# Patient Record
Sex: MALE | Race: BLACK OR AFRICAN AMERICAN | Employment: UNEMPLOYED | URBAN - METROPOLITAN AREA
[De-identification: names, ages, dates, MRNs, and addresses within clinical notes are randomized per-mention and may not be internally consistent; named-entity substitution may affect disease eponyms.]

---

## 2017-03-15 ENCOUNTER — PATIENT OUTREACH (OUTPATIENT)
Dept: INTERNAL MEDICINE CLINIC | Age: 62
End: 2017-03-15

## 2017-03-15 NOTE — PROGRESS NOTES
Goals        Post Hospitalization     Prevent complications post hospitalization. 3/15/17: Patient currently still admitted to [facility]; Wife to contact this NN with updates regarding discharge planning/discharge date.

## 2017-03-15 NOTE — PROGRESS NOTES
[address] [facility] Post-Hospitalization          NN follow-up    Patient referred to this NN via Referral from [address] 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 Patient's most recent Office Visit with PCP: 10/27/16; was advised to follow-up in 6 months. Most recent HIPAA form in the patient's chart (dated 10/27/16) was reviewed; authorized individual(s) listed on HIPAA form include Rick Gooden. NN follow-up phone call  NN spoke with the patient's Wife, Ya Ozuna, today to complete NN post-hospital discharge assessment. Two patient identifiers verified. NN introduced self to the patient's Wife, including NN role and purpose of NN follow-up phone call; patient's Wife verbalizes understanding. Per Mrs. Ryder Fernández, the patient is still currently admitted to [facility]; reports that patient may possibly be discharged from [facility] tomorrow, however discharge disposition is currently unknown. NN requested that Mrs. Ryder Fernández contact this NN with updates regarding patient's discharge planning and notify this NN upon patient's discharge from [facility]; Mrs. Ryder Fernández is agreeable to this. NN will route this encounter to Dr. Tan Oneill for notification/review. This note will not be viewable in [address]

## 2017-03-17 ENCOUNTER — HOSPITAL ENCOUNTER (OUTPATIENT)
Age: 62
Discharge: HOME HEALTH | End: 2017-03-31
Attending: PHYSICAL MEDICINE & REHABILITATION | Admitting: PHYSICAL MEDICINE & REHABILITATION

## 2017-03-17 PROCEDURE — 74011250636 HC RX REV CODE- 250/636: Performed by: PHYSICAL MEDICINE & REHABILITATION

## 2017-03-17 PROCEDURE — 74011250637 HC RX REV CODE- 250/637: Performed by: PHYSICAL MEDICINE & REHABILITATION

## 2017-03-17 RX ORDER — LISINOPRIL 20 MG/1
20 TABLET ORAL EVERY EVENING
Status: DISCONTINUED | OUTPATIENT
Start: 2017-03-17 | End: 2017-03-20

## 2017-03-17 RX ORDER — MAGNESIUM SULFATE 100 %
16 CRYSTALS MISCELLANEOUS AS NEEDED
Status: DISCONTINUED | OUTPATIENT
Start: 2017-03-17 | End: 2017-03-31 | Stop reason: HOSPADM

## 2017-03-17 RX ORDER — OXYCODONE HYDROCHLORIDE 5 MG/1
5 TABLET ORAL
Status: DISCONTINUED | OUTPATIENT
Start: 2017-03-17 | End: 2017-03-30

## 2017-03-17 RX ORDER — HYDRALAZINE HYDROCHLORIDE 10 MG/1
10 TABLET, FILM COATED ORAL 2 TIMES DAILY
Status: DISCONTINUED | OUTPATIENT
Start: 2017-03-17 | End: 2017-03-27

## 2017-03-17 RX ORDER — CARVEDILOL 12.5 MG/1
37.5 TABLET ORAL 2 TIMES DAILY WITH MEALS
Status: DISCONTINUED | OUTPATIENT
Start: 2017-03-17 | End: 2017-03-20

## 2017-03-17 RX ORDER — VANCOMYCIN/0.9 % SOD CHLORIDE 1.5G/250ML
1500 PLASTIC BAG, INJECTION (ML) INTRAVENOUS EVERY 12 HOURS
Status: ACTIVE | OUTPATIENT
Start: 2017-03-17 | End: 2017-03-22

## 2017-03-17 RX ORDER — ACETAMINOPHEN 325 MG/1
650 TABLET ORAL
Status: DISCONTINUED | OUTPATIENT
Start: 2017-03-17 | End: 2017-03-31 | Stop reason: HOSPADM

## 2017-03-17 RX ORDER — ADHESIVE BANDAGE
30 BANDAGE TOPICAL DAILY PRN
Status: DISCONTINUED | OUTPATIENT
Start: 2017-03-17 | End: 2017-03-31 | Stop reason: HOSPADM

## 2017-03-17 RX ORDER — AMOXICILLIN 250 MG
1 CAPSULE ORAL 2 TIMES DAILY
Status: DISCONTINUED | OUTPATIENT
Start: 2017-03-17 | End: 2017-03-31 | Stop reason: HOSPADM

## 2017-03-17 RX ORDER — IPRATROPIUM BROMIDE AND ALBUTEROL SULFATE 2.5; .5 MG/3ML; MG/3ML
3 SOLUTION RESPIRATORY (INHALATION)
Status: DISCONTINUED | OUTPATIENT
Start: 2017-03-17 | End: 2017-03-31 | Stop reason: HOSPADM

## 2017-03-17 RX ORDER — ZOLPIDEM TARTRATE 5 MG/1
5 TABLET ORAL
Status: DISCONTINUED | OUTPATIENT
Start: 2017-03-17 | End: 2017-03-31 | Stop reason: HOSPADM

## 2017-03-17 RX ORDER — NYSTATIN 100000 [USP'U]/ML
500000 SUSPENSION ORAL 4 TIMES DAILY
Status: DISPENSED | OUTPATIENT
Start: 2017-03-17 | End: 2017-03-22

## 2017-03-17 RX ORDER — LANOLIN ALCOHOL/MO/W.PET/CERES
800 CREAM (GRAM) TOPICAL 2 TIMES DAILY
Status: DISCONTINUED | OUTPATIENT
Start: 2017-03-17 | End: 2017-03-31 | Stop reason: HOSPADM

## 2017-03-17 RX ORDER — SODIUM CHLORIDE 0.9 % (FLUSH) 0.9 %
5 SYRINGE (ML) INJECTION AS NEEDED
Status: DISCONTINUED | OUTPATIENT
Start: 2017-03-17 | End: 2017-03-23 | Stop reason: ALTCHOICE

## 2017-03-17 RX ORDER — FACIAL-BODY WIPES
10 EACH TOPICAL DAILY PRN
Status: DISCONTINUED | OUTPATIENT
Start: 2017-03-17 | End: 2017-03-31 | Stop reason: HOSPADM

## 2017-03-17 RX ORDER — OXYCODONE HCL 10 MG/1
10 TABLET, FILM COATED, EXTENDED RELEASE ORAL EVERY 12 HOURS
Status: DISCONTINUED | OUTPATIENT
Start: 2017-03-17 | End: 2017-03-19

## 2017-03-17 RX ORDER — AMOXICILLIN AND CLAVULANATE POTASSIUM 875; 125 MG/1; MG/1
1 TABLET, FILM COATED ORAL EVERY 12 HOURS
Status: COMPLETED | OUTPATIENT
Start: 2017-03-17 | End: 2017-03-22

## 2017-03-17 RX ORDER — INSULIN LISPRO 100 [IU]/ML
INJECTION, SOLUTION INTRAVENOUS; SUBCUTANEOUS
Status: DISCONTINUED | OUTPATIENT
Start: 2017-03-17 | End: 2017-03-28

## 2017-03-17 RX ORDER — DEXTROSE 50 % IN WATER (D50W) INTRAVENOUS SYRINGE
25 AS NEEDED
Status: DISCONTINUED | OUTPATIENT
Start: 2017-03-17 | End: 2017-03-31 | Stop reason: HOSPADM

## 2017-03-17 RX ORDER — ATORVASTATIN CALCIUM 40 MG/1
80 TABLET, FILM COATED ORAL
Status: DISCONTINUED | OUTPATIENT
Start: 2017-03-17 | End: 2017-03-31 | Stop reason: HOSPADM

## 2017-03-17 RX ORDER — GUAIFENESIN 100 MG/5ML
200 SOLUTION ORAL
Status: DISCONTINUED | OUTPATIENT
Start: 2017-03-17 | End: 2017-03-23

## 2017-03-17 RX ORDER — ONDANSETRON 4 MG/1
4 TABLET, ORALLY DISINTEGRATING ORAL
Status: DISCONTINUED | OUTPATIENT
Start: 2017-03-17 | End: 2017-03-31 | Stop reason: HOSPADM

## 2017-03-17 RX ORDER — OXYCODONE HYDROCHLORIDE 5 MG/1
10 TABLET ORAL
Status: DISCONTINUED | OUTPATIENT
Start: 2017-03-17 | End: 2017-03-30

## 2017-03-17 RX ORDER — LEVOFLOXACIN 750 MG/1
750 TABLET ORAL DAILY
Status: COMPLETED | OUTPATIENT
Start: 2017-03-18 | End: 2017-03-22

## 2017-03-17 RX ORDER — SODIUM CHLORIDE 0.9 % (FLUSH) 0.9 %
5 SYRINGE (ML) INJECTION EVERY 8 HOURS
Status: DISCONTINUED | OUTPATIENT
Start: 2017-03-17 | End: 2017-03-23 | Stop reason: ALTCHOICE

## 2017-03-17 RX ADMIN — CARVEDILOL 37.5 MG: 12.5 TABLET, FILM COATED ORAL at 19:25

## 2017-03-17 RX ADMIN — RIVAROXABAN 20 MG: 20 TABLET, FILM COATED ORAL at 19:25

## 2017-03-17 RX ADMIN — Medication 1 TABLET: at 20:44

## 2017-03-17 RX ADMIN — HYDRALAZINE HYDROCHLORIDE 10 MG: 10 TABLET, FILM COATED ORAL at 20:44

## 2017-03-17 RX ADMIN — GUAIFENESIN 200 MG: 100 SOLUTION ORAL at 22:06

## 2017-03-17 RX ADMIN — INSULIN LISPRO 2 UNITS: 100 INJECTION, SOLUTION INTRAVENOUS; SUBCUTANEOUS at 22:00

## 2017-03-17 RX ADMIN — GUAIFENESIN 200 MG: 100 SOLUTION ORAL at 19:29

## 2017-03-17 RX ADMIN — Medication 800 MG: at 20:44

## 2017-03-17 RX ADMIN — LISINOPRIL 20 MG: 20 TABLET ORAL at 19:25

## 2017-03-17 RX ADMIN — VANCOMYCIN HYDROCHLORIDE 1500 MG: 10 INJECTION, POWDER, LYOPHILIZED, FOR SOLUTION INTRAVENOUS at 22:07

## 2017-03-17 RX ADMIN — OXYCODONE HYDROCHLORIDE 10 MG: 10 TABLET, FILM COATED, EXTENDED RELEASE ORAL at 20:44

## 2017-03-17 RX ADMIN — NYSTATIN 500000 UNITS: 100000 SUSPENSION ORAL at 20:45

## 2017-03-17 RX ADMIN — ATORVASTATIN CALCIUM 80 MG: 40 TABLET, FILM COATED ORAL at 20:44

## 2017-03-17 RX ADMIN — LEVETIRACETAM 750 MG: 250 TABLET, FILM COATED ORAL at 20:45

## 2017-03-17 RX ADMIN — AMOXICILLIN AND CLAVULANATE POTASSIUM 1 TABLET: 875; 125 TABLET, FILM COATED ORAL at 20:45

## 2017-03-18 LAB — VANCOMYCIN SERPL-MCNC: 14.6 UG/ML

## 2017-03-18 PROCEDURE — 74011250637 HC RX REV CODE- 250/637: Performed by: PHYSICAL MEDICINE & REHABILITATION

## 2017-03-18 PROCEDURE — 74011250636 HC RX REV CODE- 250/636: Performed by: PHYSICAL MEDICINE & REHABILITATION

## 2017-03-18 PROCEDURE — 36415 COLL VENOUS BLD VENIPUNCTURE: CPT | Performed by: PHYSICAL MEDICINE & REHABILITATION

## 2017-03-18 PROCEDURE — 80202 ASSAY OF VANCOMYCIN: CPT | Performed by: PHYSICAL MEDICINE & REHABILITATION

## 2017-03-18 RX ADMIN — LEVETIRACETAM 750 MG: 250 TABLET, FILM COATED ORAL at 08:59

## 2017-03-18 RX ADMIN — Medication 5 ML: at 21:05

## 2017-03-18 RX ADMIN — OXYCODONE HYDROCHLORIDE 10 MG: 10 TABLET, FILM COATED, EXTENDED RELEASE ORAL at 20:59

## 2017-03-18 RX ADMIN — Medication 1 TABLET: at 20:58

## 2017-03-18 RX ADMIN — Medication 800 MG: at 21:04

## 2017-03-18 RX ADMIN — AMOXICILLIN AND CLAVULANATE POTASSIUM 1 TABLET: 875; 125 TABLET, FILM COATED ORAL at 20:58

## 2017-03-18 RX ADMIN — Medication 5 ML: at 05:07

## 2017-03-18 RX ADMIN — Medication 800 MG: at 08:59

## 2017-03-18 RX ADMIN — CARVEDILOL 37.5 MG: 12.5 TABLET, FILM COATED ORAL at 17:40

## 2017-03-18 RX ADMIN — LEVETIRACETAM 750 MG: 250 TABLET, FILM COATED ORAL at 21:00

## 2017-03-18 RX ADMIN — Medication 1 TABLET: at 08:59

## 2017-03-18 RX ADMIN — HYDRALAZINE HYDROCHLORIDE 10 MG: 10 TABLET, FILM COATED ORAL at 21:29

## 2017-03-18 RX ADMIN — GUAIFENESIN 200 MG: 100 SOLUTION ORAL at 14:35

## 2017-03-18 RX ADMIN — OXYCODONE HYDROCHLORIDE 10 MG: 10 TABLET, FILM COATED, EXTENDED RELEASE ORAL at 09:00

## 2017-03-18 RX ADMIN — OXYCODONE HYDROCHLORIDE 10 MG: 5 TABLET ORAL at 05:18

## 2017-03-18 RX ADMIN — Medication 1 CAPSULE: at 08:59

## 2017-03-18 RX ADMIN — NYSTATIN 500000 UNITS: 100000 SUSPENSION ORAL at 08:59

## 2017-03-18 RX ADMIN — AMOXICILLIN AND CLAVULANATE POTASSIUM 1 TABLET: 875; 125 TABLET, FILM COATED ORAL at 08:59

## 2017-03-18 RX ADMIN — HYDRALAZINE HYDROCHLORIDE 10 MG: 10 TABLET, FILM COATED ORAL at 09:00

## 2017-03-18 RX ADMIN — RIVAROXABAN 20 MG: 20 TABLET, FILM COATED ORAL at 17:40

## 2017-03-18 RX ADMIN — CARVEDILOL 37.5 MG: 12.5 TABLET, FILM COATED ORAL at 09:00

## 2017-03-18 RX ADMIN — INSULIN LISPRO 2 UNITS: 100 INJECTION, SOLUTION INTRAVENOUS; SUBCUTANEOUS at 09:00

## 2017-03-18 RX ADMIN — LISINOPRIL 20 MG: 20 TABLET ORAL at 17:40

## 2017-03-18 RX ADMIN — LEVOFLOXACIN 750 MG: 750 TABLET, FILM COATED ORAL at 08:59

## 2017-03-18 RX ADMIN — GUAIFENESIN 200 MG: 100 SOLUTION ORAL at 05:07

## 2017-03-18 RX ADMIN — ATORVASTATIN CALCIUM 80 MG: 40 TABLET, FILM COATED ORAL at 20:59

## 2017-03-18 RX ADMIN — VANCOMYCIN HYDROCHLORIDE 1500 MG: 10 INJECTION, POWDER, LYOPHILIZED, FOR SOLUTION INTRAVENOUS at 11:20

## 2017-03-18 RX ADMIN — Medication 5 ML: at 14:19

## 2017-03-18 RX ADMIN — GUAIFENESIN 200 MG: 100 SOLUTION ORAL at 20:58

## 2017-03-18 RX ADMIN — GUAIFENESIN 200 MG: 100 SOLUTION ORAL at 17:40

## 2017-03-18 RX ADMIN — NYSTATIN 500000 UNITS: 100000 SUSPENSION ORAL at 17:41

## 2017-03-18 RX ADMIN — GUAIFENESIN 200 MG: 100 SOLUTION ORAL at 09:00

## 2017-03-19 LAB
ANION GAP BLD CALC-SCNC: 10 MMOL/L (ref 5–15)
BUN SERPL-MCNC: 11 MG/DL (ref 6–20)
BUN/CREAT SERPL: 9 (ref 12–20)
CALCIUM SERPL-MCNC: 8.3 MG/DL (ref 8.5–10.1)
CHLORIDE SERPL-SCNC: 104 MMOL/L (ref 97–108)
CO2 SERPL-SCNC: 26 MMOL/L (ref 21–32)
CREAT SERPL-MCNC: 1.16 MG/DL (ref 0.7–1.3)
GLUCOSE SERPL-MCNC: 167 MG/DL (ref 65–100)
POTASSIUM SERPL-SCNC: 3.7 MMOL/L (ref 3.5–5.1)
SODIUM SERPL-SCNC: 140 MMOL/L (ref 136–145)

## 2017-03-19 PROCEDURE — 74011250636 HC RX REV CODE- 250/636: Performed by: PHYSICAL MEDICINE & REHABILITATION

## 2017-03-19 PROCEDURE — 36415 COLL VENOUS BLD VENIPUNCTURE: CPT | Performed by: PHYSICAL MEDICINE & REHABILITATION

## 2017-03-19 PROCEDURE — 74011250637 HC RX REV CODE- 250/637: Performed by: PHYSICAL MEDICINE & REHABILITATION

## 2017-03-19 PROCEDURE — 80048 BASIC METABOLIC PNL TOTAL CA: CPT | Performed by: PHYSICAL MEDICINE & REHABILITATION

## 2017-03-19 RX ORDER — OXYCODONE HCL 20 MG/1
20 TABLET, FILM COATED, EXTENDED RELEASE ORAL EVERY 12 HOURS
Status: DISCONTINUED | OUTPATIENT
Start: 2017-03-19 | End: 2017-03-21

## 2017-03-19 RX ORDER — LIDOCAINE 50 MG/G
1 PATCH TOPICAL EVERY 24 HOURS
Status: DISCONTINUED | OUTPATIENT
Start: 2017-03-19 | End: 2017-03-21

## 2017-03-19 RX ADMIN — OXYCODONE HYDROCHLORIDE 10 MG: 5 TABLET ORAL at 12:13

## 2017-03-19 RX ADMIN — INSULIN LISPRO 2 UNITS: 100 INJECTION, SOLUTION INTRAVENOUS; SUBCUTANEOUS at 11:30

## 2017-03-19 RX ADMIN — RIVAROXABAN 20 MG: 20 TABLET, FILM COATED ORAL at 18:04

## 2017-03-19 RX ADMIN — OXYCODONE HYDROCHLORIDE 10 MG: 5 TABLET ORAL at 18:03

## 2017-03-19 RX ADMIN — Medication 5 ML: at 06:44

## 2017-03-19 RX ADMIN — ACETAMINOPHEN 650 MG: 325 TABLET, FILM COATED ORAL at 18:03

## 2017-03-19 RX ADMIN — VANCOMYCIN HYDROCHLORIDE 1500 MG: 10 INJECTION, POWDER, LYOPHILIZED, FOR SOLUTION INTRAVENOUS at 01:15

## 2017-03-19 RX ADMIN — HYDRALAZINE HYDROCHLORIDE 10 MG: 10 TABLET, FILM COATED ORAL at 08:17

## 2017-03-19 RX ADMIN — ACETAMINOPHEN 650 MG: 325 TABLET, FILM COATED ORAL at 12:13

## 2017-03-19 RX ADMIN — Medication 800 MG: at 22:19

## 2017-03-19 RX ADMIN — LEVETIRACETAM 750 MG: 250 TABLET, FILM COATED ORAL at 22:20

## 2017-03-19 RX ADMIN — Medication 1 TABLET: at 22:21

## 2017-03-19 RX ADMIN — Medication 1 TABLET: at 08:17

## 2017-03-19 RX ADMIN — NYSTATIN 500000 UNITS: 100000 SUSPENSION ORAL at 18:04

## 2017-03-19 RX ADMIN — OXYCODONE HYDROCHLORIDE 10 MG: 5 TABLET ORAL at 08:18

## 2017-03-19 RX ADMIN — Medication 800 MG: at 08:17

## 2017-03-19 RX ADMIN — LEVOFLOXACIN 750 MG: 750 TABLET, FILM COATED ORAL at 08:17

## 2017-03-19 RX ADMIN — ACETAMINOPHEN 650 MG: 325 TABLET, FILM COATED ORAL at 22:18

## 2017-03-19 RX ADMIN — ACETAMINOPHEN 650 MG: 325 TABLET, FILM COATED ORAL at 15:28

## 2017-03-19 RX ADMIN — NYSTATIN 500000 UNITS: 100000 SUSPENSION ORAL at 22:21

## 2017-03-19 RX ADMIN — ACETAMINOPHEN 650 MG: 325 TABLET, FILM COATED ORAL at 08:18

## 2017-03-19 RX ADMIN — GUAIFENESIN 200 MG: 100 SOLUTION ORAL at 22:21

## 2017-03-19 RX ADMIN — AMOXICILLIN AND CLAVULANATE POTASSIUM 1 TABLET: 875; 125 TABLET, FILM COATED ORAL at 08:17

## 2017-03-19 RX ADMIN — GUAIFENESIN 200 MG: 100 SOLUTION ORAL at 18:04

## 2017-03-19 RX ADMIN — INSULIN LISPRO 2 UNITS: 100 INJECTION, SOLUTION INTRAVENOUS; SUBCUTANEOUS at 00:06

## 2017-03-19 RX ADMIN — OXYCODONE HYDROCHLORIDE 20 MG: 20 TABLET, FILM COATED, EXTENDED RELEASE ORAL at 22:20

## 2017-03-19 RX ADMIN — Medication 1 CAPSULE: at 08:17

## 2017-03-19 RX ADMIN — HYDRALAZINE HYDROCHLORIDE 10 MG: 10 TABLET, FILM COATED ORAL at 22:21

## 2017-03-19 RX ADMIN — OXYCODONE HYDROCHLORIDE 5 MG: 5 TABLET ORAL at 22:20

## 2017-03-19 RX ADMIN — OXYCODONE HYDROCHLORIDE 10 MG: 10 TABLET, FILM COATED, EXTENDED RELEASE ORAL at 08:18

## 2017-03-19 RX ADMIN — INSULIN LISPRO 2 UNITS: 100 INJECTION, SOLUTION INTRAVENOUS; SUBCUTANEOUS at 18:02

## 2017-03-19 RX ADMIN — CARVEDILOL 37.5 MG: 12.5 TABLET, FILM COATED ORAL at 18:03

## 2017-03-19 RX ADMIN — Medication 5 ML: at 22:31

## 2017-03-19 RX ADMIN — ATORVASTATIN CALCIUM 80 MG: 40 TABLET, FILM COATED ORAL at 22:18

## 2017-03-19 RX ADMIN — LISINOPRIL 20 MG: 20 TABLET ORAL at 18:04

## 2017-03-19 RX ADMIN — Medication 5 ML: at 14:15

## 2017-03-19 RX ADMIN — OXYCODONE HYDROCHLORIDE 10 MG: 5 TABLET ORAL at 15:28

## 2017-03-19 RX ADMIN — AMOXICILLIN AND CLAVULANATE POTASSIUM 1 TABLET: 875; 125 TABLET, FILM COATED ORAL at 22:21

## 2017-03-19 RX ADMIN — GUAIFENESIN 200 MG: 100 SOLUTION ORAL at 09:11

## 2017-03-19 RX ADMIN — GUAIFENESIN 200 MG: 100 SOLUTION ORAL at 15:28

## 2017-03-19 RX ADMIN — INSULIN LISPRO 2 UNITS: 100 INJECTION, SOLUTION INTRAVENOUS; SUBCUTANEOUS at 22:29

## 2017-03-19 RX ADMIN — NYSTATIN 500000 UNITS: 100000 SUSPENSION ORAL at 08:19

## 2017-03-19 RX ADMIN — OXYCODONE HYDROCHLORIDE 10 MG: 5 TABLET ORAL at 03:55

## 2017-03-19 RX ADMIN — GUAIFENESIN 200 MG: 100 SOLUTION ORAL at 06:44

## 2017-03-19 RX ADMIN — CARVEDILOL 37.5 MG: 12.5 TABLET, FILM COATED ORAL at 08:17

## 2017-03-19 RX ADMIN — LEVETIRACETAM 750 MG: 250 TABLET, FILM COATED ORAL at 08:17

## 2017-03-19 RX ADMIN — VANCOMYCIN HYDROCHLORIDE 1500 MG: 10 INJECTION, POWDER, LYOPHILIZED, FOR SOLUTION INTRAVENOUS at 09:18

## 2017-03-19 RX ADMIN — VANCOMYCIN HYDROCHLORIDE 1500 MG: 10 INJECTION, POWDER, LYOPHILIZED, FOR SOLUTION INTRAVENOUS at 22:41

## 2017-03-19 RX ADMIN — NYSTATIN 500000 UNITS: 100000 SUSPENSION ORAL at 12:27

## 2017-03-20 LAB
ALBUMIN SERPL BCP-MCNC: 2.6 G/DL (ref 3.5–5)
ALBUMIN/GLOB SERPL: 0.5 {RATIO} (ref 1.1–2.2)
ALP SERPL-CCNC: 71 U/L (ref 45–117)
ALT SERPL-CCNC: 20 U/L (ref 12–78)
ANION GAP BLD CALC-SCNC: 9 MMOL/L (ref 5–15)
AST SERPL W P-5'-P-CCNC: 17 U/L (ref 15–37)
BILIRUB SERPL-MCNC: 0.4 MG/DL (ref 0.2–1)
BUN SERPL-MCNC: 12 MG/DL (ref 6–20)
BUN/CREAT SERPL: 8 (ref 12–20)
CALCIUM SERPL-MCNC: 8.3 MG/DL (ref 8.5–10.1)
CHLORIDE SERPL-SCNC: 103 MMOL/L (ref 97–108)
CO2 SERPL-SCNC: 28 MMOL/L (ref 21–32)
CREAT SERPL-MCNC: 1.45 MG/DL (ref 0.7–1.3)
ERYTHROCYTE [DISTWIDTH] IN BLOOD BY AUTOMATED COUNT: 14.1 % (ref 11.5–14.5)
GLOBULIN SER CALC-MCNC: 4.9 G/DL (ref 2–4)
GLUCOSE SERPL-MCNC: 126 MG/DL (ref 65–100)
HCT VFR BLD AUTO: 37.9 % (ref 36.6–50.3)
HGB BLD-MCNC: 12.5 G/DL (ref 12.1–17)
MAGNESIUM SERPL-MCNC: 2.2 MG/DL (ref 1.6–2.4)
MCH RBC QN AUTO: 27.8 PG (ref 26–34)
MCHC RBC AUTO-ENTMCNC: 33 G/DL (ref 30–36.5)
MCV RBC AUTO: 84.2 FL (ref 80–99)
PLATELET # BLD AUTO: 269 K/UL (ref 150–400)
POTASSIUM SERPL-SCNC: 3.8 MMOL/L (ref 3.5–5.1)
PROT SERPL-MCNC: 7.5 G/DL (ref 6.4–8.2)
RBC # BLD AUTO: 4.5 M/UL (ref 4.1–5.7)
SODIUM SERPL-SCNC: 140 MMOL/L (ref 136–145)
VANCOMYCIN SERPL-MCNC: 20.1 UG/ML
WBC # BLD AUTO: 7.3 K/UL (ref 4.1–11.1)

## 2017-03-20 PROCEDURE — 74011250636 HC RX REV CODE- 250/636: Performed by: PHYSICAL MEDICINE & REHABILITATION

## 2017-03-20 PROCEDURE — 83735 ASSAY OF MAGNESIUM: CPT | Performed by: PHYSICAL MEDICINE & REHABILITATION

## 2017-03-20 PROCEDURE — 74011250637 HC RX REV CODE- 250/637: Performed by: PHYSICAL MEDICINE & REHABILITATION

## 2017-03-20 PROCEDURE — 80202 ASSAY OF VANCOMYCIN: CPT | Performed by: PHYSICAL MEDICINE & REHABILITATION

## 2017-03-20 PROCEDURE — 85027 COMPLETE CBC AUTOMATED: CPT | Performed by: PHYSICAL MEDICINE & REHABILITATION

## 2017-03-20 PROCEDURE — 80053 COMPREHEN METABOLIC PANEL: CPT | Performed by: PHYSICAL MEDICINE & REHABILITATION

## 2017-03-20 PROCEDURE — 36415 COLL VENOUS BLD VENIPUNCTURE: CPT | Performed by: PHYSICAL MEDICINE & REHABILITATION

## 2017-03-20 RX ORDER — CARVEDILOL 12.5 MG/1
25 TABLET ORAL 2 TIMES DAILY WITH MEALS
Status: DISCONTINUED | OUTPATIENT
Start: 2017-03-20 | End: 2017-03-31 | Stop reason: HOSPADM

## 2017-03-20 RX ORDER — SODIUM CHLORIDE 9 MG/ML
75 INJECTION, SOLUTION INTRAVENOUS ONCE
Status: COMPLETED | OUTPATIENT
Start: 2017-03-20 | End: 2017-03-20

## 2017-03-20 RX ORDER — LISINOPRIL 20 MG/1
20 TABLET ORAL EVERY EVENING
Status: DISCONTINUED | OUTPATIENT
Start: 2017-03-23 | End: 2017-03-31 | Stop reason: HOSPADM

## 2017-03-20 RX ADMIN — Medication 5 ML: at 10:43

## 2017-03-20 RX ADMIN — LEVETIRACETAM 750 MG: 250 TABLET, FILM COATED ORAL at 22:35

## 2017-03-20 RX ADMIN — NYSTATIN 500000 UNITS: 100000 SUSPENSION ORAL at 22:36

## 2017-03-20 RX ADMIN — NYSTATIN 500000 UNITS: 100000 SUSPENSION ORAL at 12:49

## 2017-03-20 RX ADMIN — OXYCODONE HYDROCHLORIDE 5 MG: 5 TABLET ORAL at 21:39

## 2017-03-20 RX ADMIN — CARVEDILOL 37.5 MG: 12.5 TABLET, FILM COATED ORAL at 09:25

## 2017-03-20 RX ADMIN — NYSTATIN 500000 UNITS: 100000 SUSPENSION ORAL at 09:25

## 2017-03-20 RX ADMIN — CARVEDILOL 25 MG: 12.5 TABLET, FILM COATED ORAL at 17:19

## 2017-03-20 RX ADMIN — Medication 800 MG: at 22:35

## 2017-03-20 RX ADMIN — GUAIFENESIN 200 MG: 100 SOLUTION ORAL at 17:21

## 2017-03-20 RX ADMIN — Medication 5 ML: at 23:10

## 2017-03-20 RX ADMIN — HYDRALAZINE HYDROCHLORIDE 10 MG: 10 TABLET, FILM COATED ORAL at 22:36

## 2017-03-20 RX ADMIN — Medication 5 ML: at 14:18

## 2017-03-20 RX ADMIN — GUAIFENESIN 200 MG: 100 SOLUTION ORAL at 13:03

## 2017-03-20 RX ADMIN — OXYCODONE HYDROCHLORIDE 5 MG: 5 TABLET ORAL at 14:29

## 2017-03-20 RX ADMIN — LEVOFLOXACIN 750 MG: 750 TABLET, FILM COATED ORAL at 09:26

## 2017-03-20 RX ADMIN — GUAIFENESIN 200 MG: 100 SOLUTION ORAL at 09:26

## 2017-03-20 RX ADMIN — AMOXICILLIN AND CLAVULANATE POTASSIUM 1 TABLET: 875; 125 TABLET, FILM COATED ORAL at 09:25

## 2017-03-20 RX ADMIN — VANCOMYCIN HYDROCHLORIDE 1500 MG: 10 INJECTION, POWDER, LYOPHILIZED, FOR SOLUTION INTRAVENOUS at 10:46

## 2017-03-20 RX ADMIN — OXYCODONE HYDROCHLORIDE 20 MG: 20 TABLET, FILM COATED, EXTENDED RELEASE ORAL at 09:26

## 2017-03-20 RX ADMIN — NYSTATIN 500000 UNITS: 100000 SUSPENSION ORAL at 17:21

## 2017-03-20 RX ADMIN — LEVETIRACETAM 750 MG: 250 TABLET, FILM COATED ORAL at 09:26

## 2017-03-20 RX ADMIN — Medication 5 ML: at 06:23

## 2017-03-20 RX ADMIN — OXYCODONE HYDROCHLORIDE 5 MG: 5 TABLET ORAL at 06:22

## 2017-03-20 RX ADMIN — Medication 1 TABLET: at 22:36

## 2017-03-20 RX ADMIN — GUAIFENESIN 200 MG: 100 SOLUTION ORAL at 06:22

## 2017-03-20 RX ADMIN — VANCOMYCIN HYDROCHLORIDE 1500 MG: 10 INJECTION, POWDER, LYOPHILIZED, FOR SOLUTION INTRAVENOUS at 23:09

## 2017-03-20 RX ADMIN — Medication 800 MG: at 09:25

## 2017-03-20 RX ADMIN — HYDRALAZINE HYDROCHLORIDE 10 MG: 10 TABLET, FILM COATED ORAL at 09:26

## 2017-03-20 RX ADMIN — Medication 1 CAPSULE: at 09:26

## 2017-03-20 RX ADMIN — AMOXICILLIN AND CLAVULANATE POTASSIUM 1 TABLET: 875; 125 TABLET, FILM COATED ORAL at 22:35

## 2017-03-20 RX ADMIN — RIVAROXABAN 20 MG: 20 TABLET, FILM COATED ORAL at 17:19

## 2017-03-20 RX ADMIN — Medication 1 TABLET: at 12:50

## 2017-03-20 RX ADMIN — ATORVASTATIN CALCIUM 80 MG: 40 TABLET, FILM COATED ORAL at 22:35

## 2017-03-20 RX ADMIN — GUAIFENESIN 200 MG: 100 SOLUTION ORAL at 22:36

## 2017-03-20 RX ADMIN — SODIUM CHLORIDE 75 ML/HR: 9 INJECTION, SOLUTION INTRAVENOUS at 17:18

## 2017-03-21 LAB
ANION GAP BLD CALC-SCNC: 9 MMOL/L (ref 5–15)
BUN SERPL-MCNC: 8 MG/DL (ref 6–20)
BUN/CREAT SERPL: 6 (ref 12–20)
CALCIUM SERPL-MCNC: 8.3 MG/DL (ref 8.5–10.1)
CHLORIDE SERPL-SCNC: 106 MMOL/L (ref 97–108)
CO2 SERPL-SCNC: 28 MMOL/L (ref 21–32)
CREAT SERPL-MCNC: 1.34 MG/DL (ref 0.7–1.3)
GLUCOSE SERPL-MCNC: 157 MG/DL (ref 65–100)
POTASSIUM SERPL-SCNC: 3.6 MMOL/L (ref 3.5–5.1)
SODIUM SERPL-SCNC: 143 MMOL/L (ref 136–145)

## 2017-03-21 PROCEDURE — 80048 BASIC METABOLIC PNL TOTAL CA: CPT | Performed by: PHYSICAL MEDICINE & REHABILITATION

## 2017-03-21 PROCEDURE — 74011250637 HC RX REV CODE- 250/637: Performed by: PHYSICAL MEDICINE & REHABILITATION

## 2017-03-21 PROCEDURE — 74011250636 HC RX REV CODE- 250/636: Performed by: PHYSICAL MEDICINE & REHABILITATION

## 2017-03-21 PROCEDURE — 36415 COLL VENOUS BLD VENIPUNCTURE: CPT | Performed by: PHYSICAL MEDICINE & REHABILITATION

## 2017-03-21 RX ORDER — LIDOCAINE 50 MG/G
2 PATCH TOPICAL DAILY
Status: DISCONTINUED | OUTPATIENT
Start: 2017-03-22 | End: 2017-03-31 | Stop reason: HOSPADM

## 2017-03-21 RX ORDER — LIDOCAINE 50 MG/G
3 PATCH TOPICAL DAILY
Status: DISCONTINUED | OUTPATIENT
Start: 2017-03-21 | End: 2017-03-21

## 2017-03-21 RX ORDER — LIDOCAINE 50 MG/G
2 PATCH TOPICAL DAILY
Status: DISCONTINUED | OUTPATIENT
Start: 2017-03-21 | End: 2017-03-21

## 2017-03-21 RX ADMIN — INSULIN LISPRO 2 UNITS: 100 INJECTION, SOLUTION INTRAVENOUS; SUBCUTANEOUS at 21:37

## 2017-03-21 RX ADMIN — NYSTATIN 500000 UNITS: 100000 SUSPENSION ORAL at 09:48

## 2017-03-21 RX ADMIN — AMOXICILLIN AND CLAVULANATE POTASSIUM 1 TABLET: 875; 125 TABLET, FILM COATED ORAL at 21:02

## 2017-03-21 RX ADMIN — NYSTATIN 500000 UNITS: 100000 SUSPENSION ORAL at 17:06

## 2017-03-21 RX ADMIN — CARVEDILOL 25 MG: 12.5 TABLET, FILM COATED ORAL at 17:05

## 2017-03-21 RX ADMIN — Medication 5 ML: at 14:28

## 2017-03-21 RX ADMIN — GUAIFENESIN 200 MG: 100 SOLUTION ORAL at 13:03

## 2017-03-21 RX ADMIN — Medication 1 CAPSULE: at 09:47

## 2017-03-21 RX ADMIN — RIVAROXABAN 20 MG: 20 TABLET, FILM COATED ORAL at 17:06

## 2017-03-21 RX ADMIN — VANCOMYCIN HYDROCHLORIDE 1500 MG: 10 INJECTION, POWDER, LYOPHILIZED, FOR SOLUTION INTRAVENOUS at 10:02

## 2017-03-21 RX ADMIN — VANCOMYCIN HYDROCHLORIDE 1500 MG: 10 INJECTION, POWDER, LYOPHILIZED, FOR SOLUTION INTRAVENOUS at 21:11

## 2017-03-21 RX ADMIN — GUAIFENESIN 200 MG: 100 SOLUTION ORAL at 09:47

## 2017-03-21 RX ADMIN — Medication 5 ML: at 06:38

## 2017-03-21 RX ADMIN — Medication 1 TABLET: at 21:02

## 2017-03-21 RX ADMIN — NYSTATIN 500000 UNITS: 100000 SUSPENSION ORAL at 21:02

## 2017-03-21 RX ADMIN — Medication 5 ML: at 21:05

## 2017-03-21 RX ADMIN — AMOXICILLIN AND CLAVULANATE POTASSIUM 1 TABLET: 875; 125 TABLET, FILM COATED ORAL at 09:47

## 2017-03-21 RX ADMIN — GUAIFENESIN 200 MG: 100 SOLUTION ORAL at 17:06

## 2017-03-21 RX ADMIN — NYSTATIN 500000 UNITS: 100000 SUSPENSION ORAL at 12:57

## 2017-03-21 RX ADMIN — OXYCODONE HYDROCHLORIDE 5 MG: 5 TABLET ORAL at 15:43

## 2017-03-21 RX ADMIN — Medication 1 TABLET: at 09:47

## 2017-03-21 RX ADMIN — LEVOFLOXACIN 750 MG: 750 TABLET, FILM COATED ORAL at 06:38

## 2017-03-21 RX ADMIN — LEVETIRACETAM 500 MG: 250 TABLET, FILM COATED ORAL at 21:03

## 2017-03-21 RX ADMIN — Medication 800 MG: at 21:02

## 2017-03-21 RX ADMIN — INSULIN LISPRO 2 UNITS: 100 INJECTION, SOLUTION INTRAVENOUS; SUBCUTANEOUS at 12:56

## 2017-03-21 RX ADMIN — CARVEDILOL 25 MG: 12.5 TABLET, FILM COATED ORAL at 09:47

## 2017-03-21 RX ADMIN — LEVETIRACETAM 750 MG: 250 TABLET, FILM COATED ORAL at 09:47

## 2017-03-21 RX ADMIN — HYDRALAZINE HYDROCHLORIDE 10 MG: 10 TABLET, FILM COATED ORAL at 21:04

## 2017-03-21 RX ADMIN — INSULIN LISPRO 2 UNITS: 100 INJECTION, SOLUTION INTRAVENOUS; SUBCUTANEOUS at 10:01

## 2017-03-21 RX ADMIN — OXYCODONE HYDROCHLORIDE 5 MG: 5 TABLET ORAL at 21:05

## 2017-03-21 RX ADMIN — Medication 800 MG: at 09:47

## 2017-03-21 RX ADMIN — HYDRALAZINE HYDROCHLORIDE 10 MG: 10 TABLET, FILM COATED ORAL at 09:47

## 2017-03-21 RX ADMIN — GUAIFENESIN 200 MG: 100 SOLUTION ORAL at 21:02

## 2017-03-21 RX ADMIN — GUAIFENESIN 200 MG: 100 SOLUTION ORAL at 06:33

## 2017-03-21 RX ADMIN — ATORVASTATIN CALCIUM 80 MG: 40 TABLET, FILM COATED ORAL at 21:03

## 2017-03-21 RX ADMIN — INSULIN LISPRO 2 UNITS: 100 INJECTION, SOLUTION INTRAVENOUS; SUBCUTANEOUS at 17:06

## 2017-03-22 PROCEDURE — 74011250637 HC RX REV CODE- 250/637: Performed by: PHYSICAL MEDICINE & REHABILITATION

## 2017-03-22 PROCEDURE — 74011250636 HC RX REV CODE- 250/636: Performed by: PHYSICAL MEDICINE & REHABILITATION

## 2017-03-22 RX ADMIN — NYSTATIN 500000 UNITS: 100000 SUSPENSION ORAL at 16:08

## 2017-03-22 RX ADMIN — CARVEDILOL 25 MG: 12.5 TABLET, FILM COATED ORAL at 16:08

## 2017-03-22 RX ADMIN — AMOXICILLIN AND CLAVULANATE POTASSIUM 1 TABLET: 875; 125 TABLET, FILM COATED ORAL at 08:51

## 2017-03-22 RX ADMIN — CARVEDILOL 25 MG: 12.5 TABLET, FILM COATED ORAL at 08:51

## 2017-03-22 RX ADMIN — OXYCODONE HYDROCHLORIDE 5 MG: 5 TABLET ORAL at 08:58

## 2017-03-22 RX ADMIN — HYDRALAZINE HYDROCHLORIDE 10 MG: 10 TABLET, FILM COATED ORAL at 08:51

## 2017-03-22 RX ADMIN — Medication 1 CAPSULE: at 08:51

## 2017-03-22 RX ADMIN — GUAIFENESIN 200 MG: 100 SOLUTION ORAL at 06:09

## 2017-03-22 RX ADMIN — Medication 5 ML: at 06:09

## 2017-03-22 RX ADMIN — INSULIN LISPRO 2 UNITS: 100 INJECTION, SOLUTION INTRAVENOUS; SUBCUTANEOUS at 17:25

## 2017-03-22 RX ADMIN — GUAIFENESIN 200 MG: 100 SOLUTION ORAL at 13:50

## 2017-03-22 RX ADMIN — HYDRALAZINE HYDROCHLORIDE 10 MG: 10 TABLET, FILM COATED ORAL at 22:07

## 2017-03-22 RX ADMIN — Medication 1 TABLET: at 08:51

## 2017-03-22 RX ADMIN — GUAIFENESIN 200 MG: 100 SOLUTION ORAL at 17:25

## 2017-03-22 RX ADMIN — Medication 800 MG: at 08:51

## 2017-03-22 RX ADMIN — NYSTATIN 500000 UNITS: 100000 SUSPENSION ORAL at 12:35

## 2017-03-22 RX ADMIN — GUAIFENESIN 200 MG: 100 SOLUTION ORAL at 21:58

## 2017-03-22 RX ADMIN — Medication 1 TABLET: at 21:00

## 2017-03-22 RX ADMIN — NYSTATIN 500000 UNITS: 100000 SUSPENSION ORAL at 08:51

## 2017-03-22 RX ADMIN — LEVOFLOXACIN 750 MG: 750 TABLET, FILM COATED ORAL at 08:51

## 2017-03-22 RX ADMIN — GUAIFENESIN 200 MG: 100 SOLUTION ORAL at 09:00

## 2017-03-22 RX ADMIN — Medication 800 MG: at 21:57

## 2017-03-22 RX ADMIN — OXYCODONE HYDROCHLORIDE 5 MG: 5 TABLET ORAL at 12:35

## 2017-03-22 RX ADMIN — RIVAROXABAN 20 MG: 20 TABLET, FILM COATED ORAL at 16:08

## 2017-03-22 RX ADMIN — LEVETIRACETAM 750 MG: 250 TABLET, FILM COATED ORAL at 08:51

## 2017-03-22 RX ADMIN — LEVETIRACETAM 750 MG: 250 TABLET, FILM COATED ORAL at 21:58

## 2017-03-22 RX ADMIN — ATORVASTATIN CALCIUM 80 MG: 40 TABLET, FILM COATED ORAL at 21:58

## 2017-03-22 RX ADMIN — ACETAMINOPHEN 650 MG: 325 TABLET, FILM COATED ORAL at 12:35

## 2017-03-23 ENCOUNTER — PATIENT OUTREACH (OUTPATIENT)
Dept: INTERNAL MEDICINE CLINIC | Age: 62
End: 2017-03-23

## 2017-03-23 PROCEDURE — 74011250637 HC RX REV CODE- 250/637: Performed by: PHYSICAL MEDICINE & REHABILITATION

## 2017-03-23 PROCEDURE — 74011250636 HC RX REV CODE- 250/636: Performed by: PHYSICAL MEDICINE & REHABILITATION

## 2017-03-23 RX ORDER — GUAIFENESIN 100 MG/5ML
200 SOLUTION ORAL
Status: DISCONTINUED | OUTPATIENT
Start: 2017-03-23 | End: 2017-03-31 | Stop reason: HOSPADM

## 2017-03-23 RX ADMIN — OXYCODONE HYDROCHLORIDE 10 MG: 5 TABLET ORAL at 01:46

## 2017-03-23 RX ADMIN — Medication 1 CAPSULE: at 08:34

## 2017-03-23 RX ADMIN — GUAIFENESIN 200 MG: 100 SOLUTION ORAL at 06:23

## 2017-03-23 RX ADMIN — LEVETIRACETAM 750 MG: 250 TABLET, FILM COATED ORAL at 08:35

## 2017-03-23 RX ADMIN — Medication 800 MG: at 21:04

## 2017-03-23 RX ADMIN — RIVAROXABAN 20 MG: 20 TABLET, FILM COATED ORAL at 17:16

## 2017-03-23 RX ADMIN — INSULIN LISPRO 2 UNITS: 100 INJECTION, SOLUTION INTRAVENOUS; SUBCUTANEOUS at 17:16

## 2017-03-23 RX ADMIN — HYDRALAZINE HYDROCHLORIDE 10 MG: 10 TABLET, FILM COATED ORAL at 08:35

## 2017-03-23 RX ADMIN — ATORVASTATIN CALCIUM 80 MG: 40 TABLET, FILM COATED ORAL at 21:04

## 2017-03-23 RX ADMIN — ACETAMINOPHEN 650 MG: 325 TABLET, FILM COATED ORAL at 01:46

## 2017-03-23 RX ADMIN — CARVEDILOL 25 MG: 12.5 TABLET, FILM COATED ORAL at 08:35

## 2017-03-23 RX ADMIN — HYDRALAZINE HYDROCHLORIDE 10 MG: 10 TABLET, FILM COATED ORAL at 21:04

## 2017-03-23 RX ADMIN — LEVETIRACETAM 750 MG: 250 TABLET, FILM COATED ORAL at 21:04

## 2017-03-23 RX ADMIN — Medication 800 MG: at 08:35

## 2017-03-23 RX ADMIN — LISINOPRIL 20 MG: 20 TABLET ORAL at 17:16

## 2017-03-23 RX ADMIN — OXYCODONE HYDROCHLORIDE 10 MG: 5 TABLET ORAL at 14:21

## 2017-03-23 RX ADMIN — Medication 1 TABLET: at 21:04

## 2017-03-23 RX ADMIN — Medication 1 TABLET: at 08:35

## 2017-03-23 RX ADMIN — OXYCODONE HYDROCHLORIDE 10 MG: 5 TABLET ORAL at 17:16

## 2017-03-23 RX ADMIN — GUAIFENESIN 200 MG: 100 SOLUTION ORAL at 08:38

## 2017-03-23 RX ADMIN — CARVEDILOL 25 MG: 12.5 TABLET, FILM COATED ORAL at 17:16

## 2017-03-23 NOTE — PROGRESS NOTES
[facility] Hospitalization             Referral from Amy              Per combined daily census report patient admitted to [facility] on 3/10/17 and discharged on 3/17/17 with diagnosis of malignant neoplasm of unspecified part of right bronchus or lung. Patient currently admitted to [facility]. NN follow-up  Per EMR patient currently remains admitted to [facility] as of 3/17/17;per combined daily census report discharge date 3/24/17. NN will route this encounter to Dr. Roney Ley for notification/review. This note will not be viewable in [address]

## 2017-03-24 PROCEDURE — 74011250637 HC RX REV CODE- 250/637: Performed by: PHYSICAL MEDICINE & REHABILITATION

## 2017-03-24 PROCEDURE — 74011250636 HC RX REV CODE- 250/636: Performed by: PHYSICAL MEDICINE & REHABILITATION

## 2017-03-24 RX ADMIN — OXYCODONE HYDROCHLORIDE 10 MG: 5 TABLET ORAL at 15:32

## 2017-03-24 RX ADMIN — INSULIN LISPRO 2 UNITS: 100 INJECTION, SOLUTION INTRAVENOUS; SUBCUTANEOUS at 22:30

## 2017-03-24 RX ADMIN — GUAIFENESIN 200 MG: 100 SOLUTION ORAL at 22:11

## 2017-03-24 RX ADMIN — Medication 800 MG: at 22:03

## 2017-03-24 RX ADMIN — CARVEDILOL 25 MG: 12.5 TABLET, FILM COATED ORAL at 09:29

## 2017-03-24 RX ADMIN — LISINOPRIL 20 MG: 20 TABLET ORAL at 17:17

## 2017-03-24 RX ADMIN — LEVETIRACETAM 750 MG: 250 TABLET, FILM COATED ORAL at 09:29

## 2017-03-24 RX ADMIN — ATORVASTATIN CALCIUM 80 MG: 40 TABLET, FILM COATED ORAL at 22:04

## 2017-03-24 RX ADMIN — ZOLPIDEM TARTRATE 5 MG: 5 TABLET ORAL at 22:03

## 2017-03-24 RX ADMIN — HYDRALAZINE HYDROCHLORIDE 10 MG: 10 TABLET, FILM COATED ORAL at 09:29

## 2017-03-24 RX ADMIN — Medication 1 TABLET: at 22:04

## 2017-03-24 RX ADMIN — HYDRALAZINE HYDROCHLORIDE 10 MG: 10 TABLET, FILM COATED ORAL at 22:03

## 2017-03-24 RX ADMIN — Medication 1 CAPSULE: at 09:28

## 2017-03-24 RX ADMIN — OXYCODONE HYDROCHLORIDE 10 MG: 5 TABLET ORAL at 22:02

## 2017-03-24 RX ADMIN — RIVAROXABAN 20 MG: 20 TABLET, FILM COATED ORAL at 17:17

## 2017-03-24 RX ADMIN — Medication 1 TABLET: at 09:28

## 2017-03-24 RX ADMIN — OXYCODONE HYDROCHLORIDE 10 MG: 5 TABLET ORAL at 07:24

## 2017-03-24 RX ADMIN — ACETAMINOPHEN 650 MG: 325 TABLET, FILM COATED ORAL at 22:03

## 2017-03-24 RX ADMIN — OXYCODONE HYDROCHLORIDE 10 MG: 5 TABLET ORAL at 01:26

## 2017-03-24 RX ADMIN — Medication 800 MG: at 09:29

## 2017-03-24 RX ADMIN — LEVETIRACETAM 750 MG: 250 TABLET, FILM COATED ORAL at 22:03

## 2017-03-24 RX ADMIN — CARVEDILOL 25 MG: 12.5 TABLET, FILM COATED ORAL at 17:17

## 2017-03-25 PROCEDURE — 74011250637 HC RX REV CODE- 250/637: Performed by: PHYSICAL MEDICINE & REHABILITATION

## 2017-03-25 PROCEDURE — 74011250636 HC RX REV CODE- 250/636: Performed by: PHYSICAL MEDICINE & REHABILITATION

## 2017-03-25 RX ADMIN — LEVETIRACETAM 750 MG: 250 TABLET, FILM COATED ORAL at 21:23

## 2017-03-25 RX ADMIN — LEVETIRACETAM 750 MG: 250 TABLET, FILM COATED ORAL at 09:39

## 2017-03-25 RX ADMIN — CARVEDILOL 25 MG: 12.5 TABLET, FILM COATED ORAL at 17:37

## 2017-03-25 RX ADMIN — Medication 800 MG: at 09:39

## 2017-03-25 RX ADMIN — Medication 1 CAPSULE: at 09:39

## 2017-03-25 RX ADMIN — INSULIN LISPRO 2 UNITS: 100 INJECTION, SOLUTION INTRAVENOUS; SUBCUTANEOUS at 12:45

## 2017-03-25 RX ADMIN — Medication 800 MG: at 21:23

## 2017-03-25 RX ADMIN — HYDRALAZINE HYDROCHLORIDE 10 MG: 10 TABLET, FILM COATED ORAL at 21:27

## 2017-03-25 RX ADMIN — Medication 1 TABLET: at 09:39

## 2017-03-25 RX ADMIN — OXYCODONE HYDROCHLORIDE 5 MG: 5 TABLET ORAL at 20:22

## 2017-03-25 RX ADMIN — ATORVASTATIN CALCIUM 80 MG: 40 TABLET, FILM COATED ORAL at 21:23

## 2017-03-25 RX ADMIN — INSULIN LISPRO 4 UNITS: 100 INJECTION, SOLUTION INTRAVENOUS; SUBCUTANEOUS at 17:37

## 2017-03-25 RX ADMIN — LISINOPRIL 20 MG: 20 TABLET ORAL at 17:37

## 2017-03-25 RX ADMIN — ACETAMINOPHEN 650 MG: 325 TABLET, FILM COATED ORAL at 09:44

## 2017-03-25 RX ADMIN — HYDRALAZINE HYDROCHLORIDE 10 MG: 10 TABLET, FILM COATED ORAL at 09:39

## 2017-03-25 RX ADMIN — OXYCODONE HYDROCHLORIDE 10 MG: 5 TABLET ORAL at 09:44

## 2017-03-25 RX ADMIN — RIVAROXABAN 20 MG: 20 TABLET, FILM COATED ORAL at 17:37

## 2017-03-25 RX ADMIN — ACETAMINOPHEN 650 MG: 325 TABLET, FILM COATED ORAL at 14:51

## 2017-03-25 RX ADMIN — CARVEDILOL 25 MG: 12.5 TABLET, FILM COATED ORAL at 09:39

## 2017-03-25 RX ADMIN — OXYCODONE HYDROCHLORIDE 10 MG: 5 TABLET ORAL at 14:51

## 2017-03-25 RX ADMIN — Medication 1 TABLET: at 21:24

## 2017-03-26 PROCEDURE — 74011250637 HC RX REV CODE- 250/637: Performed by: PHYSICAL MEDICINE & REHABILITATION

## 2017-03-26 PROCEDURE — 74011250636 HC RX REV CODE- 250/636: Performed by: PHYSICAL MEDICINE & REHABILITATION

## 2017-03-26 RX ADMIN — ACETAMINOPHEN 650 MG: 325 TABLET, FILM COATED ORAL at 12:05

## 2017-03-26 RX ADMIN — Medication 800 MG: at 08:30

## 2017-03-26 RX ADMIN — Medication 1 TABLET: at 21:16

## 2017-03-26 RX ADMIN — LEVETIRACETAM 750 MG: 250 TABLET, FILM COATED ORAL at 21:16

## 2017-03-26 RX ADMIN — LEVETIRACETAM 750 MG: 250 TABLET, FILM COATED ORAL at 08:29

## 2017-03-26 RX ADMIN — LISINOPRIL 20 MG: 20 TABLET ORAL at 17:21

## 2017-03-26 RX ADMIN — RIVAROXABAN 20 MG: 20 TABLET, FILM COATED ORAL at 16:43

## 2017-03-26 RX ADMIN — OXYCODONE HYDROCHLORIDE 10 MG: 5 TABLET ORAL at 16:43

## 2017-03-26 RX ADMIN — Medication 1 CAPSULE: at 08:29

## 2017-03-26 RX ADMIN — INSULIN LISPRO 2 UNITS: 100 INJECTION, SOLUTION INTRAVENOUS; SUBCUTANEOUS at 21:32

## 2017-03-26 RX ADMIN — HYDRALAZINE HYDROCHLORIDE 10 MG: 10 TABLET, FILM COATED ORAL at 21:18

## 2017-03-26 RX ADMIN — INSULIN LISPRO 2 UNITS: 100 INJECTION, SOLUTION INTRAVENOUS; SUBCUTANEOUS at 17:20

## 2017-03-26 RX ADMIN — OXYCODONE HYDROCHLORIDE 5 MG: 5 TABLET ORAL at 21:18

## 2017-03-26 RX ADMIN — CARVEDILOL 25 MG: 12.5 TABLET, FILM COATED ORAL at 08:30

## 2017-03-26 RX ADMIN — CARVEDILOL 25 MG: 12.5 TABLET, FILM COATED ORAL at 16:43

## 2017-03-26 RX ADMIN — Medication 800 MG: at 21:17

## 2017-03-26 RX ADMIN — HYDRALAZINE HYDROCHLORIDE 10 MG: 10 TABLET, FILM COATED ORAL at 08:50

## 2017-03-26 RX ADMIN — ATORVASTATIN CALCIUM 80 MG: 40 TABLET, FILM COATED ORAL at 21:16

## 2017-03-26 RX ADMIN — ACETAMINOPHEN 650 MG: 325 TABLET, FILM COATED ORAL at 16:43

## 2017-03-26 RX ADMIN — OXYCODONE HYDROCHLORIDE 10 MG: 5 TABLET ORAL at 12:05

## 2017-03-26 RX ADMIN — Medication 1 TABLET: at 08:29

## 2017-03-27 ENCOUNTER — PATIENT OUTREACH (OUTPATIENT)
Dept: INTERNAL MEDICINE CLINIC | Age: 62
End: 2017-03-27

## 2017-03-27 LAB
ANION GAP BLD CALC-SCNC: 9 MMOL/L (ref 5–15)
APPEARANCE UR: CLEAR
BACTERIA URNS QL MICRO: NEGATIVE /HPF
BILIRUB UR QL: NEGATIVE
BUN SERPL-MCNC: 13 MG/DL (ref 6–20)
BUN/CREAT SERPL: 9 (ref 12–20)
CALCIUM SERPL-MCNC: 8.4 MG/DL (ref 8.5–10.1)
CHLORIDE SERPL-SCNC: 103 MMOL/L (ref 97–108)
CO2 SERPL-SCNC: 29 MMOL/L (ref 21–32)
COLOR UR: NORMAL
CREAT SERPL-MCNC: 1.38 MG/DL (ref 0.7–1.3)
EPITH CASTS URNS QL MICRO: NORMAL /LPF
ERYTHROCYTE [DISTWIDTH] IN BLOOD BY AUTOMATED COUNT: 14.1 % (ref 11.5–14.5)
GLUCOSE SERPL-MCNC: 118 MG/DL (ref 65–100)
GLUCOSE UR STRIP.AUTO-MCNC: NEGATIVE MG/DL
HCT VFR BLD AUTO: 36.3 % (ref 36.6–50.3)
HGB BLD-MCNC: 11.7 G/DL (ref 12.1–17)
HGB UR QL STRIP: NEGATIVE
HYALINE CASTS URNS QL MICRO: NORMAL /LPF (ref 0–5)
KETONES UR QL STRIP.AUTO: NEGATIVE MG/DL
LEUKOCYTE ESTERASE UR QL STRIP.AUTO: NEGATIVE
MCH RBC QN AUTO: 27.1 PG (ref 26–34)
MCHC RBC AUTO-ENTMCNC: 32.2 G/DL (ref 30–36.5)
MCV RBC AUTO: 84.2 FL (ref 80–99)
NITRITE UR QL STRIP.AUTO: NEGATIVE
PH UR STRIP: 6.5 [PH] (ref 5–8)
PLATELET # BLD AUTO: 332 K/UL (ref 150–400)
POTASSIUM SERPL-SCNC: 4.2 MMOL/L (ref 3.5–5.1)
PROT UR STRIP-MCNC: NEGATIVE MG/DL
RBC # BLD AUTO: 4.31 M/UL (ref 4.1–5.7)
RBC #/AREA URNS HPF: NORMAL /HPF (ref 0–5)
SODIUM SERPL-SCNC: 141 MMOL/L (ref 136–145)
SP GR UR REFRACTOMETRY: <1.005 (ref 1–1.03)
UROBILINOGEN UR QL STRIP.AUTO: 0.2 EU/DL (ref 0.2–1)
WBC # BLD AUTO: 7 K/UL (ref 4.1–11.1)
WBC URNS QL MICRO: NORMAL /HPF (ref 0–4)

## 2017-03-27 PROCEDURE — 81001 URINALYSIS AUTO W/SCOPE: CPT | Performed by: PHYSICAL MEDICINE & REHABILITATION

## 2017-03-27 PROCEDURE — 36415 COLL VENOUS BLD VENIPUNCTURE: CPT | Performed by: PHYSICAL MEDICINE & REHABILITATION

## 2017-03-27 PROCEDURE — 74011250636 HC RX REV CODE- 250/636: Performed by: PHYSICAL MEDICINE & REHABILITATION

## 2017-03-27 PROCEDURE — 74011250637 HC RX REV CODE- 250/637: Performed by: PHYSICAL MEDICINE & REHABILITATION

## 2017-03-27 PROCEDURE — 80048 BASIC METABOLIC PNL TOTAL CA: CPT | Performed by: PHYSICAL MEDICINE & REHABILITATION

## 2017-03-27 PROCEDURE — 85027 COMPLETE CBC AUTOMATED: CPT | Performed by: PHYSICAL MEDICINE & REHABILITATION

## 2017-03-27 PROCEDURE — 87086 URINE CULTURE/COLONY COUNT: CPT | Performed by: PHYSICAL MEDICINE & REHABILITATION

## 2017-03-27 RX ORDER — HYDRALAZINE HYDROCHLORIDE 10 MG/1
10 TABLET, FILM COATED ORAL 3 TIMES DAILY
Status: DISCONTINUED | OUTPATIENT
Start: 2017-03-27 | End: 2017-03-29

## 2017-03-27 RX ADMIN — OXYCODONE HYDROCHLORIDE 10 MG: 5 TABLET ORAL at 16:27

## 2017-03-27 RX ADMIN — CARVEDILOL 25 MG: 12.5 TABLET, FILM COATED ORAL at 09:23

## 2017-03-27 RX ADMIN — Medication 1 CAPSULE: at 09:23

## 2017-03-27 RX ADMIN — CARVEDILOL 25 MG: 12.5 TABLET, FILM COATED ORAL at 16:22

## 2017-03-27 RX ADMIN — Medication 1 TABLET: at 09:23

## 2017-03-27 RX ADMIN — RIVAROXABAN 20 MG: 20 TABLET, FILM COATED ORAL at 17:00

## 2017-03-27 RX ADMIN — HYDRALAZINE HYDROCHLORIDE 10 MG: 10 TABLET, FILM COATED ORAL at 09:23

## 2017-03-27 RX ADMIN — Medication 800 MG: at 21:32

## 2017-03-27 RX ADMIN — Medication 800 MG: at 09:23

## 2017-03-27 RX ADMIN — Medication 1 TABLET: at 21:32

## 2017-03-27 RX ADMIN — HYDRALAZINE HYDROCHLORIDE 10 MG: 10 TABLET, FILM COATED ORAL at 16:26

## 2017-03-27 RX ADMIN — OXYCODONE HYDROCHLORIDE 10 MG: 5 TABLET ORAL at 09:23

## 2017-03-27 RX ADMIN — INSULIN LISPRO 2 UNITS: 100 INJECTION, SOLUTION INTRAVENOUS; SUBCUTANEOUS at 21:40

## 2017-03-27 RX ADMIN — ATORVASTATIN CALCIUM 80 MG: 40 TABLET, FILM COATED ORAL at 21:32

## 2017-03-27 RX ADMIN — LEVETIRACETAM 750 MG: 250 TABLET, FILM COATED ORAL at 09:23

## 2017-03-27 RX ADMIN — LISINOPRIL 20 MG: 20 TABLET ORAL at 16:22

## 2017-03-27 RX ADMIN — HYDRALAZINE HYDROCHLORIDE 10 MG: 10 TABLET, FILM COATED ORAL at 21:39

## 2017-03-27 RX ADMIN — LEVETIRACETAM 750 MG: 250 TABLET, FILM COATED ORAL at 21:32

## 2017-03-28 PROCEDURE — 74011250637 HC RX REV CODE- 250/637: Performed by: PHYSICAL MEDICINE & REHABILITATION

## 2017-03-28 RX ORDER — INSULIN LISPRO 100 [IU]/ML
INJECTION, SOLUTION INTRAVENOUS; SUBCUTANEOUS
Status: DISCONTINUED | OUTPATIENT
Start: 2017-03-28 | End: 2017-03-31 | Stop reason: HOSPADM

## 2017-03-28 RX ADMIN — Medication 800 MG: at 08:50

## 2017-03-28 RX ADMIN — HYDRALAZINE HYDROCHLORIDE 10 MG: 10 TABLET, FILM COATED ORAL at 13:00

## 2017-03-28 RX ADMIN — RIVAROXABAN 20 MG: 20 TABLET, FILM COATED ORAL at 16:26

## 2017-03-28 RX ADMIN — Medication 1 TABLET: at 22:23

## 2017-03-28 RX ADMIN — HYDRALAZINE HYDROCHLORIDE 10 MG: 10 TABLET, FILM COATED ORAL at 06:24

## 2017-03-28 RX ADMIN — Medication 1 CAPSULE: at 08:50

## 2017-03-28 RX ADMIN — ATORVASTATIN CALCIUM 80 MG: 40 TABLET, FILM COATED ORAL at 22:23

## 2017-03-28 RX ADMIN — Medication 800 MG: at 22:23

## 2017-03-28 RX ADMIN — Medication 1 TABLET: at 08:50

## 2017-03-28 RX ADMIN — LISINOPRIL 20 MG: 20 TABLET ORAL at 17:44

## 2017-03-28 RX ADMIN — CARVEDILOL 25 MG: 12.5 TABLET, FILM COATED ORAL at 16:26

## 2017-03-28 RX ADMIN — ACETAMINOPHEN 650 MG: 325 TABLET, FILM COATED ORAL at 08:49

## 2017-03-28 RX ADMIN — CARVEDILOL 25 MG: 12.5 TABLET, FILM COATED ORAL at 08:50

## 2017-03-28 RX ADMIN — LEVETIRACETAM 750 MG: 250 TABLET, FILM COATED ORAL at 22:23

## 2017-03-28 RX ADMIN — LEVETIRACETAM 750 MG: 250 TABLET, FILM COATED ORAL at 08:50

## 2017-03-28 RX ADMIN — ACETAMINOPHEN 650 MG: 325 TABLET, FILM COATED ORAL at 16:25

## 2017-03-28 RX ADMIN — HYDRALAZINE HYDROCHLORIDE 10 MG: 10 TABLET, FILM COATED ORAL at 22:23

## 2017-03-29 VITALS
HEART RATE: 59 BPM | BODY MASS INDEX: 26.02 KG/M2 | DIASTOLIC BLOOD PRESSURE: 82 MMHG | WEIGHT: 196.31 LBS | HEIGHT: 73 IN | SYSTOLIC BLOOD PRESSURE: 128 MMHG

## 2017-03-29 LAB
BACTERIA SPEC CULT: ABNORMAL
BACTERIA SPEC CULT: ABNORMAL
CC UR VC: ABNORMAL
SERVICE CMNT-IMP: ABNORMAL

## 2017-03-29 PROCEDURE — 74011250637 HC RX REV CODE- 250/637: Performed by: PHYSICAL MEDICINE & REHABILITATION

## 2017-03-29 PROCEDURE — 74011636637 HC RX REV CODE- 636/637: Performed by: PHYSICAL MEDICINE & REHABILITATION

## 2017-03-29 RX ORDER — HYDRALAZINE HYDROCHLORIDE 25 MG/1
25 TABLET, FILM COATED ORAL 3 TIMES DAILY
Status: DISCONTINUED | OUTPATIENT
Start: 2017-03-29 | End: 2017-03-30

## 2017-03-29 RX ADMIN — ATORVASTATIN CALCIUM 80 MG: 40 TABLET, FILM COATED ORAL at 21:00

## 2017-03-29 RX ADMIN — HYDRALAZINE HYDROCHLORIDE 25 MG: 25 TABLET, FILM COATED ORAL at 13:05

## 2017-03-29 RX ADMIN — RIVAROXABAN 20 MG: 20 TABLET, FILM COATED ORAL at 17:18

## 2017-03-29 RX ADMIN — Medication 1 TABLET: at 21:01

## 2017-03-29 RX ADMIN — HYDRALAZINE HYDROCHLORIDE 25 MG: 25 TABLET, FILM COATED ORAL at 21:01

## 2017-03-29 RX ADMIN — LISINOPRIL 20 MG: 20 TABLET ORAL at 17:22

## 2017-03-29 RX ADMIN — CARVEDILOL 25 MG: 12.5 TABLET, FILM COATED ORAL at 17:18

## 2017-03-29 RX ADMIN — CARVEDILOL 25 MG: 12.5 TABLET, FILM COATED ORAL at 09:21

## 2017-03-29 RX ADMIN — HYDRALAZINE HYDROCHLORIDE 10 MG: 10 TABLET, FILM COATED ORAL at 05:42

## 2017-03-29 RX ADMIN — Medication 800 MG: at 09:21

## 2017-03-29 RX ADMIN — LEVETIRACETAM 750 MG: 250 TABLET, FILM COATED ORAL at 09:21

## 2017-03-29 RX ADMIN — Medication 800 MG: at 21:01

## 2017-03-29 RX ADMIN — Medication 1 CAPSULE: at 09:21

## 2017-03-29 RX ADMIN — INSULIN LISPRO 2 UNITS: 100 INJECTION, SOLUTION INTRAVENOUS; SUBCUTANEOUS at 17:19

## 2017-03-29 RX ADMIN — LEVETIRACETAM 750 MG: 250 TABLET, FILM COATED ORAL at 21:00

## 2017-03-29 RX ADMIN — ACETAMINOPHEN 650 MG: 325 TABLET, FILM COATED ORAL at 05:42

## 2017-03-30 PROCEDURE — 74011250637 HC RX REV CODE- 250/637: Performed by: PHYSICAL MEDICINE & REHABILITATION

## 2017-03-30 RX ORDER — HYDRALAZINE HYDROCHLORIDE 50 MG/1
50 TABLET, FILM COATED ORAL 3 TIMES DAILY
Status: DISCONTINUED | OUTPATIENT
Start: 2017-03-30 | End: 2017-03-31 | Stop reason: HOSPADM

## 2017-03-30 RX ORDER — AMLODIPINE BESYLATE 5 MG/1
10 TABLET ORAL DAILY
Status: DISCONTINUED | OUTPATIENT
Start: 2017-03-30 | End: 2017-03-31 | Stop reason: HOSPADM

## 2017-03-30 RX ADMIN — HYDRALAZINE HYDROCHLORIDE 50 MG: 50 TABLET, FILM COATED ORAL at 20:58

## 2017-03-30 RX ADMIN — RIVAROXABAN 20 MG: 20 TABLET, FILM COATED ORAL at 16:26

## 2017-03-30 RX ADMIN — OXYCODONE HYDROCHLORIDE 10 MG: 5 TABLET ORAL at 05:02

## 2017-03-30 RX ADMIN — Medication 800 MG: at 20:58

## 2017-03-30 RX ADMIN — ACETAMINOPHEN 650 MG: 325 TABLET, FILM COATED ORAL at 21:07

## 2017-03-30 RX ADMIN — OXYCODONE HYDROCHLORIDE 10 MG: 5 TABLET ORAL at 01:36

## 2017-03-30 RX ADMIN — Medication 1 TABLET: at 09:16

## 2017-03-30 RX ADMIN — HYDRALAZINE HYDROCHLORIDE 50 MG: 50 TABLET, FILM COATED ORAL at 15:25

## 2017-03-30 RX ADMIN — Medication 800 MG: at 09:16

## 2017-03-30 RX ADMIN — LEVETIRACETAM 750 MG: 250 TABLET, FILM COATED ORAL at 09:16

## 2017-03-30 RX ADMIN — CARVEDILOL 25 MG: 12.5 TABLET, FILM COATED ORAL at 16:25

## 2017-03-30 RX ADMIN — ATORVASTATIN CALCIUM 80 MG: 40 TABLET, FILM COATED ORAL at 20:59

## 2017-03-30 RX ADMIN — LEVETIRACETAM 750 MG: 250 TABLET, FILM COATED ORAL at 20:58

## 2017-03-30 RX ADMIN — AMLODIPINE BESYLATE 10 MG: 5 TABLET ORAL at 12:31

## 2017-03-30 RX ADMIN — HYDRALAZINE HYDROCHLORIDE 25 MG: 25 TABLET, FILM COATED ORAL at 05:02

## 2017-03-30 RX ADMIN — LISINOPRIL 20 MG: 20 TABLET ORAL at 17:42

## 2017-03-30 RX ADMIN — Medication 1 CAPSULE: at 09:16

## 2017-03-30 RX ADMIN — Medication 1 TABLET: at 20:58

## 2017-03-30 RX ADMIN — CARVEDILOL 25 MG: 12.5 TABLET, FILM COATED ORAL at 09:16

## 2017-03-31 ENCOUNTER — APPOINTMENT (OUTPATIENT)
Dept: GENERAL RADIOLOGY | Age: 62
End: 2017-03-31
Attending: EMERGENCY MEDICINE
Payer: COMMERCIAL

## 2017-03-31 ENCOUNTER — APPOINTMENT (OUTPATIENT)
Dept: CT IMAGING | Age: 62
End: 2017-03-31
Attending: EMERGENCY MEDICINE
Payer: COMMERCIAL

## 2017-03-31 ENCOUNTER — PATIENT OUTREACH (OUTPATIENT)
Dept: INTERNAL MEDICINE CLINIC | Age: 62
End: 2017-03-31

## 2017-03-31 ENCOUNTER — HOSPITAL ENCOUNTER (EMERGENCY)
Age: 62
Discharge: HOME | End: 2017-03-31
Attending: EMERGENCY MEDICINE
Payer: COMMERCIAL

## 2017-03-31 VITALS
TEMPERATURE: 97.7 F | RESPIRATION RATE: 23 BRPM | WEIGHT: 210 LBS | HEIGHT: 73 IN | HEART RATE: 95 BPM | OXYGEN SATURATION: 98 % | SYSTOLIC BLOOD PRESSURE: 141 MMHG | BODY MASS INDEX: 27.83 KG/M2 | DIASTOLIC BLOOD PRESSURE: 99 MMHG

## 2017-03-31 DIAGNOSIS — C34.92 MALIGNANT NEOPLASM OF LEFT LUNG, UNSPECIFIED PART OF LUNG (HCC): Primary | ICD-10-CM

## 2017-03-31 LAB
ALBUMIN SERPL BCP-MCNC: 3.3 G/DL (ref 3.5–5)
ALBUMIN/GLOB SERPL: 0.6 {RATIO} (ref 1.1–2.2)
ALP SERPL-CCNC: 91 U/L (ref 45–117)
ALT SERPL-CCNC: 33 U/L (ref 12–78)
ANION GAP BLD CALC-SCNC: 10 MMOL/L (ref 5–15)
ANION GAP BLD CALC-SCNC: 8 MMOL/L (ref 5–15)
APTT PPP: 31.5 SEC (ref 22.1–32.5)
AST SERPL W P-5'-P-CCNC: 16 U/L (ref 15–37)
BASOPHILS # BLD AUTO: 0 K/UL (ref 0–0.1)
BASOPHILS # BLD: 0 % (ref 0–1)
BILIRUB SERPL-MCNC: 0.3 MG/DL (ref 0.2–1)
BNP SERPL-MCNC: 967 PG/ML (ref 0–125)
BUN SERPL-MCNC: 12 MG/DL (ref 6–20)
BUN SERPL-MCNC: 9 MG/DL (ref 6–20)
BUN/CREAT SERPL: 7 (ref 12–20)
BUN/CREAT SERPL: 8 (ref 12–20)
CALCIUM SERPL-MCNC: 8 MG/DL (ref 8.5–10.1)
CALCIUM SERPL-MCNC: 8.6 MG/DL (ref 8.5–10.1)
CHLORIDE SERPL-SCNC: 104 MMOL/L (ref 97–108)
CHLORIDE SERPL-SCNC: 107 MMOL/L (ref 97–108)
CK MB CFR SERPL CALC: NORMAL % (ref 0–2.5)
CK MB SERPL-MCNC: <1 NG/ML (ref 5–25)
CK SERPL-CCNC: 74 U/L (ref 39–308)
CO2 SERPL-SCNC: 26 MMOL/L (ref 21–32)
CO2 SERPL-SCNC: 29 MMOL/L (ref 21–32)
CREAT SERPL-MCNC: 1.24 MG/DL (ref 0.7–1.3)
CREAT SERPL-MCNC: 1.45 MG/DL (ref 0.7–1.3)
EOSINOPHIL # BLD: 0 K/UL (ref 0–0.4)
EOSINOPHIL NFR BLD: 1 % (ref 0–7)
ERYTHROCYTE [DISTWIDTH] IN BLOOD BY AUTOMATED COUNT: 13.9 % (ref 11.5–14.5)
ERYTHROCYTE [DISTWIDTH] IN BLOOD BY AUTOMATED COUNT: 14.1 % (ref 11.5–14.5)
GLOBULIN SER CALC-MCNC: 5.3 G/DL (ref 2–4)
GLUCOSE SERPL-MCNC: 143 MG/DL (ref 65–100)
GLUCOSE SERPL-MCNC: 218 MG/DL (ref 65–100)
HCT VFR BLD AUTO: 36.1 % (ref 36.6–50.3)
HCT VFR BLD AUTO: 38 % (ref 36.6–50.3)
HGB BLD-MCNC: 11.8 G/DL (ref 12.1–17)
HGB BLD-MCNC: 12.5 G/DL (ref 12.1–17)
INR PPP: 1.4 (ref 0.9–1.1)
LYMPHOCYTES # BLD AUTO: 18 % (ref 12–49)
LYMPHOCYTES # BLD: 1.4 K/UL (ref 0.8–3.5)
MAGNESIUM SERPL-MCNC: 2.3 MG/DL (ref 1.6–2.4)
MCH RBC QN AUTO: 27.1 PG (ref 26–34)
MCH RBC QN AUTO: 27.5 PG (ref 26–34)
MCHC RBC AUTO-ENTMCNC: 32.7 G/DL (ref 30–36.5)
MCHC RBC AUTO-ENTMCNC: 32.9 G/DL (ref 30–36.5)
MCV RBC AUTO: 83 FL (ref 80–99)
MCV RBC AUTO: 83.7 FL (ref 80–99)
MONOCYTES # BLD: 0.6 K/UL (ref 0–1)
MONOCYTES NFR BLD AUTO: 8 % (ref 5–13)
NEUTS SEG # BLD: 5.4 K/UL (ref 1.8–8)
NEUTS SEG NFR BLD AUTO: 73 % (ref 32–75)
PLATELET # BLD AUTO: 337 K/UL (ref 150–400)
PLATELET # BLD AUTO: 366 K/UL (ref 150–400)
POTASSIUM SERPL-SCNC: 3.4 MMOL/L (ref 3.5–5.1)
POTASSIUM SERPL-SCNC: 3.7 MMOL/L (ref 3.5–5.1)
PROT SERPL-MCNC: 8.6 G/DL (ref 6.4–8.2)
PROTHROMBIN TIME: 14.5 SEC (ref 9–11.1)
RBC # BLD AUTO: 4.35 M/UL (ref 4.1–5.7)
RBC # BLD AUTO: 4.54 M/UL (ref 4.1–5.7)
SODIUM SERPL-SCNC: 141 MMOL/L (ref 136–145)
SODIUM SERPL-SCNC: 143 MMOL/L (ref 136–145)
THERAPEUTIC RANGE,PTTT: NORMAL SECS (ref 58–77)
TROPONIN I SERPL-MCNC: <0.04 NG/ML
WBC # BLD AUTO: 6.7 K/UL (ref 4.1–11.1)
WBC # BLD AUTO: 7.5 K/UL (ref 4.1–11.1)

## 2017-03-31 PROCEDURE — 74011000250 HC RX REV CODE- 250: Performed by: EMERGENCY MEDICINE

## 2017-03-31 PROCEDURE — 82550 ASSAY OF CK (CPK): CPT

## 2017-03-31 PROCEDURE — 94640 AIRWAY INHALATION TREATMENT: CPT

## 2017-03-31 PROCEDURE — 85610 PROTHROMBIN TIME: CPT

## 2017-03-31 PROCEDURE — 71275 CT ANGIOGRAPHY CHEST: CPT

## 2017-03-31 PROCEDURE — 74011250637 HC RX REV CODE- 250/637: Performed by: PHYSICAL MEDICINE & REHABILITATION

## 2017-03-31 PROCEDURE — 77030013140 HC MSK NEB VYRM -A

## 2017-03-31 PROCEDURE — 83735 ASSAY OF MAGNESIUM: CPT

## 2017-03-31 PROCEDURE — 93005 ELECTROCARDIOGRAM TRACING: CPT

## 2017-03-31 PROCEDURE — 80048 BASIC METABOLIC PNL TOTAL CA: CPT | Performed by: PHYSICAL MEDICINE & REHABILITATION

## 2017-03-31 PROCEDURE — 71010 XR CHEST PORT: CPT

## 2017-03-31 PROCEDURE — 74011250636 HC RX REV CODE- 250/636: Performed by: EMERGENCY MEDICINE

## 2017-03-31 PROCEDURE — 85025 COMPLETE CBC W/AUTO DIFF WBC: CPT

## 2017-03-31 PROCEDURE — 84484 ASSAY OF TROPONIN QUANT: CPT

## 2017-03-31 PROCEDURE — 83880 ASSAY OF NATRIURETIC PEPTIDE: CPT

## 2017-03-31 PROCEDURE — 80053 COMPREHEN METABOLIC PANEL: CPT

## 2017-03-31 PROCEDURE — 36415 COLL VENOUS BLD VENIPUNCTURE: CPT

## 2017-03-31 PROCEDURE — 74011250637 HC RX REV CODE- 250/637: Performed by: EMERGENCY MEDICINE

## 2017-03-31 PROCEDURE — 85027 COMPLETE CBC AUTOMATED: CPT | Performed by: PHYSICAL MEDICINE & REHABILITATION

## 2017-03-31 PROCEDURE — 85730 THROMBOPLASTIN TIME PARTIAL: CPT

## 2017-03-31 PROCEDURE — 99285 EMERGENCY DEPT VISIT HI MDM: CPT

## 2017-03-31 PROCEDURE — 74011636320 HC RX REV CODE- 636/320: Performed by: EMERGENCY MEDICINE

## 2017-03-31 RX ORDER — SODIUM CHLORIDE 0.9 % (FLUSH) 0.9 %
10 SYRINGE (ML) INJECTION
Status: COMPLETED | OUTPATIENT
Start: 2017-03-31 | End: 2017-03-31

## 2017-03-31 RX ORDER — POTASSIUM CHLORIDE 20 MEQ/1
40 TABLET, EXTENDED RELEASE ORAL ONCE
Status: COMPLETED | OUTPATIENT
Start: 2017-03-31 | End: 2017-03-31

## 2017-03-31 RX ORDER — POTASSIUM CHLORIDE 20 MEQ/1
20 TABLET, EXTENDED RELEASE ORAL
Status: COMPLETED | OUTPATIENT
Start: 2017-03-31 | End: 2017-03-31

## 2017-03-31 RX ORDER — SODIUM CHLORIDE 9 MG/ML
50 INJECTION, SOLUTION INTRAVENOUS
Status: COMPLETED | OUTPATIENT
Start: 2017-03-31 | End: 2017-03-31

## 2017-03-31 RX ORDER — IPRATROPIUM BROMIDE AND ALBUTEROL SULFATE 2.5; .5 MG/3ML; MG/3ML
3 SOLUTION RESPIRATORY (INHALATION)
Status: COMPLETED | OUTPATIENT
Start: 2017-03-31 | End: 2017-03-31

## 2017-03-31 RX ADMIN — POTASSIUM CHLORIDE 40 MEQ: 20 TABLET, EXTENDED RELEASE ORAL at 11:06

## 2017-03-31 RX ADMIN — Medication 1 CAPSULE: at 08:16

## 2017-03-31 RX ADMIN — SODIUM CHLORIDE 50 ML/HR: 900 INJECTION, SOLUTION INTRAVENOUS at 16:18

## 2017-03-31 RX ADMIN — AMLODIPINE BESYLATE 10 MG: 5 TABLET ORAL at 08:16

## 2017-03-31 RX ADMIN — ACETAMINOPHEN 650 MG: 325 TABLET, FILM COATED ORAL at 03:48

## 2017-03-31 RX ADMIN — Medication 800 MG: at 08:16

## 2017-03-31 RX ADMIN — IOPAMIDOL 100 ML: 755 INJECTION, SOLUTION INTRAVENOUS at 16:18

## 2017-03-31 RX ADMIN — Medication 1 TABLET: at 08:16

## 2017-03-31 RX ADMIN — IPRATROPIUM BROMIDE AND ALBUTEROL SULFATE 3 ML: .5; 3 SOLUTION RESPIRATORY (INHALATION) at 15:41

## 2017-03-31 RX ADMIN — HYDRALAZINE HYDROCHLORIDE 50 MG: 50 TABLET, FILM COATED ORAL at 05:22

## 2017-03-31 RX ADMIN — CARVEDILOL 25 MG: 12.5 TABLET, FILM COATED ORAL at 08:16

## 2017-03-31 RX ADMIN — Medication 10 ML: at 16:18

## 2017-03-31 RX ADMIN — LEVETIRACETAM 750 MG: 250 TABLET, FILM COATED ORAL at 08:17

## 2017-03-31 RX ADMIN — POTASSIUM CHLORIDE 20 MEQ: 20 TABLET, EXTENDED RELEASE ORAL at 16:40

## 2017-03-31 NOTE — ED PROVIDER NOTES
HPI Comments: 62 yo M w [facility] lung cancer presents with L lung VATS for lung cancer 20 days ago. [facility] and then came to the ED for SOB which devloped today after DC from [facility]. Denies cough, fever, leg swelling or central chest pain. On xarelto which was stopped perioperatively, however restarted during the last week and he has been compliant. Patient is a 63 y.o. male presenting with shortness of breath. The history is provided by the patient and a relative. Shortness of Breath   Pertinent negatives include no fever, no chest pain and no abdominal pain. Past Medical History:   Diagnosis Date    Congestive heart failure, unspecified     Diabetes ([address]     Hypertension     Overweight(278.02)     Seizures (HCC)     SOLITARY PULMONARY NODULE     1.6 cm    Stroke [facility])        Past Surgical History:   Procedure Laterality Date    CHEST SURGERY PROCEDURE UNLISTED      VATS Video-assisted thoracic surgery         Family History:   Problem Relation Age of Onset    Stroke Mother     Cancer Father        Social History     Social History    Marital status:      Spouse name: N/A    Number of children: N/A    Years of education: N/A     Occupational History    Not on file. Social History Main Topics    Smoking status: Former Smoker     Packs/day: 0.10     Start date: 6/1/2015    Smokeless tobacco: Never Used      Comment: former cigar    Alcohol use No    Drug use: No    Sexual activity: Not on file     Other Topics Concern    Not on file     Social History Narrative         ALLERGIES: Review of patient's allergies indicates no known allergies. Review of Systems   Constitutional: Negative for fever. Eyes: Negative for visual disturbance. Respiratory: Positive for shortness of breath. Cardiovascular: Negative for chest pain. Gastrointestinal: Negative for abdominal pain. Endocrine: Negative for polyuria.    Genitourinary: Negative for dysuria. Skin: Negative for wound. Neurological: Negative for syncope. Psychiatric/Behavioral: Negative for suicidal ideas. All other systems reviewed and are negative. Vitals:    03/31/17 1448 03/31/17 1515 03/31/17 1530 03/31/17 1545   BP: (!) 121/97 131/86 (!) 132/96 128/86   Pulse: (!) 56 89 83 86   Resp: 26 18 17 20   Temp: 97.7 °F (36.5 °C)      SpO2: 97% 99% 98% 99%   Weight: 95.3 kg (210 lb)      Height: 6' 1\" (1.854 m)               Physical Exam   Constitutional: He is oriented to person, place, and time. He appears well-developed and well-nourished. HENT:   Head: Normocephalic and atraumatic. Cardiovascular: Normal rate, regular rhythm, normal heart sounds and intact distal pulses. Exam reveals no gallop. No murmur heard. Pulmonary/Chest: Breath sounds normal. No respiratory distress. He has no wheezes. He has no rales. He exhibits tenderness. Intermittent tachypnea with IWOB   Abdominal: Soft. He exhibits no distension. There is no tenderness. Musculoskeletal: He exhibits no edema. Neurological: He is alert and oriented to person, place, and time. Skin: Skin is warm and dry. Psychiatric: He has a normal mood and affect. Nursing note and vitals reviewed. MDM  Number of Diagnoses or Management Options  Malignant neoplasm of left lung, unspecified part of lung [facility]):   Diagnosis management comments: 64 yo M w recent VATS, now with acute SOB. No fever or cough or wheeze to suggest infection. Concern for ACS vs. PE. Will ro w CT scan, trop x2.     Improved with nebs, will d/c follow to [facility], case discussed with pulmonary at [facility]       Amount and/or Complexity of Data Reviewed  Clinical lab tests: ordered and reviewed  Tests in the radiology section of CPT®: ordered and reviewed  Tests in the medicine section of CPT®: ordered and reviewed  Obtain history from someone other than the patient: yes  Review and summarize past medical records: yes  Discuss the patient with other providers: yes  Independent visualization of images, tracings, or specimens: yes    Risk of Complications, Morbidity, and/or Mortality  Presenting problems: moderate  Diagnostic procedures: moderate  Management options: moderate    Patient Progress  Patient progress: stable    ED Course       Procedures    67  Spoke with Dr. Shila Mckeon at [facility] thoracic - okay to DC    1720  Discussed the risks of smoking and the benefits of smoking cessation as well as the long term sequelae of smoking with the pt. The patient verbalized their understanding. 6:31 PM  Pt ambulates at baseline. I personally saw and examined the patient. I found the following on physical exam:    Mild bronchospasm, improved with nebs, negative CTA, follow [facility] thoracic    I have reviewed and agree with theResident's findings, including all diagnostic interpretations, and plans as written. I was present during the key portions of separately billed procedures.    Dannie Robbins MD      LABORATORY TESTS:  Recent Results (from the past 12 hour(s))   EKG, 12 LEAD, INITIAL    Collection Time: 03/31/17  2:49 PM   Result Value Ref Range    Ventricular Rate 90 BPM    Atrial Rate 90 BPM    QRS Duration 88 ms    Q-T Interval 390 ms    QTC Calculation (Bezet) 477 ms    Calculated R Axis 22 degrees    Calculated T Axis 17 degrees    Diagnosis       Atrial fibrillation  Nonspecific ST and T wave abnormality  Prolonged QT  Abnormal ECG  When compared with ECG of 20-JUL-2015 07:28,  Nonspecific T wave abnormality, worse in Lateral leads     METABOLIC PANEL, COMPREHENSIVE    Collection Time: 03/31/17  3:30 PM   Result Value Ref Range    Sodium 141 136 - 145 mmol/L    Potassium 3.7 3.5 - 5.1 mmol/L    Chloride 104 97 - 108 mmol/L    CO2 29 21 - 32 mmol/L    Anion gap 8 5 - 15 mmol/L    Glucose 218 (H) 65 - 100 mg/dL    BUN 12 6 - 20 MG/DL    Creatinine 1.45 (H) 0.70 - 1.30 MG/DL    BUN/Creatinine ratio 8 (L) 12 - 20      GFR est AA >60 >60 ml/min/1.73m2    GFR est non-AA 50 (L) >60 ml/min/1.73m2    Calcium 8.6 8.5 - 10.1 MG/DL    Bilirubin, total 0.3 0.2 - 1.0 MG/DL    ALT (SGPT) 33 12 - 78 U/L    AST (SGOT) 16 15 - 37 U/L    Alk. phosphatase 91 45 - 117 U/L    Protein, total 8.6 (H) 6.4 - 8.2 g/dL    Albumin 3.3 (L) 3.5 - 5.0 g/dL    Globulin 5.3 (H) 2.0 - 4.0 g/dL    A-G Ratio 0.6 (L) 1.1 - 2.2     CBC WITH AUTOMATED DIFF    Collection Time: 03/31/17  3:30 PM   Result Value Ref Range    WBC 7.5 4.1 - 11.1 K/uL    RBC 4.54 4.10 - 5.70 M/uL    HGB 12.5 12.1 - 17.0 g/dL    HCT 38.0 36.6 - 50.3 %    MCV 83.7 80.0 - 99.0 FL    MCH 27.5 26.0 - 34.0 PG    MCHC 32.9 30.0 - 36.5 g/dL    RDW 14.1 11.5 - 14.5 %    PLATELET 984 905 - 450 K/uL    NEUTROPHILS 73 32 - 75 %    LYMPHOCYTES 18 12 - 49 %    MONOCYTES 8 5 - 13 %    EOSINOPHILS 1 0 - 7 %    BASOPHILS 0 0 - 1 %    ABS. NEUTROPHILS 5.4 1.8 - 8.0 K/UL    ABS. LYMPHOCYTES 1.4 0.8 - 3.5 K/UL    ABS. MONOCYTES 0.6 0.0 - 1.0 K/UL    ABS. EOSINOPHILS 0.0 0.0 - 0.4 K/UL    ABS.  BASOPHILS 0.0 0.0 - 0.1 K/UL   TROPONIN I    Collection Time: 03/31/17  3:30 PM   Result Value Ref Range    Troponin-I, Qt. <0.04 <0.05 ng/mL   PRO-BNP    Collection Time: 03/31/17  3:30 PM   Result Value Ref Range    NT pro- (H) 0 - 125 PG/ML   CK W/ CKMB & INDEX    Collection Time: 03/31/17  3:30 PM   Result Value Ref Range    CK 74 39 - 308 U/L    CK - MB <1.0 <3.6 NG/ML    CK-MB Index Cannot be calulated 0 - 2.5     MAGNESIUM    Collection Time: 03/31/17  3:30 PM   Result Value Ref Range    Magnesium 2.3 1.6 - 2.4 mg/dL   PROTHROMBIN TIME + INR    Collection Time: 03/31/17  3:30 PM   Result Value Ref Range    INR 1.4 (H) 0.9 - 1.1      Prothrombin time 14.5 (H) 9.0 - 11.1 sec   PTT    Collection Time: 03/31/17  3:30 PM   Result Value Ref Range    aPTT 31.5 22.1 - 32.5 sec    aPTT, therapeutic range     58.0 - 77.0 SECS       IMAGING RESULTS:  CT Results  (Last 48 hours)               03/31/17 1617  CTA CHEST W OR W WO CONT Final result    Impression:  IMPRESSION:    1. No evidence pulmonary embolism. 2. Moderate-sized left pleural effusion and left basilar patchy airspace   consolidation. 3. Cardiomegaly. Narrative:  INDICATION: Dyspnea on exertion. Shortness of breath. History of lung cancer. CT pulmonary angiogram is performed with 2.5 mm collimation. 100 mL of nonionic   IV Isovue-370 was administered for exam. 3D coronal and sagittal postprocessed   images were performed for this examination. CT dose reduction was achieved through use of a standardized protocol tailored   for this examination and automatic exposure control for dose modulation. Direct comparison is made to prior CT dated July 2015. The pulmonary arteries are well opacified and there is no evidence of pulmonary   embolism. The thoracic aorta is normal in course and caliber and there is no   aortic dissection. The heart is moderately enlarged. There is no pericardial   fluid. There is left lung volume loss, consistent with left lower lobectomy. There is a new moderate-sized left pleural effusion and there is patchy   consolidation of the left lung base. Right lung is clear. There is no right   pleural fluid. No lytic or sclerotic osseous lesion is visualized. There are   prominent and subcentimeter mediastinal lymph nodes which are unchanged compared   to prior CT dated July 2015. Upper abdomen: The visualized upper abdominal structures are normal.               CXR Results  (Last 48 hours)               03/31/17 1618  XR CHEST PORT Final result    Impression:  IMPRESSION: New moderate sized left pleural effusion left basilar consolidation. Narrative:  INDICATION:   dyspnea. Shortness of breath for 2 hours. Portable AP semiupright view of the chest.       Direct comparison made to prior chest x-ray dated December 2015.        Cardiomediastinal silhouette is obscured by a new, moderate-sized left pleural   effusion and left basilar consolidation. Right lung is clear. Right pleural space is normal. There is no pneumothorax. VITALS:  Patient Vitals for the past 12 hrs:   Temp Pulse Resp BP SpO2   03/31/17 1800 - 95 23 (!) 141/99 98 %   03/31/17 1730 - 70 28 (!) 137/98 97 %   03/31/17 1630 - 74 23 134/88 97 %   03/31/17 1545 - 86 20 128/86 99 %   03/31/17 1530 - 83 17 (!) 132/96 98 %   03/31/17 1515 - 89 18 131/86 99 %   03/31/17 1448 97.7 °F (36.5 °C) (!) 56 26 (!) 121/97 97 %       MEDICATIONS GIVEN:  Medications   albuterol-ipratropium (DUO-NEB) 2.5 MG-0.5 MG/3 ML (3 mL Nebulization Given 3/31/17 1541)   0.9% sodium chloride infusion (0 mL/hr IntraVENous IV Completed 3/31/17 1640)   sodium chloride (NS) flush 10 mL (10 mL IntraVENous Given 3/31/17 1618)   iopamidol (ISOVUE-370) 76 % injection 100 mL (100 mL IntraVENous Given 3/31/17 1618)   potassium chloride (K-DUR, KLOR-CON) SR tablet 20 mEq (20 mEq Oral Given 3/31/17 1640)       IMPRESSION:  1. Malignant neoplasm of left lung, unspecified part of lung ([facility] [address]        PLAN:  1. Discharge Medication List as of 3/31/2017  6:30 PM        2. Follow-up Information     Follow up With Details Comments Contact Info    Follow up with Dr. Moon Dai in one week In 1 week          3. Return to ED if worse     Discharge Note:  6:44 PM  The patient has been re-evaluated and is ready for discharge. Reviewed available results with patient. Counseled patient on diagnosis and care plan. Patient has expressed understanding, and all questions have been answered. Patient agrees with plan and agrees to follow up as recommended, or to return to the ED if their symptoms worsen. Discharge instructions have been provided and explained to the patient, along with reasons to return to the ED.

## 2017-03-31 NOTE — ED NOTES
Pt presents to ED for increased SOB x 2 hours which he was [facility] hours ago. When patient got home family reports increased work to breathe. Pt denies chest pain. Pt has HX of lung CA and recently had a VATS procedure done 3/10/17. Pt had procedure done at [facility]. Pt follows commands and is alert and oriented x 4. Pt has a clean dressing to L. Abdomen. Pt had a possible ischemic EKG in triage. In triage.

## 2017-03-31 NOTE — DISCHARGE INSTRUCTIONS
Lung Cancer: Care Instructions  Your Care Instructions  Lung cancer occurs when abnormal cells grow out of control in the lung. Lung cancer can start anywhere in the lungs and spread to other parts of the body. Treatment for lung cancer depends on what type of lung cancer you have and how advanced it is. Treatment may include surgery to remove the cancer. It could also include medicines (chemotherapy) or radiation to destroy cancer cells. Being treated for cancer can weaken your body, and you may feel very tired. Home treatment and certain medicines can help you feel better. Finding out that you have cancer is scary. You may feel many emotions and may need some help coping. Seek out family, friends, and counselors for support. You also can do things at home to make yourself feel better while you go through treatment. Call the [employer] (3-907.671.9285) or visit its website at [address] [employer] for more information. Follow-up care is a key part of your treatment and safety. Be sure to make and go to all appointments, and call your doctor if you are having problems. It's also a good idea to know your test results and keep a list of the medicines you take. How can you care for yourself at home? · Take your medicines exactly as prescribed. Call your doctor if you think you are having a problem with your medicine. You will get more details on the specific medicines your doctor prescribes. · Follow your doctor's instructions to relieve pain. Use pain medicine when you first feel pain, before it becomes severe. Taking pain medicines regularly is often the best way to keep pain under control. · Eat healthy food. If you do not feel like eating, try to eat food that has protein and extra calories to keep up your strength and prevent weight loss. Drink liquid meal replacements for extra calories and protein. Try to eat your main meal early. Eating smaller portions more often may help as well.   · Get some physical activity every day, but do not get too tired. Keep doing the hobbies you enjoy as your energy allows. · Do not smoke. Smoking can make your cancer symptoms worse. If you need help quitting, talk to your doctor about stop-smoking programs and medicines. These can increase your chances of quitting for good. · If you use oxygen, do not smoke, light a cigarette, or use a flame while your oxygen is on. Smoking while using oxygen can lead to fire and even explosion. · If you have nausea, try to eat several small meals a day. When you feel better, eat clear soups and mild foods until all symptoms are gone for 12 to 48 hours. Other good choices include dry toast, crackers, cooked cereal, and gelatin dessert, such as Jell-O.  · If you are vomiting or have diarrhea:  ¨ Drink plenty of fluids (enough so that your urine is light yellow or clear like water) to prevent dehydration. Choose water and other caffeine-free clear liquids. If you have kidney, heart, or liver disease and have to limit fluids, talk with your doctor before you increase the amount of fluids you drink. ¨ When you are able to eat, try clear soups, mild foods, and liquids until all symptoms are gone for 12 to 48 hours. Other good choices include dry toast, crackers, cooked cereal, and gelatin dessert, such as Jell-O.  · Take steps to control your stress and workload. Learn relaxation techniques. ¨ Share your feelings. Stress and tension affect our emotions. By expressing your feelings to others, you may be able to understand and cope with them. ¨ Consider joining a support group. Talking about a problem with your spouse, a good friend, or other people with similar problems is a good way to reduce tension and stress. ¨ Express yourself with art. Try writing, crafts, dance, or art to relieve stress. Some dance, writing, or art groups may be available just for people who have cancer. ¨ Be kind to your body and mind.  Getting enough sleep, eating a healthy diet, and taking time to do things you enjoy can contribute to an overall feeling of balance in your life and help reduce stress. ¨ Get help if you need it. Discuss your concerns with your doctor or counselor. · If you have not already done so, prepare a list of advance directives. Advance directives are instructions to your doctor and family members about what kind of care you want if you become unable to speak or express yourself. When should you call for help? Call 911 anytime you think you may need emergency care. For example, call if:  · You have sudden or severe chest pain. · You have severe trouble breathing. · You cough up a lot of blood. · You vomit and feel like you may faint when you sit up or stand. Call your doctor now or seek immediate medical care if:  · You are short of breath. · You have new chest pain. · You have a fever. · Your neck and face swell. · You have unexpected or severe nausea or vomiting. · Your pain is not controlled by medicine. · Your symptoms get worse or are not getting better. Watch closely for changes in your health, and be sure to contact your doctor if:  · You develop a new cough, or your cough does not go away. · You cough up yellow or green mucus for longer than 2 days. · You are constipated or have diarrhea. Where can you learn more? Go to http://carolina-shayla.info/. Enter O380 in the search box to learn more about \"Lung Cancer: Care Instructions. \"  Current as of: July 26, 2016  Content Version: 11.2  © 8291-5664 [employer]. Care instructions adapted under license by [employer] (which disclaims liability or warranty for this information). If you have questions about a medical condition or this instruction, always ask your healthcare professional. [address] any warranty or liability for your use of this information.

## 2017-03-31 NOTE — PROGRESS NOTES
NNTOCIP Post Hospitalization                       [facility]  Per combined daily census report patient admitted to [facility] on 3/10/17 and discharged on 3/17/17 with diagnosis of malignant neoplasm of unspecified part of right bronchus or lung.                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                    NN follow-up  Per EMR patient currently remains admitted to [facility] as of 3/17/17.        - Goals:  Goals        Post Hospitalization     Prevent complications post hospitalization. 3/15/17: Patient currently still admitted to [facility]; Wife to contact this NN with updates regarding discharge planning/discharge date. 3/27/17: Patient currently remains admitted to [facility] as of 3/17/17.  3/31/17: Patient currently remains admitted to [facility] as of 3/17/17. This note will not be viewable in [address]

## 2017-03-31 NOTE — ED NOTES
MD Angela Turner has reviewed discharge instructions with the patient. The patient verbalized understanding. Pt discharged with written instructions. No further concerns at this time. IV removed. Pt given prescriptions and ED excuse note.

## 2017-04-01 LAB
ATRIAL RATE: 90 BPM
CALCULATED R AXIS, ECG10: 22 DEGREES
CALCULATED T AXIS, ECG11: 17 DEGREES
DIAGNOSIS, 93000: NORMAL
Q-T INTERVAL, ECG07: 390 MS
QRS DURATION, ECG06: 88 MS
QTC CALCULATION (BEZET), ECG08: 477 MS
VENTRICULAR RATE, ECG03: 90 BPM

## 2017-04-04 ENCOUNTER — PATIENT OUTREACH (OUTPATIENT)
Dept: INTERNAL MEDICINE CLINIC | Age: 62
End: 2017-04-04

## 2017-04-04 RX ORDER — HYDRALAZINE HYDROCHLORIDE 50 MG/1
50 TABLET, FILM COATED ORAL 3 TIMES DAILY
COMMUNITY
End: 2018-02-13 | Stop reason: SDUPTHER

## 2017-04-04 RX ORDER — CARVEDILOL 25 MG/1
25 TABLET ORAL 2 TIMES DAILY WITH MEALS
COMMUNITY
End: 2017-11-03 | Stop reason: SDUPTHER

## 2017-04-04 NOTE — PROGRESS NOTES
NNTOCIP Post Hospitalization       Patient admitted to [facility] 3/17/17-3/31/17.         Most recent HIPAA form in the patient's chart (dated 10/27/16) was reviewed; authorized individual(s) listed on HIPAA form include Adriano Ramos, [facility]        NN follow-up phone call  NN spoke with the patient's Wife, Oralia Sexton, today to complete NN post-hospital discharge assessment. Two patient identifiers verified. NN introduced self to the patient's Wife, including NN role and purpose of NN follow-up phone call; patient's Wife verbalizes understanding. NN inquired about the patient's current condition and how the patient is feeling; patient's Wife states, \"Fine. \"               See details of Functional Assessment below as reported by the patient's Wife, Oralia Sexton. Living Situation/Support System: Lives with his Wife; reports a good support system in place for the patient at home. ADLs: Independent with ADLs. Mobility: Vernadine Peaks; denies history of patient falls within the past 6 months. Transportation: Denies any concerns regarding patient transportation. Medication Management: Wife manages the patient's medications. Financial Status: Denies any financial concerns regarding the patient's medications. Barriers to care?  no     Patient's allergies reviewed with the patient's Wife and Medication Reconciliation completed and updated. Mrs. Damion Goddard understanding of management of the patient's medications and reports patient compliance with prescribed medication regimen. Med Rec during this call  Current Outpatient Prescriptions   Medication Sig    carvedilol (COREG) 25 mg tablet Take 25 mg by mouth two (2) times daily (with meals).  hydrALAZINE (APRESOLINE) 50 mg tablet Take 50 mg by mouth three (3) times daily.     amLODIPine (NORVASC) 10 mg tablet TAKE ONE TABLET BY MOUTH ONCE DAILY FOR BLOOD PRESSURE    lisinopril (PRINIVIL, ZESTRIL) 40 mg tablet TAKE ONE TABLET BY MOUTH ONCE DAILY FOR BLOOD PRESSURE (Patient taking differently: 20 mg. TAKE ONE TABLET BY MOUTH ONCE DAILY FOR BLOOD PRESSURE)    atorvastatin (LIPITOR) 80 mg tablet Take 1 Tab by mouth nightly.  levETIRAcetam (KEPPRA) 750 mg tablet Take 1 Tab by mouth two (2) times a day.  rivaroxaban (XARELTO) 20 mg tab tablet Take 20 mg by mouth daily.  glucose blood VI test strips (FREESTYLE TEST) strip Test daily. 250.00    Lancets (FREESTYLE LANCETS) misc Test daily. 250.00     No current facility-administered medications for this visit. Medications Discontinued During This Encounter   Medication Reason    carvedilol (COREG) 25 mg tablet Dose Adjustment    hydrALAZINE (APRESOLINE) 10 mg tablet Dose Adjustment    hydrALAZINE (APRESOLINE) 10 mg tablet Dose Adjustment    isosorbide dinitrate (ISORDIL) 20 mg tablet Discontinued by Another Clinician    clonazePAM (KLONOPIN) 0.5 mg tablet Discontinued by Another Clinician             Carvedilol 25 mg tablet discontinued because patient's Wife reports that dose was changed to 25 mg tablet Take one tablet by mouth twice daily. Med Rec updated. Hydralazine 10 mg tablet discontinued because patient's Wife reports that dose was changed to 50 mg tablet. Med Rec updated. Hydralazine 10 mg tablet discontinued because patient's Wife reports that dose was changed to 50 mg tablet. Med Rec updated. Isosorbide discontinued because patient's Wife reports that this was discontinued at patient discharge from [facility]. Not A Current Medication. Med Rec updated. Clonazepam discontinued because patient's Wife reports that this was discontinued at patient discharge from [facility]. Not A Current Medication. Med Rec updated. Per patient's Wife, New medications at discharge include: Denies  Per patient's Wife, Medication(s) changed at facility discharge include: carvedilol 25 mg tablet Take one tablet by mouth twice daily;  Hydralazine 50 mg tablet Take one tablet by mouth three times daily; Lisinopril 40 mg tablet Take 12/ tablet by mouth daily  Per patient's Wife, Medication(s) discontinued at facility discharge include: metformin, isosorbide, clonazepam,   Patient able to fill/pickup new medications without difficulty: Yes. Any Questions/Concerns regarding new Medication(s) and/or Medication Change(s): Denies any questions/concerns. - Home Health. Per Mrs. Shantell Christianson, patient is currently open to [facility] through [facility]; reports that initial visit by SN was completed yesterday and PT/OT are to be contacting her in order to schedule their visit(s). Mrs. Mitchell Reading understanding of discharge instructions that were provided to the patient/family upon the patient's discharge from [facility]. Mrs. Shantell Christianson states that the patient has a follow-up appointment scheduled with [facility] Surgeon on 4/24/17. Patient has been scheduled for a DOMINIK appointment with Dr. Robb Mckeon; Mrs. Mitchell Reading understanding of appointment information. Opportunity for patient to ask NN questions was provided. NN contact information provided and Mrs. Shantell Christianson advised to contact NN as needed. PLAN  -Self-Management of Chronic Disease(s):     - DM: Mrs. Shantell Christianson reports that the patient does check his blood glucose at home, however his glucometer battery needs to be replaced; states that the intends to purchase a new battery today.    -Patient to attend Transitions Of Care Appointment with Dr. Robb Mckeon on 4/13/17 at 2:00 pm; offered appointment fro 4/5/17, however Mrs. Shantell Christianson declined. -Patient to attend follow-up appointment(s) with Surgeon(if applicable) and/or Speciality Provider(s): [facility] Surgeon on 4/24/17 (reported).     -Patient to contact this NN and/or PCP office with any questions/concerns.  -Notified of availability of PCP on-call physician after-hours and on the weekends for non-emergent/non-life threatening medical questions/concerns; patient's Wife verbalizes understanding.  -Goals:  Goals        Post Hospitalization     Prevent complications post hospitalization. 3/15/17: Patient currently still admitted to [facility]; Wife to contact this NN with updates regarding discharge planning/discharge date. 3/27/17: Patient currently remains admitted to [facility] as of 3/17/17.  3/31/17: Patient currently remains admitted to [facility] as of 3/17/17.  4/4/17: Patient discharged from [facility] to Home with [address] on 3/31/17. NN initial DOMINIK call completed; DOMINIK [phone] scheduled. Patient reportedly open to [address]; reports f/u with [facility] Surgeon scheduled for 4/24/17. No barriers to care reported/identified at this time. Mrs. Esperanza Munson expressed no questions, concerns or needs for this NN at this time. Mrs. Esperanza Munson verbalized understanding of all information discussed. NN contact information provided and Mrs. Esperanza Munson advised to contact NN as needed. NN will route this encounter to Dr. Deandra Cantrell for notification/review. NN will route this encounter to Ambulatory  NN for notification/review and continued follow-up during DOMINIK period. This note will not be viewable in [address]

## 2017-04-04 NOTE — PROGRESS NOTES
NOTE    [address]          NN follow-up      Patient with ED visit to [facility] on 3/31/17 with diagnosis of Malignant Neoplasm of Left Lung. Per notes, patient was discharged from ED to Home.   New Presciption(s) at ED Discharge: N/A  Change(s) to existing Medication(s) at ED Discharge: N/A  Medication(s) discontinued at ED Discharge: Metformin  Patient to follow-up with:   None   Follow-up Information   Follow up With Details Comments Contact Info   Follow up with Dr. Marcela Schmitz in one week In 1 week

## 2017-04-04 NOTE — Clinical Note
D/c from [facility] 3/31/17; maria e patient/ hfu scheduled; reports f/u with [facility] surgeon scheduled for 4/24/17; no acute needs at this time; repotrs open to home health Able to resolve on 4/18/17

## 2017-04-13 ENCOUNTER — PATIENT OUTREACH (OUTPATIENT)
Dept: INTERNAL MEDICINE CLINIC | Age: 62
End: 2017-04-13

## 2017-04-13 ENCOUNTER — OFFICE VISIT (OUTPATIENT)
Dept: INTERNAL MEDICINE CLINIC | Age: 62
End: 2017-04-13

## 2017-04-13 VITALS
SYSTOLIC BLOOD PRESSURE: 127 MMHG | TEMPERATURE: 97.8 F | RESPIRATION RATE: 18 BRPM | WEIGHT: 213 LBS | HEIGHT: 73 IN | HEART RATE: 72 BPM | DIASTOLIC BLOOD PRESSURE: 92 MMHG | BODY MASS INDEX: 28.23 KG/M2 | OXYGEN SATURATION: 99 %

## 2017-04-13 DIAGNOSIS — I10 HTN (HYPERTENSION), BENIGN: ICD-10-CM

## 2017-04-13 DIAGNOSIS — D3A.00 CARCINOID TUMOR: Primary | ICD-10-CM

## 2017-04-13 DIAGNOSIS — R56.9 SEIZURES (HCC): ICD-10-CM

## 2017-04-13 DIAGNOSIS — I48.20 CHRONIC ATRIAL FIBRILLATION (HCC): ICD-10-CM

## 2017-04-13 DIAGNOSIS — E11.8 TYPE 2 DIABETES MELLITUS WITH COMPLICATION, WITHOUT LONG-TERM CURRENT USE OF INSULIN (HCC): ICD-10-CM

## 2017-04-13 DIAGNOSIS — N17.9 AKI (ACUTE KIDNEY INJURY) (HCC): ICD-10-CM

## 2017-04-13 DIAGNOSIS — I42.8 CARDIOMYOPATHY, NONISCHEMIC (HCC): ICD-10-CM

## 2017-04-13 NOTE — PROGRESS NOTES
HISTORY OF PRESENT ILLNESS  Jayla Chung is a 63 y.o. male. HPI   Seen today for DOMINIK  Admitted to [facility] for left lung VATS on 3-10-17 Dr Leidy Ontiveros to [facility] then sent home 3-31-17  Getting home PT 2 twice per week and nurse visit  F/u with Dr. Ajit Kingsley 4-24-17   Metformin was stopped in the hospit due to MARIA TERESA and lisinopril lowered to 20 mg every day  Coreg dose was reduced d/t bracycardia but still taking 25mg bid  Pt denies post surgical pain  Has dry cough  Went to ED 3-31-17 for sob--CTA neg PE but mod left effusion and left basilar ASDZ  Denies f/c    Patient Active Problem List    Diagnosis Date Noted    Polyneuropathy in diabetes ([facility] 07/21/2015    Hypertensive emergency 07/20/2015    Seizure disorder ([facility] 07/20/2015    Type II or unspecified type diabetes mellitus with neurological manifestations, uncontrolled 07/20/2015    History of atrial fibrillation 07/20/2015    Hyperlipidemia 07/20/2015    Diabetic neuropathy ([facility] 11/21/2014    Seizures ([facility] 08/08/2014    Syncope 07/13/2012    Cardiomyopathy, nonischemic ([facility] 10/31/2009    DM (diabetes mellitus) ([facility] 10/26/2009    HTN (hypertension), benign 10/26/2009    Pulmonary nodule 10/26/2009     Current Outpatient Prescriptions   Medication Sig Dispense Refill    carvedilol (COREG) 25 mg tablet Take 25 mg by mouth two (2) times daily (with meals).  hydrALAZINE (APRESOLINE) 50 mg tablet Take 50 mg by mouth three (3) times daily.  amLODIPine (NORVASC) 10 mg tablet TAKE ONE TABLET BY MOUTH ONCE DAILY FOR BLOOD PRESSURE 30 Tab 6    atorvastatin (LIPITOR) 80 mg tablet Take 1 Tab by mouth nightly. 30 Tab 6    levETIRAcetam (KEPPRA) 750 mg tablet Take 1 Tab by mouth two (2) times a day. 60 Tab 6    rivaroxaban (XARELTO) 20 mg tab tablet Take 20 mg by mouth daily.  glucose blood VI test strips (FREESTYLE TEST) strip Test daily. 250.00 1 Package 5    Lancets (FREESTYLE LANCETS) misc Test daily.  250.00 1 Package 5    lisinopril (PRINIVIL, ZESTRIL) 40 mg tablet TAKE ONE TABLET BY MOUTH ONCE DAILY FOR BLOOD PRESSURE (Patient taking differently: 20 mg. TAKE ONE TABLET BY MOUTH ONCE DAILY FOR BLOOD PRESSURE) 30 Tab 6     No Known Allergies   Lab Results  Component Value Date/Time   Hemoglobin A1c 6.9 10/27/2016 10:40 AM   Hemoglobin A1c 6.6 12/04/2015 10:04 AM   Hemoglobin A1c 6.4 07/21/2015 03:03 AM   Glucose 218 03/31/2017 03:30 PM   Glucose (POC) 137 08/24/2015 02:55 PM   Microalb/Creat ratio (ug/mg creat.) 31.8 12/04/2015 10:04 AM   LDL, calculated 55 10/27/2016 10:40 AM   Creatinine 1.45 03/31/2017 03:30 PM      Lab Results  Component Value Date/Time   Cholesterol, total 99 10/27/2016 10:40 AM   HDL Cholesterol 29 10/27/2016 10:40 AM   LDL, calculated 55 10/27/2016 10:40 AM   Triglyceride 76 10/27/2016 10:40 AM   CHOL/HDL Ratio 3.7 07/21/2015 03:03 AM       Lab Results  Component Value Date/Time   GFR est AA >60 03/31/2017 03:30 PM   GFR est non-AA 50 03/31/2017 03:30 PM   Creatinine 1.45 03/31/2017 03:30 PM   BUN 12 03/31/2017 03:30 PM   Sodium 141 03/31/2017 03:30 PM   Potassium 3.7 03/31/2017 03:30 PM   Chloride 104 03/31/2017 03:30 PM   CO2 29 03/31/2017 03:30 PM         ROS    Physical Exam   Constitutional: He appears well-developed and well-nourished. No distress. Appears stated age   HENT:   Head: Normocephalic. Cardiovascular: Normal rate and regular rhythm. Exam reveals friction rub. Exam reveals no gallop. No murmur heard. Pulmonary/Chest: Effort normal and breath sounds normal.   Abdominal: Soft. Musculoskeletal: He exhibits no edema. Neurological: He is alert. Skin:        Psychiatric: He has a normal mood and affect. Nursing note and vitals reviewed. ASSESSMENT and Estefani Blankenship was seen today for hospital follow up.     Diagnoses and all orders for this visit:    Carcinoid tumor   S/p wedge resection at U   Appears stable post op   Records reviewed from [facility] and Dr Jacques Gaston   Pleural effusion and Riki Booker may be expected post op but copy of CTA was given to pt and will d/w Dr Sharmaine Hope.    To call to f/c or increased SOB   Continue HH PT    HTN (hypertension), benign   Controlled on lower dose lisinopril  Cardiomyopathy, nonischemic (HCC)   compensated  Type 2 diabetes mellitus with complication, without long-term current use of insulin (HCC)   Ordered a1c and bmp   May need to restart metformin or an oral agent  Seizures ([address]    Chronic atrial fibrillation ([address]   On xarelto    Follow-up Disposition: Not on File

## 2017-04-13 NOTE — MR AVS SNAPSHOT
Visit Information Date & Time Provider Department Dept. Phone Encounter #  
 4/13/2017  2:00 PM Kota Shaw, [address] [phone] [id number] Follow-up Instructions Return in about 4 months (around 8/13/2017) for dm-2 htn hld carcinoid. Your Appointments 4/28/2017  3:00 PM  
ROUTINE CARE with Kota Shaw, [address] [facility] Appt Note: 6 month follow up; lvm to r/s 3/2/2017 PJ; 6 month follow up  
 [address] [facility] [address] [id number]  
[address] [facility] [address] [place] [address] Upcoming Health Maintenance Date Due COLONOSCOPY 8/24/1975 DTaP/Tdap/Td series (1 - Tdap) 8/24/1978 Pneumococcal 19-64 Highest Risk (2 of 3 - PCV13) 7/22/2016 MICROALBUMIN Q1 2/18/2017 HEMOGLOBIN A1C Q6M 4/27/2017 FOOT EXAM Q1 6/27/2017 EYE EXAM RETINAL OR DILATED Q1 10/25/2017 LIPID PANEL Q1 10/27/2017 Allergies as of 4/13/2017  Review Complete On: 4/13/2017 By: Wesley Key No Known Allergies Current Immunizations  Reviewed on 10/27/2016 Name Date Influenza Vaccine (Quad) PF 10/27/2016 10:35 AM  
 Influenza Vaccine PF 11/21/2014 Pneumococcal Polysaccharide (PPSV-23) 7/22/2015 12:39 PM  
  
 Not reviewed this visit You Were Diagnosed With   
  
 Codes Comments Carcinoid tumor    -  Primary ICD-10-CM: D3A.00 
ICD-9-CM: 209.60 HTN (hypertension), benign     ICD-10-CM: I10 
ICD-9-CM: 401.1 Cardiomyopathy, nonischemic ([facility]     ICD-10-CM: I42.8 ICD-9-CM: 425.4 Type 2 diabetes mellitus with complication, without long-term current use of insulin (HCC)     ICD-10-CM: E11.8 ICD-9-CM: 250.90 Seizures ([facility]     ICD-10-CM: R56.9 ICD-9-CM: 780.39 Chronic atrial fibrillation (HCC)     ICD-10-CM: W24.9 ICD-9-CM: 427.31 MARIA TERESA (acute kidney injury) ([facility]     ICD-10-CM: N17.9 ICD-9-CM: 353. 9 Vitals BP Pulse Temp Resp Height(growth percentile) Weight(growth percentile) (!) 127/92 (BP 1 Location: Right arm, BP Patient Position: Sitting) 72 97.8 °F (36.6 °C) (Oral) 18 6' 1\" (1.854 m) 213 lb (96.6 kg) SpO2 BMI Smoking Status 99% 28.1 kg/m2 Former Smoker Vitals History BMI and BSA Data Body Mass Index Body Surface Area  
 28.1 kg/m 2 2.23 m 2 Preferred Pharmacy Pharmacy Name Phone [facility] [address], [address] [place] [address] Avenue [phone] Your Updated Medication List  
  
   
This list is accurate as of: 4/13/17  2:47 PM.  Always use your most recent med list. amLODIPine 10 mg tablet Commonly known as:  Kate Rafter TAKE ONE TABLET BY MOUTH ONCE DAILY FOR BLOOD PRESSURE  
  
 atorvastatin 80 mg tablet Commonly known as:  LIPITOR Take 1 Tab by mouth nightly. carvedilol 25 mg tablet Commonly known as:  Angela Spinner Take 25 mg by mouth two (2) times daily (with meals). glucose blood VI test strips strip Commonly known as:  FREESTYLE TEST Test daily. 250.00  
  
 hydrALAZINE 50 mg tablet Commonly known as:  APRESOLINE Take 50 mg by mouth three (3) times daily. Lancets Misc Commonly known as:  FREESTYLE LANCETS Test daily. 250.00  
  
 levETIRAcetam 750 mg tablet Commonly known as:  KEPPRA Take 1 Tab by mouth two (2) times a day. lisinopril 40 mg tablet Commonly known as:  PRINIVIL, ZESTRIL  
TAKE ONE TABLET BY MOUTH ONCE DAILY FOR BLOOD PRESSURE  
  
 XARELTO 20 mg Tab tablet Generic drug:  rivaroxaban Take 20 mg by mouth daily. We Performed the Following HEMOGLOBIN A1C WITH EAG [80908 CPT(R)] METABOLIC PANEL, BASIC [96279 CPT(R)] Follow-up Instructions Return in about 4 months (around 8/13/2017) for dm-2 htn hld carcinoid. Introducing [facility]! Dear Cindy Hughes: Thank you for requesting a [employer] account.   Our records indicate that you have previously registered for a [employer] account but its currently inactive. Please call our [employer] support line at [phone]. Additional Information If you have questions, please visit the Frequently Asked Questions section of the [employer] website at [email or web address] / [employer] Remember, [employer] is NOT to be used for urgent needs. For medical emergencies, dial 911. Now available from your iPhone and Android! Please provide this summary of care documentation to your next provider. Your primary care clinician is listed as Silvia GLYNN. If you have any questions after today's visit, please call [phone].

## 2017-04-13 NOTE — PROGRESS NOTES
This note will not be viewable in [address] NNTOCIP Post Hospitalization -Per Combined Daily Census, patient with admission to [facility] on 3/10/17, discharge date of 3/14/17, Visit Type - Inpatient, diagnosis of Malignant Neoplasm of Unspecified Part of Right Bronchus or Lung.       - Patient attended [facility] appointment with Dr. Landon Dubin on today 4/13/17; was advised to follow-up in 4 months. Future Appointments:     Provider Bj Todd   8/18/2017 10:00 AM Elyssa Ybarra, [address] [place] [address] to pt and his wife. Pt was using a cane today. Pt stated he gets some shortness of breath yet. O2 sat in office today was 99%. He uses his incentive spirometer at home. Pt stated he is in no pain. He has left lung cancer. Has home health, [facility] physical therapy. Pt's wife stated she is going to buy a grab bar for the shower and a shower chair. Pt was in [address] [place] [address] for 2 weeks. Goals      Prevent complications post hospitalization. 3/15/17: Patient currently still admitted to [facility]; Wife to contact this NN with updates regarding discharge planning/discharge date. 3/27/17: Patient currently remains admitted to [facility] as of 3/17/17.  3/31/17: Patient currently remains admitted to [facility] as of 3/17/17.  4/4/17: Patient discharged from [facility] to Home with [address] on 3/31/17. NN initial DOMINIK call completed; DOMINIK [phone] scheduled. Patient reportedly open to [address]; reports f/u with [facility] Surgeon scheduled for 4/24/17. No barriers to care reported/identified at this time. 4/13/17- pt attended [facility] appt today with Dr. Landon Dubin. Has surgeon f/u on 4/24/17 Dr. Temo Cerna. Pt and wife stated all needs are met at this time. Daughter lives in the home and helps. DW          Pt and wife will call with any questions or concerns.

## 2017-04-17 LAB
ALBUMIN/CREAT UR: 249 MG/G CREAT (ref 0–30)
BUN SERPL-MCNC: NORMAL MG/DL
CALCIUM SERPL-MCNC: NORMAL MG/DL
CHLORIDE SERPL-SCNC: NORMAL MMOL/L
CO2 SERPL-SCNC: NORMAL MMOL/L
CREAT SERPL-MCNC: NORMAL MG/DL
CREAT UR-MCNC: 346.1 MG/DL
EST. AVERAGE GLUCOSE BLD GHB EST-MCNC: 174 MG/DL
GLUCOSE SERPL-MCNC: NORMAL MG/DL
HBA1C MFR BLD: 7.7 % (ref 4.8–5.6)
MICROALBUMIN UR-MCNC: 861.9 UG/ML
POTASSIUM SERPL-SCNC: NORMAL MMOL/L
SODIUM SERPL-SCNC: NORMAL MMOL/L

## 2017-04-17 RX ORDER — GLIMEPIRIDE 1 MG/1
1 TABLET ORAL
Qty: 30 TAB | Refills: 4 | Status: SHIPPED | OUTPATIENT
Start: 2017-04-17 | End: 2017-04-28

## 2017-04-17 NOTE — PROGRESS NOTES
Tell pt or wife -his glucose average is too high--174- no longer on metformin d/t acute kidney injury,.  I will escribe amaryl 1 mg every day  Advise fsbs check qam

## 2017-04-24 ENCOUNTER — TELEPHONE (OUTPATIENT)
Dept: INTERNAL MEDICINE CLINIC | Age: 62
End: 2017-04-24

## 2017-04-24 NOTE — TELEPHONE ENCOUNTER
4/24/17 Patient's wife call stating that patient need  to see a dentist but they don't know who to see. Per Dr. Fernando Romero patient could make an appt. With Dr. Ashli Jones. Phone number and address was given to patient's wife.

## 2017-04-28 ENCOUNTER — TELEPHONE (OUTPATIENT)
Dept: INTERNAL MEDICINE CLINIC | Age: 62
End: 2017-04-28

## 2017-04-28 RX ORDER — GLIMEPIRIDE 4 MG/1
4 TABLET ORAL
Qty: 30 TAB | Refills: 0 | Status: SHIPPED | OUTPATIENT
Start: 2017-04-28 | End: 2017-05-30 | Stop reason: SDUPTHER

## 2017-04-28 RX ORDER — METFORMIN HYDROCHLORIDE 500 MG/1
500 TABLET ORAL 2 TIMES DAILY WITH MEALS
Qty: 60 TAB | Refills: 0 | Status: SHIPPED | OUTPATIENT
Start: 2017-04-28 | End: 2017-05-30 | Stop reason: SDUPTHER

## 2017-05-09 NOTE — TELEPHONE ENCOUNTER
Pt needs blood sugar strips to be sent in to [facility] on [address]    The best contact number is [phone]       Message received & copied from [facility]

## 2017-05-10 ENCOUNTER — PATIENT OUTREACH (OUTPATIENT)
Dept: INTERNAL MEDICINE CLINIC | Age: 62
End: 2017-05-10

## 2017-05-10 NOTE — PROGRESS NOTES
This note will not be viewable in [address] NNTOCIP Post Hospitalization - [facility]- 3/10-3/14/17 lung ca-left lung VATS     - Patient attended [facility] appointment with Dr. Ny Andersen on 4/13/17; was advised to follow-up in 4 months. Pt has f/u appt in two weeks to f/u on  hyperglycemia. To the best of this NN's knowledge, this patient had no additional ED visits or Hospital Admissions during 30 day DOMINIK period following admission to [facility]. DOMINIK period has ended. Post-Hospitalization Episode Resolved. Patient has NN contact information if any questions/concerns arise in the future.       Future Appointments      Provider Bj Todd   5/15/2017 11:30 AM Kota Shaw [address]   8/18/2017 10:00 AM Kota Shaw MD [address]

## 2017-05-14 DIAGNOSIS — N18.2 CKD STAGE 2 DUE TO TYPE 2 DIABETES MELLITUS (HCC): ICD-10-CM

## 2017-05-14 DIAGNOSIS — E11.22 CKD STAGE 2 DUE TO TYPE 2 DIABETES MELLITUS (HCC): ICD-10-CM

## 2017-05-15 ENCOUNTER — OFFICE VISIT (OUTPATIENT)
Dept: INTERNAL MEDICINE CLINIC | Age: 62
End: 2017-05-15

## 2017-05-15 VITALS
WEIGHT: 221.4 LBS | SYSTOLIC BLOOD PRESSURE: 123 MMHG | OXYGEN SATURATION: 99 % | HEIGHT: 73 IN | TEMPERATURE: 97.6 F | BODY MASS INDEX: 29.34 KG/M2 | HEART RATE: 69 BPM | RESPIRATION RATE: 16 BRPM | DIASTOLIC BLOOD PRESSURE: 85 MMHG

## 2017-05-15 DIAGNOSIS — N17.9 AKI (ACUTE KIDNEY INJURY) (HCC): Primary | ICD-10-CM

## 2017-05-15 LAB — GLUCOSE POC: 188 MG/DL

## 2017-05-15 NOTE — MR AVS SNAPSHOT
Visit Information Date & Time Provider Department Dept. Phone Encounter #  
 5/15/2017 11:30 AM Krishna Moon, [address] [place] [address] [phone] [id number] Follow-up Instructions Return in about 4 months (around 9/15/2017) for dm-2 htn MARIA TERESA. Your Appointments 8/18/2017 10:00 AM  
ROUTINE CARE with Krishna Moon, [address] [place] [address]) Appt Note: 4 month follow up  
 [facility] [address] [phone]  
[address] [facility] [address] [place] [address] Upcoming Health Maintenance Date Due COLONOSCOPY 8/24/1975 DTaP/Tdap/Td series (1 - Tdap) 8/24/1978 Pneumococcal 19-64 Highest Risk (2 of 3 - PCV13) 7/22/2016 FOOT EXAM Q1 6/27/2017 INFLUENZA AGE 9 TO ADULT 8/1/2017 HEMOGLOBIN A1C Q6M 10/13/2017 EYE EXAM RETINAL OR DILATED Q1 10/25/2017 LIPID PANEL Q1 10/27/2017 MICROALBUMIN Q1 4/13/2018 Allergies as of 5/15/2017  Review Complete On: 5/15/2017 By: Dary Pan LPN No Known Allergies Current Immunizations  Reviewed on 10/27/2016 Name Date Influenza Vaccine (Quad) PF 10/27/2016 10:35 AM  
 Influenza Vaccine PF 11/21/2014 Pneumococcal Polysaccharide (PPSV-23) 7/22/2015 12:39 PM  
  
 Not reviewed this visit You Were Diagnosed With   
  
 Codes Comments MARIA TERESA (acute kidney injury) ([facility]    -  Primary ICD-10-CM: N17.9 ICD-9-CM: 584.9 Uncontrolled type 2 diabetes mellitus without complication, without long-term current use of insulin ([facility]     ICD-10-CM: E11.65 ICD-9-CM: 250.02 Vitals BP Pulse Temp Resp Height(growth percentile) Weight(growth percentile) 123/85 69 97.6 °F (36.4 °C) (Oral) 16 6' 1\" (1.854 m) 221 lb 6.4 oz (100.4 kg) SpO2 BMI Smoking Status 99% 29.21 kg/m2 Former Smoker Vitals History BMI and BSA Data  Body Mass Index Body Surface Area  
 29.21 kg/m 2 2.27 m 2  
  
  
 Preferred Pharmacy Pharmacy Name Phone [facility] [address] [place] [address] Dr Gunn, [address] [phone] Your Updated Medication List  
  
   
This list is accurate as of: 5/15/17 12:16 PM.  Always use your most recent med list. amLODIPine 10 mg tablet Commonly known as:  Ariella Akhil TAKE ONE TABLET BY MOUTH ONCE DAILY FOR BLOOD PRESSURE  
  
 atorvastatin 80 mg tablet Commonly known as:  LIPITOR Take 1 Tab by mouth nightly. carvedilol 25 mg tablet Commonly known as:  Merry Schimke Take 25 mg by mouth two (2) times daily (with meals). glimepiride 4 mg tablet Commonly known as:  AMARYL Take 1 Tab by mouth every morning. glucose blood VI test strips strip Commonly known as:  FREESTYLE TEST Use as directed to check blood sugars once daily. E11.9  
  
 hydrALAZINE 50 mg tablet Commonly known as:  APRESOLINE Take 50 mg by mouth three (3) times daily. Lancets Misc Commonly known as:  FREESTYLE LANCETS Test daily. 250.00  
  
 levETIRAcetam 750 mg tablet Commonly known as:  KEPPRA Take 1 Tab by mouth two (2) times a day. lisinopril 40 mg tablet Commonly known as:  PRINIVIL, ZESTRIL  
TAKE ONE TABLET BY MOUTH ONCE DAILY FOR BLOOD PRESSURE  
  
 metFORMIN 500 mg tablet Commonly known as:  GLUCOPHAGE Take 1 Tab by mouth two (2) times daily (with meals). XARELTO 20 mg Tab tablet Generic drug:  rivaroxaban Take 20 mg by mouth daily. Prescriptions Sent to Pharmacy Refills  
 glucose blood VI test strips (FREESTYLE TEST) strip 11 Sig: Use as directed to check blood sugars once daily. E11.9 Class: Normal  
 Pharmacy: [address] Gunn, [address] Ph #: [phone] We Performed the Following METABOLIC PANEL, BASIC [26197 CPT(R)] Follow-up Instructions Return in about 4 months (around 9/15/2017) for dm-2 htn MARIA TERESA. Introducing [facility]! Dear Tennis Limbo: Thank you for requesting a [employer] account. Our records indicate that you have previously registered for a [employer] account but its currently inactive. Please call our [employer] support line at [phone]. Additional Information If you have questions, please visit the Frequently Asked Questions section of the [employer] website at [email or web address] Remember, [employer] is NOT to be used for urgent needs. For medical emergencies, dial 911. Now available from your iPhone and Android! Please provide this summary of care documentation to your next provider. Your primary care clinician is listed as Wilma GLYNN. If you have any questions after today's visit, please call [phone].

## 2017-05-15 NOTE — PROGRESS NOTES
Chief Complaint   Patient presents with    Diabetes    Follow-up    Hand Problem     c/o (R) Hand weakness/lack of strength     Medication Refill     1. Have you been to the ER, urgent care clinic since your last visit? Hospitalized since your last visit? No    2. Have you seen or consulted any other health care providers outside of the [address] [place] [address] since your last visit? Include any pap smears or colon screening.  No

## 2017-05-15 NOTE — PROGRESS NOTES
HISTORY OF PRESENT ILLNESS  Dami Rivas is a 63 y.o. male. HPI   F/u dm-2 and MARIA TERESA  Was started to [facility]; 1 mg every day last month in place of metformin d/t MARIA TERESA after VATS surgery  Creatinine 1.45 on 3-31-17  fsbs at home 200-320 on amaryl 1 mg  Metformin has been restarted and amaryl dose increased to 4 mg every day  No fsbs checks in last 2 weeks--ran out of strips. Patient Active Problem List    Diagnosis Date Noted    CKD stage 2 due to type 2 diabetes mellitus ([facility] 05/14/2017    [facility] in diabetes [facility] 07/21/2015    Hypertensive emergency 07/20/2015    Seizure disorder ([facility] 07/20/2015    Type II or unspecified type diabetes mellitus with neurological manifestations, uncontrolled 07/20/2015    History of atrial fibrillation 07/20/2015    Hyperlipidemia 07/20/2015    Diabetic neuropathy ([facility] 11/21/2014    Seizures ([facility] 08/08/2014    Syncope 07/13/2012    Cardiomyopathy, nonischemic ([facility] 10/31/2009    DM (diabetes mellitus) ([facility] 10/26/2009    HTN (hypertension), benign 10/26/2009    Pulmonary nodule 10/26/2009     Current Outpatient Prescriptions   Medication Sig Dispense Refill    glucose blood VI test strips (FREESTYLE TEST) strip Use as directed to check blood sugars once daily. E11.9 100 Strip 11    metFORMIN (GLUCOPHAGE) 500 mg tablet Take 1 Tab by mouth two (2) times daily (with meals). 60 Tab 0    glimepiride (AMARYL) 4 mg tablet Take 1 Tab by mouth every morning. 30 Tab 0    amLODIPine (NORVASC) 10 mg tablet TAKE ONE TABLET BY MOUTH ONCE DAILY FOR BLOOD PRESSURE 30 Tab 11    carvedilol (COREG) 25 mg tablet Take 25 mg by mouth two (2) times daily (with meals).  hydrALAZINE (APRESOLINE) 50 mg tablet Take 50 mg by mouth three (3) times daily.  lisinopril (PRINIVIL, ZESTRIL) 40 mg tablet TAKE ONE TABLET BY MOUTH ONCE DAILY FOR BLOOD PRESSURE (Patient taking differently: 20 mg.  TAKE ONE TABLET BY MOUTH ONCE DAILY FOR BLOOD PRESSURE) 30 Tab 6    atorvastatin (LIPITOR) 80 mg tablet Take 1 Tab by mouth nightly. 30 Tab 6    levETIRAcetam (KEPPRA) 750 mg tablet Take 1 Tab by mouth two (2) times a day. 60 Tab 6    rivaroxaban (XARELTO) 20 mg tab tablet Take 20 mg by mouth daily.  Lancets (FREESTYLE LANCETS) misc Test daily. 250.00 1 Package 5     No Known Allergies   Lab Results  Component Value Date/Time   Hemoglobin A1c 7.7 04/13/2017 03:07 PM   Hemoglobin A1c 6.9 10/27/2016 10:40 AM   Hemoglobin A1c 6.6 12/04/2015 10:04 AM   Glucose CANCELED 04/13/2017 03:07 PM   Glucose (POC) 137 08/24/2015 02:55 PM   Microalb/Creat ratio (ug/mg creat.) 249.0 04/13/2017 03:07 PM   LDL, calculated 55 10/27/2016 10:40 AM   Creatinine CANCELED 04/13/2017 03:07 PM      Lab Results  Component Value Date/Time   GFR est AA >60 03/31/2017 03:30 PM   GFR est non-AA 50 03/31/2017 03:30 PM   Creatinine CANCELED 04/13/2017 03:07 PM   BUN CANCELED 04/13/2017 03:07 PM   Sodium CANCELED 04/13/2017 03:07 PM   Potassium CANCELED 04/13/2017 03:07 PM   Chloride CANCELED 04/13/2017 03:07 PM   CO2 CANCELED 04/13/2017 03:07 PM         ROS    Physical Exam   Constitutional: He appears well-developed and well-nourished. No distress. Appears stated age   HENT:   Head: Normocephalic. Cardiovascular: Normal rate, regular rhythm and normal heart sounds. Exam reveals no gallop and no friction rub. No murmur heard. Pulmonary/Chest: Effort normal and breath sounds normal.   Abdominal: Soft. Musculoskeletal: He exhibits no edema. Neurological: He is alert. Psychiatric: He has a normal mood and affect. Nursing note and vitals reviewed. ASSESSMENT and PLAN  Beata Henning was seen today for diabetes, follow-up, hand problem and medication refill.     Diagnoses and all orders for this visit:    MARIA TERESA (acute kidney injury) ([facility]  -     METABOLIC PANEL, BASIC   Should be able to stay on metformin in creatinine ok  Uncontrolled type 2 diabetes mellitus without complication, without long-term current use of insulin (HCC)   fsbs 188 post meal   Having sxs of hypoglycemia a few datys per week x 1-2 weeks   Start checking fsbs bid and prn and notify me if < 100--d/w pt and wife  Other orders  -     glucose blood VI test strips (FREESTYLE TEST) strip; Use as directed to check blood sugars once daily.  E11.9      Follow-up Disposition: Not on File

## 2017-05-16 LAB
BUN SERPL-MCNC: 16 MG/DL (ref 6–24)
BUN/CREAT SERPL: 11 (ref 9–20)
CALCIUM SERPL-MCNC: 7.9 MG/DL (ref 8.7–10.2)
CHLORIDE SERPL-SCNC: 101 MMOL/L (ref 96–106)
CO2 SERPL-SCNC: 26 MMOL/L (ref 18–29)
CREAT SERPL-MCNC: 1.45 MG/DL (ref 0.76–1.27)
GLUCOSE SERPL-MCNC: 134 MG/DL (ref 65–99)
POTASSIUM SERPL-SCNC: 3.9 MMOL/L (ref 3.5–5.2)
SODIUM SERPL-SCNC: 144 MMOL/L (ref 134–144)

## 2017-06-18 ENCOUNTER — HOSPITAL ENCOUNTER (EMERGENCY)
Age: 62
Discharge: HOME | End: 2017-06-18
Attending: EMERGENCY MEDICINE | Admitting: EMERGENCY MEDICINE
Payer: COMMERCIAL

## 2017-06-18 ENCOUNTER — APPOINTMENT (OUTPATIENT)
Dept: GENERAL RADIOLOGY | Age: 62
End: 2017-06-18
Attending: PHYSICIAN ASSISTANT
Payer: COMMERCIAL

## 2017-06-18 VITALS
DIASTOLIC BLOOD PRESSURE: 88 MMHG | HEART RATE: 64 BPM | TEMPERATURE: 98 F | HEIGHT: 73 IN | OXYGEN SATURATION: 100 % | SYSTOLIC BLOOD PRESSURE: 145 MMHG | RESPIRATION RATE: 14 BRPM | BODY MASS INDEX: 28.72 KG/M2 | WEIGHT: 216.71 LBS

## 2017-06-18 DIAGNOSIS — R60.0 EDEMA OF HAND: Primary | ICD-10-CM

## 2017-06-18 DIAGNOSIS — M19.049 ARTHRITIS OF HAND: ICD-10-CM

## 2017-06-18 PROCEDURE — 73130 X-RAY EXAM OF HAND: CPT

## 2017-06-18 PROCEDURE — 99283 EMERGENCY DEPT VISIT LOW MDM: CPT

## 2017-06-18 PROCEDURE — 74011250637 HC RX REV CODE- 250/637: Performed by: PHYSICIAN ASSISTANT

## 2017-06-18 RX ORDER — HYDROCODONE BITARTRATE AND ACETAMINOPHEN 5; 325 MG/1; MG/1
1 TABLET ORAL
Status: COMPLETED | OUTPATIENT
Start: 2017-06-18 | End: 2017-06-18

## 2017-06-18 RX ORDER — HYDROCODONE BITARTRATE AND ACETAMINOPHEN 5; 325 MG/1; MG/1
1 TABLET ORAL
Qty: 20 TAB | Refills: 0 | Status: SHIPPED | OUTPATIENT
Start: 2017-06-18 | End: 2017-09-19 | Stop reason: ALTCHOICE

## 2017-06-18 RX ADMIN — HYDROCODONE BITARTRATE AND ACETAMINOPHEN 1 TABLET: 5; 325 TABLET ORAL at 21:27

## 2017-06-19 NOTE — ED NOTES
Assumed care of patient from triage. Patient complains of right hand pain/swelling for 3 weeks. Patient denies injury. Hand visibly swollen on assessment and patient is unable to bend hand to make a fist. +pulses, cap refill <3 seconds in affected hand, +feeling in affected hand.

## 2017-06-19 NOTE — DISCHARGE INSTRUCTIONS
Osteoarthritis: Care Instructions  Your Care Instructions    Arthritis is a common health problem in which the joints are inflamed. There are several kinds of arthritis. Osteoarthritis is caused by a breakdown of cartilage, the hard, thick tissue that cushions the joints. It causes pain, stiffness, and swelling, often in the spine, fingers, hips, and knees. Osteoarthritis can happen at any age, but it is most common in older people. Osteoarthritis never goes away completely, but it can be controlled. Medicine and home treatment can reduce the pain and prevent the arthritis from getting worse. Follow-up care is a key part of your treatment and safety. Be sure to make and go to all appointments, and call your doctor if you are having problems. It's also a good idea to know your test results and keep a list of the medicines you take. How can you care for yourself at home? · Take a warm shower or bath in the morning to relieve stiffness. Avoid sitting still afterwards. · If the joint is not swollen, use moist heat, like a warm, damp towel, for 20 to 30 minutes, 2 or 3 times a day. Do not use heat on a swollen joint. · If the joint is swollen, use ice or cold packs for 10 to 20 minutes, once an hour. Cold will help relieve pain and reduce inflammation. Put a thin cloth between the ice and your skin. · To prevent stiffness, gently move the joint through its full range of motion several times a day. · If the joint hurts, avoid activities that put a strain on it for a few days. Take rest breaks throughout the day. · Get regular exercise. Walking, swimming, yoga, biking, ximena chi, and water aerobics are good exercises that are gentle on the joints. · Reach and stay at a healthy weight. If you need to lose or maintain weight, regular exercise and a healthy diet will help. Extra weight can strain the joints, especially the knees and hips, and make the pain worse. Losing even a few pounds may help.   · Take pain medicines exactly as directed. ¨ If the doctor gave you a prescription medicine for pain, take it as prescribed. ¨ If you are not taking a prescription pain medicine, ask your doctor if you can take an over-the-counter medicine. When should you call for help? Call your doctor now or seek immediate medical care if:  · The pain is so bad that you cannot use the joint. · You have sudden back pain with weakness in your legs or loss of bowel or bladder control. · Your stools are black and tarlike or have streaks of blood. · You have severe pain and swelling in more than one joint. Watch closely for changes in your health, and be sure to contact your doctor if:  · You have side effects from the medicines, like belly pain, ongoing heartburn, or nausea. · Joint pain continues for more than 6 weeks, and home treatment is not helping. Where can you learn more? Go to http://carolina-shayla.info/. Enter O342 in the search box to learn more about \"Osteoarthritis: Care Instructions. \"  Current as of: November 28, 2016  Content Version: 11.2  © 0241-7859 [employer]. Care instructions adapted under license by [employer] (which disclaims liability or warranty for this information). If you have questions about a medical condition or this instruction, always ask your healthcare professional. [address] any warranty or liability for your use of this information. Thumb Arthritis: Exercises  Your Care Instructions  Here are some examples of exercises for thumb arthritis. Start each exercise slowly. Ease off the exercise if you start to have pain. Your doctor or your physical or occupational therapist will tell you when you can start these exercises and which ones will work best for you. How to do the exercises  Thumb IP flexion    1. Place your forearm and hand on a table with your affected thumb pointing up.   2. With your other hand, hold your thumb steady just below the joint nearest your thumbnail. 3. Bend the tip of your thumb downward, then straighten it. 4. Repeat 8 to 12 times. 5. Switch hands and repeat steps 1 through 4, even if only one thumb is sore. Thumb MP flexion    1. Place your forearm and hand on a table with your affected thumb pointing up. 2. With your other hand, hold the base of your thumb and palm steady. 3. Bend your thumb downward where it meets your palm, then straighten it. 4. Repeat 8 to 12 times. 5. Switch hands and repeat steps 1 through 4, even if only one thumb is sore. Thumb opposition    1. With your affected hand, point your fingers and thumb straight up. Your wrist should be relaxed, following the line of your fingers and thumb. 2. Touch your affected thumb to each finger, one finger at a time. This will look like an \"okay\" sign, but try to keep your other fingers straight and pointing upward as much as you can. 3. Repeat 8 to 12 times. 4. Switch hands and repeat steps 1 through 3, even if only one thumb is sore. Follow-up care is a key part of your treatment and safety. Be sure to make and go to all appointments, and call your doctor if you are having problems. It's also a good idea to know your test results and keep a list of the medicines you take. Where can you learn more? Go to http://carolina-shayla.info/. Enter P822 in the search box to learn more about \"Thumb Arthritis: Exercises. \"  Current as of: May 23, 2016  Content Version: 11.2  © 0375-4020 [employer]. Care instructions adapted under license by [employer] (which disclaims liability or warranty for this information). If you have questions about a medical condition or this instruction, always ask your healthcare professional. [address] any warranty or liability for your use of this information.

## 2017-06-19 NOTE — ED PROVIDER NOTES
HPI Comments: Lael Siemens is a 63 y.o. male with PMhx significant for DM, CHF, HTN, CVA/TIA, and seizures who presents ambulatory to the ED for further evaluation of right hand swelling and intermittent pain shooting up to the right elbow x3 weeks. The pt denies any injuries to the affected area or any h/o similar sx. He specifically denies any kidney, liver, or thyroid disease. PCP: Nikolai Butler MD      There are no other complaints, changes or physical findings at this time. Written by Millie Rahman ED Scribe, as dictated by GERSON Heredia     The history is provided by the patient. No  was used. Past Medical History:   Diagnosis Date    Congestive heart failure, unspecified     Diabetes ([address]     Hypertension     Overweight     Seizures (HCC)     SOLITARY PULMONARY NODULE     1.6 cm    Stroke [facility])        Past Surgical History:   Procedure Laterality Date    CHEST SURGERY PROCEDURE UNLISTED      VATS Video-assisted thoracic surgery         Family History:   Problem Relation Age of Onset    Stroke Mother     Cancer Father        Social History     Social History    Marital status:      Spouse name: N/A    Number of children: N/A    Years of education: N/A     Occupational History    Not on file. Social History Main Topics    Smoking status: Light Tobacco Smoker     Packs/day: 0.10     Start date: 6/1/2015    Smokeless tobacco: Never Used      Comment: former cigar    Alcohol use No    Drug use: No    Sexual activity: Not on file     Other Topics Concern    Not on file     Social History Narrative         ALLERGIES: Review of patient's allergies indicates no known allergies. Review of Systems   Constitutional: Negative. Negative for fever. HENT: Negative. Eyes: Negative. Respiratory: Negative. Cardiovascular: Negative. Gastrointestinal: Negative. Negative for constipation, diarrhea, nausea and vomiting.         Denies liver disease   Endocrine:        Denies thyroid disease   Genitourinary: Negative. Negative for dysuria. Denies kidney disease   Musculoskeletal: Positive for arthralgias (right hand) and joint swelling (right hand). Skin: Negative. Neurological: Negative. All other systems reviewed and are negative. Vitals:    06/18/17 1958   BP: 145/88   Pulse: 64   Resp: 14   Temp: 98 °F (36.7 °C)   SpO2: 100%   Weight: 98.3 kg (216 lb 11.4 oz)   Height: 6' 1\" (1.854 m)            Physical Exam   Constitutional: He is oriented to person, place, and time. He appears well-developed and well-nourished. No distress. HENT:   Head: Normocephalic and atraumatic. Right Ear: External ear normal.   Left Ear: External ear normal.   Nose: Nose normal.   Mouth/Throat: Oropharynx is clear and moist. No oropharyngeal exudate. Eyes: Conjunctivae and EOM are normal. Pupils are equal, round, and reactive to light. Right eye exhibits no discharge. Left eye exhibits no discharge. No scleral icterus. Neck: Normal range of motion. Neck supple. No tracheal deviation present. Cardiovascular: Normal rate, regular rhythm, normal heart sounds and intact distal pulses. Exam reveals no gallop and no friction rub. No murmur heard. Capillary refill less than 3 seconds. Pulmonary/Chest: Effort normal and breath sounds normal. No respiratory distress. He has no wheezes. He has no rales. He exhibits no tenderness. Abdominal: Soft. Bowel sounds are normal. He exhibits no distension and no mass. There is no tenderness. There is no rebound and no guarding. Musculoskeletal:   Swelling to the right hand, no erythema, no contusion   Pain with passive flexion   Lymphadenopathy:     He has no cervical adenopathy. Neurological: He is alert and oriented to person, place, and time. No cranial nerve deficit. Coordination normal.   Skin: Skin is warm and dry. No rash noted. No erythema.    Psychiatric: He has a normal mood and affect. His behavior is normal. Judgment and thought content normal.   Nursing note and vitals reviewed. MDM  Number of Diagnoses or Management Options  Diagnosis management comments: DDx: Swelling, Arthritis. Amount and/or Complexity of Data Reviewed  Tests in the radiology section of CPT®: ordered and reviewed  Review and summarize past medical records: yes    Patient Progress  Patient progress: stable    ED Course       Procedures    PROGRESS NOTE:  9:23 PM  Pt has been re-evaluated. The case has been discussed with Dorothy Burkett DO who agrees with the plan. Written by Celso Rahman, ED Scribe, as dictated by GERSON Rae. Procedure Note - Ace Wrap Placement:  10:29 PM  Performed by: Meg Monreal  Neurovascularly intact prior to tx. An ace wrap was placed on pt's right hand. Joint was placed in neutral position. Neurovascularly intact after tx. The procedure took 1-15 minutes, and pt tolerated well. Written by Brandon He, ED Scribe, as dictated by GERSON Rae. IMAGING RESULTS:  XR HAND RT MIN 3 V   Final Result   EXAM: XR HAND RT MIN 3 V     INDICATION: pain swelling.     COMPARISON: None.     FINDINGS: Three views of the right hand demonstrate no fracture or other acute  osseous or articular abnormality. The soft tissues are within normal limits. There is mild degenerative spurring of the first interphalangeal joint.     IMPRESSION  IMPRESSION: No acute abnormality. MEDICATIONS GIVEN:  Medications   HYDROcodone-acetaminophen (NORCO) 5-325 mg per tablet 1 Tab (1 Tab Oral Given 6/18/17 2127)       IMPRESSION:  1. Edema of hand    2. Arthritis of hand        PLAN:  1. Current Discharge Medication List      START taking these medications    Details   HYDROcodone-acetaminophen (NORCO) 5-325 mg per tablet Take 1 Tab by mouth every four (4) hours as needed for Pain. Max Daily Amount: 6 Tabs. Qty: 20 Tab, Refills: 0           2.    Follow-up Information Follow up With Details Collin Da Silva MD   [address] [facility] [address]  [facility]  [phone]      [facility] EMERGENCY DEPT  If symptoms worsen [address] [facility]  [phone]        3. Return to ED if worse  Discharge Note:  10:31 PM  The patient is ready for discharge. The patient's signs, symptoms, diagnosis, and discharge instruction have been discussed and the patient has conveyed their understanding. The patient is to follow up as recommended or return to the ER should their symptoms worsen. Plan has been discussed and the patient is in agreement. Written by Norma Rahman ED Scribe, as dictated by PA-C Halina Schlatter    Attestation: This note is prepared by Dominguez Rahman, acting as Scribe for [employer]. PA-C Halina Schlatter: The scribe's documentation has been prepared under my direction and personally reviewed by me in its entirety. I confirm that the note above accurately reflects all work, treatment, procedures, and medical decision making performed by me.

## 2017-09-19 ENCOUNTER — OFFICE VISIT (OUTPATIENT)
Dept: INTERNAL MEDICINE CLINIC | Age: 62
End: 2017-09-19

## 2017-09-19 VITALS
SYSTOLIC BLOOD PRESSURE: 115 MMHG | BODY MASS INDEX: 29.55 KG/M2 | TEMPERATURE: 97.9 F | OXYGEN SATURATION: 99 % | DIASTOLIC BLOOD PRESSURE: 72 MMHG | HEART RATE: 69 BPM | HEIGHT: 73 IN | WEIGHT: 223 LBS

## 2017-09-19 DIAGNOSIS — D3A.090 CARCINOID TUMOR OF LUNG: ICD-10-CM

## 2017-09-19 DIAGNOSIS — G40.909 SEIZURE DISORDER (HCC): ICD-10-CM

## 2017-09-19 DIAGNOSIS — Z23 ENCOUNTER FOR IMMUNIZATION: ICD-10-CM

## 2017-09-19 DIAGNOSIS — N18.2 CKD STAGE 2 DUE TO TYPE 2 DIABETES MELLITUS (HCC): ICD-10-CM

## 2017-09-19 DIAGNOSIS — E11.8 TYPE 2 DIABETES MELLITUS WITH COMPLICATION, WITHOUT LONG-TERM CURRENT USE OF INSULIN (HCC): ICD-10-CM

## 2017-09-19 DIAGNOSIS — G56.01 CARPAL TUNNEL SYNDROME OF RIGHT WRIST: ICD-10-CM

## 2017-09-19 DIAGNOSIS — E78.00 PURE HYPERCHOLESTEROLEMIA: ICD-10-CM

## 2017-09-19 DIAGNOSIS — E11.49 DIABETIC NEUROPATHY WITH NEUROLOGIC COMPLICATION (HCC): Primary | ICD-10-CM

## 2017-09-19 DIAGNOSIS — I10 ESSENTIAL HYPERTENSION: ICD-10-CM

## 2017-09-19 DIAGNOSIS — E11.22 CKD STAGE 2 DUE TO TYPE 2 DIABETES MELLITUS (HCC): ICD-10-CM

## 2017-09-19 DIAGNOSIS — E11.40 DIABETIC NEUROPATHY WITH NEUROLOGIC COMPLICATION (HCC): Primary | ICD-10-CM

## 2017-09-19 DIAGNOSIS — I48.20 CHRONIC A-FIB (HCC): ICD-10-CM

## 2017-09-19 LAB — HBA1C MFR BLD HPLC: 6.8 % (ref 4.8–5.6)

## 2017-09-19 RX ORDER — LEVETIRACETAM 750 MG/1
TABLET ORAL
Qty: 60 TAB | Refills: 11 | Status: SHIPPED | OUTPATIENT
Start: 2017-09-19 | End: 2017-11-03 | Stop reason: SDUPTHER

## 2017-09-19 NOTE — PROGRESS NOTES
Patient is here today for his 4 month follow up  Diabetes and POC A1C . Patient 's A1C today is  6.8. Patient is complaining of pain groin area  And complaining of right hand.

## 2017-09-19 NOTE — MR AVS SNAPSHOT
Visit Information Date & Time Provider Department Dept. Phone Encounter #  
 9/19/2017  9:45 AM Radha Motley, [address] [phone] [id number] Follow-up Instructions Return in about 4 months (around 1/19/2018) for dm-2 htn hld. Upcoming Health Maintenance Date Due COLONOSCOPY 8/24/1975 DTaP/Tdap/Td series (1 - Tdap) 8/24/1978 Pneumococcal 19-64 Highest Risk (2 of 3 - PCV13) 7/22/2016 ZOSTER VACCINE AGE 60> 6/24/2017 FOOT EXAM Q1 6/27/2017 INFLUENZA AGE 9 TO ADULT 8/1/2017 HEMOGLOBIN A1C Q6M 10/13/2017 EYE EXAM RETINAL OR DILATED Q1 10/25/2017 LIPID PANEL Q1 10/27/2017 MICROALBUMIN Q1 4/13/2018 Allergies as of 9/19/2017  Review Complete On: 9/19/2017 By: Marvin Lara LPN No Known Allergies Current Immunizations  Reviewed on 10/27/2016 Name Date Influenza Vaccine (Quad) PF  Incomplete, 10/27/2016 10:35 AM  
 Influenza Vaccine PF 11/21/2014 Pneumococcal Polysaccharide (PPSV-23) 7/22/2015 12:39 PM  
  
 Not reviewed this visit You Were Diagnosed With   
  
 Codes Comments Diabetic neuropathy with neurologic complication ([facility]    -  Primary ICD-10-CM: E11.40, E11.49 
ICD-9-CM: 250.60, 357.2 Encounter for immunization     ICD-10-CM: S50 ICD-9-CM: V03.89 Type 2 diabetes mellitus with complication, without long-term current use of insulin (HCC)     ICD-10-CM: E11.8 ICD-9-CM: 250.90 CKD stage 2 due to type 2 diabetes mellitus ([facility]     ICD-10-CM: E11.22, N18.2 ICD-9-CM: 250.40, 585.2 Seizure disorder ([facility]     ICD-10-CM: G40.909 ICD-9-CM: 345.90 Essential hypertension     ICD-10-CM: I10 
ICD-9-CM: 401.9 Pure hypercholesterolemia     ICD-10-CM: E78.00 ICD-9-CM: 272.0 Chronic a-fib (HCC)     ICD-10-CM: K18.6 ICD-9-CM: 427.31 Carcinoid tumor of lung     ICD-10-CM: D3A.090 
ICD-9-CM: 209.61 Carpal tunnel syndrome of right wrist     ICD-10-CM: G56.01 
ICD-9-CM: 354.0 Vitals BP Pulse Temp Height(growth percentile) Weight(growth percentile) SpO2  
 115/72 (BP 1 Location: Left arm, BP Patient Position: Sitting) 69 97.9 °F (36.6 °C) (Oral) 6' 1\" (1.854 m) 223 lb (101.2 kg) 99% BMI Smoking Status 29.42 kg/m2 Light Tobacco Smoker BMI and BSA Data Body Mass Index Body Surface Area  
 29.42 kg/m 2 2.28 m 2 Preferred Pharmacy Pharmacy Name Phone [facility] [address] [place] [address] Dr Gunn, [address] [phone] Your Updated Medication List  
  
   
This list is accurate as of: 9/19/17 10:56 AM.  Always use your most recent med list. amLODIPine 10 mg tablet Commonly known as:  Mehdi Carson TAKE ONE TABLET BY MOUTH ONCE DAILY FOR BLOOD PRESSURE  
  
 atorvastatin 80 mg tablet Commonly known as:  LIPITOR  
TAKE ONE TABLET BY MOUTH ONCE DAILY AT  NIGHT  
  
 carvedilol 25 mg tablet Commonly known as:  Natalie Menghini Take 25 mg by mouth two (2) times daily (with meals). glimepiride 4 mg tablet Commonly known as:  AMARYL  
TAKE ONE TABLET BY MOUTH IN THE MORNING  
  
 glucose blood VI test strips strip Commonly known as:  FREESTYLE TEST Use as directed to check blood sugars once daily. E11.9  
  
 * hydrALAZINE 50 mg tablet Commonly known as:  APRESOLINE Take 50 mg by mouth three (3) times daily. * hydrALAZINE 10 mg tablet Commonly known as:  APRESOLINE  
TAKE ONE TABLET BY MOUTH THREE TIMES DAILY Lancets Misc Commonly known as:  FREESTYLE LANCETS Test daily. 250.00  
  
 levETIRAcetam 750 mg tablet Commonly known as:  KEPPRA Take 1 Tab by mouth two (2) times a day. lisinopril 40 mg tablet Commonly known as:  PRINIVIL, ZESTRIL  
TAKE ONE TABLET BY MOUTH ONCE DAILY FOR BLOOD PRESSURE  
  
 metFORMIN 500 mg tablet Commonly known as:  GLUCOPHAGE  
TAKE ONE TABLET BY MOUTH TWICE DAILY WITH FOOD  
  
 rivaroxaban 20 mg Tab tablet Commonly known as:  America Mora Take 1 Tab by mouth daily. * Notice: This list has 2 medication(s) that are the same as other medications prescribed for you. Read the directions carefully, and ask your doctor or other care provider to review them with you. Prescriptions Sent to Pharmacy Refills  
 rivaroxaban (XARELTO) 20 mg tab tablet 11 Sig: Take 1 Tab by mouth daily. Class: Normal  
 Pharmacy: [address], [address] [place] [address] Avenue  #: [phone] Route: Oral  
  
We Performed the Following AMB POC HEMOGLOBIN A1C [23268 CPT(R)] INFLUENZA VIRUS VAC QUAD,SPLIT,PRESV FREE SYRINGE IM [id number] CPT(R)] TN IMMUNIZ ADMIN,1 SINGLE/COMB VAC/TOXOID [id number] CPT(R)] REFERRAL TO ORTHOPEDIC SURGERY [REF62 Custom] Comments:  
 Please evaluate patient for carpal tunnel Follow-up Instructions Return in about 4 months (around 1/19/2018) for dm-2 htn hld. Referral Information Referral ID Referred By Referred To  
  
 [id number] LUISA GLYNN [facility]   
   [address] [address], [address] Visits Status Start Date End Date 1 New Request 9/19/17 9/19/18 If your referral has a status of pending review or denied, additional information will be sent to support the outcome of this decision. Introducing [address]! Reji Jane introduces [employer] patient portal. Now you can access parts of your medical record, email your doctor's office, and request medication refills online. 1. In your internet browser, go to [email or web address] 2. Click on the First Time User? Click Here link in the Sign In box. You will see the New Member Sign Up page. 3. Enter your [employer] Access Code exactly as it appears below. You will not need to use this code after youve completed the sign-up process. If you do not sign up before the expiration date, you must request a new code. · [employer] Access Code: [email or web address] Expires: 12/18/2017 10:34 AM 
 
4. Enter the last four digits of your Social Security Number (xxxx) and Date of Birth (mm/dd/yyyy) as indicated and click Submit. You will be taken to the next sign-up page. 5. Create a [employer] ID. This will be your [employer] login ID and cannot be changed, so think of one that is secure and easy to remember. 6. Create a [employer] password. You can change your password at any time. 7. Enter your Password Reset Question and Answer. This can be used at a later time if you forget your password. 8. Enter your e-mail address. You will receive e-mail notification when new information is available in [address] 9. Click Sign Up. You can now view and download portions of your medical record. 10. Click the Download Summary menu link to download a portable copy of your medical information. If you have questions, please visit the Frequently Asked Questions section of the [employer] website. Remember, [employer] is NOT to be used for urgent needs. For medical emergencies, dial 911. Now available from your iPhone and Android! Please provide this summary of care documentation to your next provider. Your primary care clinician is listed as Shen GLYNN. If you have any questions after today's visit, please call [phone].

## 2017-09-19 NOTE — PROGRESS NOTES
HISTORY OF PRESENT ILLNESS  Reynaldo Siddiqui is a 63 y.o. male. HPI   F/u dm-2 and MARIA TERESA, chronic afib, hx lung carcinoid  Was started to amary; 1 mg every day last month in place of metformin d/t MARIA TERESA after VATS surgery  fsbs  an am average per pt  No hypoglycemia  Last a1c 7.7 , today down to 6.8  Diet is better now per his wife  Had chest CT for the lung carcinoid a few weeks ago--Dr Light--repeat in 6 months  Metformin has been restarted and amaryl dose increased to 4 mg every day  Saw Dr. Aurelio arce for afib-no med changes  Saw Dr Jenny See for CKD 3--labs done this month  C/o weakness of right hand  x months       Patient Active Problem List    Diagnosis Date Noted    CKD stage 2 due to type 2 diabetes mellitus ([facility] 05/14/2017    Poly[facility]uropathy in diabetes ([facility] 07/21/2015    Hypertensive emergency 07/20/2015    Seizure disorder ([facility] 07/20/2015    Type II or unspecified type diabetes mellitus with neurological manifestations, uncontrolled 07/20/2015    History of atrial fibrillation 07/20/2015    Hyperlipidemia 07/20/2015    Diabetic neuropathy ([facility] 11/21/2014    Seizures ([facility] 08/08/2014    Syncope 07/13/2012    Cardiomyopathy, nonischemic ([facility] 10/31/2009    DM (diabetes mellitus) ([facility] 10/26/2009    HTN (hypertension), benign 10/26/2009    Pulmonary nodule 10/26/2009     Current Outpatient Prescriptions   Medication Sig Dispense Refill    rivaroxaban (XARELTO) 20 mg tab tablet Take 1 Tab by mouth daily.  30 Tab 11    hydrALAZINE (APRESOLINE) 10 mg tablet TAKE ONE TABLET BY MOUTH THREE TIMES DAILY 90 Tab 11    atorvastatin (LIPITOR) 80 mg tablet TAKE ONE TABLET BY MOUTH ONCE DAILY AT  NIGHT 30 Tab 11    glimepiride (AMARYL) 4 mg tablet TAKE ONE TABLET BY MOUTH IN THE MORNING 30 Tab 11    lisinopril (PRINIVIL, ZESTRIL) 40 mg tablet TAKE ONE TABLET BY MOUTH ONCE DAILY FOR BLOOD PRESSURE 30 Tab 11    metFORMIN (GLUCOPHAGE) 500 mg tablet TAKE ONE TABLET BY MOUTH TWICE DAILY WITH FOOD 60 Tab 11    glucose blood VI test strips (FREESTYLE TEST) strip Use as directed to check blood sugars once daily. E11.9 100 Strip 11    amLODIPine (NORVASC) 10 mg tablet TAKE ONE TABLET BY MOUTH ONCE DAILY FOR BLOOD PRESSURE 30 Tab 11    carvedilol (COREG) 25 mg tablet Take 25 mg by mouth two (2) times daily (with meals).  levETIRAcetam (KEPPRA) 750 mg tablet Take 1 Tab by mouth two (2) times a day. 60 Tab 6    Lancets (FREESTYLE LANCETS) misc Test daily. 250.00 1 Package 5    levETIRAcetam (KEPPRA) 750 mg tablet TAKE ONE TABLET BY MOUTH TWICE DAILY 60 Tab 11    hydrALAZINE (APRESOLINE) 50 mg tablet Take 50 mg by mouth three (3) times daily.        No Known Allergies   Lab Results  Component Value Date/Time   Hemoglobin A1c 7.7 04/13/2017 03:07 PM   Hemoglobin A1c 6.9 10/27/2016 10:40 AM   Hemoglobin A1c 6.6 12/04/2015 10:04 AM   Glucose 134 05/15/2017 12:23 PM   Glucose (POC) 137 08/24/2015 02:55 PM   Glucose  05/15/2017 12:14 PM   Microalb/Creat ratio (ug/mg creat.) 249.0 04/13/2017 03:07 PM   LDL, calculated 55 10/27/2016 10:40 AM   Creatinine 1.45 05/15/2017 12:23 PM      Lab Results  Component Value Date/Time   Cholesterol, total 99 10/27/2016 10:40 AM   Cholesterol (POC) 167 11/21/2014 09:00 AM   HDL Cholesterol 29 10/27/2016 10:40 AM   LDL, calculated 55 10/27/2016 10:40 AM   LDL Cholesterol (POC) 111 11/21/2014 09:00 AM   Triglyceride 76 10/27/2016 10:40 AM   Triglycerides (POC) 70 11/21/2014 09:00 AM   CHOL/HDL Ratio 3.7 07/21/2015 03:03 AM     Lab Results  Component Value Date/Time   GFR est non-AA 52 05/15/2017 12:23 PM   GFR est AA 61 05/15/2017 12:23 PM   Creatinine 1.45 05/15/2017 12:23 PM   BUN 16 05/15/2017 12:23 PM   Sodium 144 05/15/2017 12:23 PM   Potassium 3.9 05/15/2017 12:23 PM   Chloride 101 05/15/2017 12:23 PM   CO2 26 05/15/2017 12:23 PM   Magnesium 2.3 03/31/2017 03:30 PM          ROS    Physical Exam   Constitutional: He appears well-developed and well-nourished. No distress. Appears stated age   HENT:   Head: Normocephalic. Cardiovascular: Normal rate, regular rhythm and normal heart sounds. Exam reveals no gallop and no friction rub. No murmur heard. Pulmonary/Chest: Effort normal and breath sounds normal. No respiratory distress. He has no wheezes. He has no rales. He exhibits no tenderness. Abdominal: Soft. Musculoskeletal: He exhibits no edema. Neurological: He is alert. Right hand is edematous, weak  b/l   Psychiatric: He has a normal mood and affect. Nursing note and vitals reviewed. ASSESSMENT and PLAN  Diagnoses and all orders for this visit:    1. Diabetic neuropathy with neurologic complication (HCC)  -     AMB POC HEMOGLOBIN A1C 6.8 controlled   Continue medicines    fsbs qd  2. Encounter for immunization  -     Influenza virus vaccine (QUADRIVALENT PRES FREE SYRINGE) IM (18081)  -     LA IMMUNIZ ADMIN,1 SINGLE/COMB VAC/TOXOID    3. Type 2 diabetes mellitus with complication, without long-term current use of insulin (HCC)   controlled  4. CKD stage 2 due to type 2 diabetes mellitus ([facility]   Had labs with renal MD  recently  5. Seizure disorder ([facility]   Continue keppra  6. Essential hypertension   controlled  7. Pure hypercholesterolemia   Continue statin  8. Chronic a-fib (HCC)   Rate controlled  9. Carcinoid tumor of lung   SYLVIE recurrence by CT scans, f/u Dr Viet Donnelly and Dr. Faiza Cruz  10. Carpal tunnel syndrome of right wrist  -     REFERRAL TO ORTHOPEDIC SURGERY    Other orders  -     rivaroxaban (XARELTO) 20 mg tab tablet; Take 1 Tab by mouth daily. Follow-up Disposition:  Return in about 4 months (around 1/19/2018) for dm-2 htn hld.

## 2017-10-25 RX ORDER — CARVEDILOL 25 MG/1
TABLET ORAL
Qty: 90 TAB | Refills: 11 | Status: SHIPPED | OUTPATIENT
Start: 2017-10-25 | End: 2018-09-06 | Stop reason: DRUGHIGH

## 2017-10-27 ENCOUNTER — HOSPITAL ENCOUNTER (EMERGENCY)
Age: 62
Discharge: HOME | End: 2017-10-27
Attending: EMERGENCY MEDICINE
Payer: COMMERCIAL

## 2017-10-27 ENCOUNTER — APPOINTMENT (OUTPATIENT)
Dept: GENERAL RADIOLOGY | Age: 62
End: 2017-10-27
Attending: EMERGENCY MEDICINE
Payer: COMMERCIAL

## 2017-10-27 ENCOUNTER — APPOINTMENT (OUTPATIENT)
Dept: CT IMAGING | Age: 62
End: 2017-10-27
Attending: EMERGENCY MEDICINE
Payer: COMMERCIAL

## 2017-10-27 VITALS
HEART RATE: 63 BPM | SYSTOLIC BLOOD PRESSURE: 146 MMHG | OXYGEN SATURATION: 100 % | HEIGHT: 73 IN | DIASTOLIC BLOOD PRESSURE: 95 MMHG | RESPIRATION RATE: 14 BRPM | BODY MASS INDEX: 29.13 KG/M2 | WEIGHT: 219.8 LBS | TEMPERATURE: 100.2 F

## 2017-10-27 DIAGNOSIS — J18.9 PNEUMONIA OF RIGHT LOWER LOBE DUE TO INFECTIOUS ORGANISM: ICD-10-CM

## 2017-10-27 DIAGNOSIS — R10.32 LEFT GROIN PAIN: ICD-10-CM

## 2017-10-27 DIAGNOSIS — N28.89 RIGHT RENAL MASS: ICD-10-CM

## 2017-10-27 DIAGNOSIS — K57.90 DIVERTICULOSIS OF INTESTINE WITHOUT BLEEDING, UNSPECIFIED INTESTINAL TRACT LOCATION: ICD-10-CM

## 2017-10-27 DIAGNOSIS — R10.32 ABDOMINAL PAIN, LLQ (LEFT LOWER QUADRANT): Primary | ICD-10-CM

## 2017-10-27 LAB
ALBUMIN SERPL-MCNC: 3.8 G/DL (ref 3.5–5)
ALBUMIN/GLOB SERPL: 0.7 {RATIO} (ref 1.1–2.2)
ALP SERPL-CCNC: 84 U/L (ref 45–117)
ALT SERPL-CCNC: 36 U/L (ref 12–78)
ANION GAP SERPL CALC-SCNC: 9 MMOL/L (ref 5–15)
APPEARANCE UR: CLEAR
AST SERPL-CCNC: 21 U/L (ref 15–37)
BACTERIA URNS QL MICRO: NEGATIVE /HPF
BASOPHILS # BLD: 0 K/UL (ref 0–0.1)
BASOPHILS NFR BLD: 0 % (ref 0–1)
BILIRUB SERPL-MCNC: 1 MG/DL (ref 0.2–1)
BILIRUB UR QL CFM: NEGATIVE
BUN SERPL-MCNC: 15 MG/DL (ref 6–20)
BUN/CREAT SERPL: 9 (ref 12–20)
CALCIUM SERPL-MCNC: 8.4 MG/DL (ref 8.5–10.1)
CHLORIDE SERPL-SCNC: 101 MMOL/L (ref 97–108)
CK SERPL-CCNC: 166 U/L (ref 39–308)
CO2 SERPL-SCNC: 28 MMOL/L (ref 21–32)
COLOR UR: ABNORMAL
CREAT SERPL-MCNC: 1.72 MG/DL (ref 0.7–1.3)
EOSINOPHIL # BLD: 0 K/UL (ref 0–0.4)
EOSINOPHIL NFR BLD: 0 % (ref 0–7)
EPITH CASTS URNS QL MICRO: ABNORMAL /LPF
ERYTHROCYTE [DISTWIDTH] IN BLOOD BY AUTOMATED COUNT: 14.7 % (ref 11.5–14.5)
FLUAV AG NPH QL IA: NEGATIVE
FLUBV AG NOSE QL IA: NEGATIVE
GLOBULIN SER CALC-MCNC: 5.4 G/DL (ref 2–4)
GLUCOSE SERPL-MCNC: 80 MG/DL (ref 65–100)
GLUCOSE UR STRIP.AUTO-MCNC: NEGATIVE MG/DL
HCT VFR BLD AUTO: 42.7 % (ref 36.6–50.3)
HGB BLD-MCNC: 14.4 G/DL (ref 12.1–17)
HGB UR QL STRIP: NEGATIVE
HYALINE CASTS URNS QL MICRO: ABNORMAL /LPF (ref 0–5)
KETONES UR QL STRIP.AUTO: NEGATIVE MG/DL
LACTATE SERPL-SCNC: 1.6 MMOL/L (ref 0.4–2)
LEUKOCYTE ESTERASE UR QL STRIP.AUTO: NEGATIVE
LIPASE SERPL-CCNC: 118 U/L (ref 73–393)
LYMPHOCYTES # BLD: 1.2 K/UL (ref 0.8–3.5)
LYMPHOCYTES NFR BLD: 31 % (ref 12–49)
MCH RBC QN AUTO: 28.5 PG (ref 26–34)
MCHC RBC AUTO-ENTMCNC: 33.7 G/DL (ref 30–36.5)
MCV RBC AUTO: 84.6 FL (ref 80–99)
MONOCYTES # BLD: 0.6 K/UL (ref 0–1)
MONOCYTES NFR BLD: 14 % (ref 5–13)
MUCOUS THREADS URNS QL MICRO: ABNORMAL /LPF
NEUTS SEG # BLD: 2.2 K/UL (ref 1.8–8)
NEUTS SEG NFR BLD: 55 % (ref 32–75)
NITRITE UR QL STRIP.AUTO: NEGATIVE
PH UR STRIP: 6 [PH] (ref 5–8)
PLATELET # BLD AUTO: 170 K/UL (ref 150–400)
POTASSIUM SERPL-SCNC: 3.2 MMOL/L (ref 3.5–5.1)
PROT SERPL-MCNC: 9.2 G/DL (ref 6.4–8.2)
PROT UR STRIP-MCNC: 30 MG/DL
RBC # BLD AUTO: 5.05 M/UL (ref 4.1–5.7)
RBC #/AREA URNS HPF: ABNORMAL /HPF (ref 0–5)
SODIUM SERPL-SCNC: 138 MMOL/L (ref 136–145)
SP GR UR REFRACTOMETRY: 1.02 (ref 1–1.03)
TROPONIN I SERPL-MCNC: <0.04 NG/ML
UROBILINOGEN UR QL STRIP.AUTO: 4 EU/DL (ref 0.2–1)
WBC # BLD AUTO: 4 K/UL (ref 4.1–11.1)
WBC URNS QL MICRO: ABNORMAL /HPF (ref 0–4)

## 2017-10-27 PROCEDURE — 83690 ASSAY OF LIPASE: CPT | Performed by: EMERGENCY MEDICINE

## 2017-10-27 PROCEDURE — 99285 EMERGENCY DEPT VISIT HI MDM: CPT

## 2017-10-27 PROCEDURE — 84484 ASSAY OF TROPONIN QUANT: CPT | Performed by: EMERGENCY MEDICINE

## 2017-10-27 PROCEDURE — 87804 INFLUENZA ASSAY W/OPTIC: CPT | Performed by: EMERGENCY MEDICINE

## 2017-10-27 PROCEDURE — 96374 THER/PROPH/DIAG INJ IV PUSH: CPT

## 2017-10-27 PROCEDURE — 87040 BLOOD CULTURE FOR BACTERIA: CPT | Performed by: EMERGENCY MEDICINE

## 2017-10-27 PROCEDURE — 93005 ELECTROCARDIOGRAM TRACING: CPT

## 2017-10-27 PROCEDURE — 36415 COLL VENOUS BLD VENIPUNCTURE: CPT | Performed by: EMERGENCY MEDICINE

## 2017-10-27 PROCEDURE — 81001 URINALYSIS AUTO W/SCOPE: CPT | Performed by: EMERGENCY MEDICINE

## 2017-10-27 PROCEDURE — 83605 ASSAY OF LACTIC ACID: CPT | Performed by: EMERGENCY MEDICINE

## 2017-10-27 PROCEDURE — 74011250637 HC RX REV CODE- 250/637: Performed by: EMERGENCY MEDICINE

## 2017-10-27 PROCEDURE — 80053 COMPREHEN METABOLIC PANEL: CPT | Performed by: EMERGENCY MEDICINE

## 2017-10-27 PROCEDURE — 85025 COMPLETE CBC W/AUTO DIFF WBC: CPT | Performed by: EMERGENCY MEDICINE

## 2017-10-27 PROCEDURE — 96361 HYDRATE IV INFUSION ADD-ON: CPT

## 2017-10-27 PROCEDURE — 74176 CT ABD & PELVIS W/O CONTRAST: CPT

## 2017-10-27 PROCEDURE — 71010 XR CHEST PORT: CPT

## 2017-10-27 PROCEDURE — 82550 ASSAY OF CK (CPK): CPT | Performed by: EMERGENCY MEDICINE

## 2017-10-27 PROCEDURE — 74011250636 HC RX REV CODE- 250/636: Performed by: EMERGENCY MEDICINE

## 2017-10-27 RX ORDER — LEVOFLOXACIN 500 MG/1
500 TABLET, FILM COATED ORAL
Status: COMPLETED | OUTPATIENT
Start: 2017-10-27 | End: 2017-10-27

## 2017-10-27 RX ORDER — SODIUM CHLORIDE 9 MG/ML
50 INJECTION, SOLUTION INTRAVENOUS
Status: DISCONTINUED | OUTPATIENT
Start: 2017-10-27 | End: 2017-10-27

## 2017-10-27 RX ORDER — HYDROCODONE BITARTRATE AND ACETAMINOPHEN 5; 325 MG/1; MG/1
1 TABLET ORAL
Qty: 20 TAB | Refills: 0 | Status: SHIPPED | OUTPATIENT
Start: 2017-10-27 | End: 2017-11-03 | Stop reason: ALTCHOICE

## 2017-10-27 RX ORDER — SODIUM CHLORIDE 0.9 % (FLUSH) 0.9 %
10 SYRINGE (ML) INJECTION
Status: DISCONTINUED | OUTPATIENT
Start: 2017-10-27 | End: 2017-10-27

## 2017-10-27 RX ORDER — ACETAMINOPHEN 500 MG
1000 TABLET ORAL ONCE
Status: COMPLETED | OUTPATIENT
Start: 2017-10-27 | End: 2017-10-27

## 2017-10-27 RX ORDER — MORPHINE SULFATE 10 MG/ML
2 INJECTION, SOLUTION INTRAMUSCULAR; INTRAVENOUS
Status: COMPLETED | OUTPATIENT
Start: 2017-10-27 | End: 2017-10-27

## 2017-10-27 RX ORDER — LEVOFLOXACIN 500 MG/1
500 TABLET, FILM COATED ORAL DAILY
Qty: 6 TAB | Refills: 0 | Status: SHIPPED | OUTPATIENT
Start: 2017-10-28 | End: 2018-02-13 | Stop reason: ALTCHOICE

## 2017-10-27 RX ADMIN — LEVOFLOXACIN 500 MG: 500 TABLET, FILM COATED ORAL at 20:53

## 2017-10-27 RX ADMIN — ACETAMINOPHEN 1000 MG: 500 TABLET ORAL at 18:13

## 2017-10-27 RX ADMIN — SODIUM CHLORIDE 1000 ML: 900 INJECTION, SOLUTION INTRAVENOUS at 17:45

## 2017-10-27 RX ADMIN — MORPHINE SULFATE 2 MG: 10 INJECTION INTRAMUSCULAR; INTRAVENOUS; SUBCUTANEOUS at 20:53

## 2017-10-27 NOTE — ED NOTES
Pt arrived ambulatory to room #26 from triage. Pt with c/o of left upper leg pain. Patient stated that he saw his PCP \"about a month ago\" and he stated that the patient needs to be seen in ER. Patient is also experiencing blood tinged sputum. Patient stated upon waking up today, patient woke up coughing up mucus and noticed blood in sputum. Patient stated \"just haven't been feeling good. \"  Pt resting in position of comfort. Call bell within reach. Placed on monitor x2.

## 2017-10-27 NOTE — ED TRIAGE NOTES
Pt. Complains of left groin pain for a couple months. Pt. Also states when you sit down he falls asleep for months. He states he's told Dr. Toro Rashid he goes in there\". He told him he could take him off some medications b/c they might be doing that. Pt. States he coughed x1 today and had some blood in it and thinks he's catching a cold.

## 2017-10-27 NOTE — ED PROVIDER NOTES
[address]  EMERGENCY DEPARTMENT HISTORY AND PHYSICAL EXAM       Date of Service: 10/27/2017   Patient Name: Stan Underwood   YOB: 1955  Medical Record Number: [id number]    History of Presenting Illness     Chief Complaint   Patient presents with    Leg Pain     Pt. complains of left groin pain x2 months    Fatigue     Pt. states when he sits down he sleeps a long time. History Provided By:  patient    Additional History: Stan Underwood is a 63 y.o. male with PMhx significant for DM, HTN, stroke, seizures, CHF, and overweight, who presents ambulatory with daughter to the ED [facility] ED with cc of LLQ and left inguinal pain x [place] couple of months. \" Pt reports the pain is exacerbated by ambulation. He reports associated chills starting 2 days ago, fever today, productive cough with clear sputum, and SOB. Pt states he [place] [facility]. He notes he has had a flu vaccination for this season. Pt denies urinary symptoms, CP, bowel symptoms, and headache. Social Hx: + Tobacco (cigars occasionally)  Family Hx: CA and CVA  There are no other complaints, changes or physical findings at this time.     Primary Care Provider: Selwyn Gaines MD       Past History     Past Medical History:   Past Medical History:   Diagnosis Date    Congestive heart failure, unspecified     Diabetes ([address]     Hypertension     Overweight(278.02)     Seizures ([address]     SOLITARY PULMONARY NODULE     1.6 cm    Stroke [facility])         Past Surgical History:   Past Surgical History:   Procedure Laterality Date    CHEST SURGERY PROCEDURE UNLISTED      VATS Video-assisted thoracic surgery        Family History:   Family History   Problem Relation Age of Onset    Stroke Mother     Cancer Father         Social History:   Social History   Substance Use Topics    Smoking status: Light Tobacco Smoker     Packs/day: 0.10     Start date: 6/1/2015   [facility] Smokeless tobacco: Never Used      Comment: former cigar    Alcohol use Yes      Comment: social        Allergies:   No Known Allergies      Review of Systems   Review of Systems   Constitutional: Positive for chills and fever. HENT: Negative for congestion. Eyes: Positive for itching. Respiratory: Positive for cough (clear sputum) and shortness of breath. Cardiovascular: Negative for chest pain. Gastrointestinal: Positive for abdominal pain (LLQ). Negative for blood in stool, constipation and diarrhea. Endocrine: Negative for heat intolerance. Genitourinary: Negative. Negative for dysuria, frequency and hematuria. Musculoskeletal:        + L inguinal pain   Skin: Negative for rash. Allergic/Immunologic: Negative for immunocompromised state. Neurological: Negative for headaches. Hematological: Does not bruise/bleed easily. Psychiatric/Behavioral: Negative. All other systems reviewed and are negative. Physical Exam  Physical Exam   Constitutional: He is oriented to person, place, and time. He appears well-developed and well-nourished. He appears distressed (mild). HENT:   Head: Normocephalic and atraumatic. Eyes: EOM are normal.   Neck: Normal range of motion. Neck supple. Cardiovascular: Normal rate, regular rhythm and normal heart sounds. Pulmonary/Chest: Effort normal and breath sounds normal. He has no wheezes. Abdominal: Soft. Bowel sounds are normal. There is no tenderness. Musculoskeletal: He exhibits no edema. Neurological: He is alert and oriented to person, place, and time. Coordination normal.   Skin: Skin is warm and dry. Psychiatric: He has a normal mood and affect. Nursing note and vitals reviewed. Medical Decision Making   I am the first provider for this patient. I reviewed the vital signs, available nursing notes, past medical history, past surgical history, family history and social history. Old Medical Records: Old medical records. Provider Notes:   DDx: Diverticulitis, hernia, UTI, kidney stone, influenza, PNA, bronchitis. ED Course:  7:57 PM   Initial assessment performed. The patients presenting problems have been discussed, and they are in agreement with the care plan formulated and outlined with them. I have encouraged them to ask questions as they arise throughout their visit. Progress Notes:   8:00 PM  Pt states he feels better but still has some pain, so will give him some morphine. Diagnostic Study Results     Labs -      Recent Results (from the past 12 hour(s))   EKG, 12 LEAD, INITIAL    Collection Time: 10/27/17  5:35 PM   Result Value Ref Range    Ventricular Rate 70 BPM    Atrial Rate 202 BPM    QRS Duration 104 ms    Q-T Interval 424 ms    QTC Calculation (Bezet) 457 ms    Calculated R Axis 26 degrees    Calculated T Axis 23 degrees    Diagnosis       Atrial fibrillation with a competing junctional pacemaker  Abnormal ECG  When compared with ECG of 31-MAR-2017 14:49,  Nonspecific T wave abnormality, improved in Inferior leads  Nonspecific T wave abnormality no longer evident in Lateral leads     CBC WITH AUTOMATED DIFF    Collection Time: 10/27/17  5:36 PM   Result Value Ref Range    WBC 4.0 (L) 4.1 - 11.1 K/uL    RBC 5.05 4. 10 - 5.70 M/uL    HGB 14.4 12.1 - 17.0 g/dL    HCT 42.7 36.6 - 50.3 %    MCV 84.6 80.0 - 99.0 FL    MCH 28.5 26.0 - 34.0 PG    MCHC 33.7 30.0 - 36.5 g/dL    RDW 14.7 (H) 11.5 - 14.5 %    PLATELET 057 420 - 617 K/uL    NEUTROPHILS 55 32 - 75 %    LYMPHOCYTES 31 12 - 49 %    MONOCYTES 14 (H) 5 - 13 %    EOSINOPHILS 0 0 - 7 %    BASOPHILS 0 0 - 1 %    ABS. NEUTROPHILS 2.2 1.8 - 8.0 K/UL    ABS. LYMPHOCYTES 1.2 0.8 - 3.5 K/UL    ABS. MONOCYTES 0.6 0.0 - 1.0 K/UL    ABS. EOSINOPHILS 0.0 0.0 - 0.4 K/UL    ABS.  BASOPHILS 0.0 0.0 - 0.1 K/UL   METABOLIC PANEL, COMPREHENSIVE    Collection Time: 10/27/17  5:36 PM   Result Value Ref Range    Sodium 138 136 - 145 mmol/L    Potassium 3.2 (L) 3.5 - 5.1 mmol/L    Chloride 101 97 - 108 mmol/L    CO2 28 21 - 32 mmol/L    Anion gap 9 5 - 15 mmol/L    Glucose 80 65 - 100 mg/dL    BUN 15 6 - 20 MG/DL    Creatinine 1.72 (H) 0.70 - 1.30 MG/DL    BUN/Creatinine ratio 9 (L) 12 - 20      GFR est AA 49 (L) >60 ml/min/1.73m2    GFR est non-AA 41 (L) >60 ml/min/1.73m2    Calcium 8.4 (L) 8.5 - 10.1 MG/DL    Bilirubin, total 1.0 0.2 - 1.0 MG/DL    ALT (SGPT) 36 12 - 78 U/L    AST (SGOT) 21 15 - 37 U/L    Alk.  phosphatase 84 45 - 117 U/L    Protein, total 9.2 (H) 6.4 - 8.2 g/dL    Albumin 3.8 3.5 - 5.0 g/dL    Globulin 5.4 (H) 2.0 - 4.0 g/dL    A-G Ratio 0.7 (L) 1.1 - 2.2     LIPASE    Collection Time: 10/27/17  5:36 PM   Result Value Ref Range    Lipase 118 73 - 393 U/L   TROPONIN I    Collection Time: 10/27/17  5:36 PM   Result Value Ref Range    Troponin-I, Qt. <0.04 <0.05 ng/mL   CK    Collection Time: 10/27/17  5:36 PM   Result Value Ref Range     39 - 308 U/L   LACTIC ACID    Collection Time: 10/27/17  5:36 PM   Result Value Ref Range    Lactic acid 1.6 0.4 - 2.0 MMOL/L   INFLUENZA A & B AG (RAPID TEST)    Collection Time: 10/27/17  5:41 PM   Result Value Ref Range    Influenza A Antigen NEGATIVE  NEG      Influenza B Antigen NEGATIVE  NEG     URINALYSIS W/ RFLX MICROSCOPIC    Collection Time: 10/27/17  6:15 PM   Result Value Ref Range    Color DARK YELLOW      Appearance CLEAR CLEAR      Specific gravity 1.021 1.003 - 1.030      pH (UA) 6.0 5.0 - 8.0      Protein 30 (A) NEG mg/dL    Glucose NEGATIVE  NEG mg/dL    Ketone NEGATIVE  NEG mg/dL    Blood NEGATIVE  NEG      Urobilinogen 4.0 (H) 0.2 - 1.0 EU/dL    Nitrites NEGATIVE  NEG      Leukocyte Esterase NEGATIVE  NEG      WBC 0-4 0 - 4 /hpf    RBC 0-5 0 - 5 /hpf    Epithelial cells FEW FEW /lpf    Bacteria NEGATIVE  NEG /hpf    Mucus 1+ (A) NEG /lpf    Hyaline cast 0-2 0 - 5 /lpf   BILIRUBIN, CONFIRM    Collection Time: 10/27/17  6:15 PM   Result Value Ref Range    Bilirubin UA, confirm NEGATIVE  NEG         Radiologic Studies -  The following have been ordered and reviewed:  CT Results  (Last 48 hours)               10/27/17 1924  CT ABD PELV WO CONT Final result    Impression:  IMPRESSION:       1. Indeterminate right renal masses which may represent cysts, but this cannot   be confirmed without contrast.   2. Status post VATS procedure on the left since prior study, with residual   pleural parenchymal nodular thickening. 3. New nodular interstitial infiltrate in the right lower lobe and right middle   lobe suspicious for acute infection. 4. Diverticulosis without diverticulitis. Narrative:  EXAM:  CT ABD PELV WO CONT       INDICATION: pain/fever       COMPARISON:       CONTRAST:  None. TECHNIQUE:    Thin axial images were obtained through the abdomen and pelvis. Coronal and   sagittal reconstructions were generated. Oral contrast was not administered. CT   dose reduction was achieved through use of a standardized protocol tailored for   this examination and automatic exposure control for dose modulation. The absence of intravenous contrast material reduces the sensitivity for   evaluation of the solid parenchymal organs of the abdomen. FINDINGS:    LUNG BASES: There has been a VATS procedure since the prior study, with surgical   clips along the medial pleural margin and streaky atelectasis or scar. A nodular   component remains pleural-based in the posterior mediastinum, 1.9 cm,   considerably smaller than the nodule previously described in the left lower   lobe. However, there is new nodular infiltrate throughout the right lower lobe   and also in the right middle lobe since the prior study, suspicious for   infection. INCIDENTALLY IMAGED HEART AND MEDIASTINUM: Unremarkable. LIVER: No mass or biliary dilatation. GALLBLADDER: Unremarkable. SPLEEN: No mass. PANCREAS: No mass or ductal dilatation. ADRENALS: Unremarkable. KIDNEYS/URETERS: No calculus, or hydronephrosis.  Small hypodense renal cortical   masses on the right, possibly representing cysts, but confirmation would require   contrast.   STOMACH: Unremarkable. SMALL BOWEL: No dilatation or wall thickening. COLON: Diverticula throughout the colon with no associated acute inflammatory   change. APPENDIX: Unremarkable. PERITONEUM: No ascites or pneumoperitoneum. RETROPERITONEUM: No lymphadenopathy or aortic aneurysm. REPRODUCTIVE ORGANS:   URINARY BLADDER: No mass or calculus. BONES: No destructive bone lesion. ADDITIONAL COMMENTS: No hernia, lymphadenopathy or mass in the groins on either   side. CXR Results  (Last 48 hours)               10/27/17 1730  XR CHEST PORT Final result    Impression:  IMPRESSION:       1. Right upper lobe consolidation. 2. Left midlung field scar. 3. Left lung base not well visualized. A two-view standard examination is   recommended when clinically possible. .  . Narrative:  INDICATION:  Chest Pain        EXAM: Chest single view. COMPARISON: 3/31/2017. FINDINGS: A single frontal view of the chest at 1718 hours shows right upper   lobe consolidation, new since the prior study. Inspiratory effort is poor. The   left lung base is not well visualized, and there may be atelectasis or   infiltrate as well as a small pleural effusion. Left midlung field atelectasis   or scar. .  The heart, mediastinum and pulmonary vasculature are stable . The   bony thorax is unremarkable for age. .                 Vital Signs-Reviewed the patient's vital signs.    Patient Vitals for the past 12 hrs:   Temp Pulse Resp BP SpO2   10/27/17 1815 - - - (!) 146/95 100 %   10/27/17 1745 - - - 132/86 99 %   10/27/17 1730 - - - (!) 150/97 99 %   10/27/17 1715 - - - 140/90 99 %   10/27/17 1700 - - - (!) 138/93 97 %   10/27/17 1649 - - - 138/89 -   10/27/17 1550 100.2 °F (37.9 °C) 63 14 (!) 127/92 98 %       Medications Given in the ED:  Medications   sodium chloride 0.9 % bolus infusion 1,000 mL (1,000 mL IntraVENous New Bag 10/27/17 1745)   acetaminophen (TYLENOL) tablet 1,000 mg (1,000 mg Oral Given 10/27/17 1813)   morphine injection 2 mg (2 mg IntraVENous Given 10/27/17 2053)   levoFLOXacin (LEVAQUIN) tablet 500 mg (500 mg Oral Given 10/27/17 2053)       EKG interpretation: (Preliminary) 17:35  Rhythm: atrial fib; and irregular. Rate (approx.): 70; QRS interval: normal ; ST/T wave: normal; No significant change. Diagnosis   Clinical Impression:   1. Abdominal pain, LLQ (left lower quadrant)    2. Left groin pain    3. Pneumonia of right lower lobe due to infectious organism ([facility] [address]    4. Diverticulosis of intestine without bleeding, unspecified intestinal tract location    5. Right renal mass         Plan:  1:   Follow-up Information     Follow up With Details Comments [address] Mansoor Lara MD In 3 days about your renal masses [address]      Ramonita Wesley MD  As needed [address] [facility] [address] [phone]      \[facility]\"" EMERGENCY DEPT  If symptoms worsen [address] [facility]    Dwaine Oquendo MD  As needed [address] [facility]  [address] [facility] [address]  [phone]          2:   Current Discharge Medication List      START taking these medications    Details   HYDROcodone-acetaminophen (NORCO) 5-325 mg per tablet Take 1 Tab by mouth every four (4) hours as needed for Pain. Max Daily Amount: 6 Tabs. Qty: 20 Tab, Refills: 0      levoFLOXacin (LEVAQUIN) 500 mg tablet Take 1 Tab by mouth daily. Qty: 6 Tab, Refills: 0           Return to ED if Worse    Disposition Note:  DISCHARGE NOTE  9:20 PM  The patient has been re-evaluated and is ready for discharge. Reviewed available results with patient. Counseled patient on diagnosis and care plan. Patient has expressed understanding, and all questions have been answered.  Patient agrees with plan and agrees to follow up as recommended, or return to the ED if their symptoms worsen. Discharge instructions have been provided and explained to the patient, along with reasons to return to the ED.    _______________________________   Attestations: This note is prepared by Lisa Villanueva, acting as Scribe for Donna Acuña MD.      The scribe's documentation has been prepared under my direction and personally reviewed by me in its entirety. I confirm that the note above accurately reflects all work, treatment, procedures, and medical decision making performed by me.   Donna Acuña MD  _______________________________

## 2017-10-28 LAB
ATRIAL RATE: 202 BPM
CALCULATED R AXIS, ECG10: 26 DEGREES
CALCULATED T AXIS, ECG11: 23 DEGREES
DIAGNOSIS, 93000: NORMAL
Q-T INTERVAL, ECG07: 424 MS
QRS DURATION, ECG06: 104 MS
QTC CALCULATION (BEZET), ECG08: 457 MS
VENTRICULAR RATE, ECG03: 70 BPM

## 2017-10-28 NOTE — DISCHARGE INSTRUCTIONS
Abdominal Pain: Care Instructions  Your Care Instructions    Abdominal pain has many possible causes. Some aren't serious and get better on their own in a few days. Others need more testing and treatment. If your pain continues or gets worse, you need to be rechecked and may need more tests to find out what is wrong. You may need surgery to correct the problem. Don't ignore new symptoms, such as fever, nausea and vomiting, urination problems, pain that gets worse, and dizziness. These may be signs of a more serious problem. Your doctor may have recommended a follow-up visit in the next 8 to 12 hours. If you are not getting better, you may need more tests or treatment. The doctor has checked you carefully, but problems can develop later. If you notice any problems or new symptoms, get medical treatment right away. Follow-up care is a key part of your treatment and safety. Be sure to make and go to all appointments, and call your doctor if you are having problems. It's also a good idea to know your test results and keep a list of the medicines you take. How can you care for yourself at home? · Rest until you feel better. · To prevent dehydration, drink plenty of fluids, enough so that your urine is light yellow or clear like water. Choose water and other caffeine-free clear liquids until you feel better. If you have kidney, heart, or liver disease and have to limit fluids, talk with your doctor before you increase the amount of fluids you drink. · If your stomach is upset, eat mild foods, such as rice, dry toast or crackers, bananas, and applesauce. Try eating several small meals instead of two or three large ones. · Wait until 48 hours after all symptoms have gone away before you have spicy foods, alcohol, and drinks that contain caffeine. · Do not eat foods that are high in fat. · Avoid anti-inflammatory medicines such as aspirin, ibuprofen (Advil, Motrin), and naproxen (Aleve).  These can cause stomach upset. Talk to your doctor if you take daily aspirin for another health problem. When should you call for help? Call 911 anytime you think you may need emergency care. For example, call if:  ? · You passed out (lost consciousness). ? · You pass maroon or very bloody stools. ? · You vomit blood or what looks like coffee grounds. ? · You have new, severe belly pain. ?Call your doctor now or seek immediate medical care if:  ? · Your pain gets worse, especially if it becomes focused in one area of your belly. ? · You have a new or higher fever. ? · Your stools are black and look like tar, or they have streaks of blood. ? · You have unexpected vaginal bleeding. ? · You have symptoms of a urinary tract infection. These may include:  ¨ Pain when you urinate. ¨ Urinating more often than usual.  ¨ Blood in your urine. ? · You are dizzy or lightheaded, or you feel like you may faint. ? Watch closely for changes in your health, and be sure to contact your doctor if:  ? · You are not getting better after 1 day (24 hours). Where can you learn more? Go to http://carolinaSonexis Technologyshayla.info/. Enter Z918 in the search box to learn more about \"Abdominal Pain: Care Instructions. \"  Current as of: March 20, 2017  Content Version: 11.4  © 7887-3460 [employer]. Care instructions adapted under license by [employer] (which disclaims liability or warranty for this information). If you have questions about a medical condition or this instruction, always ask your healthcare professional. [place] [address] any warranty or liability for your use of this information. Diverticulosis: Care Instructions  Your Care Instructions  In diverticulosis, pouches called diverticula form in the wall of the large intestine (colon). The pouches do not cause any pain or other symptoms. Most people who have diverticulosis do not know they have it.  But the pouches sometimes bleed, and if they become infected, they can cause pain and other symptoms. When this happens, it is called diverticulitis. Diverticula form when pressure pushes the wall of the colon outward at certain weak points. A diet that is too low in fiber can cause diverticula. Follow-up care is a key part of your treatment and safety. Be sure to make and go to all appointments, and call your doctor if you are having problems. It's also a good idea to know your test results and keep a list of the medicines you take. How can you care for yourself at home? · Include fruits, leafy green vegetables, beans, and whole grains in your diet each day. These foods are high in fiber. · Take a fiber supplement, such as Citrucel or Metamucil, every day if needed. Read and follow all instructions on the label. · Drink plenty of fluids, enough so that your urine is light yellow or clear like water. If you have kidney, heart, or liver disease and have to limit fluids, talk with your doctor before you increase the amount of fluids you drink. · Get at least 30 minutes of exercise on most days of the week. Walking is a good choice. You also may want to do other activities, such as running, swimming, cycling, or playing tennis or team sports. · Cut out foods that cause gas, pain, or other symptoms. When should you call for help? Call your doctor now or seek immediate medical care if:  ? · You have belly pain. ? · You pass maroon or very bloody stools. ? · You have a fever. ? · You have nausea and vomiting. ? · You have unusual changes in your bowel movements or abdominal swelling. ? · You have burning pain when you urinate. ? · You have abnormal vaginal discharge. ? · You have shoulder pain. ? · You have cramping pain that does not get better when you have a bowel movement or pass gas. ? · You pass gas or stool from your urethra while urinating. ? Watch closely for changes in your health, and be sure to contact your doctor if you have any problems. Where can you learn more? Go to http://carolina-shayla.info/. Enter K044 in the search box to learn more about \"Diverticulosis: Care Instructions. \"  Current as of: May 12, 2017  Content Version: 11.4  © 7726-8740 [employer]. Care instructions adapted under license by [employer] (which disclaims liability or warranty for this information). If you have questions about a medical condition or this instruction, always ask your healthcare professional. [address] any warranty or liability for your use of this information. Pneumonia: Care Instructions  Your Care Instructions    Pneumonia is an infection of the lungs. Most cases are caused by infections from bacteria or viruses. Pneumonia may be mild or very severe. If it is caused by bacteria, you will be treated with antibiotics. It may take a few weeks to a few months to recover fully from pneumonia, depending on how sick you were and whether your overall health is good. Follow-up care is a key part of your treatment and safety. Be sure to make and go to all appointments, and call your doctor if you are having problems. It's also a good idea to know your test results and keep a list of the medicines you take. How can you care for yourself at home? · Take your antibiotics exactly as directed. Do not stop taking the medicine just because you are feeling better. You need to take the full course of antibiotics. · Take your medicines exactly as prescribed. Call your doctor if you think you are having a problem with your medicine. · Get plenty of rest and sleep. You may feel weak and tired for a while, but your energy level will improve with time. · To prevent dehydration, drink plenty of fluids, enough so that your urine is light yellow or clear like water. Choose water and other caffeine-free clear liquids until you feel better.  If you have kidney, heart, or liver disease and have to limit fluids, talk with your doctor before you increase the amount of fluids you drink. · Take care of your cough so you can rest. A cough that brings up mucus from your lungs is common with pneumonia. It is one way your body gets rid of the infection. But if coughing keeps you from resting or causes severe fatigue and chest-wall pain, talk to your doctor. He or she may suggest that you take a medicine to reduce the cough. · Use a vaporizer or humidifier to add moisture to your bedroom. Follow the directions for cleaning the machine. · Do not smoke or allow others to smoke around you. Smoke will make your cough last longer. If you need help quitting, talk to your doctor about stop-smoking programs and medicines. These can increase your chances of quitting for good. · Take an over-the-counter pain medicine, such as acetaminophen (Tylenol), ibuprofen (Advil, Motrin), or naproxen (Aleve). Read and follow all instructions on the label. · Do not take two or more pain medicines at the same time unless the doctor told you to. Many pain medicines have acetaminophen, which is Tylenol. Too much acetaminophen (Tylenol) can be harmful. · If you were given a spirometer to measure how well your lungs are working, use it as instructed. This can help your doctor tell how your recovery is going. · To prevent pneumonia in the future, talk to your doctor about getting a flu vaccine (once a year) and a pneumococcal vaccine (one time only for most people). When should you call for help? Call 911 anytime you think you may need emergency care. For example, call if:  ? · You have severe trouble breathing. ?Call your doctor now or seek immediate medical care if:  ? · You cough up dark brown or bloody mucus (sputum). ? · You have new or worse trouble breathing. ? · You are dizzy or lightheaded, or you feel like you may faint. ? Watch closely for changes in your health, and be sure to contact your doctor if:  ? · You have a new or higher fever. ? · You are coughing more deeply or more often. ? · You are not getting better after 2 days (48 hours). ? · You do not get better as expected. Where can you learn more? Go to http://carolina-shayla.info/. Enter [id number] in the search box to learn more about \"Pneumonia: Care Instructions. \"  Current as of: May 12, 2017  Content Version: 11.4  © 5437-6608 [employer]. Care instructions adapted under license by [employer] (which disclaims liability or warranty for this information). If you have questions about a medical condition or this instruction, always ask your healthcare professional. [address] any warranty or liability for your use of this information.

## 2017-10-28 NOTE — ED NOTES
Discharge instructions given to patient by Felicia Choi MD. Patient verbalized understanding of discharge instructions. Pt discharged without difficulty. Pt discharged in stable condition via ambulation, accompanied by daughter.

## 2017-10-30 ENCOUNTER — TELEPHONE (OUTPATIENT)
Dept: INTERNAL MEDICINE CLINIC | Age: 62
End: 2017-10-30

## 2017-10-30 NOTE — TELEPHONE ENCOUNTER
----- Message from Elvia Hernadez sent at 10/30/2017  1:40 PM EDT -----  Regarding: Dr. Carline Li  Patient's wife called to make an appointment for patient on tomorrow regarding  follow up appointment for ER visit on 10/27/17. Prefers to see Dr. Meagan Deluca. Best contact number for Mrs Ilda Desai is [phone]. Also needs to discuss medication prescribed by ER. Patient was diagnosed with pneumonia.      Message copied/pasted from [facility]

## 2017-10-30 NOTE — TELEPHONE ENCOUNTER
#[phone] wife Ya Ozuna states pt went to ED [facility] ED on 10-27-17 and has pneumonia and they found a mass on his kidney. Pt needs to be seen at once she was told. Please call to schedule as he wants to see Dr. Tan Oneill. Ya Ozuna is off at 1 pm and she will be able to talk with Suly at that time.

## 2017-10-31 NOTE — TELEPHONE ENCOUNTER
Spoke with patient after 2 patient identifiers being note and advised that I had an appt on Friday, November 3, 2017 02:45 PM, patient accepted. Patient expressed understanding and has no further questions at this time.

## 2017-11-01 LAB
BACTERIA SPEC CULT: NORMAL
SERVICE CMNT-IMP: NORMAL

## 2017-11-03 ENCOUNTER — OFFICE VISIT (OUTPATIENT)
Dept: INTERNAL MEDICINE CLINIC | Age: 62
End: 2017-11-03

## 2017-11-03 VITALS
HEART RATE: 53 BPM | BODY MASS INDEX: 29.03 KG/M2 | WEIGHT: 219 LBS | HEIGHT: 73 IN | SYSTOLIC BLOOD PRESSURE: 114 MMHG | TEMPERATURE: 98.2 F | DIASTOLIC BLOOD PRESSURE: 68 MMHG | OXYGEN SATURATION: 98 %

## 2017-11-03 DIAGNOSIS — N28.9 KIDNEY LESION: ICD-10-CM

## 2017-11-03 DIAGNOSIS — R93.429 ABNORMAL CT SCAN, KIDNEY: Primary | ICD-10-CM

## 2017-11-03 DIAGNOSIS — J18.9 COMMUNITY ACQUIRED PNEUMONIA OF RIGHT LOWER LOBE OF LUNG: ICD-10-CM

## 2017-11-03 RX ORDER — LEVOFLOXACIN 500 MG/1
500 TABLET, FILM COATED ORAL DAILY
Qty: 3 TAB | Refills: 0 | Status: SHIPPED | OUTPATIENT
Start: 2017-11-03 | End: 2018-02-13 | Stop reason: ALTCHOICE

## 2017-11-03 RX ORDER — CODEINE PHOSPHATE AND GUAIFENESIN 10; 100 MG/5ML; MG/5ML
5 SOLUTION ORAL
Qty: 100 ML | Refills: 0 | Status: SHIPPED | OUTPATIENT
Start: 2017-11-03 | End: 2018-09-06

## 2017-11-03 NOTE — PROGRESS NOTES
Patient is here today for ED follow up  Pneumonia ,LLQ pain ,Left groin pain,mass   on right kidney.

## 2017-11-03 NOTE — PROGRESS NOTES
HISTORY OF PRESENT ILLNESS  Stan Underwood is a 63 y.o. male. HPI   Pt here in ED f/u from 10/27  Went to ED for LLQ and f/c clear cough and SOB sxs    CT ab/pelvis:   1. Indeterminate right renal masses which may represent cysts, but this cannot  be confirmed without contrast.  2. Status post VATS procedure on the left since prior study, with residual  pleural parenchymal nodular thickening. 3. New nodular interstitial infiltrate in the right lower lobe and right middle  lobe suspicious for acute infection. 4. Diverticulosis without diverticulitis. Treated for RML/RLL CAP with iv levaquin  Has 1 more day of levaquin left  Cough getting better but still frequent  No f/c now  Has had chornic left leg and thigh pain since CVA with slight left leg weakness      Patient Active Problem List    Diagnosis Date Noted    CKD stage 2 due to type 2 diabetes mellitus ([facility] 05/14/2017    Polyneuropathy in diabetes(357.2) 07/21/2015    Hypertensive emergency 07/20/2015    Seizure disorder ([facility] 07/20/2015    Type II or unspecified type diabetes mellitus with neurological manifestations, uncontrolled(250.62) ([address] 07/20/2015    History of atrial fibrillation 07/20/2015    Hyperlipidemia 07/20/2015    Diabetic neuropathy ([address] 11/21/2014    Seizures ([facility] 08/08/2014    Syncope 07/13/2012    Cardiomyopathy, nonischemic ([address] 10/31/2009    DM (diabetes mellitus) ([facility] 10/26/2009    HTN (hypertension), benign 10/26/2009    Pulmonary nodule 10/26/2009     Current Outpatient Prescriptions   Medication Sig Dispense Refill    guaiFENesin-codeine (ROBITUSSIN AC) 100-10 mg/5 mL solution Take 5 mL by mouth three (3) times daily as needed for Cough. Max Daily Amount: 15 mL. 100 mL 0    levoFLOXacin (LEVAQUIN) 500 mg tablet Take 1 Tab by mouth daily. 3 Tab 0    levoFLOXacin (LEVAQUIN) 500 mg tablet Take 1 Tab by mouth daily.  6 Tab 0    carvedilol (COREG) 25 mg tablet TAKE ONE & ONE-HALF TABLETS BY MOUTH TWICE DAILY WITH MEALS 90 Tab 11    rivaroxaban (XARELTO) 20 mg tab tablet Take 1 Tab by mouth daily. 30 Tab 11    hydrALAZINE (APRESOLINE) 10 mg tablet TAKE ONE TABLET BY MOUTH THREE TIMES DAILY 90 Tab 11    atorvastatin (LIPITOR) 80 mg tablet TAKE ONE TABLET BY MOUTH ONCE DAILY AT  NIGHT 30 Tab 11    glimepiride (AMARYL) 4 mg tablet TAKE ONE TABLET BY MOUTH IN THE MORNING 30 Tab 11    lisinopril (PRINIVIL, ZESTRIL) 40 mg tablet TAKE ONE TABLET BY MOUTH ONCE DAILY FOR BLOOD PRESSURE 30 Tab 11    metFORMIN (GLUCOPHAGE) 500 mg tablet TAKE ONE TABLET BY MOUTH TWICE DAILY WITH FOOD 60 Tab 11    amLODIPine (NORVASC) 10 mg tablet TAKE ONE TABLET BY MOUTH ONCE DAILY FOR BLOOD PRESSURE 30 Tab 11    levETIRAcetam (KEPPRA) 750 mg tablet Take 1 Tab by mouth two (2) times a day. 60 Tab 6    Lancets (FREESTYLE LANCETS) misc Test daily. 250.00 1 Package 5    glucose blood VI test strips (FREESTYLE TEST) strip Use as directed to check blood sugars once daily. E11.9 100 Strip 11    hydrALAZINE (APRESOLINE) 50 mg tablet Take 50 mg by mouth three (3) times daily. No Known Allergies   Lab Results  Component Value Date/Time   GFR est non-AA 41 10/27/2017 05:36 PM   GFR est AA 49 10/27/2017 05:36 PM   Creatinine 1.72 10/27/2017 05:36 PM   BUN 15 10/27/2017 05:36 PM   Sodium 138 10/27/2017 05:36 PM   Potassium 3.2 10/27/2017 05:36 PM   Chloride 101 10/27/2017 05:36 PM   CO2 28 10/27/2017 05:36 PM   Magnesium 2.3 03/31/2017 03:30 PM        ROS    Physical Exam   Constitutional: He appears well-developed and well-nourished. No distress. Appears stated age   HENT:   Head: Normocephalic. Cardiovascular: Normal rate, regular rhythm and normal heart sounds. Exam reveals no gallop and no friction rub. No murmur heard. Pulmonary/Chest: Effort normal and breath sounds normal. No respiratory distress. He has no wheezes. He has no rales. He exhibits no tenderness. Abdominal: Soft. Musculoskeletal: He exhibits no edema. Neurological: He is alert. Psychiatric: He has a normal mood and affect. Nursing note and vitals reviewed. ASSESSMENT and PLAN  Diagnoses and all orders for this visit:    1. Abnormal CT scan, kidney  -     US RETROPERITONEUM COMP; Future    2. Kidney lesion  -     US RETROPERITONEUM COMP; Future       3. Community acquired pneumonia of right lower lobe of lung ([facility]   Resolving   Extend course of abx 3 more days--levaquin   Tai ac syrup   To call if not improved next weeek  Other orders  -     guaiFENesin-codeine (ROBITUSSIN AC) 100-10 mg/5 mL solution; Take 5 mL by mouth three (3) times daily as needed for Cough. Max Daily Amount: 15 mL. -     levoFLOXacin (LEVAQUIN) 500 mg tablet; Take 1 Tab by mouth daily. Follow-up Disposition:  Return if symptoms worsen or fail to improve.

## 2017-11-03 NOTE — MR AVS SNAPSHOT
Visit Information Date & Time Provider Department Dept. Phone Encounter #  
 11/3/2017  2:45 PM Derrek Bell, [address] [place] [address],[address] [phone] [id number] Follow-up Instructions Return if symptoms worsen or fail to improve. Your Appointments 2/13/2018  9:45 AM  
ROUTINE CARE with MD MICHEAL Wilder [facility]) Appt Note: 4 mnth follow up  
 [facility] [address] [place] [address]  
[address] [facility]  [address] Upcoming Health Maintenance Date Due DTaP/Tdap/Td series (1 - Tdap) 8/24/1978 Pneumococcal 19-64 Highest Risk (2 of 3 - PCV13) 7/22/2016 ZOSTER VACCINE AGE 60> 6/24/2017 FOOT EXAM Q1 6/27/2017 EYE EXAM RETINAL OR DILATED Q1 10/25/2017 LIPID PANEL Q1 10/27/2017 HEMOGLOBIN A1C Q6M 3/19/2018 MICROALBUMIN Q1 9/8/2018 COLONOSCOPY 5/9/2026 Allergies as of 11/3/2017  Review Complete On: 11/3/2017 By: Linda Cervantes LPN No Known Allergies Current Immunizations  Reviewed on 10/27/2016 Name Date Influenza Vaccine (Quad) PF 9/19/2017, 10/27/2016 10:35 AM  
 Influenza Vaccine PF 11/21/2014 Pneumococcal Polysaccharide (PPSV-23) 7/22/2015 12:39 PM  
  
 Not reviewed this visit You Were Diagnosed With   
  
 Codes Comments Abnormal CT scan, kidney    -  Primary ICD-10-CM: M45.801 ICD-9-CM: 793.5 Kidney lesion     ICD-10-CM: N28.9 ICD-9-CM: 593.9 Community acquired pneumonia of right lower lobe of lung ([facility]     ICD-10-CM: J18.1 ICD-9-CM: 865 Vitals BP Pulse Temp Height(growth percentile) Weight(growth percentile) SpO2  
 114/68 (BP 1 Location: Left arm, BP Patient Position: Sitting) (!) 53 98.2 °F (36.8 °C) (Oral) 6' 1\" (1.854 m) 219 lb (99.3 kg) 98% BMI Smoking Status 28.89 kg/m2 Light Tobacco Smoker BMI and BSA Data Body Mass Index Body Surface Area 28.89 kg/m 2 2.26 m 2 Preferred Pharmacy Pharmacy Name Phone [facility] [address], [address] [place] [address] Avenue [phone] Your Updated Medication List  
  
   
This list is accurate as of: 11/3/17  3:23 PM.  Always use your most recent med list. amLODIPine 10 mg tablet Commonly known as:  Izetta Harder TAKE ONE TABLET BY MOUTH ONCE DAILY FOR BLOOD PRESSURE  
  
 atorvastatin 80 mg tablet Commonly known as:  LIPITOR  
TAKE ONE TABLET BY MOUTH ONCE DAILY AT  NIGHT  
  
 carvedilol 25 mg tablet Commonly known as:  COREG  
TAKE ONE & ONE-HALF TABLETS BY MOUTH TWICE DAILY WITH MEALS  
  
 glimepiride 4 mg tablet Commonly known as:  AMARYL  
TAKE ONE TABLET BY MOUTH IN THE MORNING  
  
 glucose blood VI test strips strip Commonly known as:  FREESTYLE TEST Use as directed to check blood sugars once daily. E11.9  
  
 guaiFENesin-codeine 100-10 mg/5 mL solution Commonly known as:  ROBITUSSIN AC Take 5 mL by mouth three (3) times daily as needed for Cough. Max Daily Amount: 15 mL. * hydrALAZINE 50 mg tablet Commonly known as:  APRESOLINE Take 50 mg by mouth three (3) times daily. * hydrALAZINE 10 mg tablet Commonly known as:  APRESOLINE  
TAKE ONE TABLET BY MOUTH THREE TIMES DAILY Lancets Misc Commonly known as:  FREESTYLE LANCETS Test daily. 250.00  
  
 levETIRAcetam 750 mg tablet Commonly known as:  KEPPRA Take 1 Tab by mouth two (2) times a day. * levoFLOXacin 500 mg tablet Commonly known as:  Ashley Zepeda Take 1 Tab by mouth daily. * levoFLOXacin 500 mg tablet Commonly known as:  Ashley Zepeda Take 1 Tab by mouth daily. lisinopril 40 mg tablet Commonly known as:  PRINIVIL, ZESTRIL  
TAKE ONE TABLET BY MOUTH ONCE DAILY FOR BLOOD PRESSURE  
  
 metFORMIN 500 mg tablet Commonly known as:  GLUCOPHAGE  
TAKE ONE TABLET BY MOUTH TWICE DAILY WITH FOOD  
  
 rivaroxaban 20 mg Tab tablet Commonly known as:  Lauren Snyder Take 1 Tab by mouth daily. * Notice: This list has 4 medication(s) that are the same as other medications prescribed for you. Read the directions carefully, and ask your doctor or other care provider to review them with you. Prescriptions Printed Refills  
 guaiFENesin-codeine (ROBITUSSIN AC) 100-10 mg/5 mL solution 0 Sig: Take 5 mL by mouth three (3) times daily as needed for Cough. Max Daily Amount: 15 mL. Class: Print Route: Oral  
  
Prescriptions Sent to Pharmacy Refills  
 levoFLOXacin (LEVAQUIN) 500 mg tablet 0 Sig: Take 1 Tab by mouth daily. Class: Normal  
 Pharmacy: [address] [place], [address] Ph #: [phone] Route: Oral  
  
Follow-up Instructions Return if symptoms worsen or fail to improve. To-Do List   
 11/20/2017 Imaging:  US RETROPERITONEUM COMP Introducing [facility]! Chinedu Lay introduces [email or web address] patient portal. Now you can access parts of your medical record, email your doctor's office, and request medication refills online. 1. In your internet browser, go to [email or web address] 2. Click on the First Time User? Click Here link in the Sign In box. You will see the New Member Sign Up page. 3. Enter your [email or web address] Access Code exactly as it appears below. You will not need to use this code after youve completed the sign-up process. If you do not sign up before the expiration date, you must request a new code. · [email or web address] Access Code: [email or web address] Expires: 12/18/2017 10:34 AM 
 
4. Enter the last four digits of your Social Security Number (xxxx) and Date of Birth (mm/dd/yyyy) as indicated and click Submit. You will be taken to the next sign-up page. 5. Create a Appbymet ID. This will be your Appbymet login ID and cannot be changed, so think of one that is secure and easy to remember. 6. Create a Dataium password. You can change your password at any time. 7. Enter your Password Reset Question and Answer. This can be used at a later time if you forget your password. 8. Enter your e-mail address. You will receive e-mail notification when new information is available in [address] 9. Click Sign Up. You can now view and download portions of your medical record. 10. Click the Download Summary menu link to download a portable copy of your medical information. If you have questions, please visit the Frequently Asked Questions section of the Dataium website. Remember, Dataium is NOT to be used for urgent needs. For medical emergencies, dial 911. Now available from your iPhone and Android! Please provide this summary of care documentation to your next provider. Your primary care clinician is listed as Patricia GLYNN. If you have any questions after today's visit, please call [phone].

## 2018-02-13 ENCOUNTER — OFFICE VISIT (OUTPATIENT)
Dept: INTERNAL MEDICINE CLINIC | Age: 63
End: 2018-02-13

## 2018-02-13 ENCOUNTER — HOSPITAL ENCOUNTER (OUTPATIENT)
Dept: LAB | Age: 63
Discharge: HOME | End: 2018-02-13
Payer: MEDICARE

## 2018-02-13 VITALS
OXYGEN SATURATION: 100 % | SYSTOLIC BLOOD PRESSURE: 123 MMHG | BODY MASS INDEX: 30.62 KG/M2 | TEMPERATURE: 98 F | HEIGHT: 73 IN | HEART RATE: 71 BPM | DIASTOLIC BLOOD PRESSURE: 84 MMHG | WEIGHT: 231 LBS

## 2018-02-13 DIAGNOSIS — Z12.5 PROSTATE CANCER SCREENING: ICD-10-CM

## 2018-02-13 DIAGNOSIS — I10 HTN (HYPERTENSION), BENIGN: ICD-10-CM

## 2018-02-13 DIAGNOSIS — E78.00 PURE HYPERCHOLESTEROLEMIA: ICD-10-CM

## 2018-02-13 DIAGNOSIS — D3A.00 CARCINOID TUMOR: ICD-10-CM

## 2018-02-13 DIAGNOSIS — G62.9 NEUROPATHY: ICD-10-CM

## 2018-02-13 DIAGNOSIS — E11.49 TYPE II OR UNSPECIFIED TYPE DIABETES MELLITUS WITH NEUROLOGICAL MANIFESTATIONS, UNCONTROLLED(250.62) (HCC): Primary | ICD-10-CM

## 2018-02-13 PROCEDURE — 84153 ASSAY OF PSA TOTAL: CPT

## 2018-02-13 PROCEDURE — 80061 LIPID PANEL: CPT

## 2018-02-13 PROCEDURE — 36415 COLL VENOUS BLD VENIPUNCTURE: CPT

## 2018-02-13 PROCEDURE — 83036 HEMOGLOBIN GLYCOSYLATED A1C: CPT

## 2018-02-13 NOTE — PROGRESS NOTES
HISTORY OF PRESENT ILLNESS  Cliff Crawford is a 63 y.o. male. HPI   F/u dm-2 and MARIA TERESA, chronic afib, hx lung carcinoid  fsbs at [facility] 115-150  No cp or sob sxs  Sees  renal Dr Ester alonzo for ckd 3    Last visit:  Was started to [facility]; 1 mg every day last month in place of metformin d/t MARIA TERESA after VATS surgery  fsbs  an am average per pt  No hypoglycemia  Last a1c 7.7 , today down to 6.8  Diet is better now per his wife  Had chest CT for the lung carcinoid a few weeks ago--Dr Light--repeat in 6 months  Metformin has been restarted and amaryl dose increased to 4 mg every day  Saw Dr. Robles arce for afib-no med changes  Saw Dr Kyra Quinteros for CKD 3--labs done this month  C/o weakness of right hand  x months        Patient Active Problem List    Diagnosis Date Noted    Carcinoid tumor 02/13/2018    CKD stage 2 due to type 2 diabetes mellitus ([facility] 05/14/2017    Polyneuropathy in diabetes(357.2) 07/21/2015    Hypertensive emergency 07/20/2015    Seizure disorder ([facility] 07/20/2015    Type II or unspecified type diabetes mellitus with neurological manifestations, uncontrolled(250.62) ([address] 07/20/2015    History of atrial fibrillation 07/20/2015    Hyperlipidemia 07/20/2015    Diabetic neuropathy ([facility] 11/21/2014    Seizures ([facility] 08/08/2014    Syncope 07/13/2012    Cardiomyopathy, nonischemic ([facility] 10/31/2009    DM (diabetes mellitus) ([facility] 10/26/2009    HTN (hypertension), benign 10/26/2009    Pulmonary nodule 10/26/2009     Current Outpatient Prescriptions   Medication Sig Dispense Refill    carvedilol (COREG) 25 mg tablet TAKE ONE & ONE-HALF TABLETS BY MOUTH TWICE DAILY WITH MEALS 90 Tab 11    rivaroxaban (XARELTO) 20 mg tab tablet Take 1 Tab by mouth daily.  30 Tab 11    hydrALAZINE (APRESOLINE) 10 mg tablet TAKE ONE TABLET BY MOUTH THREE TIMES DAILY 90 Tab 11    atorvastatin (LIPITOR) 80 mg tablet TAKE ONE TABLET BY MOUTH ONCE DAILY AT  NIGHT 30 Tab 11    glimepiride (AMARYL) 4 mg tablet TAKE ONE TABLET BY MOUTH IN THE MORNING 30 Tab 11    lisinopril (PRINIVIL, ZESTRIL) 40 mg tablet TAKE ONE TABLET BY MOUTH ONCE DAILY FOR BLOOD PRESSURE 30 Tab 11    metFORMIN (GLUCOPHAGE) 500 mg tablet TAKE ONE TABLET BY MOUTH TWICE DAILY WITH FOOD 60 Tab 11    glucose blood VI test strips (FREESTYLE TEST) strip Use as directed to check blood sugars once daily. E11.9 100 Strip 11    amLODIPine (NORVASC) 10 mg tablet TAKE ONE TABLET BY MOUTH ONCE DAILY FOR BLOOD PRESSURE 30 Tab 11    levETIRAcetam (KEPPRA) 750 mg tablet Take 1 Tab by mouth two (2) times a day. 60 Tab 6    Lancets (FREESTYLE LANCETS) misc Test daily. 250.00 1 Package 5    guaiFENesin-codeine (ROBITUSSIN AC) 100-10 mg/5 mL solution Take 5 mL by mouth three (3) times daily as needed for Cough. Max Daily Amount: 15 mL.  100 mL 0     No Known Allergies   Lab Results  Component Value Date/Time   Hemoglobin A1c 7.7 (H) 04/13/2017 03:07 PM   Hemoglobin A1c 6.9 (H) 10/27/2016 10:40 AM   Hemoglobin A1c 6.6 (H) 12/04/2015 10:04 AM   Glucose 80 10/27/2017 05:36 PM   Glucose (POC) 137 (H) 08/24/2015 02:55 PM   Glucose  05/15/2017 12:14 PM   Microalb/Creat ratio (ug/mg creat.) 249.0 (H) 04/13/2017 03:07 PM   LDL, calculated 55 10/27/2016 10:40 AM   Creatinine 1.72 (H) 10/27/2017 05:36 PM      Lab Results  Component Value Date/Time   Cholesterol, total 99 (L) 10/27/2016 10:40 AM   Cholesterol (POC) 167 11/21/2014 09:00 AM   HDL Cholesterol 29 (L) 10/27/2016 10:40 AM   LDL, calculated 55 10/27/2016 10:40 AM   LDL Cholesterol (POC) 111 11/21/2014 09:00 AM   Triglyceride 76 10/27/2016 10:40 AM   Triglycerides (POC) 70 11/21/2014 09:00 AM   CHOL/HDL Ratio 3.7 07/21/2015 03:03 AM     Lab Results  Component Value Date/Time   GFR est non-AA 41 (L) 10/27/2017 05:36 PM   GFR est AA 49 (L) 10/27/2017 05:36 PM   Creatinine 1.72 (H) 10/27/2017 05:36 PM   BUN 15 10/27/2017 05:36 PM   Sodium 138 10/27/2017 05:36 PM   Potassium 3.2 (L) 10/27/2017 05:36 PM   Chloride 101 10/27/2017 05:36 PM   CO2 28 10/27/2017 05:36 PM   Magnesium 2.3 03/31/2017 03:30 PM        ROS    Physical Exam   Constitutional: He appears well-developed and well-nourished. No distress. Appears stated age   HENT:   Head: Normocephalic. Cardiovascular: Normal rate, regular rhythm and normal heart sounds. Exam reveals no gallop and no friction rub. No murmur heard. Pulmonary/Chest: Effort normal and breath sounds normal. No respiratory distress. He has no wheezes. He has no rales. He exhibits no tenderness. Abdominal: Soft. Musculoskeletal: He exhibits no edema. Neurological: He is alert. Diabetic foot exam performed by Mariana Castro MD       Measurement  Response Nurse Comment Physician Comment  Monofilament  R - reduced sensation with micro filament  L - reduced sensation with micro filament    Pulse DP R - 2+ (normal)  L - 2+ (normal)    Pulse TP R - 2+ (normal)  L - 2+ (normal)    Structural deformity R - None  L - None    Skin Integrity / Deformity R - None  L - None       Reviewed by:         Psychiatric: He has a normal mood and affect. Nursing note and vitals reviewed. ASSESSMENT and PLAN  Diagnoses and all orders for this visit:    1. Type II or unspecified type diabetes mellitus with neurological manifestations, uncontrolled(250.62) (HCC)  -     FUNDUS PHOTOGRAPHY  -      DIABETES FOOT EXAM  -     HEMOGLOBIN A1C WITH EAG   Controlled per home readings   Has diabetic neuropathy--discussed appropriate footwear , daily foot inspection etc   Continue metformin  2. HTN (hypertension), benign   controlled  3. Pure hypercholesterolemia  -     LIPID PANEL    4. Prostate cancer screening  -     PSA SCREENING (SCREENING) ()    5. Carcinoid tumor   S/p resection   F/u [facility] surgery and Dr Lalita Garduno  6. Neuropathy   No pain   See above   7. PAF   On Xarelto-Dr Olivo    8.  CKD 3   F/u Dr Luz Elena Tate  Follow-up Disposition:  Return in about 4 months (around 6/13/2018) for dm-2 htn hld.

## 2018-02-13 NOTE — LETTER
2018 3:01 PM 
 
Mr. Vinny [place] [address] Dear Vinny Frye: 
 
Please find your most recent results below. Resulted Orders FUNDUS PHOTOGRAPHY Result Value Ref Range SEVERITY NORMAL Right eye diabetic retinopathy None Right eye macular edema None Right eye other retinopathy None Right eye image quality Image Is Not Gradable Left eye diabetic retinopathy None Left eye macular edema None Left eye other retinopathy None Left eye image quality Image Is Not Gradable Result Retinal Study Result for Ilia March Result Result liliana Ortiz 62 y/o, M (: 19-, MRN: [id number]) Result    
  presented to [facility] on 2018 for a retinal imaging  
study of the left and right eyes. Result Result Based on the findings of the study, the following is recommended for Ilia March Result Not Gradable Eye(s) Found: Schedule an appointment with a retina specialist  
for further evaluation within 3 months. Result Result Interpreting Provider's Comments:  No comments provided Result Diagnoses Present: E11.49 - Type 2 diabetes mellitus with other diabetic  
neurological complication Result Result Right Eye Findings:   
 Result Image not gradable for reasons listed:  Camera Angle Causing Light Shadow Present Blocking View of Retina Result Result Left Eye Findings:   
 Result Image not gradable for reasons listed:  Camera Angle Causing Light Shadow Present Blocking View of Retina Result Result Result This result was electronically signed by Rangel Psot MD, NPI:  
[id number]; Taxonomy: [id number] on 2018 05:24:40 UTC time. Result Result NOTE:  Any pathology noted on this diabetic retinal evaluation should be confirmed by an appropriate ophthalmic examination. RECOMMENDATIONS: 
Per Dr. Zoila Farris the images are not gradable, advise to see eye doctor for diabetic eye exam  
 
Please call me if you have any questions: [phone] Sincerely, 
 
 
Janiece Rubinstein, MD

## 2018-02-13 NOTE — MR AVS SNAPSHOT
[facility] [address] [place] [facility]. 
[phone] Patient: Anabel Odom MRN:  [id number] Visit Information Date & Time Provider Department Dept. Phone Encounter #  
 2/13/2018  3:45 PM Tomasa Perez, [address] [place] [address] [phone] [id number] Follow-up Instructions Return in about 4 months (around 6/13/2018) for dm-2 htn hld. Upcoming Health Maintenance Date Due DTaP/Tdap/Td series (1 - Tdap) 8/24/1978 Pneumococcal 19-64 Highest Risk (2 of 3 - PCV13) 7/22/2016 ZOSTER VACCINE AGE 60> 6/24/2017 EYE EXAM RETINAL OR DILATED Q1 10/25/2017 LIPID PANEL Q1 10/27/2017 HEMOGLOBIN A1C Q6M 3/19/2018 MICROALBUMIN Q1 9/8/2018 FOOT EXAM Q1 2/13/2019 COLONOSCOPY 5/9/2026 Allergies as of 2/13/2018  Review Complete On: 11/3/2017 By: Anabel Rubio LPN No Known Allergies Current Immunizations  Reviewed on 10/27/2016 Name Date Influenza Vaccine (Quad) PF 9/19/2017, 10/27/2016 10:35 AM  
 Influenza Vaccine PF 11/21/2014 Pneumococcal Polysaccharide (PPSV-23) 7/22/2015 12:39 PM  
  
 Not reviewed this visit You Were Diagnosed With   
  
 Codes Comments Type II or unspecified type diabetes mellitus with neurological manifestations, uncontrolled(250.62) ([facility]    -  Primary ICD-10-CM: E11.49 
ICD-9-CM: 250.62 HTN (hypertension), benign     ICD-10-CM: I10 
ICD-9-CM: 401.1 Pure hypercholesterolemia     ICD-10-CM: E78.00 ICD-9-CM: 272.0 Prostate cancer screening     ICD-10-CM: Z12.5 ICD-9-CM: V76.44 Carcinoid tumor     ICD-10-CM: D3A.00 
ICD-9-CM: 209.60 Neuropathy     ICD-10-CM: G62.9 ICD-9-CM: 993. 9 Vitals BP Pulse Temp Height(growth percentile) Weight(growth percentile) SpO2  
 123/84 (BP 1 Location: Left arm, BP Patient Position: Sitting) 71 98 °F (36.7 °C) (Oral) 6' 1\" (1.854 m) 231 lb (104.8 kg) 100% BMI Smoking Status 30.48 kg/m2 Light Tobacco Smoker BMI and BSA Data Body Mass Index Body Surface Area  
 30.48 kg/m 2 2.32 m 2 Preferred Pharmacy Pharmacy Name Phone [facility] [address] [place], [address] [phone] Your Updated Medication List  
  
   
This list is accurate as of: 2/13/18  4:20 PM.  Always use your most recent med list. amLODIPine 10 mg tablet Commonly known as:  Jillyn Boast TAKE ONE TABLET BY MOUTH ONCE DAILY FOR BLOOD PRESSURE  
  
 atorvastatin 80 mg tablet Commonly known as:  LIPITOR  
TAKE ONE TABLET BY MOUTH ONCE DAILY AT  NIGHT  
  
 carvedilol 25 mg tablet Commonly known as:  COREG  
TAKE ONE & ONE-HALF TABLETS BY MOUTH TWICE DAILY WITH MEALS  
  
 glimepiride 4 mg tablet Commonly known as:  AMARYL  
TAKE ONE TABLET BY MOUTH IN THE MORNING  
  
 glucose blood VI test strips strip Commonly known as:  FREESTYLE TEST Use as directed to check blood sugars once daily. E11.9  
  
 guaiFENesin-codeine 100-10 mg/5 mL solution Commonly known as:  ROBITUSSIN AC Take 5 mL by mouth three (3) times daily as needed for Cough. Max Daily Amount: 15 mL.  
  
 hydrALAZINE 10 mg tablet Commonly known as:  APRESOLINE  
TAKE ONE TABLET BY MOUTH THREE TIMES DAILY Lancets Misc Commonly known as:  FREESTYLE LANCETS Test daily. 250.00  
  
 levETIRAcetam 750 mg tablet Commonly known as:  KEPPRA Take 1 Tab by mouth two (2) times a day. lisinopril 40 mg tablet Commonly known as:  PRINIVIL, ZESTRIL  
TAKE ONE TABLET BY MOUTH ONCE DAILY FOR BLOOD PRESSURE  
  
 metFORMIN 500 mg tablet Commonly known as:  GLUCOPHAGE  
TAKE ONE TABLET BY MOUTH TWICE DAILY WITH FOOD  
  
 rivaroxaban 20 mg Tab tablet Commonly known as:  [place]over Prom Take 1 Tab by mouth daily. We Performed the Following FUNDUS PHOTOGRAPHY I017626 CPT(R)] HEMOGLOBIN A1C WITH EAG [66160 CPT(R)] HM DIABETES FOOT EXAM [HM7 Custom] LIPID PANEL [90925 CPT(R)] PSA SCREENING (SCREENING) [ [facility]] Follow-up Instructions Return in about 4 months (around 6/13/2018) for dm-2 htn hld. Introducing [facility]! Rosalina Sandoval introduces [email or web address] patient portal. Now you can access parts of your medical record, email your doctor's office, and request medication refills online. 1. In your internet browser, go to [email or web address] 2. Click on the First Time User? Click Here link in the Sign In box. You will see the New Member Sign Up page. 3. Enter your [email or web address] Access Code exactly as it appears below. You will not need to use this code after youve completed the sign-up process. If you do not sign up before the expiration date, you must request a new code. · [email or web address] Access Code: [email or web address] Expires: 5/14/2018  4:20 PM 
 
4. Enter the last four digits of your Social Security Number (xxxx) and Date of Birth (mm/dd/yyyy) as indicated and click Submit. You will be taken to the next sign-up page. 5. Create a [email or web address] ID. This will be your [email or web address] login ID and cannot be changed, so think of one that is secure and easy to remember. 6. Create a [email or web address] password. You can change your password at any time. 7. Enter your Password Reset Question and Answer. This can be used at a later time if you forget your password. 8. Enter your e-mail address. You will receive e-mail notification when new information is available in [address]. 9. Click Sign Up. You can now view and download portions of your medical record. 10. Click the Download Summary menu link to download a portable copy of your medical information. If you have questions, please visit the Frequently Asked Questions section of the [email or web address] website. Remember, [email or web address] is NOT to be used for urgent needs. For medical emergencies, dial 911. Now available from your iPhone and Android! Please provide this summary of care documentation to your next provider. Your primary care clinician is listed as Cristiano GLYNN. If you have any questions after today's visit, please call [phone].

## 2018-02-14 LAB
CHOLEST SERPL-MCNC: 110 MG/DL (ref 100–199)
EST. AVERAGE GLUCOSE BLD GHB EST-MCNC: 146 MG/DL
HBA1C MFR BLD: 6.7 % (ref 4.8–5.6)
HDLC SERPL-MCNC: 33 MG/DL
LDLC SERPL CALC-MCNC: 56 MG/DL (ref 0–99)
LEFT EYE DIABETIC RETINOPATHY: NORMAL
LEFT EYE IMAGE QUALITY: NORMAL
LEFT EYE MACULAR EDEMA: NORMAL
LEFT EYE OTHER RETINOPATHY: NORMAL
PSA SERPL-MCNC: 0.8 NG/ML (ref 0–4)
RESULT: NORMAL
RIGHT EYE DIABETIC RETINOPATHY: NORMAL
RIGHT EYE IMAGE QUALITY: NORMAL
RIGHT EYE MACULAR EDEMA: NORMAL
RIGHT EYE OTHER RETINOPATHY: NORMAL
SEVERITY: NORMAL
TRIGL SERPL-MCNC: 107 MG/DL (ref 0–149)
VLDLC SERPL CALC-MCNC: 21 MG/DL (ref 5–40)

## 2018-04-17 NOTE — ED NOTES
Discharge instructions reviewed with patient. Discharge instructions given to patient per Soila Heredia PA-C. Patient able to return/verbalize discharge instructions. Copy of discharge instructions provided. Patient condition stable, respiratory status within normal limits, neuro status intact. Ambulatory out of ER. details…

## 2018-05-02 RX ORDER — AMLODIPINE BESYLATE 10 MG/1
TABLET ORAL
Qty: 30 TAB | Refills: 11 | Status: SHIPPED | OUTPATIENT
Start: 2018-05-02 | End: 2019-05-17 | Stop reason: SDUPTHER

## 2018-06-06 RX ORDER — GLIMEPIRIDE 4 MG/1
TABLET ORAL
Qty: 30 TAB | Refills: 11 | Status: SHIPPED | OUTPATIENT
Start: 2018-06-06 | End: 2018-06-08 | Stop reason: SDUPTHER

## 2018-06-08 RX ORDER — GLIMEPIRIDE 4 MG/1
4 TABLET ORAL
Qty: 90 TAB | Refills: 3 | Status: SHIPPED | OUTPATIENT
Start: 2018-06-08 | End: 2018-09-06

## 2018-06-08 NOTE — TELEPHONE ENCOUNTER
Requested Prescriptions     Pending Prescriptions Disp Refills    glimepiride (AMARYL) 4 mg tablet 90 Tab 3     Sig: Take 1 Tab by mouth every morning. PCP: Eun Gray MD    Last appt: 2/13/2018  Future Appointments  Date Time Provider Bj Peyton   6/21/2018 2:45 PM Eun Gray MD [address]       Requested Prescriptions     Pending Prescriptions Disp Refills    glimepiride (AMARYL) 4 mg tablet 90 Tab 3     Sig: Take 1 Tab by mouth every morning.

## 2018-06-19 RX ORDER — METFORMIN HYDROCHLORIDE 500 MG/1
TABLET ORAL
Qty: 60 TAB | Refills: 11 | Status: SHIPPED | OUTPATIENT
Start: 2018-06-19 | End: 2018-06-20 | Stop reason: SDUPTHER

## 2018-06-20 DIAGNOSIS — Z79.4 TYPE 2 DIABETES MELLITUS WITH COMPLICATION, WITH LONG-TERM CURRENT USE OF INSULIN (HCC): Primary | ICD-10-CM

## 2018-06-20 DIAGNOSIS — E11.8 TYPE 2 DIABETES MELLITUS WITH COMPLICATION, WITH LONG-TERM CURRENT USE OF INSULIN (HCC): Primary | ICD-10-CM

## 2018-06-20 RX ORDER — METFORMIN HYDROCHLORIDE 500 MG/1
500 TABLET ORAL 2 TIMES DAILY WITH MEALS
Qty: 180 TAB | Refills: 3 | Status: SHIPPED | OUTPATIENT
Start: 2018-06-20 | End: 2018-09-06 | Stop reason: DRUGHIGH

## 2018-06-20 NOTE — TELEPHONE ENCOUNTER
PCP: Lisy Hays MD    Last appt: 2/13/2018  Future Appointments  Date Time Provider Bj Todd   6/21/2018 2:45 PM Lisy Hays MD [facility]       Requested Prescriptions     Pending Prescriptions Disp Refills    metFORMIN (GLUCOPHAGE) 500 mg tablet 180 Tab 3     Sig: Take 1 Tab by mouth two (2) times daily (with meals).

## 2018-06-21 ENCOUNTER — OFFICE VISIT (OUTPATIENT)
Dept: INTERNAL MEDICINE CLINIC | Age: 63
End: 2018-06-21

## 2018-06-21 VITALS
WEIGHT: 231 LBS | BODY MASS INDEX: 30.62 KG/M2 | HEIGHT: 73 IN | SYSTOLIC BLOOD PRESSURE: 116 MMHG | DIASTOLIC BLOOD PRESSURE: 70 MMHG | HEART RATE: 63 BPM | OXYGEN SATURATION: 98 % | TEMPERATURE: 98.2 F

## 2018-06-21 DIAGNOSIS — I10 ESSENTIAL HYPERTENSION: ICD-10-CM

## 2018-06-21 DIAGNOSIS — D3A.00 CARCINOID TUMOR: ICD-10-CM

## 2018-06-21 DIAGNOSIS — Z86.73 HISTORY OF CVA (CEREBROVASCULAR ACCIDENT): ICD-10-CM

## 2018-06-21 DIAGNOSIS — Z00.00 MEDICARE ANNUAL WELLNESS VISIT, SUBSEQUENT: ICD-10-CM

## 2018-06-21 DIAGNOSIS — E78.00 PURE HYPERCHOLESTEROLEMIA: ICD-10-CM

## 2018-06-21 DIAGNOSIS — Z86.79 HISTORY OF ATRIAL FIBRILLATION: ICD-10-CM

## 2018-06-21 DIAGNOSIS — E11.9 CONTROLLED TYPE 2 DIABETES MELLITUS WITHOUT COMPLICATION, WITHOUT LONG-TERM CURRENT USE OF INSULIN (HCC): Primary | ICD-10-CM

## 2018-06-21 LAB — HBA1C MFR BLD HPLC: 7.2 % (ref 4.8–5.6)

## 2018-06-21 NOTE — PATIENT INSTRUCTIONS
Medicare Wellness Visit, Male    The best way to live healthy is to have a lifestyle where you eat a well-balanced diet, exercise regularly, limit alcohol use, and quit all forms of tobacco/nicotine, if applicable. Regular preventive services are another way to keep healthy. Preventive services (vaccines, screening tests, monitoring & exams) can help personalize your care plan, which helps you manage your own care. Screening tests can find health problems at the earliest stages, when they are easiest to treat. [facility] [address] [facility] follows the current, evidence-based guidelines published by the [facility] Antonio Reese ([facility]) when recommending preventive services for our patients. Because we follow these guidelines, sometimes recommendations change over time as research supports it. (For example, a prostate screening blood test is no longer routinely recommended for men with no symptoms.)    Of course, you and your provider may decide to screen more often for some diseases, based on your risk and co-morbidities (chronic disease you are already diagnosed with). Preventive services for you include:    - Medicare offers their members a free annual wellness visit, which is time for you and your primary care provider to discuss and plan for your preventive service needs. Take advantage of this benefit every year!    -All people over age 72 should receive the recommended pneumonia vaccines. Current USPSTF guidelines recommend a series of two vaccines for the best pneumonia protection.     -All adults should have a yearly flu vaccine and a tetanus vaccine every 10 years.  All adults age 61 years should receive a shingles vaccine once in their lifetime.      -All adults age 38-68 years who are overweight should have a diabetes screening test once every three years.     -Other screening tests & preventive services for persons with diabetes include: an eye exam to screen for diabetic retinopathy, a kidney function test, a foot exam, and stricter control over your cholesterol.     -Cardiovascular screening for adults with routine risk involves an electrocardiogram (ECG) at intervals determined by the provider.     -Colorectal cancer screenings should be done for adults age 54-65 years with normal risk. There are a number of acceptable methods of screening for this type of cancer. Each test has its own benefits and drawbacks. Discuss with your provider what is most appropriate for you during your annual wellness visit. The different tests include: colonoscopy (considered the best screening method), a fecal occult blood test, a fecal DNA test, and sigmoidoscopy.    -All adults born between [facility] should be screened once for Hepatitis C.    -An Abdominal Aortic Aneurysm (AAA) Screening is recommended for men age 73-68 who has ever smoked in their lifetime.      Here is a list of your current Health Maintenance items (your personalized list of preventive services) with a due date:  Health Maintenance Due   Topic Date Due    DTaP/Tdap/Td  (1 - Tdap) 08/24/1978    Shingles Vaccine  06/24/2017    Eye Exam  10/25/2017    Annual Well Visit  03/14/2018

## 2018-06-21 NOTE — MR AVS SNAPSHOT
[facility] [address] [place] [facility] 83. 
[phone] Patient: Lianna Chaudhary MRN:  [id number] Visit Information Date & Time Provider Department Dept. Phone Encounter #  
 6/21/2018  2:45 PM Patsy Agarwal, [address] [place] [address],[address] [phone] [id number] Follow-up Instructions Return in about 4 months (around 10/21/2018) for dm-2 htn paf. Upcoming Health Maintenance Date Due DTaP/Tdap/Td series (1 - Tdap) 8/24/1978 ZOSTER VACCINE AGE 60> 6/24/2017 EYE EXAM RETINAL OR DILATED Q1 10/25/2017 MEDICARE YEARLY EXAM 3/14/2018 Influenza Age 5 to Adult 8/1/2018 HEMOGLOBIN A1C Q6M 8/13/2018 MICROALBUMIN Q1 9/8/2018 FOOT EXAM Q1 2/13/2019 LIPID PANEL Q1 2/13/2019 COLONOSCOPY 5/9/2026 Allergies as of 6/21/2018  Review Complete On: 11/3/2017 By: Karl Montes LPN No Known Allergies Current Immunizations  Reviewed on 10/27/2016 Name Date Influenza Vaccine (Quad) PF 9/19/2017, 10/27/2016 10:35 AM  
 Influenza Vaccine PF 11/21/2014 Pneumococcal Polysaccharide (PPSV-23) 7/22/2015 12:39 PM  
  
 Not reviewed this visit You Were Diagnosed With   
  
 Codes Comments Controlled type 2 diabetes mellitus without complication, without long-term current use of insulin ([facility]    -  Primary ICD-10-CM: E11.9 ICD-9-CM: 250.00 Essential hypertension     ICD-10-CM: I10 
ICD-9-CM: 401.9 Pure hypercholesterolemia     ICD-10-CM: E78.00 ICD-9-CM: 272.0 History of atrial fibrillation     ICD-10-CM: Z86.79 
ICD-9-CM: V12.59 Carcinoid tumor     ICD-10-CM: D3A.00 
ICD-9-CM: 209.60 History of CVA (cerebrovascular accident)     ICD-10-CM: Z86.73 
ICD-9-CM: V12.54 Vitals  BP Pulse Temp Height(growth percentile) Weight(growth percentile) SpO2  
 116/70 (BP 1 Location: Left arm, BP Patient Position: Sitting) 63 98.2 °F (36.8 °C) (Oral) 6' 1\" (1.854 m) 231 lb (104.8 kg) 98% BMI Smoking Status 30.48 kg/m2 Light Tobacco Smoker BMI and BSA Data Body Mass Index Body Surface Area  
 30.48 kg/m 2 2.32 m 2 Preferred Pharmacy Pharmacy Name Phone [facility] [address], [address] [place] [address] Avenue [phone] Your Updated Medication List  
  
   
This list is accurate as of 6/21/18  3:42 PM.  Always use your most recent med list. amLODIPine 10 mg tablet Commonly known as:  Leward Crockett TAKE ONE TABLET BY MOUTH ONCE DAILY FOR BLOOD PRESSURE  
  
 atorvastatin 80 mg tablet Commonly known as:  LIPITOR  
TAKE ONE TABLET BY MOUTH ONCE DAILY AT  NIGHT  
  
 carvedilol 25 mg tablet Commonly known as:  COREG  
TAKE ONE & ONE-HALF TABLETS BY MOUTH TWICE DAILY WITH MEALS  
  
 glimepiride 4 mg tablet Commonly known as:  AMARYL Take 1 Tab by mouth every morning. glucose blood VI test strips strip Commonly known as:  FREESTYLE TEST Use as directed to check blood sugars once daily. E11.9  
  
 guaiFENesin-codeine 100-10 mg/5 mL solution Commonly known as:  ROBITUSSIN AC Take 5 mL by mouth three (3) times daily as needed for Cough. Max Daily Amount: 15 mL.  
  
 hydrALAZINE 10 mg tablet Commonly known as:  APRESOLINE  
TAKE ONE TABLET BY MOUTH THREE TIMES DAILY Lancets Misc Commonly known as:  FREESTYLE LANCETS Test daily. 250.00  
  
 levETIRAcetam 750 mg tablet Commonly known as:  KEPPRA Take 1 Tab by mouth two (2) times a day. lisinopril 40 mg tablet Commonly known as:  PRINIVIL, ZESTRIL  
TAKE ONE TABLET BY MOUTH ONCE DAILY FOR BLOOD PRESSURE  
  
 metFORMIN 500 mg tablet Commonly known as:  GLUCOPHAGE Take 1 Tab by mouth two (2) times daily (with meals). rivaroxaban 20 mg Tab tablet Commonly known as:  Daphine Nickolas Take 1 Tab by mouth daily. We Performed the Following AMB POC HEMOGLOBIN A1C [65021 CPT(R)] Follow-up Instructions Return in about 4 months (around 10/21/2018) for dm-2 htn paf. Patient Instructions Medicare Wellness Visit, Male The best way to live healthy is to have a lifestyle where you eat a well-balanced diet, exercise regularly, limit alcohol use, and quit all forms of tobacco/nicotine, if applicable. Regular preventive services are another way to keep healthy. Preventive services (vaccines, screening tests, monitoring & exams) can help personalize your care plan, which helps you manage your own care. Screening tests can find health problems at the earliest stages, when they are easiest to treat. [address] follows the current, evidence-based guidelines published by the [facility] Antonio Reese ([facility]) when recommending preventive services for our patients. Because we follow these guidelines, sometimes recommendations change over time as research supports it. (For example, a prostate screening blood test is no longer routinely recommended for men with no symptoms.) Of course, you and your provider may decide to screen more often for some diseases, based on your risk and co-morbidities (chronic disease you are already diagnosed with). Preventive services for you include: - Medicare offers their members a free annual wellness visit, which is time for you and your primary care provider to discuss and plan for your preventive service needs. Take advantage of this benefit every year! 
 
-All people over age 72 should receive the recommended pneumonia vaccines. Current USPSTF guidelines recommend a series of two vaccines for the best pneumonia protection.  
 
-All adults should have a yearly flu vaccine and a tetanus vaccine every 10 years. All adults age 61 years should receive a shingles vaccine once in their lifetime. -All adults age 38-68 years who are overweight should have a diabetes screening test once every three years.  
 
-Other screening tests & preventive services for persons with diabetes include: an eye exam to screen for diabetic retinopathy, a kidney function test, a foot exam, and stricter control over your cholesterol.  
 
-Cardiovascular screening for adults with routine risk involves an electrocardiogram (ECG) at intervals determined by the provider.  
 
-Colorectal cancer screenings should be done for adults age 54-65 years with normal risk. There are a number of acceptable methods of screening for this type of cancer. Each test has its own benefits and drawbacks. Discuss with your provider what is most appropriate for you during your annual wellness visit. The different tests include: colonoscopy (considered the best screening method), a fecal occult blood test, a fecal DNA test, and sigmoidoscopy. 
 
-All adults born between [facility] should be screened once for Hepatitis C. 
 
-An Abdominal Aortic Aneurysm (AAA) Screening is recommended for men age 73-68 who has ever smoked in their lifetime. Here is a list of your current Health Maintenance items (your personalized list of preventive services) with a due date: 
Health Maintenance Due Topic Date Due  
 DTaP/Tdap/Td  (1 - Tdap) 08/24/1978  Shingles Vaccine  06/24/2017 Pattie Imam Eye Exam  10/25/2017 [facility] Annual Well Visit  03/14/2018 Introducing [facility]! Gadeil Juárez introduces [employer] patient portal. Now you can access parts of your medical record, email your doctor's office, and request medication refills online. 1. In your internet browser, go to [email or web address] 2. Click on the First Time User? Click Here link in the Sign In box. You will see the New Member Sign Up page. 3. Enter your [employer] Access Code exactly as it appears below.  You will not need to use this code after youve completed the sign-up process. If you do not sign up before the expiration date, you must request a new code. · Penemarie K Murphy Access Code: JULIO CESAR [facility] Expires: 9/19/2018  3:42 PM 
 
4. Enter the last four digits of your Social Security Number (xxxx) and Date of Birth (mm/dd/yyyy) as indicated and click Submit. You will be taken to the next sign-up page. 5. Create a Penemarie K Murphy ID. This will be your Penemarie K Murphy login ID and cannot be changed, so think of one that is secure and easy to remember. 6. Create a Penemarie K Murphy password. You can change your password at any time. 7. Enter your Password Reset Question and Answer. This can be used at a later time if you forget your password. 8. Enter your e-mail address. You will receive e-mail notification when new information is available in [address] 9. Click Sign Up. You can now view and download portions of your medical record. 10. Click the Download Summary menu link to download a portable copy of your medical information. If you have questions, please visit the Frequently Asked Questions section of the Penemarie K Murphy website. Remember, Penemarie K Murphy is NOT to be used for urgent needs. For medical emergencies, dial 911. Now available from your iPhone and Android! Please provide this summary of care documentation to your next provider. Your primary care clinician is listed as Tera GLYNN. If you have any questions after today's visit, please call [phone].

## 2018-06-21 NOTE — PROGRESS NOTES
Chief Complaint   Patient presents with    Diabetes     4 month follow up POC A1c is 7.2% on today's visit    Leg Pain     foot and leg pain (left ) x 5 days    Breathing Problem     with and without activity.

## 2018-06-21 NOTE — PROGRESS NOTES
HISTORY OF PRESENT ILLNESS  Daniel Rodriguez is a 63 y.o. male.   HPI     F/u dm-2 and MARIA TERESA, chronic afib, hx lung carcinoid and medicare wellness-----------------  Last a1c 6.7 LDL 56  amb a1c 7.2 today  Too many carbs at home  fsbs at home -no recent  checks  Sees Dr Edna Burch for afib, renal Dr Lester Pickard for ckd 3 and Dr Teri Severe s/p VATS for lung carcinoid  Dr Isidro Orosco next month for carcinoid  Creatinine down to 1.23  2 mos ago with renal appt      Last visit  fsbs at [facility] 115-150  No cp or sob sxs  Sees  renal Dr Je Corral for ckd 3     Last visit:  Was started to [facility]; 1 mg every day last month in place of metformin d/t MARIA TERESA after VATS surgery  fsbs  an am average per pt  No hypoglycemia  Last a1c 7.7 , today down to 6.8  Diet is better now per his wife  Had chest CT for the lung carcinoid a few weeks ago--Dr Light--repeat in 6 months  Metformin has been restarted and amaryl dose increased to 4 mg every day  Saw Dr. Whitney arce for afib-no med changes  Saw Dr Lester Pickard for CKD 3--labs done this month  C/o weakness of right hand  x months        Patient Active Problem List    Diagnosis Date Noted    Carcinoid tumor 02/13/2018    CKD stage 2 due to type 2 diabetes mellitus ([address] 05/14/2017    Polyneuropathy in diabetes(357.2) 07/21/2015    Hypertensive emergency 07/20/2015    Seizure disorder ([address] 07/20/2015    Type II or unspecified type diabetes mellitus with neurological manifestations, uncontrolled(250.62) ([address] 07/20/2015    History of atrial fibrillation 07/20/2015    Hyperlipidemia 07/20/2015    Diabetic neuropathy ([address] 11/21/2014    Seizures ([address] 08/08/2014    Syncope 07/13/2012    Cardiomyopathy, nonischemic ([address] 10/31/2009    DM (diabetes mellitus) ([address] 10/26/2009    HTN (hypertension), benign 10/26/2009    Pulmonary nodule 10/26/2009     Current Outpatient Prescriptions   Medication Sig Dispense Refill    metFORMIN (GLUCOPHAGE) 500 mg tablet Take 1 Tab by mouth two (2) times daily (with meals). 180 Tab 3    glimepiride (AMARYL) 4 mg tablet Take 1 Tab by mouth every morning. 90 Tab 3    amLODIPine (NORVASC) 10 mg tablet TAKE ONE TABLET BY MOUTH ONCE DAILY FOR BLOOD PRESSURE 30 Tab 11    guaiFENesin-codeine (ROBITUSSIN AC) 100-10 mg/5 mL solution Take 5 mL by mouth three (3) times daily as needed for Cough. Max Daily Amount: 15 mL. 100 mL 0    carvedilol (COREG) 25 mg tablet TAKE ONE & ONE-HALF TABLETS BY MOUTH TWICE DAILY WITH MEALS 90 Tab 11    rivaroxaban (XARELTO) 20 mg tab tablet Take 1 Tab by mouth daily. 30 Tab 11    hydrALAZINE (APRESOLINE) 10 mg tablet TAKE ONE TABLET BY MOUTH THREE TIMES DAILY 90 Tab 11    atorvastatin (LIPITOR) 80 mg tablet TAKE ONE TABLET BY MOUTH ONCE DAILY AT  NIGHT 30 Tab 11    lisinopril (PRINIVIL, ZESTRIL) 40 mg tablet TAKE ONE TABLET BY MOUTH ONCE DAILY FOR BLOOD PRESSURE 30 Tab 11    glucose blood VI test strips (FREESTYLE TEST) strip Use as directed to check blood sugars once daily. E11.9 100 Strip 11    levETIRAcetam (KEPPRA) 750 mg tablet Take 1 Tab by mouth two (2) times a day. 60 Tab 6    Lancets (FREESTYLE LANCETS) misc Test daily.  250.00 1 Package 5     No Known Allergies   Lab Results  Component Value Date/Time   WBC 4.0 (L) 10/27/2017 05:36 PM   HGB 14.4 10/27/2017 05:36 PM   HCT 42.7 10/27/2017 05:36 PM   PLATELET 731 72/75/5074 05:36 PM   MCV 84.6 10/27/2017 05:36 PM     Lab Results  Component Value Date/Time   Hemoglobin A1c 6.7 (H) 02/13/2018 04:35 PM   Hemoglobin A1c 7.7 (H) 04/13/2017 03:07 PM   Hemoglobin A1c 6.9 (H) 10/27/2016 10:40 AM   Glucose 80 10/27/2017 05:36 PM   Glucose (POC) 137 (H) 08/24/2015 02:55 PM   Glucose  05/15/2017 12:14 PM   Microalb/Creat ratio (ug/mg creat.) 249.0 (H) 04/13/2017 03:07 PM   LDL, calculated 56 02/13/2018 04:35 PM   Creatinine 1.72 (H) 10/27/2017 05:36 PM      Lab Results  Component Value Date/Time   Cholesterol, total 110 02/13/2018 04:35 PM   Cholesterol (POC) 167 11/21/2014 09:00 AM   HDL Cholesterol 33 (L) 02/13/2018 04:35 PM   LDL, calculated 56 02/13/2018 04:35 PM   LDL Cholesterol (POC) 111 11/21/2014 09:00 AM   Triglyceride 107 02/13/2018 04:35 PM   Triglycerides (POC) 70 11/21/2014 09:00 AM   CHOL/HDL Ratio 3.7 07/21/2015 03:03 AM     Lab Results  Component Value Date/Time   GFR est non-AA 41 (L) 10/27/2017 05:36 PM   GFR est AA 49 (L) 10/27/2017 05:36 PM   Creatinine 1.72 (H) 10/27/2017 05:36 PM   BUN 15 10/27/2017 05:36 PM   Sodium 138 10/27/2017 05:36 PM   Potassium 3.2 (L) 10/27/2017 05:36 PM   Chloride 101 10/27/2017 05:36 PM   CO2 28 10/27/2017 05:36 PM   Magnesium 2.3 03/31/2017 03:30 PM     Lab Results  Component Value Date/Time   Prostate Specific Ag 0.8 02/13/2018 04:35 PM   Prostate Specific Ag 0.6 10/27/2016 10:40 AM   Prostate Specific Ag 0.7 04/04/2014 11:55 AM        ROS    Physical Exam   Constitutional: He appears well-developed and well-nourished. No distress. Appears stated age   HENT:   Head: Normocephalic. Cardiovascular: Normal rate, regular rhythm and normal heart sounds. Exam reveals no gallop and no friction rub. No murmur heard. Pulmonary/Chest: Effort normal and breath sounds normal. No respiratory distress. He has no wheezes. He has no rales. He exhibits no tenderness. Abdominal: Soft. Musculoskeletal: He exhibits no edema. Neurological: He is alert. Psychiatric: He has a normal mood and affect. Nursing note and vitals reviewed. ASSESSMENT and PLAN  Diagnoses and all orders for this visit:    1. Controlled type 2 diabetes mellitus without complication, without long-term current use of insulin (HCC)  -     AMB POC HEMOGLOBIN A1C    2. Essential hypertension    3. Pure hypercholesterolemia    4. History of atrial fibrillation    5. Carcinoid tumor    6. History of CVA (cerebrovascular accident)      Follow-up Disposition:  Return in about 4 months (around 10/21/2018) for dm-2 htn paf.     This is the Subsequent Medicare Annual Wellness Exam, performed 12 months or more after the Initial AWV or the last Subsequent AWV    I have reviewed the patient's medical history in detail and updated the computerized patient record. History     Past Medical History:   Diagnosis Date    Congestive heart failure, unspecified     Diabetes ([address]     Hypertension     Overweight(278.02)     Seizures (HCC)     SOLITARY PULMONARY NODULE     1.6 cm    Stroke [facility])       Past Surgical History:   Procedure Laterality Date    CHEST SURGERY PROCEDURE UNLISTED      VATS Video-assisted thoracic surgery     Current Outpatient Prescriptions   Medication Sig Dispense Refill    metFORMIN (GLUCOPHAGE) 500 mg tablet Take 1 Tab by mouth two (2) times daily (with meals). 180 Tab 3    glimepiride (AMARYL) 4 mg tablet Take 1 Tab by mouth every morning. 90 Tab 3    amLODIPine (NORVASC) 10 mg tablet TAKE ONE TABLET BY MOUTH ONCE DAILY FOR BLOOD PRESSURE 30 Tab 11    carvedilol (COREG) 25 mg tablet TAKE ONE & ONE-HALF TABLETS BY MOUTH TWICE DAILY WITH MEALS 90 Tab 11    rivaroxaban (XARELTO) 20 mg tab tablet Take 1 Tab by mouth daily. 30 Tab 11    hydrALAZINE (APRESOLINE) 10 mg tablet TAKE ONE TABLET BY MOUTH THREE TIMES DAILY 90 Tab 11    atorvastatin (LIPITOR) 80 mg tablet TAKE ONE TABLET BY MOUTH ONCE DAILY AT  NIGHT 30 Tab 11    lisinopril (PRINIVIL, ZESTRIL) 40 mg tablet TAKE ONE TABLET BY MOUTH ONCE DAILY FOR BLOOD PRESSURE 30 Tab 11    glucose blood VI test strips (FREESTYLE TEST) strip Use as directed to check blood sugars once daily. E11.9 100 Strip 11    levETIRAcetam (KEPPRA) 750 mg tablet Take 1 Tab by mouth two (2) times a day. 60 Tab 6    Lancets (FREESTYLE LANCETS) misc Test daily. 250.00 1 Package 5    guaiFENesin-codeine (ROBITUSSIN AC) 100-10 mg/5 mL solution Take 5 mL by mouth three (3) times daily as needed for Cough. Max Daily Amount: 15 mL.  100 mL 0     No Known Allergies  Family History   Problem Relation Age of Onset  Stroke Mother     Cancer Father      Social History   Substance Use Topics    Smoking status: Light Tobacco Smoker     Packs/day: 0.10     Start date: 6/1/2015    Smokeless tobacco: Never Used      Comment: former cigar    Alcohol use Yes      Comment: social     Patient Active Problem List   Diagnosis Code    DM [facility]) [facility] E11.9    HTN (hypertension), benign I10    Pulmonary nodule R91.1    Cardiomyopathy, nonischemic ([facility] I42.8    Syncope R55    Seizures ([facility] R56.9    Diabetic neuropathy ([facility] E11.40    Hypertensive emergency I16.1    Seizure disorder ([facility] G40.909    Type II or unspecified type diabetes mellitus with neurological manifestations, uncontrolled(250.62) ([facility] E11.49    History of atrial fibrillation Z86.79    Hyperlipidemia E78.5    Polyneuropathy in diabetes(357.2) E11.42    CKD stage 2 due to type 2 diabetes mellitus ([facility] E11.22, N18.2    Carcinoid tumor D3A.00       Depression Risk Factor Screening:     PHQ over the last two weeks 6/21/2018   Little interest or pleasure in doing things Not at all   Feeling down, depressed or hopeless Not at all   Total Score PHQ 2 0     Alcohol Risk Factor Screening: You do not drink alcohol or very rarely. Functional Ability and Level of Safety:   Hearing Loss  Hearing is good. Activities of Daily Living  The home contains: no safety equipment. Patient does total self care    Fall Risk  No flowsheet data found.     Abuse Screen  Patient is not abused    Cognitive Screening   Evaluation of Cognitive Function:  Has your family/caregiver stated any concerns about your memory: no  Normal    Patient Care Team   Patient Care Team:  Keiko Johnson MD as PCP - General  Damaris Floyd, RN as Ambulatory Care Navigator  Lupe Humphries MD (Neurology)  Gelacio Orosco MD (Thoracic (non-cardiac) Surgery)  Davy Conley, SAMANTHA as Ambulatory Care Navigator    Assessment/Plan   Education and counseling provided:  Are appropriate based on today's review and evaluation  End-of-Life planning (with patient's consent)-see ACP note  shingrix , tdap recommended    Diagnoses and all orders for this visit:    1. Controlled type 2 diabetes mellitus without complication, without long-term current use of insulin (HCC)  -     AMB POC HEMOGLOBIN A1C 7.1   Continue medicines   To work on diet    2. Essential hypertension   controlled  3. Pure hypercholesterolemia   LDL at goal  4. History of atrial fibrillation   On xarelto   Hx cva  5.  Carcinoid tumor   F/u onc MD and thoracic surgeon      Health Maintenance Due   Topic Date Due    DTaP/Tdap/Td series (1 - Tdap) 08/24/1978    ZOSTER VACCINE AGE 60>  06/24/2017    EYE EXAM RETINAL OR DILATED Q1  10/25/2017    MEDICARE YEARLY EXAM  03/14/2018

## 2018-06-29 RX ORDER — LISINOPRIL 40 MG/1
TABLET ORAL
Qty: 30 TAB | Refills: 11 | Status: SHIPPED | OUTPATIENT
Start: 2018-06-29

## 2018-07-14 RX ORDER — HYDRALAZINE HYDROCHLORIDE 10 MG/1
TABLET, FILM COATED ORAL
Qty: 90 TAB | Refills: 11 | Status: SHIPPED | OUTPATIENT
Start: 2018-07-14 | End: 2018-09-06 | Stop reason: DRUGHIGH

## 2018-08-11 RX ORDER — ATORVASTATIN CALCIUM 80 MG/1
TABLET, FILM COATED ORAL
Qty: 30 TAB | Refills: 11 | Status: SHIPPED | OUTPATIENT
Start: 2018-08-11 | End: 2019-08-28 | Stop reason: SDUPTHER

## 2018-09-06 ENCOUNTER — APPOINTMENT (OUTPATIENT)
Dept: CT IMAGING | Age: 63
End: 2018-09-06
Attending: EMERGENCY MEDICINE
Payer: MEDICARE

## 2018-09-06 ENCOUNTER — HOSPITAL ENCOUNTER (EMERGENCY)
Age: 63
Discharge: OTHER INSTITUTION | End: 2018-09-06
Attending: EMERGENCY MEDICINE
Payer: MEDICARE

## 2018-09-06 VITALS
HEIGHT: 73 IN | TEMPERATURE: 97.3 F | DIASTOLIC BLOOD PRESSURE: 104 MMHG | OXYGEN SATURATION: 98 % | WEIGHT: 241.18 LBS | SYSTOLIC BLOOD PRESSURE: 148 MMHG | HEART RATE: 66 BPM | BODY MASS INDEX: 31.96 KG/M2 | RESPIRATION RATE: 22 BRPM

## 2018-09-06 DIAGNOSIS — I77.71 CAROTID ARTERY DISSECTION (HCC): ICD-10-CM

## 2018-09-06 DIAGNOSIS — I63.9 CEREBROVASCULAR ACCIDENT (CVA), UNSPECIFIED MECHANISM (HCC): Primary | ICD-10-CM

## 2018-09-06 LAB
ALBUMIN SERPL-MCNC: 3.5 G/DL (ref 3.5–5)
ALBUMIN/GLOB SERPL: 0.9 {RATIO} (ref 1.1–2.2)
ALP SERPL-CCNC: 62 U/L (ref 45–117)
ALT SERPL-CCNC: 23 U/L (ref 12–78)
ANION GAP SERPL CALC-SCNC: 9 MMOL/L (ref 5–15)
APTT PPP: 37.6 SEC (ref 22.1–32)
AST SERPL-CCNC: 13 U/L (ref 15–37)
BASOPHILS # BLD: 0 K/UL (ref 0–0.1)
BASOPHILS NFR BLD: 0 % (ref 0–1)
BILIRUB SERPL-MCNC: 0.5 MG/DL (ref 0.2–1)
BUN SERPL-MCNC: 9 MG/DL (ref 6–20)
BUN/CREAT SERPL: 7 (ref 12–20)
CALCIUM SERPL-MCNC: 7.2 MG/DL (ref 8.5–10.1)
CHLORIDE SERPL-SCNC: 106 MMOL/L (ref 97–108)
CO2 SERPL-SCNC: 28 MMOL/L (ref 21–32)
COMMENT, HOLDF: NORMAL
CREAT SERPL-MCNC: 1.35 MG/DL (ref 0.7–1.3)
DIFFERENTIAL METHOD BLD: NORMAL
EOSINOPHIL # BLD: 0.1 K/UL (ref 0–0.4)
EOSINOPHIL NFR BLD: 1 % (ref 0–7)
ERYTHROCYTE [DISTWIDTH] IN BLOOD BY AUTOMATED COUNT: 14.4 % (ref 11.5–14.5)
GLOBULIN SER CALC-MCNC: 3.7 G/DL (ref 2–4)
GLUCOSE BLD STRIP.AUTO-MCNC: 111 MG/DL (ref 65–100)
GLUCOSE SERPL-MCNC: 94 MG/DL (ref 65–100)
HCT VFR BLD AUTO: 37.8 % (ref 36.6–50.3)
HGB BLD-MCNC: 12.4 G/DL (ref 12.1–17)
IMM GRANULOCYTES # BLD: 0 K/UL (ref 0–0.04)
IMM GRANULOCYTES NFR BLD AUTO: 0 % (ref 0–0.5)
INR PPP: 1.7 (ref 0.9–1.1)
LYMPHOCYTES # BLD: 1.8 K/UL (ref 0.8–3.5)
LYMPHOCYTES NFR BLD: 32 % (ref 12–49)
MCH RBC QN AUTO: 28.4 PG (ref 26–34)
MCHC RBC AUTO-ENTMCNC: 32.8 G/DL (ref 30–36.5)
MCV RBC AUTO: 86.7 FL (ref 80–99)
MONOCYTES # BLD: 0.6 K/UL (ref 0–1)
MONOCYTES NFR BLD: 11 % (ref 5–13)
NEUTS SEG # BLD: 3.1 K/UL (ref 1.8–8)
NEUTS SEG NFR BLD: 56 % (ref 32–75)
NRBC # BLD: 0 K/UL (ref 0–0.01)
NRBC BLD-RTO: 0 PER 100 WBC
PLATELET # BLD AUTO: 151 K/UL (ref 150–400)
PMV BLD AUTO: 12.2 FL (ref 8.9–12.9)
POTASSIUM SERPL-SCNC: 3.3 MMOL/L (ref 3.5–5.1)
PROT SERPL-MCNC: 7.2 G/DL (ref 6.4–8.2)
PROTHROMBIN TIME: 16.8 SEC (ref 9–11.1)
RBC # BLD AUTO: 4.36 M/UL (ref 4.1–5.7)
SAMPLES BEING HELD,HOLD: NORMAL
SERVICE CMNT-IMP: ABNORMAL
SODIUM SERPL-SCNC: 143 MMOL/L (ref 136–145)
THERAPEUTIC RANGE,PTTT: ABNORMAL SECS (ref 58–77)
WBC # BLD AUTO: 5.5 K/UL (ref 4.1–11.1)

## 2018-09-06 PROCEDURE — 74011636320 HC RX REV CODE- 636/320: Performed by: EMERGENCY MEDICINE

## 2018-09-06 PROCEDURE — 74011250636 HC RX REV CODE- 250/636: Performed by: EMERGENCY MEDICINE

## 2018-09-06 PROCEDURE — 80053 COMPREHEN METABOLIC PANEL: CPT | Performed by: EMERGENCY MEDICINE

## 2018-09-06 PROCEDURE — 96374 THER/PROPH/DIAG INJ IV PUSH: CPT

## 2018-09-06 PROCEDURE — 36415 COLL VENOUS BLD VENIPUNCTURE: CPT | Performed by: EMERGENCY MEDICINE

## 2018-09-06 PROCEDURE — 70450 CT HEAD/BRAIN W/O DYE: CPT

## 2018-09-06 PROCEDURE — 99285 EMERGENCY DEPT VISIT HI MDM: CPT

## 2018-09-06 PROCEDURE — 85025 COMPLETE CBC W/AUTO DIFF WBC: CPT | Performed by: EMERGENCY MEDICINE

## 2018-09-06 PROCEDURE — 85730 THROMBOPLASTIN TIME PARTIAL: CPT | Performed by: EMERGENCY MEDICINE

## 2018-09-06 PROCEDURE — 93005 ELECTROCARDIOGRAM TRACING: CPT

## 2018-09-06 PROCEDURE — 82962 GLUCOSE BLOOD TEST: CPT

## 2018-09-06 PROCEDURE — 74011250636 HC RX REV CODE- 250/636

## 2018-09-06 PROCEDURE — 85610 PROTHROMBIN TIME: CPT | Performed by: EMERGENCY MEDICINE

## 2018-09-06 PROCEDURE — 70496 CT ANGIOGRAPHY HEAD: CPT

## 2018-09-06 RX ORDER — HYDRALAZINE HYDROCHLORIDE 10 MG/1
10 TABLET, FILM COATED ORAL 3 TIMES DAILY
COMMUNITY
End: 2019-10-28 | Stop reason: SDUPTHER

## 2018-09-06 RX ORDER — ONDANSETRON 2 MG/ML
INJECTION INTRAMUSCULAR; INTRAVENOUS
Status: COMPLETED
Start: 2018-09-06 | End: 2018-09-06

## 2018-09-06 RX ORDER — SODIUM CHLORIDE 9 MG/ML
50 INJECTION, SOLUTION INTRAVENOUS
Status: COMPLETED | OUTPATIENT
Start: 2018-09-06 | End: 2018-09-06

## 2018-09-06 RX ORDER — SODIUM CHLORIDE 0.9 % (FLUSH) 0.9 %
10 SYRINGE (ML) INJECTION
Status: COMPLETED | OUTPATIENT
Start: 2018-09-06 | End: 2018-09-06

## 2018-09-06 RX ORDER — CARVEDILOL 25 MG/1
12.5 TABLET ORAL
COMMUNITY
End: 2018-09-13 | Stop reason: SDUPTHER

## 2018-09-06 RX ORDER — METFORMIN HYDROCHLORIDE 500 MG/1
500 TABLET, FILM COATED, EXTENDED RELEASE ORAL 2 TIMES DAILY
COMMUNITY
End: 2018-11-05 | Stop reason: CLARIF

## 2018-09-06 RX ORDER — ONDANSETRON 2 MG/ML
4 INJECTION INTRAMUSCULAR; INTRAVENOUS
Status: COMPLETED | OUTPATIENT
Start: 2018-09-06 | End: 2018-09-06

## 2018-09-06 RX ORDER — CARVEDILOL 25 MG/1
25 TABLET ORAL 2 TIMES DAILY
COMMUNITY
End: 2019-02-21 | Stop reason: SDUPTHER

## 2018-09-06 RX ADMIN — Medication 10 ML: at 16:20

## 2018-09-06 RX ADMIN — ONDANSETRON 4 MG: 2 INJECTION INTRAMUSCULAR; INTRAVENOUS at 16:10

## 2018-09-06 RX ADMIN — SODIUM CHLORIDE 50 ML/HR: 900 INJECTION, SOLUTION INTRAVENOUS at 16:20

## 2018-09-06 RX ADMIN — IOPAMIDOL 100 ML: 755 INJECTION, SOLUTION INTRAVENOUS at 16:20

## 2018-09-06 NOTE — ED NOTES
Daughter states the last time normal was 1400, pt was ambulatory and complained of numbness in legs. At [phone] daughter states that the pt spoken with slurred speech, and could not squeeze his R hand, 911 was called.

## 2018-09-06 NOTE — ED NOTES
Per EMS pt had witnessed changed in [address] by family, and called 911. Pt presents with weakness to R leg, speech is clear, no facial droop. Pt has difficulty getting words out. Words he says are clear, however he states \"I know the word, I just can't get it out\". Daughter states \"R side of his face was drooping\" with slurred speech, he couldn't move his right hand.

## 2018-09-06 NOTE — PROGRESS NOTES
Pharmacy Clarification of Prior to Admission Medication Regimen The patient was interviewed regarding clarification of the prior to admission medication regimen. Wife was present in room and obtained permission from patient to discuss drug regimen with visitor(s) present. Patient was questioned regarding use of any other inhalers, topical products, over the counter medications, herbal medications, vitamin products or ophthalmic/nasal/otic medication use. Information Obtained From: Patient, patient's wife, med list 
 
Pertinent Pharmacy Findings: 
? Identified High Alert Medication Information 
o Current Anticoagulants: 
? Name: rivaroxaban (XARELTO) 20 mg tab tablet PTA medication list was corrected to the following:  
 
Prior to Admission Medications Prescriptions Last Dose Informant Patient Reported? Taking? amLODIPine (NORVASC) 10 mg tablet 9/6/2018 at Unknown time Significant Other No Yes Sig: TAKE ONE TABLET BY MOUTH ONCE DAILY FOR BLOOD PRESSURE  
atorvastatin (LIPITOR) 80 mg tablet 9/6/2018 at Unknown time Significant Other No Yes Sig: TAKE ONE TABLET BY MOUTH ONCE DAILY AT  NIGHT  
carvedilol (COREG) 25 mg tablet 9/6/2018 at Unknown time Significant Other Yes Yes Sig: Take 25 mg by mouth daily. Patient takes 25 mg QAM and 12.5 mg QHS  
carvedilol (COREG) 25 mg tablet 9/5/2018 at Unknown time Significant Other Yes Yes Sig: Take 12.5 mg by mouth nightly. Patient takes 25 mg QAM and 12.5 mg QHS  
hydrALAZINE (APRESOLINE) 10 mg tablet 9/6/2018 at Unknown time Significant Other Yes Yes Sig: Take 10 mg by mouth two (2) times a day. levETIRAcetam (KEPPRA) 750 mg tablet 9/6/2018 at Unknown time Significant Other No Yes Sig: Take 1 Tab by mouth two (2) times a day. lisinopril (PRINIVIL, ZESTRIL) 40 mg tablet 9/6/2018 at Unknown time Significant Other No Yes Sig: TAKE ONE TABLET BY MOUTH ONCE DAILY FOR BLOOD PRESSURE  
 [facility]) 500 mg TG24 24 hour tablet 9/6/2018 at Unknown time Significant Other Yes Yes Sig: Take 500 mg by mouth daily. rivaroxaban (XARELTO) 20 mg tab tablet 9/6/2018 at Unknown time Significant Other No Yes Sig: Take 1 Tab by mouth daily. Facility-Administered Medications: None Thank you, 
Yuliya Carranza CPhT Medication History Pharmacy Technician

## 2018-09-06 NOTE — ED PROVIDER NOTES
EMERGENCY DEPARTMENT HISTORY AND PHYSICAL EXAM 
 
 
Date: 9/6/2018 Patient Name: Ravinder Alejandre History of Presenting Illness No chief complaint on file. History Provided By: Patient HPI: Ravinder Alejandre, 66 y.o. male with PMHx significant for CVA, CHF, DM, HTN, seizures, presents via EMS to the ED for CODE S evaluation. Pt reports that ~2:45 PM today he began to experience right sided weakness, leg greater than arm, confusion, and slurred speech. EMS endorses that the pt told them the same. He denies any current pain, numbness, tingling, or HA. Pt notes a prior hx of CVA. He reports that he is on Xarelto for chronic a-fib and is compliant, last dose taken at 12:00 PM today. Pt denies any alleviating/exacerbating factors for his sx's. He specifically denies any fever, chills, numbness, tingling, N/V/D, SOB, CP, HA, abdominal pain, dysuria or hematuria. There are no other complaints, changes, or physical findings at this time. Social Hx: + EtOH (social); + Smoker; - Illicit Drugs Family Hx: CVA Surgical Hx: VATS 
 
PCP: Chandler San MD 
 
Current Facility-Administered Medications Medication Dose Route Frequency Provider Last Rate Last Dose  ondansetron (ZOFRAN) 4 mg/2 mL injection  alteplase (ACTIVASE) 100 mg infusion  ondansetron (ZOFRAN) injection 4 mg  4 mg IntraVENous NOW Mai Galeazzi, MD      
 0.9% sodium chloride infusion  50 mL/hr IntraVENous RAD ONCE Mai Galeazzi, MD      
 iopamidol (ISOVUE-370) 76 % injection 100 mL  100 mL IntraVENous RAD ONCE Mai Galeazzi, MD      
 sodium chloride (NS) flush 10 mL  10 mL IntraVENous RAD Ariel Maki MD      
 
Current Outpatient Prescriptions Medication Sig Dispense Refill  atorvastatin (LIPITOR) 80 mg tablet TAKE ONE TABLET BY MOUTH ONCE DAILY AT  NIGHT 30 Tab 11  
 hydrALAZINE (APRESOLINE) 10 mg tablet TAKE ONE TABLET BY MOUTH THREE TIMES DAILY 90 Tab 11  
  lisinopril (PRINIVIL, ZESTRIL) 40 mg tablet TAKE ONE TABLET BY MOUTH ONCE DAILY FOR BLOOD PRESSURE 30 Tab 11  
 metFORMIN (GLUCOPHAGE) 500 mg tablet Take 1 Tab by mouth two (2) times daily (with meals). 180 Tab 3  
 glimepiride (AMARYL) 4 mg tablet Take 1 Tab by mouth every morning. 90 Tab 3  
 amLODIPine (NORVASC) 10 mg tablet TAKE ONE TABLET BY MOUTH ONCE DAILY FOR BLOOD PRESSURE 30 Tab 11  
 guaiFENesin-codeine (ROBITUSSIN AC) 100-10 mg/5 mL solution Take 5 mL by mouth three (3) times daily as needed for Cough. Max Daily Amount: 15 mL. 100 mL 0  
 carvedilol (COREG) 25 mg tablet TAKE ONE & ONE-HALF TABLETS BY MOUTH TWICE DAILY WITH MEALS 90 Tab 11  
 rivaroxaban (XARELTO) 20 mg tab tablet Take 1 Tab by mouth daily. 30 Tab 11  
 glucose blood VI test strips (FREESTYLE TEST) strip Use as directed to check blood sugars once daily. E11.9 100 Strip 11  
 levETIRAcetam (KEPPRA) 750 mg tablet Take 1 Tab by mouth two (2) times a day. 60 Tab 6  Lancets (FREESTYLE LANCETS) misc Test daily. 250.00 1 Package 5 Past History Past Medical History: 
Past Medical History:  
Diagnosis Date  Congestive heart failure, unspecified  Diabetes ([address]  Hypertension  Overweight(278.02)  Seizures ([facility]  SOLITARY PULMONARY NODULE   
 1.6 cm  Stroke ([facility] Past Surgical History: 
Past Surgical History:  
Procedure Laterality Date  CHEST SURGERY PROCEDURE UNLISTED    
 VATS Video-assisted thoracic surgery Family History: 
Family History Problem Relation Age of Onset  Stroke Mother  Cancer Father Social History: 
Social History Substance Use Topics  Smoking status: Light Tobacco Smoker Packs/day: 0.10 Start date: 6/1/2015  Smokeless tobacco: Never Used Comment: former cigar  Alcohol use Yes Comment: social  
 
 
Allergies: 
No Known Allergies Review of Systems Review of Systems Constitutional: Negative for chills and fever. HENT: Positive for voice change (slurred). Eyes: Negative for visual disturbance. Respiratory: Negative for shortness of breath. Cardiovascular: Negative for chest pain. Gastrointestinal: Negative for abdominal pain, diarrhea, nausea and vomiting. Endocrine: Negative for heat intolerance. Genitourinary: Negative. Negative for dysuria and hematuria. Musculoskeletal: Negative for back pain. Skin: Negative for rash. Allergic/Immunologic: Negative for immunocompromised state. Neurological: Positive for weakness (R side leg > arm). Negative for dizziness, numbness and headaches. Hematological: Does not bruise/bleed easily. Psychiatric/Behavioral: Negative. All other systems reviewed and are negative. Physical Exam  
Physical Exam  
Constitutional: He is oriented to person, place, and time. He appears well-developed and well-nourished. No distress. HENT:  
Head: Normocephalic and atraumatic. Eyes: EOM are normal. Pupils are equal, round, and reactive to light. Neck: Normal range of motion. Neck supple. Cardiovascular: Normal rate, regular rhythm and normal heart sounds. Pulmonary/Chest: Effort normal and breath sounds normal. He has no wheezes. Abdominal: Soft. Bowel sounds are normal. There is no tenderness. Musculoskeletal: Normal range of motion. He exhibits no edema or tenderness. Neurological: He is alert and oriented to person, place, and time. No cranial nerve deficit. Right arm 4+/5 strength Right leg 3/5 strength Sensory appears intact Slight dysarthria Taking time to give some answers Skin: Skin is warm and dry. Psychiatric: He has a normal mood and affect. His behavior is normal.  
Nursing note and vitals reviewed. Diagnostic Study Results Labs - Recent Results (from the past 12 hour(s)) GLUCOSE, POC Collection Time: 09/06/18  4:13 PM  
Result Value Ref Range Glucose (POC) 111 (H) 65 - 100 mg/dL Performed by Alexey Wesley CBC WITH AUTOMATED DIFF Collection Time: 09/06/18  4:14 PM  
Result Value Ref Range WBC 5.5 4.1 - 11.1 K/uL  
 RBC 4.36 4.10 - 5.70 M/uL  
 HGB 12.4 12.1 - 17.0 g/dL HCT 37.8 36.6 - 50.3 % MCV 86.7 80.0 - 99.0 FL  
 MCH 28.4 26.0 - 34.0 PG  
 MCHC 32.8 30.0 - 36.5 g/dL  
 RDW 14.4 11.5 - 14.5 % PLATELET 374 336 - 069 K/uL MPV 12.2 8.9 - 12.9 FL  
 NRBC 0.0 0  WBC ABSOLUTE NRBC 0.00 0.00 - 0.01 K/uL NEUTROPHILS 56 32 - 75 % LYMPHOCYTES 32 12 - 49 % MONOCYTES 11 5 - 13 % EOSINOPHILS 1 0 - 7 % BASOPHILS 0 0 - 1 % IMMATURE GRANULOCYTES 0 0.0 - 0.5 % ABS. NEUTROPHILS 3.1 1.8 - 8.0 K/UL  
 ABS. LYMPHOCYTES 1.8 0.8 - 3.5 K/UL  
 ABS. MONOCYTES 0.6 0.0 - 1.0 K/UL  
 ABS. EOSINOPHILS 0.1 0.0 - 0.4 K/UL  
 ABS. BASOPHILS 0.0 0.0 - 0.1 K/UL  
 ABS. IMM. GRANS. 0.0 0.00 - 0.04 K/UL  
 DF AUTOMATED METABOLIC PANEL, COMPREHENSIVE Collection Time: 09/06/18  4:14 PM  
Result Value Ref Range Sodium 143 136 - 145 mmol/L Potassium 3.3 (L) 3.5 - 5.1 mmol/L Chloride 106 97 - 108 mmol/L  
 CO2 28 21 - 32 mmol/L Anion gap 9 5 - 15 mmol/L Glucose 94 65 - 100 mg/dL BUN 9 6 - 20 MG/DL Creatinine 1.35 (H) 0.70 - 1.30 MG/DL  
 BUN/Creatinine ratio 7 (L) 12 - 20 GFR est AA >60 >60 ml/min/1.73m2 GFR est non-AA 54 (L) >60 ml/min/1.73m2 Calcium 7.2 (L) 8.5 - 10.1 MG/DL Bilirubin, total 0.5 0.2 - 1.0 MG/DL  
 ALT (SGPT) 23 12 - 78 U/L  
 AST (SGOT) 13 (L) 15 - 37 U/L Alk. phosphatase 62 45 - 117 U/L Protein, total 7.2 6.4 - 8.2 g/dL Albumin 3.5 3.5 - 5.0 g/dL Globulin 3.7 2.0 - 4.0 g/dL A-G Ratio 0.9 (L) 1.1 - 2.2 SAMPLES BEING HELD Collection Time: 09/06/18  4:14 PM  
Result Value Ref Range SAMPLES BEING HELD 1 RED COMMENT Add-on orders for these samples will be processed based on acceptable specimen integrity and analyte stability, which may vary by analyte. EKG, 12 LEAD, INITIAL  Collection Time: 09/06/18  4:14 PM  
 Result Value Ref Range Ventricular Rate 58 BPM  
 Atrial Rate 35 BPM  
 QRS Duration 92 ms Q-T Interval 424 ms QTC Calculation (Bezet) 416 ms Calculated R Axis 49 degrees Calculated T Axis -5 degrees Diagnosis Atrial fibrillation with slow ventricular response Nonspecific T wave abnormality When compared with ECG of 27-OCT-2017 17:35, Nonspecific T wave abnormality now evident in Lateral leads Radiologic Studies -  
CTA CODE NEURO HEAD AND NECK W CONT Final Result CT CODE NEURO HEAD WO CONTRAST Final Result CXR Results  (Last 48 hours) None Medical Decision Making I am the first provider for this patient. I reviewed the vital signs, available nursing notes, past medical history, past surgical history, family history and social history. Vital Signs-Reviewed the patient's vital signs. Patient Vitals for the past 12 hrs: 
 Pulse Resp BP SpO2  
09/06/18 1616 66 15 (!) 142/106 99 % 09/06/18 1615 65 16 (!) 144/119 99 % EKG interpretation: (Preliminary) Rhythm: atrial fib with slow ventricular response; and regular . Rate (approx.): 58; Axis: normal; QRS interval: normal ; ST/T wave: non-specific changes. Written by Arnav Kovacs. Preston Soares, ED Scribe, as dictated by Kay Fields MD 
 
Records Reviewed: Nursing Notes, Old Medical Records and Ambulance Run Sheet Provider Notes (Medical Decision Making): DDx: CVA, TIA, [address] 
 
ED Course:  
Initial assessment performed. The patients presenting problems have been discussed, and they are in agreement with the care plan formulated and outlined with them. I have encouraged them to ask questions as they arise throughout their visit. Progress Notes: 
Consult Note: 
4:09 PM 
Kay Fields MD, spoke with Mustapha Mendoza MD, Specialty: Neurology Discussed pt's hx, disposition, and available diagnostic and imaging results. Reviewed care plans. Consultant agrees with plans as outlined.  Pt is not a TPA candidate due to being on Xarelto. Advised CTA head and neck and needs admission for further workup. Vinny Frye 1955 PRE-ALERT CODE S: 6489 Arrival time to ED: 1557 Physician at Bedside: [id number] CT Order Time: 0010 ACT Page: 3255 ACT Call Back: [phone] Consult Note: 
4:59 PM 
Charo Warren MD, spoke with Erich Conti 
Pt accepted for auto-transfer. They will call back with accepting physician but transport can start to be arranged. TIME 6:50 PM 
LAST LOOK: 
 
Just prior to transfer, I re-evaluated the patient. Pertinent physical exam includes No significant change. Vitals reviewed and patient/family given a chance to ask questions. All agree with the plan to transfer. At this time, I feel that [facility] is stable for transfer. Critical Care Time:  
0 minutes Disposition: 
Transfer Note: 
Patient is being transferred to ER at [facility], transfer accepted by Dr. Isabela Fernandez. The reasons for patient's transfer have been discussed with the patient and available family. They convey agreement and understanding for the need to be transferred as explained to them by Charo Warren MD 
 
 
PLAN: 
1. Transfer to [facility] 
Diagnosis Clinical Impression: 1. Cerebrovascular accident (CVA), unspecified mechanism ([address] 2. Carotid artery dissection ([address] Attestations: This note is prepared by Isak Lal. Imani Barcenas, acting as Scribe for MD Charo Nassar MD: The scribe's documentation has been prepared under my direction and personally reviewed by me in its entirety. I confirm that the note above accurately reflects all work, treatment, procedures, and medical decision making performed by me.

## 2018-09-07 LAB
ATRIAL RATE: 35 BPM
CALCULATED R AXIS, ECG10: 49 DEGREES
CALCULATED T AXIS, ECG11: -5 DEGREES
DIAGNOSIS, 93000: NORMAL
Q-T INTERVAL, ECG07: 424 MS
QRS DURATION, ECG06: 92 MS
QTC CALCULATION (BEZET), ECG08: 416 MS
VENTRICULAR RATE, ECG03: 58 BPM

## 2018-09-11 ENCOUNTER — TELEPHONE (OUTPATIENT)
Dept: INTERNAL MEDICINE CLINIC | Age: 63
End: 2018-09-11

## 2018-09-13 ENCOUNTER — ANTI-COAG VISIT (OUTPATIENT)
Dept: INTERNAL MEDICINE CLINIC | Age: 63
End: 2018-09-13

## 2018-09-13 DIAGNOSIS — Z86.79 HISTORY OF ATRIAL FIBRILLATION: Primary | ICD-10-CM

## 2018-09-13 LAB
INR BLD: 1.8 (ref 1–1.5)
PT POC: 21.7 SECONDS (ref 9.1–12)
VALID INTERNAL CONTROL?: YES

## 2018-09-13 RX ORDER — LEVETIRACETAM 1000 MG/1
TABLET ORAL 2 TIMES DAILY
COMMUNITY
End: 2018-09-17

## 2018-09-13 NOTE — PROGRESS NOTES
[facility] is here today for anticoagulation monitoring for the diagnosis of Atrial Fibrillation. His INR goal is 2.0-3.0 and his current Coumadin dose is 35TWD. Today's findings include an INR of 1.8 (normal INR range 0.8-1.2) and 21.7. Considering Mr. Deliah Landau past history, todays findings, and per the coumadin policy/protocol, Mr. Jad Marrero was instructed to take Coumadin as follows, Description RBVO per Dr. Beryle Levee for 7.5 mg on FRI, SAT, and SUN, 5 MG all other days return for INR on Monday at your doctors appt. Rudy Drake He was also instructed to schedule an appointment in 4 days prior to leaving for an INR check. A full discussion of the nature of anticoagulants has been carried out. A full discussion of the need for frequent and regular monitoring, precise dosage adjustment and compliance was stressed. Side effects of potential bleeding were discussed and Mr. Jad Marrero was instructed to call [phone] if there are any signs of abnormal bleeding. Mr. Jad Marrero was instructed to avoid any OTC items containing aspirin or ibuprofen and prior to starting any new OTC products to consult with his physician or pharmacist to ensure no drug interactions are present. Mr. Jad Marrero was instructed to avoid any major changes in his general diet and to avoid alcohol consumption. . 
 
Mr. Jad Marrero was provided a literature booklet, \"Treatment with Warfarin (Coumadin)\", that includes topics on understanding coumadin therapy, drug interaction considerations, vitamin K and coumadin use, interactions with foods and supplements containing vitamin K, and the use of herbal products. Mr. Jad Marrero verbalized his understanding of all instructions and will call the office with any questions, concerns, or signs of abnormal bleeding or blood clot.

## 2018-09-13 NOTE — MR AVS SNAPSHOT
[address] [facility] 
[phone] Patient: Jazzy Fournier MRN:  [id number] Visit Information Date & Time Provider Department Dept. Phone Encounter #  
 9/13/2018  9:00 AM Zoraida Wilder [facility] [phone] [id number] Follow-up Instructions Return in about 4 days (around 9/17/2018). Follow-up and Disposition History Your Appointments 10/18/2018  2:45 PM  
ROUTINE CARE with Derrek Bell, [address] [place] [address]) Appt Note: 4 month follow up for dm-2 htn paf  
 [address] [address]  
[phone]  
  
   
 [address] [facility]  [address] [place] [address]  
  
    
  
 9/17/2018  3:15 PM  
TRANSITIONAL CARE MANAGEMENT with Derrek Bell [address] [place] [address] [facility] Appt [facility]: hospital follow up  
 [address] [address]  
[address] [facility]  [address] [place] [address] Upcoming Health Maintenance Date Due DTaP/Tdap/Td series (1 - Tdap) 8/24/1978 ZOSTER VACCINE AGE 60> 6/24/2017 EYE EXAM RETINAL OR DILATED Q1 10/25/2017 Influenza Age 5 to Adult 8/1/2018 MICROALBUMIN Q1 9/8/2018 HEMOGLOBIN A1C Q6M 12/21/2018 FOOT EXAM Q1 2/13/2019 LIPID PANEL Q1 2/13/2019 MEDICARE YEARLY EXAM 6/22/2019 COLONOSCOPY 5/9/2026 Allergies as of 9/13/2018  Review Complete On: 9/6/2018 By: Dania Baez No Known Allergies Current Immunizations  Reviewed on 10/27/2016 Name Date Influenza Vaccine (Quad) PF 9/19/2017, 10/27/2016 10:35 AM  
 Influenza Vaccine PF 11/21/2014 Pneumococcal Polysaccharide (PPSV-23) 7/22/2015 12:39 PM  
  
 Not reviewed this visit You Were Diagnosed With   
  
 Codes Comments  History of atrial fibrillation    -  Primary ICD-10-CM: Z86.79 
 ICD-9-CM: V12.59 Vitals Smoking Status Light Tobacco Smoker Preferred Pharmacy Pharmacy Name Phone [facility] [address] [place] [address] [address], [address] [phone] Your Updated Medication List  
  
   
This list is accurate as of 9/13/18 11:31 AM.  Always use your most recent med list. amLODIPine 10 mg tablet Commonly known as:  Elyn Coffer TAKE ONE TABLET BY MOUTH ONCE DAILY FOR BLOOD PRESSURE  
  
 atorvastatin 80 mg tablet Commonly known as:  LIPITOR  
TAKE ONE TABLET BY MOUTH ONCE DAILY AT  NIGHT * carvedilol 25 mg tablet Commonly known as:  Yaya [place] Take 25 mg by mouth daily. Patient takes 25 mg QAM and 12.5 mg QHS * carvedilol 25 mg tablet Commonly known as:  Yaya Adria Take 12.5 mg by mouth nightly. Patient takes 25 mg QAM and 12.5 mg QHS  
  
 hydrALAZINE 10 mg tablet Commonly known as:  APRESOLINE Take 10 mg by mouth two (2) times a day. levETIRAcetam 750 mg tablet Commonly known as:  KEPPRA Take 1 Tab by mouth two (2) times a day. lisinopril 40 mg tablet Commonly known as:  PRINIVIL, ZESTRIL  
TAKE ONE TABLET BY MOUTH ONCE DAILY FOR BLOOD PRESSURE  
  
 metFORMIN 500 mg Tg24 24 hour tablet [facility] Take 500 mg by mouth daily. rivaroxaban 20 mg Tab tablet Commonly known as:  Rogena Crigler Take 1 Tab by mouth daily. * Notice: This list has 2 medication(s) that are the same as other medications prescribed for you. Read the directions carefully, and ask your doctor or other care provider to review them with you. Description RBVO per Dr. Warren Lux for 7.5 mg on FRI, SAT, and SUN, 5 MG all other days return for INR on Monday at your doctors appt. September 2018 Details Sun Mon Tue Wed Thu Fri Sat  
        1  
  
  
  
  
  2  
  
  
  
   3  
  
  
  
   4  
  
  
  
   5  
  
  
  
   6  
  
  
  
   7  
  
  
  
   8  
  
  
  
  
  9  
 10  
  
  
  
   11  
  
  
  
   12  
  
  
  
   13  
  
5 mg See details 14  
  
5 mg  
  
   15  
  
5 mg  
  
  
  16  
  
5 mg  
  
   17  
  
5 mg  
  
   18  
  
  
  
   19  
  
  
  
   20  
  
  
  
   21  
  
  
  
   22  
  
  
  
  
  23  
  
  
  
   24  
  
  
  
   25  
  
  
  
   26  
  
  
  
   27  
  
  
  
   28  
  
  
  
   29  
  
  
  
  
  30  
  
  
  
        
 Date Details 09/13 This INR check INR: 1.8! Date of next INR:  9/17/2018 How to take your warfarin dose To take:  5 mg Take one of the 5 mg tablets. We Performed the Following AMB POC PT/INR [39252 CPT(R)] Follow-up Instructions Return in about 4 days (around 9/17/2018). Patient Instructions Description RBVO per Dr. Zoë Cerda for 7.5 mg on FRI, SAT, and SUN, 5 MG all other days return for INR on Monday at your doctors appt. Introducing [facility]! [employer] introduces NuPathe patient portal. Now you can access parts of your medical record, email your doctor's office, and request medication refills online. 1. In your internet browser, go to [email or web address] 2. Click on the First Time User? Click Here link in the Sign In box. You will see the New Member Sign Up page. 3. Enter your NuPathe Access Code exactly as it appears below. You will not need to use this code after youve completed the sign-up process. If you do not sign up before the expiration date, you must request a new code. · NuPathe Access Code: [facility] Expires: 9/19/2018  3:42 PM 
 
4. Enter the last four digits of your Social Security Number (xxxx) and Date of Birth (mm/dd/yyyy) as indicated and click Submit. You will be taken to the next sign-up page. 5. Create a Samasourcet ID. This will be your NuPathe login ID and cannot be changed, so think of one that is secure and easy to remember. 6. Create a exsulin password. You can change your password at any time. 7. Enter your Password Reset Question and Answer. This can be used at a later time if you forget your password. 8. Enter your e-mail address. You will receive e-mail notification when new information is available in [address] 9. Click Sign Up. You can now view and download portions of your medical record. 10. Click the Download Summary menu link to download a portable copy of your medical information. If you have questions, please visit the Frequently Asked Questions section of the exsulin website. Remember, exsulin is NOT to be used for urgent needs. For medical emergencies, dial 911. Now available from your iPhone and Android! Please provide this summary of care documentation to your next provider. Your primary care clinician is listed as Ronnie GLYNN. If you have any questions after today's visit, please call [phone].

## 2018-09-17 ENCOUNTER — OFFICE VISIT (OUTPATIENT)
Dept: INTERNAL MEDICINE CLINIC | Age: 63
End: 2018-09-17

## 2018-09-17 VITALS
SYSTOLIC BLOOD PRESSURE: 136 MMHG | HEART RATE: 62 BPM | DIASTOLIC BLOOD PRESSURE: 89 MMHG | TEMPERATURE: 97.8 F | HEIGHT: 73 IN | OXYGEN SATURATION: 98 % | BODY MASS INDEX: 29.95 KG/M2 | WEIGHT: 226 LBS

## 2018-09-17 DIAGNOSIS — Z86.73 HISTORY OF CVA (CEREBROVASCULAR ACCIDENT): Primary | ICD-10-CM

## 2018-09-17 DIAGNOSIS — R56.9 SEIZURE (HCC): ICD-10-CM

## 2018-09-17 LAB
INR BLD: 1.9 (ref 1–1.5)
PT POC: 23.1 SECONDS (ref 9.1–12)
VALID INTERNAL CONTROL?: YES

## 2018-09-17 RX ORDER — WARFARIN SODIUM 5 MG/1
5 TABLET ORAL DAILY
Qty: 30 TAB | Refills: 11 | Status: SHIPPED | OUTPATIENT
Start: 2018-09-17 | End: 2018-10-18 | Stop reason: SDUPTHER

## 2018-09-17 RX ORDER — WARFARIN SODIUM 5 MG/1
5 TABLET ORAL DAILY
COMMUNITY
End: 2018-09-17 | Stop reason: SDUPTHER

## 2018-09-17 RX ORDER — LEVETIRACETAM 500 MG/1
1000 TABLET ORAL 2 TIMES DAILY
Qty: 120 TAB | Refills: 6 | Status: SHIPPED | OUTPATIENT
Start: 2018-09-17 | End: 2018-10-18 | Stop reason: SDUPTHER

## 2018-09-17 NOTE — PROGRESS NOTES
Chief Complaint Patient presents with  
[facility] Follow Up  
  CVA  Medication Evaluation  
  coumadin and keppra  Anticoagulation PT=23.1, INR=1.9 Mr. Karime Louis is here today for anticoagulation monitoring for the diagnosis of CVA. His INR goal is 2.0-3.0 and his current Coumadin dose is 5 mg 1 HS Today's findings include an INR of 1.9  (normal INR range 2.0 and 3.0 Considering Mr. Duke Murry past history, todays findings, and per the coumadin policy/protocol, Mr. Leona Lai was instructed to take Coumadin as follows, 1 5mg tab at HS . He was also instructed to schedule an appointment in 1 weeks prior to leaving for an INR check. A full discussion of the nature of anticoagulants has been carried out. A full discussion of the need for frequent and regular monitoring, precise dosage adjustment and compliance was stressed. Side effects of potential bleeding were discussed and Mr. Leona Lai was instructed to call [phone] if there are any signs of abnormal bleeding. Mr. Leona Lai was instructed to avoid any OTC items containing aspirin or ibuprofen and prior to starting any new OTC products to consult with his physician or pharmacist to ensure no drug interactions are present. Mr. Leona Lai was instructed to avoid any major changes in his general diet and to avoid alcohol consumption. . 
 
Mr. Leona Lai was provided a literature booklet, \"Treatment with Warfarin (Coumadin)\", that includes topics on understanding coumadin therapy, drug interaction considerations, vitamin K and coumadin use, interactions with foods and supplements containing vitamin K, and the use of herbal products. Mr. Leona Lai verbalized his understanding of all instructions and will call the office with any questions, concerns, or signs of abnormal bleeding or blood clot.

## 2018-09-17 NOTE — PROGRESS NOTES
HISTORY OF PRESENT ILLNESS Angelique Hays is a 66 y.o. male. [facility] f/u from [facility] . No records available. 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 xarelto was not restarted Had ? Seizures so keppra was increased from 750mg bid to 1000mg bid 
speecj back to normal 
No c/o increased right arm or leg weakness now No HH PT or outpt PT set up Patient Active Problem List  
 Diagnosis Date Noted  Carcinoid tumor 02/13/2018  CKD stage 2 due to type 2 diabetes mellitus ([facility] 05/14/2017  Polyneuropathy in diabetes(357.2) 07/21/2015  Hypertensive emergency 07/20/2015  Seizure disorder ([facility] [address] 07/20/2015  Type II or unspecified type diabetes mellitus with neurological manifestations, uncontrolled(250.62) ([address] 07/20/2015  History of atrial fibrillation 07/20/2015  Hyperlipidemia 07/20/2015  Diabetic neuropathy ([facility] [address] [facility] 11/21/2014  Seizures ([facility] [address] [facility] 08/08/2014  Syncope 07/13/2012  Cardiomyopathy, nonischemic ([facility] [address] [facility] 10/31/2009  DM (diabetes mellitus) ([facility] [address] [facility] 10/26/2009  
 HTN (hypertension), benign 10/26/2009  Pulmonary nodule 10/26/2009 Current Outpatient Prescriptions Medication Sig Dispense Refill  levETIRAcetam (KEPPRA) 500 mg tablet Take 2 Tabs by mouth two (2) times a day. 120 Tab 6  warfarin (COUMADIN) 5 mg tablet Take 1 Tab by mouth daily. 30 Tab 11  
 carvedilol (COREG) 25 mg tablet Take 25 mg by mouth two (2) times a day.  hydrALAZINE (APRESOLINE) 10 mg tablet Take 10 mg by mouth two (2) times a day.  metFORMIN (GLUMETZA ER) 500 mg TG24 24 hour tablet Take 500 mg by mouth daily.  atorvastatin (LIPITOR) 80 mg tablet TAKE ONE TABLET BY MOUTH ONCE DAILY AT  NIGHT 30 Tab 11  
 lisinopril (PRINIVIL, ZESTRIL) 40 mg tablet TAKE ONE TABLET BY MOUTH ONCE DAILY FOR BLOOD PRESSURE 30 Tab 11  
 amLODIPine (NORVASC) 10 mg tablet TAKE ONE TABLET BY MOUTH ONCE DAILY FOR BLOOD PRESSURE 30 Tab 11 No Known Allergies Lab Results Component Value Date/Time WBC 5.5 09/06/2018 04:14 PM  
HGB 12.4 09/06/2018 04:14 PM  
HCT 37.8 09/06/2018 04:14 PM  
PLATELET 910 30/73/7447 04:14 PM  
MCV 86.7 09/06/2018 04:14 PM  
 
Lab Results Component Value Date/Time Hemoglobin A1c 6.7 (H) 02/13/2018 04:35 PM  
Hemoglobin A1c 7.7 (H) 04/13/2017 03:07 PM  
Hemoglobin A1c 6.9 (H) 10/27/2016 10:40 AM  
Glucose 94 09/06/2018 04:14 PM  
Glucose (POC) 111 (H) 09/06/2018 04:13 PM  
Microalb/Creat ratio (ug/mg creat.) 249.0 (H) 04/13/2017 03:07 PM  
LDL, calculated 56 02/13/2018 04:35 PM  
Creatinine 1.35 (H) 09/06/2018 04:14 PM  
  
Lab Results Component Value Date/Time Cholesterol, total 110 02/13/2018 04:35 PM  
Cholesterol (POC) 167 11/21/2014 09:00 AM  
HDL Cholesterol 33 (L) 02/13/2018 04:35 PM  
LDL, calculated 56 02/13/2018 04:35 PM  
LDL Cholesterol (POC) 111 11/21/2014 09:00 AM  
Triglyceride 107 02/13/2018 04:35 PM  
Triglycerides (POC) 70 11/21/2014 09:00 AM  
CHOL/HDL Ratio 3.7 07/21/2015 03:03 AM  
 
Lab Results Component Value Date/Time GFR est non-AA 54 (L) 09/06/2018 04:14 PM  
GFR est AA >60 09/06/2018 04:14 PM  
Creatinine 1.35 (H) 09/06/2018 04:14 PM  
BUN 9 09/06/2018 04:14 PM  
Sodium 143 09/06/2018 04:14 PM  
Potassium 3.3 (L) 09/06/2018 04:14 PM  
Chloride 106 09/06/2018 04:14 PM  
CO2 28 09/06/2018 04:14 PM  
Magnesium 2.3 03/31/2017 03:30 PM  
  
 
ROS Physical Exam  
Constitutional: He appears well-developed and well-nourished. No distress. Appears stated age HENT:  
Head: Normocephalic. Cardiovascular: Normal rate, regular rhythm and normal heart sounds.   Exam reveals no gallop and no friction rub. No murmur heard. Pulmonary/Chest: Effort normal and breath sounds normal. No respiratory distress. He has no wheezes. He has no rales. He exhibits no tenderness. Abdominal: Soft. Musculoskeletal: He exhibits no edema. Neurological: He is alert. Psychiatric: He has a normal mood and affect. Nursing note and vitals reviewed. ASSESSMENT and PLAN Diagnoses and all orders for this visit: 
 
1. History of CVA (cerebrovascular accident) -     AMB POC PT/INR 1.9 continue warfarin 5 mg every day Repeat inr in 1 week INR clinic [facility] prefers neurologist appt in [place] Pt will sign release of records from [facility] today 2. Seizure ([address] [facility] Neurology ref ED [facility] Refill keppra 500mg 2 bid 3. Carotid dissection On warfarin ? Repeat carotid dopplers in 2-3 months No surgery needed per pt per specialists are [facility] Other orders -     levETIRAcetam (KEPPRA) 500 mg tablet; Take 2 Tabs by mouth two (2) times a day. -     warfarin (COUMADIN) 5 mg tablet; Take 1 Tab by mouth daily. Follow-up Disposition: 
Return in about 1 month (around 10/17/2018) for f/u CVA and right carotid dissection.

## 2018-09-17 NOTE — MR AVS SNAPSHOT
[address] [facility] 
[phone] Patient: Gustavo De Dios MRN:  [id number] Visit Information Date & Time Provider Department Dept. Phone Encounter #  
 9/17/2018  3:15 PM Angie Standing, [address] [facility] [phone] [id number] Follow-up Instructions Return in about 1 month (around 10/17/2018) for f/u CVA and right carotid dissection. Your Appointments 10/18/2018  2:45 PM  
ROUTINE CARE with Angie Standing, [facility]) Appt Note: 4 month follow up for dm-2 htn paf  
 [address] [facility] [address] [address]  
[address] [place] [address] [address] [place] [address] Upcoming Health Maintenance Date Due DTaP/Tdap/Td series (1 - Tdap) 8/24/1978 ZOSTER VACCINE AGE 60> 6/24/2017 EYE EXAM RETINAL OR DILATED Q1 10/25/2017 Influenza Age 5 to Adult 8/1/2018 MICROALBUMIN Q1 9/8/2018 HEMOGLOBIN A1C Q6M 12/21/2018 FOOT EXAM Q1 2/13/2019 LIPID PANEL Q1 2/13/2019 MEDICARE YEARLY EXAM 6/22/2019 COLONOSCOPY 5/9/2026 Allergies as of 9/17/2018  Review Complete On: 9/17/2018 By: Jed Mcdowell LPN No Known Allergies Current Immunizations  Reviewed on 10/27/2016 Name Date Influenza Vaccine (Quad) PF 9/19/2017, 10/27/2016 10:35 AM  
 Influenza Vaccine PF 11/21/2014 Pneumococcal Polysaccharide (PPSV-23) 7/22/2015 12:39 PM  
  
 Not reviewed this visit You Were Diagnosed With   
  
 Codes Comments History of CVA (cerebrovascular accident)    -  Primary ICD-10-CM: Z86.73 
ICD-9-CM: V12.54 Seizure ([facility] [address] [facility]     ICD-10-CM: R56.9 ICD-9-CM: 780.39 Vitals  BP Pulse Temp Height(growth percentile) Weight(growth percentile) SpO2  
 136/89 (BP 1 Location: Left arm, BP Patient Position: Sitting) 62 97.8 °F (36.6 °C) (Oral) 6' 1\" (1.854 m) 226 lb (102.5 kg) 98% BMI Smoking Status 29.82 kg/m2 Light Tobacco Smoker BMI and BSA Data Body Mass Index Body Surface Area  
 29.82 kg/m 2 2.3 m 2 Preferred Pharmacy Pharmacy Name Phone [facility] [address] [phone] Your Updated Medication List  
  
   
This list is accurate as of 9/17/18  4:15 PM.  Always use your most recent med list. amLODIPine 10 mg tablet Commonly known as:  Estefani Croon TAKE ONE TABLET BY MOUTH ONCE DAILY FOR BLOOD PRESSURE  
  
 atorvastatin 80 mg tablet Commonly known as:  LIPITOR  
TAKE ONE TABLET BY MOUTH ONCE DAILY AT  NIGHT  
  
 carvedilol 25 mg tablet Commonly known as:  Desi Clint Take 25 mg by mouth two (2) times a day. hydrALAZINE 10 mg tablet Commonly known as:  APRESOLINE Take 10 mg by mouth two (2) times a day. levETIRAcetam 500 mg tablet Commonly known as:  KEPPRA Take 2 Tabs by mouth two (2) times a day. lisinopril 40 mg tablet Commonly known as:  PRINIVIL, ZESTRIL  
TAKE ONE TABLET BY MOUTH ONCE DAILY FOR BLOOD PRESSURE  
  
 metFORMIN 500 mg Tg24 24 hour tablet Commonly known Ministerio Carty ER Take 500 mg by mouth daily. warfarin 5 mg tablet Commonly known as:  COUMADIN Take 1 Tab by mouth daily. Prescriptions Sent to Pharmacy Refills  
 levETIRAcetam (KEPPRA) 500 mg tablet 6 Sig: Take 2 Tabs by mouth two (2) times a day. Class: Normal  
 Pharmacy: [facility] [address] DEREK JIMENEZ [address] Ph #: [phone] Route: Oral  
 warfarin (COUMADIN) 5 mg tablet 11 Sig: Take 1 Tab by mouth daily. Class: Normal  
 Pharmacy: [facility] [address] DEREK JIMENEZ [address] Ph #: [phone] Route: Oral  
  
We Performed the Following AMB POC PT/INR [36559 CPT(R)] REFERRAL TO NEUROLOGY [DIF47 Custom] Follow-up Instructions Return in about 1 month (around 10/17/2018) for f/u CVA and right carotid dissection. Referral Information Referral ID Referred By Referred To  
  
 [id number] Chente GLYNN MD   
   [address] [place] [address] [address] Phone: [phone] Fax: [phone] Visits Status Start Date End Date 1 New Request 9/17/18 9/17/19 If your referral has a status of pending review or denied, additional information will be sent to support the outcome of this decision. Introducing [facility]! [employer] introduces [employer] patient portal. Now you can access parts of your medical record, email your doctor's office, and request medication refills online. 1. In your internet browser, go to [email or web address] That [email or web address] 2. Click on the First Time User? Click Here link in the Sign In box. You will see the New Member Sign Up page. 3. Enter your [employer] Access Code exactly as it appears below. You will not need to use this code after youve completed the sign-up process. If you do not sign up before the expiration date, you must request a new code. · [employer] Access Code: [facility] Expires: 9/19/2018  3:42 PM 
 
4. Enter the last four digits of your Social Security Number (xxxx) and Date of Birth (mm/dd/yyyy) as indicated and click Submit. You will be taken to the next sign-up page. 5. Create a Predictus Bio[employer] ID. This will be your [employer] login ID and cannot be changed, so think of one that is secure and easy to remember. 6. Create a [employer] password. You can change your password at any time. 7. Enter your Password Reset Question and Answer. This can be used at a later time if you forget your password. 8. Enter your e-mail address. You will receive e-mail notification when new information is available in [address] 9. Click Sign Up. You can now view and download portions of your medical record. 10. Click the Download Summary menu link to download a portable copy of your medical information. If you have questions, please visit the Frequently Asked Questions section of the Diw[employer]e website. Remember, [employer] is NOT to be used for urgent needs. For medical emergencies, dial 911. Now available from your iPhone and Android! Please provide this summary of care documentation to your next provider. Your primary care clinician is listed as Edmond GLYNN. If you have any questions after today's visit, please call [phone].

## 2018-10-04 ENCOUNTER — OFFICE VISIT (OUTPATIENT)
Dept: NEUROLOGY | Age: 63
End: 2018-10-04

## 2018-10-04 VITALS
BODY MASS INDEX: 30.22 KG/M2 | HEIGHT: 73 IN | SYSTOLIC BLOOD PRESSURE: 138 MMHG | DIASTOLIC BLOOD PRESSURE: 70 MMHG | OXYGEN SATURATION: 98 % | WEIGHT: 228 LBS | HEART RATE: 62 BPM

## 2018-10-04 DIAGNOSIS — E11.9 TYPE 2 DIABETES MELLITUS WITHOUT COMPLICATION, WITHOUT LONG-TERM CURRENT USE OF INSULIN (HCC): ICD-10-CM

## 2018-10-04 DIAGNOSIS — N18.9 CHRONIC KIDNEY DISEASE, UNSPECIFIED CKD STAGE: ICD-10-CM

## 2018-10-04 DIAGNOSIS — I10 ESSENTIAL HYPERTENSION: ICD-10-CM

## 2018-10-04 DIAGNOSIS — I77.71 CAROTID ARTERY DISSECTION (HCC): Primary | ICD-10-CM

## 2018-10-04 RX ORDER — GUAIFENESIN 100 MG/5ML
200 SOLUTION ORAL
COMMUNITY
End: 2019-02-21

## 2018-10-04 RX ORDER — GLIMEPIRIDE 4 MG/1
4 TABLET ORAL
COMMUNITY
End: 2019-11-11 | Stop reason: SDUPTHER

## 2018-10-04 NOTE — MR AVS SNAPSHOT
[address], 
[address], [address] [facility] 
[phone] Patient: Silvano Trejo MRN:  [id number] Visit Information Date & Time Provider Department Dept. Phone Encounter #  
 10/4/2018  8:00 AM Casey King MD Neurology [facility] at [facility] [phone] [id number] Your Appointments 10/18/2018  2:45 PM  
ROUTINE CARE with Pineda Hoffman [address] [place] [address] [facility]: 4 month follow up for dm-2 htn paf  
 [facility] [address] [address]  
[address] [place] [address] [address] [facility] Upcoming Health Maintenance Date Due DTaP/Tdap/Td series (1 - Tdap) 8/24/1978 Shingrix Vaccine Age 50> (1 of 2) 8/24/2007 EYE EXAM RETINAL OR DILATED Q1 10/25/2017 MICROALBUMIN Q1 9/8/2018 Influenza Age 5 to Adult 3/31/2019* HEMOGLOBIN A1C Q6M 12/21/2018 FOOT EXAM Q1 2/13/2019 LIPID PANEL Q1 2/13/2019 MEDICARE YEARLY EXAM 6/22/2019 COLONOSCOPY 5/9/2026 *Topic was postponed. The date shown is not the original due date. Allergies as of 10/4/2018  Review Complete On: 9/17/2018 By: Jelani Gerber LPN No Known Allergies Current Immunizations  Reviewed on 10/27/2016 Name Date Influenza Vaccine (Quad) PF 9/19/2017, 10/27/2016 10:35 AM  
 Influenza Vaccine PF 11/21/2014 Pneumococcal Polysaccharide (PPSV-23) 7/22/2015 12:39 PM  
  
 Not reviewed this visit Vitals BP Pulse Height(growth percentile) Weight(growth percentile) SpO2 BMI  
 138/70 62 6' 1\" (1.854 m) 228 lb (103.4 kg) 98% 30.08 kg/m2 Smoking Status Light Tobacco Smoker Vitals History BMI and BSA Data Body Mass Index Body Surface Area 30.08 kg/m 2 2.31 m 2 Preferred Pharmacy Pharmacy Name Phone [facility] [address] [place] [address] [phone] Your Updated Medication List  
  
   
This list is accurate as of 10/4/18  8:25 AM.  Always use your most recent med list. amLODIPine 10 mg tablet Commonly known as:  Lue Varun TAKE ONE TABLET BY MOUTH ONCE DAILY FOR BLOOD PRESSURE  
  
 atorvastatin 80 mg tablet Commonly known as:  LIPITOR  
TAKE ONE TABLET BY MOUTH ONCE DAILY AT  NIGHT  
  
 carvedilol 25 mg tablet Commonly known as:  Lender Freiberg Take 25 mg by mouth two (2) times a day. glimepiride 4 mg tablet Commonly known as:  AMARYL Take  by mouth every morning. guaiFENesin 100 mg/5 mL liquid Commonly known as:  ROBITUSSIN Take 200 mg by mouth every eight (8) hours as needed for Cough. hydrALAZINE 10 mg tablet Commonly known as:  APRESOLINE Take 10 mg by mouth two (2) times a day. levETIRAcetam 500 mg tablet Commonly known as:  KEPPRA Take 2 Tabs by mouth two (2) times a day. lisinopril 40 mg tablet Commonly known as:  PRINIVIL, ZESTRIL  
TAKE ONE TABLET BY MOUTH ONCE DAILY FOR BLOOD PRESSURE  
  
 metFORMIN 500 mg Tg24 24 hour tablet Commonly known asKela Carias ER Take 500 mg by mouth two (2) times a day. warfarin 5 mg tablet Commonly known as:  COUMADIN Take 1 Tab by mouth daily. Introducing [facility] & HEALTH SERVICES! [employer] introduces UserZoom patient portal. Now you can access parts of your medical record, email your doctor's office, and request medication refills online. 1. In your internet browser, go to [email or web address] 2. Click on the First Time User? Click Here link in the Sign In box. You will see the New Member Sign Up page. 3. Enter your UserZoom Access Code exactly as it appears below. You will not need to use this code after youve completed the sign-up process. If you do not sign up before the expiration date, you must request a new code. · GameSalad Access Code: [email or web address] Expires: 12/30/2018  5:21 AM 
 
4. Enter the last four digits of your Social Security Number (xxxx) and Date of Birth (mm/dd/yyyy) as indicated and click Submit. You will be taken to the next sign-up page. 5. Create a GameSalad ID. This will be your GameSalad login ID and cannot be changed, so think of one that is secure and easy to remember. 6. Create a GameSalad password. You can change your password at any time. 7. Enter your Password Reset Question and Answer. This can be used at a later time if you forget your password. 8. Enter your e-mail address. You will receive e-mail notification when new information is available in [address] 9. Click Sign Up. You can now view and download portions of your medical record. 10. Click the Download Summary menu link to download a portable copy of your medical information. If you have questions, please visit the Frequently Asked Questions section of the GameSalad website. Remember, Game[employer]ad is NOT to be used for urgent needs. For medical emergencies, dial 911. Now available from your iPhone and Android! Please provide this summary of care documentation to your next provider. Your primary care clinician is listed as Sunni GLYNN. If you have any questions after today's visit, please call [phone].

## 2018-10-04 NOTE — PROGRESS NOTES
HISTORY OF PRESENT ILLNESS  Vinny Frye is a 66 y.o. male. HPI Comments: This patient is a 59-year-old -American male who was seen in the [facility] emergency room with PMHx significant for CVA, CHF, DM, HTN, seizures, presents via EMS to the ED for CODE S evaluation. Pt reports that ~2:45 PM today he began to experience right sided weakness, leg greater than arm, confusion, and slurred speech. EMS endorses that the pt told them the same. \"PMHx significant for CVA, CHF, DM, HTN, seizures, presents via EMS to the ED for CODE S evaluation. Pt reports that ~2:45 PM today he began to experience right sided weakness, leg greater than arm, confusion, and slurred speech. EMS endorses that the pt told them the same. \"  CTA showed:  Right vertebral artery arises off of the aortic arch and passes behind the  esophagus. The left vertebral artery is dominant. 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 He was transferred to [facility] for insurance reasons and treated conservatively there. He returns today, he is still taking his warfarin, is doing well and has no new deficits. He does have a history of diabetes, hypertension and hyperlipidemia. He has also had a seizure in the past and is on Keppra 500 mg twice daily. Has had no seizures in recent history. Hospital Follow Up   The history is provided by the patient and spouse. Pertinent negatives include no headaches. Review of Systems   Constitutional: Positive for malaise/fatigue. Negative for chills, diaphoresis, fever and weight loss. Neurological: Positive for focal weakness and weakness. Negative for dizziness, tingling, tremors, sensory change, speech change, seizures, loss of consciousness and headaches. Psychiatric/Behavioral: Negative.       Current Outpatient Prescriptions on File Prior to Visit   Medication Sig Dispense Refill    levETIRAcetam (KEPPRA) 500 mg tablet Take 2 Tabs by mouth two (2) times a day. 120 Tab 6    warfarin (COUMADIN) 5 mg tablet Take 1 Tab by mouth daily. (Patient taking differently: Take 7.5 mg by mouth daily.) 30 Tab 11    carvedilol (COREG) 25 mg tablet Take 25 mg by mouth two (2) times a day.  hydrALAZINE (APRESOLINE) 10 mg tablet Take 10 mg by mouth two (2) times a day.  metFORMIN (GLUMETZA ER) 500 mg TG24 24 hour tablet Take 500 mg by mouth two (2) times a day.  atorvastatin (LIPITOR) 80 mg tablet TAKE ONE TABLET BY MOUTH ONCE DAILY AT  NIGHT 30 Tab 11    lisinopril (PRINIVIL, ZESTRIL) 40 mg tablet TAKE ONE TABLET BY MOUTH ONCE DAILY FOR BLOOD PRESSURE 30 Tab 11    amLODIPine (NORVASC) 10 mg tablet TAKE ONE TABLET BY MOUTH ONCE DAILY FOR BLOOD PRESSURE 30 Tab 11     No current facility-administered medications on file prior to visit. Past Medical History:   Diagnosis Date    [facility] ([facility]     lung    Congestive heart failure, unspecified     Diabetes ([facility]     Hypertension     Overweight(278.02)     Seizures (HCC)     SOLITARY PULMONARY NODULE     1.6 cm    Stroke [facility])      Family History   Problem Relation Age of Onset    Stroke Mother     Cancer Father      Social History   Substance Use Topics    Smoking status: Light Tobacco Smoker     Packs/day: 0.10     Start date: 6/1/2015    Smokeless tobacco: Never Used      Comment: former cigar    Alcohol use Yes      Comment: social     /70  Pulse 62  Ht 6' 1\" (1.854 m)  Wt 103.4 kg (228 lb)  SpO2 98%  BMI 30.08 kg/m2      Physical Exam   Constitutional: He is oriented to person, place, and time. He appears well-developed and well-nourished. No distress. HENT:   Head: Normocephalic and atraumatic. Eyes: Conjunctivae are normal. Pupils are equal, round, and reactive to light. Neck: Normal range of motion. Neck supple.  No thyromegaly present. Lymphadenopathy:     He has no cervical adenopathy. Neurological: He is alert and oriented to person, place, and time. He has normal reflexes. He displays normal reflexes. No cranial nerve deficit. He exhibits normal muscle tone. Coordination normal.   Skin: He is not diaphoretic. Psychiatric: He has a normal mood and affect. His behavior is normal. Judgment and thought content normal.       ASSESSMENT and PLAN  TIA WITH CAROTID DISSECTION  This patient is doing well on his warfarin. I see no reason to alter that at this time. We had a long discussion about the causes and consequences of carotid dissection. See no reason to add antiplatelets to his regimen right now as this was a structural problem. I will plan to see him back in 6 months.   German probably repeat his carotid Dopplers at that time  HYPERTENSION  Stroke risk, stable, managed by primary care  HYPERLIPIDEMIA  Stroke risk, stable, managed by primary care  DIABETES MELLITUS  Stroke Risk, stable, managed by primary care

## 2018-10-18 ENCOUNTER — OFFICE VISIT (OUTPATIENT)
Dept: INTERNAL MEDICINE CLINIC | Age: 63
End: 2018-10-18

## 2018-10-18 VITALS
TEMPERATURE: 98.3 F | WEIGHT: 236 LBS | SYSTOLIC BLOOD PRESSURE: 138 MMHG | HEIGHT: 73 IN | BODY MASS INDEX: 31.28 KG/M2 | OXYGEN SATURATION: 97 % | DIASTOLIC BLOOD PRESSURE: 89 MMHG | HEART RATE: 57 BPM

## 2018-10-18 DIAGNOSIS — Z23 ENCOUNTER FOR IMMUNIZATION: ICD-10-CM

## 2018-10-18 DIAGNOSIS — Z86.73 HISTORY OF CVA (CEREBROVASCULAR ACCIDENT): Primary | ICD-10-CM

## 2018-10-18 DIAGNOSIS — R56.9 SEIZURES (HCC): ICD-10-CM

## 2018-10-18 LAB
INR BLD: 2.1 (ref 1–1.5)
PT POC: 25.2 SECONDS (ref 9.1–12)
VALID INTERNAL CONTROL?: YES

## 2018-10-18 RX ORDER — ASPIRIN 325 MG
50000 TABLET, DELAYED RELEASE (ENTERIC COATED) ORAL
COMMUNITY
End: 2018-12-03 | Stop reason: ALTCHOICE

## 2018-10-18 RX ORDER — WARFARIN SODIUM 5 MG/1
7.5 TABLET ORAL DAILY
Qty: 45 TAB | Refills: 11 | Status: SHIPPED | OUTPATIENT
Start: 2018-10-18 | End: 2018-12-03 | Stop reason: DRUGHIGH

## 2018-10-18 RX ORDER — LEVETIRACETAM 500 MG/1
1000 TABLET ORAL 2 TIMES DAILY
Qty: 120 TAB | Refills: 11 | Status: SHIPPED | OUTPATIENT
Start: 2018-10-18 | End: 2019-11-16 | Stop reason: SDUPTHER

## 2018-10-18 NOTE — PROGRESS NOTES
Chief Complaint Patient presents with  Diabetes  Hypertension Mr. Basil Rodriguez is here today for anticoagulation monitoring for the diagnosis of Atrial Fibrillation. His INR goal is 2.0-3.0 and his current Coumadin dose is 5mg 2 tabs dailyToday's findings include an INR of 2.1 (normal INR range 2.0-3.0) and PT =25.2 Considering Mr. Bandar Mast past history, todays findings, and per the coumadin policy/protocol, Mr. Gema Stokes was instructed to take Coumadin as follows, 5 mg 2 tabs daily. He was also instructed to schedule an appointment in 4weeks prior to leaving for an INR check. A full discussion of the nature of anticoagulants has been carried out. A full discussion of the need for frequent and regular monitoring, precise dosage adjustment and compliance was stressed. Side effects of potential bleeding were discussed and Mr. Gema Stokes was instructed to call [phone] if there are any signs of abnormal bleeding. Mr. Gema Stokes was instructed to avoid any OTC items containing aspirin or ibuprofen and prior to starting any new OTC products to consult with his physician or pharmacist to ensure no drug interactions are present. Mr. Gema Stokes was instructed to avoid any major changes in his general diet and to avoid alcohol consumption. . 
 
Mr. Gema Stokes was provided a literature booklet, \"Treatment with Warfarin (Coumadin)\", that includes topics on understanding coumadin therapy, drug interaction considerations, vitamin K and coumadin use, interactions with foods and supplements containing vitamin K, and the use of herbal products. Mr. Gema Stokes verbalized his understanding of all instructions and will call the office with any questions, concerns, or signs of abnormal bleeding or blood clot.

## 2018-10-18 NOTE — PROGRESS NOTES
HISTORY OF PRESENT ILLNESS Lida Solano is a 66 y.o. male. HPI  
F/u CVA and right carotid dissection On warfarin for last 1-2 months-due for INR --today INR is 2.1 on warfarin 7.5 mg every day Saw neurologist--repeat carotids after next OV Needs refill of Laurie Gins Wife here today Last OV Hospital f/u from [facility] . No records available. 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 xarelto was not restarted Had ? Seizures so keppra was increased from 750mg bid to 1000mg bid 
speech back to normal 
No c/o increased right arm or leg weakness now No HH PT or outpt PT set up 
  
 
 
 
Patient Active Problem List  
 Diagnosis Date Noted  Carcinoid tumor 02/13/2018  CKD stage 2 due to type 2 diabetes mellitus ([facility] [address] 05/14/2017  Polyneuropathy in diabetes(357.2) 07/21/2015  Hypertensive emergency 07/20/2015  Seizure disorder ([facility] [address] 07/20/2015  Type II or unspecified type diabetes mellitus with neurological manifestations, uncontrolled(250.62) ([facility] [address] 07/20/2015  History of atrial fibrillation 07/20/2015  Hyperlipidemia 07/20/2015  Diabetic neuropathy ([facility] [address] 11/21/2014  Seizures ([facility] [address] 08/08/2014  Syncope 07/13/2012  Cardiomyopathy, nonischemic ([address] 10/31/2009  DM (diabetes mellitus) ([facility] [address] 10/26/2009  
 HTN (hypertension), benign 10/26/2009  Pulmonary nodule 10/26/2009 Current Outpatient Medications Medication Sig Dispense Refill  cholecalciferol (VITAMIN D3) 50,000 unit capsule Take  by mouth every seven (7) days.  glimepiride (AMARYL) 4 mg tablet Take  by mouth every morning.     
 levETIRAcetam (KEPPRA) 500 mg tablet Take 2 Tabs by mouth two (2) times a day. 120 Tab 6  warfarin (COUMADIN) 5 mg tablet Take 1 Tab by mouth daily. (Patient taking differently: Take 7.5 mg by mouth daily.) 30 Tab 11  
 carvedilol (COREG) 25 mg tablet Take 25 mg by mouth two (2) times a day.  hydrALAZINE (APRESOLINE) 10 mg tablet Take 10 mg by mouth two (2) times a day.  metFORMIN (GLUMETZA ER) 500 mg TG24 24 hour tablet Take 500 mg by mouth two (2) times a day.  atorvastatin (LIPITOR) 80 mg tablet TAKE ONE TABLET BY MOUTH ONCE DAILY AT  NIGHT 30 Tab 11  
 lisinopril (PRINIVIL, ZESTRIL) 40 mg tablet TAKE ONE TABLET BY MOUTH ONCE DAILY FOR BLOOD PRESSURE 30 Tab 11  
 amLODIPine (NORVASC) 10 mg tablet TAKE ONE TABLET BY MOUTH ONCE DAILY FOR BLOOD PRESSURE 30 Tab 11  
 guaiFENesin (ROBITUSSIN) 100 mg/5 mL liquid Take 200 mg by mouth every eight (8) hours as needed for Cough. No Known Allergies Social History Tobacco Use  Smoking status: Light Tobacco Smoker Packs/day: 0.10 Start date: 6/1/2015  Smokeless tobacco: Never Used  Tobacco comment: former cigar Substance Use Topics  Alcohol use: Yes Comment: social  
  
Lab Results Component Value Date/Time WBC 5.5 09/06/2018 04:14 PM  
 HGB 12.4 09/06/2018 04:14 PM  
 HCT 37.8 09/06/2018 04:14 PM  
 PLATELET 027 25/90/3119 04:14 PM  
 MCV 86.7 09/06/2018 04:14 PM  
 
Lab Results Component Value Date/Time Hemoglobin A1c 6.7 (H) 02/13/2018 04:35 PM  
 Hemoglobin A1c 7.7 (H) 04/13/2017 03:07 PM  
 Hemoglobin A1c 6.9 (H) 10/27/2016 10:40 AM  
 Glucose 94 09/06/2018 04:14 PM  
 Glucose (POC) 111 (H) 09/06/2018 04:13 PM  
 Microalb/Creat ratio (ug/mg creat.) 249.0 (H) 04/13/2017 03:07 PM  
 LDL, calculated 56 02/13/2018 04:35 PM  
 Creatinine 1.35 (H) 09/06/2018 04:14 PM  
  
Lab Results Component Value Date/Time  Cholesterol, total 110 02/13/2018 04:35 PM  
 Cholesterol (POC) 167 11/21/2014 09:00 AM  
 HDL Cholesterol 33 (L) 02/13/2018 04:35 PM  
 LDL, calculated 56 02/13/2018 04:35 PM  
 LDL Cholesterol (POC) 111 11/21/2014 09:00 AM  
 Triglyceride 107 02/13/2018 04:35 PM  
 Triglycerides (POC) 70 11/21/2014 09:00 AM  
 CHOL/HDL Ratio 3.7 07/21/2015 03:03 AM  
 
Lab Results Component Value Date/Time GFR est non-AA 54 (L) 09/06/2018 04:14 PM  
 GFR est AA >60 09/06/2018 04:14 PM  
 Creatinine 1.35 (H) 09/06/2018 04:14 PM  
 BUN 9 09/06/2018 04:14 PM  
 Sodium 143 09/06/2018 04:14 PM  
 Potassium 3.3 (L) 09/06/2018 04:14 PM  
 Chloride 106 09/06/2018 04:14 PM  
 CO2 28 09/06/2018 04:14 PM  
 Magnesium 2.3 03/31/2017 03:30 PM  
  
ROS Physical Exam  
Constitutional: He appears well-developed and well-nourished. No distress. Appears stated age HENT:  
Head: Normocephalic. Cardiovascular: Normal rate, regular rhythm and normal heart sounds. Exam reveals no gallop and no friction rub. No murmur heard. Pulmonary/Chest: Effort normal and breath sounds normal. No respiratory distress. He has no wheezes. He has no rales. He exhibits no tenderness. Abdominal: Soft. Musculoskeletal: He exhibits no edema. Neurological: He is alert. Psychiatric: He has a normal mood and affect. Nursing note and vitals reviewed. ASSESSMENT and PLAN Diagnoses and all orders for this visit: 
 
History of CVA (cerebrovascular accident) -     AMB POC PT/INR 2.1 Repeat in 4 weeks at INR clinic Seizures ([facility] [address] Refill keppra Encounter for immunization 
-     INFLUENZA VIRUS VAC QUAD,SPLIT,PRESV FREE SYRINGE IM Other orders -     levETIRAcetam (KEPPRA) 500 mg tablet; Take 2 Tabs by mouth two (2) times a day. -     warfarin (COUMADIN) 5 mg tablet; Take 2 Tabs by mouth daily. Follow-up Disposition: 
Return in about 4 years (around 10/18/2022) for cva INR HTN PAF.

## 2018-11-01 NOTE — TELEPHONE ENCOUNTER
Dariusz Rajput (wife) requesting a hospital follow up appointment sooner than what's available.      Best Contact: [phone] or [phone] (daughter/ Hanley Litten)       Message received & copied from [facility]
Spoke with patients daughter on HIPPA after 2 patient identifiers being note and advised of DOMINIK appt on Monday, September 17, 2018 03:15 PM, patients daughter accepted appt. Patients daughter expressed understanding and has no further questions at this time.
1-2 beers/month/Yes

## 2018-11-05 ENCOUNTER — ANTI-COAG VISIT (OUTPATIENT)
Dept: INTERNAL MEDICINE CLINIC | Age: 63
End: 2018-11-05

## 2018-11-05 DIAGNOSIS — I63.9 CEREBROVASCULAR ACCIDENT (CVA), UNSPECIFIED MECHANISM (HCC): ICD-10-CM

## 2018-11-05 LAB
INR BLD: 2 (ref 1–1.5)
PT POC: 23.9 SECONDS (ref 9.1–12)
VALID INTERNAL CONTROL?: YES

## 2018-11-05 RX ORDER — METFORMIN HYDROCHLORIDE 500 MG/1
500 TABLET, EXTENDED RELEASE ORAL 2 TIMES DAILY WITH MEALS
COMMUNITY
End: 2021-03-06

## 2018-11-05 NOTE — PATIENT INSTRUCTIONS
Today your INR was 2.0. Your goal INR is 2.0-3.0. You have a 5 mg tablet of Coumadin (warfarin). Take Coumadin as follows; Take warfarin 5 mg (1 tablet) every day Come back in 4 weeks for your next finger stick/INR blood test.   
 
Avoid any over the counter items containing aspirin or ibuprofen, and avoid great swings in general diet. Avoid alcohol consumption. Please notify the INR nurse if you are started on any new medication including over the counter or herbal supplements. Also, please notify your INR nurse if any of your other prescription or over the counter medications have been discontinued. Call [facility] at [phone] if you have any signs of abnormal bleeding/blood clot. Taking Warfarin Safely: Care Instructions Your Care Instructions Warfarin is a medicine that you take to prevent blood clots. It is often called a blood thinner. Doctors give warfarin (such as Coumadin) to reduce the risk of blood clots. You may be at risk for blood clots if you have atrial fibrillation or deep vein thrombosis. Some other health problems may also put you at risk. Warfarin slows the amount of time it takes for your blood to clot. It can cause bleeding problems. Even if you've been taking warfarin for a while, it's important to know how to take it safely. Foods and other medicines can affect the way warfarin works. Some can make warfarin work too well. This can cause bleeding problems. And some can make it work poorly, so that it does not prevent blood clots very well. You will need regular blood tests to check how long it takes for your blood to form a clot. This test is called a PT or prothrombin time test. The result of the test is called an INR level. Depending on the test results, your doctor or anticoagulation clinic may adjust your dose of warfarin. Follow-up care is a key part of your treatment and safety.  Be sure to make and go to all appointments, and call your doctor if you are having problems. It's also a good idea to know your test results and keep a list of the medicines you take. How can you care for yourself at home? Take warfarin safely · Take your warfarin at the same time each day. · If you miss a dose of warfarin, don't take an extra dose to make up for it. Your doctor can tell you exactly what to do so you don't take too much or too little. · Wear medical alert jewelry that lets others know that you take warfarin. You can buy this at most [employer]. · Don't take warfarin if you are pregnant or planning to get pregnant. Talk to your doctor about how you can prevent getting pregnant while you are taking it. · Don't change your dose or stop taking warfarin unless your doctor tells you to. Effects of medicines and food on warfarin · Don't start or stop taking any medicines, vitamins, or natural remedies unless you first talk to your doctor. Many medicines can affect how warfarin works. These include aspirin and other pain relievers, over-the-counter medicines, multivitamins, dietary supplements, and herbal products. · Tell all of your doctors and pharmacists that you take warfarin. Some prescription medicines can affect how warfarin works. · Keep the amount of vitamin K in your diet about the same from day to day. Do not suddenly eat a lot more or a lot less food that is rich in vitamin K than you usually do. Vitamin K affects how warfarin works and how your blood clots. Talk with your doctor before making big changes in your diet. Vitamin K is in many foods, such as: 
¨ Leafy greens, such as kale, cabbage, spinach, Swiss chard, and lettuce. ¨ Canola and soybean oils. ¨ Green vegetables, such as asparagus, broccoli, and [place] sprouts. ¨ Vegetable drinks, green tea leaves, and some dietary supplement drinks. · Avoid cranberry juice and other cranberry products. They can increase the effects of warfarin. · Limit your use of alcohol. Avoid bleeding by preventing falls and injuries · Wear slippers or shoes with nonskid soles. · Remove throw rugs and clutter. · Rearrange furniture and electrical cords to keep them out of walking paths. · Keep stairways, porches, and outside walkways well lit. Use night-lights in hallways and bathrooms. · Be extra careful when you work with sharp tools or knives. When should you call for help? Call 911 anytime you think you may need emergency care. For example, call if: 
· You have a sudden, severe headache that is different from past headaches. Call your doctor now or seek immediate medical care if: 
· You have any abnormal bleeding, such as: 
¨ Nosebleeds. ¨ Vaginal bleeding that is different (heavier, more frequent, at a different time of the month) than what you are used to. ¨ Bloody or black stools, or rectal bleeding. ¨ Bloody or pink urine. Watch closely for changes in your health, and be sure to contact your doctor if you have any problems. Where can you learn more? Go to http://carolina-shayla.info/. Enter M973 in the search box to learn more about \"Taking Warfarin Safely: Care Instructions. \" Current as of: January 27, 2016 Content Version: 11.1 © 2677-9589 [employer]. Care instructions adapted under license by [employer] (which disclaims liability or warranty for this information). If you have questions about a medical condition or this instruction, always ask your healthcare professional. [place] [address] any warranty or liability for your use of this information.

## 2018-11-05 NOTE — PROGRESS NOTES
Pharmacy Note:  Anticoagulation Dosing Mr. Pamela Smith is here today for anticoagulation monitoring for the diagnosis of Atrial Fibrillation and CVA. His INR goal is 2.0-3.0 and his current Coumadin dose is 5 mg every day. Today's findings include an INR of 2 (normal INR range 0.8-1.2) and PT of 23.9. Patient Findings: Takes warfarin daily at 5pm 
Has not taken morning medication yet Wife handles pill box Does not normally eat greens Considering Mr. Carolina Gaines past history, todays findings, and per Anticoagulation Collaborative Practice Agreement/Protocol, Mr. Maikel Marsh was instructed to take Coumadin as follows, 5 mg every day. He was also instructed to schedule an appointment in 4 weeks prior to leaving for an INR check. Medications reviewed during visit. Current Outpatient Medications Medication Sig  
 metFORMIN ER (GLUCOPHAGE XR) 500 mg tablet Take 500 mg by mouth two (2) times daily (with meals).  cholecalciferol (VITAMIN D3) 50,000 unit capsule Take 50,000 Units by mouth every Monday.  levETIRAcetam (KEPPRA) 500 mg tablet Take 2 Tabs by mouth two (2) times a day.  warfarin (COUMADIN) 5 mg tablet Take 2 Tabs by mouth daily.  glimepiride (AMARYL) 4 mg tablet Take 4 mg by mouth every morning.  guaiFENesin (ROBITUSSIN) 100 mg/5 mL liquid Take 200 mg by mouth every eight (8) hours as needed for Cough.  carvedilol (COREG) 25 mg tablet Take 25 mg by mouth two (2) times a day.  hydrALAZINE (APRESOLINE) 10 mg tablet Take 10 mg by mouth three (3) times daily.  atorvastatin (LIPITOR) 80 mg tablet TAKE ONE TABLET BY MOUTH ONCE DAILY AT  NIGHT  lisinopril (PRINIVIL, ZESTRIL) 40 mg tablet TAKE ONE TABLET BY MOUTH ONCE DAILY FOR BLOOD PRESSURE  
 amLODIPine (NORVASC) 10 mg tablet TAKE ONE TABLET BY MOUTH ONCE DAILY FOR BLOOD PRESSURE No current facility-administered medications for this visit. Medications Discontinued During This Encounter Medication Reason  metFORMIN (GLUMETZA ER) 500 mg TG24 24 hour tablet Formulary Change A full discussion of the nature of anticoagulants has been carried out. A full discussion of the need for frequent and regular monitoring, precise dosage adjustment and compliance was stressed. Side effects of potential bleeding were discussed and Mr. Louann Chaudhari was instructed to call [phone] if there are any signs of abnormal bleeding. Mr. Louann Chaudhari was instructed to avoid any OTC items containing aspirin or ibuprofen and prior to starting any new OTC products to consult with his physician or pharmacist to ensure no drug interactions are present. Mr. Louann Chaudhari was instructed to avoid any major changes in his general diet and to avoid alcohol consumption. Mr. Louann Chaudhari was provided information in the AVS that includes topics on understanding coumadin therapy, drug interaction considerations, vitamin K and coumadin use, interactions with foods and supplements containing vitamin K, and the use of herbal products. Mr. Louann Chaudhari verbalized his understanding of all instructions and will call the office with any questions, concerns, or signs of abnormal bleeding or blood clot.  
 
Maci Britt, PHARMD, BCACP

## 2018-12-03 ENCOUNTER — ANTI-COAG VISIT (OUTPATIENT)
Dept: INTERNAL MEDICINE CLINIC | Age: 63
End: 2018-12-03

## 2018-12-03 VITALS — HEART RATE: 68 BPM | OXYGEN SATURATION: 97 % | DIASTOLIC BLOOD PRESSURE: 81 MMHG | SYSTOLIC BLOOD PRESSURE: 113 MMHG

## 2018-12-03 DIAGNOSIS — E11.8 TYPE 2 DIABETES MELLITUS WITH COMPLICATION, WITHOUT LONG-TERM CURRENT USE OF INSULIN (HCC): Primary | ICD-10-CM

## 2018-12-03 DIAGNOSIS — I63.9 CEREBROVASCULAR ACCIDENT (CVA), UNSPECIFIED MECHANISM (HCC): ICD-10-CM

## 2018-12-03 LAB
GLUCOSE POC: 257 MG/DL (ref 70–110)
INR BLD: 1.6
PT POC: 19 SECONDS
VALID INTERNAL CONTROL?: YES

## 2018-12-03 RX ORDER — WARFARIN SODIUM 5 MG/1
5 TABLET ORAL SEE ADMIN INSTRUCTIONS
Qty: 100 TAB | Refills: 2 | Status: SHIPPED | OUTPATIENT
Start: 2018-12-03 | End: 2018-12-17

## 2018-12-03 NOTE — PROGRESS NOTES
Pharmacy Progress Note  - Anticoagulation Management S/O:  Mr. Anju Gonsales  is a 66 y.o. male seen today for anticoagulation management for the diagnosis of Atrial Fibrillation and Stroke s/p right carotid dissection (09/6/18). He is accompanied by his wife, April Zheng, to this visit. April Zheng manages patient's medication. · Warfarin start date: 09/13/18 · INR Goal:  2.0-3.0 · Current warfarin regimen: 5 mg daily · Warfarin tablet strength:   5 mg · Duration of therapy: indefinite Today's Patient Findings: 
 
Pertinent positives includes: 
Medication change, Change in diet/appetite, Alcohol consumption · Completed vitamin D 50K units weekly - was on it for 5 weeks - prescribed by Nephrologist. No recent vitamin D level check. · Has not been checking his blood sugar - could not recall last SMBG reading · Had 12 oz of mountain dew ~1 hr prior this visit. ; could not recall when he last ate food · After reviewing s/sx of hypoglycemia, patient endorses that he has experienced dizziness, jitteriness, hunger, tiredness on several occasions - could not quantify # of episodes. · Since 09/9/18 - taking warfarin 7.5 mg daily · Had 1 beer on Saturday · Missed 1 dose of warfarin on Friday INR (POC) today (normal INR range 0.8-1.2) :   
Recent Results (from the past 12 hour(s)) AMB POC PT/INR Collection Time: 12/03/18  4:28 PM  
Result Value Ref Range VALID INTERNAL CONTROL POC Yes Prothrombin time (POC) 19.0 seconds INR POC 1.6 AMB POC GLUCOSE, QUANTITATIVE, BLOOD Collection Time: 12/03/18  4:35 PM  
Result Value Ref Range Glucose  (A) 70 - 110 mg/dL · Adherence: · Able to recall regimen? NO 
· Miss/extra dose? YES - missed 1 dose last Friday · Need refill? YES Upcoming procedure(s):  NO 
 
Past Medical History:  
Diagnosis Date  Cancer ([facility] [address] lung  Congestive heart failure, unspecified  Diabetes ([facility] [address]  Hypertension  Overweight(278.02)  Seizures ([facility] [address]  SOLITARY PULMONARY NODULE   
 1.6 cm  Stroke ([facility] [address] Current Outpatient Medications Medication Sig  warfarin (COUMADIN) 5 mg tablet Take 1 Tab by mouth See Admin Instructions for 30 days. Take 10 mg on Monday and Wednesday and 5 mg daily the rest of the week.  metFORMIN ER (GLUCOPHAGE XR) 500 mg tablet Take 500 mg by mouth two (2) times daily (with meals).  levETIRAcetam (KEPPRA) 500 mg tablet Take 2 Tabs by mouth two (2) times a day.  glimepiride (AMARYL) 4 mg tablet Take 4 mg by mouth every morning.  carvedilol (COREG) 25 mg tablet Take 25 mg by mouth two (2) times a day.  hydrALAZINE (APRESOLINE) 10 mg tablet Take 10 mg by mouth three (3) times daily.  atorvastatin (LIPITOR) 80 mg tablet TAKE ONE TABLET BY MOUTH ONCE DAILY AT  NIGHT  lisinopril (PRINIVIL, ZESTRIL) 40 mg tablet TAKE ONE TABLET BY MOUTH ONCE DAILY FOR BLOOD PRESSURE  
 amLODIPine (NORVASC) 10 mg tablet TAKE ONE TABLET BY MOUTH ONCE DAILY FOR BLOOD PRESSURE  
 guaiFENesin (ROBITUSSIN) 100 mg/5 mL liquid Take 200 mg by mouth every eight (8) hours as needed for Cough. No current facility-administered medications for this visit. BP Readings from Last 3 Encounters:  
12/03/18 113/81  
10/18/18 138/89  
10/04/18 138/70 Lab Results Component Value Date/Time Hemoglobin A1c 6.7 (H) 02/13/2018 04:35 PM  
 Hemoglobin A1c (POC) 7.2 (A) 06/21/2018 03:05 PM  
 
Lab Results Component Value Date/Time Glucose 94 09/06/2018 04:14 PM  
 Glucose (POC) 111 (H) 09/06/2018 04:13 PM  
 Glucose  (A) 12/03/2018 04:35 PM  
 
Lab Results Component Value Date/Time  Sodium 143 09/06/2018 04:14 PM  
 Potassium 3.3 (L) 09/06/2018 04:14 PM  
 Chloride 106 09/06/2018 04:14 PM  
 CO2 28 09/06/2018 04:14 PM  
 Anion gap 9 09/06/2018 04:14 PM  
 Glucose 94 09/06/2018 04:14 PM  
 BUN 9 09/06/2018 04:14 PM  
 Creatinine 1.35 (H) 09/06/2018 04:14 PM  
 BUN/Creatinine ratio 7 (L) 09/06/2018 04:14 PM  
 GFR est AA >60 09/06/2018 04:14 PM  
 GFR est non-AA 54 (L) 09/06/2018 04:14 PM  
 Calcium 7.2 (L) 09/06/2018 04:14 PM  
 
CBC: 
Lab Results Component Value Date/Time WBC 5.5 09/06/2018 04:14 PM  
 HGB 12.4 09/06/2018 04:14 PM  
 HCT 37.8 09/06/2018 04:14 PM  
 PLATELET 361 64/31/8110 04:14 PM  
 MCV 86.7 09/06/2018 04:14 PM  
 
 
Lab Results Component Value Date/Time Protein, total 7.2 09/06/2018 04:14 PM  
 Albumin 3.5 09/06/2018 04:14 PM  
 
INR History:   (normal INR range 0.8-1.2) Date   INR   PT   Dose/Comments 12/03/18 1.6  19.6 Missed 1 dose; has been taking 7.5 mg daily 11/05/18 2.0  23.9 
10/18/18 2.1  25.2 
09/17/18 1.9  23.1 
09/13/18 1.8  21.7 
09/06/18 1.7  16.8 A/P:   Considering Mr. Dona Smith past history, todays findings, and per Anticoagulation Collaborative Practice Agreement/Protocol: 1. POC INR (1.6) is subtherapeutic for INR goal today. Even with reported warfarin 7.5 mg daily dosing, pt's INR has been hovering in the lower end of goal range. 2. Change warfarin to 10 mg on Monday and Wednesday and 5 mg daily ROW. 3. Sent in warfarin refill to [address] with new direction. Diabetes: 4. Provide education pamphlet detailing s/sx of hypoglycemia and review with Pt about sx and how to correct for hypoglycemic event. Currently takes glimepiride. Emphasize the importance of taking this medication with food to prevent hypoglycemia. With BP today being lower than usual and hx of seizure, these factors further increase Pt's fall risk. 5.  Recommend patient check SMBG (at least fasting) daily. Bring in glucometer for review. 6. Will continue to monitor for hypoglycemia/hypotension. Patient and his wife were instructed to schedule an appointment in 2 weeks prior to leaving the clinic. Medication reconciliation was completed during the visit. Medications Discontinued During This Encounter Medication Reason  cholecalciferol (VITAMIN D3) 50,000 unit capsule Therapy Completed  warfarin (COUMADIN) 5 mg tablet Dose Adjustment A full discussion of the nature of anticoagulants has been carried out. A full discussion of the need for frequent and regular monitoring, precise dosage adjustment and compliance was stressed. Side effects of potential bleeding were discussed and Mr. Snow Byrd was instructed to call [phone] if there are any signs of abnormal bleeding. Mr. Snow Byrd was instructed to avoid any OTC items containing aspirin or ibuprofen and prior to starting any new OTC products to consult with his physician or pharmacist to ensure no drug interactions are present. Mr. Snow Byrd was instructed to avoid any major changes in his general diet and to avoid alcohol consumption. Mr. Snow Byrd was provided information in the AVS that includes topics on understanding coumadin therapy, drug interaction considerations, vitamin K and coumadin use, interactions with foods and supplements containing vitamin K, and the use of herbal products. Mr. Snow Byrd verbalized his understanding of all instructions and will call the office with any questions, concerns, or signs of abnormal bleeding or blood clot. Notifications of recommendations will be sent to Dr. Loli Hua MD for review Thank you for the consult, Shahnaz Moreau PharmD

## 2018-12-03 NOTE — PATIENT INSTRUCTIONS
Today your INR was 1.9. Your goal INR is 2-3. You have a 5 mg tablet of Coumadin (warfarin). Take Coumadin as follows: · Take 10 mg on Monday and Wednesday and 7.5 mg daily the rest of the week. ·  warfarin refill at [facility] DR DEREK JIMENEZ · Check your blood sugar every morning before you eat. Bring in blood sugar meter to the next visit. Come back in 2 weeks for your next finger stick/INR blood test.   
 
Avoid any over the counter items containing aspirin or ibuprofen, and avoid great swings in general diet. Avoid alcohol consumption. Please notify the INR nurse if you are started on any new medication including over the counter or herbal supplements. Also, please notify your INR nurse if any of your other prescription or over the counter medications have been discontinued. Call [facility] at [phone] if you have any signs of abnormal bleeding/blood clot. Taking Warfarin Safely: Care Instructions Your Care Instructions Warfarin is a medicine that you take to prevent blood clots. It is often called a blood thinner. Doctors give warfarin (such as Coumadin) to reduce the risk of blood clots. You may be at risk for blood clots if you have atrial fibrillation or deep vein thrombosis. Some other health problems may also put you at risk. Warfarin slows the amount of time it takes for your blood to clot. It can cause bleeding problems. Even if you've been taking warfarin for a while, it's important to know how to take it safely. Foods and other medicines can affect the way warfarin works. Some can make warfarin work too well. This can cause bleeding problems. And some can make it work poorly, so that it does not prevent blood clots very well. You will need regular blood tests to check how long it takes for your blood to form a clot. This test is called a PT or prothrombin time test. The result of the test is called an INR level.  Depending on the test results, your doctor or anticoagulation clinic may adjust your dose of warfarin. Follow-up care is a key part of your treatment and safety. Be sure to make and go to all appointments, and call your doctor if you are having problems. It's also a good idea to know your test results and keep a list of the medicines you take. How can you care for yourself at home? Take warfarin safely · Take your warfarin at the same time each day. · If you miss a dose of warfarin, don't take an extra dose to make up for it. Your doctor can tell you exactly what to do so you don't take too much or too little. · Wear medical alert jewelry that lets others know that you take warfarin. You can buy this at most drugstores. · Don't take warfarin if you are pregnant or planning to get pregnant. Talk to your doctor about how you can prevent getting pregnant while you are taking it. · Don't change your dose or stop taking warfarin unless your doctor tells you to. Effects of medicines and food on warfarin · Don't start or stop taking any medicines, vitamins, or natural remedies unless you first talk to your doctor. Many medicines can affect how warfarin works. These include aspirin and other pain relievers, over-the-counter medicines, multivitamins, dietary supplements, and herbal products. · Tell all of your doctors and pharmacists that you take warfarin. Some prescription medicines can affect how warfarin works. · Keep the amount of vitamin K in your diet about the same from day to day. Do not suddenly eat a lot more or a lot less food that is rich in vitamin K than you usually do. Vitamin K affects how warfarin works and how your blood clots. Talk with your doctor before making big changes in your diet. Vitamin K is in many foods, such as: 
¨ Leafy greens, such as kale, cabbage, spinach, Swiss chard, and lettuce. ¨ Canola and soybean oils. ¨ Green vegetables, such as asparagus, broccoli, and Campbell sprouts. ¨ Vegetable drinks, green tea leaves, and some dietary supplement drinks. · Avoid cranberry juice and other cranberry products. They can increase the effects of warfarin. · Limit your use of alcohol. Avoid bleeding by preventing falls and injuries · Wear slippers or shoes with nonskid soles. · Remove throw rugs and clutter. · Rearrange furniture and electrical cords to keep them out of walking paths. · Keep stairways, porches, and outside walkways well lit. Use night-lights in hallways and bathrooms. · Be extra careful when you work with sharp tools or knives. When should you call for help? Call 911 anytime you think you may need emergency care. For example, call if: 
· You have a sudden, severe headache that is different from past headaches. Call your doctor now or seek immediate medical care if: 
· You have any abnormal bleeding, such as: 
¨ Nosebleeds. ¨ Vaginal bleeding that is different (heavier, more frequent, at a different time of the month) than what you are used to. ¨ Bloody or black stools, or rectal bleeding. ¨ Bloody or pink urine. Watch closely for changes in your health, and be sure to contact your doctor if you have any problems. Where can you learn more? Go to http://carolina-shayla.info/. Enter Q294 in the search box to learn more about \"Taking Warfarin Safely: Care Instructions. \" Current as of: January 27, 2016 Content Version: 11.1 © 3548-2646 [employer]. Care instructions adapted under license by [employer] (which disclaims liability or warranty for this information).  If you have questions about a medical condition or this instruction, always ask your healthcare professional. [place] [address] any warranty or liability for your use of this information.

## 2018-12-17 ENCOUNTER — OFFICE VISIT (OUTPATIENT)
Dept: INTERNAL MEDICINE CLINIC | Age: 63
End: 2018-12-17

## 2018-12-17 DIAGNOSIS — I63.9 CEREBROVASCULAR ACCIDENT (CVA), UNSPECIFIED MECHANISM (HCC): Primary | ICD-10-CM

## 2018-12-17 LAB
INR BLD: 1.4 (ref 1–1.5)
PT POC: 16.3 SECONDS (ref 9.1–12)
VALID INTERNAL CONTROL?: YES

## 2018-12-17 RX ORDER — WARFARIN SODIUM 5 MG/1
5 TABLET ORAL SEE ADMIN INSTRUCTIONS
Qty: 100 TAB | Refills: 2
Start: 2018-12-17 | End: 2019-01-16

## 2018-12-17 NOTE — PATIENT INSTRUCTIONS
Today your INR was 1.4. Your goal INR is  2-3. You have a 5 mg tablet of Coumadin (warfarin). Take Coumadin as follows: Take 7.5 mg on Tuesdays and Thursdays and 10 mg daily the rest of the week. Come back in 2 week(s) for your next finger stick/INR blood test.        Avoid any over the counter items containing aspirin or ibuprofen, and avoid great swings in general diet. Avoid alcohol consumption. Please notify the INR nurse if you are started on any new medication including over the counter or herbal supplements. Also, please notify your INR nurse if any of your other prescription or over the counter medications have been discontinued. Call [facility] at [phone] if you have any signs of abnormal bleeding/blood clot.  ------------------------------------------------------------------------------------------------------------------  Taking Warfarin Safely: Care Instructions    Your Care Instructions  Warfarin is a medicine that you take to prevent blood clots. It is often called a blood thinner. Doctors give warfarin (such as Coumadin) to reduce the risk of blood clots. You may be at risk for blood clots if you have atrial fibrillation or deep vein thrombosis. Some other health problems may also put you at risk. Warfarin slows the amount of time it takes for your blood to clot. It can cause bleeding problems. Even if you've been taking warfarin for a while, it's important to know how to take it safely. Foods and other medicines can affect the way warfarin works. Some can make warfarin work too well. This can cause bleeding problems. And some can make it work poorly, so that it does not prevent blood clots very well. You will need regular blood tests to check how long it takes for your blood to form a clot. This test is called a PT or prothrombin time test. The result of the test is called an INR level.  Depending on the test results, your doctor or anticoagulation clinic may adjust your dose of warfarin. Follow-up care is a key part of your treatment and safety. Be sure to make and go to all appointments, and call your doctor if you are having problems. It's also a good idea to know your test results and keep a list of the medicines you take. How can you care for yourself at home? Take warfarin safely  · Take your warfarin at the same time each day. · If you miss a dose of warfarin, don't take an extra dose to make up for it. Your doctor can tell you exactly what to do so you don't take too much or too little. · Wear medical alert jewelry that lets others know that you take warfarin. You can buy this at most drugstores. · Don't take warfarin if you are pregnant or planning to get pregnant. Talk to your doctor about how you can prevent getting pregnant while you are taking it. · Don't change your dose or stop taking warfarin unless your doctor tells you to. Effects of medicines and food on warfarin  · Don't start or stop taking any medicines, vitamins, or natural remedies unless you first talk to your doctor. Many medicines can affect how warfarin works. These include aspirin and other pain relievers, over-the-counter medicines, multivitamins, dietary supplements, and herbal products. · Tell all of your doctors and pharmacists that you take warfarin. Some prescription medicines can affect how warfarin works. · Keep the amount of vitamin K in your diet about the same from day to day. Do not suddenly eat a lot more or a lot less food that is rich in vitamin K than you usually do. Vitamin K affects how warfarin works and how your blood clots. Talk with your doctor before making big changes in your diet. Vitamin K is in many foods, such as:  ¨ Leafy greens, such as kale, cabbage, spinach, Swiss chard, and lettuce. ¨ Canola and soybean oils. ¨ Green vegetables, such as asparagus, broccoli, and [place] sprouts.   ¨ Vegetable drinks, green tea leaves, and some dietary supplement drinks. · Avoid cranberry juice and other cranberry products. They can increase the effects of warfarin. · Limit your use of alcohol. Avoid bleeding by preventing falls and injuries  · Wear slippers or shoes with nonskid soles. · Remove throw rugs and clutter. · Rearrange furniture and electrical cords to keep them out of walking paths. · Keep stairways, porches, and outside walkways well lit. Use night-lights in hallways and bathrooms. · Be extra careful when you work with sharp tools or knives. When should you call for help? Call 911 anytime you think you may need emergency care. For example, call if:  · You have a sudden, severe headache that is different from past headaches. Call your doctor now or seek immediate medical care if:  · You have any abnormal bleeding, such as:  ¨ Nosebleeds. ¨ Vaginal bleeding that is different (heavier, more frequent, at a different time of the month) than what you are used to. ¨ Bloody or black stools, or rectal bleeding. ¨ Bloody or pink urine. Watch closely for changes in your health, and be sure to contact your doctor if you have any problems. Where can you learn more? Go to http://carolina-shayla.info/. Enter Z914 in the search box to learn more about \"Taking Warfarin Safely: Care Instructions. \"  Current as of: January 27, 2016  Content Version: 11.1  © 3356-2170 [employer]. Care instructions adapted under license by [employer] (which disclaims liability or warranty for this information). If you have questions about a medical condition or this instruction, always ask your healthcare professional. [place] [address] any warranty or liability for your use of this information.

## 2018-12-17 NOTE — PROGRESS NOTES
Pharmacy Progress Note  - Anticoagulation Management    S/O:  Mr. Ayleen Askew  is a 66 y.o. male seen today for anticoagulation management for the diagnosis of Atrial Fibrillation and Stroke s/p right carotid dissection (9/6/18). He is accompanied by his wife, Zeke Melgar, to this visit. Zeke Melgar manages patient's medication. · Warfarin start date: 9/13/18  · INR Goal:  2.0-3.0    · Current warfarin regimen: 10 mg on Mondays and Wednesdays; 7.5 mg ROW                 · Warfarin tablet strength:   5 mg   · Duration of therapy: Indefinite    Today's Patient Findings:    Pertinent positives includes:  No significant changes since last visit     · Patient to bring in log of blood glucose readings to next visit. He reports that he still drinks sodas throughout the day  · Wife reports that they do not eat any greens on a regular basis (baseline)    INR (POC) today (normal INR range 0.8-1.2) :    Recent Results (from the past 12 hour(s))   AMB POC PT/INR    Collection Time: 12/17/18  4:10 PM   Result Value Ref Range    VALID INTERNAL CONTROL POC Yes     Prothrombin time (POC) 16.3 (A) 9.1 - 12 seconds    INR POC 1.4 1 - 1.5       · Adherence:   · Able to recall regimen? Unclear   · Potential confusion on the correct dosing regimen. Unsure if patient was taking 7.5 mg or 5 mg on the days he is not taking 10 mg.  · Miss/extra dose? NO  · Need refill? NO    Upcoming procedure(s):  NO   · Patient's wife, Zeke Melgar, reported that the patient will follow-up in the new year regarding need for dental procedures. Past Medical History:   Diagnosis Date    Cancer ([facility] [address]     lung    Congestive heart failure, unspecified     Diabetes (HCC)     Hypertension     Overweight(278.02)     Seizures (HCC)     SOLITARY PULMONARY NODULE     1.6 cm    Stroke [facility])        Current Outpatient Medications   Medication Sig    warfarin (COUMADIN) 5 mg tablet Take 1 Tab by mouth See Admin Instructions for 30 days.  Take 7.5 mg on Tuesdays and Thursdays and 10 mg daily on all other days    metFORMIN ER (GLUCOPHAGE XR) 500 mg tablet Take 500 mg by mouth two (2) times daily (with meals).  levETIRAcetam (KEPPRA) 500 mg tablet Take 2 Tabs by mouth two (2) times a day.  glimepiride (AMARYL) 4 mg tablet Take 4 mg by mouth every morning.  guaiFENesin (ROBITUSSIN) 100 mg/5 mL liquid Take 200 mg by mouth every eight (8) hours as needed for Cough.  carvedilol (COREG) 25 mg tablet Take 25 mg by mouth two (2) times a day.  hydrALAZINE (APRESOLINE) 10 mg tablet Take 10 mg by mouth three (3) times daily.  atorvastatin (LIPITOR) 80 mg tablet TAKE ONE TABLET BY MOUTH ONCE DAILY AT  NIGHT    lisinopril (PRINIVIL, ZESTRIL) 40 mg tablet TAKE ONE TABLET BY MOUTH ONCE DAILY FOR BLOOD PRESSURE    amLODIPine (NORVASC) 10 mg tablet TAKE ONE TABLET BY MOUTH ONCE DAILY FOR BLOOD PRESSURE     No current facility-administered medications for this visit.         Wt Readings from Last 3 Encounters:   10/18/18 107 kg (236 lb)   10/04/18 103.4 kg (228 lb)   09/17/18 102.5 kg (226 lb)       CBC:    Lab Results   Component Value Date/Time    WBC 5.5 09/06/2018 04:14 PM    HGB 12.4 09/06/2018 04:14 PM    HCT 37.8 09/06/2018 04:14 PM    PLATELET 507 15/18/1675 04:14 PM    MCV 86.7 09/06/2018 04:14 PM       Lab Results   Component Value Date/Time    Protein, total 7.2 09/06/2018 04:14 PM    Albumin 3.5 09/06/2018 04:14 PM         INR History:   (normal INR range 0.8-1.2)     Date   INR   PT   Dose/Comments  12/17/18          1.4                   16.3     See adherence note; unclear what has changed recently  12/03/18          1.6                   19.6     Missed 1 dose; has been taking 7.5 mg daily  11/05/18          2.0                   23.9  10/18/18          2.1                   25.2  09/17/18          1.9                   23.1  09/13/18          1.8                   21.7  09/06/18          1.7                   16.8    A/P:       Anticoagulation:  Considering Mr. Oliva Rank past history, todays findings, and per Anticoagulation Collaborative Practice Agreement/Protocol:    1. POC INR (1.4) is Subtherapeutic for INR goal today. 2. INR results continue to remain subtherapeutic despite a recent increase in dose; possibly due to regimen confusion. Re-educated patient and wife on warfarin regimen and stressed the importance of close follow-up of INR values. Will increase dose again as patient has now had two INR values that are not at goal.  3.  Change warfarin to 7.5 mg on Tuesdays and Thursdays and 10 mg on all other days. Patient was instructed to schedule an appointment in 2 week(s) prior to leaving the clinic. Medication reconciliation was completed during the visit. Medications Discontinued During This Encounter   Medication Reason    warfarin (COUMADIN) 5 mg tablet        A full discussion of the nature of anticoagulants has been carried out. A full discussion of the need for frequent and regular monitoring, precise dosage adjustment and compliance was stressed. Side effects of potential bleeding were discussed and Mr. Ezra Hardy was instructed to call [phone] if there are any signs of abnormal bleeding. Mr. Ezra Hardy was instructed to avoid any OTC items containing aspirin or ibuprofen and prior to starting any new OTC products to consult with his physician or pharmacist to ensure no drug interactions are present. Mr. Ezra Hardy was instructed to avoid any major changes in his general diet and to avoid alcohol consumption. Mr. Ezra Hardy was provided information in the AVS that includes topics on understanding coumadin therapy, drug interaction considerations, vitamin K and coumadin use, interactions with foods and supplements containing vitamin K, and the use of herbal products.     Mr. Ezra Hardy verbalized his understanding of all instructions and will call the office with any questions, concerns, or signs of abnormal bleeding or blood clot.    Notifications of recommendations will be sent to Dr. Jaylen Escobar MD for review    Thank you for the consult,  Minerva Villanueva PharmD  PGY-1 Pharmacy Resident

## 2018-12-18 NOTE — PROGRESS NOTES
I agree with Amadeo's note. Patient looks better and more participative today than last visit. Remind patient to bring in glucometer for review. He continues to drink regular sodas throughout the day. Expresses need for dental care follow up, did not discuss specifics about what needs to be done. Has dental insurance. Discuss INR follow up frequency w/ patient. INR results remain subtherapeutic - requires more frequent follow up. Emphasize warfarin dosing regimen today (7.5 mg on Tuesdays and Thursdays and 10 mg on all other days). This regimen would minimize the number of days is required to split tablets. Dental care is important w/ DM management.      Shahnaz Franz, PharmD

## 2019-01-07 ENCOUNTER — TELEPHONE (OUTPATIENT)
Dept: INTERNAL MEDICINE CLINIC | Age: 64
End: 2019-01-07

## 2019-01-07 NOTE — TELEPHONE ENCOUNTER
Pharmacy Progress Note - Telephone Encounter    S/O: Mr. Merline Danish was contacted via an outbound telephone call to follow up on missed INR appointment today. Verified patients identifiers (name & ) per HIPAA policy. - Pt relies on daughters to assists with transportation. A/P:  - Next INR appointment will be on  @ 1PM.   - Patient endorses understanding to the provided information. All questions were answered.      Thank you,  Arden Ferguson, PharmD

## 2019-01-21 ENCOUNTER — TELEPHONE (OUTPATIENT)
Dept: INTERNAL MEDICINE CLINIC | Age: 64
End: 2019-01-21

## 2019-01-21 NOTE — TELEPHONE ENCOUNTER
Pharmacy Progress Note - Telephone Encounter    S/O: Mr. Dante Ahuja was contacted via an outbound telephone call to follow up on his missed INR appointment today. Spoke with patient's wife, Leticia Hong. - Pt could not find transportation. Family limited to 1 car. Would like reschedule appointment.   - Reports SMBG doing well \"140-150s. \" No lows or high blood sugar. A/P:  - New INR/DM follow up visit will be on Wednesday, Jan. 23rd @ 2:30 PM   - Remind patient to bring in SMBG log or glucometer to visit for review. - Patient endorses understanding to the provided information. All questions were answered.      Thank you,  Devi Aburto, PharmD

## 2019-01-23 ENCOUNTER — OFFICE VISIT (OUTPATIENT)
Dept: INTERNAL MEDICINE CLINIC | Age: 64
End: 2019-01-23

## 2019-01-23 VITALS
DIASTOLIC BLOOD PRESSURE: 94 MMHG | OXYGEN SATURATION: 98 % | BODY MASS INDEX: 30.34 KG/M2 | HEART RATE: 65 BPM | SYSTOLIC BLOOD PRESSURE: 154 MMHG | WEIGHT: 230 LBS

## 2019-01-23 DIAGNOSIS — I63.9 CEREBROVASCULAR ACCIDENT (CVA), UNSPECIFIED MECHANISM (HCC): Primary | ICD-10-CM

## 2019-01-23 LAB
INR BLD: 2.5 (ref 1–1.5)
PT POC: 30.2 SECONDS (ref 9.1–12)
VALID INTERNAL CONTROL?: YES

## 2019-01-23 RX ORDER — WARFARIN SODIUM 5 MG/1
5 TABLET ORAL SEE ADMIN INSTRUCTIONS
COMMUNITY
End: 2019-02-21 | Stop reason: SDUPTHER

## 2019-01-23 NOTE — PATIENT INSTRUCTIONS
Today your INR was 2.5. Your goal INR is  2.0-3.0 . You have a  5 mg tablet of Coumadin (warfarin). Take Coumadin as follows: · Continue warfarin 7.5 mg on Tuesdays and Thursdays and 10 mg daily the rest of the week. For your Diabetes: · Remember to take your glimepiride 4 mg with food to prevent low blood sugar. · Continue to check your blood sugar first thing in the morning and before bedtime. Check your blood sugar if you feel ill. · Do not add salt to your food. This will increase your blood pressure. Come back in 2 week(s) for your next finger stick/INR blood test.   
 
 
Avoid any over the counter items containing aspirin or ibuprofen, and avoid great swings in general diet. Avoid alcohol consumption. Please notify the INR nurse if you are started on any new medication including over the counter or herbal supplements. Also, please notify your INR nurse if any of your other prescription or over the counter medications have been discontinued. Call [facility] at [phone] if you have any signs of abnormal bleeding/blood clot. 
------------------------------------------------------------------------------------------------------------------ Taking Warfarin Safely: Care Instructions Your Care Instructions Warfarin is a medicine that you take to prevent blood clots. It is often called a blood thinner. Doctors give warfarin (such as Coumadin) to reduce the risk of blood clots. You may be at risk for blood clots if you have atrial fibrillation or deep vein thrombosis. Some other health problems may also put you at risk. Warfarin slows the amount of time it takes for your blood to clot. It can cause bleeding problems. Even if you've been taking warfarin for a while, it's important to know how to take it safely. Foods and other medicines can affect the way warfarin works. Some can make warfarin work too well. This can cause bleeding problems.  And some can make it work poorly, so that it does not prevent blood clots very well. You will need regular blood tests to check how long it takes for your blood to form a clot. This test is called a PT or prothrombin time test. The result of the test is called an INR level. Depending on the test results, your doctor or anticoagulation clinic may adjust your dose of warfarin. Follow-up care is a key part of your treatment and safety. Be sure to make and go to all appointments, and call your doctor if you are having problems. It's also a good idea to know your test results and keep a list of the medicines you take. How can you care for yourself at home? Take warfarin safely · Take your warfarin at the same time each day. · If you miss a dose of warfarin, don't take an extra dose to make up for it. Your doctor can tell you exactly what to do so you don't take too much or too little. · Wear medical alert jewelry that lets others know that you take warfarin. You can buy this at most drugstores. · Don't take warfarin if you are pregnant or planning to get pregnant. Talk to your doctor about how you can prevent getting pregnant while you are taking it. · Don't change your dose or stop taking warfarin unless your doctor tells you to. Effects of medicines and food on warfarin · Don't start or stop taking any medicines, vitamins, or natural remedies unless you first talk to your doctor. Many medicines can affect how warfarin works. These include aspirin and other pain relievers, over-the-counter medicines, multivitamins, dietary supplements, and herbal products. · Tell all of your doctors and pharmacists that you take warfarin. Some prescription medicines can affect how warfarin works. · Keep the amount of vitamin K in your diet about the same from day to day. Do not suddenly eat a lot more or a lot less food that is rich in vitamin K than you usually do.  Vitamin K affects how warfarin works and how your blood clots. Talk with your doctor before making big changes in your diet. Vitamin K is in many foods, such as: 
¨ Leafy greens, such as kale, cabbage, spinach, Swiss chard, and lettuce. ¨ Canola and soybean oils. ¨ Green vegetables, such as asparagus, broccoli, and [place] sprouts. ¨ Vegetable drinks, green tea leaves, and some dietary supplement drinks. · Avoid cranberry juice and other cranberry products. They can increase the effects of warfarin. · Limit your use of alcohol. Avoid bleeding by preventing falls and injuries · Wear slippers or shoes with nonskid soles. · Remove throw rugs and clutter. · Rearrange furniture and electrical cords to keep them out of walking paths. · Keep stairways, porches, and outside walkways well lit. Use night-lights in hallways and bathrooms. · Be extra careful when you work with sharp tools or knives. When should you call for help? Call 911 anytime you think you may need emergency care. For example, call if: 
· You have a sudden, severe headache that is different from past headaches. Call your doctor now or seek immediate medical care if: 
· You have any abnormal bleeding, such as: 
¨ Nosebleeds. ¨ Vaginal bleeding that is different (heavier, more frequent, at a different time of the month) than what you are used to. ¨ Bloody or black stools, or rectal bleeding. ¨ Bloody or pink urine. Watch closely for changes in your health, and be sure to contact your doctor if you have any problems. Where can you learn more? Go to http://carolina-shayla.info/. Enter A141 in the search box to learn more about \"Taking Warfarin Safely: Care Instructions. \" Current as of: January 27, 2016 Content Version: 11.1 © 5598-0347 [employer]. Care instructions adapted under license by [employer] (which disclaims liability or warranty for this information).  If you have questions about a medical condition or this instruction, always ask your healthcare professional. [place] [address] any warranty or liability for your use of this information.

## 2019-01-23 NOTE — PROGRESS NOTES
Pharmacy Progress Note  - Anticoagulation Management 
  
S/O:  Mr. Toyin Murphy  is a 66 y.o. male seen today for anticoagulation and diabetes management for the diagnosis of Atrial Fibrillation and Stroke s/p right carotid dissection (9/6/18). He was accompanied by his daughter, Dimitry Dawson, to this visit. Patient's wife, Mirna Mackey, manages his medication and was unavailable for assistance w/ this visit. Today's Patient Findings: 
 
Pertinent positives includes: 
Change in diet/appetite and Alcohol consumption · Dtr reports patient drinking alcohol at least 1 beer every day Diabetes: 
· Pt is not checking his SMBGs as directed  ; reports last check was on fasting on Monday - reading in the \"200s\" · Feels tired all the time · Appetite remains poor ; adds salt to meals · Still smoking cigars daily · Sleep quality is poor - heater broke at home INR (POC) today (normal INR range 0.8-1.2) :   
Recent Results (from the past 12 hour(s)) AMB POC PT/INR Collection Time: 01/23/19  3:14 PM  
Result Value Ref Range VALID INTERNAL CONTROL POC Yes Prothrombin time (POC) 30.2 (A) 9.1 - 12 seconds INR POC 2.5 (A) 1 - 1.5 · Adherence: · Able to recall regimen? N/A - wife manages medications · Miss/extra dose? YES - Daughter endorses that patient is not compliant to his medications; possible missed doses several times a week. · Need refill? NO Upcoming procedure(s):  NO 
 
Past Medical History:  
Diagnosis Date  [facility] lung  Congestive heart failure, unspecified  Diabetes ([facility]  Hypertension  Overweight(278.02)  Seizures ([facility]  SOLITARY PULMONARY NODULE   
 1.6 cm  Stroke ([facility] Current Outpatient Medications Medication Sig  warfarin (COUMADIN) 5 mg tablet Take 5 mg by mouth See Admin Instructions. Take 7.5 mg (one and one-half tablet) on Tuesday and Thursday and 10 mg (two tablets) daily the rest of the week.  carvedilol (COREG) 25 mg tablet TAKE ONE & ONE-HALF TABLETS BY MOUTH TWICE DAILY WITH MEALS  metFORMIN ER (GLUCOPHAGE XR) 500 mg tablet Take 500 mg by mouth two (2) times daily (with meals).  levETIRAcetam (KEPPRA) 500 mg tablet Take 2 Tabs by mouth two (2) times a day.  glimepiride (AMARYL) 4 mg tablet Take 4 mg by mouth every morning.  guaiFENesin (ROBITUSSIN) 100 mg/5 mL liquid Take 200 mg by mouth every eight (8) hours as needed for Cough.  carvedilol (COREG) 25 mg tablet Take 25 mg by mouth two (2) times a day.  hydrALAZINE (APRESOLINE) 10 mg tablet Take 10 mg by mouth three (3) times daily.  atorvastatin (LIPITOR) 80 mg tablet TAKE ONE TABLET BY MOUTH ONCE DAILY AT  NIGHT  lisinopril (PRINIVIL, ZESTRIL) 40 mg tablet TAKE ONE TABLET BY MOUTH ONCE DAILY FOR BLOOD PRESSURE  
 amLODIPine (NORVASC) 10 mg tablet TAKE ONE TABLET BY MOUTH ONCE DAILY FOR BLOOD PRESSURE No current facility-administered medications for this visit. Wt Readings from Last 3 Encounters:  
01/23/19 230 lb (104.3 kg) 10/18/18 236 lb (107 kg) 10/04/18 228 lb (103.4 kg) BP Readings from Last 3 Encounters:  
01/23/19 (!) 154/94  
12/03/18 113/81  
10/18/18 138/89 CBC: 
Lab Results Component Value Date/Time WBC 5.5 09/06/2018 04:14 PM  
 HGB 12.4 09/06/2018 04:14 PM  
 HCT 37.8 09/06/2018 04:14 PM  
 PLATELET 646 24/85/0332 04:14 PM  
 MCV 86.7 09/06/2018 04:14 PM  
 
 
Lab Results Component Value Date/Time Protein, total 7.2 09/06/2018 04:14 PM  
 Albumin 3.5 09/06/2018 04:14 PM  
 
 
INR History:   (normal INR range 0.8-1.2) Date   INR   PT   Dose/Comments 01/23/19 2.5  30.2 See adherence note; alcohol intake 12/17/18          1.4                   16.3     See adherence note; unclear what has changed recently 12/03/18          1.6                   19.6     Missed 1 dose; has been taking 7.5 mg daily 11/05/18          2.0                   23.9 10/18/18          2.1                   25.2 
09/17/18          1.9                   23.1 
09/13/18          1.8                   21.7 
09/06/18          1.7                   16.8 A/P:    
 
Anticoagulation: 
Considering Mr. Vahid Omalley past history, todays findings, and per Anticoagulation Collaborative Practice Agreement/Protocol: 1. POC INR (2.5) is therapeutic for INR goal today. Note, patient's alcohol consumption can also elevate INR. Review alcohol's interaction w/ warfarin and increased bleeding risk. 2.  Continue warfarin 7.5 mg on Tuesday, Thursday and 10 mg daily ROW. Diabetes: · No SMBGs to assess; reported fasting SMBG above goal of < 130 mg/dL · Binge alcohol intake also increases BGs ; active smoker not ready to quit · He is not taking glimepiride w/ food - increased hypoglycemia risk ; review dosing instruction w/ pt and family. · BP elevated today - likely due to med non-compliance and high sodium diet · Provided patient education resources on hyper/hypoglycemia and management; recommend pt check SMBG twice daily - fasting and bedtime ; bring in log/meter to next visit. · Discussed his high risk factors for additional ASCVD events Patient was instructed to schedule an appointment in 2 week(s) prior to leaving the clinic. Medication reconciliation was completed during the visit. There are no discontinued medications. A full discussion of the nature of anticoagulants has been carried out. A full discussion of the need for frequent and regular monitoring, precise dosage adjustment and compliance was stressed. Side effects of potential bleeding were discussed and Mr. Monica Carl was instructed to call [phone] if there are any signs of abnormal bleeding.   Mr. Monica Carl was instructed to avoid any OTC items containing aspirin or ibuprofen and prior to starting any new OTC products to consult with his physician or pharmacist to ensure no drug interactions are present. Mr. Rodney Mar was instructed to avoid any major changes in his general diet and to avoid alcohol consumption. Mr. Rodney Mar was provided information in the AVS that includes topics on understanding coumadin therapy, drug interaction considerations, vitamin K and coumadin use, interactions with foods and supplements containing vitamin K, and the use of herbal products. Mr. Rodney Mar verbalized his understanding of all instructions and will call the office with any questions, concerns, or signs of abnormal bleeding or blood clot. Notifications of recommendations will be sent to Dr. Chely Yeboah MD for review Thank you for the consult, Shahnaz Granados, ShruthiD

## 2019-02-21 ENCOUNTER — OFFICE VISIT (OUTPATIENT)
Dept: INTERNAL MEDICINE CLINIC | Age: 64
End: 2019-02-21

## 2019-02-21 VITALS
DIASTOLIC BLOOD PRESSURE: 81 MMHG | WEIGHT: 234 LBS | TEMPERATURE: 97.7 F | BODY MASS INDEX: 31.01 KG/M2 | OXYGEN SATURATION: 98 % | HEART RATE: 66 BPM | HEIGHT: 73 IN | SYSTOLIC BLOOD PRESSURE: 130 MMHG

## 2019-02-21 DIAGNOSIS — E78.00 PURE HYPERCHOLESTEROLEMIA: ICD-10-CM

## 2019-02-21 DIAGNOSIS — I63.9 CEREBROVASCULAR ACCIDENT (CVA), UNSPECIFIED MECHANISM (HCC): ICD-10-CM

## 2019-02-21 DIAGNOSIS — E11.8 CONTROLLED TYPE 2 DIABETES MELLITUS WITH COMPLICATION, WITHOUT LONG-TERM CURRENT USE OF INSULIN (HCC): ICD-10-CM

## 2019-02-21 DIAGNOSIS — I48.20 CHRONIC ATRIAL FIBRILLATION (HCC): Primary | ICD-10-CM

## 2019-02-21 DIAGNOSIS — Z12.5 PROSTATE CANCER SCREENING: ICD-10-CM

## 2019-02-21 DIAGNOSIS — I10 ESSENTIAL HYPERTENSION: ICD-10-CM

## 2019-02-21 LAB
HBA1C MFR BLD HPLC: 7.2 % (ref 4.8–5.6)
INR BLD: 2.8 (ref 1–1.5)
PT POC: 33.4 SECONDS (ref 9.1–12)
VALID INTERNAL CONTROL?: YES

## 2019-02-21 RX ORDER — WARFARIN SODIUM 5 MG/1
5 TABLET ORAL SEE ADMIN INSTRUCTIONS
Qty: 60 TAB | Refills: 11 | Status: SHIPPED | OUTPATIENT
Start: 2019-02-21 | End: 2019-11-11 | Stop reason: ALTCHOICE

## 2019-02-21 NOTE — PROGRESS NOTES
HISTORY OF PRESENT ILLNESS Kong Mcguire is a 66 y.o. male. HPI  
 
F/u dm-2 and [facility], chronic afib, hx lung carcinoid Feels ok Saw Dr Brandon Santana recently and will get CT to f/u carcinoid On cp or increased SOB sxs 
fsbs 120-130 INR 2.8 today Last OV 
F/u CVA and right carotid dissection On warfarin for last 1-2 months-due for INR --today INR is 2.1 on warfarin 7.5 mg every day 
  
Saw neurologist--repeat carotids after next OV Needs refill of Karen Grant Wife here today Last OV Hospital f/u from [facility]. No records available. 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 xarelto was not restarted Had ? Seizures so keppra was increased from 750mg bid to 1000mg bid 
speech back to normal 
No c/o increased right arm or leg weakness now No HH PT or outpt PT set up 
  
  
 
  
 
Last a1c 6.7 LDL 56 
amb a1c 7.2 today Too many carbs at home 
fsbs at home -no recent  checks Sees Dr Isaura Nation for afib, renal Dr Sydnee Rinaldi for ckd 3 and Dr Aure Mar s/p VATS for lung carcinoid Dr Alannah Nunes next month for carcinoid Creatinine down to 1.23  2 mos ago with renal appt 
  
 
 
 
Patient Active Problem List  
 Diagnosis Date Noted  Stroke ([facility] 11/05/2018  Carcinoid tumor 02/13/2018  CKD stage 2 due to type 2 diabetes mellitus ([facility] 05/14/2017  Polyneuropathy in diabetes(357.2) 07/21/2015  Hypertensive emergency 07/20/2015  Seizure disorder ([facility] 07/20/2015  Type II or unspecified type diabetes mellitus with neurological manifestations, uncontrolled(250.62) ([facility] 07/20/2015  History of atrial fibrillation 07/20/2015  Hyperlipidemia 07/20/2015  Di[facility] 11/21/2014  Seizures ([facility] 08/08/2014  Syncope 07/13/2012  Cardiomyopathy, nonischemic ([facility] 10/31/2009  DM (diabetes mellitus) ([facility] 10/26/2009  
 HTN (hypertension), benign 10/26/2009  Pulmonary nodule 10/26/2009 Current Outpatient Medications Medication Sig Dispense Refill  warfarin (COUMADIN) 5 mg tablet Take 5 mg by mouth See Admin Instructions. Take 7.5 mg (one and one-half tablet) on Tuesday and Thursday and 10 mg (two tablets) daily the rest of the week.  carvedilol (COREG) 25 mg tablet TAKE ONE & ONE-HALF TABLETS BY MOUTH TWICE DAILY WITH MEALS 90 Tab 3  
 metFORMIN ER (GLUCOPHAGE XR) 500 mg tablet Take 500 mg by mouth two (2) times daily (with meals).  levETIRAcetam (KEPPRA) 500 mg tablet Take 2 Tabs by mouth two (2) times a day. 120 Tab 11  
 glimepiride (AMARYL) 4 mg tablet Take 4 mg by mouth every morning.  guaiFENesin (ROBITUSSIN) 100 mg/5 mL liquid Take 200 mg by mouth every eight (8) hours as needed for Cough.  carvedilol (COREG) 25 mg tablet Take 25 mg by mouth two (2) times a day.  hydrALAZINE (APRESOLINE) 10 mg tablet Take 10 mg by mouth three (3) times daily.  atorvastatin (LIPITOR) 80 mg tablet TAKE ONE TABLET BY MOUTH ONCE DAILY AT  NIGHT 30 Tab 11  
 lisinopril (PRINIVIL, ZESTRIL) 40 mg tablet TAKE ONE TABLET BY MOUTH ONCE DAILY FOR BLOOD PRESSURE 30 Tab 11  
 amLODIPine (NORVASC) 10 mg tablet TAKE ONE TABLET BY MOUTH ONCE DAILY FOR BLOOD PRESSURE 30 Tab 11 No Known Allergies Lab Results Component Value Date/Time WBC 5.5 09/06/2018 04:14 PM  
 HGB 12.4 09/06/2018 04:14 PM  
 HCT 37.8 09/06/2018 04:14 PM  
 PLATELET 416 22/32/8361 04:14 PM  
 MCV 86.7 09/06/2018 04:14 PM  
 
Lab Results Component Value Date/Time  Hemoglobin A1c 6.7 (H) 02/13/2018 04:35 PM  
 Hemoglobin A1c 7.7 (H) 04/13/2017 03:07 PM  
 Hemoglobin A1c 6.9 (H) 10/27/2016 10:40 AM  
 Glucose 94 09/06/2018 04:14 PM  
 Glucose (POC) 111 (H) 09/06/2018 04:13 PM  
 Glucose  (A) 12/03/2018 04:35 PM  
 Microalb/Creat ratio (ug/mg creat.) 249.0 (H) 04/13/2017 03:07 PM  
 LDL, calculated 56 02/13/2018 04:35 PM  
 Creatinine 1.35 (H) 09/06/2018 04:14 PM  
  
Lab Results Component Value Date/Time Cholesterol, total 110 02/13/2018 04:35 PM  
 Cholesterol (POC) 167 11/21/2014 09:00 AM  
 HDL Cholesterol 33 (L) 02/13/2018 04:35 PM  
 LDL, calculated 56 02/13/2018 04:35 PM  
 LDL Cholesterol (POC) 111 11/21/2014 09:00 AM  
 Triglyceride 107 02/13/2018 04:35 PM  
 Triglycerides (POC) 70 11/21/2014 09:00 AM  
 CHOL/HDL Ratio 3.7 07/21/2015 03:03 AM  
 
Lab Results Component Value Date/Time GFR est non-AA 54 (L) 09/06/2018 04:14 PM  
 GFR est AA >60 09/06/2018 04:14 PM  
 Creatinine 1.35 (H) 09/06/2018 04:14 PM  
 BUN 9 09/06/2018 04:14 PM  
 Sodium 143 09/06/2018 04:14 PM  
 Potassium 3.3 (L) 09/06/2018 04:14 PM  
 Chloride 106 09/06/2018 04:14 PM  
 CO2 28 09/06/2018 04:14 PM  
 Magnesium 2.3 03/31/2017 03:30 PM  
  
ROS Physical Exam  
Constitutional: He appears well-developed and well-nourished. No distress. Appears stated age HENT:  
Head: Normocephalic. Cardiovascular: Normal rate, regular rhythm and normal heart sounds. Exam reveals no gallop and no friction rub. No murmur heard. Pulmonary/Chest: Effort normal and breath sounds normal. No respiratory distress. He has no wheezes. He has no rales. He exhibits no tenderness. Abdominal: Soft. Musculoskeletal: He exhibits no edema. Neurological: He is alert. Psychiatric: He has a normal mood and affect. Nursing note and vitals reviewed. ASSESSMENT and PLAN Diagnoses and all orders for this visit: 
 
1. Chronic atrial fibrillation (HCC) -     AMB POC PT/INR 2. Controlled type 2 diabetes mellitus with complication, without long-term current use of insulin ([address] -     AMB POC HEMOGLOBIN A1C 2.8 INR clinic in 4 weeks Recommend to lower calories in diet 3. Essential hypertension Good control 4. Prostate cancer screening -     PSA SCREENING (SCREENING) 5. Pure hypercholesterolemia -     LIPID PANEL Continue statin 6. Cerebrovascular accident (CVA), unspecified mechanism ([address] Long term warfarin On disabliity Other orders -     warfarin (COUMADIN) 5 mg tablet; Take 1 Tab by mouth See Admin Instructions. Take 7.5 mg (one and one-half tablet) on Tuesday and Thursday and 10 mg (two tablets) daily the rest of the week. Follow-up Disposition: 
Return in about 4 months (around 6/21/2019) for dm-2 htn hld hx cva afib.

## 2019-02-21 NOTE — PROGRESS NOTES
Chief Complaint Patient presents with  Medication Refill  
  coumadin  Anticoagulation INR =2.8 PT=33.4  Diabetes Poc A1C 7.2  Follow-up Routine care. Mr. April Penaloza is here today for anticoagulation monitoring for the diagnosis of Atrial Fibtillation. His INR goal is 2.0 - 3.0 and his current Coumadin dose is 7.5mg Tuesdays and Thursday ,10mg on other days. Today's findings include an INR of 2.8 (normal INR range 2.0-3.0) and PT 33.4 Considering Mr. Isaiah Machado past history, todays findings, and per the coumadin policy/protocol, Mr. Nelsy Craft was instructed to take Coumadin as follows,  7.5mg Tuesdays and Thursdays. He was also instructed to schedule an appointment in 3/25/19 in 4 weeks prior to leaving for an INR check. A full discussion of the nature of anticoagulants has been carried out. A full discussion of the need for frequent and regular monitoring, precise dosage adjustment and compliance was stressed. Side effects of potential bleeding were discussed and Mr. Nelsy Craft was instructed to call [phone] if there are any signs of abnormal bleeding. Mr. Nelsy Craft was instructed to avoid any OTC items containing aspirin or ibuprofen and prior to starting any new OTC products to consult with his physician or pharmacist to ensure no drug interactions are present. Mr. Nelsy Craft was instructed to avoid any major changes in his general diet and to avoid alcohol consumption. . 
 
Mr. Nelsy Craft was provided a literature booklet, \"Treatment with Warfarin (Coumadin)\", that includes topics on understanding coumadin therapy, drug interaction considerations, vitamin K and coumadin use, interactions with foods and supplements containing vitamin K, and the use of herbal products. Mr. Nelsy Craft verbalized his understanding of all instructions and will call the office with any questions, concerns, or signs of abnormal bleeding or blood clot.

## 2019-03-25 ENCOUNTER — ANTI-COAG VISIT (OUTPATIENT)
Dept: INTERNAL MEDICINE CLINIC | Age: 64
End: 2019-03-25

## 2019-03-25 VITALS — SYSTOLIC BLOOD PRESSURE: 111 MMHG | HEART RATE: 67 BPM | DIASTOLIC BLOOD PRESSURE: 83 MMHG

## 2019-03-25 DIAGNOSIS — I63.9 CEREBROVASCULAR ACCIDENT (CVA), UNSPECIFIED MECHANISM (HCC): ICD-10-CM

## 2019-03-25 LAB
INR BLD: 1.9 (ref 1–1.5)
PT POC: 23.4 SECONDS (ref 9.1–12)
VALID INTERNAL CONTROL?: YES

## 2019-03-25 NOTE — PROGRESS NOTES
Pharmacy Progress Note  - Anticoagulation Management S/O:  Mr. Richa Bailey  is a 66 y.o. male seen today for diabetes and anticoagulation management for the diagnosis of Atrial Fibrillation and Stroke s/p right carotid dissection (9/6/18). He was accompanied by his daughter and granddaughter on this visit. Patient's wife, Leonie Tijerina, manages his medications. Spoke with her on the phone briefly for update on medications. Interim History: · Warfarin start date: 9/13/18 · INR Goal:  2.0-3.0 · Current warfarin regimen: 7.5 mg on Tuesday and Thursday, 10 mg ROW · Warfarin tablet strength:   5 mg · Duration of therapy: Indefinite Today's pertinent positives includes: 
Alcohol consumption · Reports drinking 1 beer on occasion, although daughter and granddaughter indicated it might be more often Diabetes: · Reports SMBGs are in the \"100s\", denies blood glucose readings in the 200-300s · Denies adding extra salt to food · Reports having episodes of feeling like heart is fluttering. Started about 2 weeks ago, but episodes usually resolve quickly. Planning to make appointment with heart doctor soon to have checked out. · Reports feeling tired often and thinks he is sleeping too much, which he states is \"scaring him\". Believes he may be taking too many medications, including sleep medication. · States he gets up about 5-6 times per night to use the restroom. Also reports feeling like he sometimes needs a snack when he awakens overnight. · Blood pressure today: 111/83 mmHg, Pulse 67 INR (POC) today (normal INR range 0.8-1.2) :   
Results for orders placed or performed in visit on 03/25/19 AMB POC PT/INR Result Value Ref Range VALID INTERNAL CONTROL POC Yes Prothrombin time (POC) 23.4 (A) 9.1 - 12 seconds INR POC 1.9 (A) 1 - 1.5 · Adherence: · Able to recall regimen? NO- wife manages medications · Miss/extra dose? YES- 1 missed dose last week Tuesday · Need refill? NO Upcoming procedure(s):  NO 
 
 
Past Medical History:  
Diagnosis Date  Cancer ([facility] lung  Congestive heart failure, unspecified  Diabetes ([facility]  Hypertension  Overweight(278.02)  Seizures ([facility]  SOLITARY PULMONARY NODULE   
 1.6 cm  Stroke ([facility] No Known Allergies Current Outpatient Medications Medication Sig  warfarin (COUMADIN) 5 mg tablet Take 1 Tab by mouth See Admin Instructions. Take 7.5 mg (one and one-half tablet) on Tuesday and Thursday and 10 mg (two tablets) daily the rest of the week.  carvedilol (COREG) 25 mg tablet TAKE ONE & ONE-HALF TABLETS BY MOUTH TWICE DAILY WITH MEALS  metFORMIN ER (GLUCOPHAGE XR) 500 mg tablet Take 500 mg by mouth two (2) times daily (with meals).  levETIRAcetam (KEPPRA) 500 mg tablet Take 2 Tabs by mouth two (2) times a day.  glimepiride (AMARYL) 4 mg tablet Take 4 mg by mouth every morning.  hydrALAZINE (APRESOLINE) 10 mg tablet Take 10 mg by mouth three (3) times daily.  atorvastatin (LIPITOR) 80 mg tablet TAKE ONE TABLET BY MOUTH ONCE DAILY AT  NIGHT  lisinopril (PRINIVIL, ZESTRIL) 40 mg tablet TAKE ONE TABLET BY MOUTH ONCE DAILY FOR BLOOD PRESSURE  
 amLODIPine (NORVASC) 10 mg tablet TAKE ONE TABLET BY MOUTH ONCE DAILY FOR BLOOD PRESSURE No current facility-administered medications for this visit. Wt Readings from Last 3 Encounters:  
02/21/19 106.1 kg (234 lb)  
01/23/19 104.3 kg (230 lb) 10/18/18 107 kg (236 lb) BP Readings from Last 3 Encounters:  
03/25/19 111/83  
02/21/19 130/81  
01/23/19 (!) 154/94 Lab Results Component Value Date/Time WBC 5.5 09/06/2018 04:14 PM  
 HGB 12.4 09/06/2018 04:14 PM  
 HCT 37.8 09/06/2018 04:14 PM  
 PLATELET 133 37/09/7374 04:14 PM  
 MCV 86.7 09/06/2018 04:14 PM  
 
 
Lab Results Component Value Date/Time  Protein, total 7.2 09/06/2018 04:14 PM  
 Albumin 3.5 09/06/2018 04:14 PM  
 
INR History:   (normal INR range 0.8-1.2) Date   INR   PT   Dose/Comments 03/25/19 1.9  23.4 Missed 1 dose; Alcohol intake 01/23/19          2.5                   30.2     See adherence note; alcohol intake 12/17/18          1.4                   16.3     See adherence note; unclear what has changed recently 12/03/18          1.6                   19.6     Missed 1 dose; has been taking 7.5 mg daily 11/05/18          2.0                   23.9 
10/18/18          2.1                   25.2 
09/17/18          1.9                   23.1 
09/13/18          1.8                   21.7 
09/06/18          1.7                   16.8 A/P:    
 
Anticoagulation: 
Considering Mr. Yoo Riding past history, todays findings, and per Anticoagulation Collaborative Practice Agreement/Protocol: 1. POC INR (1.9) is Subtherapeutic for INR goal today. Missed dose last week likely contributing, but alcohol consumption is also playing a factor on top of ongoing adherence issues. Discussed interaction of alcohol with warfarin. 2.  Continue warfarin 7.5 mg on Tuesday, Thursday and 10 mg daily ROW. Diabetes: 
· Unable to assess SMBGs as meter not brought in; reported fasting SMBG above goal of < 130 mg/dL · Provided patient education resources on hyper/hypoglycemia and management as had been reported sx which may be related. Recommended pt check SMBG twice daily (fasting and bedtime) and to bring log/meter to next visit. · Recommended patient check SMBG when he feels low overnight. · Discussed patients blood pressure- today's measured lower than at previous visit (previously 130/81). Encouraged patient to check blood pressure at home in the morning and at bedtime. Instructed to bring log to next appointment. · Encouraged patient to make an appointment with his cardiologist Dr. Vida Maddox to follow up on \"heart fluttering\". Will plan for a follow-up call regarding blood pressure management in 2 weeks. Patient was instructed to schedule an appointment in 4 week(s) prior to leaving the clinic. Medication reconciliation was completed during the visit. There are no discontinued medications. A full discussion of the nature of anticoagulants has been carried out. A full discussion of the need for frequent and regular monitoring, precise dosage adjustment and compliance was stressed. Side effects of potential bleeding were discussed and Mr. Cathy Pickett was instructed to call [phone] if there are any signs of abnormal bleeding. Mr. Cathy Pickett was instructed to avoid any OTC items containing aspirin or ibuprofen and prior to starting any new OTC products to consult with his physician or pharmacist to ensure no drug interactions are present. Mr. Cathy Pickett was instructed to avoid any major changes in his general diet and to avoid alcohol consumption. Mr. Cathy Pickett was provided information in the AVS that includes topics on understanding coumadin therapy, drug interaction considerations, vitamin K and coumadin use, interactions with foods and supplements containing vitamin K, and the use of herbal products. Mr. Cathy Pickett verbalized his understanding of all instructions and will call the office with any questions, concerns, or signs of abnormal bleeding or blood clot. Notifications of recommendations will be sent to Dr. Jaylene Will MD for review. Thank you, Shruthi FrankD

## 2019-03-25 NOTE — PROGRESS NOTES
Pharmacy Progress Note I agree with Morgan's note. Med Adherence: 
Pt's wife is primarily responsible for his medication management Emphasized the importance of taking current medications daily. S/p CVA; AF Except for c/o of \"heart fluttering,\" pt denies CP at this time. INR last month was therapeutic. Denies any recent alcohol consumption - although family rapport suggests otherwise Continue current regimen of 7.5 mg Tues, Thurs and 10 mg daily ROW  
 
 
CAD/HTN/CVA: 
Understand patient's c/o of drowsiness/lethargy. Recommend pt check & document BP and SMBG daily. Need to r/o hypotension vs. Hyper/hypoglycemia No longer added extra salt to meals If BP remains in the low as it was today, will evaluate current BP medications. Recommend pt bring in logs and current medications for review at next visit. Recommend follow up w/ cardiologist ASAP - pt will schedule appt this week DM: 
Pt endorses he checks his SMBG but never brings in logs Endorses nocturia - 5-6 x/night ; craves sweets Denies polyphagia - it was 3 PM and pt endorses only drinking mt dew all day - could not recall his last meal  
Discussed and provided resources again regarding s/sx of hypo and hyperglycemia management Discussed extensively regarding risk of DM and CVD. Pt and dtr enodrse understanding to information provided. Shahanz Judd, PharmD

## 2019-03-25 NOTE — PATIENT INSTRUCTIONS
Today your INR was 1.9. Your goal INR is  2-3 . You have a   10 mg tablet of Coumadin (warfarin). Take Coumadin as follows: 
Continue warfarin 7.5 mg on Tuesdays and Thursdays and 10 mg daily the rest of the week. Remember to schedule an appointment with Dr. Eduar Braga. Bring your blood sugar log to your next appointment. Check your blood pressure while sitting down in the morning and at bedtime and record on the log. Bring in log to next appointment. Will follow up in 2 weeks for blood pressure. Come back in 4 week(s) for your next finger stick/INR blood test.   
 
 
Avoid any over the counter items containing aspirin or ibuprofen, and avoid great swings in general diet. Avoid alcohol consumption. Please notify the INR nurse if you are started on any new medication including over the counter or herbal supplements. Also, please notify your INR nurse if any of your other prescription or over the counter medications have been discontinued. Call [facility] at [phone] if you have any signs of abnormal bleeding/blood clot. 
------------------------------------------------------------------------------------------------------------------ Taking Warfarin Safely: Care Instructions Your Care Instructions Warfarin is a medicine that you take to prevent blood clots. It is often called a blood thinner. Doctors give warfarin (such as Coumadin) to reduce the risk of blood clots. You may be at risk for blood clots if you have atrial fibrillation or deep vein thrombosis. Some other health problems may also put you at risk. Warfarin slows the amount of time it takes for your blood to clot. It can cause bleeding problems. Even if you've been taking warfarin for a while, it's important to know how to take it safely. Foods and other medicines can affect the way warfarin works. Some can make warfarin work too well. This can cause bleeding problems.  And some can make it work poorly, so that it does not prevent blood clots very well. You will need regular blood tests to check how long it takes for your blood to form a clot. This test is called a PT or prothrombin time test. The result of the test is called an INR level. Depending on the test results, your doctor or anticoagulation clinic may adjust your dose of warfarin. Follow-up care is a key part of your treatment and safety. Be sure to make and go to all appointments, and call your doctor if you are having problems. It's also a good idea to know your test results and keep a list of the medicines you take. How can you care for yourself at home? Take warfarin safely · Take your warfarin at the same time each day. · If you miss a dose of warfarin, don't take an extra dose to make up for it. Your doctor can tell you exactly what to do so you don't take too much or too little. · Wear medical alert jewelry that lets others know that you take warfarin. You can buy this at most drugstores. · Don't take warfarin if you are pregnant or planning to get pregnant. Talk to your doctor about how you can prevent getting pregnant while you are taking it. · Don't change your dose or stop taking warfarin unless your doctor tells you to. Effects of medicines and food on warfarin · Don't start or stop taking any medicines, vitamins, or natural remedies unless you first talk to your doctor. Many medicines can affect how warfarin works. These include aspirin and other pain relievers, over-the-counter medicines, multivitamins, dietary supplements, and herbal products. · Tell all of your doctors and pharmacists that you take warfarin. Some prescription medicines can affect how warfarin works. · Keep the amount of vitamin K in your diet about the same from day to day. Do not suddenly eat a lot more or a lot less food that is rich in vitamin K than you usually do.  Vitamin K affects how warfarin works and how your blood clots. Talk with your doctor before making big changes in your diet. Vitamin K is in many foods, such as: 
¨ Leafy greens, such as kale, cabbage, spinach, Swiss chard, and lettuce. ¨ Canola and soybean oils. ¨ Green vegetables, such as asparagus, broccoli, and [place] sprouts. ¨ Vegetable drinks, green tea leaves, and some dietary supplement drinks. · Avoid cranberry juice and other cranberry products. They can increase the effects of warfarin. · Limit your use of alcohol. Avoid bleeding by preventing falls and injuries · Wear slippers or shoes with nonskid soles. · Remove throw rugs and clutter. · Rearrange furniture and electrical cords to keep them out of walking paths. · Keep stairways, porches, and outside walkways well lit. Use night-lights in hallways and bathrooms. · Be extra careful when you work with sharp tools or knives. When should you call for help? Call 911 anytime you think you may need emergency care. For example, call if: 
· You have a sudden, severe headache that is different from past headaches. Call your doctor now or seek immediate medical care if: 
· You have any abnormal bleeding, such as: 
¨ Nosebleeds. ¨ Vaginal bleeding that is different (heavier, more frequent, at a different time of the month) than what you are used to. ¨ Bloody or black stools, or rectal bleeding. ¨ Bloody or pink urine. Watch closely for changes in your health, and be sure to contact your doctor if you have any problems. Where can you learn more? Go to http://carolina-shayla.info/. Enter C840 in the search box to learn more about \"Taking Warfarin Safely: Care Instructions. \" Current as of: January 27, 2016 Content Version: 11.1 © 9486-5826 [employer]. Care instructions adapted under license by [employer] (which disclaims liability or warranty for this information).  If you have questions about a medical condition or this instruction, always ask your healthcare professional. [place] [address] any warranty or liability for your use of this information.

## 2019-04-29 ENCOUNTER — OFFICE VISIT (OUTPATIENT)
Dept: INTERNAL MEDICINE CLINIC | Age: 64
End: 2019-04-29

## 2019-04-29 VITALS
OXYGEN SATURATION: 99 % | HEIGHT: 73 IN | HEART RATE: 75 BPM | SYSTOLIC BLOOD PRESSURE: 134 MMHG | WEIGHT: 231 LBS | BODY MASS INDEX: 30.62 KG/M2 | DIASTOLIC BLOOD PRESSURE: 93 MMHG

## 2019-04-29 DIAGNOSIS — Z86.79 HISTORY OF ATRIAL FIBRILLATION: ICD-10-CM

## 2019-04-29 DIAGNOSIS — I63.89 CEREBROVASCULAR ACCIDENT (CVA) DUE TO OTHER MECHANISM (HCC): Primary | ICD-10-CM

## 2019-04-29 LAB
INR BLD: 5.7 (ref 1–1.5)
PT POC: 68.4 SECONDS (ref 9.1–12)
VALID INTERNAL CONTROL?: YES

## 2019-04-29 NOTE — PATIENT INSTRUCTIONS
Today your INR was 5.7 . Your goal INR is 2.0-3.0 . You have a  5  mg tablet of Coumadin (warfarin). Take Coumadin as follows: 
 
- Hold warfarin tonight, tomorrow, and Wednesday. - Then on Thursday (5/2/19),  take warfarin 5 mg. 
- On Friday (5/3/19), then resume 7.5 mg on Tuesday and Thursday and 10 mg daily the rest of the week - For upcoming dental procedure, will need to hold warfarin 5 days prior to procedure. Come back in  2   week(s) for your next finger stick/INR blood test.   
 
 
Avoid any over the counter items containing aspirin or ibuprofen, and avoid great swings in general diet. Avoid alcohol consumption. Please notify the INR nurse if you are started on any new medication including over the counter or herbal supplements. Also, please notify your INR nurse if any of your other prescription or over the counter medications have been discontinued. Call [facility] at [phone] if you have any signs of abnormal bleeding/blood clot. 
------------------------------------------------------------------------------------------------------------------ Taking Warfarin Safely: Care Instructions Your Care Instructions Warfarin is a medicine that you take to prevent blood clots. It is often called a blood thinner. Doctors give warfarin (such as Coumadin) to reduce the risk of blood clots. You may be at risk for blood clots if you have atrial fibrillation or deep vein thrombosis. Some other health problems may also put you at risk. Warfarin slows the amount of time it takes for your blood to clot. It can cause bleeding problems. Even if you've been taking warfarin for a while, it's important to know how to take it safely. Foods and other medicines can affect the way warfarin works. Some can make warfarin work too well. This can cause bleeding problems. And some can make it work poorly, so that it does not prevent blood clots very well. You will need regular blood tests to check how long it takes for your blood to form a clot. This test is called a PT or prothrombin time test. The result of the test is called an INR level. Depending on the test results, your doctor or anticoagulation clinic may adjust your dose of warfarin. Follow-up care is a key part of your treatment and safety. Be sure to make and go to all appointments, and call your doctor if you are having problems. It's also a good idea to know your test results and keep a list of the medicines you take. How can you care for yourself at home? Take warfarin safely · Take your warfarin at the same time each day. · If you miss a dose of warfarin, don't take an extra dose to make up for it. Your doctor can tell you exactly what to do so you don't take too much or too little. · Wear medical alert jewelry that lets others know that you take warfarin. You can buy this at most drugstores. · Don't take warfarin if you are pregnant or planning to get pregnant. Talk to your doctor about how you can prevent getting pregnant while you are taking it. · Don't change your dose or stop taking warfarin unless your doctor tells you to. Effects of medicines and food on warfarin · Don't start or stop taking any medicines, vitamins, or natural remedies unless you first talk to your doctor. Many medicines can affect how warfarin works. These include aspirin and other pain relievers, over-the-counter medicines, multivitamins, dietary supplements, and herbal products. · Tell all of your doctors and pharmacists that you take warfarin. Some prescription medicines can affect how warfarin works. · Keep the amount of vitamin K in your diet about the same from day to day. Do not suddenly eat a lot more or a lot less food that is rich in vitamin K than you usually do. Vitamin K affects how warfarin works and how your blood clots.  Talk with your doctor before making big changes in your diet. Vitamin K is in many foods, such as: 
¨ Leafy greens, such as kale, cabbage, spinach, Swiss chard, and lettuce. ¨ Canola and soybean oils. ¨ Green vegetables, such as asparagus, broccoli, and [place] sprouts. ¨ Vegetable drinks, green tea leaves, and some dietary supplement drinks. · Avoid cranberry juice and other cranberry products. They can increase the effects of warfarin. · Limit your use of alcohol. Avoid bleeding by preventing falls and injuries · Wear slippers or shoes with nonskid soles. · Remove throw rugs and clutter. · Rearrange furniture and electrical cords to keep them out of walking paths. · Keep stairways, porches, and outside walkways well lit. Use night-lights in hallways and bathrooms. · Be extra careful when you work with sharp tools or knives. When should you call for help? Call 911 anytime you think you may need emergency care. For example, call if: 
· You have a sudden, severe headache that is different from past headaches. Call your doctor now or seek immediate medical care if: 
· You have any abnormal bleeding, such as: 
¨ Nosebleeds. ¨ Vaginal bleeding that is different (heavier, more frequent, at a different time of the month) than what you are used to. ¨ Bloody or black stools, or rectal bleeding. ¨ Bloody or pink urine. Watch closely for changes in your health, and be sure to contact your doctor if you have any problems. Where can you learn more? Go to http://carolina-shayla.info/. Enter Y274 in the search box to learn more about \"Taking Warfarin Safely: Care Instructions. \" Current as of: January 27, 2016 Content Version: 11.1 © 6078-1534 [employer]. Care instructions adapted under license by [employer] (which disclaims liability or warranty for this information).  If you have questions about a medical condition or this instruction, always ask your healthcare professional. [employer] disclaims any warranty or liability for your use of this information.

## 2019-04-29 NOTE — PROGRESS NOTES
Pharmacy Progress Note  - Anticoagulation Management S/O:  Mr. Kristen Andrea  is a 66 y.o. male seen today for diabetes and anticoagulation management for the diagnosis of Atrial Fibrillation and Stroke s/p right carotid dissection (9/6/18). He was accompanied by his daughter to this visit. Patient's wife, Yumiko Mera, manages his medications. She was available by phone to assist w/ this visit. Interim history: Went to the dentist for evaluation. Was prescribed amoxicillin for dental infection/pain. Will have dental extraction but dates remains tentative. Pt states there has been some deaths in the family. This has motivated him to Lauren and do better. \"  
Denies any missed doses of medications. · Warfarin start date: 9/13/18 · INR Goal:  2.0-3.0 · Current warfarin regimen: 7.5 mg on Tuesday and Thursday, 10 mg ROW · Warfarin tablet strength:   5 mg · Duration of therapy: Indefinite Today's pertinent positives includes: Antibiotic use and Change in diet/appetite Results for orders placed or performed in visit on 04/29/19 AMB POC PT/INR Result Value Ref Range VALID INTERNAL CONTROL POC Yes Prothrombin time (POC) 68.4 (A) 9.1 - 12 seconds INR POC 5.7 (A) 1 - 1.5  
- Had amoxicillin 500 mg for dental infection; last dose was this last Friday and Saturday  
- not have been smoking/drinking 
- denies bleeding/bruising at this time  
- has not been checking SMBGs - last check was 2 weeks ago ; not sure if he has any test strips at home - BP today: 85/51 ---> recheck : 143/90 (right arm); 134/93 (left arm) ; HR 75 - 80 bpm ; denies dizziness · Adherence: · Able to recall regimen? N/A  - Yumiko Mera refers to AVS provided from previous. Could not recall specific warfarin regimen · Miss/extra dose? NO 
· Need refill? NO Upcoming procedure(s):  YES Past Medical History:  
Diagnosis Date  [facility] lung  Congestive heart failure, unspecified  Diabetes ([facility]  Hypertension  Overweight(278.02)  [facility]  SOLITARY PULMONARY NODULE   
 1.6 cm  Stroke ([facility] No Known Allergies Current Outpatient Medications Medication Sig  warfarin (COUMADIN) 5 mg tablet Take 1 Tab by mouth See Admin Instructions. Take 7.5 mg (one and one-half tablet) on Tuesday and Thursday and 10 mg (two tablets) daily the rest of the week.  carvedilol (COREG) 25 mg tablet TAKE ONE & ONE-HALF TABLETS BY MOUTH TWICE DAILY WITH MEALS  metFORMIN ER (GLUCOPHAGE XR) 500 mg tablet Take 500 mg by mouth two (2) times daily (with meals).  levETIRAcetam (KEPPRA) 500 mg tablet Take 2 Tabs by mouth two (2) times a day.  glimepiride (AMARYL) 4 mg tablet Take 4 mg by mouth every morning.  hydrALAZINE (APRESOLINE) 10 mg tablet Take 10 mg by mouth three (3) times daily.  atorvastatin (LIPITOR) 80 mg tablet TAKE ONE TABLET BY MOUTH ONCE DAILY AT  NIGHT  lisinopril (PRINIVIL, ZESTRIL) 40 mg tablet TAKE ONE TABLET BY MOUTH ONCE DAILY FOR BLOOD PRESSURE  
 amLODIPine (NORVASC) 10 mg tablet TAKE ONE TABLET BY MOUTH ONCE DAILY FOR BLOOD PRESSURE No current facility-administered medications for this visit. Wt Readings from Last 3 Encounters:  
02/21/19 234 lb (106.1 kg) 01/23/19 230 lb (104.3 kg) 10/18/18 236 lb (107 kg) BP Readings from Last 3 Encounters:  
03/25/19 111/83  
02/21/19 130/81  
01/23/19 (!) 154/94 Lab Results Component Value Date/Time WBC 5.5 09/06/2018 04:14 PM  
 HGB 12.4 09/06/2018 04:14 PM  
 HCT 37.8 09/06/2018 04:14 PM  
 PLATELET 121 55/05/8704 04:14 PM  
 MCV 86.7 09/06/2018 04:14 PM  
 
 
Lab Results Component Value Date/Time Protein, total 7.2 09/06/2018 04:14 PM  
 Albumin 3.5 09/06/2018 04:14 PM  
 
INR History:   (normal INR range 0.8-1.2) Date   INR   PT   Dose/Comments 04/29/19 5.7  68.4 Amoxicillin; 7.5 mg Tues, Thurs and 10 mg daily ROW 03/25/19          1.9                   23.4     Missed 1 dose; Alcohol intake 01/23/19          2.5                   30. 2     See adherence note; alcohol intake 12/17/18          1.4                   16.3     See adherence note; unclear what has changed recently 12/03/18          1.6                   19.6     Missed 1 dose; has been taking 7.5 mg daily 11/05/18          2.0                   23.9 
10/18/18          2.1                   25.2 
09/17/18          1.9                   23.1 
09/13/18          1.8                   21.7 
09/06/18          1.7                   16.8 Medications that can increase  INR: Amoxicillin A/P:    
 
Anticoagulation: 
Considering Mr. Ricco Olsen past history, todays findings, and per Anticoagulation Collaborative Practice Agreement/Protocol: 1. POC INR (5.7) is supratherapeutic for INR goal today secondary to recent amoxicillin therapy. Recall compliance has been an ongoing issue. With recent improvement in adherence, will continue to evaluate his INR response to current regimen. 2. Hold warfarin x 3 days. On Thursday (5/2/19), take warfarin 5 mg then resume warfarin at 7.5 mg Tuesday, Thursday, and 10 mg daily ROW.  on pt's increased bleeding/bruising risk today. 3. With upcoming dental extractions, will need to hold warfarin 5 days prior to procedure. Patient was instructed to schedule an appointment in 2 week(s) prior to leaving the clinic. Medication reconciliation was completed during the visit. There are no discontinued medications. A full discussion of the nature of anticoagulants has been carried out. A full discussion of the need for frequent and regular monitoring, precise dosage adjustment and compliance was stressed. Side effects of potential bleeding were discussed and Mr. Francesco Barahona was instructed to call [phone] if there are any signs of abnormal bleeding.   Mr. Francesco Barahona was instructed to avoid any OTC items containing aspirin or ibuprofen and prior to starting any new OTC products to consult with his physician or pharmacist to ensure no drug interactions are present. Mr. Sis Teresa was instructed to avoid any major changes in his general diet and to avoid alcohol consumption. Mr. Sis Teresa was provided information in the AVS that includes topics on understanding coumadin therapy, drug interaction considerations, vitamin K and coumadin use, interactions with foods and supplements containing vitamin K, and the use of herbal products. Mr. Sis Teresa verbalized his understanding of all instructions and will call the office with any questions, concerns, or signs of abnormal bleeding or blood clot. Notifications of recommendations will be sent to Dr. Deana Witt MD for review. Thank you, Shahnaz Bill Ni

## 2019-05-13 ENCOUNTER — TELEPHONE (OUTPATIENT)
Dept: INTERNAL MEDICINE CLINIC | Age: 64
End: 2019-05-13

## 2019-05-13 NOTE — TELEPHONE ENCOUNTER
Pharmacy Progress Note - Telephone Encounter    S/O: Mr. Lorenzo Green 66 y.o. male, was contacted via an outbound telephone call to discuss his missed appointment today. Verified patients identifiers (name & ) per HIPAA policy. - Pt had a family  today. Would like to reschedule appointment   - States he feels fine. A/P:  - Given supratherapeutic INR at the last visit, discussed would like to see patient this week if possible. Pt is not uncertain about his available. Dtr can drive him to the appointment next Monday. - INR visit rescheduled for 19 @ 3PM  - Patient endorses understanding to the provided information. All questions were answered at this time.      Thank you,  Shahnaz Montgomery, PharmD, CDE

## 2019-06-09 NOTE — PATIENT INSTRUCTIONS
Description RBVO per Dr. Heidi Wright for 7.5 mg on FRI, SAT, and SUN, 5 MG all other days return for INR on Monday at your doctors appt. Calm

## 2019-08-23 RX ORDER — HYDRALAZINE HYDROCHLORIDE 10 MG/1
TABLET, FILM COATED ORAL
Qty: 90 TAB | Refills: 11 | Status: SHIPPED | OUTPATIENT
Start: 2019-08-23 | End: 2020-09-18

## 2019-08-28 RX ORDER — ATORVASTATIN CALCIUM 80 MG/1
TABLET, FILM COATED ORAL
Qty: 30 TAB | Refills: 11 | Status: SHIPPED | OUTPATIENT
Start: 2019-08-28 | End: 2020-11-23

## 2019-09-25 RX ORDER — CARVEDILOL 25 MG/1
TABLET ORAL
Qty: 90 TAB | Refills: 3 | Status: SHIPPED | OUTPATIENT
Start: 2019-09-25 | End: 2020-11-28

## 2019-09-25 RX ORDER — GLIMEPIRIDE 4 MG/1
TABLET ORAL
Qty: 90 TAB | Refills: 3 | Status: SHIPPED | OUTPATIENT
Start: 2019-09-25 | End: 2020-09-18

## 2019-09-25 RX ORDER — CARVEDILOL 25 MG/1
TABLET ORAL
Qty: 90 TAB | Refills: 3 | Status: SHIPPED | OUTPATIENT
Start: 2019-09-25 | End: 2019-09-25 | Stop reason: SDUPTHER

## 2019-10-21 ENCOUNTER — OFFICE VISIT (OUTPATIENT)
Dept: INTERNAL MEDICINE CLINIC | Age: 64
End: 2019-10-21

## 2019-10-21 VITALS
DIASTOLIC BLOOD PRESSURE: 87 MMHG | HEART RATE: 64 BPM | BODY MASS INDEX: 31.42 KG/M2 | TEMPERATURE: 97.9 F | SYSTOLIC BLOOD PRESSURE: 132 MMHG | RESPIRATION RATE: 16 BRPM | OXYGEN SATURATION: 99 % | HEIGHT: 72 IN | WEIGHT: 232 LBS

## 2019-10-21 DIAGNOSIS — E78.00 PURE HYPERCHOLESTEROLEMIA: ICD-10-CM

## 2019-10-21 DIAGNOSIS — I63.9 CEREBROVASCULAR ACCIDENT (CVA), UNSPECIFIED MECHANISM (HCC): ICD-10-CM

## 2019-10-21 DIAGNOSIS — I48.20 CHRONIC A-FIB (HCC): ICD-10-CM

## 2019-10-21 DIAGNOSIS — I77.71 DISSECTION OF CAROTID ARTERY (HCC): ICD-10-CM

## 2019-10-21 DIAGNOSIS — Z23 ENCOUNTER FOR IMMUNIZATION: ICD-10-CM

## 2019-10-21 DIAGNOSIS — R56.9 SEIZURE (HCC): ICD-10-CM

## 2019-10-21 DIAGNOSIS — I10 ESSENTIAL HYPERTENSION: ICD-10-CM

## 2019-10-21 DIAGNOSIS — Z86.73 HISTORY OF CVA (CEREBROVASCULAR ACCIDENT): ICD-10-CM

## 2019-10-21 DIAGNOSIS — D12.6 ADENOMATOUS POLYP OF COLON, UNSPECIFIED PART OF COLON: ICD-10-CM

## 2019-10-21 DIAGNOSIS — Z12.5 PROSTATE CANCER SCREENING: ICD-10-CM

## 2019-10-21 DIAGNOSIS — E11.65 UNCONTROLLED TYPE 2 DIABETES MELLITUS WITH HYPERGLYCEMIA (HCC): Primary | ICD-10-CM

## 2019-10-21 LAB
HBA1C MFR BLD HPLC: 7.9 % (ref 4.8–5.6)
INR BLD: 1.2 (ref 1–1.5)
PT POC: 14.2 SECONDS (ref 9.1–12)
VALID INTERNAL CONTROL?: YES

## 2019-10-21 NOTE — PROGRESS NOTES
Chief Complaint   Patient presents with    Cholesterol Problem     Routine follow up    Diabetes     Routine follow up    Follow-up     CVA    Labs     POC A1C 7.9 %    Anticoagulation     Inr=1.2 PT=14.3    Sleep Problem     sleepy

## 2019-10-21 NOTE — PROGRESS NOTES
Pharmacy Progress Note  - Anticoagulation Management    S/O:  Mr. Rosa Hedrick  is a 66 y. o. male seen today for diabetes and anticoagulation management for the diagnosis of Atrial Fibrillation and Stroke s/p right carotid dissection (9/6/18).    He was accompanied by his wife to this visit. Last PharmD visit was 4/29/19. Pt was seen following his PCP visit today. · Warfarin start date: 9/13/18  · INR Goal:  2.0-3.0    · Current warfarin regimen: 7.5 mg on Tuesday and Thursday, 10 mg ROW                      · Warfarin tablet strength:   5 mg   · Duration of therapy: Indefinite       Today's pertinent positives includes:  No significant changes since last visit     Denies CP/SOB/LE & UE pain at this time  Denies any changes to vitamin K intake; no protein supplements/green tea      Results for orders placed or performed in visit on 10/21/19   AMB POC HEMOGLOBIN A1C   Result Value Ref Range    Hemoglobin A1c (POC) 7.9 (A) 4.8 - 5.6 %   AMB POC PT/INR   Result Value Ref Range    VALID INTERNAL CONTROL POC Yes     Prothrombin time (POC) 14.2 (A) 9.1 - 12 seconds    INR POC 1.2 1 - 1.5     · Adherence:   · Able to recall regimen? YES  · Miss/extra dose? YES - missed 2 doses last week   · Need refill?  NO    Upcoming procedure(s):  NO      Past Medical History:   Diagnosis Date    [facility]     [facility]    Congestive heart failure, unspecified     [facility]     Hypertension     Overweight(278.02)     Seizures ([facility])     SOLITARY PULMONARY NODULE     1.6 cm    Stroke ([facility])        No Known Allergies    Current Outpatient Medications   Medication Sig    amLODIPine (NORVASC) 10 mg tablet TAKE 1 TABLET BY MOUTH ONCE DAILY FOR BLOOD PRESSURE    glimepiride (AMARYL) 4 mg tablet TAKE 1 TABLET BY MOUTH IN THE MORNING    carvedilol (COREG) 25 mg tablet TAKE 1 & 1/2 (ONE & ONE-HALF) TABLETS BY MOUTH TWICE DAILY WITH MEALS    atorvastatin (LIPITOR) 80 mg tablet TAKE 1 TABLET BY MOUTH ONCE DAILY AT  NIGHT  hydrALAZINE (APRESOLINE) 10 mg tablet TAKE 1 TABLET BY MOUTH THREE TIMES DAILY    metFORMIN ER (GLUCOPHAGE XR) 500 mg tablet Take 500 mg by mouth two (2) times daily (with meals).  levETIRAcetam (KEPPRA) 500 mg tablet Take 2 Tabs by mouth two (2) times a day.  glimepiride (AMARYL) 4 mg tablet Take 4 mg by mouth every morning.  hydrALAZINE (APRESOLINE) 10 mg tablet Take 10 mg by mouth three (3) times daily.  lisinopril (PRINIVIL, ZESTRIL) 40 mg tablet TAKE ONE TABLET BY MOUTH ONCE DAILY FOR BLOOD PRESSURE    warfarin (COUMADIN) 5 mg tablet Take 1 Tab by mouth See Admin Instructions. Take 7.5 mg (one and one-half tablet) on Tuesday and Thursday and 10 mg (two tablets) daily the rest of the week. No current facility-administered medications for this visit.         Wt Readings from Last 3 Encounters:   10/21/19 232 lb (105.2 kg)   04/29/19 231 lb (104.8 kg)   02/21/19 234 lb (106.1 kg)       BP Readings from Last 3 Encounters:   10/21/19 132/87   04/29/19 (!) 134/93   03/25/19 111/83       Pulse Readings from Last 3 Encounters:   10/21/19 64   04/29/19 75   03/25/19 67       Lab Results   Component Value Date/Time    Sodium 143 09/06/2018 04:14 PM    Potassium 3.3 (L) 09/06/2018 04:14 PM    Chloride 106 09/06/2018 04:14 PM    CO2 28 09/06/2018 04:14 PM    Anion gap 9 09/06/2018 04:14 PM    Glucose 94 09/06/2018 04:14 PM    BUN 9 09/06/2018 04:14 PM    Creatinine 1.35 (H) 09/06/2018 04:14 PM    BUN/Creatinine ratio 7 (L) 09/06/2018 04:14 PM    GFR est AA >60 09/06/2018 04:14 PM    GFR est non-AA 54 (L) 09/06/2018 04:14 PM    Calcium 7.2 (L) 09/06/2018 04:14 PM       Lab Results   Component Value Date/Time    WBC 5.5 09/06/2018 04:14 PM    HGB 12.4 09/06/2018 04:14 PM    HCT 37.8 09/06/2018 04:14 PM    PLATELET 123 26/63/5504 04:14 PM    MCV 86.7 09/06/2018 04:14 PM       Lab Results   Component Value Date/Time    Protein, total 7.2 09/06/2018 04:14 PM    Albumin 3.5 09/06/2018 04:14 PM       INR History:   (normal INR range 0.8-1.2)     Date   INR   PT   Dose/Comments  10/21/19 1.2  14.2 7.5 mg on Tues, Thurs, 10 mg daily ROW ; missed 2 doses  04/29/19          5.7                   68.4     Amoxicillin; 7.5 mg Tues, Thurs and 10 mg daily ROW  03/25/19          1.9                   23.4     Missed 1 dose; Alcohol intake  01/23/19          2.5                   30. 2     See adherence note; alcohol intake  12/17/18          1.4                   16.3     See adherence note; unclear what has changed recently  12/03/18          1.6                   19.6     Missed 1 dose; has been taking 7.5 mg daily  11/05/18          2.0                   23.9  10/18/18          2.1                   25.2  09/17/18          1.9                   23.1  09/13/18          1.8                   21.7    A/P:       Anticoagulation:  Considering Mr. Hood Avila past history, todays findings, and per Anticoagulation Collaborative Practice Agreement/Protocol:    1. POC INR (1.2) is subtherapeutic for INR goal today secondary to missed doses. 2.  Continue warfarin 7.5 mg Tuesday, Thursday and 10 mg daily ROW. 3. Emphasized med adherence. Discussed importance of scheduling follow up with neurology. Check: Medication Adherence at the next visit. Bring in warfarin pill bottle    Patient was instructed to schedule an appointment in 1 week(s) prior to leaving the clinic. Medication reconciliation was completed during the visit. There are no discontinued medications. A full discussion of the nature of anticoagulants has been carried out. A full discussion of the need for frequent and regular monitoring, precise dosage adjustment and compliance was stressed. Side effects of potential bleeding were discussed and Mr. Salma Torres was instructed to call [phone] if there are any signs of abnormal bleeding.   Mr. Salma Torres was instructed to avoid any OTC items containing aspirin or ibuprofen and prior to starting any new OTC products to consult with his physician or pharmacist to ensure no drug interactions are present. Mr. Obie Fabry was instructed to avoid any major changes in his general diet and to avoid alcohol consumption. Mr. Obie Fabry was provided information in the AVS that includes topics on understanding coumadin therapy, drug interaction considerations, vitamin K and coumadin use, interactions with foods and supplements containing vitamin K, and the use of herbal products. Mr. Obie Fabry verbalized his understanding of all instructions and will call the office with any questions, concerns, or signs of abnormal bleeding or blood clot. Notifications of recommendations will be sent to Dr. Tiago Monreal MD for review.     Thank you,  Shahnaz Taylor, PharmD, CDE

## 2019-10-21 NOTE — PATIENT INSTRUCTIONS
Today your INR was 1.2. Your goal INR is  2.0-3.0 . You have a  5  mg tablet of Coumadin (warfarin). Take Coumadin (warfarin) as follows: 
 
Continue warfarin 7.5 mg on Tuesday, Thursday and 10 mg daily ROW. Bring warfarin pill bottle to this visit. Come back in  1 week(s) for your next finger stick/INR blood test.   
 
Avoid any over the counter items containing aspirin or ibuprofen, and avoid great swings in general diet. Avoid alcohol consumption. Please notify the INR nurse if you are started on any new medication including over the counter or herbal supplements. Also, please notify your INR nurse if any of your other prescription or over the counter medications have been discontinued. Call [facility] at [phone] if you have any signs of abnormal bleeding/blood clot. 
------------------------------------------------------------------------------------------------------------------ Taking Warfarin Safely: Care Instructions Your Care Instructions Warfarin is a medicine that you take to prevent blood clots. It is often called a blood thinner. Doctors give warfarin (such as Coumadin) to reduce the risk of blood clots. You may be at risk for blood clots if you have atrial fibrillation or deep vein thrombosis. Some other health problems may also put you at risk. Warfarin slows the amount of time it takes for your blood to clot. It can cause bleeding problems. Even if you've been taking warfarin for a while, it's important to know how to take it safely. Foods and other medicines can affect the way warfarin works. Some can make warfarin work too well. This can cause bleeding problems. And some can make it work poorly, so that it does not prevent blood clots very well. You will need regular blood tests to check how long it takes for your blood to form a clot.  This test is called a PT or prothrombin time test. The result of the test is called an INR level. Depending on the test results, your doctor or anticoagulation clinic may adjust your dose of warfarin. Follow-up care is a key part of your treatment and safety. Be sure to make and go to all appointments, and call your doctor if you are having problems. It's also a good idea to know your test results and keep a list of the medicines you take. How can you care for yourself at home? Take warfarin safely · Take your warfarin at the same time each day. · If you miss a dose of warfarin, don't take an extra dose to make up for it. Your doctor can tell you exactly what to do so you don't take too much or too little. · Wear medical alert jewelry that lets others know that you take warfarin. You can buy this at most drugstores. · Don't take warfarin if you are pregnant or planning to get pregnant. Talk to your doctor about how you can prevent getting pregnant while you are taking it. · Don't change your dose or stop taking warfarin unless your doctor tells you to. Effects of medicines and food on warfarin · Don't start or stop taking any medicines, vitamins, or natural remedies unless you first talk to your doctor. Many medicines can affect how warfarin works. These include aspirin and other pain relievers, over-the-counter medicines, multivitamins, dietary supplements, and herbal products. · Tell all of your doctors and pharmacists that you take warfarin. Some prescription medicines can affect how warfarin works. · Keep the amount of vitamin K in your diet about the same from day to day. Do not suddenly eat a lot more or a lot less food that is rich in vitamin K than you usually do. Vitamin K affects how warfarin works and how your blood clots. Talk with your doctor before making big changes in your diet. Vitamin K is in many foods, such as: 
¨ Leafy greens, such as kale, cabbage, spinach, Swiss chard, and lettuce. ¨ Canola and soybean oils. ¨ Green vegetables, such as asparagus, broccoli, and [place] sprouts. ¨ Vegetable drinks, green tea leaves, and some dietary supplement drinks. · Avoid cranberry juice and other cranberry products. They can increase the effects of warfarin. · Limit your use of alcohol. Avoid bleeding by preventing falls and injuries · Wear slippers or shoes with nonskid soles. · Remove throw rugs and clutter. · Rearrange furniture and electrical cords to keep them out of walking paths. · Keep stairways, porches, and outside walkways well lit. Use night-lights in hallways and bathrooms. · Be extra careful when you work with sharp tools or knives. When should you call for help? Call 911 anytime you think you may need emergency care. For example, call if: 
· You have a sudden, severe headache that is different from past headaches. Call your doctor now or seek immediate medical care if: 
· You have any abnormal bleeding, such as: 
¨ Nosebleeds. ¨ Vaginal bleeding that is different (heavier, more frequent, at a different time of the month) than what you are used to. ¨ Bloody or black stools, or rectal bleeding. ¨ Bloody or pink urine. Watch closely for changes in your health, and be sure to contact your doctor if you have any problems. Where can you learn more? Go to http://carolina-shyala.info/. Enter D118 in the search box to learn more about \"Taking Warfarin Safely: Care Instructions. \" Current as of: January 27, 2016 Content Version: 11.1 © 4638-3228 [employer]. Care instructions adapted under license by [employer] (which disclaims liability or warranty for this information). If you have questions about a medical condition or this instruction, always ask your healthcare professional. [place] [address] any warranty or liability for your use of this information. Office Policies Phone calls/patient messages: Please allow up to 24 hours for someone in the office to contact you about your call or message. Be mindful your provider may be out of the office or your message may require further review. We encourage you to use [employer] for your messages as this is a faster, more efficient way to communicate with our office Medication Refills: 
         
Prescription medications require 48-72 business hours to process. We encourage you to use [employer] for your refills. For controlled medications: Please allow 72 business hours to process. Certain medications may require you to  a written prescription at our office. NO narcotic/controlled medications will be prescribed after 4pm Monday through Friday or on weekends Form/Paperwork Completion: 
         
Please note a $25 fee may incur for all paperwork for completed by our providers. We ask that you allow 7-10 business days. Pre-payment is due prior to picking up/faxing the completed form. You may also download your forms to [employer] to have your doctor print off.

## 2019-10-21 NOTE — PROGRESS NOTES
HISTORY OF PRESENT ILLNESS  Vinny Frye is a 60 y.o. male. HPI        F/u dm-2 and MARIA TERESA, chronic afib, hx lung carcinoid  hx cva and right carotid dissection-here with wife  On warfarin for carotid dissection last tear  Last inr 5.7  On 4/2019  inr 1.2 today, likely some nonadherence with coumadin per pt  Last a1c 7.2 LDL 56  amb a1c 7.9 today  No fsbs checks  Has not been compliant with diet. Saw Dr Mirna Warren recently      Last Gabino pan  Saw Dr Riki Glass recently and will get CT to f/u carcinoid  On cp or increased SOB sxs  fsbs 120-130  INR 2.8 today  F/u CVA and right carotid dissection  On warfarin for last 1-2 months-due for INR --today INR is 2.1 on warfarin 7.5 mg every day     Saw neurologist--repeat carotids after next OV  Needs refill of keprra  Wife here today       Last [facility] f/u from [facility]. No records available. 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 xarelto was not restarted  Had ?  Seizures so keppra was increased from 750mg bid to 1000mg bid  speech back to normal  No c/o increased right arm or leg weakness now  No HH PT or outpt PT set up        Patient Active Problem List    Diagnosis Date Noted    Stroke [facility]) 11/05/2018    Carcinoid tumor 02/13/2018    CKD stage 2 due to type 2 diabetes mellitus ([facility] 05/14/2017    Polyneuropathy in diabetes(357.2) 07/21/2015    Hypertensive emergency 07/20/2015    Seizure disorder ([facility] 07/20/2015    Type II or unspecified type diabetes mellitus with neurological manifestations, uncontrolled(250.62) ([facility] 07/20/2015    History of atrial fibrillation 07/20/2015    Hyperlipidemia 07/20/2015    Diabetic neuropathy ([facility] 11/21/2014    Seizures ([facility] 08/08/2014    Syncope 07/13/2012    Cardiomyopathy, nonischemic ([facility] 10/31/2009    DM (diabetes mellitus) ([facility] 10/26/2009    HTN (hypertension), benign 10/26/2009    Pulmonary nodule 10/26/2009     Current Outpatient Medications   Medication Sig Dispense Refill    amLODIPine (NORVASC) 10 mg tablet TAKE 1 TABLET BY MOUTH ONCE DAILY FOR BLOOD PRESSURE 90 Tab 3    glimepiride (AMARYL) 4 mg tablet TAKE 1 TABLET BY MOUTH IN THE MORNING 90 Tab 3    carvedilol (COREG) 25 mg tablet TAKE 1 & 1/2 (ONE & ONE-HALF) TABLETS BY MOUTH TWICE DAILY WITH MEALS 90 Tab 3    atorvastatin (LIPITOR) 80 mg tablet TAKE 1 TABLET BY MOUTH ONCE DAILY AT  NIGHT 30 Tab 11    hydrALAZINE (APRESOLINE) 10 mg tablet TAKE 1 TABLET BY MOUTH THREE TIMES DAILY 90 Tab 11    warfarin (COUMADIN) 5 mg tablet Take 1 Tab by mouth See Admin Instructions. Take 7.5 mg (one and one-half tablet) on Tuesday and Thursday and 10 mg (two tablets) daily the rest of the week. 60 Tab 11    metFORMIN ER (GLUCOPHAGE XR) 500 mg tablet Take 500 mg by mouth two (2) times daily (with meals).  levETIRAcetam (KEPPRA) 500 mg tablet Take 2 Tabs by mouth two (2) times a day. 120 Tab 11    glimepiride (AMARYL) 4 mg tablet Take 4 mg by mouth every morning.  hydrALAZINE (APRESOLINE) 10 mg tablet Take 10 mg by mouth three (3) times daily.       lisinopril (PRINIVIL, ZESTRIL) 40 mg tablet TAKE ONE TABLET BY MOUTH ONCE DAILY FOR BLOOD PRESSURE 30 Tab 11     No Known Allergies   Lab Results   Component Value Date/Time    WBC 5.5 09/06/2018 04:14 PM    HGB 12.4 09/06/2018 04:14 PM    HCT 37.8 09/06/2018 04:14 PM    PLATELET 510 90/36/6637 04:14 PM    MCV 86.7 09/06/2018 04:14 PM     Lab Results   Component Value Date/Time    Hemoglobin A1c 6.7 (H) 02/13/2018 04:35 PM    Hemoglobin A1c 7.7 (H) 04/13/2017 03:07 PM    Hemoglobin A1c 6.9 (H) 10/27/2016 10:40 AM    Glucose 94 09/06/2018 04:14 PM    Glucose (POC) 111 (H) 09/06/2018 04:13 PM    Glucose  (A) 12/03/2018 04:35 PM    Microalb/Creat ratio (ug/mg creat.) 249.0 (H) 04/13/2017 03:07 PM    LDL, calculated 56 02/13/2018 04:35 PM    Creatinine 1.35 (H) 09/06/2018 04:14 PM      Lab Results   Component Value Date/Time    Cholesterol, total 110 02/13/2018 04:35 PM    Cholesterol (POC) 167 11/21/2014 09:00 AM    HDL Cholesterol 33 (L) 02/13/2018 04:35 PM    LDL, calculated 56 02/13/2018 04:35 PM    LDL Cholesterol (POC) 111 11/21/2014 09:00 AM    Triglyceride 107 02/13/2018 04:35 PM    Triglycerides (POC) 70 11/21/2014 09:00 AM    CHOL/HDL Ratio 3.7 07/21/2015 03:03 AM     Lab Results   Component Value Date/Time    GFR est non-AA 54 (L) 09/06/2018 04:14 PM    GFR est AA >60 09/06/2018 04:14 PM    Creatinine 1.35 (H) 09/06/2018 04:14 PM    BUN 9 09/06/2018 04:14 PM    Sodium 143 09/06/2018 04:14 PM    Potassium 3.3 (L) 09/06/2018 04:14 PM    Chloride 106 09/06/2018 04:14 PM    CO2 28 09/06/2018 04:14 PM    Magnesium 2.3 03/31/2017 03:30 PM        ROS    Physical Exam   Constitutional: He appears well-developed and well-nourished. No distress. Appears stated age   HENT:   Head: Normocephalic. Cardiovascular: Normal rate, regular rhythm and normal heart sounds. Exam reveals no gallop and no friction rub. No murmur heard. Pulmonary/Chest: Effort normal and breath sounds normal. No respiratory distress. He has no wheezes. He has no rales. He exhibits no tenderness. Abdominal: Soft. Musculoskeletal: He exhibits no edema. Neurological: He is alert. Psychiatric: He has a normal mood and affect. Nursing note and vitals reviewed. ASSESSMENT and PLAN  Diagnoses and all orders for this visit:    1. Uncontrolled type 2 diabetes mellitus with hyperglycemia (HCC)  -     METABOLIC PANEL, COMPREHENSIVE  -     AMB POC HEMOGLOBIN A1C    2. Essential hypertension  -     CBC W/O DIFF  -     METABOLIC PANEL, COMPREHENSIVE   controlled  3.  Pure hypercholesterolemia  -     METABOLIC PANEL, COMPREHENSIVE  -     LIPID PANEL   On statin  4. Chronic a-fib  -     AMB POC PT/INR 1.2nonadherent with med and f/u in INR clinic   Wife needs to monitor pill box and coumadin carefully-discussed   Needs INR monitoring -discussed   Continue same dose 10 mg every day except 7.5 mg q Tuesday and Thursday   INR clinic in 1 week--d/w pharmacist   ? Change back to xarelto  5. History of CVA (cerebrovascular accident)  -     REFERRAL TO NEUROLOGY    6. Dissection of carotid artery (HCC)  -     DUPLEX CAROTID BILATERAL; Future  -     REFERRAL TO NEUROLOGY   ? Change back to xarelto if can get med assistance again d/t cost    7. Prostate cancer screening  -     PSA SCREENING (SCREENING)    8. Encounter for immunization  -     INFLUENZA VIRUS VAC QUAD,SPLIT,PRESV FREE SYRINGE IM    9. Seizure ([facility]  -     REFERRAL TO NEUROLOGY    10.  Adenomatous polyp of colon, unspecified part of colon  -     REFERRAL TO GASTROENTEROLOGY    11. Cerebrovascular accident (CVA), unspecified mechanism ([facility]      Follow-up and Dispositions    · Return in about 3 months (around 1/21/2020) for dm-2 afib htn carotid stenosos x  10/28/19 @ 10 AM.

## 2019-10-28 ENCOUNTER — HOSPITAL ENCOUNTER (OUTPATIENT)
Dept: VASCULAR SURGERY | Age: 64
Discharge: HOME | End: 2019-10-28
Attending: INTERNAL MEDICINE
Payer: MEDICARE

## 2019-10-28 ENCOUNTER — OFFICE VISIT (OUTPATIENT)
Dept: INTERNAL MEDICINE CLINIC | Age: 64
End: 2019-10-28

## 2019-10-28 DIAGNOSIS — I63.9 CEREBROVASCULAR ACCIDENT (CVA), UNSPECIFIED MECHANISM (HCC): Primary | ICD-10-CM

## 2019-10-28 DIAGNOSIS — I77.71 DISSECTION OF CAROTID ARTERY (HCC): ICD-10-CM

## 2019-10-28 DIAGNOSIS — I48.20 CHRONIC A-FIB (HCC): ICD-10-CM

## 2019-10-28 LAB
INR BLD: 4 (ref 1–1.5)
PT POC: 50.1 SECONDS (ref 9.1–12)
VALID INTERNAL CONTROL?: YES

## 2019-10-28 PROCEDURE — 93880 EXTRACRANIAL BILAT STUDY: CPT

## 2019-10-28 NOTE — PATIENT INSTRUCTIONS
Today your INR was 4.0. Your goal INR is  2.0-3.0 . You have a  5 mg tablet of Coumadin (warfarin). Take Coumadin (warfarin) as follows: Hold tonight's dose. Continue warfarin 7.5 mg on Tuesday, Thursday and 10 mg daily the rest of the week. Alcohol will increase your INR. This increases your bleeding. Come back in   2  week(s) for your next finger stick/INR blood test.   
 
Avoid any over the counter items containing aspirin or ibuprofen, and avoid great swings in general diet. Avoid alcohol consumption. Please notify the INR nurse if you are started on any new medication including over the counter or herbal supplements. Also, please notify your INR nurse if any of your other prescription or over the counter medications have been discontinued. Call [facility] at [phone] if you have any signs of abnormal bleeding/blood clot. 
------------------------------------------------------------------------------------------------------------------ Taking Warfarin Safely: Care Instructions Your Care Instructions Warfarin is a medicine that you take to prevent blood clots. It is often called a blood thinner. Doctors give warfarin (such as Coumadin) to reduce the risk of blood clots. You may be at risk for blood clots if you have atrial fibrillation or deep vein thrombosis. Some other health problems may also put you at risk. Warfarin slows the amount of time it takes for your blood to clot. It can cause bleeding problems. Even if you've been taking warfarin for a while, it's important to know how to take it safely. Foods and other medicines can affect the way warfarin works. Some can make warfarin work too well. This can cause bleeding problems. And some can make it work poorly, so that it does not prevent blood clots very well. You will need regular blood tests to check how long it takes for your blood to form a clot.  This test is called a PT or prothrombin time test. The result of the test is called an INR level. Depending on the test results, your doctor or anticoagulation clinic may adjust your dose of warfarin. Follow-up care is a key part of your treatment and safety. Be sure to make and go to all appointments, and call your doctor if you are having problems. It's also a good idea to know your test results and keep a list of the medicines you take. How can you care for yourself at home? Take warfarin safely · Take your warfarin at the same time each day. · If you miss a dose of warfarin, don't take an extra dose to make up for it. Your doctor can tell you exactly what to do so you don't take too much or too little. · Wear medical alert jewelry that lets others know that you take warfarin. You can buy this at most drugstores. · Don't take warfarin if you are pregnant or planning to get pregnant. Talk to your doctor about how you can prevent getting pregnant while you are taking it. · Don't change your dose or stop taking warfarin unless your doctor tells you to. Effects of medicines and food on warfarin · Don't start or stop taking any medicines, vitamins, or natural remedies unless you first talk to your doctor. Many medicines can affect how warfarin works. These include aspirin and other pain relievers, over-the-counter medicines, multivitamins, dietary supplements, and herbal products. · Tell all of your doctors and pharmacists that you take warfarin. Some prescription medicines can affect how warfarin works. · Keep the amount of vitamin K in your diet about the same from day to day. Do not suddenly eat a lot more or a lot less food that is rich in vitamin K than you usually do. Vitamin K affects how warfarin works and how your blood clots. Talk with your doctor before making big changes in your diet. Vitamin K is in many foods, such as: 
¨ Leafy greens, such as kale, cabbage, spinach, Swiss chard, and lettuce. ¨ Canola and soybean oils. ¨ Green vegetables, such as asparagus, broccoli, and [place] sprouts. ¨ Vegetable drinks, green tea leaves, and some dietary supplement drinks. · Avoid cranberry juice and other cranberry products. They can increase the effects of warfarin. · Limit your use of alcohol. Avoid bleeding by preventing falls and injuries · Wear slippers or shoes with nonskid soles. · Remove throw rugs and clutter. · Rearrange furniture and electrical cords to keep them out of walking paths. · Keep stairways, porches, and outside walkways well lit. Use night-lights in hallways and bathrooms. · Be extra careful when you work with sharp tools or knives. When should you call for help? Call 911 anytime you think you may need emergency care. For example, call if: 
· You have a sudden, severe headache that is different from past headaches. Call your doctor now or seek immediate medical care if: 
· You have any abnormal bleeding, such as: 
¨ Nosebleeds. ¨ Vaginal bleeding that is different (heavier, more frequent, at a different time of the month) than what you are used to. ¨ Bloody or black stools, or rectal bleeding. ¨ Bloody or pink urine. Watch closely for changes in your health, and be sure to contact your doctor if you have any problems. Where can you learn more? Go to http://carolina-shayla.info/. Enter A684 in the search box to learn more about \"Taking Warfarin Safely: Care Instructions. \" Current as of: January 27, 2016 Content Version: 11.1 © 8064-1368 [employer]. Care instructions adapted under license by [employer] (which disclaims liability or warranty for this information). If you have questions about a medical condition or this instruction, always ask your healthcare professional. [place] [address] any warranty or liability for your use of this information.

## 2019-10-28 NOTE — PROGRESS NOTES
Pharmacy Progress Note  - Anticoagulation Management    S/O:  Mr. Rosa Hedrick  is a 66 y. o. male seen today for diabetes and anticoagulation management for the diagnosis of Atrial Fibrillation and Stroke s/p right carotid dissection (9/6/18).    He was accompanied by his daughter to this visit. · Warfarin start date: 9/13/18  · INR Goal:  2.0-3.0    · Current warfarin regimen: 7.5 mg on Tuesday and Thursday, 10 mg ROW                      · Warfarin tablet strength:   5 mg   · Duration of therapy: Indefinite    Today's pertinent positives includes:  Alcohol consumption     Reports to drinking 2 beers last night. Another occurrence last week  Denies s/sx of bleeding/bruising at this time. Results for orders placed or performed in visit on 10/28/19   AMB POC PT/INR   Result Value Ref Range    VALID INTERNAL CONTROL POC Yes     Prothrombin time (POC) 50.1 (A) 9.1 - 12 seconds    INR POC 4.0 (A) 1 - 1.5     · Adherence:   · Able to recall regimen? NO  · Miss/extra dose? NO  · Need refill? NO    Upcoming procedure(s):  NO      Past Medical History:   Diagnosis Date    Cancer ([facility]     lung    Congestive heart failure, unspecified     Diabetes ([facility]     Hypertension     Overweight(278.02)     Seizures (HCC)     SOLITARY PULMONARY NODULE     1.6 cm    Stroke (HCC)        No Known Allergies    Current Outpatient Medications   Medication Sig    amLODIPine (NORVASC) 10 mg tablet TAKE 1 TABLET BY MOUTH ONCE DAILY FOR BLOOD PRESSURE    glimepiride (AMARYL) 4 mg tablet TAKE 1 TABLET BY MOUTH IN THE MORNING    carvedilol (COREG) 25 mg tablet TAKE 1 & 1/2 (ONE & ONE-HALF) TABLETS BY MOUTH TWICE DAILY WITH MEALS    atorvastatin (LIPITOR) 80 mg tablet TAKE 1 TABLET BY MOUTH ONCE DAILY AT  NIGHT    hydrALAZINE (APRESOLINE) 10 mg tablet TAKE 1 TABLET BY MOUTH THREE TIMES DAILY    warfarin (COUMADIN) 5 mg tablet Take 1 Tab by mouth See Admin Instructions.  Take 7.5 mg (one and one-half tablet) on Tuesday and Thursday and 10 mg (two tablets) daily the rest of the week.  metFORMIN ER (GLUCOPHAGE XR) 500 mg tablet Take 500 mg by mouth two (2) times daily (with meals).  levETIRAcetam (KEPPRA) 500 mg tablet Take 2 Tabs by mouth two (2) times a day.  glimepiride (AMARYL) 4 mg tablet Take 4 mg by mouth every morning.  hydrALAZINE (APRESOLINE) 10 mg tablet Take 10 mg by mouth three (3) times daily.  lisinopril (PRINIVIL, ZESTRIL) 40 mg tablet TAKE ONE TABLET BY MOUTH ONCE DAILY FOR BLOOD PRESSURE     No current facility-administered medications for this visit.         Wt Readings from Last 3 Encounters:   10/21/19 232 lb (105.2 kg)   04/29/19 231 lb (104.8 kg)   02/21/19 234 lb (106.1 kg)       BP Readings from Last 3 Encounters:   10/21/19 132/87   04/29/19 (!) 134/93   03/25/19 111/83       Pulse Readings from Last 3 Encounters:   10/21/19 64   04/29/19 75   03/25/19 67       Lab Results   Component Value Date/Time    Sodium 143 09/06/2018 04:14 PM    Potassium 3.3 (L) 09/06/2018 04:14 PM    Chloride 106 09/06/2018 04:14 PM    CO2 28 09/06/2018 04:14 PM    Anion gap 9 09/06/2018 04:14 PM    Glucose 94 09/06/2018 04:14 PM    BUN 9 09/06/2018 04:14 PM    Creatinine 1.35 (H) 09/06/2018 04:14 PM    BUN/Creatinine ratio 7 (L) 09/06/2018 04:14 PM    GFR est AA >60 09/06/2018 04:14 PM    GFR est non-AA 54 (L) 09/06/2018 04:14 PM    Calcium 7.2 (L) 09/06/2018 04:14 PM       Lab Results   Component Value Date/Time    WBC 5.5 09/06/2018 04:14 PM    HGB 12.4 09/06/2018 04:14 PM    HCT 37.8 09/06/2018 04:14 PM    PLATELET 365 09/28/4195 04:14 PM    MCV 86.7 09/06/2018 04:14 PM       Lab Results   Component Value Date/Time    Protein, total 7.2 09/06/2018 04:14 PM    Albumin 3.5 09/06/2018 04:14 PM       INR History:   (normal INR range 0.8-1.2)     Date   INR   PT   Dose/Comments  10/28/19 4.0  50.1 7.5 mg on Tues, Thurs, 10 mg daily ROW;  + alcohol   10/21/19          1.2                   14.2     7.5 mg on Tues, Thurs, 10 mg daily ROW ; missed 2 doses  +++ gap in care ++++  04/29/19          5.7                   68.4     Amoxicillin; 7.5 mg Tues, Thurs and 10 mg daily ROW  03/25/19          1.9                   23.4     Missed 1 dose; Alcohol intake  01/23/19          2.5                   30. 2     See adherence note; alcohol intake  12/17/18          1.4                   16.3     See adherence note; unclear what has changed recently  12/03/18          1.6                   19.6     Missed 1 dose; has been taking 7.5 mg daily  11/05/18          2.0                   23.9  10/18/18          2.1                   25.2  09/17/18          1.9                   23.1  09/13/18          1.8                   21.7    A/P:       Anticoagulation:  Considering Mr. Yamile Whitley past history, todays findings, and per Anticoagulation Collaborative Practice Agreement/Protocol:    1. POC INR (4.0) is supratherapeutic for INR goal today. 2. Hold warfarin tonight. Continue warfarin 7.5 mg on Tuesday, Thursday and 10 mg daily ROW. 3. Discussed alcohol effect on INR and increased risk for bleeding/bruising. Recommend patient stop. 4. Reviewed warfarin pill bottle. Still has 1/2 bottle of 30 day supply. Last filled in beginning of Sept.  Consistent with ongoing adherence concern. Patient was instructed to schedule an appointment in 2 week(s) prior to leaving the clinic. Medication reconciliation was completed during the visit. There are no discontinued medications. A full discussion of the nature of anticoagulants has been carried out. A full discussion of the need for frequent and regular monitoring, precise dosage adjustment and compliance was stressed. Side effects of potential bleeding were discussed and Mr. Angus Grant was instructed to call [phone] if there are any signs of abnormal bleeding.   Mr. Angus Grant was instructed to avoid any OTC items containing aspirin or ibuprofen and prior to starting any new OTC products to consult with his physician or pharmacist to ensure no drug interactions are present. Mr. Sebastien Tony was instructed to avoid any major changes in his general diet and to avoid alcohol consumption. Mr. Sebastien Tony was provided information in the AVS that includes topics on understanding coumadin therapy, drug interaction considerations, vitamin K and coumadin use, interactions with foods and supplements containing vitamin K, and the use of herbal products. Mr. Sebastien Tony verbalized his understanding of all instructions and will call the office with any questions, concerns, or signs of abnormal bleeding or blood clot. Notifications of recommendations will be sent to Dr. Yamile Vyas MD for review.     Thank you,  Shahnaz Livingston, PharmD, CDE

## 2019-10-29 LAB
LEFT CCA DIST DIAS: 22.7 CM/S
LEFT CCA DIST SYS: 50.2 CM/S
LEFT CCA PROX DIAS: 22.3 CM/S
LEFT CCA PROX SYS: 57.5 CM/S
LEFT ECA DIAS: 7.03 CM/S
LEFT ECA SYS: 32.3 CM/S
LEFT ICA DIST DIAS: 14.8 CM/S
LEFT ICA DIST SYS: 31 CM/S
LEFT ICA MID DIAS: 13.6 CM/S
LEFT ICA MID SYS: 26.2 CM/S
LEFT ICA PROX DIAS: 5.3 CM/S
LEFT ICA PROX SYS: 15.3 CM/S
LEFT ICA/CCA SYS: 0.62
LEFT SUBCLAVIAN DIAS: 0 CM/S
LEFT SUBCLAVIAN SYS: 52.3 CM/S
LEFT VERTEBRAL DIAS: 15.87 CM/S
LEFT VERTEBRAL SYS: 34.7 CM/S
RIGHT CCA DIST DIAS: 22.5 CM/S
RIGHT CCA DIST SYS: 49.9 CM/S
RIGHT CCA PROX DIAS: 16.4 CM/S
RIGHT CCA PROX SYS: 56.2 CM/S
RIGHT ECA DIAS: 11.77 CM/S
RIGHT ECA SYS: 51.3 CM/S
RIGHT ICA DIST DIAS: 19.4 CM/S
RIGHT ICA DIST SYS: 36.4 CM/S
RIGHT ICA MID DIAS: 10.5 CM/S
RIGHT ICA MID SYS: 34 CM/S
RIGHT ICA PROX DIAS: 9.7 CM/S
RIGHT ICA PROX SYS: 29.9 CM/S
RIGHT ICA/CCA SYS: 0.7
RIGHT SUBCLAVIAN DIAS: 0 CM/S
RIGHT SUBCLAVIAN SYS: 51.3 CM/S
RIGHT VERTEBRAL DIAS: 7.76 CM/S
RIGHT VERTEBRAL SYS: 24.1 CM/S

## 2019-10-31 ENCOUNTER — TELEPHONE (OUTPATIENT)
Dept: INTERNAL MEDICINE CLINIC | Age: 64
End: 2019-10-31

## 2019-10-31 NOTE — TELEPHONE ENCOUNTER
Pharmacy Progress Note - Telephone Call    Mr. Vinny Frye 60 y.o. was contacted via an outbound telephone call regarding his anticoagulation management today. A voicemail was left for patient to return my call. - Need to confirm patient's prescription insurance information. Currently not scanned into [employer]. - Working to switch patient from warfarin to [phone].       Thank you,  Shahnaz Azevedo, PharmD, CDE

## 2019-11-11 ENCOUNTER — HOSPITAL ENCOUNTER (OUTPATIENT)
Dept: LAB | Age: 64
Discharge: HOME | End: 2019-11-11
Payer: MEDICARE

## 2019-11-11 ENCOUNTER — ANTI-COAG VISIT (OUTPATIENT)
Dept: INTERNAL MEDICINE CLINIC | Age: 64
End: 2019-11-11

## 2019-11-11 VITALS — WEIGHT: 234 LBS | HEIGHT: 72 IN | BODY MASS INDEX: 31.69 KG/M2

## 2019-11-11 DIAGNOSIS — I63.9 CEREBROVASCULAR ACCIDENT (CVA), UNSPECIFIED MECHANISM (HCC): ICD-10-CM

## 2019-11-11 LAB
INR BLD: 1.6 (ref 1–1.5)
PT POC: 19.2 SECONDS (ref 9.1–12)
VALID INTERNAL CONTROL?: YES

## 2019-11-11 PROCEDURE — 80053 COMPREHEN METABOLIC PANEL: CPT

## 2019-11-11 PROCEDURE — 85027 COMPLETE CBC AUTOMATED: CPT

## 2019-11-11 PROCEDURE — 80061 LIPID PANEL: CPT

## 2019-11-11 PROCEDURE — 36415 COLL VENOUS BLD VENIPUNCTURE: CPT

## 2019-11-11 PROCEDURE — 84153 ASSAY OF PSA TOTAL: CPT

## 2019-11-11 NOTE — PROGRESS NOTES
Pharmacy Progress Note  - Anticoagulation Management    S/O:  Mr. Rosa Hedrick  is a 66 y. o. male seen today for diabetes and anticoagulation management for the diagnosis of Atrial Fibrillation and Stroke s/p right carotid dissection (9/6/18).    He was accompanied by his wife to this visit.      · Warfarin start date: 9/13/18  · INR Goal:  2.0-3.0    · Current warfarin regimen: 7.5 mg on Tuesday and Thursday, 10 mg ROW                      · Warfarin tablet strength:   5 mg   · Duration of therapy: Indefinite    Today's pertinent positives includes:  Alcohol consumption     Had bilateral carotid doppler on 10/28/19 -- shows right and left ICA were normal. No evidence for a dissection in cervical right ICA. Denies CP/SOB/LE & UE pain/HA at this time  Denies bleeding/bruises  Endorses to drinking several beers this weekend     Results for orders placed or performed in visit on 11/11/19   AMB POC PT/INR   Result Value Ref Range    VALID INTERNAL CONTROL POC Yes     Prothrombin time (POC) 19.2 (A) 9.1 - 12 seconds    INR POC 1.6 (A) 1 - 1.5     · Adherence:   · Able to recall regimen? YES  · Miss/extra dose? NO  · Need refill?  NO    Upcoming procedure(s):  NO      Past Medical History:   Diagnosis Date    Cancer ([facility] [address]     lung    Congestive heart failure, unspecified     Diabetes ([facility] [address]     Hypertension     Overweight(278.02)     Seizures (HCC)     SOLITARY PULMONARY NODULE     1.6 cm    Stroke (HCC)        No Known Allergies    Current Outpatient Medications   Medication Sig    amLODIPine (NORVASC) 10 mg tablet TAKE 1 TABLET BY MOUTH ONCE DAILY FOR BLOOD PRESSURE    glimepiride (AMARYL) 4 mg tablet TAKE 1 TABLET BY MOUTH IN THE MORNING    carvedilol (COREG) 25 mg tablet TAKE 1 & 1/2 (ONE & ONE-HALF) TABLETS BY MOUTH TWICE DAILY WITH MEALS    atorvastatin (LIPITOR) 80 mg tablet TAKE 1 TABLET BY MOUTH ONCE DAILY AT  NIGHT    hydrALAZINE (APRESOLINE) 10 mg tablet TAKE 1 TABLET BY MOUTH THREE TIMES DAILY    warfarin (COUMADIN) 5 mg tablet Take 1 Tab by mouth See Admin Instructions. Take 7.5 mg (one and one-half tablet) on Tuesday and Thursday and 10 mg (two tablets) daily the rest of the week.  metFORMIN ER (GLUCOPHAGE XR) 500 mg tablet Take 500 mg by mouth two (2) times daily (with meals).  levETIRAcetam (KEPPRA) 500 mg tablet Take 2 Tabs by mouth two (2) times a day.  glimepiride (AMARYL) 4 mg tablet Take 4 mg by mouth every morning.  lisinopril (PRINIVIL, ZESTRIL) 40 mg tablet TAKE ONE TABLET BY MOUTH ONCE DAILY FOR BLOOD PRESSURE     No current facility-administered medications for this visit. Wt Readings from Last 3 Encounters:   11/11/19 234 lb (106.1 kg)   10/21/19 232 lb (105.2 kg)   04/29/19 231 lb (104.8 kg)       BP Readings from Last 3 Encounters:   10/21/19 132/87   04/29/19 (!) 134/93   03/25/19 111/83       Pulse Readings from Last 3 Encounters:   10/21/19 64   04/29/19 75   03/25/19 67     Lab Results   Component Value Date/Time    Sodium 143 09/06/2018 04:14 PM    Potassium 3.3 (L) 09/06/2018 04:14 PM    Chloride 106 09/06/2018 04:14 PM    CO2 28 09/06/2018 04:14 PM    Anion gap 9 09/06/2018 04:14 PM    Glucose 94 09/06/2018 04:14 PM    BUN 9 09/06/2018 04:14 PM    Creatinine 1.35 (H) 09/06/2018 04:14 PM    BUN/Creatinine ratio 7 (L) 09/06/2018 04:14 PM    GFR est AA >60 09/06/2018 04:14 PM    GFR est non-AA 54 (L) 09/06/2018 04:14 PM    Calcium 7.2 (L) 09/06/2018 04:14 PM       Lab Results   Component Value Date/Time    WBC 5.5 09/06/2018 04:14 PM    HGB 12.4 09/06/2018 04:14 PM    HCT 37.8 09/06/2018 04:14 PM    PLATELET 030 95/45/9341 04:14 PM    MCV 86.7 09/06/2018 04:14 PM       Lab Results   Component Value Date/Time    Protein, total 7.2 09/06/2018 04:14 PM    Albumin 3.5 09/06/2018 04:14 PM       CrCl cannot be calculated (Patient's most recent lab result is older than the maximum 180 days allowed.).       INR History:   (normal INR range 0.8-1.2)     Date   INR   PT Dose/Comments  11/11/19 1.6  19.2 Hold x 1, then 7.5 mg Tues, Thurs and 10 mg daily ROW ; (+) alcohol  10/28/19          4.0                   50.1     7.5 mg on Tues, Thurs, 10 mg daily ROW;  + alcohol   10/21/19          1.2                   14.2     7.5 mg on Tues, Thurs, 10 mg daily ROW ; missed 2 doses  +++ gap in care ++++  04/29/19          5.7                   68.4     Amoxicillin; 7.5 mg Tues, Thurs and 10 mg daily ROW  03/25/19          1.9                   23.4     Missed 1 dose; Alcohol intake  01/23/19          2.5                   30. 2     See adherence note; alcohol intake  12/17/18          1.4                   16.3     See adherence note; unclear what has changed recently  12/03/18          1.6                   19.6     Missed 1 dose; has been taking 7.5 mg daily  11/05/18          2.0                   23.9  10/18/18          2.1                   25.2  09/17/18          1.9                   23.1  09/13/18          1.8                   21.7      A/P:       Anticoagulation:  Considering Mr. Angelina Martinez past history, todays findings, and per Anticoagulation Collaborative Practice Agreement/Protocol:    1. POC INR (1.6) is subtherapeutic for INR goal today. 2. Stop warfarin today. INR < 3.0.    3. Start Xarelto 20 mg daily. Will provide samples. Will see if patient is eligible for 30 day free trial coupon. 4. Xarelto patient assistance application initiated in office. Pt's wife will supply proof of income in order to complete submission steps. 5. Discussed difference in onset and offset between Xarelto and warfarin. Emphasized  importance of medication adherence. Encourage patient to limit alcohol intake. Will check CMP & CBC w/o diff today. Medication reconciliation was completed during the visit.     Medications Discontinued During This Encounter   Medication Reason    warfarin (COUMADIN) 5 mg tablet Alternate Therapy    glimepiride (AMARYL) 4 mg tablet Duplicate Order A full discussion of the nature of anticoagulants has been carried out. A full discussion of the need for frequent and regular monitoring, precise dosage adjustment and compliance was stressed. Side effects of potential bleeding were discussed and Mr. Mesfin Jones was instructed to call [phone] if there are any signs of abnormal bleeding. Mr. Mesfin Jones was instructed to avoid any OTC items containing aspirin or ibuprofen and prior to starting any new OTC products to consult with his physician or pharmacist to ensure no drug interactions are present. Mr. Mesfin Jones was instructed to avoid any major changes in his general diet and to avoid alcohol consumption. Mr. Mesfin Jones was provided information in the AVS that includes topics on understanding coumadin therapy, drug interaction considerations, vitamin K and coumadin use, interactions with foods and supplements containing vitamin K, and the use of herbal products. Mr. Mesfin Jones verbalized his understanding of all instructions and will call the office with any questions, concerns, or signs of abnormal bleeding or blood clot. Notifications of recommendations will be sent to Dr. Karen Moore MD for review.     Thank you,  Shahnaz Mejias, PharmD, CDE

## 2019-11-11 NOTE — PATIENT INSTRUCTIONS
Today your INR was 1.6. Your goal INR is  2.0-3.0 . You have a 5 mg tablet of Coumadin (warfarin). Take Coumadin (warfarin) as follows:    Stop warfarin today. Start Xarelto 20 mg once a day tonight. Take this medication around the same time every day. Bring back proof of income for your Xarelto patient assistance application as soon as possible. Get updated labs today. Avoid any over the counter items containing aspirin or ibuprofen, and avoid great swings in general diet. Avoid alcohol consumption. Please notify the INR nurse if you are started on any new medication including over the counter or herbal supplements. Also, please notify your INR nurse if any of your other prescription or over the counter medications have been discontinued. Call [facility] at [phone] if you have any signs of abnormal bleeding/blood clot.  ------------------------------------------------------------------------------------------------------------------  Taking Warfarin Safely: Care Instructions    Your Care Instructions  Warfarin is a medicine that you take to prevent blood clots. It is often called a blood thinner. Doctors give warfarin (such as Coumadin) to reduce the risk of blood clots. You may be at risk for blood clots if you have atrial fibrillation or deep vein thrombosis. Some other health problems may also put you at risk. Warfarin slows the amount of time it takes for your blood to clot. It can cause bleeding problems. Even if you've been taking warfarin for a while, it's important to know how to take it safely. Foods and other medicines can affect the way warfarin works. Some can make warfarin work too well. This can cause bleeding problems. And some can make it work poorly, so that it does not prevent blood clots very well. You will need regular blood tests to check how long it takes for your blood to form a clot.  This test is called a PT or prothrombin time test. The result of the test is called an INR level. Depending on the test results, your doctor or anticoagulation clinic may adjust your dose of warfarin. Follow-up care is a key part of your treatment and safety. Be sure to make and go to all appointments, and call your doctor if you are having problems. It's also a good idea to know your test results and keep a list of the medicines you take. How can you care for yourself at home? Take warfarin safely  · Take your warfarin at the same time each day. · If you miss a dose of warfarin, don't take an extra dose to make up for it. Your doctor can tell you exactly what to do so you don't take too much or too little. · Wear medical alert jewelry that lets others know that you take warfarin. You can buy this at most drugstores. · Don't take warfarin if you are pregnant or planning to get pregnant. Talk to your doctor about how you can prevent getting pregnant while you are taking it. · Don't change your dose or stop taking warfarin unless your doctor tells you to. Effects of medicines and food on warfarin  · Don't start or stop taking any medicines, vitamins, or natural remedies unless you first talk to your doctor. Many medicines can affect how warfarin works. These include aspirin and other pain relievers, over-the-counter medicines, multivitamins, dietary supplements, and herbal products. · Tell all of your doctors and pharmacists that you take warfarin. Some prescription medicines can affect how warfarin works. · Keep the amount of vitamin K in your diet about the same from day to day. Do not suddenly eat a lot more or a lot less food that is rich in vitamin K than you usually do. Vitamin K affects how warfarin works and how your blood clots. Talk with your doctor before making big changes in your diet. Vitamin K is in many foods, such as:  ¨ Leafy greens, such as kale, cabbage, spinach, Swiss chard, and lettuce. ¨ Canola and soybean oils.   ¨ Green vegetables, such as asparagus, broccoli, and [place] sprouts. ¨ Vegetable drinks, green tea leaves, and some dietary supplement drinks. · Avoid cranberry juice and other cranberry products. They can increase the effects of warfarin. · Limit your use of alcohol. Avoid bleeding by preventing falls and injuries  · Wear slippers or shoes with nonskid soles. · Remove throw rugs and clutter. · Rearrange furniture and electrical cords to keep them out of walking paths. · Keep stairways, porches, and outside walkways well lit. Use night-lights in hallways and bathrooms. · Be extra careful when you work with sharp tools or knives. When should you call for help? Call 911 anytime you think you may need emergency care. For example, call if:  · You have a sudden, severe headache that is different from past headaches. Call your doctor now or seek immediate medical care if:  · You have any abnormal bleeding, such as:  ¨ Nosebleeds. ¨ Vaginal bleeding that is different (heavier, more frequent, at a different time of the month) than what you are used to. ¨ Bloody or black stools, or rectal bleeding. ¨ Bloody or pink urine. Watch closely for changes in your health, and be sure to contact your doctor if you have any problems. Where can you learn more? Go to http://carolina-shayla.info/. Enter B209 in the search box to learn more about \"Taking Warfarin Safely: Care Instructions. \"  Current as of: January 27, 2016  Content Version: 11.1  © 6752-0713 [employer]. Care instructions adapted under license by [employer] (which disclaims liability or warranty for this information). If you have questions about a medical condition or this instruction, always ask your healthcare professional. [place] [address] any warranty or liability for your use of this information.

## 2019-11-12 LAB
ALBUMIN SERPL-MCNC: 4.3 G/DL (ref 3.6–4.8)
ALBUMIN/GLOB SERPL: 1.3 {RATIO} (ref 1.2–2.2)
ALP SERPL-CCNC: 73 IU/L (ref 39–117)
ALT SERPL-CCNC: 32 IU/L (ref 0–44)
AST SERPL-CCNC: 22 IU/L (ref 0–40)
BILIRUB SERPL-MCNC: 0.5 MG/DL (ref 0–1.2)
BUN SERPL-MCNC: 14 MG/DL (ref 8–27)
BUN/CREAT SERPL: 11 (ref 10–24)
CALCIUM SERPL-MCNC: 8 MG/DL (ref 8.6–10.2)
CHLORIDE SERPL-SCNC: 98 MMOL/L (ref 96–106)
CHOLEST SERPL-MCNC: 105 MG/DL (ref 100–199)
CO2 SERPL-SCNC: 24 MMOL/L (ref 20–29)
CREAT SERPL-MCNC: 1.32 MG/DL (ref 0.76–1.27)
ERYTHROCYTE [DISTWIDTH] IN BLOOD BY AUTOMATED COUNT: 13.8 % (ref 12.3–15.4)
GLOBULIN SER CALC-MCNC: 3.2 G/DL (ref 1.5–4.5)
GLUCOSE SERPL-MCNC: 190 MG/DL (ref 65–99)
HCT VFR BLD AUTO: 38.1 % (ref 37.5–51)
HDLC SERPL-MCNC: 29 MG/DL
HGB BLD-MCNC: 13.2 G/DL (ref 13–17.7)
LDLC SERPL CALC-MCNC: 58 MG/DL (ref 0–99)
MCH RBC QN AUTO: 28.7 PG (ref 26.6–33)
MCHC RBC AUTO-ENTMCNC: 34.6 G/DL (ref 31.5–35.7)
MCV RBC AUTO: 83 FL (ref 79–97)
PLATELET # BLD AUTO: 192 X10E3/UL (ref 150–450)
POTASSIUM SERPL-SCNC: 3.4 MMOL/L (ref 3.5–5.2)
PROT SERPL-MCNC: 7.5 G/DL (ref 6–8.5)
PSA SERPL-MCNC: 0.9 NG/ML (ref 0–4)
RBC # BLD AUTO: 4.6 X10E6/UL (ref 4.14–5.8)
SODIUM SERPL-SCNC: 138 MMOL/L (ref 134–144)
TRIGL SERPL-MCNC: 92 MG/DL (ref 0–149)
VLDLC SERPL CALC-MCNC: 18 MG/DL (ref 5–40)
WBC # BLD AUTO: 5.8 X10E3/UL (ref 3.4–10.8)

## 2019-11-12 RX ORDER — POTASSIUM CHLORIDE 750 MG/1
20 TABLET, EXTENDED RELEASE ORAL 2 TIMES DAILY
Qty: 12 TAB | Refills: 0 | Status: SHIPPED | OUTPATIENT
Start: 2019-11-12 | End: 2019-11-15

## 2019-11-12 NOTE — PROGRESS NOTES
Tell pt labs -- Potassium slightly low-I escribed kcl x 3d  nomal PSA cbc lipids  3 mos bs avg is too high-need to take amaryl and metformin as presribed and low carb diet  Needs f/u appt in 3-4 months--please schedule for pt

## 2019-11-14 ENCOUNTER — DOCUMENTATION ONLY (OUTPATIENT)
Dept: INTERNAL MEDICINE CLINIC | Age: 64
End: 2019-11-14

## 2019-11-14 NOTE — PROGRESS NOTES
Pharmacy Progress Note - Anticoagulation Management    Mr. Vinny Frye 60 y.o. 's wife dropped off proof of income for his Xarelto PAP today. Reports patient is compliant to this Xarelto 20 mg daily regimen. Reviewed his recent labs. Xarelto 20 mg daily dosed appropriately for renal function. Will submit Xarelto application today. Wt Readings from Last 3 Encounters:   11/11/19 234 lb (106.1 kg)   10/21/19 232 lb (105.2 kg)   04/29/19 231 lb (104.8 kg)     Past Medical History:   Diagnosis Date    Cancer ([facility] [address]     lung    Congestive heart failure, unspecified     Diabetes ([facility] [address]     Hypertension     Overweight(278.02)     Seizures (HCC)     SOLITARY PULMONARY NODULE     1.6 cm    Stroke [facility])      Lab Results   Component Value Date/Time    WBC 5.8 11/11/2019 04:04 PM    HGB 13.2 11/11/2019 04:04 PM    HCT 38.1 11/11/2019 04:04 PM    PLATELET 305 10/70/6137 04:04 PM    MCV 83 11/11/2019 04:04 PM     Lab Results   Component Value Date/Time    Sodium 138 11/11/2019 04:04 PM    Potassium 3.4 (L) 11/11/2019 04:04 PM    Chloride 98 11/11/2019 04:04 PM    CO2 24 11/11/2019 04:04 PM    Anion gap 9 09/06/2018 04:14 PM    Glucose 190 (H) 11/11/2019 04:04 PM    BUN 14 11/11/2019 04:04 PM    Creatinine 1.32 (H) 11/11/2019 04:04 PM    BUN/Creatinine ratio 11 11/11/2019 04:04 PM    GFR est AA 66 11/11/2019 04:04 PM    GFR est non-AA 57 (L) 11/11/2019 04:04 PM    Calcium 8.0 (L) 11/11/2019 04:04 PM    Bilirubin, total 0.5 11/11/2019 04:04 PM    AST (SGOT) 22 11/11/2019 04:04 PM    Alk.  phosphatase 73 11/11/2019 04:04 PM    Protein, total 7.5 11/11/2019 04:04 PM    Albumin 4.3 11/11/2019 04:04 PM    Globulin 3.7 09/06/2018 04:14 PM    A-G Ratio 1.3 11/11/2019 04:04 PM    ALT (SGPT) 32 11/11/2019 04:04 PM     CrCl ~ 72 ml/min (SCr = 1.32)      Thank you for the consult,  Shahnaz Esteves, PharmD, CDE

## 2019-11-16 RX ORDER — LEVETIRACETAM 500 MG/1
TABLET ORAL
Qty: 120 TAB | Refills: 11 | Status: SHIPPED | OUTPATIENT
Start: 2019-11-16 | End: 2021-01-22

## 2019-11-21 ENCOUNTER — TELEPHONE (OUTPATIENT)
Dept: INTERNAL MEDICINE CLINIC | Age: 64
End: 2019-11-21

## 2019-11-21 NOTE — TELEPHONE ENCOUNTER
Pharmacy Progress Note - Telephone Call    Mr. Vinny Frye 60 y.o. was contacted via an outbound telephone call regarding his Xarelto PAP application today. A voicemail was left for patient to return my call.      Thank you,  Shahnaz Livingston, PharmD, CDE

## 2019-11-21 NOTE — TELEPHONE ENCOUNTER
Pharmacy Progress Note - Patient Assistance    Contacted [employer] regarding Mr. Vinny Frye 64 y.o.'s Xarelto PAP application today. Discussed that patient will be out of his Xarelto soon. Patient's application has been expedited for review. Once approved, the assistance card information will be the same as the one he previously received. This is the card information:   · ID# [id number]  · BIN# [id number]  · VEYIUHZ # [id number]  · No PCN    We should also receive a fax confirmation. Note, 30 day free trial coupon supplied today.      Thank you for the consult,  Shahnaz Esteves, PharmD, CDE

## 2019-11-25 DIAGNOSIS — Z79.4 TYPE 2 DIABETES MELLITUS WITH COMPLICATION, WITH LONG-TERM CURRENT USE OF INSULIN (HCC): ICD-10-CM

## 2019-11-25 DIAGNOSIS — E11.8 TYPE 2 DIABETES MELLITUS WITH COMPLICATION, WITH LONG-TERM CURRENT USE OF INSULIN (HCC): ICD-10-CM

## 2019-11-25 RX ORDER — METFORMIN HYDROCHLORIDE 500 MG/1
TABLET ORAL
Qty: 180 TAB | Refills: 3 | Status: SHIPPED | OUTPATIENT
Start: 2019-11-25 | End: 2021-03-19 | Stop reason: SDUPTHER

## 2019-11-26 NOTE — TELEPHONE ENCOUNTER
Pharmacy Progress Note - Patient Assistance    Mr. Vinny Frye is now approved for [employer] Xarelto PAP. He is approved until 11/25/2020. PAP -[id number]. Called and discussed updates with Sherry Vickers ([employer]) today. Provided pharmacist with [employer] PAP savings card billing information. Will modify previous Xarelto 20 mg prescription to include more refills. Order confirmed with $0 copay.      Thank you for the consult,  Thu Hermon Najjar, PharmD, CDE

## 2019-12-10 ENCOUNTER — TELEPHONE (OUTPATIENT)
Dept: INTERNAL MEDICINE CLINIC | Age: 64
End: 2019-12-10

## 2019-12-10 NOTE — TELEPHONE ENCOUNTER
Pt's wife, Stefani Crawford, came into the office requesting a sample of xarelto. Stefani Crawford stated that pt misplaced his medication and has spent the last 2 days searching for it. Pt currently receiving medication through patient assistance. Notified Stefani Crawford to contact xarelto and explain the situation so they can bridge pt until he can get refill in 9 days. Also notified pt the office does not receive xarelto sample often and we are currently out of samples. Stefani Crawford verbalized understanding of information discussed w/ no further questions at this time.

## 2019-12-10 NOTE — TELEPHONE ENCOUNTER
Caller's first and last name: Kayleigh Shaw, wife   Reason for call: Mickie Mckeon stated that she misplaced patient's medication(name not provided), and she won't be able to get anymore from pharmacy for 9-10 business days. Mickie Mckeon wanted to know if she could get a few samples until she is able to  prescription.    Callback required yes/no and why: yes   Best contact number(s): [phone]   Details to clarify the request: n/a      [facility]

## 2020-07-17 ENCOUNTER — HOSPITAL ENCOUNTER (OUTPATIENT)
Dept: CT IMAGING | Age: 65
Discharge: HOME | End: 2020-07-17
Attending: INTERNAL MEDICINE
Payer: MEDICARE

## 2020-07-17 DIAGNOSIS — C7A.1 MALIGNANT POORLY DIFFERENTIATED NEUROENDOCRINE TUMORS (HCC): ICD-10-CM

## 2020-07-17 DIAGNOSIS — C34.32 MALIGNANT NEOPLASM OF LOWER LOBE, LEFT BRONCHUS OR LUNG (HCC): ICD-10-CM

## 2020-07-17 PROCEDURE — 71260 CT THORAX DX C+: CPT

## 2020-07-17 PROCEDURE — 74011636320 HC RX REV CODE- 636/320: Performed by: INTERNAL MEDICINE

## 2020-07-17 RX ORDER — SODIUM CHLORIDE 0.9 % (FLUSH) 0.9 %
10 SYRINGE (ML) INJECTION
Status: COMPLETED | OUTPATIENT
Start: 2020-07-17 | End: 2020-07-17

## 2020-07-17 RX ADMIN — IOPAMIDOL 80 ML: 755 INJECTION, SOLUTION INTRAVENOUS at 09:55

## 2020-07-17 RX ADMIN — Medication 10 ML: at 09:55

## 2020-09-18 RX ORDER — GLIMEPIRIDE 4 MG/1
TABLET ORAL
Qty: 90 TAB | Refills: 0 | Status: SHIPPED | OUTPATIENT
Start: 2020-09-18 | End: 2021-01-22

## 2020-09-18 RX ORDER — HYDRALAZINE HYDROCHLORIDE 10 MG/1
TABLET, FILM COATED ORAL
Qty: 90 TAB | Refills: 0 | Status: SHIPPED | OUTPATIENT
Start: 2020-09-18 | End: 2021-01-22

## 2020-10-28 ENCOUNTER — TELEPHONE (OUTPATIENT)
Dept: INTERNAL MEDICINE CLINIC | Age: 65
End: 2020-10-28

## 2020-10-28 NOTE — TELEPHONE ENCOUNTER
Called, left vm for Aury Perez to return call to office. Pt needs to sign application renewal and bring in most recent tax returns.

## 2020-10-29 ENCOUNTER — TELEPHONE (OUTPATIENT)
Dept: INTERNAL MEDICINE CLINIC | Age: 65
End: 2020-10-29

## 2020-10-29 NOTE — TELEPHONE ENCOUNTER
----- Message from Pauline Elmore sent at 10/29/2020  4:31 PM EDT -----  Regarding: Dr. Dragan Scales Message/Vendor Calls    Caller's first and last name: Carmela Clay (spouse)      Reason for call: Alva Olivarez required yes/no and why: yes, to schedule appt      Best contact number(s):[phone]      Details to clarify the request: Pt was admitted to [facility] on [place] 10/25 for stroke and discharged on 10/27. Wife is calling to schedule him a DOMINIK with Dr. Reza Beasley. Please advise.       Message from [facility]

## 2020-10-30 NOTE — TELEPHONE ENCOUNTER
John Tavarez MD  You 1 hour ago (1:20 PM)      12 pm Tuesday 11/10 please    Message text      Called, left vm for pt to return call to office.

## 2020-11-23 RX ORDER — ATORVASTATIN CALCIUM 80 MG/1
TABLET, FILM COATED ORAL
Qty: 30 TAB | Refills: 0 | Status: SHIPPED | OUTPATIENT
Start: 2020-11-23 | End: 2021-01-22

## 2020-11-28 RX ORDER — CARVEDILOL 25 MG/1
TABLET ORAL
Qty: 90 TAB | Refills: 0 | Status: ON HOLD | OUTPATIENT
Start: 2020-11-28 | End: 2021-03-09 | Stop reason: SDUPTHER

## 2020-12-27 RX ORDER — AMLODIPINE BESYLATE 10 MG/1
TABLET ORAL
Qty: 90 TAB | Refills: 0 | Status: SHIPPED | OUTPATIENT
Start: 2020-12-27 | End: 2021-03-22

## 2020-12-27 RX ORDER — RIVAROXABAN 20 MG/1
TABLET, FILM COATED ORAL
Qty: 30 TAB | Refills: 0 | Status: SHIPPED | OUTPATIENT
Start: 2020-12-27 | End: 2021-03-06

## 2020-12-31 ENCOUNTER — TELEPHONE (OUTPATIENT)
Dept: INTERNAL MEDICINE CLINIC | Age: 65
End: 2020-12-31

## 2020-12-31 NOTE — TELEPHONE ENCOUNTER
Spoke with patient's daughter concerning his Patient assistance application. The forms has been faxed and copies of forms to be scanned in patient's chart. Advised to call if patient needs a copy.

## 2021-01-22 RX ORDER — HYDRALAZINE HYDROCHLORIDE 10 MG/1
TABLET, FILM COATED ORAL
Qty: 90 TAB | Refills: 0 | Status: SHIPPED | OUTPATIENT
Start: 2021-01-22 | End: 2021-03-22

## 2021-01-22 RX ORDER — LEVETIRACETAM 500 MG/1
TABLET ORAL
Qty: 120 TAB | Refills: 0 | Status: SHIPPED | OUTPATIENT
Start: 2021-01-22 | End: 2021-03-22

## 2021-01-22 RX ORDER — GLIMEPIRIDE 4 MG/1
TABLET ORAL
Qty: 90 TAB | Refills: 0 | Status: SHIPPED | OUTPATIENT
Start: 2021-01-22 | End: 2021-03-22

## 2021-01-22 RX ORDER — ATORVASTATIN CALCIUM 80 MG/1
TABLET, FILM COATED ORAL
Qty: 30 TAB | Refills: 0 | Status: SHIPPED | OUTPATIENT
Start: 2021-01-22 | End: 2021-03-22

## 2021-01-26 ENCOUNTER — TELEPHONE (OUTPATIENT)
Dept: INTERNAL MEDICINE CLINIC | Age: 66
End: 2021-01-26

## 2021-01-26 ENCOUNTER — VIRTUAL VISIT (OUTPATIENT)
Dept: INTERNAL MEDICINE CLINIC | Age: 66
End: 2021-01-26

## 2021-01-26 DIAGNOSIS — E11.40 TYPE 2 DIABETES MELLITUS WITH DIABETIC NEUROPATHY, WITHOUT LONG-TERM CURRENT USE OF INSULIN (HCC): ICD-10-CM

## 2021-01-26 DIAGNOSIS — I48.20 CHRONIC A-FIB (HCC): Primary | ICD-10-CM

## 2021-01-26 DIAGNOSIS — R56.9 SEIZURES (HCC): ICD-10-CM

## 2021-01-26 DIAGNOSIS — E11.49 OTHER DIABETIC NEUROLOGICAL COMPLICATION ASSOCIATED WITH TYPE 2 DIABETES MELLITUS (HCC): ICD-10-CM

## 2021-01-26 DIAGNOSIS — E78.00 PURE HYPERCHOLESTEROLEMIA: ICD-10-CM

## 2021-01-26 DIAGNOSIS — I10 ESSENTIAL HYPERTENSION: ICD-10-CM

## 2021-01-26 DIAGNOSIS — Z12.5 PROSTATE CANCER SCREENING: ICD-10-CM

## 2021-01-26 NOTE — PROGRESS NOTES
HISTORY OF PRESENT ILLNESS Chela Miramontes is a 63 y.o. male. HPI Chela Miramontes, who was evaluated through a synchronous (real-time) audio only encounter, and/or his healthcare decision maker, is aware that it is a billable service, with coverage as determined by his insurance carrier. He provided verbal consent to proceed: Yes, and patient identification was verified. It was conducted pursuant to the emergency declaration under the [address] [place] [address] and the [employer] Act. A caregiver was present when appropriate. Ability to conduct physical exam was limited. I was at home. The patient was at home. F/u dm-2 and MARIA TERESA, chronic afib, hx lung carcinoid  hx possible TIA and right carotid dissection 2018-here with wife On warfarin for carotid dissection last year and medicare wellness---- Last OV 2019 Had repeat carotid doippler 10/2019-no dissection of right carotid artery 
sstill on warfarin -previously on xarelto Had echo last year-low normal Ef and [facility]-Dr BOONE [facility] Last a1c 7.9 LDL 58-fsbs Last OV 
  
Last inr 5.7  On 4/2019 
inr 1.2 today, likely some nonadherence with coumadin per pt Last a1c 7.2 LDL 56 
amb a1c 7.9 today No fsbs checks Has not been compliant with diet. 
  
Saw Dr Wes Chin recently {Choose one or more HPI Chronic Disease Notes, press DELETE if none desired:93960} {Choose one or more SmartLinks; press DELETE if none desired:[id number]} {Choose one or more Last Lab values; press DELETE if none desired:[id number]} ROS Physical Exam 
 
ASSESSMENT and PLAN 
{ASSESSMENT/PLAN:81548}

## 2021-03-06 ENCOUNTER — APPOINTMENT (OUTPATIENT)
Dept: CT IMAGING | Age: 66
DRG: 065 | End: 2021-03-06
Attending: STUDENT IN AN ORGANIZED HEALTH CARE EDUCATION/TRAINING PROGRAM
Payer: MEDICARE

## 2021-03-06 ENCOUNTER — APPOINTMENT (OUTPATIENT)
Dept: GENERAL RADIOLOGY | Age: 66
DRG: 065 | End: 2021-03-06
Attending: STUDENT IN AN ORGANIZED HEALTH CARE EDUCATION/TRAINING PROGRAM
Payer: MEDICARE

## 2021-03-06 ENCOUNTER — APPOINTMENT (OUTPATIENT)
Dept: NON INVASIVE DIAGNOSTICS | Age: 66
DRG: 065 | End: 2021-03-06
Attending: HOSPITALIST
Payer: MEDICARE

## 2021-03-06 ENCOUNTER — HOSPITAL ENCOUNTER (INPATIENT)
Age: 66
LOS: 3 days | Discharge: HOME HEALTH | DRG: 065 | End: 2021-03-09
Attending: STUDENT IN AN ORGANIZED HEALTH CARE EDUCATION/TRAINING PROGRAM | Admitting: HOSPITALIST
Payer: MEDICARE

## 2021-03-06 ENCOUNTER — APPOINTMENT (OUTPATIENT)
Dept: MRI IMAGING | Age: 66
DRG: 065 | End: 2021-03-06
Attending: HOSPITALIST
Payer: MEDICARE

## 2021-03-06 DIAGNOSIS — E78.2 MIXED HYPERLIPIDEMIA: ICD-10-CM

## 2021-03-06 DIAGNOSIS — R53.1 LEFT-SIDED WEAKNESS: ICD-10-CM

## 2021-03-06 DIAGNOSIS — R29.898 WEAKNESS OF BOTH LOWER EXTREMITIES: Primary | ICD-10-CM

## 2021-03-06 DIAGNOSIS — I63.331 CEREBROVASCULAR ACCIDENT (CVA) DUE TO THROMBOSIS OF RIGHT POSTERIOR CEREBRAL ARTERY (HCC): ICD-10-CM

## 2021-03-06 DIAGNOSIS — E87.6 HYPOKALEMIA: ICD-10-CM

## 2021-03-06 DIAGNOSIS — I48.0 PAROXYSMAL ATRIAL FIBRILLATION (HCC): ICD-10-CM

## 2021-03-06 LAB
ALBUMIN SERPL-MCNC: 3.5 G/DL (ref 3.5–5)
ALBUMIN/GLOB SERPL: 0.8 {RATIO} (ref 1.1–2.2)
ALP SERPL-CCNC: 74 U/L (ref 45–117)
ALT SERPL-CCNC: 19 U/L (ref 12–78)
ANION GAP SERPL CALC-SCNC: 6 MMOL/L (ref 5–15)
APPEARANCE UR: CLEAR
AST SERPL-CCNC: 7 U/L (ref 15–37)
BACTERIA URNS QL MICRO: NEGATIVE /HPF
BASOPHILS # BLD: 0 K/UL (ref 0–0.1)
BASOPHILS NFR BLD: 1 % (ref 0–1)
BILIRUB SERPL-MCNC: 0.5 MG/DL (ref 0.2–1)
BILIRUB UR QL: NEGATIVE
BUN SERPL-MCNC: 12 MG/DL (ref 6–20)
BUN/CREAT SERPL: 10 (ref 12–20)
CALCIUM SERPL-MCNC: 8.2 MG/DL (ref 8.5–10.1)
CHLORIDE SERPL-SCNC: 100 MMOL/L (ref 97–108)
CO2 SERPL-SCNC: 29 MMOL/L (ref 21–32)
COLOR UR: ABNORMAL
COVID-19 RAPID TEST, COVR: NOT DETECTED
CREAT SERPL-MCNC: 1.15 MG/DL (ref 0.7–1.3)
DIFFERENTIAL METHOD BLD: NORMAL
ECHO AO ROOT DIAM: 3.37 CM
ECHO AV AREA PEAK VELOCITY: 1.75 CM2
ECHO AV AREA/BSA PEAK VELOCITY: 0.8 CM2/M2
ECHO AV PEAK GRADIENT: 3.69 MMHG
ECHO AV PEAK VELOCITY: 96.01 CM/S
ECHO EST RA PRESSURE: 10 MMHG
ECHO LA MAJOR AXIS: 5.14 CM
ECHO LA MINOR AXIS: 2.23 CM
ECHO LV E' SEPTAL VELOCITY: 6.57 CM/S
ECHO LV INTERNAL DIMENSION DIASTOLIC MMODE: 5.87 CM
ECHO LV INTERNAL DIMENSION DIASTOLIC: 5.39 CM (ref 4.2–5.9)
ECHO LV INTERNAL DIMENSION SYSTOLIC MMODE: 4.72 CM
ECHO LV INTERNAL DIMENSION SYSTOLIC: 4.74 CM
ECHO LV IVSD MMODE: 1.33 CM
ECHO LV IVSD: 1.66 CM (ref 0.6–1)
ECHO LV IVSS MMODE: 1.51 CM
ECHO LV IVSS: 1.79 CM
ECHO LV MASS 2D: 433.4 G (ref 88–224)
ECHO LV MASS INDEX 2D: 188.1 G/M2 (ref 49–115)
ECHO LV POSTERIOR WALL DIASTOLIC MMODE: 1.3 CM
ECHO LV POSTERIOR WALL DIASTOLIC: 1.73 CM (ref 0.6–1)
ECHO LV POSTERIOR WALL SYSTOLIC: 1.78 CM
ECHO LVOT DIAM: 2.13 CM
ECHO LVOT PEAK GRADIENT: 0.89 MMHG
ECHO LVOT PEAK VELOCITY: 47.04 CM/S
ECHO MV AREA PHT: 4.32 CM2
ECHO MV E DECELERATION TIME (DT): 156.41 MS
ECHO MV E VELOCITY: 95.88 CM/S
ECHO MV E/E' SEPTAL: 14.59
ECHO MV PRESSURE HALF TIME (PHT): 50.93 MS
ECHO PV MAX VELOCITY: 85.5 CM/S
ECHO PV PEAK INSTANTANEOUS GRADIENT SYSTOLIC: 2.95 MMHG
ECHO RIGHT VENTRICULAR SYSTOLIC PRESSURE (RVSP): 26.77 MMHG
ECHO RV INTERNAL DIMENSION: 3.88 CM
ECHO TV REGURGITANT MAX VELOCITY: 204.36 CM/S
ECHO TV REGURGITANT MAX VELOCITY: 313.44 CM/S
ECHO TV REGURGITANT PEAK GRADIENT: 16.77 MMHG
EOSINOPHIL # BLD: 0.1 K/UL (ref 0–0.4)
EOSINOPHIL NFR BLD: 2 % (ref 0–7)
EPITH CASTS URNS QL MICRO: ABNORMAL /LPF
ERYTHROCYTE [DISTWIDTH] IN BLOOD BY AUTOMATED COUNT: 13.8 % (ref 11.5–14.5)
GLOBULIN SER CALC-MCNC: 4.6 G/DL (ref 2–4)
GLUCOSE BLD STRIP.AUTO-MCNC: 118 MG/DL (ref 65–100)
GLUCOSE SERPL-MCNC: 190 MG/DL (ref 65–100)
GLUCOSE UR STRIP.AUTO-MCNC: NEGATIVE MG/DL
HCT VFR BLD AUTO: 39 % (ref 36.6–50.3)
HGB BLD-MCNC: 13.2 G/DL (ref 12.1–17)
HGB UR QL STRIP: NEGATIVE
HYALINE CASTS URNS QL MICRO: ABNORMAL /LPF (ref 0–5)
IMM GRANULOCYTES # BLD AUTO: 0 K/UL (ref 0–0.04)
IMM GRANULOCYTES NFR BLD AUTO: 0 % (ref 0–0.5)
INR PPP: 1.4 (ref 0.9–1.1)
KETONES UR QL STRIP.AUTO: NEGATIVE MG/DL
LEUKOCYTE ESTERASE UR QL STRIP.AUTO: NEGATIVE
LYMPHOCYTES # BLD: 1.6 K/UL (ref 0.8–3.5)
LYMPHOCYTES NFR BLD: 28 % (ref 12–49)
MAGNESIUM SERPL-MCNC: 1.6 MG/DL (ref 1.6–2.4)
MCH RBC QN AUTO: 27.7 PG (ref 26–34)
MCHC RBC AUTO-ENTMCNC: 33.8 G/DL (ref 30–36.5)
MCV RBC AUTO: 81.9 FL (ref 80–99)
MONOCYTES # BLD: 0.5 K/UL (ref 0–1)
MONOCYTES NFR BLD: 10 % (ref 5–13)
NEUTS SEG # BLD: 3.2 K/UL (ref 1.8–8)
NEUTS SEG NFR BLD: 59 % (ref 32–75)
NITRITE UR QL STRIP.AUTO: NEGATIVE
NRBC # BLD: 0 K/UL (ref 0–0.01)
NRBC BLD-RTO: 0 PER 100 WBC
PH UR STRIP: 6.5 [PH] (ref 5–8)
PLATELET # BLD AUTO: 197 K/UL (ref 150–400)
PMV BLD AUTO: 12.1 FL (ref 8.9–12.9)
POTASSIUM SERPL-SCNC: 3 MMOL/L (ref 3.5–5.1)
PROT SERPL-MCNC: 8.1 G/DL (ref 6.4–8.2)
PROT UR STRIP-MCNC: 30 MG/DL
PROTHROMBIN TIME: 14 SEC (ref 9–11.1)
RBC # BLD AUTO: 4.76 M/UL (ref 4.1–5.7)
RBC #/AREA URNS HPF: ABNORMAL /HPF (ref 0–5)
SARS-COV-2, COV2: NORMAL
SERVICE CMNT-IMP: ABNORMAL
SODIUM SERPL-SCNC: 135 MMOL/L (ref 136–145)
SOURCE, COVRS: NORMAL
SP GR UR REFRACTOMETRY: 1.02 (ref 1–1.03)
TROPONIN I SERPL-MCNC: <0.05 NG/ML
UA: UC IF INDICATED,UAUC: ABNORMAL
UROBILINOGEN UR QL STRIP.AUTO: 0.2 EU/DL (ref 0.2–1)
WBC # BLD AUTO: 5.5 K/UL (ref 4.1–11.1)
WBC URNS QL MICRO: ABNORMAL /HPF (ref 0–4)

## 2021-03-06 PROCEDURE — 74011250637 HC RX REV CODE- 250/637: Performed by: HOSPITALIST

## 2021-03-06 PROCEDURE — 80053 COMPREHEN METABOLIC PANEL: CPT

## 2021-03-06 PROCEDURE — 99285 EMERGENCY DEPT VISIT HI MDM: CPT

## 2021-03-06 PROCEDURE — 72141 MRI NECK SPINE W/O DYE: CPT

## 2021-03-06 PROCEDURE — 36415 COLL VENOUS BLD VENIPUNCTURE: CPT

## 2021-03-06 PROCEDURE — 93306 TTE W/DOPPLER COMPLETE: CPT

## 2021-03-06 PROCEDURE — 71045 X-RAY EXAM CHEST 1 VIEW: CPT

## 2021-03-06 PROCEDURE — 84484 ASSAY OF TROPONIN QUANT: CPT

## 2021-03-06 PROCEDURE — 81001 URINALYSIS AUTO W/SCOPE: CPT

## 2021-03-06 PROCEDURE — 87635 SARS-COV-2 COVID-19 AMP PRB: CPT

## 2021-03-06 PROCEDURE — 82962 GLUCOSE BLOOD TEST: CPT

## 2021-03-06 PROCEDURE — 4A03X5D MEASUREMENT OF ARTERIAL FLOW, INTRACRANIAL, EXTERNAL APPROACH: ICD-10-PCS | Performed by: INTERNAL MEDICINE

## 2021-03-06 PROCEDURE — 85025 COMPLETE CBC W/AUTO DIFF WBC: CPT

## 2021-03-06 PROCEDURE — 74011250637 HC RX REV CODE- 250/637: Performed by: STUDENT IN AN ORGANIZED HEALTH CARE EDUCATION/TRAINING PROGRAM

## 2021-03-06 PROCEDURE — 70498 CT ANGIOGRAPHY NECK: CPT

## 2021-03-06 PROCEDURE — 83735 ASSAY OF MAGNESIUM: CPT

## 2021-03-06 PROCEDURE — 0042T CT CODE NEURO PERF W CBF: CPT

## 2021-03-06 PROCEDURE — 65660000000 HC RM CCU STEPDOWN

## 2021-03-06 PROCEDURE — 70551 MRI BRAIN STEM W/O DYE: CPT

## 2021-03-06 PROCEDURE — 2709999900 HC NON-CHARGEABLE SUPPLY

## 2021-03-06 PROCEDURE — 93005 ELECTROCARDIOGRAM TRACING: CPT

## 2021-03-06 PROCEDURE — 85610 PROTHROMBIN TIME: CPT

## 2021-03-06 PROCEDURE — 70450 CT HEAD/BRAIN W/O DYE: CPT

## 2021-03-06 PROCEDURE — 74011000636 HC RX REV CODE- 636: Performed by: STUDENT IN AN ORGANIZED HEALTH CARE EDUCATION/TRAINING PROGRAM

## 2021-03-06 RX ORDER — ACETAMINOPHEN 325 MG/1
650 TABLET ORAL
Status: DISCONTINUED | OUTPATIENT
Start: 2021-03-06 | End: 2021-03-09 | Stop reason: HOSPADM

## 2021-03-06 RX ORDER — MAGNESIUM SULFATE 100 %
4 CRYSTALS MISCELLANEOUS AS NEEDED
Status: DISCONTINUED | OUTPATIENT
Start: 2021-03-06 | End: 2021-03-09 | Stop reason: HOSPADM

## 2021-03-06 RX ORDER — DEXTROSE 50 % IN WATER (D50W) INTRAVENOUS SYRINGE
12.5-25 AS NEEDED
Status: DISCONTINUED | OUTPATIENT
Start: 2021-03-06 | End: 2021-03-09 | Stop reason: HOSPADM

## 2021-03-06 RX ORDER — ONDANSETRON 2 MG/ML
4 INJECTION INTRAMUSCULAR; INTRAVENOUS
Status: DISCONTINUED | OUTPATIENT
Start: 2021-03-06 | End: 2021-03-06

## 2021-03-06 RX ORDER — POTASSIUM CHLORIDE 750 MG/1
40 TABLET, FILM COATED, EXTENDED RELEASE ORAL
Status: DISCONTINUED | OUTPATIENT
Start: 2021-03-06 | End: 2021-03-06

## 2021-03-06 RX ORDER — GLIMEPIRIDE 4 MG/1
4 TABLET ORAL
Status: DISCONTINUED | OUTPATIENT
Start: 2021-03-07 | End: 2021-03-09 | Stop reason: HOSPADM

## 2021-03-06 RX ORDER — GUAIFENESIN 100 MG/5ML
162 LIQUID (ML) ORAL
Status: COMPLETED | OUTPATIENT
Start: 2021-03-06 | End: 2021-03-06

## 2021-03-06 RX ORDER — GUAIFENESIN 100 MG/5ML
162 LIQUID (ML) ORAL
Status: DISCONTINUED | OUTPATIENT
Start: 2021-03-06 | End: 2021-03-06

## 2021-03-06 RX ORDER — ONDANSETRON 2 MG/ML
4 INJECTION INTRAMUSCULAR; INTRAVENOUS
Status: DISCONTINUED | OUTPATIENT
Start: 2021-03-06 | End: 2021-03-09 | Stop reason: HOSPADM

## 2021-03-06 RX ORDER — POTASSIUM CHLORIDE 750 MG/1
40 TABLET, FILM COATED, EXTENDED RELEASE ORAL
Status: COMPLETED | OUTPATIENT
Start: 2021-03-06 | End: 2021-03-06

## 2021-03-06 RX ORDER — LEVETIRACETAM 500 MG/1
1000 TABLET ORAL 2 TIMES DAILY
Status: DISCONTINUED | OUTPATIENT
Start: 2021-03-06 | End: 2021-03-09 | Stop reason: HOSPADM

## 2021-03-06 RX ORDER — INSULIN LISPRO 100 [IU]/ML
INJECTION, SOLUTION INTRAVENOUS; SUBCUTANEOUS
Status: DISCONTINUED | OUTPATIENT
Start: 2021-03-06 | End: 2021-03-09 | Stop reason: HOSPADM

## 2021-03-06 RX ORDER — ASPIRIN 81 MG/1
81 TABLET ORAL DAILY
Status: DISCONTINUED | OUTPATIENT
Start: 2021-03-07 | End: 2021-03-07

## 2021-03-06 RX ORDER — ATORVASTATIN CALCIUM 40 MG/1
80 TABLET, FILM COATED ORAL
Status: DISCONTINUED | OUTPATIENT
Start: 2021-03-06 | End: 2021-03-09 | Stop reason: HOSPADM

## 2021-03-06 RX ORDER — LABETALOL HYDROCHLORIDE 5 MG/ML
5 INJECTION, SOLUTION INTRAVENOUS
Status: DISCONTINUED | OUTPATIENT
Start: 2021-03-06 | End: 2021-03-09 | Stop reason: HOSPADM

## 2021-03-06 RX ORDER — POTASSIUM CHLORIDE AND SODIUM CHLORIDE 900; 300 MG/100ML; MG/100ML
INJECTION, SOLUTION INTRAVENOUS CONTINUOUS
Status: DISPENSED | OUTPATIENT
Start: 2021-03-06 | End: 2021-03-07

## 2021-03-06 RX ORDER — ALBUTEROL SULFATE 90 UG/1
2 AEROSOL, METERED RESPIRATORY (INHALATION)
COMMUNITY

## 2021-03-06 RX ADMIN — IOPAMIDOL 100 ML: 755 INJECTION, SOLUTION INTRAVENOUS at 12:40

## 2021-03-06 RX ADMIN — POTASSIUM CHLORIDE 40 MEQ: 750 TABLET, FILM COATED, EXTENDED RELEASE ORAL at 20:14

## 2021-03-06 RX ADMIN — ASPIRIN 162 MG: 81 TABLET, CHEWABLE ORAL at 20:14

## 2021-03-06 RX ADMIN — LEVETIRACETAM 1000 MG: 500 TABLET ORAL at 20:14

## 2021-03-06 RX ADMIN — ATORVASTATIN CALCIUM 80 MG: 40 TABLET, FILM COATED ORAL at 22:38

## 2021-03-06 RX ADMIN — POTASSIUM CHLORIDE 40 MEQ: 750 TABLET, FILM COATED, EXTENDED RELEASE ORAL at 14:34

## 2021-03-06 NOTE — ED NOTES
Dr. Perry Nguyễn at bedside talking to wife on the phone and also to the pt. Covid rapid test taken to lab now.

## 2021-03-06 NOTE — ED TRIAGE NOTES
Pt to ED via EMS from home c/o left sided weakness and inability to stand or walk this morning. LKW per family was 0900 and needed assistance x 2 to get to the bathroom. Arrives A&Ox4, following commands and left sided weakness especially LLE.

## 2021-03-06 NOTE — PROGRESS NOTES

## 2021-03-06 NOTE — ED PROVIDER NOTES
EMERGENCY DEPARTMENT HISTORY AND PHYSICAL EXAM      Date: 3/6/2021  Patient Name: Ernesto Srinivasan    History of Presenting Illness     Chief Complaint   Patient presents with    Extremity Weakness       History Provided By: Patient    HPI: Ernesto Srinivasan, 63 y.o. male with pertinent past medical history of A. fib on Xarelto (last dose this morning), several previous strokes with baseline mild weakness on the left, presents to the ED with cc of worsening weakness throughout the left upper and lower extremity, as well as new weakness on the right lower extremity as well. Last known well per family was 9 AM this morning. Patient states that he noticed this morning that he was extremely weak in the bilateral lower extremities, left greater than right. States that he normally ambulates independently, but due to new onset severe weakness this morning, he was unable to even get up or move. States that his family member had to carry him to use the bathroom. States that this has never happened before. Patient also reports worsening left upper extremity weakness. Denies any new facial droop, confusion, visual field deficits, speech deficits, numbness. States that his weakness at this time has improved somewhat, but is still present. Patient reports that he still feels he is too weak to stand or walk at this time. Denies ever experiencing chest pain, shortness of breath. Denies any recent illness, fevers, or chills. Denies associated neck or back pain. No changes to bladder or bowel habits. As patient is not a candidate for TPA due to recent Xarelto use, level 2 stroke was activated. PCP: Michela Garcia MD    No current facility-administered medications on file prior to encounter.       Current Outpatient Medications on File Prior to Encounter   Medication Sig Dispense Refill    levETIRAcetam (KEPPRA) 500 mg tablet Take 2 tablets by mouth twice daily 120 Tab 0    glimepiride (AMARYL) 4 mg tablet TAKE 1 TABLET BY MOUTH IN THE MORNING 90 Tab 0    hydrALAZINE (APRESOLINE) 10 mg tablet TAKE 1 TABLET BY MOUTH THREE TIMES DAILY 90 Tab 0    atorvastatin (LIPITOR) 80 mg tablet TAKE 1 TABLET BY MOUTH ONCE DAILY AT NIGHT 30 Tab 0    amLODIPine (NORVASC) 10 mg tablet Take 1 tablet by mouth once daily for blood pressure 90 Tab 0    Xarelto 20 mg tab tablet Take 1 tablet by mouth once daily 30 Tab 0    carvediloL (COREG) 25 mg tablet TAKE 1 & 1/2 (ONE & ONE-HALF) TABLETS BY MOUTH TWICE DAILY WITH MEALS 90 Tab 0    rivaroxaban (XARELTO) 20 mg tab tablet Take 1 Tab by mouth daily. 14 Tab 0    metFORMIN (GLUCOPHAGE) 500 mg tablet TAKE 1 TABLET BY MOUTH TWICE DAILY WITH MEALS 180 Tab 3    metFORMIN ER (GLUCOPHAGE XR) 500 mg tablet Take 500 mg by mouth two (2) times daily (with meals).       lisinopril (PRINIVIL, ZESTRIL) 40 mg tablet TAKE ONE TABLET BY MOUTH ONCE DAILY FOR BLOOD PRESSURE 30 Tab 11       Past History     Past Medical History:  Past Medical History:   Diagnosis Date    Cancer ([facility] [address]     lung    Congestive heart failure, unspecified     Diabetes ([facility] [address]     Hypertension     Overweight(278.02)     Seizures (HCC)     SOLITARY PULMONARY NODULE     1.6 cm    Stroke [facility])        Past Surgical History:  Past Surgical History:   Procedure Laterality Date    CHEST SURGERY PROCEDURE UNLISTED      VATS Video-assisted thoracic surgery       Family History:  Family History   Problem Relation Age of Onset    Stroke Mother     Cancer Father        Social History:  Social History     Tobacco Use    Smoking status: Light Tobacco Smoker     Packs/day: 0.10     Start date: 6/1/2015    Smokeless tobacco: Never Used    Tobacco comment: former cigar   Substance Use Topics    Alcohol use: Yes     Frequency: 2-3 times a week     Drinks per session: 1 or 2     Comment: social    Drug use: Yes     Types: Prescription, OTC       Allergies:  No Known Allergies      Review of Systems   Review of Systems   Constitutional: Negative for chills and fever. HENT: Negative for congestion and rhinorrhea. Eyes: Negative for visual disturbance. Respiratory: Negative for chest tightness and shortness of breath. Cardiovascular: Negative for chest pain and palpitations. Gastrointestinal: Negative for abdominal pain, diarrhea, nausea and vomiting. Genitourinary: Negative for dysuria, flank pain and hematuria. Musculoskeletal: Negative for back pain and neck pain. Skin: Negative for rash. Allergic/Immunologic: Negative for immunocompromised state. Neurological: Positive for weakness. Negative for dizziness, speech difficulty and headaches. Hematological: Negative for adenopathy. Psychiatric/Behavioral: Negative for dysphoric mood and suicidal ideas. Physical Exam   Physical Exam  Vitals signs and nursing note reviewed. Constitutional:       General: He is not in acute distress. Appearance: Normal appearance. He is not ill-appearing or toxic-appearing. HENT:      Head: Normocephalic and atraumatic. Nose: Nose normal.      Mouth/Throat:      Mouth: Mucous membranes are moist.   Eyes:      General: No visual field deficit. Extraocular Movements: Extraocular movements intact. Pupils: Pupils are equal, round, and reactive to light. Neck:      Musculoskeletal: Normal range of motion and neck supple. Cardiovascular:      Rate and Rhythm: Normal rate and regular rhythm. Pulses: Normal pulses. Pulmonary:      Effort: Pulmonary effort is normal. No respiratory distress. Breath sounds: Normal breath sounds. No stridor. No wheezing or rhonchi. Abdominal:      General: Abdomen is flat. There is no distension. Palpations: There is no mass. Tenderness: There is no abdominal tenderness. Musculoskeletal: Normal range of motion. Skin:     General: Skin is warm and dry. Neurological:      General: No focal deficit present.       Mental Status: He is alert and oriented to person, place, and time. Cranial Nerves: Cranial nerves are intact. No cranial nerve deficit, dysarthria or facial asymmetry. Sensory: Sensation is intact. No sensory deficit. Motor: Weakness present. Comments: Bilateral lower extremity weakness, left greater than right. Strength 1 out of 5 in the left lower extremity. 2 out of 5 strength in the right lower extremity. Sensation intact bilateral lower extremities. Distal aspects of bilateral lower extremities well-perfused. Distal pulses palpated bilaterally. Left upper extremity strength 4 out of 5, and right upper extremity strength 5 out of 5. No other focal neurologic deficits on exam.       NIHSS Stroke Scale      Interval: Baseline  Time: 12:35 PM    Person Administering Scale: Dany Beatty MD  Administer stroke scale items in the order listed. Record performance in each category after each subscale exam. Do not go back and change scores. Follow directions provided for each exam technique. Scores should reflect what the patient does, not what the clinician thinks the patient can do. The clinician should record answers while administering the exam and work quickly. Except where indicated, the patient should not be coached (i.e., repeated requests to patient to make a special effort). 1a  Level of consciousness: 0=alert; keenly responsive   1b. LOC questions:  0=Answers both questions correctly   1c. LOC commands: 0=Performs both tasks correctly   2. Best Gaze: 0=normal   3. Visual: 0=No visual loss   4. Facial Palsy: 0=Normal symmetric movement   5a. Motor left arm: 1=Drift, arm holds 90 (or 45) degrees but drifts down before full 10 seconds: does not hit bed. 5b. Motor right arm: 0=No drift, arm holds 90 (or 45) degrees for full 10 seconds   6a. Motor left leg: 3=No effort against gravity; leg falls to bed immediately   6b  Motor right le=Drift; leg falls by the end of the 5-second period but does not hit bed.    7. Limb Ataxia: 0=Absent   8. Sensory: 0=Normal; no sensory loss   9. Best Language:  0=No aphasia, normal   10. Dysarthria: 0=Normal   11. Extinction and Inattention: 0=No abnormality    Total:   5-15= Moderate stroke             Diagnostic Study Results     Labs -     Recent Results (from the past 24 hour(s))   CBC WITH AUTOMATED DIFF    Collection Time: 03/06/21  1:16 PM   Result Value Ref Range    WBC 5.5 4.1 - 11.1 K/uL    RBC 4.76 4.10 - 5.70 M/uL    HGB 13.2 12.1 - 17.0 g/dL    HCT 39.0 36.6 - 50.3 %    MCV 81.9 80.0 - 99.0 FL    MCH 27.7 26.0 - 34.0 PG    MCHC 33.8 30.0 - 36.5 g/dL    RDW 13.8 11.5 - 14.5 %    PLATELET 946 187 - 164 K/uL    MPV 12.1 8.9 - 12.9 FL    NRBC 0.0 0  WBC    ABSOLUTE NRBC 0.00 0.00 - 0.01 K/uL    NEUTROPHILS 59 32 - 75 %    LYMPHOCYTES 28 12 - 49 %    MONOCYTES 10 5 - 13 %    EOSINOPHILS 2 0 - 7 %    BASOPHILS 1 0 - 1 %    IMMATURE GRANULOCYTES 0 0.0 - 0.5 %    ABS. NEUTROPHILS 3.2 1.8 - 8.0 K/UL    ABS. LYMPHOCYTES 1.6 0.8 - 3.5 K/UL    ABS. MONOCYTES 0.5 0.0 - 1.0 K/UL    ABS. EOSINOPHILS 0.1 0.0 - 0.4 K/UL    ABS. BASOPHILS 0.0 0.0 - 0.1 K/UL    ABS. IMM. GRANS. 0.0 0.00 - 0.04 K/UL    DF AUTOMATED     METABOLIC PANEL, COMPREHENSIVE    Collection Time: 03/06/21  1:16 PM   Result Value Ref Range    Sodium 135 (L) 136 - 145 mmol/L    Potassium 3.0 (L) 3.5 - 5.1 mmol/L    Chloride 100 97 - 108 mmol/L    CO2 29 21 - 32 mmol/L    Anion gap 6 5 - 15 mmol/L    Glucose 190 (H) 65 - 100 mg/dL    BUN 12 6 - 20 MG/DL    Creatinine 1.15 0.70 - 1.30 MG/DL    BUN/Creatinine ratio 10 (L) 12 - 20      GFR est AA >60 >60 ml/min/1.73m2    GFR est non-AA >60 >60 ml/min/1.73m2    Calcium 8.2 (L) 8.5 - 10.1 MG/DL    Bilirubin, total 0.5 0.2 - 1.0 MG/DL    ALT (SGPT) 19 12 - 78 U/L    AST (SGOT) 7 (L) 15 - 37 U/L    Alk.  phosphatase 74 45 - 117 U/L    Protein, total 8.1 6.4 - 8.2 g/dL    Albumin 3.5 3.5 - 5.0 g/dL    Globulin 4.6 (H) 2.0 - 4.0 g/dL    A-G Ratio 0.8 (L) 1.1 - 2.2     PROTHROMBIN TIME + INR Collection Time: 03/06/21  1:16 PM   Result Value Ref Range    INR 1.4 (H) 0.9 - 1.1      Prothrombin time 14.0 (H) 9.0 - 11.1 sec   TROPONIN I    Collection Time: 03/06/21  1:16 PM   Result Value Ref Range    Troponin-I, Qt. <0.05 <0.05 ng/mL   URINALYSIS W/ REFLEX CULTURE    Collection Time: 03/06/21  1:16 PM    Specimen: Urine   Result Value Ref Range    Color YELLOW/STRAW      Appearance CLEAR CLEAR      Specific gravity 1.020 1.003 - 1.030      pH (UA) 6.5 5.0 - 8.0      Protein 30 (A) NEG mg/dL    Glucose Negative NEG mg/dL    Ketone Negative NEG mg/dL    Bilirubin Negative NEG      Blood Negative NEG      Urobilinogen 0.2 0.2 - 1.0 EU/dL    Nitrites Negative NEG      Leukocyte Esterase Negative NEG      WBC 0-4 0 - 4 /hpf    RBC 0-5 0 - 5 /hpf    Epithelial cells FEW FEW /lpf    Bacteria Negative NEG /hpf    UA:UC IF INDICATED CULTURE NOT INDICATED BY UA RESULT CNI      Hyaline cast 0-2 0 - 5 /lpf   MAGNESIUM    Collection Time: 03/06/21  1:16 PM   Result Value Ref Range    Magnesium 1.6 1.6 - 2.4 mg/dL   EKG, 12 LEAD, INITIAL    Collection Time: 03/06/21  2:05 PM   Result Value Ref Range    Ventricular Rate 72 BPM    Atrial Rate 50 BPM    QRS Duration 98 ms    Q-T Interval 462 ms    QTC Calculation (Bezet) 505 ms    Calculated R Axis 10 degrees    Calculated T Axis 6 degrees    Diagnosis       Atrial fibrillation  When compared with ECG of 06-SEP-2018 16:14,  QT has lengthened     SARS-COV-2    Collection Time: 03/06/21  3:58 PM   Result Value Ref Range    SARS-CoV-2 Please find results under separate order     COVID-19 RAPID TEST    Collection Time: 03/06/21  3:58 PM   Result Value Ref Range    Specimen source Nasopharyngeal      COVID-19 rapid test Not detected NOTD         Radiologic Studies -   XR CHEST PORT   Final Result   No acute findings. Cardia megaly. CTA CODE NEURO HEAD AND NECK W CONT   Final Result   CTA Head:   1. No evidence of an stenosis or aneurysm. CTA Neck:   1.  No evidence of significant stenosis. 2. Multilevel spinal canal stenoses in the cervical spine secondary to   multilevel degenerative disc disease superimposed on a congenitally narrow   spinal canal.      CT Brain Perfusion:   1. No acute abnormality. CT CODE NEURO PERF W CBF   Final Result   CTA Head:   1. No evidence of an stenosis or aneurysm. CTA Neck:   1. No evidence of significant stenosis. 2. Multilevel spinal canal stenoses in the cervical spine secondary to   multilevel degenerative disc disease superimposed on a congenitally narrow   spinal canal.      CT Brain Perfusion:   1. No acute abnormality. CT CODE NEURO HEAD WO CONTRAST   Final Result   1. No evidence of acute intracranial abnormality. 2. Unchanged severe chronic microvascular ischemic disease. CT Results  (Last 48 hours)               03/06/21 1255  CTA CODE NEURO HEAD AND NECK W CONT Final result    Impression:  CTA Head:   1. No evidence of an stenosis or aneurysm. CTA Neck:   1. No evidence of significant stenosis. 2. Multilevel spinal canal stenoses in the cervical spine secondary to   multilevel degenerative disc disease superimposed on a congenitally narrow   spinal canal.       CT Brain Perfusion:   1. No acute abnormality. Narrative:  EXAM:  CTA CODE NEURO HEAD AND NECK W CONT, CT CODE NEURO PERF W CBF       INDICATION:   Code Stroke       COMPARISON:  CT head 3/6/2021. CONTRAST:  100 mL of Isovue-370. TECHNIQUE:  Unenhanced  images were obtained to localize the volume for   acquisition. Multislice helical axial CT angiography was performed from the   aortic arch to the top of the head during uneventful rapid bolus intravenous   contrast administration. Coronal and sagittal reformations and 3D post   processing was performed.   CT dose reduction was achieved through use of a   standardized protocol tailored for this examination and automatic exposure   control for dose modulation. CT brain perfusion was performed with generation of hemodynamic maps of multiple   parameters, including cerebral blood flow, cerebral blood volume, and MTT (mean   transit time). CT dose reduction was achieved through use of a standardized   protocol tailored for this examination and automatic exposure control for dose   modulation. This study was analyzed by the [address]  algorithm. FINDINGS:       CTA Head:   There is no evidence of large vessel occlusion or flow-limiting stenosis of the   intracranial internal carotid, anterior cerebral, and middle cerebral arteries. Minimal calcification of the bilateral carotid siphons without significant   stenosis. Incidental note of persistent dorsal right ophthalmic artery. The   anterior communicating artery is patent. There is no evidence of large vessel occlusion or flow-limiting stenosis of the   intracranial vertebral arteries, basilar artery, or posterior cerebral arteries. The posterior communicating arteries are patent, with fetal origin of the right   posterior cerebral artery. There is no evidence of aneurysm or vascular malformation. The dural venous   sinuses and deep cerebral venous system are patent. No evidence of abnormal   enhancement on delayed phase images. CTA NECK:   NASCET method was utilized for calculating stenosis. The aortic arch is unremarkable. The common carotid arteries demonstrate no   significant stenosis. There is no evidence of significant stenosis in the   cervical right internal carotid artery. There is no evidence of significant   stenosis in the cervical left internal carotid artery. Minimal calcific   atherosclerosis of the carotid bifurcations. There is a dominant vertebrobasilar arterial system. An aberrant right vertebral   artery is noted which arises as the last branch from the aortic arch and courses   posterior to the esophagus.  The cervical vertebral arteries are normal in   course, size and contour without significant stenosis. Visualized soft tissues of the neck are unremarkable. Visualized lung apices are   clear. No acute fracture or aggressive osseous lesion. Multilevel degenerative   disc disease throughout the cervical spine with congenitally narrow spinal canal   noted, resulting in multilevel spinal canal stenoses. Poor dentition with   numerous missing teeth, periapical lucencies and dental caries. CT Brain Perfusion:   There are no regional areas of elevated Tmax, decreased cerebral blood flow or   blood volume. Indicated elevated Tmax in the right cerebral hemisphere is   artifactual.   rCBF < 30% = 0 cc. Tmax > 6 seconds = 111 cc.           03/06/21 1255  CT CODE NEURO PERF W CBF Final result    Impression:  CTA Head:   1. No evidence of an stenosis or aneurysm. CTA Neck:   1. No evidence of significant stenosis. 2. Multilevel spinal canal stenoses in the cervical spine secondary to   multilevel degenerative disc disease superimposed on a congenitally narrow   spinal canal.       CT Brain Perfusion:   1. No acute abnormality. Narrative:  EXAM:  CTA CODE NEURO HEAD AND NECK W CONT, CT CODE NEURO PERF W CBF       INDICATION:   Code Stroke       COMPARISON:  CT head 3/6/2021. CONTRAST:  100 mL of Isovue-370. TECHNIQUE:  Unenhanced  images were obtained to localize the volume for   acquisition. Multislice helical axial CT angiography was performed from the   aortic arch to the top of the head during uneventful rapid bolus intravenous   contrast administration. Coronal and sagittal reformations and 3D post   processing was performed. CT dose reduction was achieved through use of a   standardized protocol tailored for this examination and automatic exposure   control for dose modulation.        CT brain perfusion was performed with generation of hemodynamic maps of multiple   parameters, including cerebral blood flow, cerebral blood volume, and MTT (mean   transit time). CT dose reduction was achieved through use of a standardized   protocol tailored for this examination and automatic exposure control for dose   modulation. This study was analyzed by the [address]  algorithm. FINDINGS:       CTA Head:   There is no evidence of large vessel occlusion or flow-limiting stenosis of the   intracranial internal carotid, anterior cerebral, and middle cerebral arteries. Minimal calcification of the bilateral carotid siphons without significant   stenosis. Incidental note of persistent dorsal right ophthalmic artery. The   anterior communicating artery is patent. There is no evidence of large vessel occlusion or flow-limiting stenosis of the   intracranial vertebral arteries, basilar artery, or posterior cerebral arteries. The posterior communicating arteries are patent, with fetal origin of the right   posterior cerebral artery. There is no evidence of aneurysm or vascular malformation. The dural venous   sinuses and deep cerebral venous system are patent. No evidence of abnormal   enhancement on delayed phase images. CTA NECK:   NASCET method was utilized for calculating stenosis. The aortic arch is unremarkable. The common carotid arteries demonstrate no   significant stenosis. There is no evidence of significant stenosis in the   cervical right internal carotid artery. There is no evidence of significant   stenosis in the cervical left internal carotid artery. Minimal calcific   atherosclerosis of the carotid bifurcations. There is a dominant vertebrobasilar arterial system. An aberrant right vertebral   artery is noted which arises as the last branch from the aortic arch and courses   posterior to the esophagus. The cervical vertebral arteries are normal in   course, size and contour without significant stenosis. Visualized soft tissues of the neck are unremarkable.  Visualized lung apices are   clear. No acute fracture or aggressive osseous lesion. Multilevel degenerative   disc disease throughout the cervical spine with congenitally narrow spinal canal   noted, resulting in multilevel spinal canal stenoses. Poor dentition with   numerous missing teeth, periapical lucencies and dental caries. CT Brain Perfusion:   There are no regional areas of elevated Tmax, decreased cerebral blood flow or   blood volume. Indicated elevated Tmax in the right cerebral hemisphere is   artifactual.   rCBF < 30% = 0 cc. Tmax > 6 seconds = 111 cc.           03/06/21 1242  CT CODE NEURO HEAD WO CONTRAST Final result    Impression:  1. No evidence of acute intracranial abnormality. 2. Unchanged severe chronic microvascular ischemic disease. Narrative:  EXAM:  CT CODE NEURO HEAD WO CONTRAST       INDICATION:   Code Stroke       COMPARISON: CT head 9/6/2018. TECHNIQUE: Unenhanced CT of the head was performed using 5 mm images. Brain and   bone windows were generated. CT dose reduction was achieved through use of a   standardized protocol tailored for this examination and automatic exposure   control for dose modulation. FINDINGS:   The ventricles are normal in size and position. Unchanged confluent   periventricular and deep white matter hypodensities, consistent with severe   chronic microangiopathic ischemic changes. Basilar cisterns are patent. No   midline shift. There is no evidence of acute infarct, hemorrhage, or extraaxial   fluid collection. Retention cysts in the right maxillary sinus. The mastoid air cells and middle   ears are clear. The orbital contents are within normal limits. There are no   significant osseous or extracranial soft tissue lesions. CXR Results  (Last 48 hours)               03/06/21 1304  XR CHEST PORT Final result    Impression:  No acute findings. Cardia megaly.        Narrative:  EXAM: XR CHEST PORT       INDICATION: CVA COMPARISON: CT thorax 7/17/2020 and chest x-ray of 10/27/2017. FINDINGS: A portable AP radiograph of the chest was obtained at 12:54 hours. The   patient is on a cardiac monitor. There is redemonstration of elevation left   diaphragm and cardiomegaly. The mediastinal contours and pulmonary vascularity   are normal.  The chest wall structures and visualized upper abdomen show no   acute findings with incidental note of degenerative spine changes. Medical Decision Making   I am the first provider for this patient. I reviewed the vital signs, available nursing notes, past medical history, past surgical history, family history and social history. Vital Signs-Reviewed the patient's vital signs. Patient Vitals for the past 24 hrs:   Temp Pulse Resp BP SpO2   03/06/21 1300 97.5 °F (36.4 °C) 67 19 (!) 149/95 100 %       EKG interpretation: (Preliminary)  A. fib, rate controlled, 72 bpm.  No acute ST changes concerning for ischemia. QTc slightly prolonged at 505. EKG interpretation by Davie Wild MD    Records Reviewed: Nursing Notes and Old Medical Records    Provider Notes (Medical Decision Making):   I81-year-old male with past medical history of A. fib on Xarelto, several previous strokes with mild residual left upper and lower extremity weakness, presenting for acute onset of severe bilateral lower extremity weakness (left greater than right) as well as exacerbation of left lower extremity weakness. NIHSS of 5 on my exam.  Vitals are stable. Patient was taken to CT scan shortly after initial evaluation. CT brain, CTA head and neck, and CT brain perfusion negative for acute findings. No LVO. Patient's presentation today not entirely in line with stroke lesion distribution, although with his history of multiple previous strokes, cannot definitively rule out for acute CVA. Other differentials considered include cord impingement from spinal stenosis.   However, patient denies neck or back pain.  He denies bladder and bowel changes. Discussed with teleneurologist, who is in agreement, recommended no TPA, and admission for MRI. Will obtain medical work-up to rule out for other metabolic etiologies of current presentation. Specifically, will rule out for infection. Labs are largely unremarkable aside from hypokalemia of 3.0. This was supplemented with 40 mEq of KCl in the ED. UA is negative for infection. Chest x-ray is without acute findings. Etiology of presentation today not entirely clear based on ED work-up thus far. Will need to be definitively ruled out for CVA during inpatient course. Patient was discussed with the hospitalist, Dr. Pepe Downs, who has accepted him for admission in stable condition at this time. Jarek Floyd MD      Disposition:  Admit      Diagnosis     Clinical Impression:   1. Weakness of both lower extremities    2. Left-sided weakness    3. Hypokalemia        Attestations:    Jarek Floyd MD    Please note that this dictation was completed with [employer], the computer voice recognition software. Quite often unanticipated grammatical, syntax, homophones, and other interpretive errors are inadvertently transcribed by the computer software. Please disregard these errors. Please excuse any errors that have escaped final proofreading. Thank you.

## 2021-03-06 NOTE — H&P
Hospitalist Admission Note    NAME: Juan Duckworth   :     MRN:  [id number]     Date/Time:  3/6/2021 4:43 PM    Patient PCP: Megan Manzanares MD  Oncologist:  Dr. El Fernandez  Cardiologist:  Patient does not know    Please note that this dictation was completed with [employer], the computer voice recognition software. Quite often unanticipated grammatical, syntax, homophones, and other interpretive errors are inadvertently transcribed by the computer software. Please disregard these errors. Please excuse any errors that have escaped final proofreading  ______________________________________________________________________   Assessment & Plan:  Acute persistent worsened left arm and left leg weakness from baseline, POA  Transient right leg weakness, resolved  Hx strokes (acute right thalamus ; TIA with right distal ICA dissection  was transferred to [facility]) with residual mild left leg weakness  --ddx stroke +/- cervical spinal stenosis with myelopathy as CTA head and neck with Multilevel spinal canal stenoses in the cervical spine secondary tomultilevel degenerative disc disease superimposed on a congenitally narrow spinal canal with LVO. CT head with severe chronic microvascular ischemic disease. --risk factors:  Prior hx stroke, DM, hyperlipidemia, smoker, persistent afib, HTN  --add aspirin therapy. Continue xarelto  --continue lipitor  --consult in house neurologist  --check A1c, lipid panel  --allow permissive HTN  --get echo, MRI brain, MRI cervical spine  --pt/ot consult    DM type 2 with elevated   CKD stage 3 with proteinuria prior Cr baseline 1.3. Currently 1.15  --continue glimepiride.   Hold metformin for 48 hours due to IV contrast  --high dose SSI  --check A1c  --has seen Dr. Kyle Hernandez in past    Hyponatremia Na 130  Hypokalemia 3.5  --kdur 40meq now  --IVF with NS with KCl 100ml/hr x 1L total.  Recheck lytes in AM    Seizure disorder  --continue keppra bid. He denies any tonic clonic seizure activity today     HTN  Hx systolic chf EF 70% 3424  --hold coreg, amlodipine, hydralazine. Not clear if he is on lisinopril (it is on PTA list but not one of the medication bottles he brought in with him). He took AM doses of meds already  --not volume overloaded by exam  --check echo    Persistent afib, POA  --rate controlled, continue xarelto. Holding coreg to allow permissive HTN    Hyperlipidemia  --continue lipitor 80mg qhs. 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 Nabeel Falcon in past    Intermittent cigar smoking  --discussed smoking cessation. Mild cognitive impairment  --patient oriented to month but difficulty with year and date and poor insight into his PMH    Obesity  Body mass index is 31 kg/m². Code: discussed, wants full code  DVT prophylaxis:  xarelto  Surrogate decision maker:  Wife Joseph Gregg          Subjective:   CHIEF COMPLAINT:  Weakness, resolved on right but still weak on left side    HISTORY OF PRESENT ILLNESS:     Aguila Merida is a 63 y.o. male with PMH stroke (at least 3) with residual mild left leg weakness but at baseline ambulate independently, afib on xarelto, DM type 2, CKD stage 3, carcinoid cancer left lung s/p resection 2015, hyperlipidemia who came to ER as level 2 stroke alert. Patient went to bed normal at 9pm last night. He slept on sofa last night. At around 8 or 9am today, woke up and tried to get up but both legs and arms were weak (left more than right) and he slid down onto floor. Denies falling, hitting head or neck. Unable to get up so wife had to help him. Had difficulty walking due to b/l leg weakness. He denies headache, acute vision change, difficulty speaking. Denies neck or back pain. +mild numbness in left leg.     On arrival to ER he is noted to have weakness in left arm 4/5 and left leg 1/5 and right leg 2/5. We were asked to admit for work up and evaluation of the above problems. 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    Hypertension     Overweight(278.02)     Seizures ([facility])     SOLITARY PULMONARY NODULE     1.6 cm    Stroke ([facility] 2015    right thalamus      Past Surgical History:   Procedure Laterality Date    RI CHEST SURGERY PROCEDURE UNLISTED      VATS Video-assisted thoracic surgery     Social History     Tobacco Use    Smoking status: Light Tobacco Smoker     Packs/day: 0.10     Start date: 6/1/2015    Smokeless tobacco: Never Used    Tobacco comment: former cigar   Substance Use Topics    Alcohol use: Yes     Frequency: 2-3 times a week     Drinks per session: 1 or 2     Comment: social      Drug use:  Denies  Lives with wife, unemployed      Family History   Problem Relation Age of Onset    Stroke Mother     Cancer Father      No Known Allergies     Prior to Admission medications    Medication Sig Start Date End Date Taking? Authorizing Provider   albuterol (ProAir HFA) 90 mcg/actuation inhaler Take 2 Puffs by inhalation every four (4) hours as needed for Wheezing.    Yes Provider, Historical   levETIRAcetam (KEPPRA) 500 mg tablet Take 2 tablets by mouth twice daily 1/22/21  Yes Neyda Flowers MD   glimepiride (AMARYL) 4 mg tablet TAKE 1 TABLET BY MOUTH IN THE MORNING 1/22/21  Yes Neyda Flowers MD   hydrALAZINE (APRESOLINE) 10 mg tablet TAKE 1 TABLET BY MOUTH THREE TIMES DAILY 1/22/21  Yes Neyda Flowers MD   atorvastatin (LIPITOR) 80 mg tablet TAKE 1 TABLET BY MOUTH ONCE DAILY AT NIGHT 1/22/21  Yes Neyda Flowers MD   amLODIPine (NORVASC) 10 mg tablet Take 1 tablet by mouth once daily for blood pressure 12/27/20  Yes Neyda Flowers MD   carvediloL (COREG) 25 mg tablet TAKE 1 & 1/2 (ONE & ONE-HALF) TABLETS BY MOUTH TWICE DAILY WITH MEALS 20  Yes Doug Coyle MD   rivaroxaban (XARELTO) 20 mg tab tablet Take 1 Tab by mouth daily. 19  Yes Doug Coyle MD   metFORMIN (GLUCOPHAGE) 500 mg tablet TAKE 1 TABLET BY MOUTH TWICE DAILY WITH MEALS 19  Yes Doug Coyle MD   Xarelto 20 mg tab tablet Take 1 tablet by mouth once daily 20   Doug Coyle MD   metFORMIN ER (GLUCOPHAGE XR) 500 mg tablet Take 500 mg by mouth two (2) times daily (with meals). Provider, Historical   lisinopril (PRINIVIL, ZESTRIL) 40 mg tablet TAKE ONE TABLET BY MOUTH ONCE DAILY FOR BLOOD PRESSURE 18   Doug Coyle MD     REVIEW OF SYSTEMS:  POSITIVE= Bold. Negative = normal text  General:  fever, chills, sweats, generalized weakness, weight loss/gain, loss of appetite  Eyes:  blurred vision, eye pain, loss of vision, diplopia  Ear Nose and Throat:  rhinorrhea, pharyngitis  Respiratory:   cough, sputum production, SOB, wheezing, LOPEZ, pleuritic pain  Cardiology:  chest pain, palpitations, orthopnea, PND, edema, syncope   Gastrointestinal:  abdominal pain, N/V, dysphagia, diarrhea, constipation, bleeding  Genitourinary:  frequency, urgency, dysuria, hematuria, incontinence  Muskuloskeletal :  arthralgia, myalgia  Hematology:  easy bruising, bleeding, lymphadenopathy  Dermatological:  rash, ulceration, pruritis  Endocrine:  hot flashes or polydipsia  Neurological:  headache, dizziness, confusion, focal weakness, paresthesia, memory loss, gait disturbance  Psychological: anxiety, depression, agitation      Objective:   VITALS:    Visit Vitals  BP (!) 156/96   Pulse 72   Temp 97.7 °F (36.5 °C)   Resp 18   Ht 6' 1\" (1.854 m)   Wt 106.6 kg (235 lb)   SpO2 98%   BMI 31.00 kg/m²     Temp (24hrs), Av.6 °F (36.4 °C), Min:97.5 °F (36.4 °C), Max:97.7 °F (36.5 °C)    Body mass index is 31 kg/m².   PHYSICAL EXAM:    General:    Alert, obese, pleasant, cooperative, no distress, appears stated age. HEENT: Atraumatic, anicteric sclerae, pink conjunctivae     No oral ulcers, mucosa moist, throat clear. Hearing intact. Neck:  Supple, symmetrical,  thyroid: non tender  Lungs:   Clear to auscultation bilaterally. No Wheezing or Rhonchi. No rales. Chest wall:  No tenderness  No Accessory muscle use. Heart:   irregular  rhythm,  No  murmur   No gallop. No edema. Abdomen:   Soft, non-tender. Not distended. Bowel sounds normal. No masses  Extremities: No cyanosis. No clubbing  Skin:     Not pale Not Jaundiced  No rashes   Psych:  Poor insight. Not depressed. Not anxious or agitated. Neurologic: EOMs intact. No facial asymmetry. No aphasia or slurred speech. Arm strength 5/5 b/l but there is slight left pronator drift after 20 seconds. Left leg 2/5 with decreased plantar and dorsiflexion strength, right leg 5/5 with leg drift, Alert and oriented X self, month, and president.   Says year is 2020  Peripheral pulse: Bilateral, DP, 1+  Capillary refill:  normal    IMAGING RESULTS:   []       I have personally reviewed the actual   []     CXR  []     CT scan  CXR:  CT :  EKG: afib 72, flat T in inferolateral leads ________________________________________________________________________  Care Plan discussed with:    Comments   Patient y    Family  y Wife on phone   RN     Care Manager                    Consultant:      ________________________________________________________________________  Prophylaxis:  GI none   DVT xarelto   ________________________________________________________________________  Recommended Disposition: TBD, home with PT vs. Acute rehab  Home with Family    HH/PT/OT/RN    SNF/LTC    PHILIPPE    ________________________________________________________________________  Code Status:  Full Code y   DNR/DNI    ________________________________________________________________________  TOTAL TIME:  60 minutes      Comments    y Reviewed previous records   >50% of visit spent in counseling and coordination of care  Discussion with patient and/or family and questions answered         ______________________________________________________________________  Rony Lynch MD      Procedures: see electronic medical records for all procedures/Xrays and details which were not copied into this note but were reviewed prior to creation of Plan. LAB DATA REVIEWED:    Recent Results (from the past 24 hour(s))   CBC WITH AUTOMATED DIFF    Collection Time: 03/06/21  1:16 PM   Result Value Ref Range    WBC 5.5 4.1 - 11.1 K/uL    RBC 4.76 4.10 - 5.70 M/uL    HGB 13.2 12.1 - 17.0 g/dL    HCT 39.0 36.6 - 50.3 %    MCV 81.9 80.0 - 99.0 FL    MCH 27.7 26.0 - 34.0 PG    MCHC 33.8 30.0 - 36.5 g/dL    RDW 13.8 11.5 - 14.5 %    PLATELET 173 406 - 769 K/uL    MPV 12.1 8.9 - 12.9 FL    NRBC 0.0 0  WBC    ABSOLUTE NRBC 0.00 0.00 - 0.01 K/uL    NEUTROPHILS 59 32 - 75 %    LYMPHOCYTES 28 12 - 49 %    MONOCYTES 10 5 - 13 %    EOSINOPHILS 2 0 - 7 %    BASOPHILS 1 0 - 1 %    IMMATURE GRANULOCYTES 0 0.0 - 0.5 %    ABS. NEUTROPHILS 3.2 1.8 - 8.0 K/UL    ABS. LYMPHOCYTES 1.6 0.8 - 3.5 K/UL    ABS. MONOCYTES 0.5 0.0 - 1.0 K/UL    ABS. EOSINOPHILS 0.1 0.0 - 0.4 K/UL    ABS. BASOPHILS 0.0 0.0 - 0.1 K/UL    ABS. IMM. GRANS. 0.0 0.00 - 0.04 K/UL    DF AUTOMATED     METABOLIC PANEL, COMPREHENSIVE    Collection Time: 03/06/21  1:16 PM   Result Value Ref Range    Sodium 135 (L) 136 - 145 mmol/L    Potassium 3.0 (L) 3.5 - 5.1 mmol/L    Chloride 100 97 - 108 mmol/L    CO2 29 21 - 32 mmol/L    Anion gap 6 5 - 15 mmol/L    Glucose 190 (H) 65 - 100 mg/dL    BUN 12 6 - 20 MG/DL    Creatinine 1.15 0.70 - 1.30 MG/DL    BUN/Creatinine ratio 10 (L) 12 - 20      GFR est AA >60 >60 ml/min/1.73m2    GFR est non-AA >60 >60 ml/min/1.73m2    Calcium 8.2 (L) 8.5 - 10.1 MG/DL    Bilirubin, total 0.5 0.2 - 1.0 MG/DL    ALT (SGPT) 19 12 - 78 U/L    AST (SGOT) 7 (L) 15 - 37 U/L    Alk.  phosphatase 74 45 - 117 U/L    Protein, total 8.1 6.4 - 8.2 g/dL    Albumin 3.5 3.5 - 5.0 g/dL    Globulin 4.6 (H) 2.0 - 4.0 g/dL    A-G Ratio 0.8 (L) 1.1 - 2.2     PROTHROMBIN TIME + INR    Collection Time: 03/06/21  1:16 PM   Result Value Ref Range    INR 1.4 (H) 0.9 - 1.1      Prothrombin time 14.0 (H) 9.0 - 11.1 sec   TROPONIN I    Collection Time: 03/06/21  1:16 PM   Result Value Ref Range    Troponin-I, Qt. <0.05 <0.05 ng/mL   URINALYSIS W/ REFLEX CULTURE    Collection Time: 03/06/21  1:16 PM    Specimen: Urine   Result Value Ref Range    Color YELLOW/STRAW      Appearance CLEAR CLEAR      Specific gravity 1.020 1.003 - 1.030      pH (UA) 6.5 5.0 - 8.0      Protein 30 (A) NEG mg/dL    Glucose Negative NEG mg/dL    Ketone Negative NEG mg/dL    Bilirubin Negative NEG      Blood Negative NEG      Urobilinogen 0.2 0.2 - 1.0 EU/dL    Nitrites Negative NEG      Leukocyte Esterase Negative NEG      WBC 0-4 0 - 4 /hpf    RBC 0-5 0 - 5 /hpf    Epithelial cells FEW FEW /lpf    Bacteria Negative NEG /hpf    UA:UC IF INDICATED CULTURE NOT INDICATED BY UA RESULT CNI      Hyaline cast 0-2 0 - 5 /lpf   MAGNESIUM    Collection Time: 03/06/21  1:16 PM   Result Value Ref Range    Magnesium 1.6 1.6 - 2.4 mg/dL   EKG, 12 LEAD, INITIAL    Collection Time: 03/06/21  2:05 PM   Result Value Ref Range    Ventricular Rate 72 BPM    Atrial Rate 50 BPM    QRS Duration 98 ms    Q-T Interval 462 ms    QTC Calculation (Bezet) 505 ms    Calculated R Axis 10 degrees    Calculated T Axis 6 degrees    Diagnosis       Atrial fibrillation  When compared with ECG of 06-SEP-2018 16:14,  QT has lengthened     SARS-COV-2    Collection Time: 03/06/21  3:58 PM   Result Value Ref Range    SARS-CoV-2 Please find results under separate order     COVID-19 RAPID TEST    Collection Time: 03/06/21  3:58 PM   Result Value Ref Range    Specimen source Nasopharyngeal      COVID-19 rapid test Not detected NOTD

## 2021-03-06 NOTE — ED NOTES
Dr. Juanita Reddy at bedside and has spoken to neuro and they do not suspect a stroke.  Will continue to monitor pt and neuro checks

## 2021-03-07 LAB
ANION GAP SERPL CALC-SCNC: 5 MMOL/L (ref 5–15)
ATRIAL RATE: 50 BPM
BUN SERPL-MCNC: 12 MG/DL (ref 6–20)
BUN/CREAT SERPL: 10 (ref 12–20)
CALCIUM SERPL-MCNC: 8.1 MG/DL (ref 8.5–10.1)
CALCULATED R AXIS, ECG10: 10 DEGREES
CALCULATED T AXIS, ECG11: 6 DEGREES
CHLORIDE SERPL-SCNC: 103 MMOL/L (ref 97–108)
CHOLEST SERPL-MCNC: 116 MG/DL
CO2 SERPL-SCNC: 28 MMOL/L (ref 21–32)
CREAT SERPL-MCNC: 1.18 MG/DL (ref 0.7–1.3)
DIAGNOSIS, 93000: NORMAL
EST. AVERAGE GLUCOSE BLD GHB EST-MCNC: 206 MG/DL
GLUCOSE BLD STRIP.AUTO-MCNC: 146 MG/DL (ref 65–100)
GLUCOSE BLD STRIP.AUTO-MCNC: 193 MG/DL (ref 65–100)
GLUCOSE BLD STRIP.AUTO-MCNC: 194 MG/DL (ref 65–100)
GLUCOSE BLD STRIP.AUTO-MCNC: 223 MG/DL (ref 65–100)
GLUCOSE BLD STRIP.AUTO-MCNC: 374 MG/DL (ref 65–100)
GLUCOSE SERPL-MCNC: 170 MG/DL (ref 65–100)
HBA1C MFR BLD: 8.8 % (ref 4–5.6)
HDLC SERPL-MCNC: 30 MG/DL
HDLC SERPL: 3.9 {RATIO} (ref 0–5)
LDLC SERPL CALC-MCNC: 67.4 MG/DL (ref 0–100)
LIPID PROFILE,FLP: NORMAL
POTASSIUM SERPL-SCNC: 3.2 MMOL/L (ref 3.5–5.1)
Q-T INTERVAL, ECG07: 462 MS
QRS DURATION, ECG06: 98 MS
QTC CALCULATION (BEZET), ECG08: 505 MS
SERVICE CMNT-IMP: ABNORMAL
SODIUM SERPL-SCNC: 136 MMOL/L (ref 136–145)
TRIGL SERPL-MCNC: 93 MG/DL (ref ?–150)
VENTRICULAR RATE, ECG03: 72 BPM
VLDLC SERPL CALC-MCNC: 18.6 MG/DL

## 2021-03-07 PROCEDURE — 82962 GLUCOSE BLOOD TEST: CPT

## 2021-03-07 PROCEDURE — 74011250637 HC RX REV CODE- 250/637: Performed by: HOSPITALIST

## 2021-03-07 PROCEDURE — 80048 BASIC METABOLIC PNL TOTAL CA: CPT

## 2021-03-07 PROCEDURE — 74011250637 HC RX REV CODE- 250/637: Performed by: NURSE PRACTITIONER

## 2021-03-07 PROCEDURE — 80061 LIPID PANEL: CPT

## 2021-03-07 PROCEDURE — 74011250636 HC RX REV CODE- 250/636: Performed by: INTERNAL MEDICINE

## 2021-03-07 PROCEDURE — 99223 1ST HOSP IP/OBS HIGH 75: CPT | Performed by: PSYCHIATRY & NEUROLOGY

## 2021-03-07 PROCEDURE — 83036 HEMOGLOBIN GLYCOSYLATED A1C: CPT

## 2021-03-07 PROCEDURE — 74011636637 HC RX REV CODE- 636/637: Performed by: HOSPITALIST

## 2021-03-07 PROCEDURE — 74011636637 HC RX REV CODE- 636/637: Performed by: NURSE PRACTITIONER

## 2021-03-07 PROCEDURE — 94760 N-INVAS EAR/PLS OXIMETRY 1: CPT

## 2021-03-07 PROCEDURE — 65660000000 HC RM CCU STEPDOWN

## 2021-03-07 PROCEDURE — 36415 COLL VENOUS BLD VENIPUNCTURE: CPT

## 2021-03-07 PROCEDURE — 74011250637 HC RX REV CODE- 250/637: Performed by: INTERNAL MEDICINE

## 2021-03-07 PROCEDURE — 74011250636 HC RX REV CODE- 250/636: Performed by: HOSPITALIST

## 2021-03-07 RX ORDER — POTASSIUM CHLORIDE 750 MG/1
40 TABLET, FILM COATED, EXTENDED RELEASE ORAL
Status: COMPLETED | OUTPATIENT
Start: 2021-03-07 | End: 2021-03-07

## 2021-03-07 RX ORDER — INSULIN LISPRO 100 [IU]/ML
6 INJECTION, SOLUTION INTRAVENOUS; SUBCUTANEOUS ONCE
Status: COMPLETED | OUTPATIENT
Start: 2021-03-07 | End: 2021-03-07

## 2021-03-07 RX ORDER — DEXAMETHASONE SODIUM PHOSPHATE 4 MG/ML
4 INJECTION, SOLUTION INTRA-ARTICULAR; INTRALESIONAL; INTRAMUSCULAR; INTRAVENOUS; SOFT TISSUE EVERY 6 HOURS
Status: DISCONTINUED | OUTPATIENT
Start: 2021-03-07 | End: 2021-03-07

## 2021-03-07 RX ORDER — AMLODIPINE BESYLATE 5 MG/1
10 TABLET ORAL DAILY
Status: DISCONTINUED | OUTPATIENT
Start: 2021-03-07 | End: 2021-03-09 | Stop reason: HOSPADM

## 2021-03-07 RX ORDER — HYDRALAZINE HYDROCHLORIDE 25 MG/1
25 TABLET, FILM COATED ORAL 3 TIMES DAILY
Status: DISCONTINUED | OUTPATIENT
Start: 2021-03-07 | End: 2021-03-09 | Stop reason: HOSPADM

## 2021-03-07 RX ORDER — CARVEDILOL 12.5 MG/1
25 TABLET ORAL 2 TIMES DAILY WITH MEALS
Status: DISCONTINUED | OUTPATIENT
Start: 2021-03-07 | End: 2021-03-07

## 2021-03-07 RX ADMIN — ATORVASTATIN CALCIUM 80 MG: 40 TABLET, FILM COATED ORAL at 22:26

## 2021-03-07 RX ADMIN — LEVETIRACETAM 1000 MG: 500 TABLET ORAL at 09:27

## 2021-03-07 RX ADMIN — HYDRALAZINE HYDROCHLORIDE 25 MG: 25 TABLET, FILM COATED ORAL at 22:28

## 2021-03-07 RX ADMIN — POTASSIUM CHLORIDE AND SODIUM CHLORIDE: 900; 300 INJECTION, SOLUTION INTRAVENOUS at 02:13

## 2021-03-07 RX ADMIN — POTASSIUM CHLORIDE 40 MEQ: 750 TABLET, FILM COATED, EXTENDED RELEASE ORAL at 03:25

## 2021-03-07 RX ADMIN — INSULIN LISPRO 3 UNITS: 100 INJECTION, SOLUTION INTRAVENOUS; SUBCUTANEOUS at 11:35

## 2021-03-07 RX ADMIN — AMLODIPINE BESYLATE 10 MG: 5 TABLET ORAL at 11:36

## 2021-03-07 RX ADMIN — GLIMEPIRIDE 4 MG: 4 TABLET ORAL at 09:27

## 2021-03-07 RX ADMIN — INSULIN LISPRO 4 UNITS: 100 INJECTION, SOLUTION INTRAVENOUS; SUBCUTANEOUS at 17:33

## 2021-03-07 RX ADMIN — POTASSIUM CHLORIDE 40 MEQ: 750 TABLET, FILM COATED, EXTENDED RELEASE ORAL at 11:35

## 2021-03-07 RX ADMIN — ASPIRIN 81 MG: 81 TABLET, COATED ORAL at 09:27

## 2021-03-07 RX ADMIN — INSULIN LISPRO 6 UNITS: 100 INJECTION, SOLUTION INTRAVENOUS; SUBCUTANEOUS at 23:22

## 2021-03-07 RX ADMIN — HYDRALAZINE HYDROCHLORIDE 25 MG: 25 TABLET, FILM COATED ORAL at 17:33

## 2021-03-07 RX ADMIN — LEVETIRACETAM 1000 MG: 500 TABLET ORAL at 17:33

## 2021-03-07 RX ADMIN — RIVAROXABAN 20 MG: 20 TABLET, FILM COATED ORAL at 09:27

## 2021-03-07 RX ADMIN — INSULIN LISPRO 3 UNITS: 100 INJECTION, SOLUTION INTRAVENOUS; SUBCUTANEOUS at 09:27

## 2021-03-07 RX ADMIN — DEXAMETHASONE SODIUM PHOSPHATE 4 MG: 4 INJECTION, SOLUTION INTRAMUSCULAR; INTRAVENOUS at 11:36

## 2021-03-07 NOTE — CONSULTS
IP CONSULT TO NEUROLOGY  Consult performed by: Jamee Sanz MD  Consult ordered by: Jaye Smart MD            NEUROLOGY CONSULT    NAME Vinny Frye AGE 63 y.o. MRN [id number]  1955     REQUESTING PHYSICIAN: Coco Lovell MD      CHIEF COMPLAINT:  stroke     This is a 63 y.o. right handed male with a history of afib and cerebrovascular disease with no residual deficits. He is admitted for left sided weakness that was first noted on waking yesterday. He has no cranial nerve deficits and no sensory loss. ASSESSMENT AND PLAN     1. Stroke  MRI right basal ganglia stroke  Echo EF 55-60%  Cerebrovascular CTA: no flow limiting stenosis  A1C 8.8   LDL 67.4   Risk factors, DM, HTN, Afib , tobacco use and cerebrovascular history    2. Left-sided weakness  PT/ OT   Mild deficits    3. Atrial fibrillation  Discontinue xarelto and switch to eliquis    4. Tobacco use  Smokes cigars -  Advised to quit    5. Essential hypertension  continue amlodipine    6. Cervical stenosis  Not central to presenting complaint  Outpatient follow up with NSGY  If elective surgery is planned its advisable to delay at least 9 months from time of stroke           ALLERGIES:  Patient has no known allergies.      Current Facility-Administered Medications   Medication Dose Route Frequency    potassium chloride SR (KLOR-CON 10) tablet 40 mEq  40 mEq Oral NOW    amLODIPine (NORVASC) tablet 10 mg  10 mg Oral DAILY    dexamethasone (DECADRON) 4 mg/mL injection 4 mg  4 mg IntraVENous Q6H    hydrALAZINE (APRESOLINE) tablet 25 mg  25 mg Oral TID    acetaminophen (TYLENOL) tablet 650 mg  650 mg Oral Q6H PRN    ondansetron (ZOFRAN) injection 4 mg  4 mg IntraVENous Q6H PRN    insulin lispro (HUMALOG) injection   SubCUTAneous AC&HS    glucose chewable tablet 16 g  4 Tab Oral PRN    dextrose (D50W) injection syrg 12.5-25 g  12.5-25 g IntraVENous PRN    glucagon (GLUCAGEN) injection 1 mg  1 mg IntraMUSCular PRN    aspirin delayed-release tablet 81 mg  81 mg Oral DAILY    labetaloL (NORMODYNE;TRANDATE) injection 5 mg  5 mg IntraVENous Q10MIN PRN    atorvastatin (LIPITOR) tablet 80 mg  80 mg Oral QHS    glimepiride (AMARYL) tablet 4 mg  4 mg Oral ACB    levETIRAcetam (KEPPRA) tablet 1,000 mg  1,000 mg Oral BID    rivaroxaban (XARELTO) tablet 20 mg  20 mg Oral DAILY       Past Medical History:   Diagnosis Date    Atrial fibrillation ([facility]     Cancer ([facility] 12/2015    left lung; 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    Congestive heart failure, unspecified     Diabetes [facility]     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    Hypertension     Overweight(278.02)     Seizures ([facility])     SOLITARY PULMONARY NODULE     1.6 cm    Stroke ([facility] 2015    right thalamus       Social History     Tobacco Use    Smoking status: Light Tobacco Smoker     Packs/day: 0.10     Start date: 6/1/2015    Smokeless tobacco: Never Used    Tobacco comment: former cigar   Substance Use Topics    Alcohol use: Yes     Frequency: 2-3 times a week     Drinks per session: 1 or 2     Comment: social       Family History   Problem Relation Age of Onset    Stroke Mother     Cancer Father      Review of Systems   Constitutional: Negative for chills and fever. HENT: Negative for ear pain. Eyes: Negative for blurred vision, double vision, pain and discharge. Respiratory: Negative for cough, hemoptysis and shortness of breath. Cardiovascular: Negative for chest pain, palpitations, orthopnea and claudication. Gastrointestinal: Negative for constipation, diarrhea, nausea and vomiting. Genitourinary: Negative for flank pain and hematuria. Musculoskeletal: Negative for back pain and myalgias. Skin: Negative for itching and rash. Neurological: Positive for focal weakness. Negative for dizziness and headaches. Endo/Heme/Allergies: Negative for environmental allergies. Does not bruise/bleed easily.    Psychiatric/Behavioral: Negative for depression and hallucinations. The patient is not nervous/anxious. Visit Vitals  BP (!) 161/94 (BP 1 Location: Right upper arm, BP Patient Position: Supine)   Pulse (!) 56   Temp 98 °F (36.7 °C)   Resp 18   Ht 6' 1\" (1.854 m)   Wt 235 lb (106.6 kg)   SpO2 97%   BMI 31.00 kg/m²      General: Well developed, well nourished. Patient in no distress   Head: Normocephalic, atraumatic, anicteric sclera   Neck Normal ROM, No thyromegally   Lungs:  Clear to auscultation bilaterally   Cardiac: Regular rate and rhythm with no murmurs. Abd: Bowel sounds were audible. Ext: No pedal edema   Skin: Supple no rash     NeurologicExam:  Mental Status: Alert and oriented to person place and time   Speech: Fluent no aphasia or dysarthria. Cranial Nerves:  II - XII Intact   Motor:  Subtle left hemiparesis otherwise 5/5    Reflexes:   Deep tendon reflexes 1+/4 and symmetric. Sensory:   Symmetric and intact with no deficits    Gait:  deferred   Tremor:   No tremor noted. Cerebellar:  Coordination intact. Neurovascular: No carotid bruits. No JVD       REVIEWED IMAGING:    MRI :  Results from Hospital Encounter encounter on 03/06/21   MRI CERV SPINE WO CONT    Narrative EXAM:  MRI CERV SPINE WO CONT    INDICATION:   multiple cervical stenosis with left sided weakness and numbness  and transient right sided weakness    COMPARISON: CTA head and neck 3/6/2021. TECHNIQUE: Multiplanar multisequence acquisition without contrast of the  cervical spine. CONTRAST: None. FINDINGS:  There is normal alignment of the cervical spine. Vertebral body heights are  maintained without evidence of acute fracture. Marrow signal is normal.  Congenitally narrow spinal canal. Multilevel degenerative disc disease and facet  arthropathy as detailed below. There is focal myelomalacia at the level of C4  (series 3 image 6). No other definite cord signal abnormality is identified. Region of the foramen magnum is unremarkable.  Visualized soft tissues are  unremarkable. C2-C3: Mild bilateral facet arthropathy. Small central disc protrusion. Mild  spinal canal stenosis. No significant neural foraminal stenosis. C3-C4: Diffuse disc osteophyte complex with superimposed central disc extrusion  with associated annular fissure. Mild left facet arthropathy. Severe spinal  canal stenosis with cord compression. Moderate bilateral neural foraminal  stenosis. C4-C5: Diffuse disc osteophyte complex with bilateral uncovertebral spurring,  left worse than right. Severe spinal canal stenosis with cord compression and  myelomalacia at the level of C4. Severe left and moderate right neural foraminal  stenosis. C5-C6: Diffuse disc osteophyte complex with bilateral uncovertebral spurring. Moderate to severe spinal canal stenosis with cord compression. Severe bilateral  neural foraminal stenosis. C6-C7: Diffuse disc osteophyte complex with bilateral uncovertebral spurring. Mild spinal canal stenosis. Severe bilateral neural foraminal stenosis. C7-T1: Mild bilateral facet arthropathy. No significant disc herniation, spinal  canal or neural foraminal stenosis. Impression 1. Multilevel degenerative disc disease and facet arthropathy superimposed on a  congenitally narrow spinal canal as detailed above. 2. Severe spinal canal stenosis with cord compression at C4-C5 with myelomalacia  at the level of C4. Severe left and moderate right neural foraminal stenosis at  C4-C5. 3. Severe spinal canal stenosis with cord compression at C3-C4 secondary to a  diffuse disc osteophyte complex with superimposed central disc extrusion. No  definite cord signal abnormality. Moderate bilateral neural foraminal stenosis  at C3-C4. 4. Moderate to severe spinal canal stenosis with cord compression at C5-C6. No  definite cord signal abnormality. Severe bilateral neural foraminal stenosis at  C5-C6.   5. Mild spinal canal stenosis and severe bilateral neural foraminal stenosis at  C6-C7. 6. Remaining degenerative changes as detailed above. CT:  Results from Hospital Encounter encounter on 03/06/21   CT CODE NEURO PERF W CBF    Narrative EXAM:  CTA CODE NEURO HEAD AND NECK W CONT, CT CODE NEURO PERF W CBF    INDICATION:   Code Stroke    COMPARISON:  CT head 3/6/2021. CONTRAST:  100 mL of Isovue-370. TECHNIQUE:  Unenhanced  images were obtained to localize the volume for  acquisition. Multislice helical axial CT angiography was performed from the  aortic arch to the top of the head during uneventful rapid bolus intravenous  contrast administration. Coronal and sagittal reformations and 3D post  processing was performed. CT dose reduction was achieved through use of a  standardized protocol tailored for this examination and automatic exposure  control for dose modulation. CT brain perfusion was performed with generation of hemodynamic maps of multiple  parameters, including cerebral blood flow, cerebral blood volume, and MTT (mean  transit time). CT dose reduction was achieved through use of a standardized  protocol tailored for this examination and automatic exposure control for dose  modulation. This study was analyzed by the [address]  algorithm. FINDINGS:    CTA Head:  There is no evidence of large vessel occlusion or flow-limiting stenosis of the  intracranial internal carotid, anterior cerebral, and middle cerebral arteries. Minimal calcification of the bilateral carotid siphons without significant  stenosis. Incidental note of persistent dorsal right ophthalmic artery. The  anterior communicating artery is patent. There is no evidence of large vessel occlusion or flow-limiting stenosis of the  intracranial vertebral arteries, basilar artery, or posterior cerebral arteries. The posterior communicating arteries are patent, with fetal origin of the right  posterior cerebral artery. There is no evidence of aneurysm or vascular malformation. The dural venous  sinuses and deep cerebral venous system are patent. No evidence of abnormal  enhancement on delayed phase images. CTA NECK:  NASCET method was utilized for calculating stenosis. The aortic arch is unremarkable. The common carotid arteries demonstrate no  significant stenosis. There is no evidence of significant stenosis in the  cervical right internal carotid artery. There is no evidence of significant  stenosis in the cervical left internal carotid artery. Minimal calcific  atherosclerosis of the carotid bifurcations. There is a dominant vertebrobasilar arterial system. An aberrant right vertebral  artery is noted which arises as the last branch from the aortic arch and courses  posterior to the esophagus. The cervical vertebral arteries are normal in  course, size and contour without significant stenosis. Visualized soft tissues of the neck are unremarkable. Visualized lung apices are  clear. No acute fracture or aggressive osseous lesion. Multilevel degenerative  disc disease throughout the cervical spine with congenitally narrow spinal canal  noted, resulting in multilevel spinal canal stenoses. Poor dentition with  numerous missing teeth, periapical lucencies and dental caries. CT Brain Perfusion:  There are no regional areas of elevated Tmax, decreased cerebral blood flow or  blood volume. Indicated elevated Tmax in the right cerebral hemisphere is  artifactual.  rCBF < 30% = 0 cc. Tmax > 6 seconds = 111 cc. Impression CTA Head:  1. No evidence of an stenosis or aneurysm. CTA Neck:  1. No evidence of significant stenosis. 2. Multilevel spinal canal stenoses in the cervical spine secondary to  multilevel degenerative disc disease superimposed on a congenitally narrow  spinal canal.    CT Brain Perfusion:  1. No acute abnormality.          REVIEWED LABS:  Lab Results   Component Value Date/Time    WBC 5.5 03/06/2021 01:16 PM    HCT 39.0 03/06/2021 01:16 PM    HGB 13.2 03/06/2021 01:16 PM    PLATELET 833 58/47/9563 01:16 PM     Lab Results   Component Value Date/Time    Sodium 136 03/07/2021 02:18 AM    Potassium 3.2 (L) 03/07/2021 02:18 AM    Chloride 103 03/07/2021 02:18 AM    CO2 28 03/07/2021 02:18 AM    Glucose 170 (H) 03/07/2021 02:18 AM    BUN 12 03/07/2021 02:18 AM    Creatinine 1.18 03/07/2021 02:18 AM    Calcium 8.1 (L) 03/07/2021 02:18 AM     Lab Results   Component Value Date/Time    Folate 13.6 07/22/2015 02:27 AM     Lab Results   Component Value Date/Time    LDL, calculated 67.4 03/07/2021 02:18 AM     Lab Results   Component Value Date/Time    Hemoglobin A1c 8.8 (H) 03/07/2021 02:18 AM    Hemoglobin A1c (POC) 7.9 (A) 10/21/2019 03:00 PM

## 2021-03-07 NOTE — PROGRESS NOTES
Bedside and Verbal shift change report given to Yanira Ngo (oncoming nurse) by Yanira Ngo (offgoing nurse). Report included the following information SBAR, Kardex, ED Summary, Procedure Summary, MAR and Recent Results.

## 2021-03-07 NOTE — PROGRESS NOTES
Received notification from bedside RN about patient with regards to: patient now bradycardic, noted HR dropped in the 30's.  Patient asymptomatic  VS: /85, HR 45, RR 18, O2 sat 98%    Intervention given: EKG ordered    0300: Notified of K+ 3.2  - Kdur 40 meq x 1 dose ordered

## 2021-03-07 NOTE — PROGRESS NOTES
Hospitalist Progress Note    NAME: Zee Jane   :  1955   MRN:  [id number]       Assessment / Plan:  Acute CVA involving the right posterior corona radiata/basal ganglia  Multiple chronic infarcts in the bilateral cerebral hemispheres  Hx strokes (acute right thalamus ; TIA with right distal ICA dissection  was transferred to [address] [place] [address]) with residual mild left leg weakness  --ddx stroke +/- cervical spinal stenosis with myelopathy as CTA head and neck with Multilevel spinal canal stenoses in the cervical spine secondary tomultilevel degenerative disc disease superimposed on a congenitally narrow spinal canal with LVO. CT head with severe chronic microvascular ischemic disease.  --add aspirin therapy. Continue xarelto  --continue lipitor  --Pending neurology evaluation  --Pending A1c, LDL is 67.4  --allow permissive HTN  --Pending echocardiogram  --pt/ot consult    Multilevel degenerative changes of cervical spine with cord compression  -We will start Decadron. Will consult neurosurgery. Spoke to Dr Donald Quinteros     DM type 2 with elevated   CKD stage 3 with proteinuria prior Cr baseline 1.3. Currently 1.15  --continue glimepiride. Hold metformin for 48 hours due to IV contrast  --high dose SSI  --check A1c  --has seen Dr. Diego Echevarria in past    ?SSS  Hx of A fib  -HR dropped to low 30's with dizziness  -hold coreg  -consult cards Dr Guero Hyde. Tele monitoring     Hyponatremia   Hypokalemia 3.5  --K replaced today. Recheck in am. Na is normal.      Seizure disorder  --continue keppra bid. He denies any tonic clonic seizure activity today      HTN  Hx systolic chf EF 03% 6178  -resume coreg and Norvasc  -cont to hold hydralazine   --not volume overloaded by exam  --check echo     Persistent afib, POA  --rate controlled, continue xarelto. Holding coreg to allow permissive HTN     Hyperlipidemia  --continue lipitor 80mg qhs.   Check fasting lipid     Left lung carcinoid cancer 12/2015  --per patient had resection at [facility] and is cancer free, additional details unknown  --has seen Dr. Aleene Hodgkin in past     Intermittent cigar smoking  --discussed smoking cessation.     Mild cognitive impairment  --patient oriented to month but difficulty with year and date and poor insight into his PMH     Obesity  Body mass index is 31 kg/m².     Code: discussed, wants full code  DVT prophylaxis:  xarelto  Surrogate decision maker:  Wife Kateryna Bustos         Body mass index is 31 kg/m². Subjective:     Chief Complaint / Reason for Physician Visit  Still has some weakness on the left     Review of Systems:  Symptom Y/N Comments  Symptom Y/N Comments   Fever/Chills    Chest Pain     Poor Appetite    Edema     Cough    Abdominal Pain     Sputum    Joint Pain     SOB/LOPEZ    Pruritis/Rash     Nausea/vomit    Tolerating PT/OT     Diarrhea    Tolerating Diet     Constipation    Other       Could NOT obtain due to:      Objective:     VITALS:   Last 24hrs VS reviewed since prior progress note. Most recent are:  Patient Vitals for the past 24 hrs:   Temp Pulse Resp BP SpO2   03/07/21 0806 98 °F (36.7 °C) (!) 56 18 (!) 161/94 97 %   03/07/21 0229  (!) 55  (!) 141/85 98 %   03/06/21 1958 98.5 °F (36.9 °C) 66 18 (!) 152/101 97 %   03/06/21 1800 98.4 °F (36.9 °C) 65 20 (!) 159/96 99 %   03/06/21 1612 97.7 °F (36.5 °C) 72 18 (!) 156/96 98 %   03/06/21 1430  79 20 (!) 148/92 95 %   03/06/21 1400  67 21 (!) 144/91 99 %   03/06/21 1345  70 22 (!) 152/107 100 %   03/06/21 1330  72 23 (!) 144/85 100 %   03/06/21 1300 97.5 °F (36.4 °C) 67 19 (!) 149/95 100 %       Intake/Output Summary (Last 24 hours) at 3/7/2021 1006  Last data filed at 3/7/2021 0909  Gross per 24 hour   Intake    Output 1275 ml   Net -1275 ml        PHYSICAL EXAM:  General: WD, WN. Alert, cooperative, no acute distress    EENT:  EOMI. Anicteric sclerae. MMM  Resp:  CTA bilaterally, no wheezing or rales.   No accessory muscle use  CV:  Regular  rhythm,  No edema  GI:  Soft, Non distended, Non tender.  +Bowel sounds  Neurologic:  Alert and oriented X 3, normal speech,   Psych:   Not anxious nor agitated  Skin:  No rashes.   No jaundice    Reviewed most current lab test results and cultures  YES  Reviewed most current radiology test results   YES  Review and summation of old records today    NO  Reviewed patient's current orders and MAR    YES  PMH/SH reviewed - no change compared to H&P          Current Facility-Administered Medications:     potassium chloride SR (KLOR-CON 10) tablet 40 mEq, 40 mEq, Oral, NOW, Myah Hernández MD    acetaminophen (TYLENOL) tablet 650 mg, 650 mg, Oral, Q6H PRN, Dimitry Shell MD    ondansetron [facility]) injection 4 mg, 4 mg, IntraVENous, Q6H PRN, Sri Rivas MD    insulin lispro (HUMALOG) injection, , SubCUTAneous, AC&HS, Sri Rivas MD, 3 Units at 03/07/21 1840    glucose chewable tablet 16 g, 4 Tab, Oral, PRN, Sri Rivas MD    dextrose (D50W) injection syrg 12.5-25 g, 12.5-25 g, IntraVENous, PRN, Sri Rivas MD    glucagon [facility]) injection 1 mg, 1 mg, IntraMUSCular, PRN, Sri Rivas MD    aspirin delayed-release tablet 81 mg, 81 mg, Oral, DAILY, Sri Rivas MD, 81 mg at 03/07/21 3595    labetaloL (NORMODYNE;TRANDATE) injection 5 mg, 5 mg, IntraVENous, Q10MIN PRN, Sri Rivas MD    atorvastatin (LIPITOR) tablet 80 mg, 80 mg, Oral, QHS, Sri Rivas MD, 80 mg at 03/06/21 2238    glimepiride (AMARYL) tablet 4 mg, 4 mg, Oral, ACB, Sri Rivas MD, 4 mg at 03/07/21 2061    levETIRAcetam (KEPPRA) tablet 1,000 mg, 1,000 mg, Oral, BID, Sri Rivas MD, 1,000 mg at 03/07/21 8904    rivaroxaban (XARELTO) tablet 20 mg, 20 mg, Oral, DAILY, Sri Rivas MD, 20 mg at 03/07/21 0100  ________________________________________________________________________  Care Plan discussed with:    Comments   Patient y    Family      RN y    Care Manager     Consultant                        Multidiciplinary team rounds were held today with , nursing, pharmacist and clinical coordinator. Patient's plan of care was discussed; medications were reviewed and discharge planning was addressed. ________________________________________________________________________  Total NON critical care TIME:  35    Minutes    Total CRITICAL CARE TIME Spent:   Minutes non procedure based      Comments   >50% of visit spent in counseling and coordination of care     ________________________________________________________________________  Quinn Perry MD     Procedures: see electronic medical records for all procedures/Xrays and details which were not copied into this note but were reviewed prior to creation of Plan. LABS:  I reviewed today's most current labs and imaging studies.   Pertinent labs include:  Recent Labs     03/06/21  1316   WBC 5.5   HGB 13.2   HCT 39.0        Recent Labs     03/07/21  0218 03/06/21  1316    135*   K 3.2* 3.0*    100   CO2 28 29   * 190*   BUN 12 12   CREA 1.18 1.15   CA 8.1* 8.2*   MG  --  1.6   ALB  --  3.5   TBILI  --  0.5   ALT  --  19   INR  --  1.4*       Signed: Quinn Perry MD

## 2021-03-07 NOTE — PROGRESS NOTES
Orders received, chart reviewed. Spoke to RN who reports that pt has both cardiology consult pending due to bradycardia to 30s and neurosurgery consult pending due to potential cord compression. Will hold PT evaluation and await further clarification.  Thank you    Genevieve Iglesias, PT, DPT

## 2021-03-08 LAB
ANION GAP SERPL CALC-SCNC: 7 MMOL/L (ref 5–15)
BUN SERPL-MCNC: 16 MG/DL (ref 6–20)
BUN/CREAT SERPL: 12 (ref 12–20)
CALCIUM SERPL-MCNC: 8.5 MG/DL (ref 8.5–10.1)
CHLORIDE SERPL-SCNC: 103 MMOL/L (ref 97–108)
CO2 SERPL-SCNC: 25 MMOL/L (ref 21–32)
CREAT SERPL-MCNC: 1.29 MG/DL (ref 0.7–1.3)
ERYTHROCYTE [DISTWIDTH] IN BLOOD BY AUTOMATED COUNT: 13.8 % (ref 11.5–14.5)
GLUCOSE BLD STRIP.AUTO-MCNC: 169 MG/DL (ref 65–100)
GLUCOSE BLD STRIP.AUTO-MCNC: 271 MG/DL (ref 65–100)
GLUCOSE BLD STRIP.AUTO-MCNC: 283 MG/DL (ref 65–100)
GLUCOSE BLD STRIP.AUTO-MCNC: 288 MG/DL (ref 65–100)
GLUCOSE SERPL-MCNC: 291 MG/DL (ref 65–100)
HCT VFR BLD AUTO: 40.4 % (ref 36.6–50.3)
HGB BLD-MCNC: 13.1 G/DL (ref 12.1–17)
MCH RBC QN AUTO: 27.2 PG (ref 26–34)
MCHC RBC AUTO-ENTMCNC: 32.4 G/DL (ref 30–36.5)
MCV RBC AUTO: 83.8 FL (ref 80–99)
NRBC # BLD: 0 K/UL (ref 0–0.01)
NRBC BLD-RTO: 0 PER 100 WBC
PLATELET # BLD AUTO: 218 K/UL (ref 150–400)
PMV BLD AUTO: 12 FL (ref 8.9–12.9)
POTASSIUM SERPL-SCNC: 3.8 MMOL/L (ref 3.5–5.1)
RBC # BLD AUTO: 4.82 M/UL (ref 4.1–5.7)
SERVICE CMNT-IMP: ABNORMAL
SODIUM SERPL-SCNC: 135 MMOL/L (ref 136–145)
WBC # BLD AUTO: 7.4 K/UL (ref 4.1–11.1)

## 2021-03-08 PROCEDURE — 92523 SPEECH SOUND LANG COMPREHEN: CPT

## 2021-03-08 PROCEDURE — 74011250637 HC RX REV CODE- 250/637: Performed by: INTERNAL MEDICINE

## 2021-03-08 PROCEDURE — 97530 THERAPEUTIC ACTIVITIES: CPT

## 2021-03-08 PROCEDURE — 97161 PT EVAL LOW COMPLEX 20 MIN: CPT

## 2021-03-08 PROCEDURE — 80048 BASIC METABOLIC PNL TOTAL CA: CPT

## 2021-03-08 PROCEDURE — 74011250637 HC RX REV CODE- 250/637: Performed by: HOSPITALIST

## 2021-03-08 PROCEDURE — 74011250637 HC RX REV CODE- 250/637: Performed by: PSYCHIATRY & NEUROLOGY

## 2021-03-08 PROCEDURE — 36415 COLL VENOUS BLD VENIPUNCTURE: CPT

## 2021-03-08 PROCEDURE — 65270000029 HC RM PRIVATE

## 2021-03-08 PROCEDURE — 74011636637 HC RX REV CODE- 636/637: Performed by: HOSPITALIST

## 2021-03-08 PROCEDURE — 97535 SELF CARE MNGMENT TRAINING: CPT | Performed by: OCCUPATIONAL THERAPIST

## 2021-03-08 PROCEDURE — 82962 GLUCOSE BLOOD TEST: CPT

## 2021-03-08 PROCEDURE — 97112 NEUROMUSCULAR REEDUCATION: CPT | Performed by: OCCUPATIONAL THERAPIST

## 2021-03-08 PROCEDURE — 97165 OT EVAL LOW COMPLEX 30 MIN: CPT | Performed by: OCCUPATIONAL THERAPIST

## 2021-03-08 PROCEDURE — 85027 COMPLETE CBC AUTOMATED: CPT

## 2021-03-08 RX ORDER — RIVAROXABAN 20 MG/1
TABLET, FILM COATED ORAL
Qty: 30 TAB | Refills: 0 | OUTPATIENT
Start: 2021-03-08 | End: 2021-03-09

## 2021-03-08 RX ORDER — METFORMIN HYDROCHLORIDE 500 MG/1
500 TABLET ORAL 2 TIMES DAILY WITH MEALS
Status: DISCONTINUED | OUTPATIENT
Start: 2021-03-08 | End: 2021-03-09 | Stop reason: HOSPADM

## 2021-03-08 RX ADMIN — LEVETIRACETAM 1000 MG: 500 TABLET ORAL at 09:36

## 2021-03-08 RX ADMIN — INSULIN LISPRO 7 UNITS: 100 INJECTION, SOLUTION INTRAVENOUS; SUBCUTANEOUS at 17:46

## 2021-03-08 RX ADMIN — APIXABAN 5 MG: 5 TABLET, FILM COATED ORAL at 22:09

## 2021-03-08 RX ADMIN — METFORMIN HYDROCHLORIDE 500 MG: 500 TABLET ORAL at 17:47

## 2021-03-08 RX ADMIN — ATORVASTATIN CALCIUM 80 MG: 40 TABLET, FILM COATED ORAL at 22:09

## 2021-03-08 RX ADMIN — GLIMEPIRIDE 4 MG: 4 TABLET ORAL at 09:36

## 2021-03-08 RX ADMIN — HYDRALAZINE HYDROCHLORIDE 25 MG: 25 TABLET, FILM COATED ORAL at 22:09

## 2021-03-08 RX ADMIN — INSULIN LISPRO 7 UNITS: 100 INJECTION, SOLUTION INTRAVENOUS; SUBCUTANEOUS at 12:53

## 2021-03-08 RX ADMIN — AMLODIPINE BESYLATE 10 MG: 5 TABLET ORAL at 09:36

## 2021-03-08 RX ADMIN — LEVETIRACETAM 1000 MG: 500 TABLET ORAL at 17:47

## 2021-03-08 RX ADMIN — APIXABAN 5 MG: 5 TABLET, FILM COATED ORAL at 09:36

## 2021-03-08 RX ADMIN — HYDRALAZINE HYDROCHLORIDE 25 MG: 25 TABLET, FILM COATED ORAL at 17:47

## 2021-03-08 RX ADMIN — INSULIN LISPRO 7 UNITS: 100 INJECTION, SOLUTION INTRAVENOUS; SUBCUTANEOUS at 08:40

## 2021-03-08 RX ADMIN — HYDRALAZINE HYDROCHLORIDE 25 MG: 25 TABLET, FILM COATED ORAL at 09:36

## 2021-03-08 NOTE — CONSULTS
[address] [facility]    Name:  Black Jolley  MR#:  [id number]  :  1955  ACCOUNT #:  [de-identified]  DATE OF SERVICE:  2021    REASON FOR CONSULTATION:  Severe cervical stenosis with cord compression. HISTORY OF PRESENT ILLNESS:  This is a 44-year-old gentleman who was admitted to the hospital yesterday with complaints of new onset left-sided weakness. He has had 4 previous strokes. He does have some baseline mild left leg weakness, but usually is able to ambulate. He went to bed on the evening of  and said he felt well. He slept on the sofa that night, when he woke up in the morning he was unable to weightbear secondary to severe weakness on his left side. He says he slid on to the floor. He did not have a fall, did not hit his head or neck. He was unable to get up secondary to this weakness. He did not have any vision changes, no difficulty with speech. He thinks that he may have had some numbness in his left leg. He was brought to the emergency room by Emergency Medical Services. In the emergency room, he had some weakness in his left arm, approximately 4/5 and was 1/5 in his left leg. There is also mention of right leg weakness. He was admitted to the Hospitalist Service for rule out stroke. He does take Xarelto. Workup during hospitalization included MRI of the brain that showed an acute right basal ganglia infarct. In addition, he had an MRI of the cervical spine that showed severe spinal cord stenosis at C3-4, C4-5 and moderate to severe at C5-6. There was cord signal change within the upper cervical spinal cord adjacent to the C3-4 level. I was asked to see him in consultation. Since he has been admitted, his strength has improved. There is still residual weakness on the left side. He thinks some of this may be baseline. I did not ambulate him to determine if he is actually able to walk.     PAST MEDICAL HISTORY:  Atrial fibrillation, carcinoma (neuroendocrine?)  resected from the left lung 12/2015. He had surgery at [facility]. He is followed by Dr. Queenie Gomez as well as U. He says he no longer has any cancer. 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 He has seizure disorder. PAST SURGICAL HISTORY:  VATS (video-assisted thoracic surgery for resection of carcinoma tumor at [place] [address] in 2015). MEDICATIONS:  Norvasc 10 mg daily, Lipitor 80 mg daily, Decadron 4 mg every 6 hours, glimepiride 4 mg daily, hydralazine 25 mg t.i.d., insulin sliding scale, Keppra 1000 mg b.i.d. SOCIAL HISTORY:  He continues to smoke cigarettes, he smokes just a few a day. Occasional alcohol use. He lives with his wife. He is currently unemployed. No history of drug use. ALLERGIES:  NO KNOWN DRUG ALLERGIES. FAMILY HISTORY:  Positive for stroke and cancer. REVIEW OF SYSTEMS:  Currently denies any headaches, vision changes, hearing difficulty, chest pain, shortness of breath, abdominal pain, urinary dysfunction, numbness. Positive for some baseline weakness. No skin eruption. PHYSICAL EXAMINATION:  VITAL SIGNS:  Height 6 feet 1 inch, weight 235 pounds, BMI 31, temperature 98, blood pressure 161/94, heart rate 56. NEUROLOGIC:  Well-developed, well-nourished gentleman who is examined lying in the hospital bed. He is alert. He is oriented. Normal affect. Fluent speech. Conjugate gaze. He has a slight pronator drift on the left, otherwise full strength in bilateral upper extremities, full strength in bilateral lower extremities at the bedside exam, although I did not ambulate him. SKIN:  Intact. ABDOMEN:  Soft. IMAGING STUDIES:  As mentioned in history of present illness. Again, he has an MRI of the cervical spine showing severe stenosis C3-4, C4-5 and moderate-to-severe at C5-6.   There is no malalignment as the stenosis is secondary to disk bulging, but no acute herniation, no evidence of fracture. No evidence of instability. There is an area of cord signal change at the C4 level. ASSESSMENT AND PLAN:  This is a 82-year-old gentleman who has severe spinal stenosis with cord compression given that he has some acute stroke and does have a history of stroke, so I am not recommending surgical intervention at this time and recommend that he recover from stroke. He is currently on Xarelto, they have also added aspirin to his medications. I have recommended that he follow up with me as an outpatient to discuss treatment options. Thank you for this consultation.       MD RUTH ANN Horta/[email or web address]  D:  03/07/2021 16:23  T:  03/07/2021 21:34  JOB #:  [id number]

## 2021-03-08 NOTE — PROGRESS NOTES
Received notification from bedside RN about patient with regards to: , needs Lispro coverage order    Intervention given: Lispro 6 units SQ x 1 dose ordered

## 2021-03-08 NOTE — PROGRESS NOTES
.Bedside shift change report given to yodit (oncoming nurse) by Meeta Bennett (offgoing nurse). Report included the following information SBAR, Kardex, MAR and Recent Results.

## 2021-03-08 NOTE — PROGRESS NOTES
Hospitalist Progress Note    NAME: Quoc Heading   :  1955   MRN:  [id number]     This is a 44-year-old male with past medical history of CVA presented to hospital with weakness and noted to have acute CVA. Aspirin was added to his home Xarelto. He also developed bradycardia during hospitalization and sick sinus syndrome was suspected and cardiology was consulted. Assessment / Plan:  Acute CVA involving the right posterior corona radiata/basal ganglia  Multiple chronic infarcts in the bilateral cerebral hemispheres  Hx strokes (acute right thalamus ; TIA with right distal ICA dissection  was transferred to [facility]) with residual mild left leg weakness  -MRI confirms acute CVA as above  --Continue added aspirin therapy. Continue xarelto  --continue lipitor  --Neurology recommended to add aspirin to Xarelto  --A1c is 8.8 LDL is 67.4  --Echo shows normal ejection fraction with no evidence of shunt  --CTA shows no large vessel occlusion  -Evaluated by PT and recommended discharging home with home health    Multilevel degenerative changes of cervical spine with cord compression  -Evaluated by neurosurgery and recommended to stop steroids and have outpatient follow-up in about 3 to 4 weeks for consideration of surgery     DM type 2 with elevated   CKD stage 3 with proteinuria prior Cr baseline 1.3. Currently 1.15  --continue glimepiride. Resume Metformin. --high dose SSI  --A1c is 8.8  --has seen Dr. Bryce Cerda in past    ?SSS  Hx of A fib  -HR dropped to low 30's with dizziness  -hold coreg  -Evaluated by cardiology. Waiting on final recommendations from Dr. Radha Samuel.     Hyponatremia   Hypokalemia 3.5  --K is normal today     Seizure disorder  --continue keppra bid.  He denies any tonic clonic seizure activity today      HTN  Hx systolic chf EF 63% 9230  -resume Norvasc  -cont to hold hydralazine   --not volume overloaded by exam       Persistent afib, POA  --rate controlled, continue xarelto. Holding coreg due to bradycardia     Hyperlipidemia  --continue lipitor 80mg qhs. 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 Aleene Hodgkin in past     Intermittent cigar smoking  --discussed smoking cessation.     Mild cognitive impairment  --patient oriented to month but difficulty with year and date and poor insight into his PMH     Obesity  Body mass index is 31 kg/m².     Code: discussed, wants full code  DVT prophylaxis:  xarelto  Surrogate decision maker:  Wife Kateryna Parkiran    Disposition:  Discharge likely today or tomorrow based on cardiology recommendations.         Body mass index is 29.95 kg/m². Subjective:     Chief Complaint / Reason for Physician Visit  Continues to report some weakness on the left along with some numbness  Speech is normal  Overnight events noted and discussed with nursing      Review of Systems:  Symptom Y/N Comments  Symptom Y/N Comments   Fever/Chills    Chest Pain     Poor Appetite    Edema     Cough    Abdominal Pain     Sputum    Joint Pain     SOB/LOPEZ    Pruritis/Rash     Nausea/vomit    Tolerating PT/OT     Diarrhea    Tolerating Diet     Constipation    Other       Could NOT obtain due to:      Objective:     VITALS:   Last 24hrs VS reviewed since prior progress note.  Most recent are:  Patient Vitals for the past 24 hrs:   Temp Pulse Resp BP SpO2   03/08/21 1113 97.7 °F (36.5 °C) 73 16 (!) 151/91 98 %   03/08/21 0858 97.9 °F (36.6 °C) 68 17 (!) 155/96 97 %   03/08/21 0350    (!) 158/88    03/08/21 0326 98 °F (36.7 °C) 77 16 (!) 161/102 100 %   03/07/21 2257 98.2 °F (36.8 °C) 93 18 (!) 157/95 97 %   03/07/21 2228  93  (!) 157/95    03/07/21 1941 98.2 °F (36.8 °C) 95 16 (!) 155/97 96 %       Intake/Output Summary (Last 24 hours) at 3/8/2021 1527  Last data filed at 3/8/2021 1254  Gross per 24 hour   Intake 360 ml   Output 800 ml   Net -440 ml PHYSICAL EXAM:  General: WD, WN. Alert, cooperative, no acute distress    EENT:  EOMI. Anicteric sclerae. MMM  Resp:  CTA bilaterally, no wheezing or rales. No accessory muscle use  CV:  Regular  rhythm,  No edema  GI:  Soft, Non distended, Non tender.  +Bowel sounds  Neurologic:  Alert and oriented X 3, normal speech,   Psych:   Not anxious nor agitated  Skin:  No rashes.   No jaundice    Reviewed most current lab test results and cultures  YES  Reviewed most current radiology test results   YES  Review and summation of old records today    NO  Reviewed patient's current orders and MAR    YES  PMH/SH reviewed - no change compared to H&P          Current Facility-Administered Medications:     amLODIPine (NORVASC) tablet 10 mg, 10 mg, Oral, DAILY, Jim Hernández MD, 10 mg at 03/08/21 0936    hydrALAZINE (APRESOLINE) tablet 25 mg, 25 mg, Oral, TID, Pao Kay MD, 25 mg at 03/08/21 0936    apixaban (ELIQUIS) tablet 5 mg, 5 mg, Oral, Q12H, Vera Rogers MD, 5 mg at 03/08/21 0936    acetaminophen (TYLENOL) tablet 650 mg, 650 mg, Oral, Q6H PRN, Terrence Arredondo MD    ondansetron [facility]) injection 4 mg, 4 mg, IntraVENous, Q6H PRN, Taqueria Fuentes MD    insulin lispro (HUMALOG) injection, , SubCUTAneous, AC&HS, Taqueria Fuentes MD, 7 Units at 03/08/21 1253    glucose chewable tablet 16 g, 4 Tab, Oral, PRN, Taqueria Fuentes MD    dextrose (D50W) injection syrg 12.5-25 g, 12.5-25 g, IntraVENous, PRN, Taqueria Fuentes MD    glucagon [facility]) injection 1 mg, 1 mg, IntraMUSCular, PRN, Taqueria Fuentes MD    labetaloL (NORMODYNE;TRANDATE) injection 5 mg, 5 mg, IntraVENous, Q10MIN PRN, Taqueria Fuentes MD    atorvastatin (LIPITOR) tablet 80 mg, 80 mg, Oral, QHS, Taqueria Fuentes MD, 80 mg at 03/07/21 2226    glimepiride (AMARYL) tablet 4 mg, 4 mg, Oral, ACB, Taqueria Fuentes MD, 4 mg at 03/08/21 0936    levETIRAcetam (KEPPRA) tablet 1,000 mg, 1,000 mg, Oral, BID, Taqueria Fuentes MD, 1,000 mg at 03/08/21 9574  ________________________________________________________________________  Care Plan discussed with:    Comments   Patient y    Family      RN y    Care Manager     Consultant                        Multidiciplinary team rounds were held today with , nursing, pharmacist and clinical coordinator. Patient's plan of care was discussed; medications were reviewed and discharge planning was addressed. ________________________________________________________________________  Total NON critical care TIME:  35    Minutes    Total CRITICAL CARE TIME Spent:   Minutes non procedure based      Comments   >50% of visit spent in counseling and coordination of care     ________________________________________________________________________  Everett Mack MD     Procedures: see electronic medical records for all procedures/Xrays and details which were not copied into this note but were reviewed prior to creation of Plan. LABS:  I reviewed today's most current labs and imaging studies.   Pertinent labs include:  Recent Labs     03/08/21 0315 03/06/21  1316   WBC 7.4 5.5   HGB 13.1 13.2   HCT 40.4 39.0    197     Recent Labs     03/08/21  0315 03/07/21  0218 03/06/21  1316   * 136 135*   K 3.8 3.2* 3.0*    103 100   CO2 25 28 29   * 170* 190*   BUN 16 12 12   CREA 1.29 1.18 1.15   CA 8.5 8.1* 8.2*   MG  --   --  1.6   ALB  --   --  3.5   TBILI  --   --  0.5   ALT  --   --  19   INR  --   --  1.4*       Signed: Everett Mack MD

## 2021-03-08 NOTE — PROGRESS NOTES
SPEECH LANGUAGE PATHOLOGY EVALUATION/DISCHARGE  Patient: Araceli Delong (89 y.o. male)  Date: 3/8/2021  Primary Diagnosis: Bilateral leg weakness [R29.898]  Left arm weakness [R29.898]       Precautions:      ASSESSMENT :  Based on the objective data described below, the patient presents with functional basic language and motor speech deficits. His verbal problem solving was mild to mod impaired as was verbal memory. Patient states he is on disability due to his strokes. He does not cook due to his memory deficits. Skilled acute therapy provided by a speech-language pathologist is not indicated at this time. PLAN :  Recommendations:  No follow up is needed. Patient has preexisting memory deficits due to prior CVAs. Discharge Recommendations: None     SUBJECTIVE:   Patient asked if one of the words to remember was \"pamela\".  (it was not.)    OBJECTIVE:     Past Medical History:   Diagnosis Date    [facility]     Cancer ([facility] [address] 12/2015    left lung; 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    Congestive heart failure, unspecified     Diabetes ([facility] [address]     Dissection of right carotid artery ([facility] [address] 09/2018    Hypertension     Overweight(278.02)     Seizures (HCC)     SOLITARY PULMONARY NODULE     1.6 cm    Stroke [facility]) 2015    right thalamus     Past Surgical History:   Procedure Laterality Date    VA CHEST SURGERY PROCEDURE UNLISTED      VATS Video-assisted thoracic surgery     Prior Level of Function/Home Situation:   Home Situation  Home Environment: Private residence  # Steps to Enter: 4  Rails to Enter: Yes  Hand Rails : Bilateral  One/Two Story Residence: One story  Living Alone: No  Support Systems: Spouse/Significant Other/Partner, Child(evy)  Patient Expects to be Discharged to[de-identified] Private residence  Current DME Used/Available at Home: Cane, straight, Crutches  Tub or Shower Type: Tub/Shower combination  Mental Status:  Neurologic State: Alert  Orientation Level: Oriented X4  Cognition: Decreased attention/concentration, Decreased command following(delayed processing)  Perception: Appears intact  Perseveration: No perseveration noted  Safety/Judgement: Awareness of environment, Insight into deficits, Home safety, Fall prevention  Motor Speech:   wnl  Language Comprehension and Expression:   wnl in conversation           Neuro-Linguistics:        Verbal Problem Solving: Impaired           Memory: Impaired recalled 1/3 words with no cues, 1/3 words with semantic cue and the last word was not recalled with semanic or multiple choice cues. Pragmatics:    wnl        NOMS: verbal memory 5    Pain:  Pain Scale 1: Numeric (0 - 10)  Pain Intensity 1: 0       After treatment:   Patient left in no apparent distress sitting up in chair    COMMUNICATION/EDUCATION:   Patient was educated regarding his deficit(s) of verbal memory deficits. The patient's plan of care including recommendations, planned interventions, and recommended diet changes were discussed with: Registered nurse.        Thank you for this referral.  FORTUNATO Jama  Time Calculation: 15 mins

## 2021-03-08 NOTE — PROGRESS NOTES
Transition of Care Plan:    Disposition:  TBD Home vs. Home Health vs. IPR vs SNF  Follow up appointments:PCP, Neurology   DME needed: TBD   Transportation at Discharge: Pt wife or daughter to transport  Fernando Adrian or means to access home: Yes       IM Medicare letter:2nd IM needed before discharge   Caregiver Contact:Patient wife Ling Walton [phone]  Discharge Caregiver contacted prior to discharge? Unit CM to follow up before discharge    Reason for Admission:  Weakness                      RUR Score:         12 %, low risk for readmission           Plan for utilizing home health:    TBD, PT/OT consults pending      PCP: First and Last name:  Doug Coyle MD     Name of Practice:    Are you a current patient: Yes/No:  Yes   Approximate date of last visit:  A year or more according to patient   Can you participate in a virtual visit with your PCP:   Yes                    Current Advanced Directive/Advance Care Plan: Full Code  Advance Care Planning     General Advance Care Planning (ACP) Conversation  Date of Conversation: 3/6/2021  Conducted with: Patient with Decision Making Capacity      Content/Action Overview:   Has NO ACP documents/care preferences - information provided, considering goals and options  Reviewed DNR/DNI and patient elects Full Code (Attempt Resuscitation)    Length of Voluntary ACP Conversation in minutes:  <16 minutes (Non-Billable)    Gary Lawrence RN                      Transition of Care Plan:                    CM placed call to patient's room to discuss discharge planning. Patient name, , and demographics all verified in chart. Patient lives in a one story home [address] with his wife. Patient reported history of CVA resulting in left sided weakness. Patient has a cane that he uses for ambulation sometimes. Patient is independent of his ADLS. Patient's wife provide transportation and patent reports he does drive sometimes.  Patient's preferred pharmacy is [facility] on [email or web address]. Patient reports he does have a [facility] and SNF history but can not recall name of agency or facility. Care Management Interventions  PCP Verified by CM: Yes  Mode of Transport at Discharge: Other (see comment)(Patient wife or daughter to provide transport)  Transition of Care Consult (CM Consult): Discharge Planning  Physical Therapy Consult: Yes  Occupational Therapy Consult: Yes  Speech Therapy Consult: Yes  Current Support Network: Lives with Spouse  Confirm Follow Up Transport: Family  [facility] Resource Information Provided?: No  Discharge Location  Discharge Placement: Home with family assistance; Unit CM to continue to follow for discharge needs and planning.     Teressa Webb, VALENTEN, RN, [facility]

## 2021-03-08 NOTE — PROGRESS NOTES
Physical Therapy:  Chart reviewed, evaluation completed. Patient completed bed mobility Indep, transfers CGA, and ambulation SPC with SBA. Recommending HH PT/OT due to LLE weakness, decreased activity tolerance, and increased risk for falls. Formal evaluation to follow.    Thank you,  Gloria Jha PT DPT

## 2021-03-08 NOTE — PROGRESS NOTES
Progress Note      3/8/2021 5:12 PM  NAME: Lew Wilkins   MRN:  [id number]   Admit Diagnosis: Bilateral leg weakness [R29.898]; Left arm weakness [R29.898]     Assessment:     Atrial Fibrillation    Rate controlled. Eliquis  for anticoagulation   Strokes   Diabetes   Hypertension   Obesity         Echo showed normal LV function . No findings for cardiac source of embolization  Normal heart by echo . Plan:     Cardiac status stable. No changes. [x]        High complexity decision making was performed    Subjective: Lew Wilkins denies chest pain, dyspnea. Discussed with RN events overnight. Patient Active Problem List   Diagnosis Code    DM (diabetes mellitus) ([facility] [address] E11.9    HTN (hypertension), benign I10    Pulmonary nodule R91.1    Cardiomyopathy, nonischemic ([address] I42.8    Syncope R55    Seizures ([address] R56.9    Diabetic neuropathy ([facility] [address] E11.40    Hypertensive emergency I16.1    Seizure disorder ([facility] [address] G40.909    Type II or unspecified type diabetes mellitus with neurological manifestations, uncontrolled(250.62) ([address] E11.49    History of atrial fibrillation Z86.79    Hyperlipidemia E78.5    Polyneuropathy in diabetes(357.2) E11.42    CKD stage 2 due to type 2 diabetes mellitus ([address] E11.22, N18.2    Carcinoid tumor D3A.00    Left arm weakness R29.898    Bilateral leg weakness R29.898       Review of Systems:    Symptom Y/N Comments  Symptom Y/N Comments   Fever/Chills N   Chest Pain N    Poor Appetite N   Edema N    Cough N   Abdominal Pain N    Sputum N   Joint Pain N    SOB/LOPEZ N   Pruritis/Rash N    Nausea/vomit N   Tolerating PT/OT Y    Diarrhea N   Tolerating Diet Y    Constipation N   Other       Could NOT obtain due to:      Objective:      Physical Exam:    Last 24hrs VS reviewed since prior progress note.  Most recent are:    Visit Vitals  BP (!) 140/83   Pulse 74   Temp 97.5 °F (36.4 °C)   Resp 17   Ht 6' 1\" (1.854 m)   Wt 103 kg (227 lb) SpO2 96%   BMI 29.95 kg/m²       Intake/Output Summary (Last 24 hours) at 3/8/2021 1712  Last data filed at 3/8/2021 1254  Gross per 24 hour   Intake 360 ml   Output 450 ml   Net -90 ml        General Appearance: Well developed, well nourished, alert & oriented x 3,    no acute distress. Ears/Nose/Mouth/Throat: Hearing grossly normal.  Neck: Supple. Chest: Lungs clear to auscultation bilaterally. Cardiovascular: Regular rate and rhythm, S1S2 normal, no murmur. Abdomen: Soft, non-tender, bowel sounds are active. Extremities: No edema bilaterally. Skin: Warm and dry. PMH/SH reviewed - no change compared to H&P    Data Review    Telemetry: normal sinus rhythm     Lab Data Personally Reviewed:    Recent Labs     03/08/21 0315 03/06/21  1316   WBC 7.4 5.5   HGB 13.1 13.2   HCT 40.4 39.0    197   LABRCNT(INR:3,PTP:3,APTT:3,)  Recent Labs     03/08/21 0315 03/07/21  0218 03/06/21  1316   * 136 135*   K 3.8 3.2* 3.0*    103 100   CO2 25 28 29   BUN 16 12 12   CREA 1.29 1.18 1.15   * 170* 190*   CA 8.5 8.1* 8.2*   MG  --   --  1.6   LABRCNT(CPK:3,CpKMB:3,ckndx:3,troiq:3)  Lab Results   Component Value Date/Time    Cholesterol, total 116 03/07/2021 02:18 AM    HDL Cholesterol 30 03/07/2021 02:18 AM    LDL, calculated 67.4 03/07/2021 02:18 AM    Triglyceride 93 03/07/2021 02:18 AM    CHOL/HDL Ratio 3.9 03/07/2021 02:18 AM   LABRCNT(sgot:3,gpt:3,ap:3,tbiL:3,TP:3,ALB:3,GLOB:3,ggt:3,aml:3,amyp:3,lpse:3,hlpse:3)No results for input(s): PH, PCO2, PO2 in the last 72 hours. Lab Results   Component Value Date/Time    Cholesterol, total 116 03/07/2021 02:18 AM    HDL Cholesterol 30 03/07/2021 02:18 AM    LDL, calculated 67.4 03/07/2021 02:18 AM    Triglyceride 93 03/07/2021 02:18 AM    CHOL/HDL Ratio 3.9 03/07/2021 02:18 AM   MEDTABLETimothy Beryle Montgomery, MD  No results for input(s): PH, PCO2, PO2 in the last 72 hours.     Medications Personally Reviewed:    Current Facility-Administered Medications Medication Dose Route Frequency    metFORMIN (GLUCOPHAGE) tablet 500 mg  500 mg Oral BID WITH MEALS    amLODIPine (NORVASC) tablet 10 mg  10 mg Oral DAILY    hydrALAZINE (APRESOLINE) tablet 25 mg  25 mg Oral TID    apixaban (ELIQUIS) tablet 5 mg  5 mg Oral Q12H    acetaminophen (TYLENOL) tablet 650 mg  650 mg Oral Q6H PRN    ondansetron (ZOFRAN) injection 4 mg  4 mg IntraVENous Q6H PRN    insulin lispro (HUMALOG) injection   SubCUTAneous AC&HS    glucose chewable tablet 16 g  4 Tab Oral PRN    dextrose (D50W) injection syrg 12.5-25 g  12.5-25 g IntraVENous PRN    glucagon (GLUCAGEN) injection 1 mg  1 mg IntraMUSCular PRN    labetaloL (NORMODYNE;TRANDATE) injection 5 mg  5 mg IntraVENous Q10MIN PRN    atorvastatin (LIPITOR) tablet 80 mg  80 mg Oral QHS    glimepiride (AMARYL) tablet 4 mg  4 mg Oral ACB    levETIRAcetam (KEPPRA) tablet 1,000 mg  1,000 mg Oral BID         Laxmi Mckinney MD

## 2021-03-08 NOTE — PROGRESS NOTES
Problem: Mobility Impaired (Adult and Pediatric)  Goal: *Acute Goals and Plan of Care (Insert Text)  Description:   FUNCTIONAL STATUS PRIOR TO ADMISSION: Patient reports he was completely indep prior, intermittently used cane due to weakness from prior CVA. HOME SUPPORT PRIOR TO ADMISSION: The patient lived with spouse but did not require assist.    Physical Therapy Goals  Initiated 3/8/2021  1. Patient will move from supine to sit and sit to supine , scoot up and down, and roll side to side in bed with modified independence within 7 day(s). 2.  Patient will transfer from bed to chair and chair to bed with modified independence using the least restrictive device within 7 day(s). 3.  Patient will perform sit to stand with modified independence within 7 day(s). 4.  Patient will ambulate with supervision/set-up for 125 feet with the least restrictive device within 7 day(s). 5.  Patient will ascend/descend 4 stairs with bilat handrail(s) with supervision/set-up within 7 day(s). 6.  Patient will improve Stafford Balance score by 7 points within 7 days. Outcome: Not Met   PHYSICAL THERAPY EVALUATION  Patient: Aguila Merida (29 y.o. male)  Date: 3/8/2021  Primary Diagnosis: Bilateral leg weakness [R29.898]  Left arm weakness [R29.898]        Precautions: Fall       ASSESSMENT  Based on the objective data described below, the patient presents with decreased strength (LLE>RLE), decreased activity tolerance, impaired standing balance, increased risk for falls, and decreased independence following admission for R basal ganglia infarction and severe c-spine stenosis with cord compression. Due to CVA, patient is not a surgical candidate at this time and neuro will follow-up with patient in OP setting. Patient completes bed mobility with supervision, sit<>stand CGA, and ambulation x30ft with SPC CGA.  Patient would benefit from skilled physical therapy in order to address impairments so that patient can safely d/c to home environment. Current Level of Function Impacting Discharge (mobility/balance): CGA     Functional Outcome Measure: The patient scored 30/56 on the Stafford Balance Scale outcome measure which is indicative of medium fall risk. Other factors to consider for discharge: Indep PLOF with use of cane, hx of CVA, supportive spouse     Patient will benefit from skilled therapy intervention to address the above noted impairments. PLAN :  Recommendations and Planned Interventions: bed mobility training, transfer training, gait training, therapeutic exercises, neuromuscular re-education, patient and family training/education and therapeutic activities      Frequency/Duration: Patient will be followed by physical therapy:  5 times a week to address goals. Recommendation for discharge: (in order for the patient to meet his/her long term goals)  Physical therapy at least 2 days/week in the home     This discharge recommendation:  Has been made in collaboration with the attending provider and/or case management    IF patient discharges home will need the following DME: patient owns DME required for discharge         SUBJECTIVE:   Patient stated I need to use the bathroom. 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    Congestive heart failure, unspecified     Diabetes ([address]     Dissection of right carotid artery ([facility] [address] 09/2018    Hypertension     Overweight(278.02)     Seizures (HCC)     SOLITARY PULMONARY NODULE     1.6 cm    Stroke [facility]) 2015    right thalamus     Past Surgical History:   Procedure Laterality Date    [place] CHEST SURGERY PROCEDURE UNLISTED      VATS Video-assisted thoracic surgery       Personal factors and/or comorbidities impacting plan of care: CVA x4, a-fib, DM    Home Situation  Home Environment: Private residence  # Steps to Enter: [address] to Enter: Yes  Hand Rails : Bilateral  One/Two Story Residence: One story  Living Alone: No  Support Systems: Spouse/Significant Other/Partner, Child(evy)  Patient Expects to be Discharged to[de-identified] Private residence  Current DME Used/Available at Home: Cane, straight, Crutches  Tub or Shower Type: Tub/Shower combination    EXAMINATION/PRESENTATION/DECISION MAKING:   Critical Behavior:  Neurologic State: Alert, Appropriate for age  Orientation Level: Oriented X4  Cognition: Decreased attention/concentration, Decreased command following(delayed processing)  Safety/Judgement: Awareness of environment, Insight into deficits, Home safety, Fall prevention  Hearing: Auditory  Auditory Impairment: None    Range Of Motion:  AROM: Within functional limits      PROM: Within functional limits    Strength:    Strength: Generally decreased, functional(LLE: hip flex 2/5, knee ext 3-/5, ankle DF 3-/5)      Tone & Sensation:   Tone: Normal      Sensation: Impaired(grossly decreased LLE, nondermatomal pattern)     Coordination:  Coordination: Generally decreased, functional(LLE and LUE)  Vision:   Acuity: Within Defined Limits  Corrective Lenses: Reading glasses  Functional Mobility:  Bed Mobility:     Supine to Sit: Supervision     Scooting: Supervision  Transfers:  Sit to Stand: Contact guard assistance  Stand to Sit: Contact guard assistance        Bed to Chair: Contact guard assistance    Balance:   Sitting: Intact  Standing: Impaired  Standing - Static: Fair  Standing - Dynamic : Fair  Ambulation/Gait Training:  Distance (ft): 30 Feet (ft)  Assistive Device: Cane, straight;Gait belt  Ambulation - Level of Assistance: Contact guard assistance; Additional time     Gait Abnormalities: Step to gait; Decreased step clearance     Base of Support: Shift to right;Narrowed     Speed/Daisy: Shuffled  Interventions: Verbal cues    Functional Measure:  Stafford Balance Test:    Sitting to Standin  Standing Unsupported: 3  Sitting with Back Unsupported: 4  Standing to Sitting: 3  Transfers: 2  Standing Unsupported with Eyes Closed: 3  Standing Unsupported with Feet Together: 3  Reach Forward with Outstretched Arm: 2   Object: 1  Turn to Look Over Shoulders: 3  Turn 360 Degrees: 2  Alternate Foot on Step/Stool: 0  Standing Unsupported One Foot in Front: 1  Stand on One Le  Total: 30/56         56=Maximum possible score;   0-20=High fall risk  21-40=Moderate fall risk   41-56=Low fall risk        Physical Therapy Evaluation Charge Determination   History Examination Presentation Decision-Making   HIGH Complexity :3+ comorbidities / personal factors will impact the outcome/ POC  MEDIUM Complexity : 3 Standardized tests and measures addressing body structure, function, activity limitation and / or participation in recreation  LOW Complexity : Stable, uncomplicated  Other outcome measures Stafford Balance Scale   MEDIUM      Based on the above components, the patient evaluation is determined to be of the following complexity level: LOW     Activity Tolerance:   Good    After treatment patient left in no apparent distress:   Sitting in chair and Call bell within reach    COMMUNICATION/EDUCATION:   The patients plan of care was discussed with: Occupational therapist and Case management. Fall prevention education was provided and the patient/caregiver indicated understanding., Patient/family have participated as able in goal setting and plan of care. and Patient/family agree to work toward stated goals and plan of care.     Thank you for this referral.  Gloria Jha, PT   Time Calculation: 35 mins

## 2021-03-08 NOTE — PROGRESS NOTES
End of Shift Note    Bedside shift change report given to Marley (oncoming nurse) by Gagan Flores RN (offgoing nurse). Report included the following information Kardex, MAR and Recent Results    Shift worked:  7p-7a     Shift summary and any significant changes:     no significant changes     Concerns for physician to address:     Zone phone for oncoming shift:   [phone]       Activity:  Activity Level: Bed Rest  Number times ambulated in hallways past shift: 0  Number of times OOB to chair past shift: 0    Cardiac:   Cardiac Monitoring: Yes      Cardiac Rhythm: Atrial fibrillation    Access:   Current line(s): PIV     Genitourinary:   Urinary status: voiding    Respiratory:   O2 Device: Room air  Chronic home O2 use?: NO  Incentive spirometer at bedside:      GI:  Last Bowel Movement Date: 03/05/21  Current diet:  DIET DIABETIC WITH OPTIONS Consistent Carb 2000kcal; Regular; AHA-LOW-CHOL FAT  Passing flatus: Tolerating current diet: YES  % Diet Eaten: 100 %    Pain Management:   Patient states pain is manageable on current regimen: N/A, no c/o pain    Skin:  Don Score: 16  Interventions: float heels and PT/OT consult    Patient Safety:  Fall Score:  Total Score: 3  Interventions: bed/chair alarm, assistive device (walker, cane, etc), gripper socks and pt to call before getting OOB  High Fall Risk: Yes    Length of Stay:  Expected LOS: - - -  Actual LOS: 2      Gagan Flores RN

## 2021-03-08 NOTE — CONSULTS
Consult/Admission    NAME: Judson Kawasaki   :  1955   MRN:  [id number]     Date/Time:  3/7/2021 7:15 PM    Patient PCP: Doug Coyle MD  ________________________________________________________________________     Assessment:     Atrial Fibrillation    Rate controlled. Eliquis  for anticoagulation   Strokes   Diabetes   Hypertension   Obesity        Echo showed normal LV function . No findings for cardiac source of embolization  Normal heart by echo . Plan:     Monitor. Will need to see who he sees for cardiology         []           High complexity decision making was performed        Subjective:   CHIEF COMPLAINT: pateint admiitted with stroke or TIA       HISTORY OF PRESENT ILLNESS:     Amaya Arriaga is a 63 y.o. male who is admitted with TIA vs Stroke. Hx of strokes. Echo done . No source of embolization seen .          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    Congestive heart failure, unspecified     Diabetes ([facility] [address]     Dissection of right carotid artery ([facility] [address] 2018    Hypertension     Overweight(278.02)     Seizures (HCC)     SOLITARY PULMONARY NODULE     1.6 cm    Stroke [facility])     right thalamus      Past Surgical History:   Procedure Laterality Date    [place] CHEST SURGERY PROCEDURE UNLISTED      VATS Video-assisted thoracic surgery     No Known Allergies   Meds:  See below  Social History     Tobacco Use    Smoking status: Light Tobacco Smoker     Packs/day: 0.10     Start date: 2015    Smokeless tobacco: Never Used    Tobacco comment: former cigar   Substance Use Topics    Alcohol use: Yes     Frequency: 2-3 times a week     Drinks per session: 1 or 2     Comment: social      Family History   Problem Relation Age of Onset    Stroke Mother     Cancer Father        REVIEW OF SYSTEMS:     []            Unable to obtain  ROS due to ---   [x] Total of 12 systems reviewed as follows:    Constitutional: negative fever, negative chills, negative weight loss  Eyes:   negative visual changes  ENT:   negative sore throat, tongue or lip swelling  Respiratory:  negative cough, negative dyspnea  Cards:  negative for chest pain, palpitations, lower extremity edema  GI:   negative for nausea, vomiting, diarrhea, and abdominal pain  Genitourinary: negative for frequency, dysuria  Integument:  negative for rash   Hematologic:  negative for easy bruising and gum/nose bleeding  Musculoskel: negative for myalgias,  back pain  Neurological:  negative for headaches, dizziness, vertigo, weakness  Behavl/Psych: negative for feelings of anxiety, depression     Pertinent Positives include :    Objective:      Physical Exam:    Last 24hrs VS reviewed since prior progress note. Most recent are:    Visit Vitals  BP (!) 135/99 (BP 1 Location: Right upper arm, BP Patient Position: At rest)   Pulse 65   Temp 98.4 °F (36.9 °C)   Resp 18   Ht 6' 1\" (1.854 m)   Wt 106.6 kg (235 lb)   SpO2 98%   BMI 31.00 kg/m²       Intake/Output Summary (Last 24 hours) at 3/7/2021 1915  Last data filed at 3/7/2021 1604  Gross per 24 hour   Intake    Output 1725 ml   Net -1725 ml        General Appearance: Well developed, well nourished, alert & oriented x 3,    no acute distress. Ears/Nose/Mouth/Throat: Pupils equal and round, Hearing grossly normal.  Neck: Supple. JVP within normal limits. Carotids good upstrokes, with no bruit. Chest: Lungs clear to auscultation bilaterally. Cardiovascular: Regular rate and rhythm, S1S2 normal, no murmur, rubs, gallops. Abdomen: Soft, non-tender, bowel sounds are active. No organomegaly. Extremities: No edema bilaterally. Femoral pulses +2, Distal Pulses +1. Skin: Warm and dry. Neuro: CN II-XII grossly intact, Strength and sensation grossly intact. Data:      Prior to Admission medications    Medication Sig Start Date End Date Taking?  Authorizing Provider   albuterol (ProAir HFA) 90 mcg/actuation inhaler Take 2 Puffs by inhalation every four (4) hours as needed for Wheezing. Yes Provider, Historical   levETIRAcetam (KEPPRA) 500 mg tablet Take 2 tablets by mouth twice daily 1/22/21  Yes Marcel Griffiths MD   glimepiride (AMARYL) 4 mg tablet TAKE 1 TABLET BY MOUTH IN THE MORNING 1/22/21  Yes Marcel Griffiths MD   hydrALAZINE (APRESOLINE) 10 mg tablet TAKE 1 TABLET BY MOUTH THREE TIMES DAILY 1/22/21  Yes Marcel Griffiths MD   atorvastatin (LIPITOR) 80 mg tablet TAKE 1 TABLET BY MOUTH ONCE DAILY AT NIGHT 1/22/21  Yes Marcel Griffiths MD   amLODIPine (NORVASC) 10 mg tablet Take 1 tablet by mouth once daily for blood pressure 12/27/20  Yes Marcel Grfifiths MD   carvediloL (COREG) 25 mg tablet TAKE 1 & 1/2 (ONE & ONE-HALF) TABLETS BY MOUTH TWICE DAILY WITH MEALS 11/28/20  Yes Marcel Griffiths MD   rivaroxaban (XARELTO) 20 mg tab tablet Take 1 Tab by mouth daily.  11/25/19  Yes Marcel Griffiths MD   metFORMIN (GLUCOPHAGE) 500 mg tablet TAKE 1 TABLET BY MOUTH TWICE DAILY WITH MEALS 11/25/19  Yes Marcel Griffiths MD   lisinopril (PRINIVIL, ZESTRIL) 40 mg tablet TAKE ONE TABLET BY MOUTH ONCE DAILY FOR BLOOD PRESSURE 6/29/18   Marcel Griffiths MD       Recent Results (from the past 24 hour(s))   ECHO ADULT COMPLETE    Collection Time: 03/06/21  7:48 PM   Result Value Ref Range    IVSd 1.66 (A) 0.6 - 1.0 cm    IVSs 1.79 cm    LVIDd 5.39 4.2 - 5.9 cm    LVIDs 4.74 cm    LVOT d 2.13 cm    LVPWd 1.73 (A) 0.6 - 1.0 cm    LVPWs 1.78 cm    IVSd (M-mode) 1.33 (A) cm    IVSs (M-mode) 1.51 cm    LVIDd (M-mode) 5.87 cm    LVIDs (M-mode) 4.72 cm    LVPWd (M-mode) 1.30 (A) cm    LVOT Peak Gradient 0.89 mmHg    LVOT Peak Velocity 47.04 cm/s    RVIDd 3.88 cm    RVSP 26.77 mmHg    Left Atrium Major Axis 5.14 cm    Est. RA Pressure 10.00 mmHg    Aortic Valve Area by Continuity of Peak Velocity 1.75 cm2    AoV PG 3.69 mmHg    Aortic Valve Systolic Peak Velocity 41.10 cm/s    Mitral Valve E Wave Deceleration Time 156.41 ms    MV E Les 95.88 cm/s    E/E' septal 14.59     LV E' Septal Velocity 6.57 cm/s    Mitral Valve Pressure Half-time 50.93 ms    MVA (PHT) 4.32 cm2    TR Max Velocity 313.44 cm/s    Pulmonic Valve Systolic Peak Instantaneous Gradient 2.95 mmHg    Pulmonic Valve Max Velocity 85.50 cm/s    Triscuspid Valve Regurgitation Peak Gradient 16.77 mmHg    TR Max Velocity 204.36 cm/s    Ao Root D 3.37 cm    LV Mass .4 88 - 224 g    LV Mass AL Index 188.1 49 - 115 g/m2    Left Atrium Minor Axis 2.23 cm    NADER/BSA Pk Les 0.8 cm2/m2   GLUCOSE, POC    Collection Time: 03/07/21 12:55 AM   Result Value Ref Range    Glucose (POC) 193 (H) 65 - 100 mg/dL    Performed by Cruz Velez [address] PANEL    Collection Time: 03/07/21  2:18 AM   Result Value Ref Range    LIPID PROFILE          Cholesterol, total 116 <200 MG/DL    Triglyceride 93 <150 MG/DL    HDL Cholesterol 30 MG/DL    LDL, calculated 67.4 0 - 100 MG/DL    VLDL, calculated 18.6 MG/DL    CHOL/HDL Ratio 3.9 0.0 - 5.0     HEMOGLOBIN A1C WITH EAG    Collection Time: 03/07/21  2:18 AM   Result Value Ref Range    Hemoglobin A1c 8.8 (H) 4.0 - 5.6 %    Est. average glucose 307 mg/dL   METABOLIC PANEL, BASIC    Collection Time: 03/07/21  2:18 AM   Result Value Ref Range    Sodium 136 136 - 145 mmol/L    Potassium 3.2 (L) 3.5 - 5.1 mmol/L    Chloride 103 97 - 108 mmol/L    CO2 28 21 - 32 mmol/L    Anion gap 5 5 - 15 mmol/L    Glucose 170 (H) 65 - 100 mg/dL    BUN 12 6 - 20 MG/DL    Creatinine 1.18 0.70 - 1.30 MG/DL    BUN/Creatinine ratio 10 (L) 12 - 20      GFR est AA >60 >60 ml/min/1.73m2    GFR est non-AA >60 >60 ml/min/1.73m2    Calcium 8.1 (L) 8.5 - 10.1 MG/DL   GLUCOSE, POC    Collection Time: 03/07/21  8:10 AM   Result Value Ref Range    Glucose (POC) 146 (H) 65 - 100 mg/dL    Performed by Josef Bustillo    GLUCOSE, POC    Collection Time: 03/07/21 11:23 AM   Result Value Ref Range    Glucose (POC) 194 (H) 65 - 100 mg/dL    Performed by [address], POC Collection Time: 03/07/21  4:06 PM   Result Value Ref Range    Glucose (POC) 223 (H) 65 - 100 mg/dL    Performed by [employer]

## 2021-03-08 NOTE — PROGRESS NOTES
Problem: Falls - Risk of  Goal: *Absence of Falls  Description: Document Britni Traylor Fall Risk and appropriate interventions in the flowsheet.   Outcome: Progressing Towards Goal  Note: Fall Risk Interventions:  Mobility Interventions: Bed/chair exit alarm, Communicate number of staff needed for ambulation/transfer, OT consult for ADLs, Patient to call before getting OOB, PT Consult for mobility concerns, PT Consult for assist device competence, Utilize walker, cane, or other assistive device         Medication Interventions: Bed/chair exit alarm, Patient to call before getting OOB, Teach patient to arise slowly    Elimination Interventions: Bed/chair exit alarm, Call light in reach, Patient to call for help with toileting needs, Urinal in reach

## 2021-03-08 NOTE — PROGRESS NOTES
Problem: Self Care Deficits Care Plan (Adult)  Goal: *Acute Goals and Plan of Care (Insert Text)  Description: FUNCTIONAL STATUS PRIOR TO ADMISSION: Mod I to I with basic self-care and light household tasks. He uses a cane intermittently. Patient reports still driving. HOME SUPPORT: Lives with his wife. Patient has 3 daughters. Occupational Therapy Goals  Initiated 3/8/2021   1. Patient will perform grooming standing at the sink with modified independence within 7 day(s). 2.  Patient will perform lower body dressing with modified independence within 7 day(s). 3.  Patient will perform bathing with supervision within 7 day(s). 4.  Patient will perform toilet transfers with modified independence within 7 day(s). 5.  Patient will perform all aspects of toileting with modified independence within 7 day(s). 6.  Patient will participate in UE coordination exercises with modified independence within 7 day(s). 7.  Patient will improve their Fugl Rhoades score by 2 points to 66/66 within 7 days. Outcome: Progressing Towards Goal    OCCUPATIONAL THERAPY EVALUATION  Patient: Silva Wood (82 y.o. male)  Date: 3/8/2021  Primary Diagnosis: Bilateral leg weakness [R29.898]  Left arm weakness [R29.898]        Precautions: Fall       ASSESSMENT  Based on the objective data described below, the patient presents with deficits in self-care, primarily due to delayed processing, impaired L UE coordination (mild), decreased activity tolerance, and impaired standing balance. He demonstrates good safety awareness. Patient currently requires overall Min A with ADL using the cane. He is functioning below his baseline level and will benefit from skilled OT treatment to maximize independence in basic self-care. Current Level of Function Impacting Discharge (ADLs/self-care): Min A    Functional Outcome Measure:   The patient scored Total A-D  Total A-D (Motor Function): 64/66 on the Fugl-Rhoades Assessment which is indicative of mild impairment in upper extremity functional status. Other factors to consider for discharge: N/A     Patient will benefit from skilled therapy intervention to address the above noted impairments. PLAN :  Recommendations and Planned Interventions: self care training, functional mobility training, therapeutic exercise, balance training, therapeutic activities, cognitive retraining, endurance activities, neuromuscular re-education, patient education, home safety training, and family training/education    Frequency/Duration: Patient will be followed by occupational therapy 4 times a week to address goals. Recommendation for discharge: (in order for the patient to meet his/her long term goals)  Occupational therapy at least 2 days/week in the home     This discharge recommendation:  Has been made in collaboration with the attending provider and/or case management    IF patient discharges home will need the following DME: TBD - may benefit from a shower chair       SUBJECTIVE:   Patient stated This all started on Saturday.     OBJECTIVE DATA SUMMARY:   HISTORY:   Past Medical History:   Diagnosis Date    Atrial fibrillation ([facility] [address]     Cancer ([facility] [address] 12/2015    left lung; 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    Congestive heart failure, unspecified     Diabetes ([facility] [address]     Dissection of right carotid artery ([facility] [address] 09/2018    Hypertension     Overweight(278.02)     Seizures ([address]     SOLITARY PULMONARY NODULE     1.6 cm    Stroke [facility]) 2015    right thalamus     Past Surgical History:   Procedure Laterality Date    NC CHEST SURGERY PROCEDURE UNLISTED      VATS Video-assisted thoracic surgery     Expanded or extensive additional review of patient history:     Home Situation  Home Environment: Private residence  # Steps to Enter: 4  Rails to Enter: Yes  Hand Rails : Bilateral  One/Two Story Residence: One story  Living Alone: No  Support Systems: Spouse/Significant Other/Partner, Child(evy)  Patient Expects to be Discharged to[de-identified] Private residence  Current DME Used/Available at Home: Cane, straight, Crutches  Tub or Shower Type: Tub/Shower combination    Hand dominance: Right    EXAMINATION OF PERFORMANCE DEFICITS:  Cognitive/Behavioral Status:        Cognition: Decreased attention/concentration;Decreased command following(delayed processing)  Perception: Appears intact  Perseveration: No perseveration noted  Safety/Judgement: Awareness of environment; Insight into deficits;Home safety; Fall prevention      Hearing: Auditory  Auditory Impairment: None    Vision/Perceptual:                           Acuity: Within Defined Limits    Corrective Lenses: Reading glasses    Range of Motion:  B UE WNL                            Strength:  B UE WFL                   Coordination:     Fine Motor Skills-Upper: Left Impaired;Right Intact    Gross Motor Skills-Upper: Left Intact; Right Intact    Tone & Sensation:  Tone: Normal  Sensation: Impaired                            Balance:  Sitting: Intact  Standing: Impaired  Standing - Static: Fair  Standing - Dynamic : Fair    Functional Mobility and Transfers for ADLs:  Bed Mobility:  Supine to Sit: Supervision  Scooting: Supervision    Transfers:  Sit to Stand: Contact guard assistance  Stand to Sit: Contact guard assistance  Bed to Chair: Contact guard assistance  Bathroom Mobility: Contact guard assistance  Toilet Transfer : Contact guard assistance    ADL Assessment:  Feeding: Supervision;Setup    Oral Facial Hygiene/Grooming: Minimum assistance(standing at the sink)    Bathing: Minimum assistance    Upper Body Dressing: Supervision    Lower Body Dressing: Minimum assistance    Toileting: Contact guard assistance                ADL Intervention and task modifications:  Patient instructed and indicated understanding the benefits of maintaining activity tolerance, functional mobility, and independence with self care tasks during acute stay  to ensure safe return home and to baseline. Encouraged patient to increase frequency and duration OOB, be out of bed for all meals, perform daily ADLs (as approved by RN/MD regarding bathing etc), and performing functional mobility to/from bathroom (with assistance). Pt educated on safe transfer techniques, with specific emphasis on proper hand placement to push up from seated surface rather than attempt to pull self up, fully positioning self in-front of desired seated location, feeling chair on back of legs and reaching back with 1-2 UE to slowly lower self to seated position. Initiated education of energy conservation techniques, including how they specifically apply to functional activities; This includes pacing, rest breaks, and planning. Patient with good verbal understanding. Cognitive Retraining  Safety/Judgement: Awareness of environment; Insight into deficits;Home safety; Fall prevention    Functional Measure:  Fugl-Rhoades Assessment of Motor Recovery after Stroke:   Reflex Activity  Flexors/Biceps/Fingers: Can be elicited  Extensors/Triceps: Can be elicited  Reflex Subtotal: 4    Volitional Movement Within Synergies  Shoulder Retraction: Full  Shoulder Elevation: Full  Shoulder Abduction (90 degrees): Full  Shoulder External Rotation: Full  Elbow Flexion: Full  Forearm Supination: Full  Shoulder Adduction/Internal Rotation: Full  Elbow Extension: Full  Forearm Pronation: Full  Subtotal: 18    Volitional Movement Mixing Synergies  Hand to Lumbar Spine: Full  Shoulder Flexion (0-90 degrees): Full  Pronation-Supination: Full  Subtotal: 6    Volitional Movement With Little or No Synergy  Shoulder Abduction (0-90 degrees): Full  Shoulder Flexion ( degrees): Full  Pronation/Supination: Full  Subtotal : 6    Normal Reflex Activity  Biceps, Triceps, Finger Flexors:  Full  Subtotal : 2    Upper Extremity Total   Upper Extremity Total: 36    Wrist  Stability at 15 Degree Dorsiflexion: Full  Repeated Dorsiflexion/ Volar Flexion: Full  Stability at 15 Degree Dorsiflexion: Full  Repeated Dorsiflexion/ Volar Flexion: Full  Circumduction: Full  Wrist Total: 10    Hand  Mass Flexion: Full  Mass Extension: Full  Grasp A: Full  Grasp B: Full  Grasp C: Full  Grasp D: Full  Grasp E: Full  Hand Total: 14    Coordination/Speed  Tremor: None  Dysmetria: Slight  Time: 2-5s  Coordination/Speed Total : 4    Total A-D  Total A-D (Motor Function): 64/66     This is a reliable/valid measure of arm function after a neurological event. It has established value to characterize functional status and for measuring spontaneous and therapy-induced recovery; tests proximal and distal motor functions. Fugl-Rhoades Assessment - UE scores recorded between five and 30 days post neurologic event can be used to predict UE recovery at six months post neurologic event. Severe = 0-21 points   Moderately Severe = 22-33 points   Moderate = 34-47 points   Mild = 48-66 points  ONEIL Covington, ZARIA Sullivan, & NILE Rose (1992). Measurement of motor recovery after stroke: Outcome assessment and sample size requirements.  Stroke, 23, pp. 0531-6410.   ------------------------------------------------------------------------------------------------------------------------------------------------------------------  MCID:  Stroke:   Bert Horowitz al, 2001; n = 171; mean age 79 (6) years; assessed within 16 (12) days of stroke, Acute Stroke)  FMA Motor Scores from Admission to Discharge   10 point increase in FMA Upper Extremity = 1.5 change in discharge FIM   10 point increase in FMA Lower Extremity = 1.9 change in discharge FIM  MDC:   Stroke:   Darletta Sprain et al, 2008, n = 14, mean age = 59.9 (14.6) years, assessed on average 14 (6.5) months post stroke, Chronic Stroke)   FMA = 5.2 points for the Upper Extremity portion of the assessment     Occupational Therapy Evaluation Charge Determination   History Examination Decision-Making   LOW Complexity : Brief history review  MEDIUM Complexity : 3-5 performance deficits relating to physical, cognitive , or psychosocial skils that result in activity limitations and / or participation restrictions LOW Complexity : No comorbidities that affect functional and no verbal or physical assistance needed to complete eval tasks       Based on the above components, the patient evaluation is determined to be of the following complexity level: LOW   Pain Rating:  No complaints    Activity Tolerance:   Fair    After treatment patient left in no apparent distress:    Sitting in chair and Call bell within reach    COMMUNICATION/EDUCATION:   The patients plan of care was discussed with: Physical therapist, Speech therapist, and Registered nurse. This patients plan of care is appropriate for delegation to [facility].     Thank you for this referral.  Randy Sexton OTR/L  Time Calculation: 37 mins

## 2021-03-08 NOTE — PROGRESS NOTES
TRANSFER - OUT REPORT:    Verbal report given to Whitney(name) on [employer]  being transferred to [facility](unit) for routine progression of care       Report consisted of patients Situation, Background, Assessment and   Recommendations(SBAR). Information from the following report(s) SBAR, Kardex, Intake/Output, MAR and Recent Results was reviewed with the receiving nurse. Lines:   Peripheral IV 03/06/21 Left Antecubital (Active)   Site Assessment Clean, dry, & intact 03/08/21 1500   Phlebitis Assessment 0 03/08/21 1500   Infiltration Assessment 0 03/08/21 1500   Dressing Status Clean, dry, & intact 03/08/21 1500   Dressing Type Transparent 03/08/21 1500   Hub Color/Line Status Green;Capped 03/08/21 1500   Alcohol Cap Used Yes 03/08/21 1500        Opportunity for questions and clarification was provided.       Patient transported with:   Monitor

## 2021-03-09 VITALS
SYSTOLIC BLOOD PRESSURE: 159 MMHG | OXYGEN SATURATION: 98 % | HEIGHT: 73 IN | DIASTOLIC BLOOD PRESSURE: 100 MMHG | TEMPERATURE: 97.6 F | RESPIRATION RATE: 18 BRPM | BODY MASS INDEX: 30.09 KG/M2 | WEIGHT: 227 LBS | HEART RATE: 80 BPM

## 2021-03-09 LAB
ANION GAP SERPL CALC-SCNC: 4 MMOL/L (ref 5–15)
BUN SERPL-MCNC: 13 MG/DL (ref 6–20)
BUN/CREAT SERPL: 12 (ref 12–20)
CALCIUM SERPL-MCNC: 8.7 MG/DL (ref 8.5–10.1)
CHLORIDE SERPL-SCNC: 106 MMOL/L (ref 97–108)
CO2 SERPL-SCNC: 29 MMOL/L (ref 21–32)
CREAT SERPL-MCNC: 1.07 MG/DL (ref 0.7–1.3)
ERYTHROCYTE [DISTWIDTH] IN BLOOD BY AUTOMATED COUNT: 14.3 % (ref 11.5–14.5)
GLUCOSE BLD STRIP.AUTO-MCNC: 168 MG/DL (ref 65–100)
GLUCOSE BLD STRIP.AUTO-MCNC: 292 MG/DL (ref 65–100)
GLUCOSE SERPL-MCNC: 173 MG/DL (ref 65–100)
HCT VFR BLD AUTO: 38.7 % (ref 36.6–50.3)
HGB BLD-MCNC: 12.7 G/DL (ref 12.1–17)
MCH RBC QN AUTO: 27.4 PG (ref 26–34)
MCHC RBC AUTO-ENTMCNC: 32.8 G/DL (ref 30–36.5)
MCV RBC AUTO: 83.6 FL (ref 80–99)
NRBC # BLD: 0 K/UL (ref 0–0.01)
NRBC BLD-RTO: 0 PER 100 WBC
PLATELET # BLD AUTO: 202 K/UL (ref 150–400)
PMV BLD AUTO: 12.7 FL (ref 8.9–12.9)
POTASSIUM SERPL-SCNC: 3.3 MMOL/L (ref 3.5–5.1)
RBC # BLD AUTO: 4.63 M/UL (ref 4.1–5.7)
SERVICE CMNT-IMP: ABNORMAL
SERVICE CMNT-IMP: ABNORMAL
SODIUM SERPL-SCNC: 139 MMOL/L (ref 136–145)
WBC # BLD AUTO: 8.8 K/UL (ref 4.1–11.1)

## 2021-03-09 PROCEDURE — 36415 COLL VENOUS BLD VENIPUNCTURE: CPT

## 2021-03-09 PROCEDURE — 74011250637 HC RX REV CODE- 250/637: Performed by: INTERNAL MEDICINE

## 2021-03-09 PROCEDURE — 80048 BASIC METABOLIC PNL TOTAL CA: CPT

## 2021-03-09 PROCEDURE — 85027 COMPLETE CBC AUTOMATED: CPT

## 2021-03-09 PROCEDURE — 74011636637 HC RX REV CODE- 636/637: Performed by: HOSPITALIST

## 2021-03-09 PROCEDURE — 82962 GLUCOSE BLOOD TEST: CPT

## 2021-03-09 PROCEDURE — 94760 N-INVAS EAR/PLS OXIMETRY 1: CPT

## 2021-03-09 PROCEDURE — 74011250637 HC RX REV CODE- 250/637: Performed by: PSYCHIATRY & NEUROLOGY

## 2021-03-09 PROCEDURE — 74011250637 HC RX REV CODE- 250/637: Performed by: HOSPITALIST

## 2021-03-09 RX ORDER — ASPIRIN 81 MG/1
81 TABLET ORAL DAILY
Qty: 30 TAB | Refills: 0 | Status: SHIPPED | OUTPATIENT
Start: 2021-03-09

## 2021-03-09 RX ORDER — CARVEDILOL 12.5 MG/1
12.5 TABLET ORAL 2 TIMES DAILY WITH MEALS
Qty: 30 TAB | Refills: 0 | Status: SHIPPED | OUTPATIENT
Start: 2021-03-09 | End: 2021-04-30 | Stop reason: SDUPTHER

## 2021-03-09 RX ORDER — POTASSIUM CHLORIDE 20 MEQ/1
40 TABLET, EXTENDED RELEASE ORAL
Status: COMPLETED | OUTPATIENT
Start: 2021-03-09 | End: 2021-03-09

## 2021-03-09 RX ADMIN — APIXABAN 5 MG: 5 TABLET, FILM COATED ORAL at 08:37

## 2021-03-09 RX ADMIN — LEVETIRACETAM 1000 MG: 500 TABLET ORAL at 08:37

## 2021-03-09 RX ADMIN — HYDRALAZINE HYDROCHLORIDE 25 MG: 25 TABLET, FILM COATED ORAL at 08:37

## 2021-03-09 RX ADMIN — AMLODIPINE BESYLATE 10 MG: 5 TABLET ORAL at 08:37

## 2021-03-09 RX ADMIN — METFORMIN HYDROCHLORIDE 500 MG: 500 TABLET ORAL at 08:37

## 2021-03-09 RX ADMIN — INSULIN LISPRO 3 UNITS: 100 INJECTION, SOLUTION INTRAVENOUS; SUBCUTANEOUS at 08:38

## 2021-03-09 RX ADMIN — POTASSIUM CHLORIDE 40 MEQ: 20 TABLET, EXTENDED RELEASE ORAL at 09:06

## 2021-03-09 RX ADMIN — INSULIN LISPRO 4 UNITS: 100 INJECTION, SOLUTION INTRAVENOUS; SUBCUTANEOUS at 11:46

## 2021-03-09 RX ADMIN — GLIMEPIRIDE 4 MG: 4 TABLET ORAL at 08:37

## 2021-03-09 NOTE — PROGRESS NOTES
Patient alert and oriented  X 4 . No complaints of pain  No  S/s of distress. Patient able to make needs known and follow commands. Patient on room air. Patient able to ambulate with  Cane  X 1. Patient continued to be monitored.

## 2021-03-09 NOTE — DISCHARGE SUMMARY
Discharge Summary      Name: Mirian Russell  [id number]  YOB: 1955 (Age: 63 y.o.)   Date of Admission: 3/6/2021  Date of Discharge: 3/9/2021  Attending Physician: Jarod Rdz MD    Discharge Diagnosis:   Acute CVA involving the right posterior corona radiata/basal ganglia  Multiple chronic infarcts in the bilateral cerebral hemispheres  Hx strokes  Multilevel degenerative changes of cervical spine with cord compression  DM type 2 with elevated   CKD stage 3 with proteinuria prior Cr baseline 1.3.  Currently 1.15  Hyponatremia   Hypokalemia    Consultations:  IP CONSULT TO HOSPITALIST  IP CONSULT TO NEUROLOGY  IP CONSULT TO NEUROINTERVENTIONAL SURGERY  IP CONSULT TO CARDIOLOGY    Brief Admission History/Reason for Admission Per Madonna Rinne, MD:   Mirian Russell is a 63 y.o. male with PMH stroke (at least 3) with residual mild left leg weakness but at baseline ambulate independently, afib on xarelto, DM type 2, CKD stage 3, carcinoid cancer left lung s/p resection 2015, hyperlipidemia who came to ER as level 2 stroke alert.     Patient went to bed normal at 9pm last night. He slept on sofa last night. At around 8 or 9am today, woke up and tried to get up but both legs and arms were weak (left more than right) and he slid down onto floor. Denies falling, hitting head or neck. Unable to get up so wife had to help him. Had difficulty walking due to b/l leg weakness. He denies headache, acute vision change, difficulty speaking. Denies neck or back pain. +mild numbness in left leg.     On arrival to ER he is noted to have weakness in left arm 4/5 and left leg 1/5 and right leg 2/5.       We were asked to admit for work up and evaluation of the above problems.      Brief Hospital Course by Main Problems:   Acute CVA involving the right posterior corona radiata/basal ganglia  Multiple chronic infarcts in the bilateral cerebral hemispheres  Hx strokes (acute right thalamus 2015; TIA with right distal ICA dissection 9/18 was transferred to [place]) with residual mild left leg weakness  -MRI confirms acute CVA as above  --Neurology recommended to add aspirin to Eliquis. Cont' lipitor  --A1c is 8.8 LDL is 67.4  --Echo shows normal ejection fraction with no evidence of shunt  --CTA shows no large vessel occlusion  -Evaluated by PT and recommended discharging home with home health     Multilevel degenerative changes of cervical spine with cord compression  -Evaluated by neurosurgery and recommended to stop steroids and have outpatient follow-up in about 3 to 4 weeks for consideration of surgery.       DM type 2 with hyperglycemia  CKD stage 3 with proteinuria prior Cr baseline 1.3.  Currently 1.07  --continue glimepiride. Resume Metformin. --high dose SSI  --A1c is 8.8  --has seen Dr. Peewee Mosher in past    Hyponatremia   Hypokalemia 3.5  --K is normal today     Seizure disorder  --continue keppra bid. He denies any tonic clonic seizure activity today      Persistent afib, POA  --rate controlled, switched to Eliquis from Xarelto as above. Coreg held due to an episode of bradycardia associated with dizziness. I spoke to Dr Rubina Grossman today and he recommended that coreg 12.5 to be restarted at discharge. F/u with him in 2 weeks.     HTN  Hx systolic chf EF 51% 5002  -cont' home medications. Coreg reduced. --not volume overloaded by exam    Hyperlipidemia  --continue lipitor 80mg qhs.       Left lung carcinoid cancer 12/2015  --per patient had resection at [facility] and is cancer free, additional details unknown  --has seen Dr. Grant Dunbar in past     Intermittent cigar smoking  --discussed smoking cessation.     Mild cognitive impairment  --patient oriented to month but difficulty with year and date and poor insight into his PMH     Obesity  Body mass index is 31 kg/m².        Body mass index is 29.95 kg/m².     Discharge Exam:  Patient seen and examined by me on discharge day. Pertinent Findings:  Visit Vitals  /89   Pulse 78   Temp 98.5 °F (36.9 °C)   Resp 18   Ht 6' 1\" (1.854 m)   Wt 103 kg (227 lb)   SpO2 98%   BMI 29.95 kg/m²     Gen:    Not in distress  Chest: Clear lungs  CVS:   Regular rhythm. No edema  Abd:  Soft, not distended, not tender    Discharge/Recent Laboratory Results:  Recent Labs     03/09/21  0312 03/06/21  1316 03/06/21  1316      < > 135*   K 3.3*   < > 3.0*      < > 100   CO2 29   < > 29   BUN 13   < > 12   *   < > 190*   CA 8.7   < > 8.2*   MG  --   --  1.6    < > = values in this interval not displayed. Recent Labs     03/09/21 0312   HGB 12.7   HCT 38.7   WBC 8.8          Discharge Medications:  Current Discharge Medication List      START taking these medications    Details   apixaban (ELIQUIS) 5 mg tablet Take 1 Tab by mouth every twelve (12) hours. Qty: 60 Tab, Refills: 0      aspirin delayed-release 81 mg tablet Take 1 Tab by mouth daily. Qty: 30 Tab, Refills: 0         CONTINUE these medications which have CHANGED    Details   carvediloL (COREG) 12.5 mg tablet Take 1 Tab by mouth two (2) times daily (with meals). Qty: 30 Tab, Refills: 0         CONTINUE these medications which have NOT CHANGED    Details   albuterol (ProAir HFA) 90 mcg/actuation inhaler Take 2 Puffs by inhalation every four (4) hours as needed for Wheezing.       levETIRAcetam (KEPPRA) 500 mg tablet Take 2 tablets by mouth twice daily  Qty: 120 Tab, Refills: 0      glimepiride (AMARYL) 4 mg tablet TAKE 1 TABLET BY MOUTH IN THE MORNING  Qty: 90 Tab, Refills: 0      hydrALAZINE (APRESOLINE) 10 mg tablet TAKE 1 TABLET BY MOUTH THREE TIMES DAILY  Qty: 90 Tab, Refills: 0      atorvastatin (LIPITOR) 80 mg tablet TAKE 1 TABLET BY MOUTH ONCE DAILY AT NIGHT  Qty: 30 Tab, Refills: 0      amLODIPine (NORVASC) 10 mg tablet Take 1 tablet by mouth once daily for blood pressure  Qty: 90 Tab, Refills: 0      metFORMIN (GLUCOPHAGE) 500 mg tablet TAKE 1 TABLET BY MOUTH TWICE DAILY WITH MEALS  Qty: 180 Tab, Refills: 3    Associated Diagnoses: Type 2 diabetes mellitus with complication, with long-term current use of insulin (HCC)      lisinopril (PRINIVIL, ZESTRIL) 40 mg tablet TAKE ONE TABLET BY MOUTH ONCE DAILY FOR BLOOD PRESSURE  Qty: 30 Tab, Refills: 11    Comments: Please consider 90 day supplies to promote better adherence         STOP taking these medications       rivaroxaban (XARELTO) 20 mg tab tablet Comments:   Reason for Stopping:         Xarelto 20 mg tab tablet Comments:   Reason for Stopping:               Patient Follow Up Instructions:    Activity: Activity as tolerated  Diet: Diabetic Diet  Wound Care: None needed  Code: Full    DISPOSITION:    Home with Family:    Home with HH/PT/OT/RN: x    SNF/LTC:    PHILIPPE:    OTHER:          Follow up with:   PCP : Aileen Grey MD  Follow-up Information     Follow up With Specialties Details Why Eduard Stoner MD Neurosurgery In 2 weeks  [address] [facility] [address]      Aileen Grey MD Internal Medicine   [address]  [place] [address]  [address] Neurology In 2 weeks  [address]  [address]  [address] [facility]      Yimi Lundberg MD Cardiology In 2 weeks  [address]  [address]  [address] [phone]              Total time in minutes spent coordinating this discharge (includes going over instructions, follow-up, prescriptions, and preparing report for sign off to her PCP) :  35 minutes

## 2021-03-09 NOTE — PROGRESS NOTES
Patient discharged with all belonging. Wife received  Discharge education. Verified pharmacy. Patient alert and oriented  X 3 no pain , stable . Patient  IV removed with tip in place. Patient  Transferred VIA wheelchair by staff. Patient continued to be monitored.

## 2021-03-09 NOTE — PROGRESS NOTES
DOMINIK:    Eliquis Card  2nd IM Medicare Letter  Home with Family    UPDATE: 9:27AM    CM informed that pt's daughter will be picking up pt today at 17P. Pt's nurse aware. GEORGE Torres, [address]            CM: Eyal Paulino is currently working with pt in the Neuro Unit. CM aware that pt will d/c on today and will transition home with family. Pt will require eliquis card and 2nd IM Medicare Letter at the time of d/c (CM provided). Pt's family will assist with transport at the time of d/c.    CM completed the needs of the pt at this time.     GEORGE Torres, [address] [facility]

## 2021-03-09 NOTE — PROGRESS NOTES
End of Shift Note    Bedside shift change report given to Nandini (oncoming nurse) by Bita Burkett (offgoing nurse). Report included the following information SBAR, MAR and Recent Results    Shift worked:  night     Shift summary and any significant changes:     none     Concerns for physician to address:  none     Zone phone for oncoming shift:   [phone]       Activity:  Activity Level: Ambulate X (#)  Number times ambulated in hallways past shift: 0  Number of times OOB to chair past shift: 1    Cardiac:   Cardiac Monitoring: Yes    - Afib    Access:   Current line(s): PIV     Genitourinary:   Urinary status: voiding    Respiratory:   O2 Device: Room air  Chronic home O2 use?: NO  Incentive spirometer at bedside: N/A     GI:  Last Bowel Movement Date: 03/08/21  Current diet:  DIET DIABETIC WITH OPTIONS Consistent Carb 2000kcal; Regular; AHA-LOW-CHOL FAT  Passing flatus: YES  Tolerating current diet: YES  % Diet Eaten: 100 %    Pain Management:   Patient states pain is manageable on current regimen: N/A    Skin:  Don Score: 22  Interventions: increase time out of bed    Patient Safety:  Fall Score:  Total Score: 2  Interventions: pt to call before getting OOB  High Fall Risk: Yes    Length of Stay:  Expected LOS: 2d 21h  Actual LOS: [address]

## 2021-03-10 ENCOUNTER — PATIENT OUTREACH (OUTPATIENT)
Dept: CASE MANAGEMENT | Age: 66
End: 2021-03-10

## 2021-03-10 NOTE — PROGRESS NOTES
Care Transitions Initial Follow Up Call    Call within 2 business days of discharge: Yes     Patient: Chela Miramontes Patient : 1955 MRN: [id number]    Last Discharge [address]       Complaint Diagnosis Description Type Department Provider    3/6/21 Extremity Weakness Weakness of both lower extremities . .. ED to Hosp-Admission (Discharged) (ADMIT) Bonifacio Christiansen MD; Mariola Hope MD; ... Challenges to be reviewed by the provider   Additional needs identified to be addressed with provider    Neurology recommended to add aspirin to Eliquis. Cont' lipitor  --A1c is 8.8 LDL is 67.4  Evaluated by PT and recommended discharging home with home health     Multilevel degenerative changes of cervical spine with cord compression  -Evaluated by neurosurgery and recommended to stop steroids and have outpatient follow-up in about 3 to 4 weeks for consideration of surgery. Details   apixaban (ELIQUIS) 5 mg tablet Take 1 Tab by mouth every twelve (12) hours. Qty: 60 Tab, Refills: 0       aspirin delayed-release 81 mg tablet Take 1 Tab by mouth daily. Qty: 30 Tab, Refills: 0                 CONTINUE these medications which have CHANGED     Details   carvediloL (COREG) 12.5 mg tablet Take 1 Tab by mouth two (2) times daily (with meals). Qty: 30 Tab, Refills: 0      CTN unable to see where Home health is ordered as recommended by IP PT--per [facility] at [id number] CM, CM did not receive orders and no HH ordered. Method of communication with provider : chart routing    Discussed COVID-19 related testing which was available at this time. Test results were negative. Patient informed of results, if available? yes     Advance Care Planning:   Does patient have an Advance Directive: not on file, will discuss with spouse when reached. Inpatient Readmission Risk score: Unplanned Readmit Risk Score: 11    Was this a readmission?  no       Patients top risk factors for readmission: medical condition diabetes, chronic kidney disease and CVA  Interventions to address risk factors: Need to discuss with family needs/concerns to assist patient. Not available at time of call. Care Transition Nurse contacted the patient by telephone to perform post hospital discharge assessment. Verified name and  with patient as identifiers. Provided introduction to self, and explanation of the Care Transition Nurse role. Care Transition Nurse  Attempted to review discharge instructions, medical action plan and red flags with patient=--patient requested CTN speak to wife, who was unavailable. Were discharge instructions available to patient? unknown. Medication reconciliation was not performed with patient,     Home Health/Outpatient orders at discharge: is in discharge notes but not done per CM at [place] [id number] Education was not performed at this call. Patient has following risk factors of: diabetes and chronic kidney disease. Discussed follow-up appointments. If no appointment was previously scheduled, appointment scheduling offered: no Is follow up appointment scheduled within 7 days of discharge? no   [facility] follow up appointment(s):   Future Appointments   Date Time Provider Bj Todd   2021 10:30 AM Naldo Johnson MD [facility] BS [facility]     Non-[facility] follow up appointment(s): Recommended at discharge Dr Miguel Davis, Dr Carlee Sanchez, and Dr Glendy Alvarez will review with wife when she is reached. Plan for follow-up call in 1-2 days based on severity of symptoms and risk factors. Plan for next call: follow up appointment-above recommeded appts  Care Transition Nurse provided contact information for future needs. Goals      Prevent complications post hospitalization. 03/10/21  CTN unable to reach patient or wife on phone, LM on wife VM, unable to LM on patient phone as mailbox full. Patient has following recommeded appts, will discuss with pt/family when reached.     PCP Dr Ricardo Genao will ask pt if this can be moved up. Dr Ely Cerna, neuro surgery--2 weeks  Dr Benjamin Sky, neuology 2 weeks      Acute CVA involving the right posterior corona radiata/basal ganglia  Multiple chronic infarcts in the bilateral cerebral hemispheres    ASSESSMENT AND PLAN      1. Stroke  MRI right basal ganglia stroke  Echo EF 55-60%  Cerebrovascular CTA: no flow limiting stenosis  A1C 8.8   LDL 67.4   Risk factors, DM, HTN, Afib , tobacco use and cerebrovascular history     2. Left-sided weakness  PT/ OT   Mild deficits     3. Atrial fibrillation  Discontinue xarelto and switch to eliquis     4. Tobacco use  Smokes cigars -  Advised to quit     5. Essential hypertension  continue amlodipine     6. Cervical stenosis  Not central to presenting complaint  Outpatient follow up with NSGY  If elective surgery is planned its advisable to delay at least 9 months from time of stroke       CTN received call from [address] [place] [address] at [facility] states that there was no orders given to them for [place], she thinks patient refused it even though it is not documented. She also said she would have to check about the follow up appts as many doctors are having their office call pt directly. CTN will discuss appts with pt or family when reached to assist with making appts and to see if they want [place] ordered. --parveen    03/11/21  CTN was able to speak to patient briefly today--patient states he has just \"woken up\" . When CTN asked patient if he had any concerns right now, he states Kellypaul Glad my wife. \" Patient then disconnected call. CTN called pt's wife, LM on  requesting return call.      CTN will follow up in 1-2 days if no call received---elsarw

## 2021-03-11 ENCOUNTER — TELEPHONE (OUTPATIENT)
Dept: INTERNAL MEDICINE CLINIC | Age: 66
End: 2021-03-11

## 2021-03-11 NOTE — TELEPHONE ENCOUNTER
Patient's wife came into the office to schedule a hospital follow up for patient within two weeks from discharge on 3/9/21. Patient's wife can be contacted at [phone].

## 2021-03-11 NOTE — PROGRESS NOTES
DOMINIK:    [address]  Follow Up Appointment        CM staffed case with CM , via telephone and was asked to arrange [address] and appointments for pt. Pt was known to d/c on 3/10/21. CM enter referal to [facility], however, agency is unable to staff pt due to address location. [facility] currently does not service the [place] area. CM attempted to arrange follow up appointment with PCP: Dr. iGl Gupta, via telephone and was informed by  that pt MD nurse will have to contact pt with time and date for appointment. CM staff case with CM Supervisor and CM was informed that pt's [address] referral can be sent to another home health agency that will service pt in his location (address). CM contact pt's spouse: Radha vEans, via telephone, and she reported that she is agreeable to pt having [address] at this time. However, she will have to contact CM at a later time for information due her phone is about to die. Radha Evans reported that she will contact CM on 3/12/21 at 9AM to get [address] agency information from . CM submitted referral to [facility] for additional services in the home and acceptance is currently pending. CM will continue to follow.     GEORGE Dempsey, [address] [place] [address]

## 2021-03-11 NOTE — TELEPHONE ENCOUNTER
----- Message from Alejandro Rachel sent at 3/11/2021  4:14 PM EST -----  Regarding: Dr. Shelley Lovell: [phone]  General Message/Vendor Calls    Caller's first and last name:  Adrienne with [facility]       Reason for call: ALEJANDRO ESQUIVEL Appt Request      Callback required yes/no and why: yes/scheduling      Best contact number(s):  [phone]      Details to clarify the request: Discharged from [facility] on 3/10/21.  Diagnosis: Acute persistent worsening weakness in left arm      Message from [facility]

## 2021-03-12 ENCOUNTER — PATIENT OUTREACH (OUTPATIENT)
Dept: CASE MANAGEMENT | Age: 66
End: 2021-03-12

## 2021-03-12 NOTE — PROGRESS NOTES
Goals      Prevent complications post hospitalization. 03/10/21  CTN unable to reach patient or wife on phone, LM on wife VM, unable to LM on patient phone as mailbox full. Patient has following recommeded appts, will discuss with pt/family when reached. PCP Dr Christiana Calvo will ask pt if this can be moved up. Dr Su Neither weeks   Dr Caio Wade, neuro surgery--2 weeks  Dr Elmo Roman, neuology 2 weeks      Acute CVA involving the right posterior corona radiata/basal ganglia  Multiple chronic infarcts in the bilateral cerebral hemispheres    ASSESSMENT AND PLAN      1. Stroke  MRI right basal ganglia stroke  Echo EF 55-60%  Cerebrovascular CTA: no flow limiting stenosis  A1C 8.8   LDL 67.4   Risk factors, DM, HTN, Afib , tobacco use and cerebrovascular history     2. Left-sided weakness  PT/ OT   Mild deficits     3. Atrial fibrillation  Discontinue xarelto and switch to eliquis     4. Tobacco use  Smokes cigars -  Advised to quit     5. Essential hypertension  continue amlodipine     6. Cervical stenosis  Not central to presenting complaint  Outpatient follow up with NSGY  If elective surgery is planned its advisable to delay at least 9 months from time of stroke       CTN received call from [facility] at [facility] states that there was no orders given to them for [facility], she thinks patient refused it even though it is not documented. She also said she would have to check about the follow up appts as many doctors are having their office call pt directly. CTN will discuss appts with pt or family when reached to assist with making appts and to see if they want [facility] ordered. --mkluisw    03/11/21  CTN was able to speak to patient briefly today--patient states he has just \"woken up\" . When CTN asked patient if he had any concerns right now, he states Ernestine Borders my wife. \" Patient then disconnected call. CTN called pt's wife, LM on VM requesting return call.      CTN will follow up in 1-2 days if no call received---parveen    03/12/21  CTN spoke to wife today to offer assistance with appointments and follow up on patient condition.  Wife states that Dr Trey Hughes office will contact her with a PCP appt, she will follow up with them next week if she doesn't hear anything back today.  CTN offered to assist with making specialist appts---wife states that it would be best if she calls to make them herself as she works and will have to either work around her schedule or have daughter transport---she will have to check with daughter to see when she is available. CTN asked wife to call me if she needs any assistance.  Wife reports that she has called drug [employer] to get application for eliquis financial assistance---she states they were already getting xarelto for free. She states she spoke to representative who mailed application yesterday. Wife states she fill complete it asap. CTN informed her that pt was given a card for free 30 day supply of eliquis---she states she took it to [facility] and they couldn't activate it. She states she will take it back and see if someone else can assist her at the pharmacy. In the meantime pt is still taking xarelto.  Wife states someone at the hospital called today to tell her that [facility] was ordered and to expect a call to schedule HH therapies. She did not know that name of Agency. CTN reviewed chart--sees that Adrienne Jacques sent an order to [facility]. CTN will follow up to see if they accepted.    CTN will follow up again with wife next week---parveen

## 2021-03-15 ENCOUNTER — TELEPHONE (OUTPATIENT)
Dept: INTERNAL MEDICINE CLINIC | Age: 66
End: 2021-03-15

## 2021-03-15 NOTE — TELEPHONE ENCOUNTER
I reached out to patient and his wife by phone to see if Mr. Arun Bahena is willing to do VV DOMINIK with Dr. Pema Carpenter today. I was unable to leave message on both phones mailbox was full.

## 2021-03-19 ENCOUNTER — VIRTUAL VISIT (OUTPATIENT)
Dept: INTERNAL MEDICINE CLINIC | Age: 66
End: 2021-03-19
Payer: MEDICARE

## 2021-03-19 DIAGNOSIS — Z79.4 TYPE 2 DIABETES MELLITUS WITH COMPLICATION, WITH LONG-TERM CURRENT USE OF INSULIN (HCC): ICD-10-CM

## 2021-03-19 DIAGNOSIS — E11.8 TYPE 2 DIABETES MELLITUS WITH COMPLICATION, WITH LONG-TERM CURRENT USE OF INSULIN (HCC): ICD-10-CM

## 2021-03-19 DIAGNOSIS — I63.9 ACUTE CVA (CEREBROVASCULAR ACCIDENT) (HCC): Primary | ICD-10-CM

## 2021-03-19 DIAGNOSIS — I48.91 ATRIAL FIBRILLATION, UNSPECIFIED TYPE (HCC): ICD-10-CM

## 2021-03-19 DIAGNOSIS — I10 ESSENTIAL HYPERTENSION: ICD-10-CM

## 2021-03-19 DIAGNOSIS — Z09 HOSPITAL DISCHARGE FOLLOW-UP: ICD-10-CM

## 2021-03-19 PROCEDURE — G8427 DOCREV CUR MEDS BY ELIG CLIN: HCPCS | Performed by: INTERNAL MEDICINE

## 2021-03-19 PROCEDURE — 99495 TRANSJ CARE MGMT MOD F2F 14D: CPT | Performed by: INTERNAL MEDICINE

## 2021-03-19 PROCEDURE — 1111F DSCHRG MED/CURRENT MED MERGE: CPT | Performed by: INTERNAL MEDICINE

## 2021-03-19 RX ORDER — METFORMIN HYDROCHLORIDE 500 MG/1
1000 TABLET ORAL 2 TIMES DAILY WITH MEALS
Qty: 360 TAB | Refills: 3 | Status: SHIPPED | OUTPATIENT
Start: 2021-03-19 | End: 2022-04-25 | Stop reason: SDUPTHER

## 2021-03-19 NOTE — PATIENT INSTRUCTIONS
This is an established visit conducted via telemedicine. The patient has been instructed that this meets HIPAA criteria and acknowledges and agrees to this method of visitation.  
 
Tanner Lucas [place] 
40/88/04 
75:36 PM

## 2021-03-19 NOTE — PROGRESS NOTES
HISTORY OF PRESENT ILLNESS  Vinny Frye is a 63 y.o. male. HPI     Vinny Frye, was evaluated through a synchronous (real-time) audio-video encounter. The patient (or guardian if applicable) is aware that this is a billable service. Verbal consent to proceed has been obtained within the past 12 months. The visit was conducted pursuant to the emergency declaration under the [address] [place] [address] and the [employer] and [employer] General Act. Patient identification was verified, and a caregiver was present when appropriate. The patient was located in a state where the provider was credentialed to provide care. --Jenn Ventura MD on 3/19/2021 at 12:29 PM    An electronic signature was used to authenticate this note. Hx  dm-2 and MARIA TERESA, chronic afib, hx lung carcinoid  hx cva and right carotid dissection-here with wife   getting HH PT OT and nursing      DOMINIK visit for acute CVA  Date of Admission: 3/6/2021  Date of Discharge: 3/9/2021  Attending Physician: Hans Gamez MD     Discharge Diagnosis:   Acute CVA involving the right posterior corona radiata/basal ganglia  Multiple chronic infarcts in the bilateral cerebral hemispheres  Hx strokes  Multilevel degenerative changes of cervical spine with cord compression  DM type 2 with elevated   CKD stage 3 with proteinuria prior Cr baseline 1.3.  Currently 1.15  Hyponatremia   Hypokalemia     Consultations:  IP CONSULT TO HOSPITALIST  IP CONSULT TO NEUROLOGY  IP CONSULT TO NEUROINTERVENTIONAL SURGERY  IP CONSULT TO CARDIOLOGY     Brief Admission History/Reason for Admission Per Gregory Dietz MD:   Miles Hodge a 63 y. o. male with PMH stroke (at least 3) with residual mild left leg weakness but at baseline ambulate independently, afib on xarelto, DM type 2, CKD stage 3, carcinoid cancer left lung s/p resection 2015, hyperlipidemia who came to ER as level 2 stroke alert.     Patient went to bed normal at 9pm last night.  He slept on sofa last night.  At around 8 or 9am today, woke up and tried to get up but both legs and arms were weak (left more than right) and he slid down onto floor.  Denies falling, hitting head or neck.  Unable to get up so wife had to help him.  Had difficulty walking due to b/l leg weakness.  He denies headache, acute vision change, difficulty speaking.  Denies neck or back pain.  +mild numbness in left leg.     On arrival to ER he is noted to have weakness in left arm 4/5 and left leg 1/5 and right leg 2/5.       We were asked to admit for work up and evaluation of the above problems.      [address] Course by Main Problems:   Acute CVA involving the right posterior corona radiata/basal ganglia  Multiple chronic infarcts in the bilateral cerebral hemispheres  Hx strokes (acute right thalamus 2015; TIA with right distal ICA dissection 9/18 was transferred to [facility]) with residual mild left leg weakness  -MRI confirms acute CVA as above  --Neurology recommended to add aspirin to Eliquis. Cont' lipitor  --A1c is 8.8 LDL is 67.4  --Echo shows normal ejection fraction with no evidence of shunt  --CTA shows no large vessel occlusion  -Evaluated by PT and recommended discharging home with home health     Multilevel degenerative changes of cervical spine with cord compression  -Evaluated by neurosurgery and recommended to stop steroids and have outpatient follow-up in about 3 to 4 weeks for consideration of surgery.       DM type 2 with hyperglycemia  CKD stage 3 with proteinuria prior Cr baseline 1.3.  Currently 1.07  --continue glimepiride.  Resume Metformin. --high dose SSI  --A1c is 8.8  --has seen Dr. Guy Posey in past     Hyponatremia   Hypokalemia 3.5  --K is normal today     Seizure disorder  --continue keppra bid.  He denies any tonic clonic seizure activity today      Persistent afib, POA  --rate controlled, switched to Eliquis from Xarelto as above. Coreg held due to an episode of bradycardia associated with dizziness. I spoke to Dr Florence Falk today and he recommended that coreg 12.5 to be restarted at discharge. F/u with him in 2 weeks.     HTN  Hx systolic chf EF 62% 9273  -cont' home medications. Coreg reduced. --not volume overloaded by exam     Hyperlipidemia  --continue lipitor 80mg qhs.       Left lung carcinoid cancer 12/2015  --per patient had resection at [facility] and is cancer free, additional details unknown  --has seen Dr. Landry Ortiz in past     Intermittent cigar smoking  --discussed smoking cessation.     Mild cognitive impairment  --patient oriented to month but difficulty with year and date and poor insight into his PMH     Obesity  Body mass index is 31 kg/m².        Body mass index is 29.95 kg/m².     Discharge Exam:  Patient seen and examined by me on discharge day. Pertinent Findings:  Visit Vitals  /89   Pulse 78   Temp 98.5 °F (36.9 °C)   Resp 18   Ht 6' 1\" (1.854 m)   Wt 103 kg (227 lb)   SpO2 98%   BMI 29.95 kg/m²      Gen:    Not in distress  Chest:  Clear lungs  CVS:    Regular rhythm. No edema  Abd:     Soft, not distended, not tender     Discharge/Recent Laboratory Results:        Recent Labs     03/09/21  0312 03/06/21  1316 03/06/21  1316      < > 135*   K 3.3*   < > 3.0*      < > 100   CO2 29   < > 29   BUN 13   < > 12   *   < > 190*   CA 8.7   < > 8.2*   MG  --   --  1.6    < > = values in this interval not displayed.          Recent Labs     03/09/21 0312   HGB 12.7   HCT 38.7   WBC 8.8            Discharge Medications:        Current Discharge Medication List             START taking these medications     Details   apixaban (ELIQUIS) 5 mg tablet Take 1 Tab by mouth every twelve (12) hours. Qty: 60 Tab, Refills: 0       aspirin delayed-release 81 mg tablet Take 1 Tab by mouth daily.   Qty: 30 Tab, Refills: 0                 CONTINUE these medications which have CHANGED     Details carvediloL (COREG) 12.5 mg tablet Take 1 Tab by mouth two (2) times daily (with meals).   Qty: 30 Tab, Refills: 0                 CONTINUE these medications which have NOT CHANGED     Details   albuterol (ProAir HFA) 90 mcg/actuation inhaler Take 2 Puffs by inhalation every four (4) hours as needed for Wheezing.       levETIRAcetam (KEPPRA) 500 mg tablet Take 2 tablets by mouth twice daily  Qty: 120 Tab, Refills: 0       glimepiride (AMARYL) 4 mg tablet TAKE 1 TABLET BY MOUTH IN THE MORNING  Qty: 90 Tab, Refills: 0       hydrALAZINE (APRESOLINE) 10 mg tablet TAKE 1 TABLET BY MOUTH THREE TIMES DAILY  Qty: 90 Tab, Refills: 0       atorvastatin (LIPITOR) 80 mg tablet TAKE 1 TABLET BY MOUTH ONCE DAILY AT NIGHT  Qty: 30 Tab, Refills: 0       amLODIPine (NORVASC) 10 mg tablet Take 1 tablet by mouth once daily for blood pressure  Qty: 90 Tab, Refills: 0       metFORMIN (GLUCOPHAGE) 500 mg tablet TAKE 1 TABLET BY MOUTH TWICE DAILY WITH MEALS  Qty: 180 Tab, Refills: 3     Associated Diagnoses: Type 2 diabetes mellitus with complication, with long-term current use of insulin (HCC)       lisinopril (PRINIVIL, ZESTRIL) 40 mg tablet TAKE ONE TABLET BY MOUTH ONCE DAILY FOR BLOOD PRESSURE  Qty: 30 Tab, Refills: 11     Comments: Please consider 90 day supplies to promote better adherence                STOP taking these medications         rivaroxaban (XARELTO) 20 mg tab tablet Comments:   Reason for Stopping:            Xarelto 20 mg tab tablet Comments:   Reason for Stopping:                  Last OV     On warfarin for carotid dissection last tear  Last inr 5.7  On 4/2019  inr 1.2 today, likely some nonadherence with coumadin per pt  Last a1c 7.2 LDL 56  amb a1c 7.9 today  No fsbs checks  Has not been compliant with diet.     Saw Dr Tarah Maradiaga recently    Patient Active Problem List    Diagnosis Date Noted    Left arm weakness 03/06/2021    Bilateral leg weakness 03/06/2021    Carcinoid tumor 02/13/2018    CKD stage 2 due to type 2 diabetes mellitus ([facility] 05/14/2017    Polyneuropathy in diabetes(357.2) 07/21/2015    Hypertensive emergency 07/20/2015    Seizure disorder ([facility] 07/20/2015    Type II or unspecified type diabetes mellitus with neurological manifestations, uncontrolled(250.62) ([facility] 07/20/2015    History of atrial fibrillation 07/20/2015    Hyperlipidemia 07/20/2015    Diabetic neuropathy ([facility] 11/21/2014    Seizures ([place] [address].) 08/08/2014    Syncope 07/13/2012    Cardiomyopathy, nonischemic ([place] [address].) 10/31/2009    DM (diabetes mellitus) ([place] [address].) 10/26/2009    HTN (hypertension), benign 10/26/2009    Pulmonary nodule 10/26/2009     Current Outpatient Medications   Medication Sig Dispense Refill    metFORMIN (GLUCOPHAGE) 500 mg tablet Take 2 Tabs by mouth two (2) times daily (with meals). 360 Tab 3    carvediloL (COREG) 12.5 mg tablet Take 1 Tab by mouth two (2) times daily (with meals). 30 Tab 0    apixaban (ELIQUIS) 5 mg tablet Take 1 Tab by mouth every twelve (12) hours. 60 Tab 0    aspirin delayed-release 81 mg tablet Take 1 Tab by mouth daily. 30 Tab 0    albuterol (ProAir HFA) 90 mcg/actuation inhaler Take 2 Puffs by inhalation every four (4) hours as needed for Wheezing.       levETIRAcetam (KEPPRA) 500 mg tablet Take 2 tablets by mouth twice daily 120 Tab 0    glimepiride (AMARYL) 4 mg tablet TAKE 1 TABLET BY MOUTH IN THE MORNING 90 Tab 0    hydrALAZINE (APRESOLINE) 10 mg tablet TAKE 1 TABLET BY MOUTH THREE TIMES DAILY 90 Tab 0    atorvastatin (LIPITOR) 80 mg tablet TAKE 1 TABLET BY MOUTH ONCE DAILY AT NIGHT 30 Tab 0    amLODIPine (NORVASC) 10 mg tablet Take 1 tablet by mouth once daily for blood pressure 90 Tab 0    lisinopril (PRINIVIL, ZESTRIL) 40 mg tablet TAKE ONE TABLET BY MOUTH ONCE DAILY FOR BLOOD PRESSURE 30 Tab 11     No Known Allergies  Social History     Tobacco Use    Smoking status: Light Tobacco Smoker     Packs/day: 0.10     Start date: 6/1/2015    Smokeless tobacco: Never Used    Tobacco comment: former cigar   Substance Use Topics    Alcohol use: Yes     Frequency: 2-3 times a week     Drinks per session: 1 or 2     Comment: social      Lab Results   Component Value Date/Time    WBC 8.8 03/09/2021 03:12 AM    HGB 12.7 03/09/2021 03:12 AM    HCT 38.7 03/09/2021 03:12 AM    PLATELET 227 14/85/5854 03:12 AM    MCV 83.6 03/09/2021 03:12 AM     Lab Results   Component Value Date/Time    Hemoglobin A1c 8.8 (H) 03/07/2021 02:18 AM    Hemoglobin A1c 6.7 (H) 02/13/2018 04:35 PM    Hemoglobin A1c 7.7 (H) 04/13/2017 03:07 PM    Glucose 173 (H) 03/09/2021 03:12 AM    Glucose (POC) 292 (H) 03/09/2021 10:48 AM    Microalb/Creat ratio (ug/mg creat.) 249.0 (H) 04/13/2017 03:07 PM    LDL, calculated 67.4 03/07/2021 02:18 AM    Creatinine 1.07 03/09/2021 03:12 AM      Lab Results   Component Value Date/Time    Cholesterol, total 116 03/07/2021 02:18 AM    Cholesterol (POC) 167 11/21/2014 09:00 AM    HDL Cholesterol 30 03/07/2021 02:18 AM    LDL, calculated 67.4 03/07/2021 02:18 AM    LDL Cholesterol (POC) 111 11/21/2014 09:00 AM    Triglyceride 93 03/07/2021 02:18 AM    Triglycerides (POC) 70 11/21/2014 09:00 AM    CHOL/HDL Ratio 3.9 03/07/2021 02:18 AM     Lab Results   Component Value Date/Time    GFR est non-AA >60 03/09/2021 03:12 AM    GFR est AA >60 03/09/2021 03:12 AM    Creatinine 1.07 03/09/2021 03:12 AM    BUN 13 03/09/2021 03:12 AM    Sodium 139 03/09/2021 03:12 AM    Potassium 3.3 (L) 03/09/2021 03:12 AM    Chloride 106 03/09/2021 03:12 AM    CO2 29 03/09/2021 03:12 AM    Magnesium 1.6 03/06/2021 01:16 PM        ROS    Physical Exam  Constitutional:       Appearance: Normal appearance. Pulmonary:      Effort: Pulmonary effort is normal.   Neurological:      Mental Status: He is alert. ASSESSMENT and PLAN  Diagnoses and all orders for this visit:    1. Type 2 diabetes mellitus with complication, with long-term current use of insulin (HCC)  -     metFORMIN (GLUCOPHAGE) 500 mg tablet;  Take 2 Tabs by mouth two (2) times daily (with meals). a1c 8.8--increase metformin 2tabs bid   Continue amaryl   fsbs checks    2. Acute CVA   Now on eliqis and aspirin per neurologist   [facility] PT/OT   Fu neurologit    3. AFib    Fu Dr Arlene Tirado    4.  HTN   bp 142/80   bp monitoring -to call if elevated with [facility]    Fu next month as scheduled

## 2021-03-22 ENCOUNTER — TELEPHONE (OUTPATIENT)
Dept: INTERNAL MEDICINE CLINIC | Age: 66
End: 2021-03-22

## 2021-03-22 RX ORDER — GLIMEPIRIDE 4 MG/1
TABLET ORAL
Qty: 90 TAB | Refills: 0 | Status: SHIPPED | OUTPATIENT
Start: 2021-03-22 | End: 2021-09-01

## 2021-03-22 RX ORDER — HYDRALAZINE HYDROCHLORIDE 10 MG/1
TABLET, FILM COATED ORAL
Qty: 90 TAB | Refills: 0 | Status: SHIPPED | OUTPATIENT
Start: 2021-03-22 | End: 2021-07-16

## 2021-03-22 RX ORDER — ATORVASTATIN CALCIUM 80 MG/1
TABLET, FILM COATED ORAL
Qty: 30 TAB | Refills: 0 | Status: SHIPPED | OUTPATIENT
Start: 2021-03-22 | End: 2021-09-01

## 2021-03-22 RX ORDER — AMLODIPINE BESYLATE 10 MG/1
TABLET ORAL
Qty: 90 TAB | Refills: 0 | Status: SHIPPED | OUTPATIENT
Start: 2021-03-22 | End: 2021-09-01

## 2021-03-22 RX ORDER — LEVETIRACETAM 500 MG/1
TABLET ORAL
Qty: 120 TAB | Refills: 0 | Status: SHIPPED | OUTPATIENT
Start: 2021-03-22 | End: 2021-05-10

## 2021-03-22 NOTE — TELEPHONE ENCOUNTER
Pt's wife came by to drop off paperwork for Dr. Matt Lopez to fill out. Pt's wife stated that pt wants the covid vaccine and wanted to talk to Dr. Matt Lopez about it. Told pt's wife I will send message to the nurse.

## 2021-03-24 ENCOUNTER — PATIENT OUTREACH (OUTPATIENT)
Dept: CASE MANAGEMENT | Age: 66
End: 2021-03-24

## 2021-03-24 NOTE — PROGRESS NOTES
Goals      Prevent complications post hospitalization. 03/10/21  CTN unable to reach patient or wife on phone, LM on wife VM, unable to LM on patient phone as mailbox full. Patient has following recommeded appts, will discuss with pt/family when reached. PCP Dr Citlaly Campbell will ask pt if this can be moved up. Dr Kelly Fernandez weeks   Dr Fernando Diaz, neuro surgery--2 weeks  Dr Alexandria Cross, neuology 2 weeks      Acute CVA involving the right posterior corona radiata/basal ganglia  Multiple chronic infarcts in the bilateral cerebral hemispheres    ASSESSMENT AND PLAN      1. Stroke  MRI right basal ganglia stroke  Echo EF 55-60%  Cerebrovascular CTA: no flow limiting stenosis  A1C 8.8   LDL 67.4   Risk factors, DM, HTN, Afib , tobacco use and cerebrovascular history     2. Left-sided weakness  PT/ OT   Mild deficits     3. Atrial fibrillation  Discontinue xarelto and switch to eliquis     4. Tobacco use  Smokes cigars -  Advised to quit     5. Essential hypertension  continue amlodipine     6. Cervical stenosis  Not central to presenting complaint  Outpatient follow up with NSGY  If elective surgery is planned its advisable to delay at least 9 months from time of stroke       CTN received call from [facility] at [facility] states that there was no orders given to them for [place], she thinks patient refused it even though it is not documented. She also said she would have to check about the follow up appts as many doctors are having their office call pt directly. CTN will discuss appts with pt or family when reached to assist with making appts and to see if they want [place] ordered. --mkrw    03/11/21  CTN was able to speak to patient briefly today--patient states he has just \"woken up\" . When CTN asked patient if he had any concerns right now, he states Maggy Sours my wife. \" Patient then disconnected call. CTN called pt's wife, LM on VM requesting return call.      CTN will follow up in 1-2 days if no call received---mkrw    03/12/21  CTN spoke to wife today to offer assistance with appointments and follow up on patient condition.  Wife states that Dr Werner Other office will contact her with a PCP appt, she will follow up with them next week if she doesn't hear anything back today.  CTN offered to assist with making specialist appts---wife states that it would be best if she calls to make them herself as she works and will have to either work around her schedule or have daughter transport---she will have to check with daughter to see when she is available. CTN asked wife to call me if she needs any assistance.  Wife reports that she has called drug [facility] to get application for eliquis financial assistance---she states they were already getting xarelto for free. She states she spoke to representative who mailed application yesterday. Wife states she fill complete it asap. CTN informed her that pt was given a card for free 30 day supply of eliquis---she states she took it to [facility] and they couldn't activate it. She states she will take it back and see if someone else can assist her at the pharmacy. In the meantime pt is still taking xarelto.  Wife states someone at the hospital called today to tell her that [facility] was ordered and to expect a call to schedule HH therapies. She did not know that name of Agency. CTN reviewed chart--sees that Adrienne Jacques sent an order to [facility]. CTN will follow up to see if they accepted. CTN will follow up again with wife next week---mkrw    03/24/21  CTN unable to reach pt or wife on phone, unable to Leave  as both mailboxes are full. Per chart review, pt attended VV with PCP on 3/19/21. CTN contacted [facility], was informed that pt is receiving SN, PT and OT--they were able to start on 3/17/21.  Patient is being seen 4 times a week by a nurse or therapist.    Salma Gant will reach back out again to pt/wife next week---rw

## 2021-03-25 ENCOUNTER — DOCUMENTATION ONLY (OUTPATIENT)
Dept: INTERNAL MEDICINE CLINIC | Age: 66
End: 2021-03-25

## 2021-03-29 ENCOUNTER — DOCUMENTATION ONLY (OUTPATIENT)
Dept: INTERNAL MEDICINE CLINIC | Age: 66
End: 2021-03-29

## 2021-03-29 NOTE — PROGRESS NOTES
Pharmacy Progress Note     Application for Mr. Vinny Frye 's Eliquis resubmitted with updated information today to [employer].       Thank you for the consult,  Shahnaz Hong, PharmD, BCACP, [facility]      CLINICAL PHARMACY CONSULT: MED RECONCILIATION/REVIEW ADDENDUM    For Pharmacy Admin Tracking Only    PHSO: PHSO Patient?: Yes  Total # of Interventions Recommended: Count: 1

## 2021-04-02 ENCOUNTER — PATIENT OUTREACH (OUTPATIENT)
Dept: CASE MANAGEMENT | Age: 66
End: 2021-04-02

## 2021-04-02 NOTE — PROGRESS NOTES
Goals      Prevent complications post hospitalization. 03/10/21  CTN unable to reach patient or wife on phone, LM on wife VM, unable to LM on patient phone as mailbox full. Patient has following recommeded appts, will discuss with pt/family when reached. PCP Dr Tanesha Silverio will ask pt if this can be moved up. Dr Rafaela Dey, neuro surgery--2 weeks  Dr Alesia Gosselin, neuology 2 weeks      Acute CVA involving the right posterior corona radiata/basal ganglia  Multiple chronic infarcts in the bilateral cerebral hemispheres    ASSESSMENT AND PLAN      1. Stroke  MRI right basal ganglia stroke  Echo EF 55-60%  Cerebrovascular CTA: no flow limiting stenosis  A1C 8.8   LDL 67.4   Risk factors, DM, HTN, Afib , tobacco use and cerebrovascular history     2. Left-sided weakness  PT/ OT   Mild deficits     3. Atrial fibrillation  Discontinue xarelto and switch to eliquis     4. Tobacco use  Smokes cigars -  Advised to quit     5. Essential hypertension  continue amlodipine     6. Cervical stenosis  Not central to presenting complaint  Outpatient follow up with NSGY  If elective surgery is planned its advisable to delay at least 9 months from time of stroke       CTN received call from [address] [place] [address] at [facility] states that there was no orders given to them for [facility], she thinks patient refused it even though it is not documented. She also said she would have to check about the follow up appts as many doctors are having their office call pt directly. CTN will discuss appts with pt or family when reached to assist with making appts and to see if they want [facility] ordered. --mkrw    03/11/21  CTN was able to speak to patient briefly today--patient states he has just \"woken up\" . When CTN asked patient if he had any concerns right now, he states Jameel Filter my wife. \" Patient then disconnected call. CTN called pt's wife, LM on VM requesting return call.      CTN will follow up in 1-2 days if no call received---rw    03/12/21  CTN spoke to wife today to offer assistance with appointments and follow up on patient condition.  Wife states that Dr Roni Pham office will contact her with a PCP appt, she will follow up with them next week if she doesn't hear anything back today.  CTN offered to assist with making specialist appts---wife states that it would be best if she calls to make them herself as she works and will have to either work around her schedule or have daughter transport---she will have to check with daughter to see when she is available. CTN asked wife to call me if she needs any assistance.  Wife reports that she has called drug [facility] to get application for eliquis financial assistance---she states they were already getting xarelto for free. She states she spoke to representative who mailed application yesterday. Wife states she fill complete it asap. CTN informed her that pt was given a card for free 30 day supply of eliquis---she states she took it to [facility] and they couldn't activate it. She states she will take it back and see if someone else can assist her at the pharmacy. In the meantime pt is still taking xarelto.  Wife states someone at the hospital called today to tell her that [facility] was ordered and to expect a call to schedule HH therapies. She did not know that name of Agency. CTN reviewed chart--sees that Adrienne Jacques sent an order to [facility]. CTN will follow up to see if they accepted. CTN will follow up again with wife next week---rw    03/24/21  CTN unable to reach pt or wife on phone, unable to Leave VM as both mailboxes are full. Per chart review, pt attended VV with PCP on 3/19/21. CTN contacted [facility], was informed that pt is receiving SN, PT and OT--they were able to start on 3/17/21.  Patient is being seen 4 times a week by a nurse or therapist.    Shell Bey will reach back out again to pt/wife next week---rw    04/02/21  CTN spoke to patient today for updates. Patient reports that he has not had any new or worsening CVA symptoms since discharge. He denies weakness, headaches, vision changes, difficulty swallowing or breathing. He has left sided residual weakness from previous stroke--no worsening weakness. He reports he is up walking in the yard at time of call. Patient reports that he attended appt with Dr Kaycee Monae neuro surgery last Friday. He states \"she wants to cut on me again, but me and  my wife have to talk about it. I'm not sure I want to do that again. \"    Patient has follow up with neurology Dr Reyes Flynn scheduled for 4/23/21. CTN called Dr Nelson Kraus office as pt was not sure if he had an appt made yet. Per office, Patient sees Dr Bird Gilbert regularly and has an appt on 4/30 21 at 2:30. Per chart review, [facility] Pharmacist has completed and sent in pt's financial assistance application for Eliquis on 3/29/21. CT reviewed with patient lifestyle changes to keep stroke risk down to include low salt low sugar diet, exercise, no drinking alcohol or smoking and taking his medications. Pt states he is trying, some days it is easier to do than others. CTN encouraged him to continuing trying his best as this will help him in the long run to remain out of the hospital.  Pt verbalizes understanding. Per patient, he is still receiving [place] therapy. CTN will follow up next week for updates. ---parveen

## 2021-04-09 ENCOUNTER — PATIENT OUTREACH (OUTPATIENT)
Dept: CASE MANAGEMENT | Age: 66
End: 2021-04-09

## 2021-04-09 NOTE — PROGRESS NOTES
Patient has graduated from the Transitions of Care Coordination  program on 4/9/21. Patient/family has the ability to self-manage at this time Care management goals have been completed. Patient was not referred to the [facility] team for further management. Goals Addressed                 This Visit's Progress     COMPLETED: Prevent complications post hospitalization. 03/10/21  CTN unable to reach patient or wife on phone, LM on wife VM, unable to LM on patient phone as mailbox full. Patient has following recommeded appts, will discuss with pt/family when reached. PCP Dr Lawyer Figueroa will ask pt if this can be moved up. Dr Ariel Yan weeks   Dr Junaid Campbell, neuro surgery--2 weeks  Dr Thai Pickett, neuology 2 weeks      Acute CVA involving the right posterior corona radiata/basal ganglia  Multiple chronic infarcts in the bilateral cerebral hemispheres    ASSESSMENT AND PLAN      1. Stroke  MRI right basal ganglia stroke  Echo EF 55-60%  Cerebrovascular CTA: no flow limiting stenosis  A1C 8.8   LDL 67.4   Risk factors, DM, HTN, Afib , tobacco use and cerebrovascular history     2. Left-sided weakness  PT/ OT   Mild deficits     3. Atrial fibrillation  Discontinue xarelto and switch to eliquis     4. Tobacco use  Smokes cigars -  Advised to quit     5. Essential hypertension  continue amlodipine     6. Cervical stenosis  Not central to presenting complaint  Outpatient follow up with NSGY  If elective surgery is planned its advisable to delay at least 9 months from time of stroke       CTN received call from [facility] at [facility] states that there was no orders given to them for [facility], she thinks patient refused it even though it is not documented. She also said she would have to check about the follow up appts as many doctors are having their office call pt directly.   CTN will discuss appts with pt or family when reached to assist with making appts and to see if they want [facility] ordered. --mkrw    03/11/21  CTN was able to speak to patient briefly today--patient states he has just \"woken up\" . When CTN asked patient if he had any concerns right now, he states Marcela Motley my wife. \" Patient then disconnected call. CTN called pt's wife, KALLI on VM requesting return call. CTN will follow up in 1-2 days if no call received---mkrw    03/12/21  CTN spoke to wife today to offer assistance with appointments and follow up on patient condition.  Wife states that Dr Senait Castro office will contact her with a PCP appt, she will follow up with them next week if she doesn't hear anything back today.  CTN offered to assist with making specialist appts---wife states that it would be best if she calls to make them herself as she works and will have to either work around her schedule or have daughter transport---she will have to check with daughter to see when she is available. CTN asked wife to call me if she needs any assistance.  Wife reports that she has called drug company to get application for eliquis financial assistance---she states they were already getting xarelto for free. She states she spoke to representative who mailed application yesterday. Wife states she fill complete it asap. CTN informed her that pt was given a card for free 30 day supply of eliquis---she states she took it to [facility] and they couldn't activate it. She states she will take it back and see if someone else can assist her at the pharmacy. In the meantime pt is still taking xarelto.  Wife states someone at the hospital called today to tell her that [place] was ordered and to expect a call to schedule HH therapies. She did not know that name of Agency. CTN reviewed chart--sees that Adrienne Jacques sent an order to [facility]. CTN will follow up to see if they accepted. CTN will follow up again with wife next week---mkrw    03/24/21  CTN unable to reach pt or wife on phone, unable to Leave  as both mailboxes are full.     Per chart review, pt attended VV with PCP on 3/19/21. CTN contacted [facility], was informed that pt is receiving SN, PT and OT--they were able to start on 3/17/21. Patient is being seen 4 times a week by a nurse or therapist.    Joe Alston will reach back out again to pt/wife next week---mkrw    04/02/21  CTN spoke to patient today for updates. Patient reports that he has not had any new or worsening CVA symptoms since discharge. He denies weakness, headaches, vision changes, difficulty swallowing or breathing. He has left sided residual weakness from previous stroke--no worsening weakness. He reports he is up walking in the yard at time of call. Patient reports that he attended appt with Dr Leidy Marion neuro surgery last Friday. He states \"she wants to cut on me again, but me and  my wife have to talk about it. I'm not sure I want to do that again. \"    Patient has follow up with neurology Dr Tete Samuel scheduled for 4/23/21. CTN called Dr Reilly Wick office as pt was not sure if he had an appt made yet. Per office, Patient sees Dr Edison Aquino regularly and has an appt on 4/30 21 at 2:30. Per chart review, [facility] Pharmacist has completed and sent in pt's financial assistance application for Eliquis on 3/29/21. CT reviewed with patient lifestyle changes to keep stroke risk down to include low salt low sugar diet, exercise, no drinking alcohol or smoking and taking his medications. Pt states he is trying, some days it is easier to do than others. CTN encouraged him to continuing trying his best as this will help him in the long run to remain out of the hospital.  Pt verbalizes understanding. Per patient, he is still receiving [facility] therapy. CTN will follow up next week for updates. ---mkrw    04/09/21  CTN unable to reach  pt on phone. Unable to LM.     Chart reviewed, pt has had no readmission during 30 day DOMINIK---rw            Patient has Care Transition Nurse's contact information for any further questions, concerns, or needs.   Patients upcoming visits:    Future Appointments   Date Time Provider Bj Todd   4/23/2021 10:00 AM MD ADAMS Gibbons BS AMB   4/30/2021 10:30 AM Sejal Squires MD [facility] BS AMB

## 2021-04-22 NOTE — TELEPHONE ENCOUNTER
Future Appointments:  Future Appointments   Date Time Provider Bj Todd   4/23/2021 10:00 AM MD ADAMS Hollingsworth BS AMB   4/30/2021 10:30 AM Jimmie Tai MD [facility] BS AMB        Last Appointment With Me:  3/19/2021     Requested Prescriptions     Pending Prescriptions Disp Refills    apixaban (ELIQUIS) 5 mg tablet 60 Tab 0     Sig: Take 1 Tab by mouth every twelve (12) hours.

## 2021-04-23 ENCOUNTER — OFFICE VISIT (OUTPATIENT)
Dept: NEUROLOGY | Age: 66
End: 2021-04-23
Payer: MEDICARE

## 2021-04-23 VITALS
WEIGHT: 231.6 LBS | HEART RATE: 91 BPM | OXYGEN SATURATION: 97 % | BODY MASS INDEX: 30.69 KG/M2 | RESPIRATION RATE: 16 BRPM | SYSTOLIC BLOOD PRESSURE: 122 MMHG | HEIGHT: 73 IN | TEMPERATURE: 97.3 F | DIASTOLIC BLOOD PRESSURE: 86 MMHG

## 2021-04-23 DIAGNOSIS — I10 HTN (HYPERTENSION), BENIGN: ICD-10-CM

## 2021-04-23 DIAGNOSIS — I63.311 CEREBROVASCULAR ACCIDENT (CVA) DUE TO THROMBOSIS OF RIGHT MIDDLE CEREBRAL ARTERY (HCC): ICD-10-CM

## 2021-04-23 DIAGNOSIS — E78.2 MIXED HYPERLIPIDEMIA: ICD-10-CM

## 2021-04-23 DIAGNOSIS — I48.21 PERMANENT ATRIAL FIBRILLATION (HCC): Primary | ICD-10-CM

## 2021-04-23 DIAGNOSIS — G40.909 SEIZURE DISORDER (HCC): ICD-10-CM

## 2021-04-23 PROCEDURE — G8432 DEP SCR NOT DOC, RNG: HCPCS | Performed by: PSYCHIATRY & NEUROLOGY

## 2021-04-23 PROCEDURE — 3017F COLORECTAL CA SCREEN DOC REV: CPT | Performed by: PSYCHIATRY & NEUROLOGY

## 2021-04-23 PROCEDURE — G8417 CALC BMI ABV UP PARAM F/U: HCPCS | Performed by: PSYCHIATRY & NEUROLOGY

## 2021-04-23 PROCEDURE — G8754 DIAS BP LESS 90: HCPCS | Performed by: PSYCHIATRY & NEUROLOGY

## 2021-04-23 PROCEDURE — 99214 OFFICE O/P EST MOD 30 MIN: CPT | Performed by: PSYCHIATRY & NEUROLOGY

## 2021-04-23 PROCEDURE — G8428 CUR MEDS NOT DOCUMENT: HCPCS | Performed by: PSYCHIATRY & NEUROLOGY

## 2021-04-23 PROCEDURE — G8752 SYS BP LESS 140: HCPCS | Performed by: PSYCHIATRY & NEUROLOGY

## 2021-04-23 NOTE — PROGRESS NOTES
Chief Complaint   Patient presents with    Follow-up     follow up on stroke which he states that he has had 5 and last one was about 3 -4 weeks ago.    went to [facility].

## 2021-04-23 NOTE — PROGRESS NOTES
Follow-up Visit    Name Donna Sam Age 63 y.o. MRN [id number]  1955       Chief Complaint: stroke    Patent comes for a follow up visit. He was discharged from the hosptial after have a right basal ganglia stroke. He risk factors including afib, htn, essential hypertension. He is now on eliquis after being on coumadin. He walks with a cane. Complains he is short of breath with dark stool. Assesment and Plan  1. Stroke  Discussed risk factors HTN DM and afib  Continue xarelto and aspirin    2. HTN  Continue amlodpiine      3. AFib  Continue xarelto    4. Atovastain  Continue atorvastatin  Allergies  Patient has no known allergies. Medications  Current Outpatient Medications   Medication Sig    apixaban (ELIQUIS) 5 mg tablet Take 1 Tab by mouth every twelve (12) hours.  atorvastatin (LIPITOR) 80 mg tablet TAKE 1 TABLET BY MOUTH ONCE DAILY AT NIGHT    glimepiride (AMARYL) 4 mg tablet TAKE 1 TABLET BY MOUTH IN THE MORNING    hydrALAZINE (APRESOLINE) 10 mg tablet TAKE 1 TABLET BY MOUTH THREE TIMES DAILY    levETIRAcetam (KEPPRA) 500 mg tablet Take 2 tablets by mouth twice daily    amLODIPine (NORVASC) 10 mg tablet Take 1 tablet by mouth once daily for blood pressure    metFORMIN (GLUCOPHAGE) 500 mg tablet Take 2 Tabs by mouth two (2) times daily (with meals).  carvediloL (COREG) 12.5 mg tablet Take 1 Tab by mouth two (2) times daily (with meals).  aspirin delayed-release 81 mg tablet Take 1 Tab by mouth daily.  albuterol (ProAir HFA) 90 mcg/actuation inhaler Take 2 Puffs by inhalation every four (4) hours as needed for Wheezing.  lisinopril (PRINIVIL, ZESTRIL) 40 mg tablet TAKE ONE TABLET BY MOUTH ONCE DAILY FOR BLOOD PRESSURE     No current facility-administered medications for this visit.          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 Respiratory: Positive for shortness of breath. Negative for cough. Cardiovascular: Negative for chest pain, palpitations and orthopnea. Gastrointestinal: Negative for nausea and vomiting. Neurological: Negative for dizziness and headaches. Psychiatric/Behavioral: Negative for depression. The patient is not nervous/anxious. Exam:  Visit Vitals  /86 (BP 1 Location: Left upper arm, BP Patient Position: Sitting, BP Cuff Size: Adult)   Pulse 91   Temp 97.3 °F (36.3 °C) (Temporal)   Resp 16   Ht 6' 1\" (1.854 m)   Wt 231 lb 9.6 oz (105.1 kg)   SpO2 97%   BMI 30.56 kg/m²        General: Well developed, well nourished. Patient in no apparent distress   Head: Normocephalic, atraumatic, anicteric sclera   Neck Normal ROM, No thyromegally   Lungs:  Clear to auscultation    Cardiac: Regular rate and rhythm with no murmurs. Abd: Bowel sounds were audible. Ext: No pedal edema   Skin: Supple no rash     NeurologicExam:  Mental Status: Alert and oriented to person place and time   Speech: Fluent no aphasia or dysarthria. Cranial Nerves:  II - XII Intact   Motor:  Full and symmetric strength of upper and lower extremities. Normal bulk and tone. Reflexes:   Deep tendon reflexes 1+ and symmetric. Sensory:   Symmetric and intact with no perceived deficits . Gait:  Slow walks with cage   Tremor:   No tremor noted. Cerebellar:  Coordination intact. Neurovascular: No carotid bruits.  No JVD          Lab Review  Lab Results   Component Value Date/Time    WBC 8.8 03/09/2021 03:12 AM    HCT 38.7 03/09/2021 03:12 AM    HGB 12.7 03/09/2021 03:12 AM    PLATELET 834 67/10/4480 03:12 AM Lab Results   Component Value Date/Time    Sodium 139 03/09/2021 03:12 AM    Potassium 3.3 (L) 03/09/2021 03:12 AM    Chloride 106 03/09/2021 03:12 AM    CO2 29 03/09/2021 03:12 AM    Glucose 173 (H) 03/09/2021 03:12 AM    BUN 13 03/09/2021 03:12 AM    Creatinine 1.07 03/09/2021 03:12 AM    Calcium 8.7 03/09/2021 03:12 AM         Lab Results   Component Value Date/Time    Hemoglobin A1c 8.8 (H) 03/07/2021 02:18 AM    Hemoglobin A1c (POC) 7.9 (A) 10/21/2019 03:00 PM        Lab Results   Component Value Date/Time    Folate 13.6 07/22/2015 02:27 AM       No results found for: Moni TIWARI XBANA    Lab Results   Component Value Date/Time    Cholesterol, total 116 03/07/2021 02:18 AM    Cholesterol (POC) 167 11/21/2014 09:00 AM    HDL Cholesterol 30 03/07/2021 02:18 AM    HDL Cholesterol (POC) 43 11/21/2014 09:00 AM    LDL Cholesterol (POC) 111 11/21/2014 09:00 AM    LDL, calculated 67.4 03/07/2021 02:18 AM    VLDL, calculated 18.6 03/07/2021 02:18 AM    Triglyceride 93 03/07/2021 02:18 AM    Triglycerides (POC) 70 11/21/2014 09:00 AM    CHOL/HDL Ratio 3.9 03/07/2021 02:18 AM

## 2021-04-30 ENCOUNTER — VIRTUAL VISIT (OUTPATIENT)
Dept: INTERNAL MEDICINE CLINIC | Age: 66
End: 2021-04-30
Payer: MEDICARE

## 2021-04-30 DIAGNOSIS — I48.20 CHRONIC A-FIB (HCC): ICD-10-CM

## 2021-04-30 DIAGNOSIS — M48.02 CERVICAL SPINAL STENOSIS: Primary | ICD-10-CM

## 2021-04-30 DIAGNOSIS — I10 ESSENTIAL HYPERTENSION: ICD-10-CM

## 2021-04-30 DIAGNOSIS — I42.8 CARDIOMYOPATHY, NONISCHEMIC (HCC): ICD-10-CM

## 2021-04-30 DIAGNOSIS — E78.00 PURE HYPERCHOLESTEROLEMIA: ICD-10-CM

## 2021-04-30 DIAGNOSIS — Z00.00 MEDICARE ANNUAL WELLNESS VISIT, SUBSEQUENT: ICD-10-CM

## 2021-04-30 DIAGNOSIS — Z12.5 PROSTATE CANCER SCREENING: ICD-10-CM

## 2021-04-30 DIAGNOSIS — E11.69 TYPE 2 DIABETES MELLITUS WITH OTHER SPECIFIED COMPLICATION, WITHOUT LONG-TERM CURRENT USE OF INSULIN (HCC): ICD-10-CM

## 2021-04-30 PROCEDURE — G8510 SCR DEP NEG, NO PLAN REQD: HCPCS | Performed by: INTERNAL MEDICINE

## 2021-04-30 PROCEDURE — G8756 NO BP MEASURE DOC: HCPCS | Performed by: INTERNAL MEDICINE

## 2021-04-30 PROCEDURE — 3017F COLORECTAL CA SCREEN DOC REV: CPT | Performed by: INTERNAL MEDICINE

## 2021-04-30 PROCEDURE — 99443 PR PHYS/QHP TELEPHONE EVALUATION 21-30 MIN: CPT | Performed by: INTERNAL MEDICINE

## 2021-04-30 PROCEDURE — 2022F DILAT RTA XM EVC RTNOPTHY: CPT | Performed by: INTERNAL MEDICINE

## 2021-04-30 PROCEDURE — G8427 DOCREV CUR MEDS BY ELIG CLIN: HCPCS | Performed by: INTERNAL MEDICINE

## 2021-04-30 PROCEDURE — G0439 PPPS, SUBSEQ VISIT: HCPCS | Performed by: INTERNAL MEDICINE

## 2021-04-30 PROCEDURE — G8417 CALC BMI ABV UP PARAM F/U: HCPCS | Performed by: INTERNAL MEDICINE

## 2021-04-30 RX ORDER — CARVEDILOL 12.5 MG/1
12.5 TABLET ORAL 2 TIMES DAILY WITH MEALS
Qty: 180 TAB | Refills: 3 | Status: SHIPPED | OUTPATIENT
Start: 2021-04-30 | End: 2022-06-06

## 2021-04-30 NOTE — PATIENT INSTRUCTIONS
This is an established visit conducted via telemedicine. The patient has been instructed that this meets HIPAA criteria and acknowledges and agrees to this method of visitation. Miranda Haile LPN  59/68/23  12:98 AM  Medicare Wellness Visit, Male    The best way to live healthy is to have a lifestyle where you eat a well-balanced diet, exercise regularly, limit alcohol use, and quit all forms of tobacco/nicotine, if applicable. Regular preventive services are another way to keep healthy. Preventive services (vaccines, screening tests, monitoring & exams) can help personalize your care plan, which helps you manage your own care. Screening tests can find health problems at the earliest stages, when they are easiest to treat. [facility] follows the current, evidence-based guidelines published by the [facility] States Antonio Leigh ([facility]) when recommending preventive services for our patients. Because we follow these guidelines, sometimes recommendations change over time as research supports it. (For example, a prostate screening blood test is no longer routinely recommended for men with no symptoms). Of course, you and your doctor may decide to screen more often for some diseases, based on your risk and co-morbidities (chronic disease you are already diagnosed with). Preventive services for you include:  - Medicare offers their members a free annual wellness visit, which is time for you and your primary care provider to discuss and plan for your preventive service needs. Take advantage of this benefit every year!  -All adults over age 72 should receive the recommended pneumonia vaccines. Current USPSTF guidelines recommend a series of two vaccines for the best pneumonia protection.   -All adults should have a flu vaccine yearly and tetanus vaccine every 10 years.  -All adults age 48 and older should receive the shingles vaccines (series of two vaccines).        -All adults age 38-68 who are overweight should have a diabetes screening test once every three years.   -Other screening tests & preventive services for persons with diabetes include: an eye exam to screen for diabetic retinopathy, a kidney function test, a foot exam, and stricter control over your cholesterol.   -Cardiovascular screening for adults with routine risk involves an electrocardiogram (ECG) at intervals determined by the provider.   -Colorectal cancer screening should be done for adults age 54-65 with no increased risk factors for colorectal cancer. There are a number of acceptable methods of screening for this type of cancer. Each test has its own benefits and drawbacks. Discuss with your provider what is most appropriate for you during your annual wellness visit. The different tests include: colonoscopy (considered the best screening method), a fecal occult blood test, a fecal DNA test, and sigmoidoscopy.  -All adults born between [facility] should be screened once for Hepatitis C.  -An Abdominal Aortic Aneurysm (AAA) Screening is recommended for men age 73-68 who has ever smoked in their lifetime.      Here is a list of your current Health Maintenance items (your personalized list of preventive services) with a due date:  Health Maintenance Due   Topic Date Due    DTaP/Tdap/Td  (1 - Tdap) Never done    Shingles Vaccine (1 of 2) Never done    Albumin Urine Test  09/08/2018    Diabetic Foot Care  02/13/2019    Eye Exam  11/16/2020

## 2021-04-30 NOTE — PROGRESS NOTES
HISTORY OF PRESENT ILLNESS  Vinny Frye is a 63 y.o. male. HPI   Vinny Frye, who was evaluated through a synchronous (real-time) audio only encounter, and/or his healthcare decision maker, is aware that it is a billable service, with coverage as determined by his insurance carrier. He provided verbal consent to proceed: Yes, and patient identification was verified. This visit was conducted pursuant to the emergency declaration under the [facility], [address] [place] [address] authority and the [employer] Act. A caregiver was present when appropriate. Ability to conduct physical exam was limited. The patient was located in a state where the provider was credentialed to provide care. --Eduin Morales MD on 4/30/2021 at 10:47 AM                VV via telephone encounter x 21 minutes  An electronic signature was used to authenticate this note.   Fu and medicare wellness-----  Hx  dm-2 and MARIA TERESA, chronic afib, hx lung carcinoid  hx cva's HTN HLD NICM ( nl EF 2021) -here with wife  Last a1c 8.8 in hospital -metformin increaseed to 2 bid  No fsbs checks recently  Had left weakness from right BG and corona radiata CVA last month and severe cervical spine stenosis and cervical cord compression-see by Nsx Dr Linda Garcia was advised outpt fu -had appt last month---surgery not recommended at this time  [facility] with a cane prn , left side weakness has improved to baseline  Aspirin was added to eliquis per neurologist  Home BP--130/88  Has been out of coreg x weeks  Sees Dr Kristie Archibald later today for FU    fsbs  Last OV        getting [facility] PT OT and nursing            Patient Active Problem List    Diagnosis Date Noted    Left arm weakness 03/06/2021    Bilateral leg weakness 03/06/2021    Carcinoid tumor 02/13/2018    CKD stage 2 due to type 2 diabetes mellitus ([address] 05/14/2017    Polyneuropathy in diabetes(357.2) 07/21/2015    Hypertensive emergency 07/20/2015    Seizure disorder ([facility] 07/20/2015    Type II or unspecified type diabetes mellitus with neurological manifestations, uncontrolled(250.62) ([place] [address] 07/20/2015    History of atrial fibrillation 07/20/2015    Hyperlipidemia 07/20/2015    Diabetic neuropathy ([facility] 11/21/2014    Seizures ([place] [address] 08/08/2014    Syncope 07/13/2012    Cardiomyopathy, nonischemic ([place] [address] 10/31/2009    HTN (hypertension), benign 10/26/2009    Pulmonary nodule 10/26/2009     Current Outpatient Medications   Medication Sig Dispense Refill    apixaban (Eliquis) 5 mg tablet Take 5 mg by mouth two (2) times a day.  atorvastatin (LIPITOR) 80 mg tablet TAKE 1 TABLET BY MOUTH ONCE DAILY AT NIGHT 30 Tab 0    glimepiride (AMARYL) 4 mg tablet TAKE 1 TABLET BY MOUTH IN THE MORNING 90 Tab 0    hydrALAZINE (APRESOLINE) 10 mg tablet TAKE 1 TABLET BY MOUTH THREE TIMES DAILY 90 Tab 0    levETIRAcetam (KEPPRA) 500 mg tablet Take 2 tablets by mouth twice daily 120 Tab 0    amLODIPine (NORVASC) 10 mg tablet Take 1 tablet by mouth once daily for blood pressure 90 Tab 0    metFORMIN (GLUCOPHAGE) 500 mg tablet Take 2 Tabs by mouth two (2) times daily (with meals). 360 Tab 3    carvediloL (COREG) 12.5 mg tablet Take 1 Tab by mouth two (2) times daily (with meals). 30 Tab 0    aspirin delayed-release 81 mg tablet Take 1 Tab by mouth daily. 30 Tab 0    albuterol (ProAir HFA) 90 mcg/actuation inhaler Take 2 Puffs by inhalation every four (4) hours as needed for Wheezing.       lisinopril (PRINIVIL, ZESTRIL) 40 mg tablet TAKE ONE TABLET BY MOUTH ONCE DAILY FOR BLOOD PRESSURE 30 Tab 11     No Known Allergies   Lab Results   Component Value Date/Time    WBC 6.1 04/23/2021 11:01 AM    HGB 14.0 04/23/2021 11:01 AM    HCT 43.3 04/23/2021 11:01 AM    PLATELET 030 14/68/6048 11:01 AM    MCV 84.4 04/23/2021 11:01 AM     Lab Results   Component Value Date/Time    Hemoglobin A1c 8.8 (H) 03/07/2021 02:18 AM    Hemoglobin A1c 6.7 (H) 02/13/2018 04:35 PM    Hemoglobin A1c 7.7 (H) 04/13/2017 03:07 PM    Glucose 173 (H) 03/09/2021 03:12 AM    Glucose (POC) 292 (H) 03/09/2021 10:48 AM    Microalb/Creat ratio (ug/mg creat.) 249.0 (H) 04/13/2017 03:07 PM    LDL, calculated 67.4 03/07/2021 02:18 AM    Creatinine 1.07 03/09/2021 03:12 AM      Lab Results   Component Value Date/Time    Cholesterol, total 116 03/07/2021 02:18 AM    Cholesterol (POC) 167 11/21/2014 09:00 AM    HDL Cholesterol 30 03/07/2021 02:18 AM    LDL, calculated 67.4 03/07/2021 02:18 AM    LDL Cholesterol (POC) 111 11/21/2014 09:00 AM    Triglyceride 93 03/07/2021 02:18 AM    Triglycerides (POC) 70 11/21/2014 09:00 AM    CHOL/HDL Ratio 3.9 03/07/2021 02:18 AM     Lab Results   Component Value Date/Time    GFR est non-AA >60 03/09/2021 03:12 AM    GFR est AA >60 03/09/2021 03:12 AM    Creatinine 1.07 03/09/2021 03:12 AM    BUN 13 03/09/2021 03:12 AM    Sodium 139 03/09/2021 03:12 AM    Potassium 3.3 (L) 03/09/2021 03:12 AM    Chloride 106 03/09/2021 03:12 AM    CO2 29 03/09/2021 03:12 AM    Magnesium 1.6 03/06/2021 01:16 PM        ROS    Physical Exam    ASSESSMENT and PLAN  Diagnoses and all orders for this visit:    1. Cervical spinal stenosis    2. Type 2 diabetes mellitus with other specified complication, without long-term current use of insulin (HCC)  -     MICROALBUMIN, UR, RAND W/ MICROALB/CREAT RATIO; Future    3. Essential hypertension    4. Pure hypercholesterolemia    5. Chronic a-fib (HCC)    6. Cardiomyopathy, nonischemic ([address]    7. Prostate cancer screening  -     PSA SCREENING (SCREENING); Future    Other orders  -     carvediloL (COREG) 12.5 mg tablet; Take 1 Tab by mouth two (2) times daily (with meals). -     HEMOGLOBIN A1C WITH EAG;  Future           This is the Subsequent Medicare Annual Wellness Exam, performed 12 months or more after the Initial AWV or the last Subsequent AWV    I have reviewed the patient's medical history in detail and updated the computerized patient record. Assessment/Plan   Education and counseling provided:  Are appropriate based on today's review and evaluation  End-of-Life planning (with patient's consent)  Prostate cancer screening tests (PSA, covered annually)  tdap and shingrix recommended    1. Cervical spinal stenosis   No surgery at this time  2. Type 2 diabetes mellitus with other specified complication, without long-term current use of insulin (HCC)  -     MICROALBUMIN, UR, RAND W/ MICROALB/CREAT RATIO; Future   a1c in June ordered  3. Essential hypertension   bp monitoring  4. Pure hypercholesterolemia   LDL at goal  5. Chronic a-fib ([facility]   On eliquis and aspirin   Yordan Mckeon MD today  6. Cardiomyopathy, nonischemic ([facility]   compensated  7. Prostate cancer screening  -     PSA SCREENING (SCREENING); Future     8. S/p CVA   Control risk factors   On aspirin and eliquis  Depression Risk Factor Screening     3 most recent PHQ Screens 4/30/2021   Little interest or pleasure in doing things Not at all   Feeling down, depressed, irritable, or hopeless Not at all   Total Score PHQ 2 0       Alcohol Risk Screen    Do you average more than 2 drinks per night or 14 drinks a week: No    On any one occasion in the past three months have you have had more than 4 drinks containing alcohol:  No        Functional Ability and Level of Safety    Hearing: Hearing is good. Activities of Daily Living: The home contains: grab bars  Patient needs help with:  transportation, shopping, preparing meals, laundry, housework, managing medications and managing money      Ambulation: with difficulty, uses a cane     Fall Risk:  No flowsheet data found.    Abuse Screen:  Patient is not abused       Cognitive Screening    Has your family/caregiver stated any concerns about your memory: yes - some forgetfullness     Cognitive Screening: Normal - serial 3    Health Maintenance Due     Health Maintenance Due   Topic Date Due    DTaP/Tdap/Td series (1 - Tdap) Never done    Shingrix Vaccine Age 50> (1 of 2) Never done    MICROALBUMIN Q1  09/08/2018    Foot Exam Q1  02/13/2019    Medicare Yearly Exam  06/22/2019    Eye Exam Retinal or Dilated  11/16/2020       Patient Care Team   Patient Care Team:  Jennifer Agrawal MD as PCP - Krishna Schreiber MD as PCP - [facility]  Ronald Alcazar MD (Neurology)  Shayne Simon MD (Thoracic Surgery)  Lorie Her MD (Neurology)  Michael Yeager MD (Neurosurgery)  Mary Gaines MD (Cardiology)    History     Patient Active Problem List   Diagnosis Code    HTN (hypertension), benign I10    Pulmonary nodule R91.1    Cardiomyopathy, nonischemic ([address] I42.8    Syncope R55    Seizures ([address] R56.9    Diabetic neuropathy ([address] E11.40    Hypertensive emergency I16.1    Seizure disorder ([address] G40.909    Type II or unspecified type diabetes mellitus with neurological manifestations, uncontrolled(250.62) ([address] E11.49    History of atrial fibrillation Z86.79    Hyperlipidemia E78.5    Polyneuropathy in diabetes(357.2) E11.42    CKD stage 2 due to type 2 diabetes mellitus ([address] E11.22, N18.2    Carcinoid tumor D3A.00    Left arm weakness R29.898    Bilateral leg weakness R29.898     Past Medical History:   Diagnosis Date    Atrial fibrillation ([address]     Cancer ([address] 12/2015    left lung; 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       atorvastatin (LIPITOR) 80 mg tablet TAKE 1 TABLET BY MOUTH ONCE DAILY AT NIGHT 30 Tab 0    glimepiride (AMARYL) 4 mg tablet TAKE 1 TABLET BY MOUTH IN THE MORNING 90 Tab 0    hydrALAZINE (APRESOLINE) 10 mg tablet TAKE 1 TABLET BY MOUTH THREE TIMES DAILY 90 Tab 0    levETIRAcetam (KEPPRA) 500 mg tablet Take 2 tablets by mouth twice daily 120 Tab 0    amLODIPine (NORVASC) 10 mg tablet Take 1 tablet by mouth once daily for blood pressure 90 Tab 0    metFORMIN (GLUCOPHAGE) 500 mg tablet Take 2 Tabs by mouth two (2) times daily (with meals). 360 Tab 3    carvediloL (COREG) 12.5 mg tablet Take 1 Tab by mouth two (2) times daily (with meals). 30 Tab 0    aspirin delayed-release 81 mg tablet Take 1 Tab by mouth daily. 30 Tab 0    albuterol (ProAir HFA) 90 mcg/actuation inhaler Take 2 Puffs by inhalation every four (4) hours as needed for Wheezing.       lisinopril (PRINIVIL, ZESTRIL) 40 mg tablet TAKE ONE TABLET BY MOUTH ONCE DAILY FOR BLOOD PRESSURE 30 Tab 11     No Known Allergies    Family History   Problem Relation Age of Onset    Stroke Mother     Cancer Father      Social History     Tobacco Use    Smoking status: Light Tobacco Smoker     Packs/day: 0.10     Start date: 6/1/2015    Smokeless tobacco: Never Used    Tobacco comment: former cigar   Substance Use Topics    Alcohol use: Yes     Frequency: 2-3 times a week     Drinks per session: 1 or 2     Binge frequency: Never     Comment: social Manuel Hickey MD

## 2021-10-08 ENCOUNTER — CLINICAL SUPPORT (OUTPATIENT)
Dept: INTERNAL MEDICINE CLINIC | Age: 66
End: 2021-10-08
Payer: MEDICARE

## 2021-10-08 DIAGNOSIS — L60.2 OVERGROWN TOENAILS: ICD-10-CM

## 2021-10-08 DIAGNOSIS — Z23 NEEDS FLU SHOT: Primary | ICD-10-CM

## 2021-10-08 PROCEDURE — 90686 IIV4 VACC NO PRSV 0.5 ML IM: CPT | Performed by: INTERNAL MEDICINE

## 2021-10-08 NOTE — PROGRESS NOTES
Donalda Curling is a 61 y.o. male who presents for routine immunizations. He denies any symptoms , reactions or allergies that would exclude them from being immunized today. Risks and adverse reactions were discussed and the VIS was given to them. All questions were addressed. He was observed for 5 min post injection. There were no reactions observed. Reza Carlson      Pt asked for referral to podiatrist.  Caryn Barroso per Dr. Jose Guadalupe Easton Referral placed to Dr. Fang Sands for overgrown toenails. Pt verbalizes understanding of the instructions and has no further questions at this time.

## 2021-12-28 ENCOUNTER — TELEPHONE (OUTPATIENT)
Dept: INTERNAL MEDICINE CLINIC | Age: 66
End: 2021-12-28

## 2021-12-28 NOTE — TELEPHONE ENCOUNTER
Forms were dropped off in person to this psr and scanned into media for reference.     Form was: Patient Assistance Program Application for [place]  for Walk-in assistance    Please have physician complete and fax to [phone]

## 2021-12-31 RX ORDER — LEVETIRACETAM 500 MG/1
TABLET ORAL
Qty: 120 TABLET | Refills: 0 | Status: SHIPPED | OUTPATIENT
Start: 2021-12-31 | End: 2022-03-17

## 2022-01-03 NOTE — TELEPHONE ENCOUNTER
Pharmacy Progress Note - Telephone Encounter    S/O: Mr. John Copeland 61 y.o. male, referred by Dr. Cristi Hook MD, was contacted via an outbound telephone call to discuss Xarelto PAP application today. Verified patients identifiers (name & ) per HIPAA policy. A/P:  - Discuss proof of income documents were missing with recent [employer] Xarelto PAP application submission.   - Will submit application to [employer] for now. - Patient also overdue for PCP follow up. Ko Layton endorses understanding to the provided information. All questions answered at this time. Medications Discontinued During This Encounter   Medication Reason    apixaban (Eliquis) 5 mg tablet Alternate Therapy     Orders Placed This Encounter    rivaroxaban (Xarelto) 20 mg tab tablet     Sig: Take 20 mg by mouth daily (with dinner). Thank you,  Shahnaz Ho, PharmD, BCACP, [address] in place:  Yes   Recommendation Provided To: Patient/Caregiver: 2 via Telephone   Intervention Detail: Discontinued Rx: 1, reason: Therapy Complete and Patient Access Assistance/Sample Provided   Intervention Accepted By: Patient/Caregiver: 2   Time Spent (min): 20

## 2022-01-12 ENCOUNTER — TELEPHONE (OUTPATIENT)
Dept: INTERNAL MEDICINE CLINIC | Age: 67
End: 2022-01-12

## 2022-01-12 NOTE — TELEPHONE ENCOUNTER
Pharmacy Progress Note - Telephone Encounter    S/O: Mr. Kateryna Burnham 61 y.o. male, referred by Dr. Isabela Villarreal MD, was contacted via an outbound telephone call to discuss Xarelto PAP application today. Verified patients identifiers (name & ) per HIPAA policy. Call discuss with patient's wife. Cleared on PHI.     - Received communication from [employer] regarding incomplete application - missing updated proof of income. A/P:  - Shared updates with wife. She will have to check on this. Celsa Velasco endorses understanding to the provided information. All questions answered at this time. Thank you,  Shahnaz Barboza, PharmD, BCACP, [address] [place] [address] in place:  Yes   Recommendation Provided To: Patient/Caregiver: 1 via Telephone   Intervention Detail: Patient Access Assistance/Sample Provided   Intervention Accepted By: Patient/Caregiver: 1   Time Spent (min): 5

## 2022-01-13 ENCOUNTER — TELEPHONE (OUTPATIENT)
Dept: INTERNAL MEDICINE CLINIC | Age: 67
End: 2022-01-13

## 2022-01-13 NOTE — TELEPHONE ENCOUNTER
Pharmacy Progress Note - Telephone Call    Mr. Vinny Frye 61 y.o. was contacted via an outbound telephone call regarding his Xarelto PAP today. A voicemail was left for patient to return my call. Macho Lerner (Pt's wife) dropped off 2020 proof of income. Had previously discussed with Macho Lerner that [employer] requires updated POI. Will submit documents to [employer] today. Thank you,  Shahnaz Rios, PharmD, BCACP, [address] [place] [address] in place:  Yes   Recommendation Provided To: Patient/Caregiver: 1 via Telephone   Intervention Detail: Patient Access Assistance/Sample Provided   Intervention Accepted By: Patient/Caregiver: 1   Time Spent (min): 10

## 2022-01-17 ENCOUNTER — TELEPHONE (OUTPATIENT)
Dept: INTERNAL MEDICINE CLINIC | Age: 67
End: 2022-01-17

## 2022-01-17 NOTE — TELEPHONE ENCOUNTER
Pharmacy Progress Note - Telephone Call    Mr. Vinny Frye 61 y.o. was contacted via an outbound telephone call regarding previous call today. A voicemail was left for patient to return my call. Thank you,  Shahnaz Eddy, PharmD, BCACP, [address] [place] [address] in place:  Yes   Time Spent (min): 5

## 2022-01-17 NOTE — TELEPHONE ENCOUNTER
Pt wife called stating that she received communication from [employer] about med applications    Pt wife states that she believes the wrong application was done and should have been for Eliquis, not for Xarelto. Pt wife states she needs a call to clarify and get sorted bc she said the application should be for Eliquis. Please call to discuss the medication application. Please call after 2 pm as wife is at work before then.

## 2022-01-24 ENCOUNTER — DOCUMENTATION ONLY (OUTPATIENT)
Dept: INTERNAL MEDICINE CLINIC | Age: 67
End: 2022-01-24

## 2022-01-25 NOTE — PROGRESS NOTES
Pharmacy Progress Note     Mr. Vinny Frye 61 y.o. 's wife, Terrell Sauceda, stopped by office today to inquire about Eliquis PAP. She states patient is not on Xarelto but is taking Eliquis. D/w Terrell Sohail. Xarelto PAP application has been submitted to [employer]. No verdict established at this time. Patient should take both Eliquis and Xarelto. Recommend for Terrell Sauceda to contact [employer] to cancel renewal application as submission may affect his Eliquis approval.      Per Edel's request, I will assist in starting a [employer] renewal application for Eliquis. Discuss - [employer] may request for updated proof of income. Does not have cardiology f/u. No recent visits. Pt is overdue for PCP follow up with Dr. Jake Farrar. Terrell Sauceda voiced understanding of the information provided. All questions answered at this time. Thank you for the consult,  Shahnaz Cortes, PharmD, BCACP, [address] in place:  Yes   Recommendation Provided To: Patient/Caregiver: 1 via In person   Intervention Detail: Patient Access Assistance/Sample Provided   Intervention Accepted By: Patient/Caregiver: 1   Time Spent (min): 10

## 2022-01-26 ENCOUNTER — DOCUMENTATION ONLY (OUTPATIENT)
Dept: INTERNAL MEDICINE CLINIC | Age: 67
End: 2022-01-26

## 2022-01-26 NOTE — PROGRESS NOTES
Pharmacy Progress Note     Eliquis application for Mr. Vinny Frye 61 y.o. submitted. Verdict pending. Note - patient is no longer on Xarelto. Medications Discontinued During This Encounter   Medication Reason    rivaroxaban (Xarelto) 20 mg tab tablet Alternate Therapy         Thank you for the consult,  Shahnaz Pierre, PharmD, BCACP, [facility] [address] in place: Yes   Recommendation Provided To:  Other: 2   Intervention Detail: Discontinued Rx: 1, reason: Therapy Complete and Patient Access Assistance/Sample Provided   Intervention Accepted By: Other: 2   Time Spent (min): 20

## 2022-03-09 ENCOUNTER — TELEPHONE (OUTPATIENT)
Dept: INTERNAL MEDICINE CLINIC | Age: 67
End: 2022-03-09

## 2022-03-09 NOTE — TELEPHONE ENCOUNTER
----- Message from Amadou Streeter sent at 3/9/2022  4:21 PM EST -----  Subject: Message to Provider    QUESTIONS  Information for Provider? patient has referral for homehealth services but   doesn't want to be seen until Friday   ---------------------------------------------------------------------------  --------------  CALL BACK INFO  What is the best way for the office to contact you? OK to leave message on   voicemail  Preferred Call Back Phone Number? [phone]  ---------------------------------------------------------------------------  --------------  SCRIPT ANSWERS  Relationship to Patient? Third Party  Representative Name?  All about homecare

## 2022-03-10 NOTE — TELEPHONE ENCOUNTER
Spoke with home health nurse, Mi Galarza, in regards to message received. [facility] states they are required, per insurance, to notify the provider when the patient can not be seen within a certain amount of days. Information noted by clinical staff at this time. No further actions required at this time.

## 2022-03-14 ENCOUNTER — TELEPHONE (OUTPATIENT)
Dept: INTERNAL MEDICINE CLINIC | Age: 67
End: 2022-03-14

## 2022-03-14 NOTE — TELEPHONE ENCOUNTER
Patients wife came in office asking if we could change medication Levetiracetam from 1,000mg to 500 mg due to cost.

## 2022-03-17 RX ORDER — LEVETIRACETAM 500 MG/1
TABLET ORAL
Qty: 120 TABLET | Refills: 0 | Status: SHIPPED | OUTPATIENT
Start: 2022-03-17 | End: 2022-04-29

## 2022-03-18 PROBLEM — R29.898 LEFT ARM WEAKNESS: Status: ACTIVE | Noted: 2021-03-06

## 2022-03-18 PROBLEM — R29.898 BILATERAL LEG WEAKNESS: Status: ACTIVE | Noted: 2021-03-06

## 2022-03-19 PROBLEM — N18.2 CKD STAGE 2 DUE TO TYPE 2 DIABETES MELLITUS (HCC): Status: ACTIVE | Noted: 2017-05-14

## 2022-03-19 PROBLEM — E11.22 CKD STAGE 2 DUE TO TYPE 2 DIABETES MELLITUS (HCC): Status: ACTIVE | Noted: 2017-05-14

## 2022-03-19 PROBLEM — D3A.00 CARCINOID TUMOR: Status: ACTIVE | Noted: 2018-02-13

## 2022-04-07 ENCOUNTER — TELEPHONE (OUTPATIENT)
Dept: INTERNAL MEDICINE CLINIC | Age: 67
End: 2022-04-07

## 2022-04-07 NOTE — TELEPHONE ENCOUNTER
Spoke w/ pt's wife, informed pt that we have an appt available on 4/12 at 1:30 pm. Pt cannot make appt.  Pt's wife is planning on coming into the office to schedule appt

## 2022-04-07 NOTE — TELEPHONE ENCOUNTER
----- Message from Lizzeth Shows sent at 4/7/2022  3:10 PM EDT -----  Subject: Appointment Request    Reason for Call: Routine Hospital Follow Up    QUESTIONS  Type of Appointment? Established Patient  Reason for appointment request? No appointments available during search  Additional Information for Provider? pt spouse would like a call back from   office for a hospital f/u due to fall.  ---------------------------------------------------------------------------  --------------  CALL BACK INFO  What is the best way for the office to contact you? OK to leave message on   voicemail  Preferred Call Back Phone Number? [phone]  ---------------------------------------------------------------------------  --------------  SCRIPT ANSWERS  Relationship to Patient? Other  Representative Name? Deborrah Saliva spouse  Additional information verified (besides Name and Date of Birth)? Address  (Patient requests to see provider urgently. )? No  (Has the patient been discharged from the hospital within 2 business days   AND does not have a Telephone Encounter  Follow Up From [address] [place] [address]   documented in [address]?)? No  Do you have any questions for your primary care provider that need to be   answered prior to your appointment? (Use RN Triage if question pertains to   anything on the red flag list)? No  (Patient needs follow up visit after hospital discharge) Book first   available appointment within 7 days OF DISCHARGE, if no appt, proceed to   book the next available time slot within 14 days OF DISCHARGE AND Send   Message to Provider. [facility] Follow Up appointment cannot be booked   beyond 14 Days and should result in a Message to Provider. ? Yes   Have you been diagnosed with, awaiting test results for, or told that you   are suspected of having COVID-19 (Coronavirus)? (If patient has tested   negative or was tested as a requirement for work, school, or travel and   not based on symptoms, answer no)?  No  Within the past 10 days have you developed any of the following symptoms   (answer no if symptoms have been present longer than 10 days or began   more than 10 days ago)? Fever or Chills, Cough, Shortness of breath or   difficulty breathing, Loss of taste or smell, Sore throat, Nasal   congestion, Sneezing or runny nose, Fatigue or generalized body aches   (answer no if pain is specific to a body part e.g. back pain), Diarrhea,   Headache? No  Have you had close contact with someone with COVID-19 in the last 7 days? No  (Service Expert  click yes below to proceed with [employer] As Usual   Scheduling)?  Yes

## 2022-04-08 NOTE — TELEPHONE ENCOUNTER
Pt's wife came into the office to schedule an appt for pt. Pt's wife will come back into the office Monday 4/11 to schedule. Pt's wife stated that usually Fridays are better to schedule or 5/4. Pt's wife has an appt on 4/25 at 2:15 with Dr. Amish [place]. Please advise.

## 2022-04-22 ENCOUNTER — OFFICE VISIT (OUTPATIENT)
Dept: INTERNAL MEDICINE CLINIC | Age: 67
End: 2022-04-22
Payer: MEDICARE

## 2022-04-22 VITALS
SYSTOLIC BLOOD PRESSURE: 138 MMHG | DIASTOLIC BLOOD PRESSURE: 80 MMHG | HEART RATE: 62 BPM | OXYGEN SATURATION: 98 % | RESPIRATION RATE: 16 BRPM | TEMPERATURE: 97.5 F | HEIGHT: 73 IN | WEIGHT: 235 LBS | BODY MASS INDEX: 31.14 KG/M2

## 2022-04-22 DIAGNOSIS — C34.32 MALIGNANT NEOPLASM OF LOWER LOBE, LEFT BRONCHUS OR LUNG (HCC): ICD-10-CM

## 2022-04-22 DIAGNOSIS — E11.69 TYPE 2 DIABETES MELLITUS WITH OTHER SPECIFIED COMPLICATION, WITHOUT LONG-TERM CURRENT USE OF INSULIN (HCC): ICD-10-CM

## 2022-04-22 DIAGNOSIS — I77.71 DISSECTION OF CAROTID ARTERY (HCC): ICD-10-CM

## 2022-04-22 DIAGNOSIS — I42.8 CARDIOMYOPATHY, NONISCHEMIC (HCC): ICD-10-CM

## 2022-04-22 DIAGNOSIS — Z12.5 PROSTATE CANCER SCREENING: ICD-10-CM

## 2022-04-22 DIAGNOSIS — I10 HYPERTENSION, UNSPECIFIED TYPE: ICD-10-CM

## 2022-04-22 DIAGNOSIS — E78.5 HYPERLIPIDEMIA, UNSPECIFIED HYPERLIPIDEMIA TYPE: Primary | ICD-10-CM

## 2022-04-22 DIAGNOSIS — G40.909 SEIZURE DISORDER (HCC): ICD-10-CM

## 2022-04-22 DIAGNOSIS — I48.20 CHRONIC A-FIB (HCC): ICD-10-CM

## 2022-04-22 PROCEDURE — G8417 CALC BMI ABV UP PARAM F/U: HCPCS | Performed by: INTERNAL MEDICINE

## 2022-04-22 PROCEDURE — G8752 SYS BP LESS 140: HCPCS | Performed by: INTERNAL MEDICINE

## 2022-04-22 PROCEDURE — G8427 DOCREV CUR MEDS BY ELIG CLIN: HCPCS | Performed by: INTERNAL MEDICINE

## 2022-04-22 PROCEDURE — G8432 DEP SCR NOT DOC, RNG: HCPCS | Performed by: INTERNAL MEDICINE

## 2022-04-22 PROCEDURE — G0463 HOSPITAL OUTPT CLINIC VISIT: HCPCS | Performed by: INTERNAL MEDICINE

## 2022-04-22 PROCEDURE — G8754 DIAS BP LESS 90: HCPCS | Performed by: INTERNAL MEDICINE

## 2022-04-22 PROCEDURE — 3017F COLORECTAL CA SCREEN DOC REV: CPT | Performed by: INTERNAL MEDICINE

## 2022-04-22 PROCEDURE — 2022F DILAT RTA XM EVC RTNOPTHY: CPT | Performed by: INTERNAL MEDICINE

## 2022-04-22 PROCEDURE — 99213 OFFICE O/P EST LOW 20 MIN: CPT | Performed by: INTERNAL MEDICINE

## 2022-04-22 PROCEDURE — 3046F HEMOGLOBIN A1C LEVEL >9.0%: CPT | Performed by: INTERNAL MEDICINE

## 2022-04-22 RX ORDER — LANCETS
EACH MISCELLANEOUS
Qty: 1 EACH | Refills: 11 | Status: SHIPPED | OUTPATIENT
Start: 2022-04-22

## 2022-04-22 RX ORDER — INSULIN PUMP SYRINGE, 3 ML
EACH MISCELLANEOUS
Qty: 1 KIT | Refills: 0 | Status: SHIPPED | OUTPATIENT
Start: 2022-04-22

## 2022-04-22 NOTE — PATIENT INSTRUCTIONS
Office Policies    Phone calls/patient messages:            Please allow up to 24 hours for someone in the office to contact you about your call or message. Be mindful your provider may be out of the office or your message may require further review. We encourage you to use [facility] for your messages as this is a faster, more efficient way to communicate with our office                         Medication Refills:            Prescription medications require 48-72 business hours to process. We encourage you to use [facility] for your refills. For controlled medications: Please allow 72 business hours to process. Certain medications may require you to  a written prescription at our office. NO narcotic/controlled medications will be prescribed after 4pm Monday through Friday or on weekends              Form/Paperwork Completion:            Please note a $25 fee may incur for all paperwork for completed by our providers. We ask that you allow 7-10 business days. Pre-payment is due prior to picking up/faxing the completed form. You may also download your forms to [facility] to have your doctor print off.

## 2022-04-22 NOTE — PROGRESS NOTES
HISTORY OF PRESENT ILLNESS  Vinny Frye is a 61 y.o. male.   HPI   Hx  dm-2 and MARIA TERESA, chronic afib, hx lung carcinoid  hx cva's HTN HLD NICM ( nl EF 2021) -here with wife  Here for DOMINIK for fall and syncope--porch collapsed, admitted to [facility] inpatient then rehab-has been at home about 1 month   head CT neg, EEG neg, blood cx neg  Was intubated for airway then extubated  Getting HH now but back to baseline  No med changes  No longer drives car  No fsbs at home-needs supplies  Has fu [facility] neurologist in June    Sees Dr Rene Jose next month        Last OV    Last a1c 8.8 in hospital -metformin increaseed to 2 bid  No fsbs checks recently  Had left weakness from right BG and corona radiata CVA last month and severe cervical spine stenosis and cervical cord compression-see by Nsx Dr Shaunna Garcia was advised outpt fu -had appt last month---surgery not recommended at this time  [facility] with a cane prn , left side weakness has improved to baseline  Aspirin was added to eliquis per neurologist  Home BP--130/88  Has been out of coreg x weeks  Sees Dr Rene Jose later today for FU     fsbs    Patient Active Problem List    Diagnosis Date Noted    Left arm weakness 03/06/2021    Bilateral leg weakness 03/06/2021    Carcinoid tumor 02/13/2018    CKD stage 2 due to type 2 diabetes mellitus ([address] 05/14/2017    Polyneuropathy in diabetes(357.2) 07/21/2015    Hypertensive emergency 07/20/2015    Seizure disorder ([address] 07/20/2015    Type II or unspecified type diabetes mellitus with neurological manifestations, uncontrolled(250.62) ([address] 07/20/2015    History of atrial fibrillation 07/20/2015    Hyperlipidemia 07/20/2015    Diabetic neuropathy ([address] 11/21/2014    Seizures ([address] 08/08/2014    Syncope 07/13/2012    Cardiomyopathy, nonischemic ([address] 10/31/2009    HTN (hypertension), benign 10/26/2009    Pulmonary nodule 10/26/2009     Current Outpatient Medications   Medication Sig Dispense Refill    apixaban (ELIQUIS) 2.5 mg tablet Take 1 Tablet by mouth two (2) times a day. 180 Tablet 0    levETIRAcetam (KEPPRA) 500 mg tablet Take 2 tablets by mouth twice daily 120 Tablet 0    amLODIPine (NORVASC) 10 mg tablet Take 1 tablet by mouth once daily for blood pressure 90 Tablet 3    atorvastatin (LIPITOR) 80 mg tablet TAKE 1 TABLET BY MOUTH ONCE DAILY AT NIGHT 30 Tablet 5    glimepiride (AMARYL) 4 mg tablet TAKE 1 TABLET BY MOUTH IN THE MORNING 90 Tablet 3    hydrALAZINE (APRESOLINE) 10 mg tablet TAKE 1 TABLET BY MOUTH THREE TIMES DAILY 90 Tablet 5    carvediloL (COREG) 12.5 mg tablet Take 1 Tab by mouth two (2) times daily (with meals). 180 Tab 3    metFORMIN (GLUCOPHAGE) 500 mg tablet Take 2 Tabs by mouth two (2) times daily (with meals). (Patient taking differently: Take 1,000 mg by mouth. 1 tab bid) 360 Tab 3    aspirin delayed-release 81 mg tablet Take 1 Tab by mouth daily. 30 Tab 0    lisinopril (PRINIVIL, ZESTRIL) 40 mg tablet TAKE ONE TABLET BY MOUTH ONCE DAILY FOR BLOOD PRESSURE 30 Tab 11    albuterol (ProAir HFA) 90 mcg/actuation inhaler Take 2 Puffs by inhalation every four (4) hours as needed for Wheezing.        No Known Allergies  Social History     Tobacco Use    Smoking status: Light Tobacco Smoker     Packs/day: 0.10     Start date: 6/1/2015    Smokeless tobacco: Never Used    Tobacco comment: former cigar   Substance Use Topics    Alcohol use: Not Currently     Comment: social      Lab Results   Component Value Date/Time    WBC 6.1 04/23/2021 11:01 AM    HGB 14.0 04/23/2021 11:01 AM    HCT 43.3 04/23/2021 11:01 AM    PLATELET 621 97/74/2106 11:01 AM    MCV 84.4 04/23/2021 11:01 AM     Lab Results   Component Value Date/Time    Hemoglobin A1c 8.8 (H) 03/07/2021 02:18 AM    Hemoglobin A1c 6.7 (H) 02/13/2018 04:35 PM    Hemoglobin A1c 7.7 (H) 04/13/2017 03:07 PM    Glucose 173 (H) 03/09/2021 03:12 AM    Glucose (POC) 292 (H) 03/09/2021 10:48 AM    Microalb/Creat ratio (ug/mg creat.) 249.0 (H) 04/13/2017 03:07 PM LDL, calculated 67.4 03/07/2021 02:18 AM    Creatinine 1.07 03/09/2021 03:12 AM      Lab Results   Component Value Date/Time    Cholesterol, total 116 03/07/2021 02:18 AM    Cholesterol (POC) 167 11/21/2014 09:00 AM    HDL Cholesterol 30 03/07/2021 02:18 AM    LDL, calculated 67.4 03/07/2021 02:18 AM    LDL Cholesterol (POC) 111 11/21/2014 09:00 AM    Triglyceride 93 03/07/2021 02:18 AM    Triglycerides (POC) 70 11/21/2014 09:00 AM    CHOL/HDL Ratio 3.9 03/07/2021 02:18 AM     Lab Results   Component Value Date/Time    GFR est non-AA >60 03/09/2021 03:12 AM    GFR est AA >60 03/09/2021 03:12 AM    Creatinine 1.07 03/09/2021 03:12 AM    BUN 13 03/09/2021 03:12 AM    Sodium 139 03/09/2021 03:12 AM    Potassium 3.3 (L) 03/09/2021 03:12 AM    Chloride 106 03/09/2021 03:12 AM    CO2 29 03/09/2021 03:12 AM    Magnesium 1.6 03/06/2021 01:16 PM     Lab Results   Component Value Date/Time    Prostate Specific Ag 0.9 11/11/2019 04:04 PM    Prostate Specific Ag 0.8 02/13/2018 04:35 PM    Prostate Specific Ag 0.6 10/27/2016 10:40 AM     No results found for: TSH, TSH2, TSH3, TSHP, TSHELE, TSHEXT, TT3, T3U, T3UP, FRT3, FT3, FT4, FT4P, T4, T4P, FT4T, TT7, TSHEXT      ROS    Physical Exam  Vitals and nursing note reviewed. Constitutional:       General: He is not in acute distress. Appearance: Normal appearance. He is well-developed. Comments: Appears stated age   HENT:      Head: Normocephalic. Cardiovascular:      Rate and Rhythm: Normal rate. Rhythm irregular. Heart sounds: Normal heart sounds. Pulmonary:      Effort: Pulmonary effort is normal.      Breath sounds: Normal breath sounds. Abdominal:      Palpations: Abdomen is soft. Neurological:      Mental Status: He is alert. Comments: Slow unsteady gait         ASSESSMENT and PLAN  Diagnoses and all orders for this visit:    1. Hyperlipidemia, unspecified hyperlipidemia type  -     LIPID PANEL;  Future  -     METABOLIC PANEL, COMPREHENSIVE; Future   On statin  2. Dissection of carotid artery ([facility]   resolved  3. Cardiomyopathy, nonischemic ([facility]    4. Type 2 diabetes mellitus with other specified complication, without long-term current use of insulin (HCC)  -     HEMOGLOBIN A1C WITH EAG; Future    5. Chronic a-fib (HCC)   On eliquis and Bb   Fu at [facility]  6. Seizure disorder ([facility]    Neg EEG    Continue  Keppra  7. Hypertension, unspecified type  -     METABOLIC PANEL, COMPREHENSIVE; Future   controlled  8. Prostate cancer screening  -     PSA SCREENING (SCREENING); Future    9. Malignant neoplasm of lower lobe, left bronchus or lung (HCC)   Carcinoid was resected    10. S/p fall   Concussion-back to baseline   Continue HH and fu Neuro MD at [facility]   RPM via [facility]  Other orders  -     apixaban (ELIQUIS) 2.5 mg tablet; Take 1 Tablet by mouth two (2) times a day. -     glucose blood VI test strips (ASCENSIA AUTODISC VI, ONE TOUCH ULTRA TEST VI) strip; Check fsbs every day E11  -     Blood-Glucose Meter monitoring kit; Check fsbs every day E11  -     lancets misc; Check fsbs every day E11      Follow-up and Dispositions    · Return in about 6 months (around 10/22/2022) for medicare wellness.

## 2022-04-23 LAB
ALBUMIN SERPL-MCNC: 3.7 G/DL (ref 3.5–5)
ALBUMIN/GLOB SERPL: 0.9 {RATIO} (ref 1.1–2.2)
ALP SERPL-CCNC: 99 U/L (ref 45–117)
ALT SERPL-CCNC: 49 U/L (ref 12–78)
ANION GAP SERPL CALC-SCNC: 6 MMOL/L (ref 5–15)
AST SERPL-CCNC: 20 U/L (ref 15–37)
BILIRUB SERPL-MCNC: 0.5 MG/DL (ref 0.2–1)
BUN SERPL-MCNC: 11 MG/DL (ref 6–20)
BUN/CREAT SERPL: 8 (ref 12–20)
CALCIUM SERPL-MCNC: 8.2 MG/DL (ref 8.5–10.1)
CHLORIDE SERPL-SCNC: 103 MMOL/L (ref 97–108)
CHOLEST SERPL-MCNC: 119 MG/DL
CO2 SERPL-SCNC: 30 MMOL/L (ref 21–32)
CREAT SERPL-MCNC: 1.36 MG/DL (ref 0.7–1.3)
EST. AVERAGE GLUCOSE BLD GHB EST-MCNC: 212 MG/DL
GLOBULIN SER CALC-MCNC: 3.9 G/DL (ref 2–4)
GLUCOSE SERPL-MCNC: 184 MG/DL (ref 65–100)
HBA1C MFR BLD: 9 % (ref 4–5.6)
HDLC SERPL-MCNC: 32 MG/DL
HDLC SERPL: 3.7 {RATIO} (ref 0–5)
LDLC SERPL CALC-MCNC: 66.2 MG/DL (ref 0–100)
POTASSIUM SERPL-SCNC: 3.5 MMOL/L (ref 3.5–5.1)
PROT SERPL-MCNC: 7.6 G/DL (ref 6.4–8.2)
PSA SERPL-MCNC: 0.9 NG/ML (ref 0.01–4)
SODIUM SERPL-SCNC: 139 MMOL/L (ref 136–145)
TRIGL SERPL-MCNC: 104 MG/DL (ref ?–150)
VLDLC SERPL CALC-MCNC: 20.8 MG/DL

## 2022-04-23 NOTE — PROGRESS NOTES
Discussed labs with patient--a1c up to 9.0 on metformin and amaryl-consider adding weekly GLP1 or insulin and work on diet.  Pt says he will ask wife to call back next week since she is currently at work to discuss recommendation

## 2022-04-25 DIAGNOSIS — Z79.4 TYPE 2 DIABETES MELLITUS WITH COMPLICATION, WITH LONG-TERM CURRENT USE OF INSULIN (HCC): ICD-10-CM

## 2022-04-25 DIAGNOSIS — E11.8 TYPE 2 DIABETES MELLITUS WITH COMPLICATION, WITH LONG-TERM CURRENT USE OF INSULIN (HCC): ICD-10-CM

## 2022-04-25 RX ORDER — METFORMIN HYDROCHLORIDE 500 MG/1
1000 TABLET ORAL 2 TIMES DAILY WITH MEALS
Qty: 360 TABLET | Refills: 3 | Status: SHIPPED | OUTPATIENT
Start: 2022-04-25

## 2022-04-29 RX ORDER — LEVETIRACETAM 500 MG/1
TABLET ORAL
Qty: 120 TABLET | Refills: 0 | Status: SHIPPED | OUTPATIENT
Start: 2022-04-29 | End: 2022-05-26

## 2022-10-13 RX ORDER — GLIMEPIRIDE 4 MG/1
TABLET ORAL
Qty: 90 TABLET | Refills: 0 | Status: SHIPPED | OUTPATIENT
Start: 2022-10-13 | End: 2022-10-27 | Stop reason: SDUPTHER

## 2022-10-27 ENCOUNTER — OFFICE VISIT (OUTPATIENT)
Dept: INTERNAL MEDICINE CLINIC | Age: 67
End: 2022-10-27
Payer: MEDICARE

## 2022-10-27 VITALS
SYSTOLIC BLOOD PRESSURE: 124 MMHG | WEIGHT: 241.2 LBS | TEMPERATURE: 97.7 F | HEIGHT: 73 IN | RESPIRATION RATE: 16 BRPM | BODY MASS INDEX: 31.97 KG/M2 | DIASTOLIC BLOOD PRESSURE: 82 MMHG | HEART RATE: 74 BPM | OXYGEN SATURATION: 98 %

## 2022-10-27 DIAGNOSIS — Z23 ENCOUNTER FOR IMMUNIZATION: ICD-10-CM

## 2022-10-27 DIAGNOSIS — Z86.73 HISTORY OF CVA (CEREBROVASCULAR ACCIDENT): ICD-10-CM

## 2022-10-27 DIAGNOSIS — I42.8 NICM (NONISCHEMIC CARDIOMYOPATHY) (HCC): ICD-10-CM

## 2022-10-27 DIAGNOSIS — E78.5 HYPERLIPIDEMIA, UNSPECIFIED HYPERLIPIDEMIA TYPE: ICD-10-CM

## 2022-10-27 DIAGNOSIS — E11.65 TYPE 2 DIABETES MELLITUS WITH HYPERGLYCEMIA, WITHOUT LONG-TERM CURRENT USE OF INSULIN (HCC): ICD-10-CM

## 2022-10-27 DIAGNOSIS — E11.9 TYPE 2 DIABETES MELLITUS WITHOUT COMPLICATION, WITHOUT LONG-TERM CURRENT USE OF INSULIN (HCC): Primary | ICD-10-CM

## 2022-10-27 DIAGNOSIS — I48.20 CHRONIC A-FIB (HCC): ICD-10-CM

## 2022-10-27 DIAGNOSIS — Z00.00 MEDICARE ANNUAL WELLNESS VISIT, SUBSEQUENT: ICD-10-CM

## 2022-10-27 LAB — HBA1C MFR BLD HPLC: 8.5 %

## 2022-10-27 PROCEDURE — G8754 DIAS BP LESS 90: HCPCS | Performed by: INTERNAL MEDICINE

## 2022-10-27 PROCEDURE — G8510 SCR DEP NEG, NO PLAN REQD: HCPCS | Performed by: INTERNAL MEDICINE

## 2022-10-27 PROCEDURE — 3017F COLORECTAL CA SCREEN DOC REV: CPT | Performed by: INTERNAL MEDICINE

## 2022-10-27 PROCEDURE — G8417 CALC BMI ABV UP PARAM F/U: HCPCS | Performed by: INTERNAL MEDICINE

## 2022-10-27 PROCEDURE — 99214 OFFICE O/P EST MOD 30 MIN: CPT | Performed by: INTERNAL MEDICINE

## 2022-10-27 PROCEDURE — G0439 PPPS, SUBSEQ VISIT: HCPCS | Performed by: INTERNAL MEDICINE

## 2022-10-27 PROCEDURE — 90694 VACC AIIV4 NO PRSRV 0.5ML IM: CPT | Performed by: INTERNAL MEDICINE

## 2022-10-27 PROCEDURE — G0463 HOSPITAL OUTPT CLINIC VISIT: HCPCS | Performed by: INTERNAL MEDICINE

## 2022-10-27 PROCEDURE — 1101F PT FALLS ASSESS-DOCD LE1/YR: CPT | Performed by: INTERNAL MEDICINE

## 2022-10-27 PROCEDURE — 2022F DILAT RTA XM EVC RTNOPTHY: CPT | Performed by: INTERNAL MEDICINE

## 2022-10-27 PROCEDURE — G8427 DOCREV CUR MEDS BY ELIG CLIN: HCPCS | Performed by: INTERNAL MEDICINE

## 2022-10-27 PROCEDURE — G8752 SYS BP LESS 140: HCPCS | Performed by: INTERNAL MEDICINE

## 2022-10-27 PROCEDURE — G8536 NO DOC ELDER MAL SCRN: HCPCS | Performed by: INTERNAL MEDICINE

## 2022-10-27 PROCEDURE — 83036 HEMOGLOBIN GLYCOSYLATED A1C: CPT | Performed by: INTERNAL MEDICINE

## 2022-10-27 RX ORDER — ATORVASTATIN CALCIUM 80 MG/1
TABLET, FILM COATED ORAL
Qty: 30 TABLET | Refills: 5 | Status: SHIPPED | OUTPATIENT
Start: 2022-10-27

## 2022-10-27 RX ORDER — CARVEDILOL 12.5 MG/1
12.5 TABLET ORAL 2 TIMES DAILY WITH MEALS
Qty: 180 TABLET | Refills: 3 | Status: SHIPPED | OUTPATIENT
Start: 2022-10-27

## 2022-10-27 RX ORDER — GLIMEPIRIDE 4 MG/1
TABLET ORAL
Qty: 180 TABLET | Refills: 3 | Status: SHIPPED | OUTPATIENT
Start: 2022-10-27

## 2022-10-27 NOTE — PROGRESS NOTES
HISTORY OF PRESENT ILLNESS  Vinny Frye is a 74 y.o. male.   HPI  Hx  dm-2 and MARIA TERESA, chronic afib, hx lung carcinoid  hx cva's HTN HLD NICM ( nl EF 2021) and medicare wellness -here with wife  Last a1c 9.0--today amb a1c 8.5  Drinks too many sodas- 2 litres bottle every day mountain dew and lots of grapes  Fsbs-none recent  Falls-no recurrent falls recently  No cp sob or neuropathy  Last OV Dr Karen Trujillo chronic afib--on elliquis 5 mg bid-yearly appt    Last OV  Here for DOMINIK for fall and syncope--porch collapsed, admitted to [facility] inpatient then rehab-has been at home about 1 month   head CT neg, EEG neg, blood cx neg  Was intubated for airway then extubated  Getting HH now but back to baseline  No med changes  No longer drives car  No fsbs at home-needs supplies  Has fu [facility] neurologist in June     Sees Dr Jacquelyn Cross next month            Patient Active Problem List    Diagnosis Date Noted    Malignant neoplasm of lower lobe, left bronchus or lung ([facility] 04/22/2022    Left arm weakness 03/06/2021    Bilateral leg weakness 03/06/2021    Carcinoid tumor 02/13/2018    CKD stage 2 due to type 2 diabetes mellitus ([address] 05/14/2017    Polyneuropathy in diabetes(357.2) 07/21/2015    Hypertensive emergency 07/20/2015    Seizure disorder ([address] 07/20/2015    Type II or unspecified type diabetes mellitus with neurological manifestations, uncontrolled(250.62) ([address] 07/20/2015    History of atrial fibrillation 07/20/2015    Hyperlipidemia 07/20/2015    Diabetic neuropathy ([address] 11/21/2014    Seizures ([address] 08/08/2014    Syncope 07/13/2012    Cardiomyopathy, nonischemic ([address] 10/31/2009    HTN (hypertension), benign 10/26/2009    Pulmonary nodule 10/26/2009     Current Outpatient Medications   Medication Sig Dispense Refill    glimepiride (AMARYL) 4 mg tablet TAKE 1 TABLET BY MOUTH IN THE MORNING 90 Tablet 0    amLODIPine (NORVASC) 10 mg tablet Take 1 tablet by mouth once daily for blood pressure 30 Tablet 5    hydrALAZINE (APRESOLINE) 10 mg tablet TAKE 1 TABLET BY MOUTH THREE TIMES DAILY 90 Tablet 5    carvediloL (COREG) 12.5 mg tablet TAKE 1 TABLET BY MOUTH TWICE DAILY WITH MEALS 180 Tablet 3    levETIRAcetam (KEPPRA) 500 mg tablet Take 2 tablets by mouth twice daily 120 Tablet 3    metFORMIN (GLUCOPHAGE) 500 mg tablet Take 2 Tablets by mouth two (2) times daily (with meals). 360 Tablet 3    apixaban (ELIQUIS) 2.5 mg tablet Take 1 Tablet by mouth two (2) times a day. 180 Tablet 0    glucose blood VI test strips (ASCENSIA AUTODISC VI, ONE TOUCH ULTRA TEST VI) strip Check fsbs every day E11 50 Strip 11    Blood-Glucose Meter monitoring kit Check fsbs every day E11 1 Kit 0    lancets misc Check fsbs every day E11 1 Each 11    atorvastatin (LIPITOR) 80 mg tablet TAKE 1 TABLET BY MOUTH ONCE DAILY AT NIGHT 30 Tablet 5    aspirin delayed-release 81 mg tablet Take 1 Tab by mouth daily. 30 Tab 0    albuterol (PROVENTIL HFA, VENTOLIN HFA, PROAIR HFA) 90 mcg/actuation inhaler Take 2 Puffs by inhalation every four (4) hours as needed for Wheezing.       lisinopril (PRINIVIL, ZESTRIL) 40 mg tablet TAKE ONE TABLET BY MOUTH ONCE DAILY FOR BLOOD PRESSURE 30 Tab 11     No Known Allergies   Lab Results   Component Value Date/Time    WBC 6.1 04/23/2021 11:01 AM    HGB 14.0 04/23/2021 11:01 AM    HCT 43.3 04/23/2021 11:01 AM    PLATELET 629 28/44/9211 11:01 AM    MCV 84.4 04/23/2021 11:01 AM     Lab Results   Component Value Date/Time    Hemoglobin A1c 9.0 (H) 04/22/2022 01:13 PM    Hemoglobin A1c 8.8 (H) 03/07/2021 02:18 AM    Hemoglobin A1c 6.7 (H) 02/13/2018 04:35 PM    Glucose 184 (H) 04/22/2022 01:13 PM    Glucose (POC) 292 (H) 03/09/2021 10:48 AM    Microalb/Creat ratio (ug/mg creat.) 249.0 (H) 04/13/2017 03:07 PM    LDL, calculated 66.2 04/22/2022 01:13 PM    Creatinine 1.36 (H) 04/22/2022 01:13 PM      Lab Results   Component Value Date/Time    Cholesterol, total 119 04/22/2022 01:13 PM    Cholesterol (POC) 167 11/21/2014 09:00 AM    HDL Cholesterol 32 04/22/2022 01:13 PM    LDL, calculated 66.2 04/22/2022 01:13 PM    LDL Cholesterol (POC) 111 11/21/2014 09:00 AM    Triglyceride 104 04/22/2022 01:13 PM    Triglycerides (POC) 70 11/21/2014 09:00 AM    CHOL/HDL Ratio 3.7 04/22/2022 01:13 PM     Lab Results   Component Value Date/Time    GFR est non-AA 53 (L) 04/22/2022 01:13 PM    GFR est AA >60 04/22/2022 01:13 PM    Creatinine 1.36 (H) 04/22/2022 01:13 PM    BUN 11 04/22/2022 01:13 PM    Sodium 139 04/22/2022 01:13 PM    Potassium 3.5 04/22/2022 01:13 PM    Chloride 103 04/22/2022 01:13 PM    CO2 30 04/22/2022 01:13 PM    Magnesium 1.6 03/06/2021 01:16 PM        ROS    Physical Exam  Vitals and nursing note reviewed. Constitutional:       General: He is not in acute distress. Appearance: Normal appearance. He is well-developed. He is obese. Comments: Appears stated age   HENT:      Head: Normocephalic. Cardiovascular:      Rate and Rhythm: Normal rate and regular rhythm. Heart sounds: Normal heart sounds. Pulmonary:      Effort: Pulmonary effort is normal.      Breath sounds: Normal breath sounds. Abdominal:      Palpations: Abdomen is soft. Musculoskeletal:      Right lower leg: Edema present. Left lower leg: Edema present. Neurological:      General: No focal deficit present. Mental Status: He is alert. Comments:      Diabetic foot exam performed by Mannie Carl MD       Measurement  Response Nurse Comment Physician Comment  Monofilament  R - normal sensation with micro filament  L - normal sensation with micro filament    Pulse DP R - 2+ (normal)  L - 2+ (normal)    Pulse TP R - 2+ (normal)  L - 2+ (normal)    Structural deformity R - None  L - None    Skin Integrity / Deformity R - None  L - None       Reviewed by:              ASSESSMENT and PLAN    ICD-10-CM ICD-9-CM    1.  Type 2 diabetes mellitus without complication, without long-term current use of insulin (HCC)  E11.9 250.00 AMB POC HEMOGLOBIN A1C METABOLIC PANEL, BASIC      CBC W/O DIFF      MICROALBUMIN, UR, RAND W/ MICROALB/CREAT RATIO       DIABETES FOOT EXAM      MICROALBUMIN, UR, RAND W/ MICROALB/CREAT RATIO      CBC W/O DIFF      METABOLIC PANEL, BASIC      2. Type 2 diabetes mellitus with hyperglycemia, without long-term current use of insulin ([facility])  E11.65 250.00      790.29       3. Encounter for immunization  Z23 V03.89 INFLUENZA, FLUAD, (AGE 72 Y+), IM, PF, 0.5 ML      4. Hyperlipidemia, unspecified hyperlipidemia type  E78.5 272.4 atorvastatin (LIPITOR) 80 mg tablet      5. NICM (nonischemic cardiomyopathy) ([facility])  I42.8 425.4 carvediloL (COREG) 12.5 mg tablet      6. History of CVA (cerebrovascular accident)  Z86.73 V12.54       7. Chronic a-fib ([facility])  I48.20 427.31 DISCONTINUED: apixaban (ELIQUIS) 5 mg tablet      DISCONTINUED: apixaban (ELIQUIS) 5 mg tablet      This is the Subsequent Medicare Annual Wellness Exam, performed 12 months or more after the Initial AWV or the last Subsequent AWV    I have reviewed the patient's medical history in detail and updated the computerized patient record. Assessment/Plan   Education and counseling provided:  Are appropriate based on today's review and evaluation  End-of-Life planning (with patient's consent)  Influenza Vaccine  Tdap and shingrix recommended    1. Type 2 diabetes mellitus without complication, without long-term current use of insulin ([facility])  -     AMB POC HEMOGLOBIN A1C 8.5 improved but above goal.  Work on diet -stop sodas and limit fruit  Increase amaryl 4 mg bid  Wife does not feel they can afford insulin or GLP-1 at this time  -     METABOLIC PANEL, BASIC; Future  -     CBC W/O DIFF; Future  -     MICROALBUMIN, UR, RAND W/ MICROALB/CREAT RATIO; Future  2. Type 2 diabetes mellitus with hyperglycemia, without long-term current use of insulin ([facility])  3. Encounter for immunization  -     INFLUENZA, FLUAD, (AGE 65 Y+), IM, PF, 0.5 ML  4.  Hyperlipidemia, unspecified hyperlipidemia type  -     atorvastatin (LIPITOR) 80 mg tablet; TAKE 1 TABLET BY MOUTH ONCE DAILY AT NIGHT, Normal, Disp-30 Tablet, R-5  5. NICM (nonischemic cardiomyopathy) (HCC)  -     carvediloL (COREG) 12.5 mg tablet; Take 1 Tablet by mouth two (2) times daily (with meals). , Normal, Disp-180 Tablet, R-3  6. History of CVA (cerebrovascular accident)  7. Chronic a-fib (HCC)       Depression Risk Factor Screening     3 most recent PHQ Screens 10/27/2022   Little interest or pleasure in doing things Not at all   Feeling down, depressed, irritable, or hopeless Not at all   Total Score PHQ 2 0       Alcohol & Drug Abuse Risk Screen    Do you average more than 1 drink per night or more than 7 drinks a week: No    In the past three months have you have had more than 4 drinks containing alcohol on one occasion: No          Functional Ability and Level of Safety    Hearing: Hearing is good. Activities of Daily Living: The home contains: handrails and grab bars  Patient needs help with:  transportation, shopping, preparing meals, laundry, housework, managing medications, and managing money      Ambulation: with no difficulty     Fall Risk:  Fall Risk Assessment, last 12 mths 10/27/2022   Able to walk? Yes   Fall in past 12 months? 0   Do you feel unsteady?  1   Are you worried about falling 1   Is the gait abnormal? 1   Number of falls in past 12 months 0      Abuse Screen:  Patient is not abused       Cognitive Screening    Has your family/caregiver stated any concerns about your memory: yes - none  Cognitive Screening: serial 3    Health Maintenance Due     Health Maintenance Due   Topic Date Due    DTaP/Tdap/Td series (1 - Tdap) Never done    Shingrix Vaccine Age 50> (1 of 2) Never done    MICROALBUMIN Q1  09/08/2018    Foot Exam Q1  02/13/2019    Eye Exam Retinal or Dilated  11/16/2020    COVID-19 Vaccine (2 - Moderna series) 05/14/2021    Medicare Yearly Exam  05/01/2022    Flu Vaccine (1) 08/01/2022       Patient Care Team Patient Care Team:  Mary Murphy MD as PCP - Tonya Bangura MD as PCP - [facility] Provider  Kim Iraheta MD (Neurology)  Jeanie Ariza MD (Thoracic Surgery)  Maria Luz Black MD (Neurology)  Thomas Donovan MD (Neurosurgery)  Ale Walters MD (Cardiovascular Disease Physician)    History     Patient Active Problem List   Diagnosis Code    HTN (hypertension), benign I10    Pulmonary nodule R91.1    Cardiomyopathy, nonischemic ([address] I42.8    Syncope R55    Seizures ([address] R56.9    Diabetic neuropathy ([address] E11.40    Hypertensive emergency I16.1    Seizure disorder ([address] G40.909    Type II or unspecified type diabetes mellitus with neurological manifestations, uncontrolled(250.62) ([address] E11.49    History of atrial fibrillation Z86.79    Hyperlipidemia E78.5    Polyneuropathy in diabetes(357.2) E11.42    CKD stage 2 due to type 2 diabetes mellitus ([address] E11.22, N18.2    Carcinoid tumor D3A.00    Left arm weakness R29.898    Bilateral leg weakness R29.898    Malignant neoplasm of lower lobe, left bronchus or lung ([address] C34.32     Past Medical History:   Diagnosis Date    Atrial fibrillation ([address]     Cancer ([address] 12/2015    left lung; 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    Congestive heart failure, unspecified     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 180 Tablet 3    atorvastatin (LIPITOR) 80 mg tablet TAKE 1 TABLET BY MOUTH ONCE DAILY AT NIGHT 30 Tablet 5    apixaban (ELIQUIS) 5 mg tablet Take 1 Tablet by mouth two (2) times a day.  2 Tablet 0 glimepiride (AMARYL) 4 mg tablet TAKE 1 TABLET BY MOUTH IN THE MORNING 180 Tablet 3    amLODIPine (NORVASC) 10 mg tablet Take 1 tablet by mouth once daily for blood pressure 30 Tablet 5    hydrALAZINE (APRESOLINE) 10 mg tablet TAKE 1 TABLET BY MOUTH THREE TIMES DAILY 90 Tablet 5    levETIRAcetam (KEPPRA) 500 mg tablet Take 2 tablets by mouth twice daily 120 Tablet 3    metFORMIN (GLUCOPHAGE) 500 mg tablet Take 2 Tablets by mouth two (2) times daily (with meals). 360 Tablet 3    glucose blood VI test strips (ASCENSIA AUTODISC VI, ONE TOUCH ULTRA TEST VI) strip Check fsbs every day E11 50 Strip 11    Blood-Glucose Meter monitoring kit Check fsbs every day E11 1 Kit 0    lancets misc Check fsbs every day E11 1 Each 11    aspirin delayed-release 81 mg tablet Take 1 Tab by mouth daily. 30 Tab 0    albuterol (PROVENTIL HFA, VENTOLIN HFA, PROAIR HFA) 90 mcg/actuation inhaler Take 2 Puffs by inhalation every four (4) hours as needed for Wheezing.       lisinopril (PRINIVIL, ZESTRIL) 40 mg tablet TAKE ONE TABLET BY MOUTH ONCE DAILY FOR BLOOD PRESSURE 30 Tab 11     No Known Allergies    Family History   Problem Relation Age of Onset    Stroke Mother     Cancer Father      Social History     Tobacco Use    Smoking status: Light Smoker     Packs/day: 0.10     Types: Cigarettes     Start date: 6/1/2015    Smokeless tobacco: Never    Tobacco comments:     former cigar   Substance Use Topics    Alcohol use: Not Currently     Comment: social Antoinette Muller MD

## 2022-10-27 NOTE — PROGRESS NOTES
HISTORY OF PRESENT ILLNESS  74 y.o. male. Here for 6 month f/up for Hx  dm-2 and MARIA TERESA, chronic afib, hx lung carcinoid  hx cva's HTN HLD NICM ( nl EF 2021)  -here with wife  POC A1C 8.5 today, down from 9.0. Wife states that cooling and eating healthier (have eliminated a lot of fried and fattening foods). No cp sob or neuropathy, denies pain or mobility concerns. Dg [place] (May 2022) aof chronic afib--on elliquis 2.5 mg bid    Denies any specialty apts in future except foot dr for routine nail trimming. ROS  General - reports feeling will and denies any concerns. Does state that he has been eating a better diet. Cardio - negative chest pain  Resp - denies SOB or diff breathing  Musck - walks with cane, denies any joint pain  Neuro - denies syncope or dizziness  Psych - denies anxiety or depression        Physical Exam  Vitals and nursing note reviewed. Constitutional:       General: He is not in acute distress. Appearance: Normal appearance. He is well-developed. Comments: Appears stated age   HENT:      Head: Normocephalic. Cardiovascular:      Rate and Rhythm: Normal rate and regular rhythm. Heart sounds: Normal heart sounds. Pulmonary:      Effort: Pulmonary effort is normal.      Breath sounds: Normal breath sounds. Abdominal:      Palpations: Abdomen is soft. Neurological:      Mental Status: He is alert. ASSESSMENT and PLAN  Review dose- elliquis 2.5 mg bid  Consider insulin or add on therapy in future. F/up 6 months for A1C check.

## 2022-10-27 NOTE — PATIENT INSTRUCTIONS
Medicare Wellness Visit, Male    The best way to live healthy is to have a lifestyle where you eat a well-balanced diet, exercise regularly, limit alcohol use, and quit all forms of tobacco/nicotine, if applicable. Regular preventive services are another way to keep healthy. Preventive services (vaccines, screening tests, monitoring & exams) can help personalize your care plan, which helps you manage your own care. Screening tests can find health problems at the earliest stages, when they are easiest to treat. [facility] follows the current, evidence-based guidelines published by the [facility] ([facility]) when recommending preventive services for our patients. Because we follow these guidelines, sometimes recommendations change over time as research supports it. (For example, a prostate screening blood test is no longer routinely recommended for men with no symptoms). Of course, you and your doctor may decide to screen more often for some diseases, based on your risk and co-morbidities (chronic disease you are already diagnosed with). Preventive services for you include:  - Medicare offers their members a free annual wellness visit, which is time for you and your primary care provider to discuss and plan for your preventive service needs. Take advantage of this benefit every year!  -All adults over age 72 should receive the recommended pneumonia vaccines. Current USPSTF guidelines recommend a series of two vaccines for the best pneumonia protection.   -All adults should have a flu vaccine yearly and tetanus vaccine every 10 years.  -All adults age 48 and older should receive the shingles vaccines (series of two vaccines).        -All adults age 38-68 who are overweight should have a diabetes screening test once every three years.   -Other screening tests & preventive services for persons with diabetes include: an eye exam to screen for diabetic retinopathy, a kidney function test, a foot exam, and stricter control over your cholesterol.   -Cardiovascular screening for adults with routine risk involves an electrocardiogram (ECG) at intervals determined by the provider.   -Colorectal cancer screening should be done for adults age 54-65 with no increased risk factors for colorectal cancer. There are a number of acceptable methods of screening for this type of cancer. Each test has its own benefits and drawbacks. Discuss with your provider what is most appropriate for you during your annual wellness visit. The different tests include: colonoscopy (considered the best screening method), a fecal occult blood test, a fecal DNA test, and sigmoidoscopy.  -All adults born between [facility] should be screened once for Hepatitis C.  -An Abdominal Aortic Aneurysm (AAA) Screening is recommended for men age 73-68 who has ever smoked in their lifetime.      Here is a list of your current Health Maintenance items (your personalized list of preventive services) with a due date:  Health Maintenance Due   Topic Date Due    DTaP/Tdap/Td  (1 - Tdap) Never done    Shingles Vaccine (1 of 2) Never done    Albumin Urine Test  09/08/2018    Eye Exam  11/16/2020    COVID-19 Vaccine (2 - Westly Poisson series) 05/14/2021    Annual Well Visit  05/01/2022    Yearly Flu Vaccine (1) 08/01/2022

## 2022-10-27 NOTE — PROGRESS NOTES
Room: 1A    Identified pt with two pt identifiers(name and ). Reviewed record in preparation for visit and have obtained necessary documentation. All patient medications has been reviewed. Chief Complaint   Patient presents with    Annual Wellness Visit    Follow-up     6 month       Health Maintenance Due   Topic    DTaP/Tdap/Td series (1 - Tdap)    Shingrix Vaccine Age 50> (1 of 2)    MICROALBUMIN Q1     Foot Exam Q1     Eye Exam Retinal or Dilated     COVID-19 Vaccine (2 - Moderna series)    Depression Screen     Medicare Yearly Exam     A1C test (Diabetic or Prediabetic)     Flu Vaccine (1)       Vitals:    10/27/22 1054   BP: 124/82   Pulse: 74   Resp: 16   Temp: 97.7 °F (36.5 °C)   TempSrc: Temporal   SpO2: 98%   Weight: 241 lb 3.2 oz (109.4 kg)   Height: 6' 1\" (1.854 m)   PainSc:   0 - No pain       4. Have you been to the ER, urgent care clinic since your last visit? Hospitalized since your last visit? No    5. Have you seen or consulted any other health care providers outside of the [address] [place] [address] since your last visit? Include any pap smears or colon screening. No    6. Would you like to receive your flu shot today? YES    Patient is accompanied by patient and wife I have received verbal consent from Paty Nova to discuss any/all medical information while they are present in the room.

## 2022-10-28 DIAGNOSIS — N18.30 STAGE 3 CHRONIC KIDNEY DISEASE, UNSPECIFIED WHETHER STAGE 3A OR 3B CKD (HCC): Primary | ICD-10-CM

## 2022-10-28 PROBLEM — N18.31 STAGE 3A CHRONIC KIDNEY DISEASE (HCC): Status: ACTIVE | Noted: 2022-10-28

## 2022-10-28 LAB
ANION GAP SERPL CALC-SCNC: 4 MMOL/L (ref 5–15)
BUN SERPL-MCNC: 9 MG/DL (ref 6–20)
BUN/CREAT SERPL: 7 (ref 12–20)
CALCIUM SERPL-MCNC: 8.4 MG/DL (ref 8.5–10.1)
CHLORIDE SERPL-SCNC: 106 MMOL/L (ref 97–108)
CO2 SERPL-SCNC: 33 MMOL/L (ref 21–32)
CREAT SERPL-MCNC: 1.38 MG/DL (ref 0.7–1.3)
CREAT UR-MCNC: 703 MG/DL
ERYTHROCYTE [DISTWIDTH] IN BLOOD BY AUTOMATED COUNT: 14.4 % (ref 11.5–14.5)
GLUCOSE SERPL-MCNC: 107 MG/DL (ref 65–100)
HCT VFR BLD AUTO: 40.5 % (ref 36.6–50.3)
HGB BLD-MCNC: 12.5 G/DL (ref 12.1–17)
MCH RBC QN AUTO: 27.4 PG (ref 26–34)
MCHC RBC AUTO-ENTMCNC: 30.9 G/DL (ref 30–36.5)
MCV RBC AUTO: 88.8 FL (ref 80–99)
MICROALBUMIN UR-MCNC: 617 MG/DL
MICROALBUMIN/CREAT UR-RTO: 878 MG/G (ref 0–30)
NRBC # BLD: 0 K/UL (ref 0–0.01)
NRBC BLD-RTO: 0 PER 100 WBC
PLATELET # BLD AUTO: 185 K/UL (ref 150–400)
PMV BLD AUTO: 12.8 FL (ref 8.9–12.9)
POTASSIUM SERPL-SCNC: 3.6 MMOL/L (ref 3.5–5.1)
RBC # BLD AUTO: 4.56 M/UL (ref 4.1–5.7)
SODIUM SERPL-SCNC: 143 MMOL/L (ref 136–145)
WBC # BLD AUTO: 6.1 K/UL (ref 4.1–11.1)

## 2022-10-28 NOTE — PROGRESS NOTES
Tell pt or wife has increasing protein in urine \"kidney strain\" from Dm-2 and has ckd3--refer back to Dr Camille Nuno for protein in urine and ckd 3

## 2022-10-28 NOTE — PROGRESS NOTES
Two pt identifiers confirmed. Called, spoke with Pt. Tell pt or wife has increasing protein in urine \"kidney strain\" from Dm-2 and has ckd3--refer back to Dr Nadir Macedo for protein in urine and ckd 3   Pt wife is coming Monday to  lab results to take to Dr. Nadir Macedo.

## 2023-01-10 ENCOUNTER — TRANSCRIBE ORDER (OUTPATIENT)
Dept: SCHEDULING | Age: 68
End: 2023-01-10

## 2023-01-10 DIAGNOSIS — N18.30 CHRONIC KIDNEY DISEASE, STAGE III (MODERATE) (HCC): Primary | ICD-10-CM

## 2023-01-17 NOTE — PROGRESS NOTES
NNTOCIP Post Hospitalization                  Referral from [place]                    Per combined daily census report patient admitted to [facility]17 and discharged on 3/17/17 with diagnosis of malignant neoplasm of unspecified part of right bronchus or lung.             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               NN follow-up  Per EMR patient currently remains admitted to [facility] as of 3/17/17.             This note will not be viewable in [address] "arm fistula"

## 2023-02-03 ENCOUNTER — HOSPITAL ENCOUNTER (OUTPATIENT)
Dept: ULTRASOUND IMAGING | Age: 68
Discharge: HOME | End: 2023-02-03
Attending: INTERNAL MEDICINE
Payer: MEDICARE

## 2023-02-03 DIAGNOSIS — N18.30 CHRONIC KIDNEY DISEASE, STAGE III (MODERATE) (HCC): ICD-10-CM

## 2023-02-03 PROCEDURE — 76770 US EXAM ABDO BACK WALL COMP: CPT

## 2023-02-28 ENCOUNTER — APPOINTMENT (OUTPATIENT)
Dept: CT IMAGING | Age: 68
DRG: 065 | End: 2023-02-28
Attending: EMERGENCY MEDICINE
Payer: MEDICARE

## 2023-02-28 ENCOUNTER — APPOINTMENT (OUTPATIENT)
Dept: MRI IMAGING | Age: 68
DRG: 065 | End: 2023-02-28
Attending: STUDENT IN AN ORGANIZED HEALTH CARE EDUCATION/TRAINING PROGRAM
Payer: MEDICARE

## 2023-02-28 ENCOUNTER — APPOINTMENT (OUTPATIENT)
Dept: NON INVASIVE DIAGNOSTICS | Age: 68
DRG: 065 | End: 2023-02-28
Attending: STUDENT IN AN ORGANIZED HEALTH CARE EDUCATION/TRAINING PROGRAM
Payer: MEDICARE

## 2023-02-28 ENCOUNTER — APPOINTMENT (OUTPATIENT)
Dept: VASCULAR SURGERY | Age: 68
DRG: 065 | End: 2023-02-28
Attending: PSYCHIATRY & NEUROLOGY
Payer: MEDICARE

## 2023-02-28 ENCOUNTER — HOSPITAL ENCOUNTER (INPATIENT)
Age: 68
LOS: 9 days | Discharge: NURSING, REHAB OR LONG-TERM CARE | DRG: 065 | End: 2023-03-09
Attending: EMERGENCY MEDICINE | Admitting: STUDENT IN AN ORGANIZED HEALTH CARE EDUCATION/TRAINING PROGRAM
Payer: MEDICARE

## 2023-02-28 DIAGNOSIS — I63.9 CEREBROVASCULAR ACCIDENT (CVA), UNSPECIFIED MECHANISM (HCC): Primary | ICD-10-CM

## 2023-02-28 DIAGNOSIS — E87.6 HYPOKALEMIA: ICD-10-CM

## 2023-02-28 DIAGNOSIS — I48.91 ATRIAL FIBRILLATION, UNSPECIFIED TYPE (HCC): ICD-10-CM

## 2023-02-28 DIAGNOSIS — R53.1 ACUTE LEFT-SIDED WEAKNESS: ICD-10-CM

## 2023-02-28 PROBLEM — I63.311 THROMBOTIC STROKE INVOLVING RIGHT MIDDLE CEREBRAL ARTERY (HCC): Status: ACTIVE | Noted: 2023-02-28

## 2023-02-28 PROBLEM — I65.23 BILATERAL CAROTID ARTERY STENOSIS: Status: ACTIVE | Noted: 2023-02-28

## 2023-02-28 LAB
ALBUMIN SERPL-MCNC: 3.6 G/DL (ref 3.5–5)
ALBUMIN/GLOB SERPL: 0.8 (ref 1.1–2.2)
ALP SERPL-CCNC: 71 U/L (ref 45–117)
ALT SERPL-CCNC: 25 U/L (ref 12–78)
ANION GAP SERPL CALC-SCNC: 6 MMOL/L (ref 5–15)
APPEARANCE UR: CLEAR
AST SERPL-CCNC: 15 U/L (ref 15–37)
ATRIAL RATE: 75 BPM
BACTERIA URNS QL MICRO: NEGATIVE /HPF
BASOPHILS # BLD: 0 K/UL (ref 0–0.1)
BASOPHILS NFR BLD: 1 % (ref 0–1)
BILIRUB SERPL-MCNC: 0.7 MG/DL (ref 0.2–1)
BILIRUB UR QL: NEGATIVE
BUN SERPL-MCNC: 12 MG/DL (ref 6–20)
BUN/CREAT SERPL: 9 (ref 12–20)
CALCIUM SERPL-MCNC: 8.2 MG/DL (ref 8.5–10.1)
CALCULATED R AXIS, ECG10: 108 DEGREES
CALCULATED T AXIS, ECG11: -11 DEGREES
CHLORIDE SERPL-SCNC: 107 MMOL/L (ref 97–108)
CO2 SERPL-SCNC: 29 MMOL/L (ref 21–32)
COLOR UR: ABNORMAL
COMMENT, HOLDF: NORMAL
CREAT SERPL-MCNC: 1.29 MG/DL (ref 0.7–1.3)
DIAGNOSIS, 93000: NORMAL
DIFFERENTIAL METHOD BLD: NORMAL
ECHO AO ASC DIAM: 3.6 CM
ECHO AO ASCENDING AORTA INDEX: 1.56 CM/M2
ECHO AO ROOT DIAM: 3.2 CM
ECHO AO ROOT INDEX: 1.39 CM/M2
ECHO AV AREA PEAK VELOCITY: 2.5 CM2
ECHO AV AREA/BSA PEAK VELOCITY: 1.1 CM2/M2
ECHO AV PEAK GRADIENT: 10 MMHG
ECHO AV PEAK VELOCITY: 1.6 M/S
ECHO AV VELOCITY RATIO: 0.63
ECHO LV EDV A2C: 105 ML
ECHO LV EDV A4C: 135 ML
ECHO LV EDV BP: 126 ML (ref 67–155)
ECHO LV EDV INDEX A4C: 58 ML/M2
ECHO LV EDV INDEX BP: 55 ML/M2
ECHO LV EDV NDEX A2C: 45 ML/M2
ECHO LV EJECTION FRACTION A2C: 14 %
ECHO LV EJECTION FRACTION A4C: 43 %
ECHO LV EJECTION FRACTION BIPLANE: 32 % (ref 55–100)
ECHO LV ESV A2C: 91 ML
ECHO LV ESV A4C: 77 ML
ECHO LV ESV BP: 85 ML (ref 22–58)
ECHO LV ESV INDEX A2C: 39 ML/M2
ECHO LV ESV INDEX A4C: 33 ML/M2
ECHO LV ESV INDEX BP: 37 ML/M2
ECHO LV FRACTIONAL SHORTENING: 17 % (ref 28–44)
ECHO LV INTERNAL DIMENSION DIASTOLE INDEX: 2.73 CM/M2
ECHO LV INTERNAL DIMENSION DIASTOLIC: 6.3 CM (ref 4.2–5.9)
ECHO LV INTERNAL DIMENSION SYSTOLIC INDEX: 2.25 CM/M2
ECHO LV INTERNAL DIMENSION SYSTOLIC: 5.2 CM
ECHO LV IVSD: 1.3 CM (ref 0.6–1)
ECHO LV MASS 2D: 359.5 G (ref 88–224)
ECHO LV MASS INDEX 2D: 155.6 G/M2 (ref 49–115)
ECHO LV POSTERIOR WALL DIASTOLIC: 1.2 CM (ref 0.6–1)
ECHO LV RELATIVE WALL THICKNESS RATIO: 0.38
ECHO LVOT AREA: 3.8 CM2
ECHO LVOT DIAM: 2.2 CM
ECHO LVOT PEAK GRADIENT: 4 MMHG
ECHO LVOT PEAK VELOCITY: 1 M/S
ECHO MV MAX VELOCITY: 1 M/S
ECHO MV MEAN GRADIENT: 1 MMHG
ECHO MV MEAN VELOCITY: 0.5 M/S
ECHO MV PEAK GRADIENT: 4 MMHG
ECHO MV VTI: 26.6 CM
EOSINOPHIL # BLD: 0.1 K/UL (ref 0–0.4)
EOSINOPHIL NFR BLD: 1 % (ref 0–7)
EPITH CASTS URNS QL MICRO: ABNORMAL /LPF
ERYTHROCYTE [DISTWIDTH] IN BLOOD BY AUTOMATED COUNT: 14.3 % (ref 11.5–14.5)
GLOBULIN SER CALC-MCNC: 4.4 G/DL (ref 2–4)
GLUCOSE BLD STRIP.AUTO-MCNC: 104 MG/DL (ref 65–117)
GLUCOSE BLD STRIP.AUTO-MCNC: 157 MG/DL (ref 65–117)
GLUCOSE BLD STRIP.AUTO-MCNC: 196 MG/DL (ref 65–117)
GLUCOSE BLD STRIP.AUTO-MCNC: 212 MG/DL (ref 65–117)
GLUCOSE SERPL-MCNC: 128 MG/DL (ref 65–100)
GLUCOSE UR STRIP.AUTO-MCNC: NEGATIVE MG/DL
HCT VFR BLD AUTO: 41.5 % (ref 36.6–50.3)
HGB BLD-MCNC: 13.8 G/DL (ref 12.1–17)
HGB UR QL STRIP: NEGATIVE
HYALINE CASTS URNS QL MICRO: ABNORMAL /LPF (ref 0–2)
IMM GRANULOCYTES # BLD AUTO: 0 K/UL (ref 0–0.04)
IMM GRANULOCYTES NFR BLD AUTO: 0 % (ref 0–0.5)
INR PPP: 1.1 (ref 0.9–1.1)
KETONES UR QL STRIP.AUTO: NEGATIVE MG/DL
LEFT CCA DIST DIAS: 15.5 CM/S
LEFT CCA DIST SYS: 58.6 CM/S
LEFT CCA PROX DIAS: 15.5 CM/S
LEFT CCA PROX SYS: 59.9 CM/S
LEFT ECA DIAS: 11.6 CM/S
LEFT ECA SYS: 52 CM/S
LEFT ICA DIST DIAS: 15.5 CM/S
LEFT ICA DIST SYS: 39 CM/S
LEFT ICA MID DIAS: 10.3 CM/S
LEFT ICA MID SYS: 28.6 CM/S
LEFT ICA PROX DIAS: 9 CM/S
LEFT ICA PROX SYS: 26 CM/S
LEFT ICA/CCA SYS: 0.7 NO UNITS
LEFT VERTEBRAL DIAS: 10.3 CM/S
LEFT VERTEBRAL SYS: 28.6 CM/S
LEUKOCYTE ESTERASE UR QL STRIP.AUTO: NEGATIVE
LYMPHOCYTES # BLD: 2.5 K/UL (ref 0.8–3.5)
LYMPHOCYTES NFR BLD: 40 % (ref 12–49)
MCH RBC QN AUTO: 27.9 PG (ref 26–34)
MCHC RBC AUTO-ENTMCNC: 33.3 G/DL (ref 30–36.5)
MCV RBC AUTO: 83.8 FL (ref 80–99)
MONOCYTES # BLD: 0.7 K/UL (ref 0–1)
MONOCYTES NFR BLD: 10 % (ref 5–13)
NEUTS SEG # BLD: 3 K/UL (ref 1.8–8)
NEUTS SEG NFR BLD: 48 % (ref 32–75)
NITRITE UR QL STRIP.AUTO: NEGATIVE
NRBC # BLD: 0 K/UL (ref 0–0.01)
NRBC BLD-RTO: 0 PER 100 WBC
PH UR STRIP: 7 (ref 5–8)
PLATELET # BLD AUTO: 170 K/UL (ref 150–400)
PMV BLD AUTO: 12.9 FL (ref 8.9–12.9)
POTASSIUM SERPL-SCNC: 3.1 MMOL/L (ref 3.5–5.1)
PROT SERPL-MCNC: 8 G/DL (ref 6.4–8.2)
PROT UR STRIP-MCNC: 30 MG/DL
PROTHROMBIN TIME: 11.7 SEC (ref 9–11.1)
Q-T INTERVAL, ECG07: 456 MS
QRS DURATION, ECG06: 100 MS
QTC CALCULATION (BEZET), ECG08: 506 MS
RBC # BLD AUTO: 4.95 M/UL (ref 4.1–5.7)
RBC #/AREA URNS HPF: ABNORMAL /HPF (ref 0–5)
RIGHT CCA DIST DIAS: 14.2 CM/S
RIGHT CCA DIST SYS: 48.1 CM/S
RIGHT CCA PROX DIAS: 11.6 CM/S
RIGHT CCA PROX SYS: 57.3 CM/S
RIGHT ECA DIAS: 11.6 CM/S
RIGHT ECA SYS: 50.7 CM/S
RIGHT ICA DIST DIAS: 12.9 CM/S
RIGHT ICA DIST SYS: 37.7 CM/S
RIGHT ICA MID DIAS: 12.9 CM/S
RIGHT ICA MID SYS: 35.1 CM/S
RIGHT ICA PROX DIAS: 10.3 CM/S
RIGHT ICA PROX SYS: 24.6 CM/S
RIGHT ICA/CCA SYS: 0.8 NO UNITS
RIGHT VERTEBRAL DIAS: 6.4 CM/S
RIGHT VERTEBRAL SYS: 32.5 CM/S
SAMPLES BEING HELD,HOLD: NORMAL
SERVICE CMNT-IMP: ABNORMAL
SERVICE CMNT-IMP: NORMAL
SODIUM SERPL-SCNC: 142 MMOL/L (ref 136–145)
SP GR UR REFRACTOMETRY: 1.02
UA: UC IF INDICATED,UAUC: ABNORMAL
UROBILINOGEN UR QL STRIP.AUTO: 0.2 EU/DL (ref 0.2–1)
VAS LEFT SUBCLAVIAN PROX EDV: 0 CM/S
VAS LEFT SUBCLAVIAN PROX PSV: 54.6 CM/S
VAS RIGHT SUBCLAVIAN PROX EDV: 0 CM/S
VAS RIGHT SUBCLAVIAN PROX PSV: 34.4 CM/S
VENTRICULAR RATE, ECG03: 74 BPM
WBC # BLD AUTO: 6.3 K/UL (ref 4.1–11.1)
WBC URNS QL MICRO: ABNORMAL /HPF (ref 0–4)

## 2023-02-28 PROCEDURE — 80053 COMPREHEN METABOLIC PANEL: CPT

## 2023-02-28 PROCEDURE — 74011000250 HC RX REV CODE- 250: Performed by: STUDENT IN AN ORGANIZED HEALTH CARE EDUCATION/TRAINING PROGRAM

## 2023-02-28 PROCEDURE — 4A03X5D MEASUREMENT OF ARTERIAL FLOW, INTRACRANIAL, EXTERNAL APPROACH: ICD-10-PCS | Performed by: INTERNAL MEDICINE

## 2023-02-28 PROCEDURE — 92610 EVALUATE SWALLOWING FUNCTION: CPT

## 2023-02-28 PROCEDURE — 93005 ELECTROCARDIOGRAM TRACING: CPT

## 2023-02-28 PROCEDURE — 93880 EXTRACRANIAL BILAT STUDY: CPT

## 2023-02-28 PROCEDURE — 70450 CT HEAD/BRAIN W/O DYE: CPT

## 2023-02-28 PROCEDURE — 81001 URINALYSIS AUTO W/SCOPE: CPT

## 2023-02-28 PROCEDURE — 94762 N-INVAS EAR/PLS OXIMTRY CONT: CPT

## 2023-02-28 PROCEDURE — 70551 MRI BRAIN STEM W/O DYE: CPT

## 2023-02-28 PROCEDURE — 82962 GLUCOSE BLOOD TEST: CPT

## 2023-02-28 PROCEDURE — 65270000046 HC RM TELEMETRY

## 2023-02-28 PROCEDURE — 85610 PROTHROMBIN TIME: CPT

## 2023-02-28 PROCEDURE — 70496 CT ANGIOGRAPHY HEAD: CPT

## 2023-02-28 PROCEDURE — 74011000636 HC RX REV CODE- 636: Performed by: EMERGENCY MEDICINE

## 2023-02-28 PROCEDURE — 93308 TTE F-UP OR LMTD: CPT

## 2023-02-28 PROCEDURE — 85025 COMPLETE CBC W/AUTO DIFF WBC: CPT

## 2023-02-28 PROCEDURE — 74011636637 HC RX REV CODE- 636/637: Performed by: STUDENT IN AN ORGANIZED HEALTH CARE EDUCATION/TRAINING PROGRAM

## 2023-02-28 PROCEDURE — 74011250637 HC RX REV CODE- 250/637: Performed by: EMERGENCY MEDICINE

## 2023-02-28 PROCEDURE — 99223 1ST HOSP IP/OBS HIGH 75: CPT | Performed by: PSYCHIATRY & NEUROLOGY

## 2023-02-28 PROCEDURE — 0042T CT CODE NEURO PERF W CBF: CPT

## 2023-02-28 PROCEDURE — 36415 COLL VENOUS BLD VENIPUNCTURE: CPT

## 2023-02-28 PROCEDURE — 74011250637 HC RX REV CODE- 250/637: Performed by: STUDENT IN AN ORGANIZED HEALTH CARE EDUCATION/TRAINING PROGRAM

## 2023-02-28 PROCEDURE — 93880 EXTRACRANIAL BILAT STUDY: CPT | Performed by: PSYCHIATRY & NEUROLOGY

## 2023-02-28 PROCEDURE — 99285 EMERGENCY DEPT VISIT HI MDM: CPT

## 2023-02-28 RX ORDER — SODIUM CHLORIDE 0.9 % (FLUSH) 0.9 %
5-40 SYRINGE (ML) INJECTION EVERY 8 HOURS
Status: DISCONTINUED | OUTPATIENT
Start: 2023-02-28 | End: 2023-03-09 | Stop reason: HOSPADM

## 2023-02-28 RX ORDER — LISINOPRIL 20 MG/1
40 TABLET ORAL DAILY
Status: DISCONTINUED | OUTPATIENT
Start: 2023-02-28 | End: 2023-03-09 | Stop reason: HOSPADM

## 2023-02-28 RX ORDER — ACETAMINOPHEN 325 MG/1
650 TABLET ORAL
Status: DISCONTINUED | OUTPATIENT
Start: 2023-02-28 | End: 2023-03-09 | Stop reason: HOSPADM

## 2023-02-28 RX ORDER — POTASSIUM CHLORIDE 750 MG/1
40 TABLET, FILM COATED, EXTENDED RELEASE ORAL
Status: COMPLETED | OUTPATIENT
Start: 2023-02-28 | End: 2023-02-28

## 2023-02-28 RX ORDER — ATORVASTATIN CALCIUM 40 MG/1
80 TABLET, FILM COATED ORAL
Status: DISCONTINUED | OUTPATIENT
Start: 2023-02-28 | End: 2023-03-09 | Stop reason: HOSPADM

## 2023-02-28 RX ORDER — POLYETHYLENE GLYCOL 3350 17 G/17G
17 POWDER, FOR SOLUTION ORAL DAILY PRN
Status: DISCONTINUED | OUTPATIENT
Start: 2023-02-28 | End: 2023-03-07

## 2023-02-28 RX ORDER — DEXTROSE MONOHYDRATE 100 MG/ML
0-250 INJECTION, SOLUTION INTRAVENOUS AS NEEDED
Status: DISCONTINUED | OUTPATIENT
Start: 2023-02-28 | End: 2023-03-09 | Stop reason: HOSPADM

## 2023-02-28 RX ORDER — AMLODIPINE BESYLATE 5 MG/1
10 TABLET ORAL DAILY
Status: DISCONTINUED | OUTPATIENT
Start: 2023-02-28 | End: 2023-03-09 | Stop reason: HOSPADM

## 2023-02-28 RX ORDER — CARVEDILOL 12.5 MG/1
12.5 TABLET ORAL 2 TIMES DAILY WITH MEALS
Status: DISCONTINUED | OUTPATIENT
Start: 2023-02-28 | End: 2023-03-09 | Stop reason: HOSPADM

## 2023-02-28 RX ORDER — ASPIRIN 81 MG/1
81 TABLET ORAL DAILY
Status: DISCONTINUED | OUTPATIENT
Start: 2023-02-28 | End: 2023-03-01

## 2023-02-28 RX ORDER — ONDANSETRON 4 MG/1
4 TABLET, ORALLY DISINTEGRATING ORAL
Status: DISCONTINUED | OUTPATIENT
Start: 2023-02-28 | End: 2023-03-09 | Stop reason: HOSPADM

## 2023-02-28 RX ORDER — IBUPROFEN 200 MG
4 TABLET ORAL AS NEEDED
Status: DISCONTINUED | OUTPATIENT
Start: 2023-02-28 | End: 2023-03-09 | Stop reason: HOSPADM

## 2023-02-28 RX ORDER — LEVETIRACETAM 500 MG/1
1000 TABLET ORAL 2 TIMES DAILY
Status: DISCONTINUED | OUTPATIENT
Start: 2023-02-28 | End: 2023-03-09 | Stop reason: HOSPADM

## 2023-02-28 RX ORDER — LABETALOL HYDROCHLORIDE 5 MG/ML
5 INJECTION, SOLUTION INTRAVENOUS
Status: DISCONTINUED | OUTPATIENT
Start: 2023-02-28 | End: 2023-03-09 | Stop reason: HOSPADM

## 2023-02-28 RX ORDER — ACETAMINOPHEN 650 MG/1
650 SUPPOSITORY RECTAL
Status: DISCONTINUED | OUTPATIENT
Start: 2023-02-28 | End: 2023-03-09 | Stop reason: HOSPADM

## 2023-02-28 RX ORDER — ALBUTEROL SULFATE 0.83 MG/ML
10 SOLUTION RESPIRATORY (INHALATION)
Status: DISCONTINUED | OUTPATIENT
Start: 2023-02-28 | End: 2023-03-09 | Stop reason: HOSPADM

## 2023-02-28 RX ORDER — ONDANSETRON 2 MG/ML
4 INJECTION INTRAMUSCULAR; INTRAVENOUS
Status: DISCONTINUED | OUTPATIENT
Start: 2023-02-28 | End: 2023-03-09 | Stop reason: HOSPADM

## 2023-02-28 RX ORDER — SODIUM CHLORIDE 0.9 % (FLUSH) 0.9 %
5-40 SYRINGE (ML) INJECTION AS NEEDED
Status: DISCONTINUED | OUTPATIENT
Start: 2023-02-28 | End: 2023-03-09 | Stop reason: HOSPADM

## 2023-02-28 RX ORDER — HYDRALAZINE HYDROCHLORIDE 10 MG/1
10 TABLET, FILM COATED ORAL 3 TIMES DAILY
Status: DISCONTINUED | OUTPATIENT
Start: 2023-02-28 | End: 2023-03-03

## 2023-02-28 RX ORDER — INSULIN LISPRO 100 [IU]/ML
INJECTION, SOLUTION INTRAVENOUS; SUBCUTANEOUS
Status: DISCONTINUED | OUTPATIENT
Start: 2023-02-28 | End: 2023-03-09 | Stop reason: HOSPADM

## 2023-02-28 RX ADMIN — IOPAMIDOL 100 ML: 755 INJECTION, SOLUTION INTRAVENOUS at 03:41

## 2023-02-28 RX ADMIN — SODIUM CHLORIDE, PRESERVATIVE FREE 10 ML: 5 INJECTION INTRAVENOUS at 17:50

## 2023-02-28 RX ADMIN — SODIUM CHLORIDE, PRESERVATIVE FREE 10 ML: 5 INJECTION INTRAVENOUS at 22:52

## 2023-02-28 RX ADMIN — APIXABAN 5 MG: 5 TABLET, FILM COATED ORAL at 10:01

## 2023-02-28 RX ADMIN — ASPIRIN 81 MG: 81 TABLET, COATED ORAL at 10:01

## 2023-02-28 RX ADMIN — Medication 2 UNITS: at 17:47

## 2023-02-28 RX ADMIN — LEVETIRACETAM 1000 MG: 500 TABLET, FILM COATED ORAL at 10:01

## 2023-02-28 RX ADMIN — APIXABAN 5 MG: 5 TABLET, FILM COATED ORAL at 17:47

## 2023-02-28 RX ADMIN — LEVETIRACETAM 1000 MG: 500 TABLET, FILM COATED ORAL at 17:47

## 2023-02-28 RX ADMIN — POTASSIUM CHLORIDE 40 MEQ: 750 TABLET, FILM COATED, EXTENDED RELEASE ORAL at 05:52

## 2023-02-28 RX ADMIN — Medication 2 UNITS: at 12:09

## 2023-02-28 RX ADMIN — SODIUM CHLORIDE, PRESERVATIVE FREE 10 ML: 5 INJECTION INTRAVENOUS at 10:03

## 2023-02-28 RX ADMIN — ATORVASTATIN CALCIUM 80 MG: 40 TABLET, FILM COATED ORAL at 22:35

## 2023-02-28 RX ADMIN — Medication 2 UNITS: at 22:51

## 2023-02-28 NOTE — ED NOTES
ADMISSION SBAR NOTE    IP UNIT CALLED NOTE IS READY: Yes Spoke to Gabino    IF there are questions Call transferring nurse (your name) China Cisneros at phone # [phone] (before 1953) or next nurse after shift change at same extension    SITUATION/BACKGROUND:    Patient is being transferred to [address], Room# 135    Patient's Chief Complaint on arrival to ED was L sided weakness and is admitted for stroke work up. CODE STATUS: Prior    VITAL SIGNS (MUST BE WITHIN 1 HOUR OF TRANSFER TO IP UNIT:    TEMP: 98.3  PULSE: 74  RESP: 20  BP: 162/90  PAIN SCORE: 0  MEWS:      ISOLATION/PRECAUTIONS: No   ISOLATION TYPE: None    Called outstanding consults: No      Are there sign and held orders that need to be released? No   Are all STAT orders completed: Yes    STAT labs collected: Yes  REPEAT LACTIC ACID DUE? No  TIME DUE: N/A    Are there any titrating drips? No   If so what? The following personal items will be sent with the patient during transfer to the floor:   All valuables:   ITEM:    ITEM:    ITEM:           ASSESSMENT:    CIWA Assessment: No  Last Score:     NEURO:   NIH SCORE:   Total: 8 (02/28/23 0600)    [place] SCREENING:   Swallow Screening  Is the Patient Unable to Remain Alert for Testing?: No (02/28/23 0300)  Is the Patient on a Modified Diet (Thickened Liquids) Due to Pre-existing Dysphagia?: No (02/28/23 0300)  Is There Presence of Existing Enteral Tube Feeding via the Stomach or Nose?: No (02/28/23 0300)  Is There Presence of Head-of-Bed Restrictions (Less than 30 Degrees)?: No (02/28/23 0300)  Is There Presence of Tracheotomy Tube?: No (02/28/23 0300)  Is the Patient Ordered Nothing-by-Mouth Status?: No (02/28/23 0300)  3 oz Water Swallow Screen: Pass (02/28/23 0300)      NEURO ASSESSMENT:   Neuro  Neurologic State: Alert, Confused (02/28/23 0257)  Orientation Level: Disoriented to time (02/28/23 0257)  Cognition: Appropriate decision making, Appropriate safety awareness (02/28/23 0257)  Speech: Appropriate for age, Clear (02/28/23 0257)  Assessment L Pupil: Brisk (02/28/23 0257)  Size L Pupil (mm): 2 (02/28/23 0257)  Assessment R Pupil: Brisk (02/28/23 0257)  Size R Pupil (mm): 2 (02/28/23 0257)  LUE Motor Response: Flaccid (02/28/23 0257)  LLE Motor Response: Flaccid (02/28/23 0257)  RUE Motor Response: Spontaneous  (02/28/23 0257)  RLE Motor Response: Spontaneous  (02/28/23 0257)    Is patient impulsive? No   Is patient oriented? Yes   Do they follow commands? Yes  Is the patient ambulatory? No  Device need     FALL RISK? Yes   Is the [place] 1 Assessment completed? Yes  INTERVENTIONS: Kept in bed; using urinal; rails up    INTEGUMENTARY:   IS THE PATIENT UNDRESSED? Yes  WOUNDS PRESENT? No  On admit to ED No   ARE THE WOUNDS DOCUMENTED? No     RESPIRATORY:   Is patient on Oxygen? No    OXYGEN: Oxygen Therapy  O2 Device: None (02/28/23 0400)    CARDIAC:   Is cardiac monitoring ordered? Yes Last Rhythm: Afib  Patient to transfer with tele box on? Yes  Is patient using a LIFE VEST? No     LINE ACCESS:   Peripheral IV 02/28/23 Right Antecubital (Active)   Site Assessment Clean, dry, & intact 02/28/23 0243   Phlebitis Assessment 0 02/28/23 0243   Infiltration Assessment 0 02/28/23 0243   Dressing Status Clean, dry, & intact 02/28/23 0243   Dressing Type Transparent 02/28/23 0243   Hub Color/Line Status Patent; Flushed;Capped 02/28/23 0243   Action Taken Blood drawn 02/28/23 0243       Peripheral IV 02/28/23 Left Antecubital (Active)   Site Assessment Clean, dry, & intact 02/28/23 0252   Phlebitis Assessment 0 02/28/23 0252   Infiltration Assessment 0 02/28/23 0252   Dressing Status Clean, dry, & intact 02/28/23 0252   Dressing Type Transparent 02/28/23 0252   Hub Color/Line Status Patent; Flushed;Capped 02/28/23 0252   Action Taken Blood drawn 02/28/23 0252        /GI:   CONTINENT BOWEL/BLADDER? Yes  URINARY OUTPUT: voiding   Written Order for [place] Cath? No   CHRONIC OR ACUTE?     If CHRONIC, is it 1days old, was it changed prior to specimen collection? No   WAS UA WITH REFLEX SENT TO LAB? Yes IF NO,  COLLECT AND SEND PRIOR TO TRANSPORT TO INPATIENT AREA    RESTRAINTS IN USE: No      IS DOCUMENTATION COMPLETE: No   Is there a current Order? No  When does it ?  N.A    Additional details as Needed:

## 2023-02-28 NOTE — PROGRESS NOTES
Physical Therapy    Acknowledged new order and chart reviewed, H and P and neuro consult not yet in chart and patient still pending MRI. Plan to defer today and will see in a.m tomorrow.     Rosalio Sagastume

## 2023-02-28 NOTE — CONSULTS
Consult  REFERRED BY:  Nate Sheldon MD    CHIEF COMPLAINT: Left-sided weakness      Subjective: Sylvia Truong is a 74 y.o. right-handed -American male we are seen as a new patient, at the request of the hospitalist, for evaluation of new left-sided weakness that occurred when he tried to get out of bed this morning and found that he could not walk because his left side was too weak and came to the hospital for further evaluation. He denies any chest pain or palpitations denies any trauma or fever, and says he still feels weak on that side. He had a past stroke in the past I think around 2021 and involve the right side of the brain, left him with residual left-sided weakness that was mild that he was able to ambulate with a cane. His CT of the head and CTA of the head neck showed no new lesions and no new obstruction on today's study. MRI is still pending at this time. His carotid Doppler studies do not show any high-grade stenosis on today's exam.  He is on Eliquis and aspirin therapy. He has a history of A-fib. He denies any new chest pain or palpitations at this time.     Past Medical History:   Diagnosis Date    Atrial fibrillation ([address]     Cancer ([facility] 12/2015    left lung; 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    Congestive heart failure, unspecified     Dissection of right carotid artery ([address] 09/2018    Hypertension     Overweight(278.02)     Seizures ([address]     SOLITARY PULMONARY NODULE     1.6 cm    Stroke [facility]) 2015    right thalamus      Past Surgical History:   Procedure Laterality Date    RI UNLISTED PROCEDURE LUNGS & PLEURA      VATS Video-assisted thoracic surgery     Family History   Problem Relation Age of Onset    Stroke Mother     Cancer Father       Social History     Tobacco Use    Smoking status: Light Smoker     Packs/day: 0.10     Types: Cigarettes     Start date: 6/1/2015    Smokeless tobacco: Never    Tobacco comments:     former cigar   Substance Use Topics    Alcohol use: Not Currently     Comment: social         Current Facility-Administered Medications:     albuterol (PROVENTIL VENTOLIN) nebulizer solution 10 mg, 10 mg, Inhalation, Q4H PRN, Angelia Hercules MD    [Held by provider] amLODIPine (NORVASC) tablet 10 mg, 10 mg, Oral, DAILY, Angelia Hercules MD    apixaban Milagro Labrador) tablet 5 mg, 5 mg, Oral, BID, Angelia Hercules MD, 5 mg at 02/28/23 1001    aspirin delayed-release tablet 81 mg, 81 mg, Oral, DAILY, Angelia Hercules MD, 81 mg at 02/28/23 1001    atorvastatin (LIPITOR) tablet 80 mg, 80 mg, Oral, QHS, Angelia Hercules MD    [facility] by provider] hydrALAZINE (APRESOLINE) tablet 10 mg, 10 mg, Oral, TID, Angelia Hercules MD    levETIRAcetam (KEPPRA) tablet 1,000 mg, 1,000 mg, Oral, BID, Angelia Hercules MD, 1,000 mg at 02/28/23 1001    [Held by provider] lisinopriL (PRINIVIL, ZESTRIL) tablet 40 mg, 40 mg, Oral, DAILY, Angelia Hercules MD    [facility] by provider] carvediloL (COREG) tablet 12.5 mg, 12.5 mg, Oral, BID WITH MEALS, Angelia Hercules MD    sodium chloride (NS) flush 5-40 mL, 5-40 mL, IntraVENous, Q8H, Angelia Hercules MD, 10 mL at 02/28/23 1003    sodium chloride (NS) flush 5-40 mL, 5-40 mL, IntraVENous, PRN, Angelia Hercules MD    acetaminophen (TYLENOL) tablet 650 mg, 650 mg, Oral, Q6H PRN **OR** acetaminophen (TYLENOL) suppository 650 mg, 650 mg, Rectal, Q6H PRN, Angelia Hercules MD    polyethylene glycol (MIRALAX) packet 17 g, 17 g, Oral, DAILY PRN, Angelia Hercules MD    ondansetron (ZOFRAN ODT) tablet 4 mg, 4 mg, Oral, Q8H PRN **OR** ondansetron (ZOFRAN) injection 4 mg, 4 mg, IntraVENous, Q6H PRN, Angelia Hercules MD    labetaloL (NORMODYNE;TRANDATE) injection 5 mg, 5 mg, IntraVENous, Q10MIN PRN, Angelia Hercules MD    insulin lispro (HUMALOG) injection, , SubCUTAneous, AC&HS, Angelia Hercules MD, 2 Units at 02/28/23 1209    glucose chewable tablet 16 g, 4 Tablet, Oral, PRN, Angelia Hercules MD    glucagon [facility]) injection 1 mg, 1 mg, IntraMUSCular, PRN, Yvrose Delgado MD    dextrose 10% infusion 0-250 mL, 0-250 mL, IntraVENous, PRN, Yvrose Delgado MD        No Known Allergies   MRI Results (most recent):  Results from [place] encounter on 03/06/21    MRI CERV SPINE WO CONT    Narrative  EXAM:  MRI CERV SPINE WO CONT    INDICATION:   multiple cervical stenosis with left sided weakness and numbness  and transient right sided weakness    COMPARISON: CTA head and neck 3/6/2021. TECHNIQUE: Multiplanar multisequence acquisition without contrast of the  cervical spine. CONTRAST: None. FINDINGS:  There is normal alignment of the cervical spine. Vertebral body heights are  maintained without evidence of acute fracture. Marrow signal is normal.  Congenitally narrow spinal canal. Multilevel degenerative disc disease and facet  arthropathy as detailed below. There is focal myelomalacia at the level of C4  (series 3 image 6). No other definite cord signal abnormality is identified. Region of the foramen magnum is unremarkable. Visualized soft tissues are  unremarkable. C2-C3: Mild bilateral facet arthropathy. Small central disc protrusion. Mild  spinal canal stenosis. No significant neural foraminal stenosis. C3-C4: Diffuse disc osteophyte complex with superimposed central disc extrusion  with associated annular fissure. Mild left facet arthropathy. Severe spinal  canal stenosis with cord compression. Moderate bilateral neural foraminal  stenosis. C4-C5: Diffuse disc osteophyte complex with bilateral uncovertebral spurring,  left worse than right. Severe spinal canal stenosis with cord compression and  myelomalacia at the level of C4. Severe left and moderate right neural foraminal  stenosis. C5-C6: Diffuse disc osteophyte complex with bilateral uncovertebral spurring. Moderate to severe spinal canal stenosis with cord compression. Severe bilateral  neural foraminal stenosis.     C6-C7: Diffuse disc osteophyte complex with bilateral uncovertebral spurring. Mild spinal canal stenosis. Severe bilateral neural foraminal stenosis. C7-T1: Mild bilateral facet arthropathy. No significant disc herniation, spinal  canal or neural foraminal stenosis. Impression  1. Multilevel degenerative disc disease and facet arthropathy superimposed on a  congenitally narrow spinal canal as detailed above. 2. Severe spinal canal stenosis with cord compression at C4-C5 with myelomalacia  at the level of C4. Severe left and moderate right neural foraminal stenosis at  C4-C5. 3. Severe spinal canal stenosis with cord compression at C3-C4 secondary to a  diffuse disc osteophyte complex with superimposed central disc extrusion. No  definite cord signal abnormality. Moderate bilateral neural foraminal stenosis  at C3-C4. 4. Moderate to severe spinal canal stenosis with cord compression at C5-C6. No  definite cord signal abnormality. Severe bilateral neural foraminal stenosis at  C5-C6. 5. Mild spinal canal stenosis and severe bilateral neural foraminal stenosis at  C6-C7. 6. Remaining degenerative changes as detailed above. Results from [place] encounter on 03/06/21    MRI CERV SPINE WO CONT    Narrative  EXAM:  MRI CERV SPINE WO CONT    INDICATION:   multiple cervical stenosis with left sided weakness and numbness  and transient right sided weakness    COMPARISON: CTA head and neck 3/6/2021. TECHNIQUE: Multiplanar multisequence acquisition without contrast of the  cervical spine. CONTRAST: None. FINDINGS:  There is normal alignment of the cervical spine. Vertebral body heights are  maintained without evidence of acute fracture. Marrow signal is normal.  Congenitally narrow spinal canal. Multilevel degenerative disc disease and facet  arthropathy as detailed below. There is focal myelomalacia at the level of C4  (series 3 image 6). No other definite cord signal abnormality is identified.   Region of the foramen magnum is unremarkable. Visualized soft tissues are  unremarkable. C2-C3: Mild bilateral facet arthropathy. Small central disc protrusion. Mild  spinal canal stenosis. No significant neural foraminal stenosis. C3-C4: Diffuse disc osteophyte complex with superimposed central disc extrusion  with associated annular fissure. Mild left facet arthropathy. Severe spinal  canal stenosis with cord compression. Moderate bilateral neural foraminal  stenosis. C4-C5: Diffuse disc osteophyte complex with bilateral uncovertebral spurring,  left worse than right. Severe spinal canal stenosis with cord compression and  myelomalacia at the level of C4. Severe left and moderate right neural foraminal  stenosis. C5-C6: Diffuse disc osteophyte complex with bilateral uncovertebral spurring. Moderate to severe spinal canal stenosis with cord compression. Severe bilateral  neural foraminal stenosis. C6-C7: Diffuse disc osteophyte complex with bilateral uncovertebral spurring. Mild spinal canal stenosis. Severe bilateral neural foraminal stenosis. C7-T1: Mild bilateral facet arthropathy. No significant disc herniation, spinal  canal or neural foraminal stenosis. Impression  1. Multilevel degenerative disc disease and facet arthropathy superimposed on a  congenitally narrow spinal canal as detailed above. 2. Severe spinal canal stenosis with cord compression at C4-C5 with myelomalacia  at the level of C4. Severe left and moderate right neural foraminal stenosis at  C4-C5. 3. Severe spinal canal stenosis with cord compression at C3-C4 secondary to a  diffuse disc osteophyte complex with superimposed central disc extrusion. No  definite cord signal abnormality. Moderate bilateral neural foraminal stenosis  at C3-C4. 4. Moderate to severe spinal canal stenosis with cord compression at C5-C6. No  definite cord signal abnormality. Severe bilateral neural foraminal stenosis at  C5-C6.   5. Mild spinal canal stenosis and severe bilateral neural foraminal stenosis at  C6-C7. 6. Remaining degenerative changes as detailed above. Review of Systems:  A comprehensive review of systems was negative except for: Constitutional: positive for fatigue and malaise  Musculoskeletal: positive for myalgias, arthralgias, stiff joints, and muscle weakness  Neurological: positive for memory problems, coordination problems, gait problems, and weakness  Behvioral/Psych: positive for depression   Vitals:    02/28/23 0700 02/28/23 0821 02/28/23 1145 02/28/23 1259   BP: (!) 157/106 (!) 163/115 (!) 152/109 (!) 152/109   Pulse: 70 61 (!) 45    Resp: 22 17 20    Temp:  97.9 °F (36.6 °C) 97.7 °F (36.5 °C)    SpO2: 98%  98%    Weight:    236 lb (107 kg)   Height:    6' 0.99\" (1.854 m)     Objective:     I      NEUROLOGICAL EXAM:    Appearance: The patient is well developed, well nourished, provides a poor history and is in no acute distress. Mental Status: Oriented to place and person, and not the date or the president, cognitive function is mildly abnormal and speech is fluent and no aphasia or dysarthria. Mood and affect appropriate but depressed. Cranial Nerves:   Intact visual fields. Fundi are benign, disc are flat, no lesions seen on funduscopy. DEMAR, EOM's full, no nystagmus, no ptosis. Facial sensation is normal. Corneal reflexes are not tested. Facial movement is shows left central facial weakness. Hearing is abnormal bilaterally. Palate is midline with normal sternocleidomastoid and trapezius muscles are normal. Tongue is midline. Neck without meningismus or bruits  Temporal arteries are not tender or enlarged  TMJ areas are not tender on palpation   Motor:  5/5 strength in upper and lower proximal and distal muscles, on the right but fairly prominent weakness on the left side so that he cannot even raise his arm or move his leg. . Normal bulk and tone. No fasciculations.   Rapid alternating movement is decreased on the left Reflexes:   Deep tendon reflexes 2+/4 on the left and 1+/4 on the right. No babinski or clonus present   Sensory:   Normal to touch, pinprick and vibration and temperature decreased in both feet. DSS is intact   Gait:  Not testable    Tremor:   No tremor noted. Cerebellar:  Not testable abnormal cerebellar signs present on Romberg and tandem testing and finger-nose-finger exam.   Neurovascular:  Abnormal heart sounds and irregular rhythm, peripheral pulses intact, and no carotid bruits. Assessment:     Active Problems:    CVA (cerebral vascular accident) ([address] (2/28/2023)      Thrombotic stroke involving right middle cerebral artery ([address] (2/28/2023)      Bilateral carotid artery stenosis (2/28/2023)        Plan:     Patient with new left-sided weakness that looks like a new stroke, and we will await his MRI scan he continue his aspirin and Eliquis for now, but he clearly is much weaker on the left. He is to get an echocardiogram to make sure that he is not going in and out of A-fib as a cause of the stroke. Need to monitor carefully on telemetry to rule out arrhythmias, and may need to change his anticoagulation depending on results of his work-up. His Dopplers look okay I reviewed those personally reviewed his old chart, reviewed his old records, reviewed his old neuro notes, and 70 minutes today spent with the patient going over all this in detail because his diagnosis prognosis and treatment options with him in addition.     Signed By: Russell Guajardo MD     February 28, 2023       CC: Froilan Maier MD  FAX: [phone]

## 2023-02-28 NOTE — PROGRESS NOTES

## 2023-02-28 NOTE — PROGRESS NOTES
Problem: Pressure Injury - Risk of  Goal: *Prevention of pressure injury  Description: Document Don Scale and appropriate interventions in the flowsheet.   Outcome: Progressing Towards Goal  Note: Pressure Injury Interventions:       Moisture Interventions: Absorbent underpads, Internal/External urinary devices    Activity Interventions: PT/OT evaluation    Mobility Interventions: PT/OT evaluation

## 2023-02-28 NOTE — ED PROVIDER NOTES
[facility] EMERGENCY DEPT  EMERGENCY DEPARTMENT ENCOUNTER       Pt Name: Tianna Izquierdo  MRN: [id number]  [place] 1955  Date of evaluation: 2/28/2023  Provider: Gely Palacio MD   PCP: Mariam Ochoa MD  Note Started: 5:49 AM 2/28/23     CHIEF COMPLAINT       Chief Complaint   Patient presents with    Extremity Weakness     Patient arrives by EMS from home for stroke symptoms. Patient had a previous stroke but it left him with no deficits. Patient went to bed at midnight and said he was normal but woke up around 0045 and fell trying to get up to the bathroom. He advised that that was when he noticed that he couldn't move his left side at all. Patient was on the ground and couldn't get up so he had to wait for his family to find him and call EMS which didn't happen until 0145. Currently has complete left side flaccidity. HISTORY OF PRESENT ILLNESS: 1 or more elements      History From: Patient and EMS  HPI Limitations : None     Tianna Izquierdo is a 74 y.o. male with history of atrial fibrillation on Eliquis, hypertension, seizures, right thalamic stroke, right carotid artery dissection, chronic kidney disease, hyperlipidemia, diabetes who presents to ED by EMS for acute onset of left-sided weakness. Patient had fallen asleep on the floor at about midnight which was his last known well time. When he tried to get up off the floor at [phone] to go to bed, he could not get up because his left side was weak. Denies any headache. Denies vision changes, difficulty speaking. EMS reports no facial droop. Per EMS, blood sugar was 135 blood pressure was 185/113. Nursing Notes were all reviewed and agreed with or any disagreements were addressed in the HPI. REVIEW OF SYSTEMS      Review of Systems     Positives and Pertinent negatives as per HPI.     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    Congestive heart failure, unspecified     Dissection of right carotid artery ([facility] [address] 09/2018    Hypertension     Overweight(278.02)     [facility]     SOLITARY PULMONARY NODULE     1.6 cm    Stroke ([facility] 2015    right thalamus       Past Surgical History:  Past Surgical History:   Procedure Laterality Date    NC CHEST SURGERY PROCEDURE UNLISTED      VATS Video-assisted thoracic surgery       Family History:  Family History   Problem Relation Age of Onset    Stroke Mother     Cancer Father        Social History:  Social History     Tobacco Use    Smoking status: Light Smoker     Packs/day: 0.10     Types: Cigarettes     Start date: 6/1/2015    Smokeless tobacco: Never    Tobacco comments:     former cigar   Substance Use Topics    Alcohol use: Not Currently     Comment: social    Drug use: Yes     Types: Prescription, OTC       Allergies:  No Known Allergies    CURRENT MEDICATIONS      Previous Medications    ALBUTEROL (PROVENTIL HFA, VENTOLIN HFA, PROAIR HFA) 90 MCG/ACTUATION INHALER    Take 2 Puffs by inhalation every four (4) hours as needed for Wheezing. AMLODIPINE (NORVASC) 10 MG TABLET    Take 1 tablet by mouth once daily for blood pressure    APIXABAN (ELIQUIS) 5 MG TABLET    Take 1 Tablet by mouth two (2) times a day. ASPIRIN DELAYED-RELEASE 81 MG TABLET    Take 1 Tab by mouth daily. ATORVASTATIN (LIPITOR) 80 MG TABLET    TAKE 1 TABLET BY MOUTH ONCE DAILY AT NIGHT    BLOOD-GLUCOSE METER MONITORING KIT    Check fsbs every day E11    CARVEDILOL (COREG) 12.5 MG TABLET    Take 1 Tablet by mouth two (2) times daily (with meals).     GLIMEPIRIDE (AMARYL) 4 MG TABLET    TAKE 1 TABLET BY MOUTH IN THE MORNING    GLUCOSE BLOOD VI TEST STRIPS (ASCENSIA AUTODISC VI, ONE TOUCH ULTRA TEST VI) STRIP    Check fsbs every day E11    HYDRALAZINE (APRESOLINE) 10 MG TABLET    TAKE 1 TABLET BY MOUTH THREE TIMES DAILY    LANCETS MISC    Check fsbs every day E11    LEVETIRACETAM (KEPPRA) 500 MG TABLET    Take 2 tablets by mouth twice daily    LISINOPRIL (PRINIVIL, ZESTRIL) 40 MG TABLET    TAKE ONE TABLET BY MOUTH ONCE DAILY FOR BLOOD PRESSURE    METFORMIN (GLUCOPHAGE) 500 MG TABLET    Take 2 Tablets by mouth two (2) times daily (with meals). PHYSICAL EXAM      ED Triage Vitals   ED Encounter Vitals Group      BP 02/28/23 0335 (!) 165/105      Pulse (Heart Rate) 02/28/23 0335 69      Resp Rate 02/28/23 0335 21      Temp 02/28/23 0346 98.3 °F (36.8 °C)      Temp src --       O2 Sat (%) 02/28/23 0337 99 %      Weight 02/28/23 0346 236 lb 3.2 oz      Height 02/28/23 0346 6' 1\"        Physical Exam  Vitals and nursing note reviewed. Constitutional:       General: He is not in acute distress. Appearance: Normal appearance. HENT:      Head: Normocephalic. Mouth/Throat:      Mouth: Mucous membranes are moist.   Eyes:      Extraocular Movements: Extraocular movements intact. Pupils: Pupils are equal, round, and reactive to light. Cardiovascular:      Rate and Rhythm: Normal rate. Rhythm irregular. Pulses: Normal pulses. Heart sounds: No murmur heard. Pulmonary:      Effort: Pulmonary effort is normal.      Breath sounds: No wheezing or rales. Abdominal:      General: Abdomen is flat. Tenderness: There is no abdominal tenderness. There is no guarding. Musculoskeletal:         General: No deformity or signs of injury. Normal range of motion. Cervical back: Normal range of motion. No tenderness. Skin:     General: Skin is warm. Capillary Refill: Capillary refill takes less than 2 seconds. Neurological:      Mental Status: He is alert and oriented to person, place, and time. Sensory: No sensory deficit. Motor: Weakness (Flaccid paralysis of left upper and lower extremity) present.    Psychiatric:         Mood and Affect: Mood normal.          DIAGNOSTIC RESULTS   LABS:     Recent Results (from the past 12 hour(s))   GLUCOSE, POC    Collection Time: 02/28/23  2:49 AM   Result Value Ref Range    Glucose (POC) 104 65 - 117 mg/dL    Performed by Vanessa CARABALLO    CBC WITH AUTOMATED DIFF    Collection Time: 02/28/23  2:59 AM   Result Value Ref Range    WBC 6.3 4.1 - 11.1 K/uL    RBC 4.95 4.10 - 5.70 M/uL    HGB 13.8 12.1 - 17.0 g/dL    HCT 41.5 36.6 - 50.3 %    MCV 83.8 80.0 - 99.0 FL    MCH 27.9 26.0 - 34.0 PG    MCHC 33.3 30.0 - 36.5 g/dL    RDW 14.3 11.5 - 14.5 %    PLATELET 545 163 - 979 K/uL    MPV 12.9 8.9 - 12.9 FL    NRBC 0.0 0  WBC    ABSOLUTE NRBC 0.00 0.00 - 0.01 K/uL    NEUTROPHILS 48 32 - 75 %    LYMPHOCYTES 40 12 - 49 %    MONOCYTES 10 5 - 13 %    EOSINOPHILS 1 0 - 7 %    BASOPHILS 1 0 - 1 %    IMMATURE GRANULOCYTES 0 0.0 - 0.5 %    ABS. NEUTROPHILS 3.0 1.8 - 8.0 K/UL    ABS. LYMPHOCYTES 2.5 0.8 - 3.5 K/UL    ABS. MONOCYTES 0.7 0.0 - 1.0 K/UL    ABS. EOSINOPHILS 0.1 0.0 - 0.4 K/UL    ABS. BASOPHILS 0.0 0.0 - 0.1 K/UL    ABS. IMM. GRANS. 0.0 0.00 - 0.04 K/UL    DF AUTOMATED     METABOLIC PANEL, COMPREHENSIVE    Collection Time: 02/28/23  2:59 AM   Result Value Ref Range    Sodium 142 136 - 145 mmol/L    Potassium 3.1 (L) 3.5 - 5.1 mmol/L    Chloride 107 97 - 108 mmol/L    CO2 29 21 - 32 mmol/L    Anion gap 6 5 - 15 mmol/L    Glucose 128 (H) 65 - 100 mg/dL    BUN 12 6 - 20 MG/DL    Creatinine 1.29 0.70 - 1.30 MG/DL    BUN/Creatinine ratio 9 (L) 12 - 20      eGFR >60 >60 ml/min/1.73m2    Calcium 8.2 (L) 8.5 - 10.1 MG/DL    Bilirubin, total 0.7 0.2 - 1.0 MG/DL    ALT (SGPT) 25 12 - 78 U/L    AST (SGOT) 15 15 - 37 U/L    Alk.  phosphatase 71 45 - 117 U/L    Protein, total 8.0 6.4 - 8.2 g/dL    Albumin 3.6 3.5 - 5.0 g/dL    Globulin 4.4 (H) 2.0 - 4.0 g/dL    A-G Ratio 0.8 (L) 1.1 - 2.2     PROTHROMBIN TIME + INR    Collection Time: 02/28/23  2:59 AM   Result Value Ref Range    INR 1.1 0.9 - 1.1      Prothrombin time 11.7 (H) 9.0 - 11.1 sec   SAMPLES BEING HELD    Collection Time: 02/28/23  2:59 AM   Result Value Ref Range    SAMPLES BEING HELD SST     COMMENT        Add-on orders for these samples will be processed based on acceptable specimen integrity and analyte stability, which may vary by analyte. EKG, 12 LEAD, INITIAL    Collection Time: 02/28/23  3:29 AM   Result Value Ref Range    Ventricular Rate 74 BPM    Atrial Rate 75 BPM    QRS Duration 100 ms    Q-T Interval 456 ms    QTC Calculation (Bezet) 506 ms    Calculated R Axis 108 degrees    Calculated T Axis -11 degrees    Diagnosis       Atrial fibrillation with premature ventricular or aberrantly conducted   complexes  Rightward axis  Cannot rule out Anterior infarct , age undetermined  Prolonged QT  When compared with ECG of 07-MAR-2021 01:58,  Vent.  rate has increased BY  27 BPM  QRS axis shifted right  Inverted T waves have replaced nonspecific T wave abnormality in Inferior   leads  QT has lengthened     URINALYSIS W/ REFLEX CULTURE    Collection Time: 02/28/23  4:03 AM    Specimen: Urine   Result Value Ref Range    Color YELLOW/STRAW      Appearance CLEAR CLEAR      Specific gravity 1.025      pH (UA) 7.0 5.0 - 8.0      Protein 30 (A) NEG mg/dL    Glucose Negative NEG mg/dL    Ketone Negative NEG mg/dL    Bilirubin Negative NEG      Blood Negative NEG      Urobilinogen 0.2 0.2 - 1.0 EU/dL    Nitrites Negative NEG      Leukocyte Esterase Negative NEG      UA:UC IF INDICATED CULTURE NOT INDICATED BY UA RESULT CNI      WBC 0-4 0 - 4 /hpf    RBC 0-5 0 - 5 /hpf    Epithelial cells FEW FEW /lpf    Bacteria Negative NEG /hpf    Hyaline cast 0-2 0 - 2 /lpf        EKG at 3:29 AM on 2/28/2023 interpreted by me: Atrial fibrillation with controlled rate, 74 bpm, normal axis, normal QRS interval, prolonged QTc interval, T wave inversions in inferior leads     RADIOLOGY:         Interpretation per the Radiologist below, if available at the time of this note:     CTA CODE NEURO HEAD AND NECK W CONT    Result Date: 2/28/2023  *PRELIMINARY REPORT* Right vertebral artery arises directly from the thoracic aortic arch distally and extends behind the esophagus, a normal variant. No acute large vessel occlusion. Unreliable perfusion imaging Preliminary report was provided by Dr. Victorina Floyd the on-call radiologist. Final report to follow. *END PRELIMINARY REPORT*     CT CODE NEURO HEAD WO CONTRAST    Result Date: 2/28/2023  INDICATION: Code Stroke EXAM:  HEAD CT WITHOUT CONTRAST COMPARISON: MRI March 6, 2021 TECHNIQUE:  Routine noncontrast axial head CT was performed. Sagittal and coronal reconstructions were generated. CT dose reduction was achieved through use of a standardized protocol tailored for this examination and automatic exposure control for dose modulation. FINDINGS: Ventricles: Midline, no hydrocephalus. Intracranial Hemorrhage: None. Brain Parenchyma/Brainstem: Extensive chronic white matter hypodensity. Chronic right and left corona radiata infarctions. Basal Cisterns: Normal. Paranasal Sinuses: Visualized sinuses are clear. Additional Comments: N/A. No acute process. CT CODE NEURO PERF W CBF    Result Date: 2/28/2023  *PRELIMINARY REPORT* Right vertebral artery arises directly from the thoracic aortic arch distally and extends behind the esophagus, a normal variant. No acute large vessel occlusion. Unreliable perfusion imaging Preliminary report was provided by Dr. Victorina Floyd the on-call radiologist. Final report to follow.  *END PRELIMINARY REPORT*        PROCEDURES   Unless otherwise noted below, none  Procedures     CRITICAL CARE TIME   CRITICAL CARE NOTE :        IMPENDING DETERIORATION -CNS and Metabolic  ASSOCIATED RISK FACTORS - Dysrhythmia and CNS Decompensation  MANAGEMENT- Bedside Assessment and Supervision of Care  INTERPRETATION -  CT Scan, ECG, and Blood Pressure  INTERVENTIONS - Neurologic interventions  and Metobolic interventions  CASE REVIEW - Hospitalist/Intensivist, Nursing, and teleneurologist  TREATMENT RESPONSE -Stable  PERFORMED BY - Self    NOTES   :  I have spent 45 minutes of critical care time involved in lab review, consultations with specialist, family decision- making, bedside attention and documentation. This time excludes time spent in any separate billed procedures. During this entire length of time I was immediately available to the patient . Gloria Mcpherson MD     EMERGENCY DEPARTMENT COURSE and DIFFERENTIAL DIAGNOSIS/MDM   Vitals:    Vitals:    02/28/23 0400 02/28/23 0416 02/28/23 0441 02/28/23 0500   BP: (!) 155/104 (!) 169/99 (!) 163/103 (!) 159/104   Pulse: 71 85 65 67   Resp: 23 21 23 22   Temp:       SpO2: 98% 98% 99% 99%   Weight:       Height:            Patient was given the following medications:  Medications   potassium chloride SR (KLOR-CON 10) tablet 40 mEq (has no administration in time range)   iopamidoL (ISOVUE-370) 370 mg iodine /mL (76 %) injection 100 mL (100 mL IntraVENous Given 2/28/23 0341)       CONSULTS: (Who and What was discussed)  Case discussed with Dr. Paresh Santos (teleneurology). She evaluated patient after initial head CT as part of a level 1 stroke alert. Patient is not a candidate for tPA because he is on Eliquis. Recommends CTA to rule out LVO and admission for MRI if CTA is negative for LVO. Chronic Conditions: Atrial fibrillation, hypertension, diabetes        Records Reviewed (source and summary of external notes):   Reviewed neurology notes from 3/7/2021 (Dr. Ian Ryan). Patient had left-sided weakness that was described as mild. MRI showed right basal ganglia stroke. CTA negative for flow-limiting stenosis. The patient was switched to Eliquis from Xarelto for his A-fib.    CC/HPI Summary, DDx, ED Course, and Reassessment:   Patient with history of diabetes, hypertension, smoking addiction, atrial fibrillation on Eliquis, previous CVA with mild left-sided weakness presents to the ED with profound left-sided weakness that he noticed at 1245 after falling asleep at midnight on the floor.   Treated as a level 1 stroke alert on arrival.  Differential diagnosis includes intracranial bleed, thrombotic stroke. Labs and imaging ordered including CBC, CMP, UA head CT, CTA head and neck with perfusion studies. Noted to be hypertensive on arrival.    Discussed with  (teleneurology). Considered thrombolytics, but patient is not a candidate because he is anticoagulated on Eliquis. CTA with perfusion studies pending. Labs and imaging reviewed. Head CT negative. CTA of head and neck does not show any LVO.  (Viz AI alerted as possible LVO, however Dr. Mary Dietz responded through text on chen that there was no identifiable LVO. CBC unremarkable. CMP shows hypokalemia potassium of 3.1 which was repleted in ED. LFTs are normal.  UA is unremarkable. ED Course as of 02/28/23 0549   Tue Feb 28, 2023   0335 Viz AI alerted for possible LVO. Dr. Mary Dietz responded that there is no identifiable LVO. [AO]   0502 Case discussed with Dr. Esther Dey (hospitalist) who will see and admit patient. [AO]      ED Course User Index  [AO] Gloria Mcpherson MD         FINAL IMPRESSION     1. Cerebrovascular accident (CVA), unspecified mechanism ([address]    2. Acute left-sided weakness    3. Hypokalemia    4. Atrial fibrillation, unspecified type [facility])          DISPOSITION/PLAN       Admitted by hospitalist           I am the Primary Clinician of Record. Yasmin Roa MD (electronically signed)    (Please note that parts of this dictation were completed with voice recognition software. Quite often unanticipated grammatical, syntax, homophones, and other interpretive errors are inadvertently transcribed by the computer software. Please disregards these errors.  Please excuse any errors that have escaped final proofreading.)

## 2023-02-28 NOTE — H&P
Hospitalist Admission Note    NAME: Shay Álvarez   :  1955   MRN:  [id number]     Date/Time:  2023 7:22 AM    Patient PCP: Yadi Lee MD  ______________________________________________________________________  Given the patient's current clinical presentation, I have a high level of concern for decompensation if discharged from the emergency department. Complex decision making was performed, which includes reviewing the patient's available past medical records, laboratory results, and x-ray films. My assessment of this patient's clinical condition and my plan of care is as follows. Assessment / Plan:  Possible CVA  CT head with no acute process. CTA technically sub-optimal. No large vessel occlusion. Mild artherosclerosis. Carotid duplex-done, pending results. Plan  Admit to neuro  Stroke order set used  Cardiac monitoring  MRI ordered  Follow up duplex results  Echo ordered  AM labs of HbA1C and lipid panel  Continue eliquis, statin, and ASA  Permissive HTN, prns available for systolic >332  Neurology consulted, expertise appreciated  PT/OT/SLP    HTN  Afib  H/o dissection of right carotid artery  Eliquis secondary hypercoagulable state due to A-fib  Hold PTA norvasc, coreg, hydralazine, and lisinopril. Continue eliquis and lipitor    DM  Home metformin and glipizide held.   SSI with POC glucose checks    Seizure disorder  Continue PTA keppra 1000 mg po bid    Hypokalemia  K 3.1 on admission  Replete  AM repeat labs    Code Status: FULL  Surrogate Decision Maker: Spouse Moses Quach [phone]    DVT Prophylaxis: Lovenox  GI Prophylaxis: not indicated    Baseline: Home, independent      Subjective:   CHIEF COMPLAINT: Left sided weakness    HISTORY OF PRESENT ILLNESS:     Pillo Kwon is a 74 y.o.  male with PMH of afib on eliquis, lung ca 2015 s/p VATS resection left lower lobe, CHF, dissection of right carotid artery, HTN, seizure disorder and h/o right thalamic stroke who presents with left sided weakness. Patient reports that he was trying to get out of bed earlier this morning a little after midnight, last known normal was a little before midnight. He endorses a fall to the floor, without hitting his head due to left sided weakness. He was unable to get up and his wife called 911. He reports that he has a h/o stroke and this feels similar to his prior left sided weakness, however after last stroke he now only needed to ambulate with a cane. He denies CP, SOB, fever, chills, and palpitations. We were asked to admit for work up and evaluation of the above problems. 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    Congestive heart failure, unspecified     Dissection of right carotid artery ([facility] [place] [facility] 09/2018    Hypertension     Overweight(278.02)     Seizures ([facility] [place] [facility]     SOLITARY PULMONARY NODULE     1.6 cm    Stroke [facility]) 2015    right thalamus        Past Surgical History:   Procedure Laterality Date    [place] CHEST SURGERY PROCEDURE UNLISTED      VATS Video-assisted thoracic surgery       Social History     Tobacco Use    Smoking status: Light Smoker     Packs/day: 0.10     Types: Cigarettes     Start date: 6/1/2015    Smokeless tobacco: Never    Tobacco comments:     former cigar   Substance Use Topics    Alcohol use: Not Currently     Comment: social        Family History   Problem Relation Age of Onset    Stroke Mother     Cancer Father      No Known Allergies     Prior to Admission medications    Medication Sig Start Date End Date Taking? Authorizing Provider   levETIRAcetam (KEPPRA) 500 mg tablet Take 2 tablets by mouth twice daily 11/26/22   Yadi Lee MD   carvediloL (COREG) 12.5 mg tablet Take 1 Tablet by mouth two (2) times daily (with meals).  10/27/22   Yadi Lee MD   atorvastatin (LIPITOR) 80 mg tablet TAKE 1 TABLET BY MOUTH ONCE DAILY AT NIGHT 10/27/22   Yadi Lee MD apixaban (ELIQUIS) 5 mg tablet Take 1 Tablet by mouth two (2) times a day. 10/27/22   Pablo Darling MD   glimepiride (AMARYL) 4 mg tablet TAKE 1 TABLET BY MOUTH IN THE MORNING 10/27/22   Pablo Darling MD   amLODIPine (NORVASC) 10 mg tablet Take 1 tablet by mouth once daily for blood pressure 9/12/22   Pablo Darling MD   hydrALAZINE (APRESOLINE) 10 mg tablet TAKE 1 TABLET BY MOUTH THREE TIMES DAILY 9/12/22   Pablo Darling MD   metFORMIN (GLUCOPHAGE) 500 mg tablet Take 2 Tablets by mouth two (2) times daily (with meals). 4/25/22   Pablo Darling MD   glucose blood VI test strips (ASCENSIA AUTODISC VI, ONE TOUCH ULTRA TEST VI) strip Check fsbs every day E11 4/22/22   Pablo Darling MD   Blood-Glucose Meter monitoring kit Check fsbs every day E11 4/22/22   Pablo Darling MD   lancets misc Check fsbs every day E11 4/22/22   Pablo Darling MD   aspirin delayed-release 81 mg tablet Take 1 Tab by mouth daily. 3/9/21   Aggie Singh MD   albuterol (PROVENTIL HFA, VENTOLIN HFA, PROAIR HFA) 90 mcg/actuation inhaler Take 2 Puffs by inhalation every four (4) hours as needed for Wheezing. Provider, Historical   lisinopril (PRINIVIL, ZESTRIL) 40 mg tablet TAKE ONE TABLET BY MOUTH ONCE DAILY FOR BLOOD PRESSURE 6/29/18   Pablo Darling MD       REVIEW OF SYSTEMS:     I am not able to complete the review of systems because:    The patient is intubated and sedated    The patient has altered mental status due to his acute medical problems    The patient has baseline aphasia from prior stroke(s)    The patient has baseline dementia and is not reliable historian    The patient is in acute medical distress and unable to provide information           Total of 12 systems reviewed as follows:       POSITIVE= underlined text  Negative = text not underlined  General:  fever, chills, sweats, generalized weakness, weight loss/gain,      loss of appetite   Eyes:    blurred vision, eye pain, loss of vision, double vision  ENT:    rhinorrhea, pharyngitis   Respiratory:   cough, sputum production, SOB, LOPEZ, wheezing, pleuritic pain   Cardiology:   chest pain, palpitations, orthopnea, PND, edema, syncope   Gastrointestinal:  abdominal pain , N/V, diarrhea, dysphagia, constipation, bleeding   Genitourinary:  frequency, urgency, dysuria, hematuria, incontinence   Muskuloskeletal :  arthralgia, myalgia, back pain  Hematology:  easy bruising, nose or gum bleeding, lymphadenopathy   Dermatological: rash, ulceration, pruritis, color change / jaundice  Endocrine:   hot flashes or polydipsia   Neurological:  headache, dizziness, confusion, focal weakness, paresthesia,     Speech difficulties, memory loss, gait difficulty  Psychological: Feelings of anxiety, depression, agitation    Objective:   VITALS:    Visit Vitals  BP (!) 157/106   Pulse 70   Temp 98.3 °F (36.8 °C)   Resp 22   Ht 6' 1\" (1.854 m)   Wt 107.1 kg (236 lb 3.2 oz)   SpO2 98%   BMI 31.16 kg/m²       PHYSICAL EXAM:    General:    Alert, cooperative, no distress, appears stated age. HEENT: Atraumatic, anicteric sclerae, pink conjunctivae     No oral ulcers, mucosa moist, throat clear, dentition fair  Neck:  Supple, symmetrical,  thyroid: non tender  Lungs:   Clear to auscultation bilaterally. No Wheezing or Rhonchi. No rales. Chest wall:  No tenderness  No Accessory muscle use. Heart:   Regular  rhythm,  No  murmur   No edema  Abdomen:   Soft, non-tender. Not distended. Bowel sounds normal  Extremities: No cyanosis. No clubbing,      Skin turgor normal, Capillary refill normal, Radial dial pulse 2+  Skin:     Not pale. Not Jaundiced  No rashes   Psych:  Good insight. Not depressed. Not anxious or agitated. Neurologic: EOMs intact. No facial asymmetry. No aphasia or slurred speech. Asymmetrical strength with flaccid 0-1/5 weakness of L UE and LE, Sensation grossly intact.  Alert and oriented X 4.   _______________________________________________________________________  Care Plan discussed with:    Comments   Patient x    Family      RN     Care Manager                    Consultant:      _______________________________________________________________________  Expected  Disposition:   Home with Family    HH/PT/OT/RN    SNF/LTC x   PHILIPPE    ________________________________________________________________________  TOTAL TIME:  54 Minutes    Critical Care Provided     Minutes non procedure based      Comments    x Reviewed previous records   >50% of visit spent in counseling and coordination of care x Discussion with patient and/or family and questions answered       ________________________________________________________________________  Signed: Lynn Joiner MD    Procedures: see electronic medical records for all procedures/Xrays and details which were not copied into this note but were reviewed prior to creation of Plan. LAB DATA REVIEWED:    Recent Results (from the past 24 hour(s))   GLUCOSE, POC    Collection Time: 02/28/23  2:49 AM   Result Value Ref Range    Glucose (POC) 104 65 - 117 mg/dL    Performed by Vanessa CARABALLO    CBC WITH AUTOMATED DIFF    Collection Time: 02/28/23  2:59 AM   Result Value Ref Range    WBC 6.3 4.1 - 11.1 K/uL    RBC 4.95 4.10 - 5.70 M/uL    HGB 13.8 12.1 - 17.0 g/dL    HCT 41.5 36.6 - 50.3 %    MCV 83.8 80.0 - 99.0 FL    MCH 27.9 26.0 - 34.0 PG    MCHC 33.3 30.0 - 36.5 g/dL    RDW 14.3 11.5 - 14.5 %    PLATELET 820 660 - 729 K/uL    MPV 12.9 8.9 - 12.9 FL    NRBC 0.0 0  WBC    ABSOLUTE NRBC 0.00 0.00 - 0.01 K/uL    NEUTROPHILS 48 32 - 75 %    LYMPHOCYTES 40 12 - 49 %    MONOCYTES 10 5 - 13 %    EOSINOPHILS 1 0 - 7 %    BASOPHILS 1 0 - 1 %    IMMATURE GRANULOCYTES 0 0.0 - 0.5 %    ABS. NEUTROPHILS 3.0 1.8 - 8.0 K/UL    ABS. LYMPHOCYTES 2.5 0.8 - 3.5 K/UL    ABS. MONOCYTES 0.7 0.0 - 1.0 K/UL    ABS. EOSINOPHILS 0.1 0.0 - 0.4 K/UL    ABS. BASOPHILS 0.0 0.0 - 0.1 K/UL    ABS. IMM.  GRANS. 0.0 0.00 - 0.04 K/UL    DF AUTOMATED     METABOLIC PANEL, COMPREHENSIVE    Collection Time: 02/28/23  2:59 AM   Result Value Ref Range    Sodium 142 136 - 145 mmol/L    Potassium 3.1 (L) 3.5 - 5.1 mmol/L    Chloride 107 97 - 108 mmol/L    CO2 29 21 - 32 mmol/L    Anion gap 6 5 - 15 mmol/L    Glucose 128 (H) 65 - 100 mg/dL    BUN 12 6 - 20 MG/DL    Creatinine 1.29 0.70 - 1.30 MG/DL    BUN/Creatinine ratio 9 (L) 12 - 20      eGFR >60 >60 ml/min/1.73m2    Calcium 8.2 (L) 8.5 - 10.1 MG/DL    Bilirubin, total 0.7 0.2 - 1.0 MG/DL    ALT (SGPT) 25 12 - 78 U/L    AST (SGOT) 15 15 - 37 U/L    Alk. phosphatase 71 45 - 117 U/L    Protein, total 8.0 6.4 - 8.2 g/dL    Albumin 3.6 3.5 - 5.0 g/dL    Globulin 4.4 (H) 2.0 - 4.0 g/dL    A-G Ratio 0.8 (L) 1.1 - 2.2     PROTHROMBIN TIME + INR    Collection Time: 02/28/23  2:59 AM   Result Value Ref Range    INR 1.1 0.9 - 1.1      Prothrombin time 11.7 (H) 9.0 - 11.1 sec   SAMPLES BEING HELD    Collection Time: 02/28/23  2:59 AM   Result Value Ref Range    SAMPLES BEING HELD SST     COMMENT        Add-on orders for these samples will be processed based on acceptable specimen integrity and analyte stability, which may vary by analyte. EKG, 12 LEAD, INITIAL    Collection Time: 02/28/23  3:29 AM   Result Value Ref Range    Ventricular Rate 74 BPM    Atrial Rate 75 BPM    QRS Duration 100 ms    Q-T Interval 456 ms    QTC Calculation (Bezet) 506 ms    Calculated R Axis 108 degrees    Calculated T Axis -11 degrees    Diagnosis       Atrial fibrillation with premature ventricular or aberrantly conducted   complexes  Rightward axis  Cannot rule out Anterior infarct , age undetermined  Prolonged QT  When compared with ECG of 07-MAR-2021 01:58,  Vent.  rate has increased BY  27 BPM  QRS axis shifted right  Inverted T waves have replaced nonspecific T wave abnormality in Inferior   leads  QT has lengthened     URINALYSIS W/ REFLEX CULTURE    Collection Time: 02/28/23  4:03 AM    Specimen: Urine   Result Value Ref Range    Color YELLOW/STRAW Appearance CLEAR CLEAR      Specific gravity 1.025      pH (UA) 7.0 5.0 - 8.0      Protein 30 (A) NEG mg/dL    Glucose Negative NEG mg/dL    Ketone Negative NEG mg/dL    Bilirubin Negative NEG      Blood Negative NEG      Urobilinogen 0.2 0.2 - 1.0 EU/dL    Nitrites Negative NEG      Leukocyte Esterase Negative NEG      UA:UC IF INDICATED CULTURE NOT INDICATED BY UA RESULT CNI      WBC 0-4 0 - 4 /hpf    RBC 0-5 0 - 5 /hpf    Epithelial cells FEW FEW /lpf    Bacteria Negative NEG /hpf    Hyaline cast 0-2 0 - 2 /lpf

## 2023-02-28 NOTE — PROGRESS NOTES
SPEECH PATHOLOGY BEDSIDE SWALLOW EVALUATION/DISCHARGE  Patient: Mortimer Mare (74 y.o. male)  Date: 2/28/2023  Primary Diagnosis: CVA (cerebral vascular accident) [facility]) [I63.9]       Precautions:        ASSESSMENT :  Based on the objective data described below, the patient presents with functional swallow of all textures. He has reduced dentition but eats a regular diet. He reported he doesn't force things and takes his time while eating. Speech is fluent and appropriate. No dysarthria. Wife feels he is at baseline with thinking and memory. Skilled acute therapy provided by a speech-language pathologist is not indicated at this time. PLAN :  Recommendations:  Reg/thins  Meds whole  No speech follow up  Discharge Recommendations: None     SUBJECTIVE:   Patient stated he takes his time to eat. 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    Congestive heart failure, unspecified     Dissection of right carotid artery ([facility] 09/2018    Hypertension     Overweight(278.02)     Seizures ([facility]     SOLITARY PULMONARY NODULE     1.6 cm    Stroke [facility]) 2015    right thalamus     Past Surgical History:   Procedure Laterality Date    [place] CHEST SURGERY PROCEDURE UNLISTED      VATS Video-assisted thoracic surgery     Prior Level of Function/Home Situation: lives with his wife, has supervision always     Diet prior to admission: reg/thins  Current Diet:  reg/thins   Cognitive and Communication Status:  Neurologic State: Alert  Orientation Level: Oriented X4  Cognition: Appropriate decision making, Follows commands  Perception: Appears intact  Perseveration: No perseveration noted     Oral Assessment:  Oral Assessment  Labial: No impairment  Dentition: Limited;Natural;Poor  Lingual: No impairment  Mandible: No impairment  P.O.  Trials:  Patient Position: upright in bed  Vocal quality prior to P.O.: No impairment  Consistency Presented: Thin liquid;Mechanical soft  How Presented: Self-fed/presented;Spoon     Bolus Acceptance: No impairment  Bolus Formation/Control: No impairment     Propulsion: No impairment  Oral Residue: None  Initiation of Swallow: No impairment  Laryngeal Elevation: Functional  Aspiration Signs/Symptoms: None  Pharyngeal Phase Characteristics: No impairment, issues, or problems              Oral Phase Severity: No impairment  Pharyngeal Phase Severity : No impairment  NOMS:   The NOMS functional outcome measure was used to quantify this patient's level of swallowing impairment. Based on the NOMS, the patient was determined to be at level 6 for swallow function     NOMS Swallowing Levels:  Level 1 (CN): NPO  Level 2 (CM): NPO but takes consistency in therapy  Level 3 (CL): Takes less than 50% of nutrition p.o. and continues with nonoral feedings; and/or safe with mod cues; and/or max diet restriction  Level 4 (CK): Safe swallow but needs mod cues; and/or mod diet restriction; and/or still requires some nonoral feeding/supplements  Level 5 (CJ): Safe swallow with min diet restriction; and/or needs min cues  Level 6 (CI): Independent with p.o.; rare cues; usually self cues; may need to avoid some foods or needs extra time  Level 7 ([address] [place] [address]): Independent for all p.o.  GELACIO. (2003). National Outcomes Measurement System (NOMS): Adult Speech-Language Pathology User's Guide. Pain:  Pain Scale 1: Numeric (0 - 10)  Pain Intensity 1: 0     After treatment:   Patient left in no apparent distress in bed    COMMUNICATION/EDUCATION:   Patient was educated regarding his deficit(s) of dysphagia  The patient's plan of care including recommendations, planned interventions, and recommended diet changes were discussed with: the patient and his wife.      Thank you for this referral.  Ned Be, SLP  Time Calculation: 10 mins

## 2023-02-28 NOTE — DISCHARGE INSTRUCTIONS
Smoking Cessation Program:   [facility] is now offering a proven, interactive, text-based smoking cessation program for FREE! To register, please text Audrey Hernandez [phone] or visit [email or web address]  For more information, please call: [phone]

## 2023-02-28 NOTE — PROGRESS NOTES
Reason for Admission:  stroke w/up left sided weakness                      RUR Score:          9% low            Plan for utilizing home health:    n/a       PCP: First and Last name:  Kahlil Taveras MD     Name of Practice:    Are you a current patient: Yes/No: yes    Approximate date of last visit:  3 weeks    Can you participate in a virtual visit with your PCP:                     Current Advanced Directive/Advance Care Plan: Full Code    Declines additional info     Healthcare Decision Maker:   Click here to complete [id number] including selection of the Healthcare Decision Maker Relationship (ie \"Primary\")             Primary Decision Maker: Lorena Parker  - [phone]                  Transition of Care Plan:              Lives with spouse. Reports he is independent at home. He does not drive. He showers etc alone. He reports being good on his feet. He does have a cane. He can navigate the stairs. He has no psych history. He is not a [place]. He confirms his home address. He has prescription coverage. Care manager to follow as needed.      Caitlyn Andrews RN   246 pm   2-28-23

## 2023-03-01 LAB
GLUCOSE BLD STRIP.AUTO-MCNC: 135 MG/DL (ref 65–117)
GLUCOSE BLD STRIP.AUTO-MCNC: 148 MG/DL (ref 65–117)
GLUCOSE BLD STRIP.AUTO-MCNC: 182 MG/DL (ref 65–117)
GLUCOSE BLD STRIP.AUTO-MCNC: 193 MG/DL (ref 65–117)
SERVICE CMNT-IMP: ABNORMAL

## 2023-03-01 PROCEDURE — 97165 OT EVAL LOW COMPLEX 30 MIN: CPT

## 2023-03-01 PROCEDURE — 97530 THERAPEUTIC ACTIVITIES: CPT

## 2023-03-01 PROCEDURE — U0005 INFEC AGEN DETEC AMPLI PROBE: HCPCS

## 2023-03-01 PROCEDURE — 65270000046 HC RM TELEMETRY

## 2023-03-01 PROCEDURE — 74011250637 HC RX REV CODE- 250/637: Performed by: STUDENT IN AN ORGANIZED HEALTH CARE EDUCATION/TRAINING PROGRAM

## 2023-03-01 PROCEDURE — 97161 PT EVAL LOW COMPLEX 20 MIN: CPT

## 2023-03-01 PROCEDURE — 74011000250 HC RX REV CODE- 250: Performed by: STUDENT IN AN ORGANIZED HEALTH CARE EDUCATION/TRAINING PROGRAM

## 2023-03-01 PROCEDURE — 82962 GLUCOSE BLOOD TEST: CPT

## 2023-03-01 PROCEDURE — 97112 NEUROMUSCULAR REEDUCATION: CPT

## 2023-03-01 PROCEDURE — 99232 SBSQ HOSP IP/OBS MODERATE 35: CPT | Performed by: PSYCHIATRY & NEUROLOGY

## 2023-03-01 PROCEDURE — 74011636637 HC RX REV CODE- 636/637: Performed by: STUDENT IN AN ORGANIZED HEALTH CARE EDUCATION/TRAINING PROGRAM

## 2023-03-01 RX ORDER — ASPIRIN 325 MG
325 TABLET, DELAYED RELEASE (ENTERIC COATED) ORAL DAILY
Status: DISCONTINUED | OUTPATIENT
Start: 2023-03-01 | End: 2023-03-09 | Stop reason: HOSPADM

## 2023-03-01 RX ADMIN — POLYETHYLENE GLYCOL 3350 17 G: 17 POWDER, FOR SOLUTION ORAL at 17:40

## 2023-03-01 RX ADMIN — SODIUM CHLORIDE, PRESERVATIVE FREE 10 ML: 5 INJECTION INTRAVENOUS at 17:36

## 2023-03-01 RX ADMIN — Medication 2 UNITS: at 12:30

## 2023-03-01 RX ADMIN — LEVETIRACETAM 1000 MG: 500 TABLET, FILM COATED ORAL at 09:30

## 2023-03-01 RX ADMIN — APIXABAN 5 MG: 5 TABLET, FILM COATED ORAL at 17:33

## 2023-03-01 RX ADMIN — LEVETIRACETAM 1000 MG: 500 TABLET, FILM COATED ORAL at 17:33

## 2023-03-01 RX ADMIN — HYDRALAZINE HYDROCHLORIDE 10 MG: 10 TABLET, FILM COATED ORAL at 23:34

## 2023-03-01 RX ADMIN — ATORVASTATIN CALCIUM 80 MG: 40 TABLET, FILM COATED ORAL at 23:34

## 2023-03-01 RX ADMIN — APIXABAN 5 MG: 5 TABLET, FILM COATED ORAL at 09:30

## 2023-03-01 RX ADMIN — SODIUM CHLORIDE, PRESERVATIVE FREE 10 ML: 5 INJECTION INTRAVENOUS at 06:00

## 2023-03-01 RX ADMIN — SODIUM CHLORIDE, PRESERVATIVE FREE 10 ML: 5 INJECTION INTRAVENOUS at 23:38

## 2023-03-01 RX ADMIN — HYDRALAZINE HYDROCHLORIDE 10 MG: 10 TABLET, FILM COATED ORAL at 17:51

## 2023-03-01 RX ADMIN — Medication 2 UNITS: at 17:33

## 2023-03-01 RX ADMIN — ASPIRIN 81 MG: 81 TABLET, COATED ORAL at 09:29

## 2023-03-01 NOTE — PROGRESS NOTES
Problem: Mobility Impaired (Adult and Pediatric)  Goal: *Acute Goals and Plan of Care (Insert Text)  Description:   FUNCTIONAL STATUS PRIOR TO ADMISSION: Patient required assistance for ADLs from wife, was ambulatory with cane for household distances, had residual left weakness from previous stroke. HOME SUPPORT PRIOR TO ADMISSION: The patient lived with spouse and required assistance for dressing and bathing. Physical Therapy Goals  Initiated 3/1/2023  1. Patient will move from supine to sit and sit to supine  in bed with moderate assistance  within 7 day(s). 2.  Patient will transfer from bed to chair and chair to bed with moderate assistance  using the least restrictive device within 7 day(s). 3.  Patient will perform sit to stand with moderate assistance  within 7 day(s). 4.  Patient will ambulate with moderate assistance  for 5 feet with the least restrictive device within 7 day(s). 5.  Patient will increase Stafford balance score by 5 points within 7 days. Outcome: Not Met   PHYSICAL THERAPY EVALUATION- NEURO POPULATION  Patient: Tyrel Cardozo (81 y.o. male)  Date: 3/1/2023  Primary Diagnosis: CVA (cerebral vascular accident) [facility]) [I63.9]       Precautions:          ASSESSMENT  Based on the objective data described below, the patient presents with dense left hemiplegia, impaired sitting balance, impaired functional mobility and unable to stand or ambulate following acute infarct in posterior corona radiata. Patient received in bed and agreeable to participate, minimally verbal and is a poor historian. Patient required total assist to come to sit EOB and constant support in sitting, has left lateral lean and also loses balance posteriorly. Unable to ellicit any active movement in left LE and just trace in left LE. Good tolerance of seated balance challenges, weight shifts and activities for core activation, and can follow simple multi modal commands.   Returned to supine and left with call bell in reach, patient is well below functional baseline, can tolerate three hours of therapy and will benefit from inpatient rehab placement. Current Level of Function Impacting Discharge (mobility/balance): Total assist for bed mobility, unable to stand or ambulate    Functional Outcome Measure: The patient scored Total: 0/56 on the Stafford Balance Assessment which is indicative of high fall risk. Other factors to consider for discharge: below baseline, high falls risk, acute CVA     Patient will benefit from skilled therapy intervention to address the above noted impairments. PLAN :  Recommendations and Planned Interventions: bed mobility training, transfer training, gait training, therapeutic exercises, patient and family training/education, and therapeutic activities      Frequency/Duration: Patient will be followed by physical therapy:  5 times a week to address goals. Recommendation for discharge: (in order for the patient to meet his/her long term goals)  Therapy 3 hours per day 5-7 days per week    This discharge recommendation:  Has been made in collaboration with the attending provider and/or case management    IF patient discharges home will need the following DME: to be determined (TBD)         SUBJECTIVE:   Patient stated I can move it. 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    Congestive heart failure, unspecified     Dissection of right carotid artery ([address] 09/2018    Hypertension     Overweight(278.02)     Seizures ([facility]     SOLITARY PULMONARY NODULE     1.6 cm    Stroke [facility]) 2015    right thalamus     Past Surgical History:   Procedure Laterality Date    NM UNLISTED PROCEDURE LUNGS & PLEURA      VATS Video-assisted thoracic surgery       Personal factors and/or comorbidities impacting plan of care: previous CVA    Home Situation  Patient Expects to be Discharged to[de-identified] Home    EXAMINATION/PRESENTATION/DECISION MAKING:   Critical Behavior:  Neurologic State: Alert  Orientation Level: Oriented to person, Oriented to place  Cognition: Follows commands     Hearing:     Skin:    Edema:   Range Of Motion:  AROM: Grossly decreased, non-functional (left UE and LE)                       Strength:    Strength: Grossly decreased, non-functional (left UE and LE)                    Tone & Sensation:   Tone: Abnormal              Sensation: Impaired               Coordination:  Coordination: Generally decreased, functional  Vision:      Functional Mobility:  Bed Mobility:  Rolling: Maximum assistance; Total assistance  Supine to Sit: Total assistance (with HOB elevated)  Sit to Supine: Total assistance  Scooting: Total assistance  Transfers:  Sit to Stand:  (no attempted, poor sitting balance, left yessica)                          Balance:   Sitting: Impaired  Sitting - Static: Poor (constant support)  Sitting - Dynamic: Poor (constant support)  Standing:  (not safe to attempt)  Ambulation/Gait Training:                                                         Stairs:               Therapeutic Exercises:   Seated weight shifts, head turns, lateral leans, LE ROM with constant support    Functional Measure  Stafford Balance Test:    Sitting to Standin  Standing Unsupported: 0  Sitting with Back Unsupported: 0  Standing to Sittin  Transfers: 0  Standing Unsupported with Eyes Closed: 0  Standing Unsupported with Feet Together: 0  Reach Forward with Outstretched Arm: 0   Object: 0  Turn to Look Over Shoulders: 0  Turn 360 Degrees: 0  Alternate Foot on Step/Stool: 0  Standing Unsupported One Foot in Front: 0  Stand on One Le  Total: 0/56         56=Maximum possible score;   0-20=High fall risk  21-40=Moderate fall risk   41-56=Low fall risk        Physical Therapy Evaluation Charge Determination   History Examination Presentation Decision-Making   MEDIUM Complexity : 1-2 comorbidities / personal factors will impact the outcome/ POC  MEDIUM Complexity : 3 Standardized tests and measures addressing body structure, function, activity limitation and / or participation in recreation  MEDIUM Complexity : Evolving with changing characteristics  MEDIUM Complexity : FOTO score of 26-74      Based on the above components, the patient evaluation is determined to be of the following complexity level: MEDIUM    Pain Rating:      Activity Tolerance:   Good      After treatment patient left in no apparent distress:   Supine in bed, Call bell within reach, Bed / chair alarm activated, Side rails x 3, and left UE elevated on pillows    COMMUNICATION/EDUCATION:   The patients plan of care was discussed with: Occupational therapist, Registered nurse, and Case management. Patient was educated regarding his deficit(s) of weakness and balance as this relates to his diagnosis of CVA. He demonstrated Fair understanding as evidenced by conversation. Patient/family have participated as able in goal setting and plan of care.     Thank you for this referral.  Christian Savage, PT   Time Calculation: 30 mins

## 2023-03-01 NOTE — PROGRESS NOTES
End of Shift Note    Bedside shift change report given to Elzbieta Nash RN (oncoming nurse) by Blessing Mcdonnell RN (offgoing nurse). Report included the following information SBAR, Kardex, and ED Summary    Shift worked:  1041-2155   Shift summary and any significant changes:     Patient got  admitted during the day. Duplex carotid done, MRI screen form completed. Concerns for physician to address:  None   Zone phone for oncoming shift:   [phone]     Patient [address]  74 y.o.  2/28/2023  3:19 AM by Pillo Young DO.  Janice Montoya was admitted from Home    Problem List  Patient Active Problem List    Diagnosis Date Noted    CVA (cerebral vascular accident) ([address] 02/28/2023    Thrombotic stroke involving right middle cerebral artery ([address] 02/28/2023    Bilateral carotid artery stenosis 02/28/2023    Stage 3a chronic kidney disease ([address] 10/28/2022    Malignant neoplasm of lower lobe, left bronchus or lung ([address] 04/22/2022    Left arm weakness 03/06/2021    Bilateral leg weakness 03/06/2021    Carcinoid tumor 02/13/2018    CKD stage 2 due to type 2 diabetes mellitus ([address] 05/14/2017    Polyneuropathy in diabetes(357.2) 07/21/2015    Hypertensive emergency 07/20/2015    Seizure disorder ([address] 07/20/2015    Type II or unspecified type diabetes mellitus with neurological manifestations, uncontrolled(250.62) ([address] 07/20/2015    History of atrial fibrillation 07/20/2015    Hyperlipidemia 07/20/2015    Diabetic neuropathy ([address] 11/21/2014    Seizures ([address] 08/08/2014    Syncope 07/13/2012    Cardiomyopathy, nonischemic ([address] 10/31/2009    HTN (hypertension), benign 10/26/2009    Pulmonary nodule 10/26/2009     Past Medical History:   Diagnosis Date    Atrial fibrillation ([address]     Cancer ([address] 12/2015    left lung; 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) 2015    right thalamus       Core Measures:  CVA: Yes Yes  CHF:No No  PNA:No No    Activity:  Activity Level: Bed Rest  Number times ambulated in hallways past shift: 0  Number of times OOB to chair past shift: 0    Cardiac:   Cardiac Monitoring: Yes      Cardiac Rhythm: Atrial Fib    Access:   Current line(s): PIV   Central Line? No Placement date  Reason Medically Necessary   PICC LINE? No Placement date Reason Medically Necessary     Genitourinary:   Urinary status: voiding  Urinary Catheter?  No Placement Date  Reason Medically Necessary     Respiratory:   O2 Device: None  Chronic home O2 use?: NO  Incentive spirometer at bedside: NO       GI:  Last Bowel Movement Date: 02/28/23  Current diet:  ADULT DIET Regular; 4 carb choices (60 gm/meal)  Passing flatus: YES  Tolerating current diet: YES       Pain Management:   Patient states pain is manageable on current regimen: N/A    Skin:  Don Score: 18  Interventions: PT/OT consult    Patient Safety:  Fall Score:    Interventions: bed/chair alarm, gripper socks, and pt to call before getting OOB     @Rollbelt  @dexterity to release roll belt  Yes/No ( must document dexterity  here by stating Yes or No here, otherwise this is a restraint and must follow restraint documentation policy.)    DVT prophylaxis:  DVT prophylaxis Med- Yes  DVT prophylaxis SCD or HAZEL- No     Wounds: (If Applicable)  Wounds- No  Location     Active Consults:  IP CONSULT TO NEUROLOGY    Length of Stay:  Expected LOS: - - -  Actual LOS: 0  Discharge Plan: No TBD      Femi Swift RN

## 2023-03-01 NOTE — PROGRESS NOTES
Problem: Pressure Injury - Risk of  Goal: *Prevention of pressure injury  Description: Document Don Scale and appropriate interventions in the flowsheet.   Outcome: Progressing Towards Goal  Note: Pressure Injury Interventions:  Sensory Interventions: Assess changes in LOC    Moisture Interventions: Internal/External urinary devices    Activity Interventions: PT/OT evaluation    Mobility Interventions: PT/OT evaluation                          Problem: Patient Education: Go to Patient Education Activity  Goal: Patient/Family Education  Outcome: Progressing Towards Goal     Problem: Patient Education: Go to Patient Education Activity  Goal: Patient/Family Education  Outcome: Progressing Towards Goal     Problem: TIA/CVA Stroke: 0-24 hours  Goal: Off Pathway (Use only if patient is Off Pathway)  Outcome: Progressing Towards Goal  Goal: Activity/Safety  Outcome: Progressing Towards Goal  Goal: Consults, if ordered  Outcome: Progressing Towards Goal  Goal: Diagnostic Test/Procedures  Outcome: Progressing Towards Goal  Goal: Nutrition/Diet  Outcome: Progressing Towards Goal  Goal: Discharge Planning  Outcome: Progressing Towards Goal  Goal: Medications  Outcome: Progressing Towards Goal  Goal: Respiratory  Outcome: Progressing Towards Goal  Goal: Treatments/Interventions/Procedures  Outcome: Progressing Towards Goal  Goal: Minimize risk of bleeding post-thrombolytic infusion  Outcome: Progressing Towards Goal  Goal: Monitor for complications post-thrombolytic infusion  Outcome: Progressing Towards Goal  Goal: Psychosocial  Outcome: Progressing Towards Goal  Goal: *Hemodynamically stable  Outcome: Progressing Towards Goal  Goal: *Neurologically stable  Description: Absence of additional neurological deficits    Outcome: Progressing Towards Goal  Goal: *Verbalizes anxiety and depression are reduced or absent  Outcome: Progressing Towards Goal  Goal: *Absence of Signs of Aspiration on Current Diet  Outcome: Progressing Towards Goal  Goal: *Absence of deep venous thrombosis signs and symptoms(Stroke Metric)  Outcome: Progressing Towards Goal  Goal: *Ability to perform ADLs and demonstrates progressive mobility and function  Outcome: Progressing Towards Goal  Goal: *Stroke education started(Stroke Metric)  Outcome: Progressing Towards Goal  Goal: *Dysphagia screen performed(Stroke Metric)  Outcome: Progressing Towards Goal  Goal: *Rehab consulted(Stroke Metric)  Outcome: Progressing Towards Goal     Problem: TIA/CVA Stroke: Day 2 Until Discharge  Goal: Off Pathway (Use only if patient is Off Pathway)  Outcome: Progressing Towards Goal  Goal: Activity/Safety  Outcome: Progressing Towards Goal  Goal: Diagnostic Test/Procedures  Outcome: Progressing Towards Goal  Goal: Nutrition/Diet  Outcome: Progressing Towards Goal  Goal: Discharge Planning  Outcome: Progressing Towards Goal  Goal: Medications  Outcome: Progressing Towards Goal  Goal: Respiratory  Outcome: Progressing Towards Goal  Goal: Treatments/Interventions/Procedures  Outcome: Progressing Towards Goal  Goal: Psychosocial  Outcome: Progressing Towards Goal  Goal: *Verbalizes anxiety and depression are reduced or absent  Outcome: Progressing Towards Goal  Goal: *Absence of aspiration  Outcome: Progressing Towards Goal  Goal: *Absence of deep venous thrombosis signs and symptoms(Stroke Metric)  Outcome: Progressing Towards Goal  Goal: *Optimal pain control at patient's stated goal  Outcome: Progressing Towards Goal  Goal: *Tolerating diet  Outcome: Progressing Towards Goal  Goal: *Ability to perform ADLs and demonstrates progressive mobility and function  Outcome: Progressing Towards Goal  Goal: *Stroke education continued(Stroke Metric)  Outcome: Progressing Towards Goal     Problem: Ischemic Stroke: Discharge Outcomes  Goal: *Verbalizes anxiety and depression are reduced or absent  Outcome: Progressing Towards Goal  Goal: *Verbalize understanding of risk factor modification(Stroke Metric)  Outcome: Progressing Towards Goal  Goal: *Hemodynamically stable  Outcome: Progressing Towards Goal  Goal: *Absence of aspiration pneumonia  Outcome: Progressing Towards Goal  Goal: *Aware of needed dietary changes  Outcome: Progressing Towards Goal  Goal: *Verbalize understanding of prescribed medications including anti-coagulants, anti-lipid, and/or anti-platelets(Stroke Metric)  Outcome: Progressing Towards Goal  Goal: *Tolerating diet  Outcome: Progressing Towards Goal  Goal: *Aware of follow-up diagnostics related to anticoagulants  Outcome: Progressing Towards Goal  Goal: *Ability to perform ADLs and demonstrates progressive mobility and function  Outcome: Progressing Towards Goal  Goal: *Absence of DVT(Stroke Metric)  Outcome: Progressing Towards Goal  Goal: *Absence of aspiration  Outcome: Progressing Towards Goal  Goal: *Optimal pain control at patient's stated goal  Outcome: Progressing Towards Goal  Goal: *Home safety concerns addressed  Outcome: Progressing Towards Goal  Goal: *Describes available resources and support systems  Outcome: Progressing Towards Goal  Goal: *Verbalizes understanding of activation of EMS(911) for stroke symptoms(Stroke Metric)  Outcome: Progressing Towards Goal  Goal: *Understands and describes signs and symptoms to report to providers(Stroke Metric)  Outcome: Progressing Towards Goal  Goal: *Neurolgocially stable (absence of additional neurological deficits)  Outcome: Progressing Towards Goal  Goal: *Verbalizes importance of follow-up with primary care physician(Stroke Metric)  Outcome: Progressing Towards Goal  Goal: *Smoking cessation discussed,if applicable(Stroke Metric)  Outcome: Progressing Towards Goal  Goal: *Depression screening completed(Stroke Metric)  Outcome: Progressing Towards Goal     Problem: Falls - Risk of  Goal: *Absence of Falls  Description: Document Yenifer Fall Risk and appropriate interventions in the flowsheet.   Outcome: Progressing Towards Goal     Problem: Patient Education: Go to Patient Education Activity  Goal: Patient/Family Education  Outcome: Progressing Towards Goal

## 2023-03-01 NOTE — PROGRESS NOTES
Hospitalist Progress Note    NAME: Celso Waller   :  1955   MRN:  [id number]       Assessment / Plan:  Acute CVA  CT head with no acute process. CTA technically sub-optimal. No large vessel occlusion. Mild artherosclerosis. Carotid duplex-done, pending results. Continue telemetry  MRI showing acute infarct posterior corona radiator  Echo showed 40 to 45% EF, no intracardiac shunt  Appreciate neurology recs  PT recommends acute rehab  Speech therapy regular diet  Patient was already on Eliquis 5 mg twice daily at home  We will follow-up on neurology recommendation regarding anticoagulation  Currently on Eliquis 5 mg twice daily and aspirin started 325 mg daily  Continue statin replete as needed  A1c, lipid panel pending    Will eventually resume his home hypertensive medications  Resumed on hydralazine today for blood pressure goal <140    HTN  Afib  H/o dissection of right carotid artery  Eliquis secondary hypercoagulable state due to A-fib  Hold PTA norvasc, coreg, hydralazine, and lisinopril. Continue eliquis and lipitor     DM  Home metformin and glipizide held. SSI with POC glucose checks     Seizure disorder  Continue PTA keppra 1000 mg po bid     Hypokalemia  Replete as needed     Code Status: FULL  Surrogate Decision Maker: Spouse Capo Lay [phone]     DVT Prophylaxis: Lovenox  GI Prophylaxis: not indicated       30.0 - 39.9 Obese / Body mass index is 31.14 kg/m². Estimated discharge date:   Barriers:    Code status: Full  Prophylaxis: Lovenox  Recommended Disposition:  rehab     Subjective:     Chief Complaint / Reason for Physician Visit  \" Patient was very lethargic, no overnight events,\". Discussed with RN events overnight.      Review of Systems:  Symptom Y/N Comments  Symptom Y/N Comments   Fever/Chills    Chest Pain     Poor Appetite    Edema     Cough    Abdominal Pain     Sputum    Joint Pain     SOB/LOPEZ    Pruritis/Rash     Nausea/vomit    Tolerating PT/OT Diarrhea    Tolerating Diet     Constipation    Other       Could NOT obtain due to:      Objective:     VITALS:   Last 24hrs VS reviewed since prior progress note. Most recent are:  Patient Vitals for the past 24 hrs:   Temp Pulse Resp BP SpO2   03/01/23 1208 -- -- -- (!) 166/94 --   03/01/23 1204 97.9 °F (36.6 °C) 99 20 (!) 165/100 99 %   03/01/23 0732 97.9 °F (36.6 °C) 75 17 (!) 169/108 100 %   03/01/23 0353 98.7 °F (37.1 °C) (!) 45 20 (!) 156/94 98 %   02/28/23 2343 98.1 °F (36.7 °C) 84 18 (!) 175/109 97 %   02/28/23 2012 97.9 °F (36.6 °C) (!) 42 18 (!) 183/103 98 %   02/28/23 1606 97.7 °F (36.5 °C) 65 20 (!) 173/110 100 %   02/28/23 1259 -- -- -- (!) 152/109 --     No intake or output data in the 24 hours ending 03/01/23 1219     I had a face to face encounter and independently examined this patient on 3/1/2023, as outlined below:  PHYSICAL EXAM:  General: Acute distress,  EENT:  EOMI. Anicteric sclerae. MMM  Resp:  CTA bilaterally, no wheezing or rales. No accessory muscle use  CV:  Regular  rhythm,  No edema  GI:  Soft, Non distended, Non tender. +Bowel sounds  Neurologic:  Sleepy, f left hemiparesis  Psych:   t. Not anxious nor agitated  Skin:  No rashes. No jaundice    Reviewed most current lab test results and cultures  YES  Reviewed most current radiology test results   YES  Review and summation of old records today    NO  Reviewed patient's current orders and MAR    YES  PMH/SH reviewed - no change compared to H&P  ________________________________________________________________________  Care Plan discussed with:    Comments   Patient x    Family      RN x    Care Manager x    Consultant                       x Multidiciplinary team rounds were held today with , nursing, pharmacist and clinical coordinator. Patient's plan of care was discussed; medications were reviewed and discharge planning was addressed. ________________________________________________________________________  Total NON critical care TIME:  45   Minutes    Total CRITICAL CARE TIME Spent:   Minutes non procedure based      Comments   >50% of visit spent in counseling and coordination of care x    ________________________________________________________________________  Julio Mckeon MD     Procedures: see electronic medical records for all procedures/Xrays and details which were not copied into this note but were reviewed prior to creation of Plan. LABS:  I reviewed today's most current labs and imaging studies.   Pertinent labs include:  Recent Labs     02/28/23 0259   WBC 6.3   HGB 13.8   HCT 41.5        Recent Labs     02/28/23 0259      K 3.1*      CO2 29   *   BUN 12   CREA 1.29   CA 8.2*   ALB 3.6   TBILI 0.7   ALT 25   INR 1.1       Signed: Julio Mckeon MD

## 2023-03-01 NOTE — PROGRESS NOTES
Reason for Admission:  stroke w/up left sided weakness                       RUR Score:          9% low            Plan for utilizing home health:    n/a        PCP:    First and Last name:  Sampson Graves MD                 Name of Practice:               Are you a current patient: Yes/No: yes               Approximate date of last visit:  3 weeks               Can you participate in a virtual visit with your PCP:                     Current Advanced Directive/Advance Care Plan: Full Code     Declines additional info      Healthcare Decision Maker:   Click here to complete [address] including selection of the Healthcare Decision Maker Relationship (ie \"Primary\")             Primary Decision MakerRabon Begin - Spouse - [phone]                    Transition of Care Plan:    RUR:  13%   Disposition: IPR  If SNF or IPR: Date FOC offered: 3-1  Date FOC received: 3-1 1223 PM   Date authorization started with reference number:  Date authorization received and expires:  Accepting facility: REFERRALS PENDING  Follow up appointments: TO BE MADE  DME needed: TBD   Transportation at Discharge: [facility] or means to access home: N/A        IM Medicare Letter: NEEDED  Is patient a [place] and connected with the [address] [place] [address]? NO  If yes, was [employer] transfer form completed and [facility] notified? Caregiver Contact:  Discharge Caregiver contacted prior to discharge? Care Conference needed?:  NO    I attempted to leave spouse a message-  voice box was full. I spoke with patient and he is agreeable to referrals to acute rehab-  \" any facility\"  Referrals sent.       Lisa Tovar RN  1226 pm   3-1-2023

## 2023-03-01 NOTE — PROGRESS NOTES
Problem: Self Care Deficits Care Plan (Adult)  Goal: *Acute Goals and Plan of Care (Insert Text)  Description: FUNCTIONAL STATUS PRIOR TO ADMISSION: Patient was modified independent using a single point cane for functional mobility. Patient required moderate assistance for distal LB dressing and bathing from supportive wife. HOME SUPPORT: The patient lived with wife and required moderate assistance  for LB distal dressing and bathing. Occupational Therapy Goals  Initiated 3/1/2023   1. Patient will perform grooming sitting EOB with moderate assistance  within 7 day(s). 2.  Patient will perform upper body dressing with moderate assistance  within 7 day(s). 3.  Patient will perform toilet transfers with maximal assistance within 7 day(s). 4.  Patient will perform all aspects of toileting with minimal assistance/contact guard assist within 7 day(s). 5.  Patient will participate in upper extremity therapeutic exercise/activities with moderate assistance  within 7 day(s). 6.  Patient will utilize energy conservation techniques during functional activities with verbal and visual cues within 7 day(s). 7.  Patient will improve their Fugl Rhoades score by 5 points in prep for ADLs within 7 days. Outcome: Not Met   OCCUPATIONAL THERAPY EVALUATION  Patient: Skylar Wesley (50 y.o. male)  Date: 3/1/2023  Primary Diagnosis: CVA (cerebral vascular accident) [facility]) [I63.9]       Precautions: CVA       ASSESSMENT  Based on the objective data described below, the patient presents with dense L hemiplegia, visual scanning/saccadic/tracking deficits, inability to stand, urinary incontinence, and overall severely increased need for assistance with all ADLs and functional mobility at this time. Patient received supine in bed incontinent of urine though able to verbalize urge and urinate with Min-Mod A to hold urinal in R hand for bed level toileting.  Patient requiring increased time to visually track target and requires increased time and Max VC for cervical rotation to locate targets outside of visual field in room today. Patient is poor historian and is largely non conversant throughout session though follows commands well with increased processing time. Noted no AROM or trace muscle activation in LUE at this time on exam. Currently requiring Max-TotalAx2 for all bed mobility. Patient with good participation today following all commands and did not require rest breaks, anticipate ability to tolerate inpatient therapy setting and higluy recommend this given severity of deficits. Current Level of Function Impacting Discharge (ADLs/self-care): Total A with bed mobility, unable to stand, Total A toileting, dressing, bathing, setup-CGA self feeding in semi supine with HOB raised    Functional Outcome Measure: The patient scored Total A-D  Total A-D (Motor Function): 0/66 on the Fugl-Rhoades Assessment which is indicative of severe impairment in upper extremity functional status. Other factors to consider for discharge: decline in function, CVA, PLOF, needs rehab     Patient will benefit from skilled therapy intervention to address the above noted impairments. PLAN :  Recommendations and Planned Interventions: self care training, functional mobility training, therapeutic exercise, balance training, visual/perceptual training, therapeutic activities, cognitive retraining, endurance activities, neuromuscular re-education, patient education, home safety training, and family training/education    Frequency/Duration: Patient will be followed by occupational therapy 5 times a week to address goals.     Recommendation for discharge: (in order for the patient to meet his/her long term goals)  Therapy 3 hours per day 5-7 days per week    This discharge recommendation:  Has been made in collaboration with the attending provider and/or case management    IF patient discharges home will need the following DME: hospital bed and mechanical lift       SUBJECTIVE:   Patient stated Yes. 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    Congestive heart failure, unspecified     Dissection of right carotid artery ([facility] 09/2018    Hypertension     Overweight(278.02)     Seizures (HCC)     SOLITARY PULMONARY NODULE     1.6 cm    Stroke [facility]) 2015    right thalamus     Past Surgical History:   Procedure Laterality Date    MA UNLISTED PROCEDURE LUNGS & PLEURA      VATS Video-assisted thoracic surgery     Expanded or extensive additional review of patient history:     Home Situation  Patient Expects to be Discharged to[de-identified] Rehab hospital/unit acute    Hand dominance: Right    EXAMINATION OF PERFORMANCE DEFICITS:  Cognitive/Behavioral Status:  Neurologic State: Alert;Confused  Orientation Level: Oriented to person  Cognition: Follows commands  Perception: Cues to attend left visual field;Cues to attend right visual field;Cues to attend to left side of body;Cues to attend to right side of body;Cues to maintain midline in sitting  Perseveration: No perseveration noted  Safety/Judgement: Decreased awareness of environment;Decreased insight into deficits    Skin: WNL    Edema: Edema noted in L hand    Hearing: Auditory  Auditory Impairment: None    Vision/Perceptual:    Tracking: Requires cues, head turns, or add eye shifts to track    Saccades:  Additional eye shifts occurred during testing            Diplopia: No    Acuity: Able to read clock/calendar on wall without difficulty    Corrective Lenses: Reading glasses    Range of Motion:  AROM: Grossly decreased, non-functional (left UE and LE)  PROM: Generally decreased, functional (significantly increased tone in LLE and slightly increased in LUE)                      Strength:  Strength: Grossly decreased, non-functional (left UE and LE) Coordination:  Coordination: Generally decreased, functional  Fine Motor Skills-Upper: Left Impaired;Right Intact    Gross Motor Skills-Upper: Left Impaired;Right Intact    Tone & Sensation:  Tone: Abnormal  Sensation: Impaired                      Balance:  Sitting: Impaired  Sitting - Static: Poor (constant support)  Sitting - Dynamic: Poor (constant support)  Standing:  (not safe to attempt)    Functional Mobility and Transfers for ADLs:  Bed Mobility:  Rolling: Maximum assistance; Total assistance  Supine to Sit: Total assistance (with HOB elevated)  Sit to Supine: Total assistance  Scooting: Total assistance    Transfers:  Sit to Stand:  (no attempted, poor sitting balance, left yessica)    ADL Assessment:  Feeding: Setup;Stand-by assistance    Oral Facial Hygiene/Grooming: Minimum assistance    Bathing: Maximum assistance         Upper Body Dressing: Total assistance    Lower Body Dressing: Total assistance    Toileting: Total assistance                ADL Intervention and task modifications:                                Lower Body Dressing Assistance  Socks:  Total assistance (dependent)    Toileting  Toileting Assistance: Maximum assistance (at bed level using urinal)  Bladder Hygiene: Maximum assistance  Clothing Management: Maximum assistance    Cognitive Retraining  Safety/Judgement: Decreased awareness of environment;Decreased insight into deficits    Functional Measure:  Fugl-Rhoades Assessment of Motor Recovery after Stroke:   Upper Extremity Assessment: Yes    Reflex Activity  Flexors/Biceps/Fingers: None  Extensors/Triceps: None  Reflex Subtotal: 0    Volitional Movement Within Synergies  Shoulder Retraction: None  Shoulder Elevation: None  Shoulder Abduction (90 degrees): None  Shoulder External Rotation: None  Elbow Flexion: None  Forearm Supination: None  Shoulder Adduction/Internal Rotation: None  Elbow Extension: None  Forearm Pronation: None  Subtotal: 0    Volitional Movement Mixing Synergies  Hand to Lumbar Spine: None  Shoulder Flexion (0-90 degrees): None  Pronation-Supination: None  Subtotal: 0    Volitional Movement With Little or No Synergy  Shoulder Abduction (0-90 degrees): None  Shoulder Flexion ( degrees): None  Pronation/Supination: None  Subtotal : 0    Normal Reflex Activity  Biceps, Triceps, Finger Flexors: None  Subtotal : 0    Upper Extremity Total   Upper Extremity Total: 0    Wrist  Stability at 15 Degree Dorsiflexion: None  Repeated Dorsiflexion/ Volar Flexion: None  Stability at 15 Degree Dorsiflexion: None  Repeated Dorsiflexion/ Volar Flexion: None  Circumduction: None  Wrist Total: 0    Hand  Mass Flexion: None  Mass Extension: None  Grasp A: None  Grasp B: None  Grasp C: None  Grasp D: None  Grasp E: None  Hand Total: 0    Coordination/Speed  Tremor: Marked  Dysmetria: Marked  Time: >5s  Coordination/Speed Total : 0    Total A-D  Total A-D (Motor Function): 0/66     This is a reliable/valid measure of arm function after a neurological event. It has established value to characterize functional status and for measuring spontaneous and therapy-induced recovery; tests proximal and distal motor functions. Fugl-Rhoades Assessment - UE scores recorded between five and 30 days post neurologic event can be used to predict UE recovery at six months post neurologic event. Severe = 0-21 points   Moderately Severe = 22-33 points   Moderate = 34-47 points   Mild = 48-66 points  ONEIL Villatoro, ZARIA Sullivan, & NILE Rose (1992). Measurement of motor recovery after stroke: Outcome assessment and sample size requirements.  Stroke, 23, pp. 0195-9750.   ------------------------------------------------------------------------------------------------------------------------------------------------------------------  MCID:  Stroke:   Ronel Raman et al, 2001; n = 171; mean age 79 (6) years; assessed within 16 (12) days of stroke, Acute Stroke)  FMA Motor Scores from Admission to Discharge   10 point increase in FMA Upper Extremity = 1.5 change in discharge FIM   10 point increase in FMA Lower Extremity = 1.9 change in discharge FIM  MDC:   Stroke:   Marian Pritchard et al, 2008, n = 14, mean age = 59.9 (14.6) years, assessed on average 14 (6.5) months post stroke, Chronic Stroke)   FMA = 5.2 points for the Upper Extremity portion of the assessment     Occupational Therapy Evaluation Charge Determination   History Examination Decision-Making   LOW Complexity : Brief history review  MEDIUM Complexity : 3-5 performance deficits relating to physical, cognitive , or psychosocial skils that result in activity limitations and / or participation restrictions MEDIUM Complexity : Patient may present with comorbidities that affect occupational performnce. Miniml to moderate modification of tasks or assistance (eg, physical or verbal ) with assesment(s) is necessary to enable patient to complete evaluation       Based on the above components, the patient evaluation is determined to be of the following complexity level: LOW   Pain Rating:  No pain per pt report    Activity Tolerance:   Fair, tolerates ADLs without rest breaks, and SpO2 stable on RA    After treatment patient left in no apparent distress:    Supine in bed, Call bell within reach, Bed / chair alarm activated, and Side rails x 3    COMMUNICATION/EDUCATION:   The patients plan of care was discussed with: Physical therapist and Registered nurse. Patient was educated regarding his deficit(s) of L sided weakness as this relates to his diagnosis of R corona radiata CVA. He demonstrated Guarded understanding as evidenced by decreased carryover of education. Home safety education was provided and the patient/caregiver indicated understanding., Patient/family have participated as able in goal setting and plan of care. , and Patient/family agree to work toward stated goals and plan of care.     This patients plan of care is appropriate for delegation to ROBIN.     Thank you for this referral.  Van Myers OT  Time Calculation: 30 mins

## 2023-03-01 NOTE — PROGRESS NOTES
End of Shift Note    Bedside shift change report given to Oli Greene (oncoming nurse) by Warren Ray RN (offgoing nurse). Report included the following information SBAR, Kardex, and ED Summary    Shift worked: night   Shift summary and any significant changes:     MRI done, CHG bathed, call bell and phone within reach of patient, able to make needs knwn. Concerns for physician to address:  None   Zone phone for oncoming shift:   [phone]     Patient [address]  74 y.o.  2/28/2023  3:19 AM by Shana Brown DO.  Palmira Jane was admitted from Home    Problem List  Patient Active Problem List    Diagnosis Date Noted    CVA (cerebral vascular accident) ([address] 02/28/2023    Thrombotic stroke involving right middle cerebral artery ([address] 02/28/2023    Bilateral carotid artery stenosis 02/28/2023    Stage 3a chronic kidney disease ([address] 10/28/2022    Malignant neoplasm of lower lobe, left bronchus or lung ([address] 04/22/2022    Left arm weakness 03/06/2021    Bilateral leg weakness 03/06/2021    Carcinoid tumor 02/13/2018    CKD stage 2 due to type 2 diabetes mellitus ([address] 05/14/2017    Polyneuropathy in diabetes(357.2) 07/21/2015    Hypertensive emergency 07/20/2015    Seizure disorder ([address] 07/20/2015    Type II or unspecified type diabetes mellitus with neurological manifestations, uncontrolled(250.62) ([address] 07/20/2015    History of atrial fibrillation 07/20/2015    Hyperlipidemia 07/20/2015    Diabetic neuropathy ([address] 11/21/2014    Seizures ([address] 08/08/2014    Syncope 07/13/2012    Cardiomyopathy, nonischemic ([address] 10/31/2009    HTN (hypertension), benign 10/26/2009    Pulmonary nodule 10/26/2009     Past Medical History:   Diagnosis Date    Atrial fibrillation ([address]     Cancer ([address] 12/2015    left lung; 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 No Placement date  Reason Medically Necessary   PICC LINE? No Placement date Reason Medically Necessary     Genitourinary:   Urinary status: voiding  Urinary Catheter? No Placement Date  Reason Medically Necessary     Respiratory:   O2 Device: None (Room air)  Chronic home O2 use?: NO  Incentive spirometer at bedside: NO       GI:  Last Bowel Movement Date: 02/28/23  Current diet:  ADULT DIET Regular; 4 carb choices (60 gm/meal)  Passing flatus: YES  Tolerating current diet: YES       Pain Management:   Patient states pain is manageable on current regimen: N/A    Skin:  Don Score: 16  Interventions: PT/OT consult    Patient Safety:  Fall Score:  Total Score: 5  Interventions: bed/chair alarm, gripper socks, and pt to call before getting OOB  High Fall Risk: Yes  @Rollbelt  @dexterity to release roll belt  Yes/No ( must document dexterity  here by stating Yes or No here, otherwise this is a restraint and must follow restraint documentation policy.)    DVT prophylaxis:  DVT prophylaxis Med- Yes  DVT prophylaxis SCD or HAZEL- No     Wounds: (If Applicable)  Wounds- No  Location     Active Consults:  IP CONSULT TO NEUROLOGY    Length of Stay:  Expected LOS: - - -  Actual LOS: 1  Discharge Plan: No TBD      Dorian Bueno RN

## 2023-03-02 LAB
ANION GAP SERPL CALC-SCNC: 6 MMOL/L (ref 5–15)
BASOPHILS # BLD: 0 K/UL (ref 0–0.1)
BASOPHILS NFR BLD: 1 % (ref 0–1)
BUN SERPL-MCNC: 12 MG/DL (ref 6–20)
BUN/CREAT SERPL: 10 (ref 12–20)
CALCIUM SERPL-MCNC: 8.3 MG/DL (ref 8.5–10.1)
CHLORIDE SERPL-SCNC: 105 MMOL/L (ref 97–108)
CO2 SERPL-SCNC: 29 MMOL/L (ref 21–32)
CREAT SERPL-MCNC: 1.22 MG/DL (ref 0.7–1.3)
DIFFERENTIAL METHOD BLD: NORMAL
EOSINOPHIL # BLD: 0.1 K/UL (ref 0–0.4)
EOSINOPHIL NFR BLD: 1 % (ref 0–7)
ERYTHROCYTE [DISTWIDTH] IN BLOOD BY AUTOMATED COUNT: 14.5 % (ref 11.5–14.5)
GLUCOSE BLD STRIP.AUTO-MCNC: 137 MG/DL (ref 65–117)
GLUCOSE BLD STRIP.AUTO-MCNC: 154 MG/DL (ref 65–117)
GLUCOSE BLD STRIP.AUTO-MCNC: 158 MG/DL (ref 65–117)
GLUCOSE BLD STRIP.AUTO-MCNC: 164 MG/DL (ref 65–117)
GLUCOSE BLD STRIP.AUTO-MCNC: 223 MG/DL (ref 65–117)
GLUCOSE SERPL-MCNC: 181 MG/DL (ref 65–100)
HCT VFR BLD AUTO: 43.9 % (ref 36.6–50.3)
HGB BLD-MCNC: 14 G/DL (ref 12.1–17)
IMM GRANULOCYTES # BLD AUTO: 0 K/UL (ref 0–0.04)
IMM GRANULOCYTES NFR BLD AUTO: 0 % (ref 0–0.5)
LYMPHOCYTES # BLD: 2.6 K/UL (ref 0.8–3.5)
LYMPHOCYTES NFR BLD: 39 % (ref 12–49)
MCH RBC QN AUTO: 27 PG (ref 26–34)
MCHC RBC AUTO-ENTMCNC: 31.9 G/DL (ref 30–36.5)
MCV RBC AUTO: 84.6 FL (ref 80–99)
MONOCYTES # BLD: 0.6 K/UL (ref 0–1)
MONOCYTES NFR BLD: 10 % (ref 5–13)
NEUTS SEG # BLD: 3.3 K/UL (ref 1.8–8)
NEUTS SEG NFR BLD: 49 % (ref 32–75)
NRBC # BLD: 0 K/UL (ref 0–0.01)
NRBC BLD-RTO: 0 PER 100 WBC
PLATELET # BLD AUTO: 179 K/UL (ref 150–400)
PMV BLD AUTO: 12.1 FL (ref 8.9–12.9)
POTASSIUM SERPL-SCNC: 3.3 MMOL/L (ref 3.5–5.1)
RBC # BLD AUTO: 5.19 M/UL (ref 4.1–5.7)
SARS-COV-2 RNA RESP QL NAA+PROBE: NOT DETECTED
SERVICE CMNT-IMP: ABNORMAL
SODIUM SERPL-SCNC: 140 MMOL/L (ref 136–145)
SOURCE, COVRS: NORMAL
WBC # BLD AUTO: 6.6 K/UL (ref 4.1–11.1)

## 2023-03-02 PROCEDURE — 74011636637 HC RX REV CODE- 636/637: Performed by: STUDENT IN AN ORGANIZED HEALTH CARE EDUCATION/TRAINING PROGRAM

## 2023-03-02 PROCEDURE — 65270000046 HC RM TELEMETRY

## 2023-03-02 PROCEDURE — 85025 COMPLETE CBC W/AUTO DIFF WBC: CPT

## 2023-03-02 PROCEDURE — 82962 GLUCOSE BLOOD TEST: CPT

## 2023-03-02 PROCEDURE — 99231 SBSQ HOSP IP/OBS SF/LOW 25: CPT | Performed by: PSYCHIATRY & NEUROLOGY

## 2023-03-02 PROCEDURE — 74011000250 HC RX REV CODE- 250: Performed by: STUDENT IN AN ORGANIZED HEALTH CARE EDUCATION/TRAINING PROGRAM

## 2023-03-02 PROCEDURE — 74011250637 HC RX REV CODE- 250/637: Performed by: STUDENT IN AN ORGANIZED HEALTH CARE EDUCATION/TRAINING PROGRAM

## 2023-03-02 PROCEDURE — 36415 COLL VENOUS BLD VENIPUNCTURE: CPT

## 2023-03-02 PROCEDURE — 74011250636 HC RX REV CODE- 250/636: Performed by: PHYSICIAN ASSISTANT

## 2023-03-02 PROCEDURE — 97112 NEUROMUSCULAR REEDUCATION: CPT

## 2023-03-02 PROCEDURE — 74011250637 HC RX REV CODE- 250/637: Performed by: PHYSICIAN ASSISTANT

## 2023-03-02 PROCEDURE — 97110 THERAPEUTIC EXERCISES: CPT

## 2023-03-02 PROCEDURE — 74011250637 HC RX REV CODE- 250/637: Performed by: PSYCHIATRY & NEUROLOGY

## 2023-03-02 PROCEDURE — 80048 BASIC METABOLIC PNL TOTAL CA: CPT

## 2023-03-02 RX ORDER — HYDRALAZINE HYDROCHLORIDE 20 MG/ML
20 INJECTION INTRAMUSCULAR; INTRAVENOUS
Status: DISCONTINUED | OUTPATIENT
Start: 2023-03-02 | End: 2023-03-09 | Stop reason: HOSPADM

## 2023-03-02 RX ORDER — POTASSIUM CHLORIDE 20 MEQ/1
40 TABLET, EXTENDED RELEASE ORAL 2 TIMES DAILY
Status: COMPLETED | OUTPATIENT
Start: 2023-03-02 | End: 2023-03-02

## 2023-03-02 RX ORDER — FUROSEMIDE 10 MG/ML
40 INJECTION INTRAMUSCULAR; INTRAVENOUS ONCE
Status: COMPLETED | OUTPATIENT
Start: 2023-03-02 | End: 2023-03-02

## 2023-03-02 RX ADMIN — Medication 2 UNITS: at 12:16

## 2023-03-02 RX ADMIN — AMLODIPINE BESYLATE 10 MG: 5 TABLET ORAL at 09:52

## 2023-03-02 RX ADMIN — POTASSIUM CHLORIDE 40 MEQ: 1500 TABLET, EXTENDED RELEASE ORAL at 17:49

## 2023-03-02 RX ADMIN — HYDRALAZINE HYDROCHLORIDE 10 MG: 10 TABLET, FILM COATED ORAL at 22:01

## 2023-03-02 RX ADMIN — LEVETIRACETAM 1000 MG: 500 TABLET, FILM COATED ORAL at 17:49

## 2023-03-02 RX ADMIN — SODIUM CHLORIDE, PRESERVATIVE FREE 10 ML: 5 INJECTION INTRAVENOUS at 17:50

## 2023-03-02 RX ADMIN — Medication 2 UNITS: at 22:10

## 2023-03-02 RX ADMIN — SODIUM CHLORIDE, PRESERVATIVE FREE 10 ML: 5 INJECTION INTRAVENOUS at 22:01

## 2023-03-02 RX ADMIN — APIXABAN 5 MG: 5 TABLET, FILM COATED ORAL at 17:49

## 2023-03-02 RX ADMIN — POTASSIUM CHLORIDE 40 MEQ: 1500 TABLET, EXTENDED RELEASE ORAL at 02:08

## 2023-03-02 RX ADMIN — LISINOPRIL 40 MG: 20 TABLET ORAL at 09:52

## 2023-03-02 RX ADMIN — APIXABAN 5 MG: 5 TABLET, FILM COATED ORAL at 08:04

## 2023-03-02 RX ADMIN — Medication 2 UNITS: at 08:04

## 2023-03-02 RX ADMIN — HYDRALAZINE HYDROCHLORIDE 10 MG: 10 TABLET, FILM COATED ORAL at 08:04

## 2023-03-02 RX ADMIN — FUROSEMIDE 40 MG: 10 INJECTION, SOLUTION INTRAMUSCULAR; INTRAVENOUS at 02:08

## 2023-03-02 RX ADMIN — ASPIRIN 325 MG: 325 TABLET, COATED ORAL at 08:04

## 2023-03-02 RX ADMIN — HYDRALAZINE HYDROCHLORIDE 10 MG: 10 TABLET, FILM COATED ORAL at 17:49

## 2023-03-02 RX ADMIN — POTASSIUM CHLORIDE 40 MEQ: 1500 TABLET, EXTENDED RELEASE ORAL at 08:04

## 2023-03-02 RX ADMIN — CARVEDILOL 12.5 MG: 12.5 TABLET, FILM COATED ORAL at 17:49

## 2023-03-02 RX ADMIN — ATORVASTATIN CALCIUM 80 MG: 40 TABLET, FILM COATED ORAL at 22:00

## 2023-03-02 RX ADMIN — LEVETIRACETAM 1000 MG: 500 TABLET, FILM COATED ORAL at 08:04

## 2023-03-02 NOTE — PROGRESS NOTES
End of Shift Note     Bedside shift change report given to Miya Sullivan RN (oncoming nurse) by Daljit Jimenez RN (offgoing nurse). Report included the following information SBAR, Kardex, and ED Summary     Shift worked:  [id number]   Shift summary and any significant changes:      Miralax for constipation given, Hydralazine for BP unheld. Meds given. Concerns for physician to address:  None   Zone phone for oncoming shift:   [phone]      Patient [address]  74 y.o.  2/28/2023  3:19 AM by Koki Wheeler DO.  Ulises Napoles was admitted from Home     Problem List       Patient Active Problem List     Diagnosis Date Noted    CVA (cerebral vascular accident) ([address] 02/28/2023    Thrombotic stroke involving right middle cerebral artery ([address] 02/28/2023    Bilateral carotid artery stenosis 02/28/2023    Stage 3a chronic kidney disease ([address] 10/28/2022    Malignant neoplasm of lower lobe, left bronchus or lung ([address] 04/22/2022    Left arm weakness 03/06/2021    Bilateral leg weakness 03/06/2021    Carcinoid tumor 02/13/2018    CKD stage 2 due to type 2 diabetes mellitus ([address] 05/14/2017    Polyneuropathy in diabetes(357.2) 07/21/2015    Hypertensive emergency 07/20/2015    Seizure disorder ([address] 07/20/2015    Type II or unspecified type diabetes mellitus with neurological manifestations, uncontrolled(250.62) ([address] 07/20/2015    History of atrial fibrillation 07/20/2015    Hyperlipidemia 07/20/2015    Diabetic neuropathy ([address] 11/21/2014    Seizures ([address] 08/08/2014    Syncope 07/13/2012    Cardiomyopathy, nonischemic ([address] 10/31/2009    HTN (hypertension), benign 10/26/2009    Pulmonary nodule 10/26/2009           Past Medical History:   Diagnosis Date    Atrial fibrillation ([address]      Cancer ([address] 12/2015     left lung; 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) 2015     right thalamus         Core Measures:  CVA: Yes Yes  CHF:No No  PNA:No No     Activity:  Activity Level: Bed Rest  Number times ambulated in hallways past shift: 0  Number of times OOB to chair past shift: 0     Cardiac:   Cardiac Monitoring: Yes      Cardiac Rhythm: Atrial Fib     Access:   Current line(s): PIV   Central Line? No Placement date  Reason Medically Necessary   PICC LINE? No Placement date Reason Medically Necessary      Genitourinary:   Urinary status: voiding  Urinary Catheter?  No Placement Date  Reason Medically Necessary      Respiratory:   O2 Device: None  Chronic home O2 use?: NO  Incentive spirometer at bedside: NO     GI:  Last Bowel Movement Date: 02/28/23  Current diet:  ADULT DIET Regular; 4 carb choices (60 gm/meal)  Passing flatus: YES  Tolerating current diet: YES     Pain Management:   Patient states pain is manageable on current regimen: N/A     Skin:  Don Score: 18  Interventions: PT/OT consult    Patient Safety:  Fall Score:    Interventions: bed/chair alarm, gripper socks, and pt to call before getting OOB  @Rollbelt  @dexterity to release roll belt  Yes/No ( must document dexterity  here by stating Yes or No here, otherwise this is a restraint and must follow restraint documentation policy.)     DVT prophylaxis:  DVT prophylaxis Med- Yes  DVT prophylaxis SCD or HAZEL- No      Wounds: (If Applicable)  Wounds- No  Location      Active Consults:  IP CONSULT TO NEUROLOGY     Length of Stay:  Expected LOS: - - -  Actual LOS: 1  Discharge Plan: No TBD        Femi Swift RN

## 2023-03-02 NOTE — PROGRESS NOTES
Problem: Pressure Injury - Risk of  Goal: *Prevention of pressure injury  Description: Document Don Scale and appropriate interventions in the flowsheet.   Outcome: Progressing Towards Goal  Note: Pressure Injury Interventions:  Sensory Interventions: Assess changes in LOC    Moisture Interventions: Absorbent underpads    Activity Interventions: PT/OT evaluation, Increase time out of bed    Mobility Interventions: PT/OT evaluation    Nutrition Interventions: Document food/fluid/supplement intake                     Problem: TIA/CVA Stroke: 0-24 hours  Goal: Off Pathway (Use only if patient is Off Pathway)  Outcome: Progressing Towards Goal  Goal: Activity/Safety  Outcome: Progressing Towards Goal  Goal: Consults, if ordered  Outcome: Progressing Towards Goal  Goal: Diagnostic Test/Procedures  Outcome: Progressing Towards Goal  Goal: Nutrition/Diet  Outcome: Progressing Towards Goal  Goal: Discharge Planning  Outcome: Progressing Towards Goal  Goal: Medications  Outcome: Progressing Towards Goal  Goal: Respiratory  Outcome: Progressing Towards Goal  Goal: Treatments/Interventions/Procedures  Outcome: Progressing Towards Goal  Goal: Minimize risk of bleeding post-thrombolytic infusion  Outcome: Progressing Towards Goal  Goal: Monitor for complications post-thrombolytic infusion  Outcome: Progressing Towards Goal  Goal: Psychosocial  Outcome: Progressing Towards Goal  Goal: *Hemodynamically stable  Outcome: Progressing Towards Goal  Goal: *Neurologically stable  Description: Absence of additional neurological deficits    Outcome: Progressing Towards Goal  Goal: *Verbalizes anxiety and depression are reduced or absent  Outcome: Progressing Towards Goal  Goal: *Absence of Signs of Aspiration on Current Diet  Outcome: Progressing Towards Goal  Goal: *Absence of deep venous thrombosis signs and symptoms(Stroke Metric)  Outcome: Progressing Towards Goal  Goal: *Ability to perform ADLs and demonstrates progressive mobility and function  Outcome: Progressing Towards Goal  Goal: *Stroke education started(Stroke Metric)  Outcome: Progressing Towards Goal  Goal: *Dysphagia screen performed(Stroke Metric)  Outcome: Progressing Towards Goal  Goal: *Rehab consulted(Stroke Metric)  Outcome: Progressing Towards Goal     Problem: Ischemic Stroke: Discharge Outcomes  Goal: *Verbalizes anxiety and depression are reduced or absent  Outcome: Progressing Towards Goal  Goal: *Verbalize understanding of risk factor modification(Stroke Metric)  Outcome: Progressing Towards Goal  Goal: *Hemodynamically stable  Outcome: Progressing Towards Goal  Goal: *Absence of aspiration pneumonia  Outcome: Progressing Towards Goal  Goal: *Aware of needed dietary changes  Outcome: Progressing Towards Goal  Goal: *Verbalize understanding of prescribed medications including anti-coagulants, anti-lipid, and/or anti-platelets(Stroke Metric)  Outcome: Progressing Towards Goal  Goal: *Tolerating diet  Outcome: Progressing Towards Goal  Goal: *Aware of follow-up diagnostics related to anticoagulants  Outcome: Progressing Towards Goal  Goal: *Ability to perform ADLs and demonstrates progressive mobility and function  Outcome: Progressing Towards Goal  Goal: *Absence of DVT(Stroke Metric)  Outcome: Progressing Towards Goal  Goal: *Absence of aspiration  Outcome: Progressing Towards Goal  Goal: *Optimal pain control at patient's stated goal  Outcome: Progressing Towards Goal  Goal: *Home safety concerns addressed  Outcome: Progressing Towards Goal  Goal: *Describes available resources and support systems  Outcome: Progressing Towards Goal  Goal: *Verbalizes understanding of activation of EMS(911) for stroke symptoms(Stroke Metric)  Outcome: Progressing Towards Goal  Goal: *Understands and describes signs and symptoms to report to providers(Stroke Metric)  Outcome: Progressing Towards Goal  Goal: *Neurolgocially stable (absence of additional neurological deficits)  Outcome: Progressing Towards Goal  Goal: *Verbalizes importance of follow-up with primary care physician(Stroke Metric)  Outcome: Progressing Towards Goal  Goal: *Smoking cessation discussed,if applicable(Stroke Metric)  Outcome: Progressing Towards Goal  Goal: *Depression screening completed(Stroke Metric)  Outcome: Progressing Towards Goal

## 2023-03-02 NOTE — PROGRESS NOTES
Consult  REFERRED BY:  Moni Jeff MD    CHIEF COMPLAINT: Left-sided weakness      Subjective: Kristie Bond is a 74 y.o. right-handed -American male we are seen as a new patient, at the request of the hospitalist, for evaluation of new left-sided weakness that occurred when he tried to get out of bed this morning and found that he could not walk because his left side was too weak and came to the hospital for further evaluation. He denies any chest pain or palpitations denies any trauma or fever, and says he still feels weak on that side. He had a past stroke in the past I think around 2021 and involve the right side of the brain, left him with residual left-sided weakness that was mild that he was able to ambulate with a cane. His CT of the head and CTA of the head neck showed no new lesions and no new obstruction on today's study. Patient's MRI scan does show a lacunar type infarct in the right basal ganglion region consistent with his left-sided weakness, and we will increase his aspirin to 325 mg a day from 81 and continue his Eliquis for distal small vessel disease and his history of atrial fibrillation. Patient is being considered for inpatient rehab due to his dense left-sided weakness that is appropriate. His carotid Doppler studies did not show any high-grade stenosis. Discussed this with the patient and his wife and they agree with plans and therapy as above.     Past Medical History:   Diagnosis Date    Atrial fibrillation ([facility]     Cancer ([facility] [address] 12/2015    left lung; 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    Congestive heart failure, unspecified     Dissection of right carotid artery ([address] 09/2018    Hypertension     Overweight(278.02)     Seizures ([address]     SOLITARY PULMONARY NODULE     1.6 cm    Stroke [facility]) 2015    right thalamus      Past Surgical History:   Procedure Laterality Date    VT UNLISTED PROCEDURE LUNGS & PLEURA      VATS Video-assisted thoracic surgery     Family History Problem Relation Age of Onset    Stroke Mother     Cancer Father       Social History     Tobacco Use    Smoking status: Light Smoker     Packs/day: 0.10     Types: Cigarettes     Start date: 6/1/2015    Smokeless tobacco: Never    Tobacco comments:     former cigar   Substance Use Topics    Alcohol use: Not Currently     Comment: social         Current Facility-Administered Medications:     aspirin delayed-release tablet 325 mg, 325 mg, Oral, DAILY, Minal Louie MD    albuterol (PROVENTIL VENTOLIN) nebulizer solution 10 mg, 10 mg, Inhalation, Q4H PRN, Nabeel Cramer MD    [Held by provider] amLODIPine (NORVASC) tablet 10 mg, 10 mg, Oral, DAILY, Nabeel Cramer MD    apixaban Wynetta Adrien) tablet 5 mg, 5 mg, Oral, BID, Nabeel Cramer MD, 5 mg at 03/01/23 1733    atorvastatin (LIPITOR) tablet 80 mg, 80 mg, Oral, QHS, Nabeel Cramer MD, 80 mg at 02/28/23 2235    hydrALAZINE (APRESOLINE) tablet 10 mg, 10 mg, Oral, TID, Nabeel Cramer MD, 10 mg at 03/01/23 1751    levETIRAcetam (KEPPRA) tablet 1,000 mg, 1,000 mg, Oral, BID, Nabeel Cramer MD, 1,000 mg at 03/01/23 1733    [Held by provider] lisinopriL (PRINIVIL, ZESTRIL) tablet 40 mg, 40 mg, Oral, DAILY, Nabeel Cramer MD    [facility] by provider] carvediloL (COREG) tablet 12.5 mg, 12.5 mg, Oral, BID WITH MEALS, Nabeel Cramer MD    sodium chloride (NS) flush 5-40 mL, 5-40 mL, IntraVENous, Q8H, Nabeel Cramer MD, 10 mL at 03/01/23 1736    sodium chloride (NS) flush 5-40 mL, 5-40 mL, IntraVENous, PRN, Nabeel Cramer MD    acetaminophen (TYLENOL) tablet 650 mg, 650 mg, Oral, Q6H PRN **OR** acetaminophen (TYLENOL) suppository 650 mg, 650 mg, Rectal, Q6H PRN, Nabeel Cramer MD    polyethylene glycol (MIRALAX) packet 17 g, 17 g, Oral, DAILY PRN, Nabeel Cramer MD, 17 g at 03/01/23 1740    ondansetron (ZOFRAN ODT) tablet 4 mg, 4 mg, Oral, Q8H PRN **OR** ondansetron (ZOFRAN) injection 4 mg, 4 mg, IntraVENous, Q6H PRN, Mauricio Crenshaw Tru Devine MD    labetaloL (NORMODYNE;TRANDATE) injection 5 mg, 5 mg, IntraVENous, Q10MIN PRN, Sage Goodman MD    insulin lispro (HUMALOG) injection, , SubCUTAneous, AC&HS, Sage Goodman MD, 2 Units at 03/01/23 1444    glucose chewable tablet 16 g, 4 Tablet, Oral, PRN, Sage Goodman MD    glucagon [facility]) injection 1 mg, 1 mg, IntraMUSCular, PRN, Sage Goodman MD    dextrose 10% infusion 0-250 mL, 0-250 mL, IntraVENous, PRN, Sage Goodman MD        No Known Allergies   MRI Results (most recent):  Results from [place] encounter on 02/28/23    MRI BRAIN WO CONT    Narrative  EXAM: MRI BRAIN WO CONT    INDICATION: CVA - L sided weakness    COMPARISON: CT scans from earlier, brain MRI 3/6/2021. CONTRAST: None. TECHNIQUE:  Multiplanar multisequence acquisition without contrast of the brain. FINDINGS:  Restricted diffusion consistent with acute infarction is shown in the posterior  right corona radiata which is adjacent to the area of acute infarction shown on  prior MRI which was noted in the posterior corona radiata extending into the  posterior lentiform nucleus. On the current study, the area of acute infarction  measures 18 mm AP and 9 mm transverse on the axial diffusion weighted images. Prominent bilateral periventricular signal alteration is again demonstrated in  the cerebrum. While nonspecific, these findings are likely on the basis of  chronic small vessel ischemic disease the white matter. There is interval  demonstration of an area of filum periventricular linear cystic change adjacent  to the anterior body of the right lateral ventricle likely on the basis of  remote infarction. The vascular flow voids at base of the brain appear within normal limits  conspicuity. Mild-moderate ventricular megaly mild cortical sulcal prominence is  again shown indicative of atrophy. No intra-axial extra-axial mass lesion is  demonstrated.  The unenhanced sella, optic chiasm, and orbits appear normal.  Because of thickening in the superior left sphenoid sinus is again shown. Impression  Atrophy and abundant chronic small vessel ischemic disease the white matter  again shown. Acute infarction is demonstrated in the posterior corona radiata on  the right. Results from [place] encounter on 02/28/23    MRI BRAIN WO CONT    Narrative  EXAM: MRI BRAIN WO CONT    INDICATION: CVA - L sided weakness    COMPARISON: CT scans from earlier, brain MRI 3/6/2021. CONTRAST: None. TECHNIQUE:  Multiplanar multisequence acquisition without contrast of the brain. FINDINGS:  Restricted diffusion consistent with acute infarction is shown in the posterior  right corona radiata which is adjacent to the area of acute infarction shown on  prior MRI which was noted in the posterior corona radiata extending into the  posterior lentiform nucleus. On the current study, the area of acute infarction  measures 18 mm AP and 9 mm transverse on the axial diffusion weighted images. Prominent bilateral periventricular signal alteration is again demonstrated in  the cerebrum. While nonspecific, these findings are likely on the basis of  chronic small vessel ischemic disease the white matter. There is interval  demonstration of an area of filum periventricular linear cystic change adjacent  to the anterior body of the right lateral ventricle likely on the basis of  remote infarction. The vascular flow voids at base of the brain appear within normal limits  conspicuity. Mild-moderate ventricular megaly mild cortical sulcal prominence is  again shown indicative of atrophy. No intra-axial extra-axial mass lesion is  demonstrated. The unenhanced sella, optic chiasm, and orbits appear normal.  Because of thickening in the superior left sphenoid sinus is again shown. Impression  Atrophy and abundant chronic small vessel ischemic disease the white matter  again shown.  Acute infarction is demonstrated in the posterior corona radiata on  the right. Review of Systems:  A comprehensive review of systems was negative except for: Constitutional: positive for fatigue and malaise  Musculoskeletal: positive for myalgias, arthralgias, stiff joints, and muscle weakness  Neurological: positive for memory problems, coordination problems, gait problems, and weakness  Behvioral/Psych: positive for depression   Vitals:    03/01/23 1204 03/01/23 1208 03/01/23 1550 03/01/23 1553   BP: (!) 165/100 (!) 166/94 (!) 156/111 (!) 168/111   Pulse: 99  (!) 50 84   Resp: 20  18    Temp: 97.9 °F (36.6 °C)  97.5 °F (36.4 °C)    SpO2: 99%  100%    Weight:       Height:         Objective:     I      NEUROLOGICAL EXAM:    Appearance: The patient is well developed, well nourished, provides a poor history and is in no acute distress. Mental Status: Oriented to place and person, and not the date or the president, cognitive function is mildly abnormal and speech is fluent and no aphasia or dysarthria. Mood and affect appropriate but depressed. Cranial Nerves:   Intact visual fields. Fundi are benign, disc are flat, no lesions seen on funduscopy. DEMAR, EOM's full, no nystagmus, no ptosis. Facial sensation is normal. Corneal reflexes are not tested. Facial movement is shows left central facial weakness. Hearing is abnormal bilaterally. Palate is midline with normal sternocleidomastoid and trapezius muscles are normal. Tongue is midline. Neck without meningismus or bruits  Temporal arteries are not tender or enlarged  TMJ areas are not tender on palpation   Motor:  5/5 strength in upper and lower proximal and distal muscles, on the right but fairly prominent weakness on the left side so that he cannot even raise his arm or move his leg. . Normal bulk and tone. No fasciculations. Rapid alternating movement is decreased on the left   Reflexes:   Deep tendon reflexes 2+/4 on the left and 1+/4 on the right.   No babinski or clonus present Sensory:   Normal to touch, pinprick and vibration and temperature decreased in both feet. DSS is intact   Gait:  Not testable    Tremor:   No tremor noted. Cerebellar:  Not testable abnormal cerebellar signs present on Romberg and tandem testing and finger-nose-finger exam.   Neurovascular:  Abnormal heart sounds and irregular rhythm, peripheral pulses intact, and no carotid bruits. Assessment:     Active Problems:    CVA (cerebral vascular accident) ([facility] [address] (2/28/2023)      Thrombotic stroke involving right middle cerebral artery ([facility] [address] (2/28/2023)      Bilateral carotid artery stenosis (2/28/2023)      Plan:     Patient with new left-sided weakness that looks like a new stroke,  He is to get an echocardiogram to make sure that he is not going in and out of A-fib as a cause of the stroke. Patient's MRI scan does show a lacunar type infarct in the right basal ganglion region consistent with his left-sided weakness, and we will increase his aspirin to 325 mg a day from 81 and continue his Eliquis for distal small vessel disease and his history of atrial fibrillation. Patient is being considered for inpatient rehab due to his dense left-sided weakness that is appropriate. His carotid Doppler studies did not show any high-grade stenosis. Discussed this with the patient and his wife and they agree with plans and therapy as above. Need to monitor carefully on telemetry to rule out arrhythmias, and may need to change his anticoagulation depending on results of his work-up. His Dopplers look okay I reviewed those personally reviewed his old chart, reviewed his old records, reviewed his old neuro notes, and 34 minutes today spent with the patient and his wife going over all this in detail because his diagnosis prognosis and treatment options with him in addition. Patient going to inpatient rehab, and I will follow as needed for now, call if we can help.     Signed By: Anaya Hernandez MD     March 1, 2023 CC: Vasyl Myers MD  FAX: [phone]

## 2023-03-02 NOTE — PROGRESS NOTES
Transition of Care Plan:     RUR:  13%   Disposition: IPR  If SNF or IPR: Date FOC offered: 3-1  Date FOC received: 3-1 1223 PM   Date authorization started with reference number: see below   Date authorization received and expires:  Accepting facility:  [facility]   Follow up appointments: TO BE MADE  DME needed: TBD   Transportation at Discharge: [facility] or means to access home: N/A        IM Medicare Letter: NEEDED  Is patient a [place] and connected with the [place]? NO  If yes, was [employer] transfer form completed and [facility] notified? Caregiver Contact:  Discharge Caregiver contacted prior to discharge? Care Conference needed?:  NO    I spoke with Deanne this am from [facility]. If Lucretia Ambrocio was not started yesterday will be started today ( OT note is in ). I did speak with spouse yesterday and she was in agreement as well with IPR. Care manager to follow.      Faheem Valenzuela RN   839 am   Care Manager

## 2023-03-02 NOTE — PROGRESS NOTES
Hospitalist Progress Note    NAME: Andriy Olivo   :  1955   MRN:  [id number]       Assessment / Plan:  Acute CVA  CT head with no acute process. CTA technically sub-optimal. No large vessel occlusion. Mild artherosclerosis. Carotid duplex-done, no significant stenosis  Continue telemetry  MRI showing acute infarct posterior corona radiator  Echo showed 40 to 45% EF, no intracardiac shunt  Appreciate neurology recs  PT recommends acute rehab  Speech therapy regular diet  Patient was already on Eliquis 5 mg twice daily at home  We will follow-up on neurology recommendation regarding anticoagulation  Currently on Eliquis 5 mg twice daily and aspirin started 325 mg daily  Continue statin   A1c, lipid panel pending    Resumed on home medications amlodipine, hydralazine, lisinopril  Coreg  HTN  Afib  H/o dissection of right carotid artery  Eliquis secondary hypercoagulable state due to A-fib  Hold PTA norvasc, coreg, hydralazine, and lisinopril. Continue eliquis and lipitor  Resumed on home medications     DM  Home metformin and glipizide held. SSI with POC glucose checks     Seizure disorder  Continue PTA keppra 1000 mg po bid     Hypokalemia  Replete as needed          30.0 - 39.9 Obese / Body mass index is 31.14 kg/m². Estimated discharge date:   Barriers: Medically stable, pending rehab    Code status: Full  Prophylaxis: Lovenox  Recommended Disposition:  rehab     Subjective:     Chief Complaint / Reason for Physician Visit  Patient seen and examined with brothers at bedside, continues to have left flaccid paralysis, otherwise pleasant, no new events or new new weakness\". Discussed with RN events overnight.      Review of Systems:  Symptom Y/N Comments  Symptom Y/N Comments   Fever/Chills    Chest Pain     Poor Appetite    Edema     Cough    Abdominal Pain     Sputum    Joint Pain     SOB/LOPEZ    Pruritis/Rash     Nausea/vomit    Tolerating PT/OT     Diarrhea    Tolerating Diet Constipation    Other       Could NOT obtain due to:      Objective:     VITALS:   Last 24hrs VS reviewed since prior progress note. Most recent are:  Patient Vitals for the past 24 hrs:   Temp Pulse Resp BP SpO2   03/02/23 1128 98.4 °F (36.9 °C) 81 18 (!) 158/96 99 %   03/02/23 0759 97.9 °F (36.6 °C) 92 20 (!) 167/125 99 %   03/02/23 0509 -- 67 -- (!) 165/106 --   03/02/23 0126 -- -- -- (!) 164/108 --   03/02/23 0122 97.9 °F (36.6 °C) 68 18 (!) 181/130 99 %   03/01/23 2130 98.2 °F (36.8 °C) 83 18 (!) 171/125 100 %   03/01/23 1553 -- 84 -- (!) 168/111 --   03/01/23 1550 97.5 °F (36.4 °C) (!) 50 18 (!) 156/111 100 %         Intake/Output Summary (Last 24 hours) at 3/2/2023 1419  Last data filed at 3/2/2023 7371  Gross per 24 hour   Intake --   Output 600 ml   Net -600 ml          I had a face to face encounter and independently examined this patient on 3/2/2023, as outlined below:  PHYSICAL EXAM:  General: s, no acute distress  EENT:  EOMI. Anicteric sclerae. MMM  Resp:  CTA bilaterally, no wheezing or rales. No accessory muscle use  CV:  Regular  rhythm,  No edema  GI:  Soft, Non distended, Non tender. +Bowel sounds  Neurologic:  Left flaccid paralysis, moving right upper and lower extremities  Psych:   t. Not anxious nor agitated  Skin:  No rashes. No jaundice    Reviewed most current lab test results and cultures  YES  Reviewed most current radiology test results   YES  Review and summation of old records today    NO  Reviewed patient's current orders and MAR    YES  PMH/SH reviewed - no change compared to H&P  ________________________________________________________________________  Care Plan discussed with:    Comments   Patient x    Family  x    RN x    Care Manager x    Consultant                       x Multidiciplinary team rounds were held today with , nursing, pharmacist and clinical coordinator.   Patient's plan of care was discussed; medications were reviewed and discharge planning was addressed. ________________________________________________________________________  Total NON critical care TIME:  45   Minutes    Total CRITICAL CARE TIME Spent:   Minutes non procedure based      Comments   >50% of visit spent in counseling and coordination of care x    ________________________________________________________________________  Faby Pope MD     Procedures: see electronic medical records for all procedures/Xrays and details which were not copied into this note but were reviewed prior to creation of Plan. LABS:  I reviewed today's most current labs and imaging studies.   Pertinent labs include:  Recent Labs     03/02/23  0515 02/28/23  0259   WBC 6.6 6.3   HGB 14.0 13.8   HCT 43.9 41.5    170       Recent Labs     03/02/23  0515 02/28/23  0259    142   K 3.3* 3.1*    107   CO2 29 29   * 128*   BUN 12 12   CREA 1.22 1.29   CA 8.3* 8.2*   ALB  --  3.6   TBILI  --  0.7   ALT  --  25   INR  --  1.1         Signed: Faby Pope MD

## 2023-03-02 NOTE — PROGRESS NOTES
Bedside shift change report given to SAMANTHA Hazel (oncoming nurse) by Hunter Jackson (offgoing nurse). Report included the following information SBAR, Kardex, Intake/Output, MAR, Cardiac Rhythm Afib, and Alarm Parameters . 0138Pt BP elevated 164/108. No PRN meds available. NP Reasoner notified, see orders. Pt status did not change (left side flaccid) during the shift and pt had no complaints.

## 2023-03-02 NOTE — PROGRESS NOTES
Problem: Mobility Impaired (Adult and Pediatric)  Goal: *Acute Goals and Plan of Care (Insert Text)  Description:   FUNCTIONAL STATUS PRIOR TO ADMISSION: Patient required assistance for ADLs from wife, was ambulatory with can for household distances, had residual left weakness from previous stroke. HOME SUPPORT PRIOR TO ADMISSION: The patient lived with spouse and required assistance for dressing and bathing. Physical Therapy Goals  Initiated 3/1/2023  1. Patient will move from supine to sit and sit to supine  in bed with moderate assistance  within 7 day(s). 2.  Patient will transfer from bed to chair and chair to bed with moderate assistance  using the least restrictive device within 7 day(s). 3.  Patient will perform sit to stand with moderate assistance  within 7 day(s). 4.  Patient will ambulate with moderate assistance  for 5 feet with the least restrictive device within 7 day(s). 5.  Patient will increase Stafford balance score by 5 points within 7 days. Outcome: Progressing Towards Goal   PHYSICAL THERAPY TREATMENT  Patient: Chika Champion (2 y.o. male)  Date: 3/2/2023  Diagnosis: CVA (cerebral vascular accident) [facility]) [I63.9] <principal problem not specified>      Precautions: Bed Alarm  Chart, physical therapy assessment, plan of care and goals were reviewed. ASSESSMENT  Patient continues with skilled PT services and is progressing towards goals. Patient received in bed and agreeable to participate, more alert and conversational today and had good tolerance of ther ex for left LE. Left LE continues to be flaccid, focused on using visual cues and manual facilitation to move through ROM all planes, including rotational movement at hip with overpressure. Note increased extensor tone unless movement is very slow. Patient's wife Robb Mujica present at end of session, works as PCT on [employer] unit.  Patient is well below baseline and presents with significant neurological impairments, can tolerate three hours of therapy and is appropriate for acute rehab setting. Current Level of Function Impacting Discharge (mobility/balance): total assist for bed mobility    Other factors to consider for discharge: well below baseline, falls risk, left hemiplegia         PLAN :  Patient continues to benefit from skilled intervention to address the above impairments. Continue treatment per established plan of care. to address goals. Recommendation for discharge: (in order for the patient to meet his/her long term goals)  Therapy 3 hours per day 5-7 days per week    This discharge recommendation:  Has been made in collaboration with the attending provider and/or case management    IF patient discharges home will need the following DME: to be determined (TBD)       SUBJECTIVE:   Patient stated I've been in rehab before.     OBJECTIVE DATA SUMMARY:   Critical Behavior:  Neurologic State: Alert  Orientation Level: Oriented to person, Oriented to place  Cognition: Follows commands, Poor safety awareness  Safety/Judgement: Decreased awareness of environment, Decreased insight into deficits  Functional Mobility Training:  Bed Mobility:  Rolling: Maximum assistance;Assist x2                 Transfers:                                   Balance:     Ambulation/Gait Training:                                                        Stairs: Therapeutic Exercises:   PROM all planes left LE with tactile facilitation, overpressure, rotational movement, vibration, distraction   Pain Rating:      Activity Tolerance:   Good    After treatment patient left in no apparent distress:   Supine in bed, Call bell within reach, Bed / chair alarm activated, Caregiver / family present, and Side rails x 3    COMMUNICATION/COLLABORATION:   The patients plan of care was discussed with: Occupational therapist and Rehabilitation technician.      Devin Penny, PT   Time Calculation: 19 mins

## 2023-03-02 NOTE — PROGRESS NOTES
Problem: Self Care Deficits Care Plan (Adult)  Goal: *Acute Goals and Plan of Care (Insert Text)  Description: FUNCTIONAL STATUS PRIOR TO ADMISSION: Patient was modified independent using a single point cane for functional mobility. Patient required moderate assistance for distal LB dressing and bathing from supportive wife. HOME SUPPORT: The patient lived with wife and required moderate assistance  for LB distal dressing and bathing. Occupational Therapy Goals  Initiated 3/1/2023   1. Patient will perform grooming sitting EOB with moderate assistance  within 7 day(s). 2.  Patient will perform upper body dressing with moderate assistance  within 7 day(s). 3.  Patient will perform toilet transfers with maximal assistance within 7 day(s). 4.  Patient will perform all aspects of toileting with minimal assistance/contact guard assist within 7 day(s). 5.  Patient will participate in upper extremity therapeutic exercise/activities with moderate assistance  within 7 day(s). 6.  Patient will utilize energy conservation techniques during functional activities with verbal and visual cues within 7 day(s). 7.  Patient will improve their Fugl Rhoades score by 5 points in prep for ADLs within 7 days. Outcome: Not Progressing Towards Goal    OCCUPATIONAL THERAPY TREATMENT  Patient: John Echavarria (47 y.o. male)  Date: 3/2/2023  Diagnosis: CVA (cerebral vascular accident) [facility]) [I63.9] <principal problem not specified>      Precautions: Bed Alarm  Chart, occupational therapy assessment, plan of care, and goals were reviewed. ASSESSMENT  Patient continues with skilled OT services and is not progressing towards goals yet is cooperative for UE neuromuscular re-education today. Patient max A to lace R fingers through L HP hand in prep for SPOM.  He was educated on NOT allowing staff to pull on L UE and for L scapular and L UE to be supported on pillows when in supine/sitting, etc. He stated his brother pulled on L UE this am yet denies any c/o pain other than paresthesia in L hand. Patient mod-max A to D for B UE SROM with max cues with good motivation. Would benefit form training family when they are present as well. He will benefit from IP rehab at d/c      Current Level of Function Impacting Discharge (ADLs): D for ADL, max A-D x 2 for bed mobility    Other factors to consider for discharge: needs rehab         PLAN :  Patient continues to benefit from skilled intervention to address the above impairments. Continue treatment per established plan of care to address goals. Recommendation for discharge: (in order for the patient to meet his/her long term goals)  Therapy 3 hours per day 5-7 days per week    This discharge recommendation:  Has not yet been discussed the attending provider and/or case management    IF patient discharges home will need the following DME: defer to IP       SUBJECTIVE:   Patient stated It is heavy.     OBJECTIVE DATA SUMMARY:   Cognitive/Behavioral Status:  Neurologic State: Alert  Orientation Level: Oriented to person;Oriented to place  Cognition: Follows commands;Poor safety awareness             Functional Mobility and Transfers for ADLs:  Bed Mobility:       Transfers:             Balance:       ADL Intervention:                 Type of Bath: Chlorhexidine (CHG)                             Neuromuscular Reeducation/ Exercises:   Patient ed on L UE protection /positioning as well as elevating L heel when in supine for skin integrity. He required max A for SROM L shoulder flexion to 90 degrees (as OT couldn't facilitate scapular mobility with positioning), elbow flexion/extension, forearm sup/pronation, wrist flex/extension/ thumb abd/flexion, finger flexion with max A to mod A, D for finger flexion. He completed 10 reps of all with increased time and max cues.       Pain:  C/o paresthesia L hand (pins/needles)    Activity Tolerance:   Good and requires rest breaks    After treatment patient left in no apparent distress:   Supine in bed, Heels elevated for pressure relief, Call bell within reach, Bed / chair alarm activated, and Side rails x 3    COMMUNICATION/COLLABORATION:   The patients plan of care was discussed with: Registered nurse.      Brenden Lagunas OTR/L  Time Calculation: 37 mins

## 2023-03-03 LAB
EST. AVERAGE GLUCOSE BLD GHB EST-MCNC: 177 MG/DL
GLUCOSE BLD STRIP.AUTO-MCNC: 132 MG/DL (ref 65–117)
GLUCOSE BLD STRIP.AUTO-MCNC: 153 MG/DL (ref 65–117)
GLUCOSE BLD STRIP.AUTO-MCNC: 159 MG/DL (ref 65–117)
GLUCOSE BLD STRIP.AUTO-MCNC: 168 MG/DL (ref 65–117)
GLUCOSE BLD STRIP.AUTO-MCNC: 185 MG/DL (ref 65–117)
HBA1C MFR BLD: 7.8 % (ref 4–5.6)
SERVICE CMNT-IMP: ABNORMAL

## 2023-03-03 PROCEDURE — 74011000250 HC RX REV CODE- 250: Performed by: STUDENT IN AN ORGANIZED HEALTH CARE EDUCATION/TRAINING PROGRAM

## 2023-03-03 PROCEDURE — 74011636637 HC RX REV CODE- 636/637: Performed by: STUDENT IN AN ORGANIZED HEALTH CARE EDUCATION/TRAINING PROGRAM

## 2023-03-03 PROCEDURE — 82962 GLUCOSE BLOOD TEST: CPT

## 2023-03-03 PROCEDURE — 97110 THERAPEUTIC EXERCISES: CPT

## 2023-03-03 PROCEDURE — 74011250637 HC RX REV CODE- 250/637: Performed by: PSYCHIATRY & NEUROLOGY

## 2023-03-03 PROCEDURE — 36415 COLL VENOUS BLD VENIPUNCTURE: CPT

## 2023-03-03 PROCEDURE — 77030042042 HC CATH EXT MALE -B

## 2023-03-03 PROCEDURE — 65270000046 HC RM TELEMETRY

## 2023-03-03 PROCEDURE — 83036 HEMOGLOBIN GLYCOSYLATED A1C: CPT

## 2023-03-03 PROCEDURE — 74011250637 HC RX REV CODE- 250/637: Performed by: INTERNAL MEDICINE

## 2023-03-03 PROCEDURE — 74011250637 HC RX REV CODE- 250/637: Performed by: STUDENT IN AN ORGANIZED HEALTH CARE EDUCATION/TRAINING PROGRAM

## 2023-03-03 PROCEDURE — 97530 THERAPEUTIC ACTIVITIES: CPT

## 2023-03-03 PROCEDURE — 97112 NEUROMUSCULAR REEDUCATION: CPT | Performed by: OCCUPATIONAL THERAPIST

## 2023-03-03 RX ORDER — HYDRALAZINE HYDROCHLORIDE 25 MG/1
25 TABLET, FILM COATED ORAL 3 TIMES DAILY
Status: DISCONTINUED | OUTPATIENT
Start: 2023-03-03 | End: 2023-03-04

## 2023-03-03 RX ORDER — ASPIRIN 325 MG
325 TABLET, DELAYED RELEASE (ENTERIC COATED) ORAL DAILY
Qty: 30 TABLET | Refills: 0 | Status: SHIPPED | OUTPATIENT
Start: 2023-03-04

## 2023-03-03 RX ORDER — HYDRALAZINE HYDROCHLORIDE 25 MG/1
25 TABLET, FILM COATED ORAL 3 TIMES DAILY
Qty: 90 TABLET | Refills: 0 | Status: SHIPPED | OUTPATIENT
Start: 2023-03-03 | End: 2023-03-05

## 2023-03-03 RX ADMIN — HYDRALAZINE HYDROCHLORIDE 25 MG: 25 TABLET, FILM COATED ORAL at 21:51

## 2023-03-03 RX ADMIN — HYDRALAZINE HYDROCHLORIDE 25 MG: 25 TABLET, FILM COATED ORAL at 17:05

## 2023-03-03 RX ADMIN — SODIUM CHLORIDE, PRESERVATIVE FREE 10 ML: 5 INJECTION INTRAVENOUS at 05:55

## 2023-03-03 RX ADMIN — Medication 2 UNITS: at 12:21

## 2023-03-03 RX ADMIN — SODIUM CHLORIDE, PRESERVATIVE FREE 10 ML: 5 INJECTION INTRAVENOUS at 17:06

## 2023-03-03 RX ADMIN — HYDRALAZINE HYDROCHLORIDE 10 MG: 10 TABLET, FILM COATED ORAL at 08:44

## 2023-03-03 RX ADMIN — ATORVASTATIN CALCIUM 80 MG: 40 TABLET, FILM COATED ORAL at 21:49

## 2023-03-03 RX ADMIN — AMLODIPINE BESYLATE 10 MG: 5 TABLET ORAL at 08:43

## 2023-03-03 RX ADMIN — LEVETIRACETAM 1000 MG: 500 TABLET, FILM COATED ORAL at 08:44

## 2023-03-03 RX ADMIN — Medication 2 UNITS: at 17:04

## 2023-03-03 RX ADMIN — ASPIRIN 325 MG: 325 TABLET, COATED ORAL at 08:43

## 2023-03-03 RX ADMIN — APIXABAN 5 MG: 5 TABLET, FILM COATED ORAL at 08:44

## 2023-03-03 RX ADMIN — CARVEDILOL 12.5 MG: 12.5 TABLET, FILM COATED ORAL at 17:05

## 2023-03-03 RX ADMIN — Medication 2 UNITS: at 08:44

## 2023-03-03 RX ADMIN — LEVETIRACETAM 1000 MG: 500 TABLET, FILM COATED ORAL at 17:05

## 2023-03-03 RX ADMIN — LISINOPRIL 40 MG: 20 TABLET ORAL at 08:43

## 2023-03-03 RX ADMIN — CARVEDILOL 12.5 MG: 12.5 TABLET, FILM COATED ORAL at 08:43

## 2023-03-03 RX ADMIN — SODIUM CHLORIDE, PRESERVATIVE FREE 10 ML: 5 INJECTION INTRAVENOUS at 21:53

## 2023-03-03 RX ADMIN — APIXABAN 5 MG: 5 TABLET, FILM COATED ORAL at 17:05

## 2023-03-03 NOTE — PROGRESS NOTES
Problem: Pressure Injury - Risk of  Goal: *Prevention of pressure injury  Description: Document Don Scale and appropriate interventions in the flowsheet.   Outcome: Progressing Towards Goal  Note: Pressure Injury Interventions:  Sensory Interventions: Assess changes in LOC, Assess need for specialty bed    Moisture Interventions: Absorbent underpads, Apply protective barrier, creams and emollients, Check for incontinence Q2 hours and as needed, Internal/External urinary devices    Activity Interventions: Assess need for specialty bed, PT/OT evaluation, Pressure redistribution bed/mattress(bed type)    Mobility Interventions: Assess need for specialty bed, Float heels, HOB 30 degrees or less    Nutrition Interventions: Document food/fluid/supplement intake    Friction and Shear Interventions: Apply protective barrier, creams and emollients                Problem: TIA/CVA Stroke: Day 2 Until Discharge  Goal: Off Pathway (Use only if patient is Off Pathway)  Outcome: Progressing Towards Goal     Problem: Ischemic Stroke: Discharge Outcomes  Goal: *Verbalizes anxiety and depression are reduced or absent  Outcome: Progressing Towards Goal

## 2023-03-03 NOTE — PROGRESS NOTES
Physical Therapy    Chart reviewed and discussed with RN who recommend waiting till the afternoon to see patient as he was drowsy this morning.     Irina Monroe

## 2023-03-03 NOTE — PROGRESS NOTES
Consult  REFERRED BY:  Kan Cm MD    CHIEF COMPLAINT: Left-sided weakness      Subjective: Tyrel Cardozo is a 74 y.o. right-handed -American male we are seen as a new patient, at the request of the hospitalist, for evaluation of new left-sided weakness that occurred when he tried to get out of bed this morning and found that he could not walk because his left side was too weak and came to the hospital for further evaluation. He denies any chest pain or palpitations denies any trauma or fever, and says he still feels weak on that side. He had a past stroke in the past I think around 2021 and involve the right side of the brain, left him with residual left-sided weakness that was mild that he was able to ambulate with a cane. His CT of the head and CTA of the head neck showed no new lesions and no new obstruction on today's study. Patient's MRI scan does show a lacunar type infarct in the right basal ganglion region consistent with his left-sided weakness, and we will increase his aspirin to 325 mg a day from 81 and continue his Eliquis for distal small vessel disease and his history of atrial fibrillation. Patient is being considered for inpatient rehab due to his dense left-sided weakness that is appropriate. His carotid Doppler studies did not show any high-grade stenosis. Discussed this with the patient and his wife and they agree with plans and therapy as above. Patient remained stable, and we will follow as needed for now, and discussed his case with him in detail and advised him he should get some improvement hopefully with time and spent 20 minutes counseling him.   Past Medical History:   Diagnosis Date    Atrial fibrillation ([address]     Cancer ([address] 12/2015    left lung; 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    Congestive heart failure, unspecified     Dissection of right carotid artery ([address] 09/2018    Hypertension     Overweight(278.02)     Seizures (HCC)     SOLITARY PULMONARY NODULE     1.6 cm    Stroke [facility]) 2015    right thalamus      Past Surgical History:   Procedure Laterality Date    LA UNLISTED PROCEDURE LUNGS & PLEURA      VATS Video-assisted thoracic surgery     Family History   Problem Relation Age of Onset    Stroke Mother     Cancer Father       Social History     Tobacco Use    Smoking status: Light Smoker     Packs/day: 0.10     Types: Cigarettes     Start date: 6/1/2015    Smokeless tobacco: Never    Tobacco comments:     former cigar   Substance Use Topics    Alcohol use: Not Currently     Comment: social         Current Facility-Administered Medications:     hydrALAZINE (APRESOLINE) 20 mg/mL injection 20 mg, 20 mg, IntraVENous, Q6H PRN, Martell Drake PA-C    aspirin delayed-release tablet 325 mg, 325 mg, Oral, DAILY, Scott Barahona MD, 325 mg at 03/02/23 0804    albuterol (PROVENTIL VENTOLIN) nebulizer solution 10 mg, 10 mg, Inhalation, Q4H PRN, Yoli Billings MD    amLODIPine (NORVASC) tablet 10 mg, 10 mg, Oral, DAILY, Yoli Billings MD, 10 mg at 03/02/23 8027    apixaban (ELIQUIS) tablet 5 mg, 5 mg, Oral, BID, Yoli Billings MD, 5 mg at 03/02/23 1749    atorvastatin (LIPITOR) tablet 80 mg, 80 mg, Oral, QHS, Yoli Billings MD, 80 mg at 03/01/23 2334    hydrALAZINE (APRESOLINE) tablet 10 mg, 10 mg, Oral, TID, Yoli Billings MD, 10 mg at 03/02/23 1749    levETIRAcetam (KEPPRA) tablet 1,000 mg, 1,000 mg, Oral, BID, Yoli Billings MD, 1,000 mg at 03/02/23 1749    lisinopriL (PRINIVIL, ZESTRIL) tablet 40 mg, 40 mg, Oral, DAILY, Yoli Billings MD, 40 mg at 03/02/23 1714    carvediloL (COREG) tablet 12.5 mg, 12.5 mg, Oral, BID WITH MEALS, Yoli Billings MD, 12.5 mg at 03/02/23 1749    sodium chloride (NS) flush 5-40 mL, 5-40 mL, IntraVENous, Q8H, Yoli Billings MD, 10 mL at 03/02/23 1750    sodium chloride (NS) flush 5-40 mL, 5-40 mL, IntraVENous, PRN, Yoli Billings MD    acetaminophen (TYLENOL) tablet 650 mg, 650 mg, Oral, Q6H PRN **OR** acetaminophen (TYLENOL) suppository 650 mg, 650 mg, Rectal, Q6H PRN, Marlys Sepulveda MD    polyethylene glycol (MIRALAX) packet 17 g, 17 g, Oral, DAILY PRN, Marlys Sepulveda MD, 17 g at 03/01/23 1740    ondansetron (ZOFRAN ODT) tablet 4 mg, 4 mg, Oral, Q8H PRN **OR** ondansetron (ZOFRAN) injection 4 mg, 4 mg, IntraVENous, Q6H PRN, Marlys Sepulveda MD    labetaloL (NORMODYNE;TRANDATE) injection 5 mg, 5 mg, IntraVENous, Q10MIN PRN, Marlys Sepulveda MD    insulin lispro (HUMALOG) injection, , SubCUTAneous, AC&HS, Marlys Sepulveda MD, 2 Units at 03/02/23 1216    glucose chewable tablet 16 g, 4 Tablet, Oral, PRN, Marlys Sepulveda MD    glucagon [facility]) injection 1 mg, 1 mg, IntraMUSCular, PRN, Marlys Sepulveda MD    dextrose 10% infusion 0-250 mL, 0-250 mL, IntraVENous, PRN, Marlys Sepulveda MD        No Known Allergies   MRI Results (most recent):  Results from Hospital Encounter encounter on 02/28/23    MRI BRAIN WO CONT    Narrative  EXAM: MRI BRAIN WO CONT    INDICATION: CVA - L sided weakness    COMPARISON: CT scans from earlier, brain MRI 3/6/2021. CONTRAST: None. TECHNIQUE:  Multiplanar multisequence acquisition without contrast of the brain. FINDINGS:  Restricted diffusion consistent with acute infarction is shown in the posterior  right corona radiata which is adjacent to the area of acute infarction shown on  prior MRI which was noted in the posterior corona radiata extending into the  posterior lentiform nucleus. On the current study, the area of acute infarction  measures 18 mm AP and 9 mm transverse on the axial diffusion weighted images. Prominent bilateral periventricular signal alteration is again demonstrated in  the cerebrum. While nonspecific, these findings are likely on the basis of  chronic small vessel ischemic disease the white matter.  There is interval  demonstration of an area of filum periventricular linear cystic change adjacent  to the anterior body of the right lateral ventricle likely on the basis of  remote infarction. The vascular flow voids at base of the brain appear within normal limits  conspicuity. Mild-moderate ventricular megaly mild cortical sulcal prominence is  again shown indicative of atrophy. No intra-axial extra-axial mass lesion is  demonstrated. The unenhanced sella, optic chiasm, and orbits appear normal.  Because of thickening in the superior left sphenoid sinus is again shown. Impression  Atrophy and abundant chronic small vessel ischemic disease the white matter  again shown. Acute infarction is demonstrated in the posterior corona radiata on  the right. Results from [place] encounter on 02/28/23    MRI BRAIN WO CONT    Narrative  EXAM: MRI BRAIN WO CONT    INDICATION: CVA - L sided weakness    COMPARISON: CT scans from earlier, brain MRI 3/6/2021. CONTRAST: None. TECHNIQUE:  Multiplanar multisequence acquisition without contrast of the brain. FINDINGS:  Restricted diffusion consistent with acute infarction is shown in the posterior  right corona radiata which is adjacent to the area of acute infarction shown on  prior MRI which was noted in the posterior corona radiata extending into the  posterior lentiform nucleus. On the current study, the area of acute infarction  measures 18 mm AP and 9 mm transverse on the axial diffusion weighted images. Prominent bilateral periventricular signal alteration is again demonstrated in  the cerebrum. While nonspecific, these findings are likely on the basis of  chronic small vessel ischemic disease the white matter. There is interval  demonstration of an area of filum periventricular linear cystic change adjacent  to the anterior body of the right lateral ventricle likely on the basis of  remote infarction. The vascular flow voids at base of the brain appear within normal limits  conspicuity.  Mild-moderate ventricular megaly mild cortical sulcal prominence is  again shown indicative of atrophy. No intra-axial extra-axial mass lesion is  demonstrated. The unenhanced sella, optic chiasm, and orbits appear normal.  Because of thickening in the superior left sphenoid sinus is again shown. Impression  Atrophy and abundant chronic small vessel ischemic disease the white matter  again shown. Acute infarction is demonstrated in the posterior corona radiata on  the right. Review of Systems:  A comprehensive review of systems was negative except for: Constitutional: positive for fatigue and malaise  Musculoskeletal: positive for myalgias, arthralgias, stiff joints, and muscle weakness  Neurological: positive for memory problems, coordination problems, gait problems, and weakness  Behvioral/Psych: positive for depression   Vitals:    03/02/23 0759 03/02/23 1128 03/02/23 1653 03/02/23 2027   BP: (!) 167/125 (!) 158/96 (!) 143/103 (!) 142/109   Pulse: 92 81 72 80   Resp: 20 18 20 20   Temp: 97.9 °F (36.6 °C) 98.4 °F (36.9 °C) 98.1 °F (36.7 °C) 98.2 °F (36.8 °C)   SpO2: 99% 99% 98% 97%   Weight:       Height:         Objective:     I      NEUROLOGICAL EXAM:    Appearance: The patient is well developed, well nourished, provides a poor history and is in no acute distress. Mental Status: Oriented to place and person, and not the date or the president, cognitive function is mildly abnormal and speech is fluent and no aphasia or dysarthria. Mood and affect appropriate but depressed. Cranial Nerves:   Intact visual fields. Fundi are benign, disc are flat, no lesions seen on funduscopy. DEMAR, EOM's full, no nystagmus, no ptosis. Facial sensation is normal. Corneal reflexes are not tested. Facial movement is shows left central facial weakness. Hearing is abnormal bilaterally. Palate is midline with normal sternocleidomastoid and trapezius muscles are normal. Tongue is midline.   Neck without meningismus or bruits  Temporal arteries are not tender or enlarged  TMJ areas are not tender on palpation   Motor:  5/5 strength in upper and lower proximal and distal muscles, on the right but fairly prominent weakness on the left side so that he cannot even raise his arm or move his leg. . Normal bulk and tone. No fasciculations. Rapid alternating movement is decreased on the left   Reflexes:   Deep tendon reflexes 2+/4 on the left and 1+/4 on the right. No babinski or clonus present   Sensory:   Normal to touch, pinprick and vibration and temperature decreased in both feet. DSS is intact   Gait:  Not testable    Tremor:   No tremor noted. Cerebellar:  Not testable abnormal cerebellar signs present on Romberg and tandem testing and finger-nose-finger exam.   Neurovascular:  Abnormal heart sounds and irregular rhythm, peripheral pulses intact, and no carotid bruits. Assessment:     Active Problems:    CVA (cerebral vascular accident) ([facility] (2/28/2023)      Thrombotic stroke involving right middle cerebral artery ([address] (2/28/2023)      Bilateral carotid artery stenosis (2/28/2023)      Plan:     Patient with new left-sided weakness that looks like a new stroke,  He is to get an echocardiogram to make sure that he is not going in and out of A-fib as a cause of the stroke. Patient's MRI scan does show a lacunar type infarct in the right basal ganglion region consistent with his left-sided weakness, and we will increase his aspirin to 325 mg a day from 81 and continue his Eliquis for distal small vessel disease and his history of atrial fibrillation. Patient is being considered for inpatient rehab due to his dense left-sided weakness that is appropriate. His carotid Doppler studies did not show any high-grade stenosis. Discussed this with the patient and his wife and they agree with plans and therapy as above. Need to monitor carefully on telemetry to rule out arrhythmias, and may need to change his anticoagulation depending on results of his work-up.   His Dopplers look okay I reviewed those personally reviewed his old chart, reviewed his old records, reviewed his old neuro notes, and 34 minutes today spent with the patient and his wife going over all this in detail because his diagnosis prognosis and treatment options with him in addition. Patient going to inpatient rehab, and I will follow as needed for now, call if we can help. Patient remained stable, and we will follow as needed for now, and discussed his case with him in detail and advised him he should get some improvement hopefully with time and spent 20 minutes counseling him. I will follow as needed for now. Call if we can help we will follow-up in stroke clinic.   Signed By: Nasrin Chávez MD     March 2, 2023       CC: Mariam Ochoa MD  FAX: [phone]

## 2023-03-03 NOTE — PROGRESS NOTES
Transition of Care Plan:     RUR:  13%   Disposition: IPR  If SNF or IPR: Date FOC offered: 3-1  Date FOC received: 3-1 1223 PM   Date authorization started with reference number: see below  ref number [id number] 3-2-23  Date authorization received and expires:  Accepting facility:  [facility]   Follow up appointments: TO BE MADE  DME needed: TBD   Transportation at Discharge: [address] [place] [address] or means to access home: N/A        IM Medicare Letter: NEEDED  Is patient a  and connected with the [place]? NO  If yes, was [employer] transfer form completed and [facility] notified? Caregiver Contact:  Discharge Caregiver contacted prior to discharge? Care Conference needed?:  NO      Auth was sent by [facility] ( see above ) for IPR. Reference number # [id number]. Care manager to follow.      Covid PCR negative 3-1-2023 ( good for 7 days )    Sumaya Ocasio, RN  847 am   Care manager   Ext [phone]

## 2023-03-03 NOTE — PROGRESS NOTES
Problem: Self Care Deficits Care Plan (Adult)  Goal: *Acute Goals and Plan of Care (Insert Text)  Description: FUNCTIONAL STATUS PRIOR TO ADMISSION: Patient was modified independent using a single point cane for functional mobility. Patient required moderate assistance for distal LB dressing and bathing from supportive wife. HOME SUPPORT: The patient lived with wife and required moderate assistance  for LB distal dressing and bathing. Occupational Therapy Goals  Initiated 3/1/2023   1. Patient will perform grooming sitting EOB with moderate assistance  within 7 day(s). 2.  Patient will perform upper body dressing with moderate assistance  within 7 day(s). 3.  Patient will perform toilet transfers with maximal assistance within 7 day(s). 4.  Patient will perform all aspects of toileting with minimal assistance/contact guard assist within 7 day(s). 5.  Patient will participate in upper extremity therapeutic exercise/activities with moderate assistance  within 7 day(s). 6.  Patient will utilize energy conservation techniques during functional activities with verbal and visual cues within 7 day(s). 7.  Patient will improve their Fugl Rhoades score by 5 points in prep for ADLs within 7 days. Outcome: Progressing Towards Goal    OCCUPATIONAL THERAPY TREATMENT  Patient: Chika Champion (89 y.o. male)  Date: 3/3/2023  Diagnosis: CVA (cerebral vascular accident) [facility]) [I63.9] <principal problem not specified>      Precautions: Bed Alarm  Chart, occupational therapy assessment, plan of care, and goals were reviewed. ASSESSMENT  Patient continues with skilled OT services and is slowly progressing towards goals. Patient with increased arousal on arrival and beginning of tx however decreased after ~20 minutes. Noted increased pain response to all movement of LE's and minimal UE. Attempted to roll to right in order to access left scapula however unable to roll right without assist of 2-3 people.  Minimal command following throughout tx. Current Level of Function Impacting Discharge (ADLs): overall total care    Other factors to consider for discharge:          PLAN :  Patient continues to benefit from skilled intervention to address the above impairments. Continue treatment per established plan of care to address goals. Recommend with staff: frequent repositioning    Recommend next OT session: as tolerated     Recommendation for discharge: (in order for the patient to meet his/her long term goals)  Therapy 3 hours per day 5-7 days per week    This discharge recommendation:  Has been made in collaboration with the attending provider and/or case management    IF patient discharges home will need the following DME:        SUBJECTIVE:   Patient stated basically all over.  when asked where he hurts    OBJECTIVE DATA SUMMARY:   Cognitive/Behavioral Status:  Neurologic State: Drowsy (initiallyalert)  Orientation Level: Oriented to person;Oriented to place; Disoriented to situation;Disoriented to time  Cognition: Decreased attention/concentration;Decreased command following        Safety/Judgement: Lack of insight into deficits    Functional Mobility and Transfers for ADLs:  Bed Mobility:  Rolling: Total assistance;Assist x2 (unable to facilitate roll to right with total assist of 1)  Scooting: Total assistance (attempted to faciliatate bridge right LE and unable to maintain)         ADL Intervention:        Educated on role of [id number] [employer] [address]: Total assistance(dependent)  Bladder Hygiene: Total assistance (dependent)    Cognitive Retraining  Safety/Judgement: Lack of insight into deficits    Neuromuscular re-education:   Re-educated on positioning of left UE with proximal and distal support due to decreased strength, educated on subluxation and management/prevention.  On arrival patient positioned with support under left UE from shoulder to hand, after tx placed blanket behind humerus and pillow under elbow and forearm, towel roll at hand to facilitate wrist extension    In supine instructed on benefit of increased movement in bed, noted bilateral LE's externally rotated, facilitated midline and with whincing and groaning. Educated on bridge right LE and attempted to facilitate however pain response right LE with minimal movement, reported pain primarily in knee and performed gentle PROM right LE to facilitate increased tolerance to bridge, noted improvement and able to hold right LE in bridge with mod assist, completed PROM left knee flexion and extension as above and with same pain response, reported in knees. Instructed in shoulder elevation and performed on right, facilitated shoulder elevation on left and instructed to coordinate with right however unable to coordinate left and right. Performed PROM left UE in supine focused on shoulder flexion, chest press, elbow and wrist flexion and extension. Max cues throughout to attempt to facilitate active ROM on right coordinated with left however unable to perform, ? If due to comprehension, decreased sensation left UE, decreased proprioception/fatigue/decreased arousal    Re-educated on using right hand to lace fingers and cross, total assist required, after hands crossed instructed in SROM for elbow flexion/extension and completed with max cues        Pain:  Reported pain in knees with ROM, reported hurting all over, RN informed    Activity Tolerance:   Poor    After treatment patient left in no apparent distress:   Supine in bed, Heels elevated for pressure relief, Bed / chair alarm activated, and Side rails x 3    COMMUNICATION/COLLABORATION:   The patients plan of care was discussed with: Physical therapist and Registered nurse.      KEN Beyer/L  Time Calculation: 30 mins

## 2023-03-03 NOTE — PROGRESS NOTES
End of Shift Note     Bedside shift change report given to Dannie Rea, RN (oncoming nurse) by Dante Opitz, RN (offgoing nurse). Report included the following information SBAR, Kardex, and ED Summary     Shift worked:  7a-7p   Shift summary and any significant changes:        None-- awaiting insurance authorization      Concerns for physician to address:  None   Zone phone for oncoming shift:   [phone]      Patient [address]  74 y.o.  2/28/2023  3:19 AM by Amaury Kwan DO.  Dru Ríos was admitted from Home     Problem List          Patient Active Problem List     Diagnosis Date Noted    CVA (cerebral vascular accident) ([address] 02/28/2023    Thrombotic stroke involving right middle cerebral artery ([address] 02/28/2023    Bilateral carotid artery stenosis 02/28/2023    Stage 3a chronic kidney disease ([address] 10/28/2022    Malignant neoplasm of lower lobe, left bronchus or lung ([address] 04/22/2022    Left arm weakness 03/06/2021    Bilateral leg weakness 03/06/2021    Carcinoid tumor 02/13/2018    CKD stage 2 due to type 2 diabetes mellitus ([address] 05/14/2017    Polyneuropathy in diabetes(357.2) 07/21/2015    Hypertensive emergency 07/20/2015    Seizure disorder ([address] 07/20/2015    Type II or unspecified type diabetes mellitus with neurological manifestations, uncontrolled(250.62) ([address] 07/20/2015    History of atrial fibrillation 07/20/2015    Hyperlipidemia 07/20/2015    Diabetic neuropathy ([address] 11/21/2014    Seizures ([address] 08/08/2014    Syncope 07/13/2012    Cardiomyopathy, nonischemic ([address] 10/31/2009    HTN (hypertension), benign 10/26/2009    Pulmonary nodule 10/26/2009              Past Medical History:   Diagnosis Date    Atrial fibrillation ([address]      Cancer ([address] 12/2015     left lung; 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 No Placement date  Reason Medically Necessary   PICC LINE? No Placement date Reason Medically Necessary      Genitourinary:   Urinary status: voiding - manwick  Urinary Catheter? No Placement Date  Reason Medically Necessary      Respiratory:   O2 Device: None  Chronic home O2 use?: NO  Incentive spirometer at bedside: NO     GI:  Last Bowel Movement Date: 02/28/23  Current diet:  ADULT DIET Regular; 4 carb choices (60 gm/meal)  Passing flatus: YES  Tolerating current diet: YES     Pain Management:   Patient states pain is manageable on current regimen: N/A     Skin:  Don Score: 18  Interventions: PT/OT consult    Patient Safety:  Fall Score:    Interventions: bed/chair alarm, gripper socks, and pt to call before getting OOB  @Rollbelt  @dexterity to release roll belt  Yes/No ( must document dexterity  here by stating Yes or No here, otherwise this is a restraint and must follow restraint documentation policy.)     DVT prophylaxis:  DVT prophylaxis Med- Yes  DVT prophylaxis SCD or HAZEL- No      Wounds: (If Applicable)  Wounds- No  Location      Active Consults:  IP CONSULT TO NEUROLOGY     Length of Stay:  Expected LOS: - - -  Actual LOS: 1  Discharge Plan: Yes, Til Stable.  To SNF     Chastity Larson RN

## 2023-03-03 NOTE — PROGRESS NOTES
Problem: Mobility Impaired (Adult and Pediatric)  Goal: *Acute Goals and Plan of Care (Insert Text)  Description:   FUNCTIONAL STATUS PRIOR TO ADMISSION: Patient required assistance for ADLs from wife, was ambulatory with cane for household distances, had residual left weakness from previous stroke. HOME SUPPORT PRIOR TO ADMISSION: The patient lived with spouse and required assistance for dressing and bathing. Physical Therapy Goals  Initiated 3/1/2023  1. Patient will move from supine to sit and sit to supine  in bed with moderate assistance  within 7 day(s). 2.  Patient will transfer from bed to chair and chair to bed with moderate assistance  using the least restrictive device within 7 day(s). 3.  Patient will perform sit to stand with moderate assistance  within 7 day(s). 4.  Patient will ambulate with moderate assistance  for 5 feet with the least restrictive device within 7 day(s). 5.  Patient will increase Stafford balance score by 5 points within 7 days. Outcome: Not Progressing Towards Goal   PHYSICAL THERAPY TREATMENT  Patient: Dru Ríos (68 y.o. male)  Date: 3/3/2023  Diagnosis: CVA (cerebral vascular accident) [facility]) [I63.9] <principal problem not specified>      Precautions: Bed Alarm  Chart, physical therapy assessment, plan of care and goals were reviewed. ASSESSMENT  Patient continues with skilled PT services and is not progressing towards goals. Patient received in bed, drowsy but awakens to voice and follows simple commands with tactile facilitation. PROM performed to left LE, slow pace due to abnormal extensor tone, no active movement noted. AAROM for right LE all planes and worked on reaching with right UE and initiation of rolling. Left in supine with left UE supported on pillows. Patient remains well below baseline and is presenting with dense left hemiplegia, is a good candidate for acute rehab.       Current Level of Function Impacting Discharge (mobility/balance): total assist for bed mobility    Other factors to consider for discharge: well below baseline, dense hemiplegia         PLAN :  Patient continues to benefit from skilled intervention to address the above impairments. Continue treatment per established plan of care. to address goals. Recommendation for discharge: (in order for the patient to meet his/her long term goals)  Therapy 3 hours per day 5-7 days per week    This discharge recommendation:  Has been made in collaboration with the attending provider and/or case management    IF patient discharges home will need the following DME: to be determined (TBD)       SUBJECTIVE:   Patient stated I feel stiff.     OBJECTIVE DATA SUMMARY:   Critical Behavior:  Neurologic State: Drowsy (initiallyalert)  Orientation Level: Oriented to person, Oriented to place, Disoriented to situation, Disoriented to time  Cognition: Decreased attention/concentration, Decreased command following  Safety/Judgement: Lack of insight into deficits  Functional Mobility Training:  Bed Mobility:  Rolling: Total assistance;Assist x2 (unable to facilitate roll to right with total assist of 1)        Scooting: Total assistance (attempted to faciliatate bridge right LE and unable to maintain)        Transfers:                                   Balance:     Ambulation/Gait Training:                                                        Stairs: Therapeutic Exercises:   PROM left LE all planes with neuro facilitation  AAROM right LE all planes  Pain Rating:      Activity Tolerance:   Good    After treatment patient left in no apparent distress:   Supine in bed, Call bell within reach, Bed / chair alarm activated, and Side rails x 3    COMMUNICATION/COLLABORATION:   The patients plan of care was discussed with: Occupational therapist and Registered nurse.      Claudetta Paula, PT   Time Calculation: 24 mins

## 2023-03-03 NOTE — ROUTINE PROCESS
End of Shift Note     Bedside shift change report given to [facility], [address] [place] [address] (oncoming nurse) by Abdiel Morse RN (offgoing nurse). Report included the following information SBAR, Kardex, and ED Summary     Shift worked:  7p - 7a   Shift summary and any significant changes:         None      Concerns for physician to address:  None   Zone phone for oncoming shift:   [phone]      Patient [address]  74 y.o.  2/28/2023  3:19 AM by Messi Moody DO.  Ritchie Diaz was admitted from Home     Problem List       Patient Active Problem List     Diagnosis Date Noted    CVA (cerebral vascular accident) ([address] 02/28/2023    Thrombotic stroke involving right middle cerebral artery ([address] 02/28/2023    Bilateral carotid artery stenosis 02/28/2023    Stage 3a chronic kidney disease ([address] 10/28/2022    Malignant neoplasm of lower lobe, left bronchus or lung ([address] 04/22/2022    Left arm weakness 03/06/2021    Bilateral leg weakness 03/06/2021    Carcinoid tumor 02/13/2018    CKD stage 2 due to type 2 diabetes mellitus ([address] 05/14/2017    Polyneuropathy in diabetes(357.2) 07/21/2015    Hypertensive emergency 07/20/2015    Seizure disorder ([address] 07/20/2015    Type II or unspecified type diabetes mellitus with neurological manifestations, uncontrolled(250.62) ([address] 07/20/2015    History of atrial fibrillation 07/20/2015    Hyperlipidemia 07/20/2015    Diabetic neuropathy ([address] 11/21/2014    Seizures ([address] 08/08/2014    Syncope 07/13/2012    Cardiomyopathy, nonischemic ([address] 10/31/2009    HTN (hypertension), benign 10/26/2009    Pulmonary nodule 10/26/2009           Past Medical History:   Diagnosis Date    Atrial fibrillation ([address]      Cancer ([address] 12/2015     left lung; 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 No Placement date  Reason Medically Necessary   PICC LINE? No Placement date Reason Medically Necessary      Genitourinary:   Urinary status: voiding  Urinary Catheter? No Placement Date  Reason Medically Necessary      Respiratory:   O2 Device: None  Chronic home O2 use?: NO  Incentive spirometer at bedside: NO     GI:  Last Bowel Movement Date: 02/28/23  Current diet:  ADULT DIET Regular; 4 carb choices (60 gm/meal)  Passing flatus: YES  Tolerating current diet: YES     Pain Management:   Patient states pain is manageable on current regimen: N/A     Skin:  Don Score: 18  Interventions: PT/OT consult    Patient Safety:  Fall Score:    Interventions: bed/chair alarm, gripper socks, and pt to call before getting OOB  @Rollbelt  @dexterity to release roll belt  Yes/No ( must document dexterity  here by stating Yes or No here, otherwise this is a restraint and must follow restraint documentation policy.)     DVT prophylaxis:  DVT prophylaxis Med- Yes  DVT prophylaxis SCD or HAZEL- No      Wounds: (If Applicable)  Wounds- No  Location      Active Consults:  IP CONSULT TO NEUROLOGY     Length of Stay:  Expected LOS: - - -  Actual LOS: 1  Discharge Plan: Yes, Til Stable.  To SNF     Blas Cross RN

## 2023-03-03 NOTE — DISCHARGE SUMMARY
Hospitalist Discharge Summary     Patient ID:  Janice Montoya  [id number]  74 y.o.  1955 2/28/2023    PCP on record: Ed Cifuentes MD    Admit date: 2/28/2023  Discharge date and time: 3/3/2023    DISCHARGE DIAGNOSIS:    Acute CVA  Hypertension  A-fib  History of dissection of right carotid artery  Diabetes mellitus  Seizure disorder  Hypokalemia    CONSULTATIONS:  IP CONSULT TO NEUROLOGY    Excerpted HPI from H&P of Pillo Young, DO:  Sawyer Reaves is a 74 y.o.  male with PMH of afib on eliquis, lung ca 2015 s/p VATS resection left lower lobe, CHF, dissection of right carotid artery, HTN, seizure disorder and h/o right thalamic stroke who presents with left sided weakness. Patient reports that he was trying to get out of bed earlier this morning a little after midnight, last known normal was a little before midnight. He endorses a fall to the floor, without hitting his head due to left sided weakness. He was unable to get up and his wife called 911. He reports that he has a h/o stroke and this feels similar to his prior left sided weakness, however after last stroke he now only needed to ambulate with a cane. He denies CP, SOB, fever, chills, and palpitations. ______________________________________________________________________  DISCHARGE SUMMARY/HOSPITAL COURSE:  for full details see H&P, daily progress notes, labs, consult notes. Patient admitted with acute CVA with flaccid paralysis of left side MRI showed acute infarct posterior corona radiator. Patient was already on aspirin 81 mg and Eliquis. Neurology on board. Increased aspirin to 325 and continued on Eliquis. PT OT evaluated the patient and recommended acute rehab. Patient is medically stable for discharge and need follow-up with neurology in 4 to 6 weeks. Acute CVA  CT head with no acute process. CTA technically sub-optimal. No large vessel occlusion. Mild artherosclerosis.   Carotid duplex-done, no significant stenosis  Continue telemetry  MRI showing acute infarct posterior corona radiator  Echo showed 40 to 45% EF, no intracardiac shunt  Appreciate neurology recs neurology cleared for discharge  PT recommends acute rehab  Speech therapy regular diet  Currently on Eliquis 5 mg twice daily and aspirin started 325 mg daily  Continue statin   A1c, lipid panel pending at the time of discharge  Blood sugar controlled 1 40-1 80 currently. Resume on home medication. Check A1c as outpatient. On high-dose statin     Resumed on home medications amlodipine, hydralazine, lisinopril  Coreg    HTN  Afib  H/o dissection of right carotid artery  Eliquis secondary hypercoagulable state due to A-fib  norvasc, coreg, hydralazine, and lisinopril. Continue eliquis and lipitor  Resumed on home medications  Uptitrate hydralazine as needed for blood pressure resume      DM  Home metformin and glipizide A1c as outpatient     seizure disorder  Continue PTA keppra 1000 mg po bid     Hypokalemia  Replete as needed         _______________________________________________________________________  Patient seen and examined by me on discharge day. Pertinent Findings:  Gen:    Not in distress  Chest: Clear lungs  CVS:   Regular rhythm. No edema  Abd:  Soft, not distended, not tender  Neuro:  Alert,   _______________________________________________________________________  DISCHARGE MEDICATIONS:   Current Discharge Medication List        CONTINUE these medications which have CHANGED    Details   aspirin delayed-release 325 mg tablet Take 1 Tablet by mouth daily. Qty: 30 Tablet, Refills: 0  Start date: 3/4/2023      hydrALAZINE (APRESOLINE) 25 mg tablet Take 1 Tablet by mouth three (3) times daily.   Qty: 90 Tablet, Refills: 0  Start date: 3/3/2023           CONTINUE these medications which have NOT CHANGED    Details   levETIRAcetam (KEPPRA) 500 mg tablet Take 2 tablets by mouth twice daily  Qty: 120 Tablet, Refills: 5 carvediloL (COREG) 12.5 mg tablet Take 1 Tablet by mouth two (2) times daily (with meals). Qty: 180 Tablet, Refills: 3    Associated Diagnoses: NICM (nonischemic cardiomyopathy) ([facility])      atorvastatin (LIPITOR) 80 mg tablet TAKE 1 TABLET BY MOUTH ONCE DAILY AT NIGHT  Qty: 30 Tablet, Refills: 5    Associated Diagnoses: Hyperlipidemia, unspecified hyperlipidemia type      apixaban (ELIQUIS) 5 mg tablet Take 1 Tablet by mouth two (2) times a day. Qty: 2 Tablet, Refills: 0      glimepiride (AMARYL) 4 mg tablet TAKE 1 TABLET BY MOUTH IN THE MORNING  Qty: 180 Tablet, Refills: 3      amLODIPine (NORVASC) 10 mg tablet Take 1 tablet by mouth once daily for blood pressure  Qty: 30 Tablet, Refills: 5      metFORMIN (GLUCOPHAGE) 500 mg tablet Take 2 Tablets by mouth two (2) times daily (with meals). Qty: 360 Tablet, Refills: 3    Associated Diagnoses: Type 2 diabetes mellitus with complication, with long-term current use of insulin ([facility])      glucose blood VI test strips (ASCENSIA AUTODISC VI, ONE TOUCH ULTRA TEST VI) strip Check fsbs every day E11  Qty: 50 Strip, Refills: 11      Blood-Glucose Meter monitoring kit Check fsbs every day E11  Qty: 1 Kit, Refills: 0      lancets misc Check fsbs every day E11  Qty: 1 Each, Refills: 11      albuterol (PROVENTIL HFA, VENTOLIN HFA, PROAIR HFA) 90 mcg/actuation inhaler Take 2 Puffs by inhalation every four (4) hours as needed for Wheezing. lisinopril (PRINIVIL, ZESTRIL) 40 mg tablet TAKE ONE TABLET BY MOUTH ONCE DAILY FOR BLOOD PRESSURE  Qty: 30 Tab, Refills: 11    Comments: Please consider 90 day supplies to promote better adherence               Patient Follow Up Instructions: Activity: Activity as tolerated  Diet: Diabetic Diet  Wound Care: As directed    Follow-up with PCP in 1 to 2 weeks.   Neurology in 4 to 6 weeks in   Follow-up tests/labs CBC BMP in 1 week    Follow-up Information       Follow up With Specialties Details Why Contact Info    Froilan Maier MD Internal Medicine Physician   [address]  [facility]  [phone]            ________________________________________________________________    Risk of deterioration: Moderate    Condition at Discharge:  Stable  __________________________________________________________________    Disposition  IP Rehab    ____________________________________________________________________    Code Status: Full Code  ___________________________________________________________________      Total time in minutes spent coordinating this discharge (includes going over instructions, follow-up, prescriptions, and preparing report for sign off to her PCP) :  45 minutes    Signed:  Jt Dickerson MD

## 2023-03-04 LAB
GLUCOSE BLD STRIP.AUTO-MCNC: 161 MG/DL (ref 65–117)
GLUCOSE BLD STRIP.AUTO-MCNC: 162 MG/DL (ref 65–117)
GLUCOSE BLD STRIP.AUTO-MCNC: 163 MG/DL (ref 65–117)
GLUCOSE BLD STRIP.AUTO-MCNC: 183 MG/DL (ref 65–117)
SERVICE CMNT-IMP: ABNORMAL

## 2023-03-04 PROCEDURE — 82962 GLUCOSE BLOOD TEST: CPT

## 2023-03-04 PROCEDURE — 74011636637 HC RX REV CODE- 636/637: Performed by: STUDENT IN AN ORGANIZED HEALTH CARE EDUCATION/TRAINING PROGRAM

## 2023-03-04 PROCEDURE — 74011250637 HC RX REV CODE- 250/637: Performed by: PSYCHIATRY & NEUROLOGY

## 2023-03-04 PROCEDURE — 74011250637 HC RX REV CODE- 250/637: Performed by: STUDENT IN AN ORGANIZED HEALTH CARE EDUCATION/TRAINING PROGRAM

## 2023-03-04 PROCEDURE — 74011250637 HC RX REV CODE- 250/637: Performed by: INTERNAL MEDICINE

## 2023-03-04 PROCEDURE — 74011000250 HC RX REV CODE- 250: Performed by: STUDENT IN AN ORGANIZED HEALTH CARE EDUCATION/TRAINING PROGRAM

## 2023-03-04 PROCEDURE — 65270000046 HC RM TELEMETRY

## 2023-03-04 RX ORDER — HYDRALAZINE HYDROCHLORIDE 50 MG/1
50 TABLET, FILM COATED ORAL 3 TIMES DAILY
Status: DISCONTINUED | OUTPATIENT
Start: 2023-03-04 | End: 2023-03-05

## 2023-03-04 RX ADMIN — LEVETIRACETAM 1000 MG: 500 TABLET, FILM COATED ORAL at 17:04

## 2023-03-04 RX ADMIN — APIXABAN 5 MG: 5 TABLET, FILM COATED ORAL at 17:04

## 2023-03-04 RX ADMIN — APIXABAN 5 MG: 5 TABLET, FILM COATED ORAL at 09:00

## 2023-03-04 RX ADMIN — Medication 2 UNITS: at 12:26

## 2023-03-04 RX ADMIN — HYDRALAZINE HYDROCHLORIDE 50 MG: 50 TABLET, FILM COATED ORAL at 09:50

## 2023-03-04 RX ADMIN — ATORVASTATIN CALCIUM 80 MG: 40 TABLET, FILM COATED ORAL at 22:05

## 2023-03-04 RX ADMIN — CARVEDILOL 12.5 MG: 12.5 TABLET, FILM COATED ORAL at 17:04

## 2023-03-04 RX ADMIN — LEVETIRACETAM 1000 MG: 500 TABLET, FILM COATED ORAL at 09:41

## 2023-03-04 RX ADMIN — HYDRALAZINE HYDROCHLORIDE 50 MG: 50 TABLET, FILM COATED ORAL at 22:05

## 2023-03-04 RX ADMIN — SODIUM CHLORIDE, PRESERVATIVE FREE 10 ML: 5 INJECTION INTRAVENOUS at 22:05

## 2023-03-04 RX ADMIN — Medication 2 UNITS: at 17:04

## 2023-03-04 RX ADMIN — ACETAMINOPHEN 325MG 650 MG: 325 TABLET ORAL at 17:17

## 2023-03-04 RX ADMIN — HYDRALAZINE HYDROCHLORIDE 50 MG: 50 TABLET, FILM COATED ORAL at 17:04

## 2023-03-04 RX ADMIN — POLYETHYLENE GLYCOL 3350 17 G: 17 POWDER, FOR SOLUTION ORAL at 17:04

## 2023-03-04 RX ADMIN — Medication 2 UNITS: at 09:41

## 2023-03-04 RX ADMIN — AMLODIPINE BESYLATE 10 MG: 5 TABLET ORAL at 09:41

## 2023-03-04 RX ADMIN — SODIUM CHLORIDE, PRESERVATIVE FREE 10 ML: 5 INJECTION INTRAVENOUS at 05:55

## 2023-03-04 RX ADMIN — SODIUM CHLORIDE, PRESERVATIVE FREE 10 ML: 5 INJECTION INTRAVENOUS at 14:36

## 2023-03-04 RX ADMIN — LISINOPRIL 40 MG: 20 TABLET ORAL at 09:41

## 2023-03-04 RX ADMIN — ASPIRIN 325 MG: 325 TABLET, COATED ORAL at 09:41

## 2023-03-04 RX ADMIN — CARVEDILOL 12.5 MG: 12.5 TABLET, FILM COATED ORAL at 09:41

## 2023-03-04 NOTE — DISCHARGE SUMMARY
Hospitalist Discharge Summary     Patient ID:  Dru Ríos  [id number]  74 y.o.  1955 2/28/2023    PCP on record: Jose Haley MD    Admit date: 2/28/2023  Discharge date and time: 3/4/2023    DISCHARGE DIAGNOSIS:    Acute CVA  Hypertension  A-fib  History of dissection of right carotid artery  Diabetes mellitus  Seizure disorder  Hypokalemia    CONSULTATIONS:  IP CONSULT TO NEUROLOGY    Excerpted HPI from H&P of Amaury Kwan, DO:  Stuart Minaya is a 74 y.o.  male with PMH of afib on eliquis, lung ca 2015 s/p VATS resection left lower lobe, CHF, dissection of right carotid artery, HTN, seizure disorder and h/o right thalamic stroke who presents with left sided weakness. Patient reports that he was trying to get out of bed earlier this morning a little after midnight, last known normal was a little before midnight. He endorses a fall to the floor, without hitting his head due to left sided weakness. He was unable to get up and his wife called 911. He reports that he has a h/o stroke and this feels similar to his prior left sided weakness, however after last stroke he now only needed to ambulate with a cane. He denies CP, SOB, fever, chills, and palpitations. ______________________________________________________________________  DISCHARGE SUMMARY/HOSPITAL COURSE:  for full details see H&P, daily progress notes, labs, consult notes. Patient admitted with acute CVA with flaccid paralysis of left side MRI showed acute infarct posterior corona radiator. Patient was already on aspirin 81 mg and Eliquis. Neurology on board. Increased aspirin to 325 and continued on Eliquis. PT OT evaluated the patient and recommended acute rehab. Patient is medically stable for discharge and need follow-up with neurology in 4 to 6 weeks. Acute CVA  CT head with no acute process. CTA technically sub-optimal. No large vessel occlusion. Mild artherosclerosis.   Carotid duplex-done, no significant stenosis  Continue telemetry  MRI showing acute infarct posterior corona radiator  Echo showed 40 to 45% EF, no intracardiac shunt  Appreciate neurology recs neurology cleared for discharge  PT recommends acute rehab  Speech therapy regular diet  Currently on Eliquis 5 mg twice daily and aspirin started 325 mg daily  Continue statin   A1c, lipid panel pending at the time of discharge  Blood sugar controlled 1 40-1 80 currently. Resume on home medication. Check A1c as outpatient. On high-dose statin     Resumed on home medications amlodipine, hydralazine, lisinopril  Coreg    HTN  Afib  H/o dissection of right carotid artery  Eliquis secondary hypercoagulable state due to A-fib  norvasc, coreg, hydralazine, and lisinopril. Continue eliquis and lipitor  Resumed on home medications  Uptitrate hydralazine as needed for blood pressure resume      DM  Home metformin and glipizide A1c as outpatient     seizure disorder  Continue PTA keppra 1000 mg po bid     Hypokalemia  Replete as needed         _______________________________________________________________________  Patient seen and examined by me on discharge day. Pertinent Findings:  Gen:    Not in distress  Chest: Clear lungs  CVS:   Regular rhythm. No edema  Abd:  Soft, not distended, not tender  Neuro:  Alert,   _______________________________________________________________________  DISCHARGE MEDICATIONS:   Current Discharge Medication List        CONTINUE these medications which have CHANGED    Details   aspirin delayed-release 325 mg tablet Take 1 Tablet by mouth daily. Qty: 30 Tablet, Refills: 0  Start date: 3/4/2023      hydrALAZINE (APRESOLINE) 25 mg tablet Take 1 Tablet by mouth three (3) times daily.   Qty: 90 Tablet, Refills: 0  Start date: 3/3/2023           CONTINUE these medications which have NOT CHANGED    Details   levETIRAcetam (KEPPRA) 500 mg tablet Take 2 tablets by mouth twice daily  Qty: 120 Tablet, Refills: 5 carvediloL (COREG) 12.5 mg tablet Take 1 Tablet by mouth two (2) times daily (with meals). Qty: 180 Tablet, Refills: 3    Associated Diagnoses: NICM (nonischemic cardiomyopathy) ([facility])      atorvastatin (LIPITOR) 80 mg tablet TAKE 1 TABLET BY MOUTH ONCE DAILY AT NIGHT  Qty: 30 Tablet, Refills: 5    Associated Diagnoses: Hyperlipidemia, unspecified hyperlipidemia type      apixaban (ELIQUIS) 5 mg tablet Take 1 Tablet by mouth two (2) times a day. Qty: 2 Tablet, Refills: 0      glimepiride (AMARYL) 4 mg tablet TAKE 1 TABLET BY MOUTH IN THE MORNING  Qty: 180 Tablet, Refills: 3      amLODIPine (NORVASC) 10 mg tablet Take 1 tablet by mouth once daily for blood pressure  Qty: 30 Tablet, Refills: 5      metFORMIN (GLUCOPHAGE) 500 mg tablet Take 2 Tablets by mouth two (2) times daily (with meals). Qty: 360 Tablet, Refills: 3    Associated Diagnoses: Type 2 diabetes mellitus with complication, with long-term current use of insulin ([facility])      glucose blood VI test strips (ASCENSIA AUTODISC VI, ONE TOUCH ULTRA TEST VI) strip Check fsbs every day E11  Qty: 50 Strip, Refills: 11      Blood-Glucose Meter monitoring kit Check fsbs every day E11  Qty: 1 Kit, Refills: 0      lancets misc Check fsbs every day E11  Qty: 1 Each, Refills: 11      albuterol (PROVENTIL HFA, VENTOLIN HFA, PROAIR HFA) 90 mcg/actuation inhaler Take 2 Puffs by inhalation every four (4) hours as needed for Wheezing. lisinopril (PRINIVIL, ZESTRIL) 40 mg tablet TAKE ONE TABLET BY MOUTH ONCE DAILY FOR BLOOD PRESSURE  Qty: 30 Tab, Refills: 11    Comments: Please consider 90 day supplies to promote better adherence               Patient Follow Up Instructions: Activity: Activity as tolerated  Diet: Diabetic Diet  Wound Care: As directed    Follow-up with PCP in 1 to 2 weeks.   Neurology in 4 to 6 weeks in   Follow-up tests/labs CBC BMP in 1 week    Follow-up Information       Follow up With Specialties Details Why Contact Info    Froilan Maier MD Internal Medicine Physician   [address] [facility]  [phone]      Gal Parks MD Neurology Follow up in 1 month(s)  [address]  [facility]  [phone]            ________________________________________________________________    Risk of deterioration: Moderate    Condition at Discharge:  Stable  __________________________________________________________________    Disposition  IP Rehab    ____________________________________________________________________    Code Status: Full Code  ___________________________________________________________________      Total time in minutes spent coordinating this discharge (includes going over instructions, follow-up, prescriptions, and preparing report for sign off to her PCP) :  45 minutes    Signed:  Gentry Dakin, MD

## 2023-03-04 NOTE — ROUTINE PROCESS
End of Shift Note    Bedside shift change report given to Sarwat RN (oncoming nurse) by Eva Jesus RN (offgoing nurse). Report included the following information SBAR, Kardex, and MAR    Shift worked:  7a-7p   Shift summary and any significant changes:     none       Concerns for physician to address:  none   Zone phone for oncoming shift:   [phone]     Patient [address]  74 y.o.  2/28/2023  3:19 AM by Alisa Billy DO.  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 2 diabetes mellitus ([address] 05/14/2017    Polyneuropathy in diabetes(357.2) 07/21/2015    Hypertensive emergency 07/20/2015    Seizure disorder ([address] 07/20/2015    Type II or unspecified type diabetes mellitus with neurological manifestations, uncontrolled(250.62) ([address] 07/20/2015    History of atrial fibrillation 07/20/2015    Hyperlipidemia 07/20/2015    Diabetic neuropathy ([address] 11/21/2014    Seizures ([address] 08/08/2014    Syncope 07/13/2012    Cardiomyopathy, nonischemic ([address] 10/31/2009    HTN (hypertension), benign 10/26/2009    Pulmonary nodule 10/26/2009     Past Medical History:   Diagnosis Date    Atrial fibrillation ([address]     Cancer ([address] 12/2015    left lung; 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 No   PICC LINE? No     Genitourinary:   Urinary status: external catheter  Urinary Catheter? No     Respiratory:   O2 Device: None (Room air)  Chronic home O2 use?: NO  Incentive spirometer at bedside: NO       GI:  Last Bowel Movement Date: 03/01/23  Current diet:  ADULT DIET Regular; 4 carb choices (60 gm/meal)  Passing flatus: NO  Tolerating current diet: YES       Pain Management:   Patient states pain is manageable on current regimen: YES    Skin:  Don Score: 15  Interventions: float heels, foam dressing, PT/OT consult, and limit briefs    Patient Safety:  Fall Score:  Total Score: 3  Interventions: bed/chair alarm and gripper socks  High Fall Risk: Yes  @Rollbelt  @dexterity to release roll belt  Yes/No ( must document dexterity  here by stating Yes or No here, otherwise this is a restraint and must follow restraint documentation policy.)    DVT prophylaxis:  DVT prophylaxis Med- Yes  DVT prophylaxis SCD or HAZEL- No     Wounds: (If Applicable)  Wounds- No  Location     Active Consults:  IP CONSULT TO NEUROLOGY    Length of Stay:  Expected LOS: 2d 21h  Actual LOS: 4  Discharge Plan: Yes auth pending      Jon Mo RN

## 2023-03-04 NOTE — PROGRESS NOTES
Transition of Care Plan:     RUR:  13%   SUSAN: Stable. TBD 3/6? DC order is in. Disposition: IPR- [facility]:   Auth  Pending. 2:24 PM   CM sent another message via [employer] to see if there is any auth updates. Anticipating that Pt will be here through the weekend? 8:40 AM   CM completed chart review and per chart it indicated that authorization is pending. CM sent a message to [facility] via [employer] to see if it is still pending. If SNF or IPR: Date FOC offered: 3-1  Date FOC received: 3-1 1223 PM   Date authorization started with reference number: see below  ref number [id number] 3-2-23  Date authorization received and expires:  Accepting facility:  [facility]     Follow up appointments: TO BE MADE  DME needed: TBD   Transportation at Discharge: [address] or means to access home: N/A        IM Medicare Letter: NEEDED  Is patient a [place] and connected with the [employer] ? NO  If yes, was [employer] transfer form completed and [facility] notified? Caregiver Contact: Spouse: Isaac Larger: [phone]  Discharge Caregiver contacted prior to discharge? Will do prior to DC. Care Conference needed?:  NO    CM will continue to monitor discharge plan.      Eric Lopes, [address] [facility]

## 2023-03-04 NOTE — PROGRESS NOTES
Problem: Pressure Injury - Risk of  Goal: *Prevention of pressure injury  Description: Document Don Scale and appropriate interventions in the flowsheet. Outcome: Progressing Towards Goal  Note: Pressure Injury Interventions:  Sensory Interventions: Assess changes in LOC, Discuss PT/OT consult with provider, Keep linens dry and wrinkle-free, Minimize linen layers, Turn and reposition approx. every two hours (pillows and wedges if needed)    Moisture Interventions: Apply protective barrier, creams and emollients, Absorbent underpads, Check for incontinence Q2 hours and as needed, Internal/External urinary devices    Activity Interventions: PT/OT evaluation, Pressure redistribution bed/mattress(bed type)    Mobility Interventions: HOB 30 degrees or less, PT/OT evaluation, Turn and reposition approx.  every two hours(pillow and wedges)    Nutrition Interventions: Document food/fluid/supplement intake    Friction and Shear Interventions: HOB 30 degrees or less, Apply protective barrier, creams and emollients                Problem: Patient Education: Go to Patient Education Activity  Goal: Patient/Family Education  Outcome: Progressing Towards Goal     Problem: Patient Education: Go to Patient Education Activity  Goal: Patient/Family Education  Outcome: Progressing Towards Goal     Problem: TIA/CVA Stroke: 0-24 hours  Goal: Off Pathway (Use only if patient is Off Pathway)  Outcome: Progressing Towards Goal  Goal: Activity/Safety  Outcome: Progressing Towards Goal  Goal: Consults, if ordered  Outcome: Progressing Towards Goal  Goal: Diagnostic Test/Procedures  Outcome: Progressing Towards Goal  Goal: Nutrition/Diet  Outcome: Progressing Towards Goal  Goal: Discharge Planning  Outcome: Progressing Towards Goal  Goal: Medications  Outcome: Progressing Towards Goal  Goal: Respiratory  Outcome: Progressing Towards Goal  Goal: Treatments/Interventions/Procedures  Outcome: Progressing Towards Goal  Goal: Minimize risk of bleeding post-thrombolytic infusion  Outcome: Progressing Towards Goal  Goal: Monitor for complications post-thrombolytic infusion  Outcome: Progressing Towards Goal  Goal: Psychosocial  Outcome: Progressing Towards Goal  Goal: *Hemodynamically stable  Outcome: Progressing Towards Goal  Goal: *Neurologically stable  Description: Absence of additional neurological deficits    Outcome: Progressing Towards Goal  Goal: *Verbalizes anxiety and depression are reduced or absent  Outcome: Progressing Towards Goal  Goal: *Absence of Signs of Aspiration on Current Diet  Outcome: Progressing Towards Goal  Goal: *Absence of deep venous thrombosis signs and symptoms(Stroke Metric)  Outcome: Progressing Towards Goal  Goal: *Ability to perform ADLs and demonstrates progressive mobility and function  Outcome: Progressing Towards Goal  Goal: *Stroke education started(Stroke Metric)  Outcome: Progressing Towards Goal  Goal: *Dysphagia screen performed(Stroke Metric)  Outcome: Progressing Towards Goal  Goal: *Rehab consulted(Stroke Metric)  Outcome: Progressing Towards Goal     Problem: TIA/CVA Stroke: Day 2 Until Discharge  Goal: Off Pathway (Use only if patient is Off Pathway)  Outcome: Progressing Towards Goal  Goal: Activity/Safety  Outcome: Progressing Towards Goal  Goal: Diagnostic Test/Procedures  Outcome: Progressing Towards Goal  Goal: Nutrition/Diet  Outcome: Progressing Towards Goal  Goal: Discharge Planning  Outcome: Progressing Towards Goal  Goal: Medications  Outcome: Progressing Towards Goal  Goal: Respiratory  Outcome: Progressing Towards Goal  Goal: Treatments/Interventions/Procedures  Outcome: Progressing Towards Goal  Goal: Psychosocial  Outcome: Progressing Towards Goal  Goal: *Verbalizes anxiety and depression are reduced or absent  Outcome: Progressing Towards Goal  Goal: *Absence of aspiration  Outcome: Progressing Towards Goal  Goal: *Absence of deep venous thrombosis signs and symptoms(Stroke Metric)  Outcome: Progressing Towards Goal  Goal: *Optimal pain control at patient's stated goal  Outcome: Progressing Towards Goal  Goal: *Tolerating diet  Outcome: Progressing Towards Goal  Goal: *Ability to perform ADLs and demonstrates progressive mobility and function  Outcome: Progressing Towards Goal  Goal: *Stroke education continued(Stroke Metric)  Outcome: Progressing Towards Goal     Problem: Ischemic Stroke: Discharge Outcomes  Goal: *Verbalizes anxiety and depression are reduced or absent  Outcome: Progressing Towards Goal  Goal: *Verbalize understanding of risk factor modification(Stroke Metric)  Outcome: Progressing Towards Goal  Goal: *Hemodynamically stable  Outcome: Progressing Towards Goal  Goal: *Absence of aspiration pneumonia  Outcome: Progressing Towards Goal  Goal: *Aware of needed dietary changes  Outcome: Progressing Towards Goal  Goal: *Verbalize understanding of prescribed medications including anti-coagulants, anti-lipid, and/or anti-platelets(Stroke Metric)  Outcome: Progressing Towards Goal  Goal: *Tolerating diet  Outcome: Progressing Towards Goal  Goal: *Aware of follow-up diagnostics related to anticoagulants  Outcome: Progressing Towards Goal  Goal: *Ability to perform ADLs and demonstrates progressive mobility and function  Outcome: Progressing Towards Goal  Goal: *Absence of DVT(Stroke Metric)  Outcome: Progressing Towards Goal  Goal: *Absence of aspiration  Outcome: Progressing Towards Goal  Goal: *Optimal pain control at patient's stated goal  Outcome: Progressing Towards Goal  Goal: *Home safety concerns addressed  Outcome: Progressing Towards Goal  Goal: *Describes available resources and support systems  Outcome: Progressing Towards Goal  Goal: *Verbalizes understanding of activation of EMS(911) for stroke symptoms(Stroke Metric)  Outcome: Progressing Towards Goal  Goal: *Understands and describes signs and symptoms to report to providers(Stroke Metric)  Outcome: Progressing Towards Goal  Goal: *Neurolgocially stable (absence of additional neurological deficits)  Outcome: Progressing Towards Goal  Goal: *Verbalizes importance of follow-up with primary care physician(Stroke Metric)  Outcome: Progressing Towards Goal  Goal: *Smoking cessation discussed,if applicable(Stroke Metric)  Outcome: Progressing Towards Goal  Goal: *Depression screening completed(Stroke Metric)  Outcome: Progressing Towards Goal     Problem: Falls - Risk of  Goal: *Absence of Falls  Description: Document Yenifer Fall Risk and appropriate interventions in the flowsheet. Outcome: Progressing Towards Goal  Note: Fall Risk Interventions:  Mobility Interventions: Bed/chair exit alarm    Mentation Interventions: Bed/chair exit alarm, Room close to nurse's station    Medication Interventions: Bed/chair exit alarm    Elimination Interventions: Bed/chair exit alarm, Call light in reach    History of Falls Interventions: Bed/chair exit alarm, Door open when patient unattended, Vital signs minimum Q4HRs X 24 hrs (comment for end date)         Problem: Patient Education: Go to Patient Education Activity  Goal: Patient/Family Education  Outcome: Progressing Towards Goal     Problem: Patient Education: Go to Patient Education Activity  Goal: Patient/Family Education  Outcome: Progressing Towards Goal     Problem: Patient Education: Go to Patient Education Activity  Goal: Patient/Family Education  Outcome: Progressing Towards Goal     Problem: Diabetes Self-Management  Goal: *Disease process and treatment process  Description: Define diabetes and identify own type of diabetes; list 3 options for treating diabetes. Outcome: Progressing Towards Goal  Goal: *Incorporating nutritional management into lifestyle  Description: Describe effect of type, amount and timing of food on blood glucose; list 3 methods for planning meals.   Outcome: Progressing Towards Goal  Goal: *Incorporating physical activity into lifestyle  Description: State effect of exercise on blood glucose levels. Outcome: Progressing Towards Goal  Goal: *Developing strategies to promote health/change behavior  Description: Define the ABC's of diabetes; identify appropriate screenings, schedule and personal plan for screenings. Outcome: Progressing Towards Goal  Goal: *Using medications safely  Description: State effect of diabetes medications on diabetes; name diabetes medication taking, action and side effects. Outcome: Progressing Towards Goal  Goal: *Monitoring blood glucose, interpreting and using results  Description: Identify recommended blood glucose targets  and personal targets. Outcome: Progressing Towards Goal  Goal: *Prevention, detection, treatment of acute complications  Description: List symptoms of hyper- and hypoglycemia; describe how to treat low blood sugar and actions for lowering  high blood glucose level. Outcome: Progressing Towards Goal  Goal: *Prevention, detection and treatment of chronic complications  Description: Define the natural course of diabetes and describe the relationship of blood glucose levels to long term complications of diabetes.   Outcome: Progressing Towards Goal  Goal: *Developing strategies to address psychosocial issues  Description: Describe feelings about living with diabetes; identify support needed and support network  Outcome: Progressing Towards Goal  Goal: *Insulin pump training  Outcome: Progressing Towards Goal  Goal: *Sick day guidelines  Outcome: Progressing Towards Goal  Goal: *Patient Specific Goal (EDIT GOAL, INSERT TEXT)  Outcome: Progressing Towards Goal

## 2023-03-04 NOTE — ROUTINE PROCESS
End of Shift Note     Bedside shift change report given to Parish Cormier (oncoming nurse) by Lenny Vasquez RN (offgoing nurse). Report included the following information SBAR, Kardex, and ED Summary     Shift worked:  7p - 7a   Shift summary and any significant changes:        None--       Concerns for physician to address:  None   Zone phone for oncoming shift:   1600      Patient [address]  74 y.o.  2/28/2023  3:19 AM by Johnna Ching DO.  Shay Álvarez was admitted from Home     Problem List          Patient Active Problem List     Diagnosis Date Noted    CVA (cerebral vascular accident) ([address] 02/28/2023    Thrombotic stroke involving right middle cerebral artery ([address] 02/28/2023    Bilateral carotid artery stenosis 02/28/2023    Stage 3a chronic kidney disease ([address] 10/28/2022    Malignant neoplasm of lower lobe, left bronchus or lung ([address] 04/22/2022    Left arm weakness 03/06/2021    Bilateral leg weakness 03/06/2021    Carcinoid tumor 02/13/2018    CKD stage 2 due to type 2 diabetes mellitus ([address] 05/14/2017    Polyneuropathy in diabetes(357.2) 07/21/2015    Hypertensive emergency 07/20/2015    Seizure disorder ([address] 07/20/2015    Type II or unspecified type diabetes mellitus with neurological manifestations, uncontrolled(250.62) ([address] 07/20/2015    History of atrial fibrillation 07/20/2015    Hyperlipidemia 07/20/2015    Diabetic neuropathy ([address] 11/21/2014    Seizures ([address] 08/08/2014    Syncope 07/13/2012    Cardiomyopathy, nonischemic ([address] 10/31/2009    HTN (hypertension), benign 10/26/2009    Pulmonary nodule 10/26/2009              Past Medical History:   Diagnosis Date    Atrial fibrillation ([address]      Cancer ([address] 12/2015     left lung; 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    Congestive heart failure, unspecified      Dissection of right carotid artery ([address] 09/2018    Hypertension      Overweight(278.02)      Seizures (HCC)      SOLITARY PULMONARY NODULE       1.6 cm Stroke [facility]) 2015     right thalamus         Core Measures:  CVA: Yes Yes  CHF:No No  PNA:No No     Activity:  Activity Level: Bed Rest  Number times ambulated in hallways past shift: 0  Number of times OOB to chair past shift: 0     Cardiac:   Cardiac Monitoring: no     Cardiac Rhythm:      Access:   Current line(s): PIV   Central Line? No Placement date  Reason Medically Necessary   PICC LINE? No Placement date Reason Medically Necessary      Genitourinary:   Urinary status: voiding - manwick  Urinary Catheter? No Placement Date  Reason Medically Necessary      Respiratory:   O2 Device: None  Chronic home O2 use?: NO  Incentive spirometer at bedside: NO     GI:  Last Bowel Movement Date: 02/28/23  Current diet:  ADULT DIET Regular; 4 carb choices (60 gm/meal)  Passing flatus: YES  Tolerating current diet: YES     Pain Management:   Patient states pain is manageable on current regimen: N/A     Skin:  Don Score: 18  Interventions: PT/OT consult    Patient Safety:  Fall Score:    Interventions: bed/chair alarm, gripper socks, and pt to call before getting OOB  @Rollbelt  @dexterity to release roll belt  Yes/No ( must document dexterity  here by stating Yes or No here, otherwise this is a restraint and must follow restraint documentation policy.)     DVT prophylaxis:  DVT prophylaxis Med- Yes  DVT prophylaxis SCD or HAZEL- No      Wounds: (If Applicable)  Wounds- No  Location      Active Consults:  IP CONSULT TO NEUROLOGY     Length of Stay:  Expected LOS: - - -  Actual LOS: 1  Discharge Plan: Yes, Til Stable.  To SNF    Kim Hinojosa RN

## 2023-03-05 LAB
GLUCOSE BLD STRIP.AUTO-MCNC: 160 MG/DL (ref 65–117)
GLUCOSE BLD STRIP.AUTO-MCNC: 171 MG/DL (ref 65–117)
GLUCOSE BLD STRIP.AUTO-MCNC: 178 MG/DL (ref 65–117)
GLUCOSE BLD STRIP.AUTO-MCNC: 186 MG/DL (ref 65–117)
SERVICE CMNT-IMP: ABNORMAL

## 2023-03-05 PROCEDURE — 65270000046 HC RM TELEMETRY

## 2023-03-05 PROCEDURE — 74011250637 HC RX REV CODE- 250/637: Performed by: STUDENT IN AN ORGANIZED HEALTH CARE EDUCATION/TRAINING PROGRAM

## 2023-03-05 PROCEDURE — 74011636637 HC RX REV CODE- 636/637: Performed by: STUDENT IN AN ORGANIZED HEALTH CARE EDUCATION/TRAINING PROGRAM

## 2023-03-05 PROCEDURE — 74011000250 HC RX REV CODE- 250: Performed by: STUDENT IN AN ORGANIZED HEALTH CARE EDUCATION/TRAINING PROGRAM

## 2023-03-05 PROCEDURE — 74011250637 HC RX REV CODE- 250/637: Performed by: INTERNAL MEDICINE

## 2023-03-05 PROCEDURE — 74011250637 HC RX REV CODE- 250/637: Performed by: PSYCHIATRY & NEUROLOGY

## 2023-03-05 PROCEDURE — 82962 GLUCOSE BLOOD TEST: CPT

## 2023-03-05 RX ORDER — HYDRALAZINE HYDROCHLORIDE 100 MG/1
100 TABLET, FILM COATED ORAL 3 TIMES DAILY
Qty: 90 TABLET | Refills: 0 | Status: SHIPPED
Start: 2023-03-05

## 2023-03-05 RX ORDER — HYDRALAZINE HYDROCHLORIDE 50 MG/1
100 TABLET, FILM COATED ORAL 3 TIMES DAILY
Status: DISCONTINUED | OUTPATIENT
Start: 2023-03-05 | End: 2023-03-09 | Stop reason: HOSPADM

## 2023-03-05 RX ORDER — HYDROCHLOROTHIAZIDE 25 MG/1
12.5 TABLET ORAL DAILY
Status: DISCONTINUED | OUTPATIENT
Start: 2023-03-05 | End: 2023-03-09 | Stop reason: HOSPADM

## 2023-03-05 RX ORDER — HYDROCHLOROTHIAZIDE 12.5 MG/1
12.5 TABLET ORAL DAILY
Qty: 30 TABLET | Refills: 0 | Status: SHIPPED
Start: 2023-03-05

## 2023-03-05 RX ADMIN — SODIUM CHLORIDE, PRESERVATIVE FREE 10 ML: 5 INJECTION INTRAVENOUS at 22:04

## 2023-03-05 RX ADMIN — HYDRALAZINE HYDROCHLORIDE 100 MG: 50 TABLET, FILM COATED ORAL at 09:01

## 2023-03-05 RX ADMIN — LEVETIRACETAM 1000 MG: 500 TABLET, FILM COATED ORAL at 17:08

## 2023-03-05 RX ADMIN — ATORVASTATIN CALCIUM 80 MG: 40 TABLET, FILM COATED ORAL at 22:01

## 2023-03-05 RX ADMIN — POLYETHYLENE GLYCOL 3350 17 G: 17 POWDER, FOR SOLUTION ORAL at 09:01

## 2023-03-05 RX ADMIN — APIXABAN 5 MG: 5 TABLET, FILM COATED ORAL at 17:08

## 2023-03-05 RX ADMIN — Medication 2 UNITS: at 16:32

## 2023-03-05 RX ADMIN — LISINOPRIL 40 MG: 20 TABLET ORAL at 09:00

## 2023-03-05 RX ADMIN — ASPIRIN 325 MG: 325 TABLET, COATED ORAL at 09:00

## 2023-03-05 RX ADMIN — SODIUM CHLORIDE, PRESERVATIVE FREE 10 ML: 5 INJECTION INTRAVENOUS at 05:35

## 2023-03-05 RX ADMIN — SODIUM CHLORIDE, PRESERVATIVE FREE 10 ML: 5 INJECTION INTRAVENOUS at 16:33

## 2023-03-05 RX ADMIN — HYDRALAZINE HYDROCHLORIDE 100 MG: 50 TABLET, FILM COATED ORAL at 22:01

## 2023-03-05 RX ADMIN — Medication 2 UNITS: at 11:58

## 2023-03-05 RX ADMIN — APIXABAN 5 MG: 5 TABLET, FILM COATED ORAL at 09:00

## 2023-03-05 RX ADMIN — HYDRALAZINE HYDROCHLORIDE 100 MG: 50 TABLET, FILM COATED ORAL at 16:32

## 2023-03-05 RX ADMIN — AMLODIPINE BESYLATE 10 MG: 5 TABLET ORAL at 09:00

## 2023-03-05 RX ADMIN — SODIUM CHLORIDE, PRESERVATIVE FREE 20 ML: 5 INJECTION INTRAVENOUS at 16:33

## 2023-03-05 RX ADMIN — CARVEDILOL 12.5 MG: 12.5 TABLET, FILM COATED ORAL at 16:32

## 2023-03-05 RX ADMIN — Medication 2 UNITS: at 09:00

## 2023-03-05 RX ADMIN — CARVEDILOL 12.5 MG: 12.5 TABLET, FILM COATED ORAL at 09:01

## 2023-03-05 RX ADMIN — HYDROCHLOROTHIAZIDE 12.5 MG: 25 TABLET ORAL at 09:03

## 2023-03-05 RX ADMIN — ACETAMINOPHEN 325MG 650 MG: 325 TABLET ORAL at 09:01

## 2023-03-05 RX ADMIN — LEVETIRACETAM 1000 MG: 500 TABLET, FILM COATED ORAL at 09:01

## 2023-03-05 NOTE — DISCHARGE SUMMARY
Hospitalist Discharge Summary     Patient ID:  Ritchie Diaz  [id number]  74 y.o.  1955 2/28/2023    PCP on record: Viji Oliveros MD    Admit date: 2/28/2023  Discharge date and time: 3/5/2023    DISCHARGE DIAGNOSIS:    Acute CVA  Hypertension  A-fib  History of dissection of right carotid artery  Diabetes mellitus  Seizure disorder  Hypokalemia    CONSULTATIONS:  IP CONSULT TO NEUROLOGY    Excerpted HPI from H&P of Messi Moody, DO:  Jamey Chavarria is a 74 y.o.  male with PMH of afib on eliquis, lung ca 2015 s/p VATS resection left lower lobe, CHF, dissection of right carotid artery, HTN, seizure disorder and h/o right thalamic stroke who presents with left sided weakness. Patient reports that he was trying to get out of bed earlier this morning a little after midnight, last known normal was a little before midnight. He endorses a fall to the floor, without hitting his head due to left sided weakness. He was unable to get up and his wife called 911. He reports that he has a h/o stroke and this feels similar to his prior left sided weakness, however after last stroke he now only needed to ambulate with a cane. He denies CP, SOB, fever, chills, and palpitations. ______________________________________________________________________  DISCHARGE SUMMARY/HOSPITAL COURSE:  for full details see H&P, daily progress notes, labs, consult notes. Patient admitted with acute CVA with flaccid paralysis of left side MRI showed acute infarct posterior corona radiator. Patient was already on aspirin 81 mg and Eliquis. Neurology on board. Increased aspirin to 325 and continued on Eliquis. PT OT evaluated the patient and recommended acute rehab. Patient is medically stable for discharge and need follow-up with neurology in 4 to 6 weeks. Acute CVA  CT head with no acute process. CTA technically sub-optimal. No large vessel occlusion. Mild artherosclerosis.   Carotid duplex-done, no significant stenosis  Continue telemetry  MRI showing acute infarct posterior corona radiator  Echo showed 40 to 45% EF, no intracardiac shunt  Appreciate neurology recs neurology cleared for discharge  PT recommends acute rehab  Speech therapy regular diet  Currently on Eliquis 5 mg twice daily and aspirin started 325 mg daily  Continue statin   A1c, lipid panel pending at the time of discharge  Blood sugar controlled 1 40-1 80 currently. Resume on home medication. Check A1c as outpatient. On high-dose statin     Resumed on home medications amlodipine, hydralazine, lisinopril  Coreg    HTN  Afib  H/o dissection of right carotid artery  Eliquis secondary hypercoagulable state due to A-fib  norvasc, coreg, hydralazine, and lisinopril. Continue eliquis and lipitor  Resumed on home medications  Bp uncontrolled  Increased hydralazine 100tid  Added hctz     DM  Home metformin and glipizide A1c as outpatient     seizure disorder  Continue PTA keppra 1000 mg po bid     Hypokalemia  Replete as needed         _______________________________________________________________________  Patient seen and examined by me on discharge day. Pertinent Findings:  Gen:    Not in distress  Chest: Clear lungs  CVS:   Regular rhythm. No edema  Abd:  Soft, not distended, not tender  Neuro:  Alert,   _______________________________________________________________________  DISCHARGE MEDICATIONS:   Current Discharge Medication List        START taking these medications    Details   hydroCHLOROthiazide (HYDRODIURIL) 12.5 mg tablet Take 1 Tablet by mouth daily. Qty: 30 Tablet, Refills: 0  Start date: 3/5/2023           CONTINUE these medications which have CHANGED    Details   hydrALAZINE (APRESOLINE) 100 mg tablet Take 1 Tablet by mouth three (3) times daily. Qty: 90 Tablet, Refills: 0  Start date: 3/5/2023      aspirin delayed-release 325 mg tablet Take 1 Tablet by mouth daily.   Qty: 30 Tablet, Refills: 0  Start date: 3/4/2023           CONTINUE these medications which have NOT CHANGED    Details   levETIRAcetam (KEPPRA) 500 mg tablet Take 2 tablets by mouth twice daily  Qty: 120 Tablet, Refills: 5      carvediloL (COREG) 12.5 mg tablet Take 1 Tablet by mouth two (2) times daily (with meals). Qty: 180 Tablet, Refills: 3    Associated Diagnoses: NICM (nonischemic cardiomyopathy) ([facility])      atorvastatin (LIPITOR) 80 mg tablet TAKE 1 TABLET BY MOUTH ONCE DAILY AT NIGHT  Qty: 30 Tablet, Refills: 5    Associated Diagnoses: Hyperlipidemia, unspecified hyperlipidemia type      apixaban (ELIQUIS) 5 mg tablet Take 1 Tablet by mouth two (2) times a day. Qty: 2 Tablet, Refills: 0      glimepiride (AMARYL) 4 mg tablet TAKE 1 TABLET BY MOUTH IN THE MORNING  Qty: 180 Tablet, Refills: 3      amLODIPine (NORVASC) 10 mg tablet Take 1 tablet by mouth once daily for blood pressure  Qty: 30 Tablet, Refills: 5      metFORMIN (GLUCOPHAGE) 500 mg tablet Take 2 Tablets by mouth two (2) times daily (with meals). Qty: 360 Tablet, Refills: 3    Associated Diagnoses: Type 2 diabetes mellitus with complication, with long-term current use of insulin ([facility])      glucose blood VI test strips (ASCENSIA AUTODISC VI, ONE TOUCH ULTRA TEST VI) strip Check fsbs every day E11  Qty: 50 Strip, Refills: 11      Blood-Glucose Meter monitoring kit Check fsbs every day E11  Qty: 1 Kit, Refills: 0      lancets misc Check fsbs every day E11  Qty: 1 Each, Refills: 11      albuterol (PROVENTIL HFA, VENTOLIN HFA, PROAIR HFA) 90 mcg/actuation inhaler Take 2 Puffs by inhalation every four (4) hours as needed for Wheezing. lisinopril (PRINIVIL, ZESTRIL) 40 mg tablet TAKE ONE TABLET BY MOUTH ONCE DAILY FOR BLOOD PRESSURE  Qty: 30 Tab, Refills: 11    Comments: Please consider 90 day supplies to promote better adherence               Patient Follow Up Instructions:    Activity: Activity as tolerated  Diet: Diabetic Diet  Wound Care: As directed    Follow-up with PCP in 1 to 2 weeks.  Neurology in 4 to 6 weeks in   Follow-up tests/labs CBC BMP in 1 week    Follow-up Information       Follow up With Specialties Details Why Contact Info    Lorie Matthews MD Internal Medicine Physician   [address] [facility]  [phone]      Walt Anton MD Neurology Follow up in 1 month(s)  [address]  [address] [facility]  [phone]            ________________________________________________________________    Risk of deterioration: Moderate    Condition at Discharge:  Stable  __________________________________________________________________    Disposition  IP Rehab    ____________________________________________________________________    Code Status: Full Code  ___________________________________________________________________      Total time in minutes spent coordinating this discharge (includes going over instructions, follow-up, prescriptions, and preparing report for sign off to her PCP) :  45 minutes    Signed:  Jim Hollis MD

## 2023-03-05 NOTE — PROGRESS NOTES
Problem: Pressure Injury - Risk of  Goal: *Prevention of pressure injury  Description: Document Don Scale and appropriate interventions in the flowsheet.   Outcome: Progressing Towards Goal  Note: Pressure Injury Interventions:  Sensory Interventions: Assess changes in LOC, Discuss PT/OT consult with provider, Float heels, Keep linens dry and wrinkle-free, Maintain/enhance activity level, Minimize linen layers    Moisture Interventions: Absorbent underpads, Apply protective barrier, creams and emollients    Activity Interventions: PT/OT evaluation    Mobility Interventions: HOB 30 degrees or less, PT/OT evaluation    Nutrition Interventions: Document food/fluid/supplement intake    Friction and Shear Interventions: HOB 30 degrees or less, Apply protective barrier, creams and emollients                Problem: Patient Education: Go to Patient Education Activity  Goal: Patient/Family Education  Outcome: Progressing Towards Goal     Problem: Patient Education: Go to Patient Education Activity  Goal: Patient/Family Education  Outcome: Progressing Towards Goal     Problem: TIA/CVA Stroke: 0-24 hours  Goal: Off Pathway (Use only if patient is Off Pathway)  Outcome: Progressing Towards Goal  Goal: Activity/Safety  Outcome: Progressing Towards Goal  Goal: Consults, if ordered  Outcome: Progressing Towards Goal  Goal: Diagnostic Test/Procedures  Outcome: Progressing Towards Goal  Goal: Nutrition/Diet  Outcome: Progressing Towards Goal  Goal: Discharge Planning  Outcome: Progressing Towards Goal  Goal: Medications  Outcome: Progressing Towards Goal  Goal: Respiratory  Outcome: Progressing Towards Goal  Goal: Treatments/Interventions/Procedures  Outcome: Progressing Towards Goal  Goal: Minimize risk of bleeding post-thrombolytic infusion  Outcome: Progressing Towards Goal  Goal: Monitor for complications post-thrombolytic infusion  Outcome: Progressing Towards Goal  Goal: Psychosocial  Outcome: Progressing Towards Goal  Goal: *Hemodynamically stable  Outcome: Progressing Towards Goal  Goal: *Neurologically stable  Description: Absence of additional neurological deficits    Outcome: Progressing Towards Goal  Goal: *Verbalizes anxiety and depression are reduced or absent  Outcome: Progressing Towards Goal  Goal: *Absence of Signs of Aspiration on Current Diet  Outcome: Progressing Towards Goal  Goal: *Absence of deep venous thrombosis signs and symptoms(Stroke Metric)  Outcome: Progressing Towards Goal  Goal: *Ability to perform ADLs and demonstrates progressive mobility and function  Outcome: Progressing Towards Goal  Goal: *Stroke education started(Stroke Metric)  Outcome: Progressing Towards Goal  Goal: *Dysphagia screen performed(Stroke Metric)  Outcome: Progressing Towards Goal  Goal: *Rehab consulted(Stroke Metric)  Outcome: Progressing Towards Goal     Problem: TIA/CVA Stroke: Day 2 Until Discharge  Goal: Off Pathway (Use only if patient is Off Pathway)  Outcome: Progressing Towards Goal  Goal: Activity/Safety  Outcome: Progressing Towards Goal  Goal: Diagnostic Test/Procedures  Outcome: Progressing Towards Goal  Goal: Nutrition/Diet  Outcome: Progressing Towards Goal  Goal: Discharge Planning  Outcome: Progressing Towards Goal  Goal: Medications  Outcome: Progressing Towards Goal  Goal: Respiratory  Outcome: Progressing Towards Goal  Goal: Treatments/Interventions/Procedures  Outcome: Progressing Towards Goal  Goal: Psychosocial  Outcome: Progressing Towards Goal  Goal: *Verbalizes anxiety and depression are reduced or absent  Outcome: Progressing Towards Goal  Goal: *Absence of aspiration  Outcome: Progressing Towards Goal  Goal: *Absence of deep venous thrombosis signs and symptoms(Stroke Metric)  Outcome: Progressing Towards Goal  Goal: *Optimal pain control at patient's stated goal  Outcome: Progressing Towards Goal  Goal: *Tolerating diet  Outcome: Progressing Towards Goal  Goal: *Ability to perform ADLs and demonstrates progressive mobility and function  Outcome: Progressing Towards Goal  Goal: *Stroke education continued(Stroke Metric)  Outcome: Progressing Towards Goal     Problem: Ischemic Stroke: Discharge Outcomes  Goal: *Verbalizes anxiety and depression are reduced or absent  Outcome: Progressing Towards Goal  Goal: *Verbalize understanding of risk factor modification(Stroke Metric)  Outcome: Progressing Towards Goal  Goal: *Hemodynamically stable  Outcome: Progressing Towards Goal  Goal: *Absence of aspiration pneumonia  Outcome: Progressing Towards Goal  Goal: *Aware of needed dietary changes  Outcome: Progressing Towards Goal  Goal: *Verbalize understanding of prescribed medications including anti-coagulants, anti-lipid, and/or anti-platelets(Stroke Metric)  Outcome: Progressing Towards Goal  Goal: *Tolerating diet  Outcome: Progressing Towards Goal  Goal: *Aware of follow-up diagnostics related to anticoagulants  Outcome: Progressing Towards Goal  Goal: *Ability to perform ADLs and demonstrates progressive mobility and function  Outcome: Progressing Towards Goal  Goal: *Absence of DVT(Stroke Metric)  Outcome: Progressing Towards Goal  Goal: *Absence of aspiration  Outcome: Progressing Towards Goal  Goal: *Optimal pain control at patient's stated goal  Outcome: Progressing Towards Goal  Goal: *Home safety concerns addressed  Outcome: Progressing Towards Goal  Goal: *Describes available resources and support systems  Outcome: Progressing Towards Goal  Goal: *Verbalizes understanding of activation of EMS(911) for stroke symptoms(Stroke Metric)  Outcome: Progressing Towards Goal  Goal: *Understands and describes signs and symptoms to report to providers(Stroke Metric)  Outcome: Progressing Towards Goal  Goal: *Neurolgocially stable (absence of additional neurological deficits)  Outcome: Progressing Towards Goal  Goal: *Verbalizes importance of follow-up with primary care physician(Stroke Metric)  Outcome: Progressing Towards Goal  Goal: *Smoking cessation discussed,if applicable(Stroke Metric)  Outcome: Progressing Towards Goal  Goal: *Depression screening completed(Stroke Metric)  Outcome: Progressing Towards Goal     Problem: Falls - Risk of  Goal: *Absence of Falls  Description: Document Yenifer Fall Risk and appropriate interventions in the flowsheet. Outcome: Progressing Towards Goal     Problem: Patient Education: Go to Patient Education Activity  Goal: Patient/Family Education  Outcome: Progressing Towards Goal     Problem: Patient Education: Go to Patient Education Activity  Goal: Patient/Family Education  Outcome: Progressing Towards Goal     Problem: Patient Education: Go to Patient Education Activity  Goal: Patient/Family Education  Outcome: Progressing Towards Goal     Problem: Diabetes Self-Management  Goal: *Disease process and treatment process  Description: Define diabetes and identify own type of diabetes; list 3 options for treating diabetes. Outcome: Progressing Towards Goal  Goal: *Incorporating nutritional management into lifestyle  Description: Describe effect of type, amount and timing of food on blood glucose; list 3 methods for planning meals. Outcome: Progressing Towards Goal  Goal: *Incorporating physical activity into lifestyle  Description: State effect of exercise on blood glucose levels. Outcome: Progressing Towards Goal  Goal: *Developing strategies to promote health/change behavior  Description: Define the ABC's of diabetes; identify appropriate screenings, schedule and personal plan for screenings. Outcome: Progressing Towards Goal  Goal: *Using medications safely  Description: State effect of diabetes medications on diabetes; name diabetes medication taking, action and side effects. Outcome: Progressing Towards Goal  Goal: *Monitoring blood glucose, interpreting and using results  Description: Identify recommended blood glucose targets  and personal targets.   Outcome: Progressing Towards Goal  Goal: *Prevention, detection, treatment of acute complications  Description: List symptoms of hyper- and hypoglycemia; describe how to treat low blood sugar and actions for lowering  high blood glucose level. Outcome: Progressing Towards Goal  Goal: *Prevention, detection and treatment of chronic complications  Description: Define the natural course of diabetes and describe the relationship of blood glucose levels to long term complications of diabetes.   Outcome: Progressing Towards Goal  Goal: *Developing strategies to address psychosocial issues  Description: Describe feelings about living with diabetes; identify support needed and support network  Outcome: Progressing Towards Goal  Goal: *Insulin pump training  Outcome: Progressing Towards Goal  Goal: *Sick day guidelines  Outcome: Progressing Towards Goal  Goal: *Patient Specific Goal (EDIT GOAL, INSERT TEXT)  Outcome: Progressing Towards Goal

## 2023-03-05 NOTE — ROUTINE PROCESS
End of Shift Note     Bedside shift change report given to [address] (oncoming nurse) by Holger Frank RN (offgoing nurse). Report included the following information SBAR, Kardex, and MAR     Shift worked:  7a-7p   Shift summary and any significant changes:      none         Concerns for physician to address:  none   Zone phone for oncoming shift:   [phone]      Patient [address]  74 y.o.  2/28/2023  3:19 AM by Eileen Nielson DO.  Celso Waller was admitted from Home     Problem List       Patient Active Problem List     Diagnosis Date Noted    CVA (cerebral vascular accident) ([address] 02/28/2023    Thrombotic stroke involving right middle cerebral artery ([address] 02/28/2023    Bilateral carotid artery stenosis 02/28/2023    Stage 3a chronic kidney disease ([address] 10/28/2022    Malignant neoplasm of lower lobe, left bronchus or lung ([address] 04/22/2022    Left arm weakness 03/06/2021    Bilateral leg weakness 03/06/2021    Carcinoid tumor 02/13/2018    CKD stage 2 due to type 2 diabetes mellitus ([address] 05/14/2017    Polyneuropathy in diabetes(357.2) 07/21/2015    Hypertensive emergency 07/20/2015    Seizure disorder ([address] 07/20/2015    Type II or unspecified type diabetes mellitus with neurological manifestations, uncontrolled(250.62) ([address] 07/20/2015    History of atrial fibrillation 07/20/2015    Hyperlipidemia 07/20/2015    Diabetic neuropathy ([address] 11/21/2014    Seizures ([address] 08/08/2014    Syncope 07/13/2012    Cardiomyopathy, nonischemic ([address] 10/31/2009    HTN (hypertension), benign 10/26/2009    Pulmonary nodule 10/26/2009           Past Medical History:   Diagnosis Date    Atrial fibrillation ([address]      Cancer ([address] 12/2015     left lung; 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    Congestive heart failure, unspecified      Dissection of right carotid artery ([address] 09/2018    Hypertension      Overweight(278.02)      Seizures (HCC)      SOLITARY PULMONARY NODULE       1.6 cm    Stroke ([facility] 2015     right thalamus         Core Measures:  CVA: Yes Yes  CHF:Yes Not applicable  PNA:No No     Activity:  Activity Level: Bed Rest  Number times ambulated in hallways past shift: 0  Number of times OOB to chair past shift: 0     Cardiac:   Cardiac Monitoring: No      Cardiac Rhythm: Atrial Fib     Access:   Current line(s): PIV   Central Line? No   PICC LINE? No      Genitourinary:   Urinary status: external catheter  Urinary Catheter? No      Respiratory:   O2 Device: None (Room air)  Chronic home O2 use?: NO  Incentive spirometer at bedside: NO     GI:  Last Bowel Movement Date: 03/01/23  Current diet:  ADULT DIET Regular; 4 carb choices (60 gm/meal)  Passing flatus: NO  Tolerating current diet: YES     Pain Management:   Patient states pain is manageable on current regimen: YES     Skin:  Don Score: 15  Interventions: float heels, foam dressing, PT/OT consult, and limit briefs    Patient Safety:  Fall Score:  Total Score: 3  Interventions: bed/chair alarm and gripper socks  High Fall Risk: Yes  @Rollbelt  @dexterity to release roll belt  Yes/No ( must document dexterity  here by stating Yes or No here, otherwise this is a restraint and must follow restraint documentation policy.)     DVT prophylaxis:  DVT prophylaxis Med- Yes  DVT prophylaxis SCD or HAZEL- No      Wounds: (If Applicable)  Wounds- No  Location      Active Consults:  IP CONSULT TO NEUROLOGY     Length of Stay:  Expected LOS: 2d 21h  Actual LOS: 4  Discharge Plan: Yes auth pending        Joseph Schreiber RN

## 2023-03-05 NOTE — PROGRESS NOTES
Spiritual Care Assessment/Progress Note  [facility]      NAME: Ulises Napoles      MRN: [id number]  AGE: 74 y.o. SEX: male  Taoist Affiliation: Worship   Language: English     3/5/2023     Total Time (in minutes): 13     Spiritual Assessment begun in [facility] TELE[facility] through conversation with:         [x]Patient        [x] Family    [] Friend(s)        Reason for Consult: Initial/Spiritual assessment, patient floor, Initial visit     Spiritual beliefs: (Please include comment if needed)     [x] Identifies with a jean-claude tradition:         [] Supported by a jean-claude community:            [] Claims no spiritual orientation:           [] Seeking spiritual identity:                [] Adheres to an individual form of spirituality:           [] Not able to assess:                           Identified resources for coping:      [x] Prayer                               [] Music                  [] Guided Imagery     [x] Family/friends                 [] Pet visits     [] Devotional reading                         [] Unknown     [] Other:                                               Interventions offered during this visit: (See comments for more details)    Patient Interventions: Initial/Spiritual assessment, patient floor, Initial visit, Affirmation of jean-claude, Affirmation of emotions/emotional suffering, Prayer (actual), Normalization of emotional/spiritual concerns     Family/Friend(s):  Affirmation of jean-claude, Affirmation of emotions/emotional suffering     Plan of Care:     [x] Support spiritual and/or cultural needs    [] Support AMD and/or advance care planning process      [] Support grieving process   [] Coordinate Rites and/or Rituals    [] Coordination with community clergy   [] No spiritual needs identified at this time   [] Detailed Plan of Care below (See Comments)  [] Make referral to Music Therapy  [] Make referral to Pet Therapy     [] Make referral to Addiction services  [] Make referral to [facility]  [] Make referral to Spiritual Care Partner  [] No future visits requested        [] Contact Spiritual Care for further referrals     Comments: When I entered the room the patient was laying in the bed visiting with his uncle. Affirmation of jean-claude. Normalization of emotion. Offered prayer. [address] [facility], [place]. [address] ([id number])

## 2023-03-06 LAB
CHOLEST SERPL-MCNC: 130 MG/DL
GLUCOSE BLD STRIP.AUTO-MCNC: 152 MG/DL (ref 65–117)
GLUCOSE BLD STRIP.AUTO-MCNC: 185 MG/DL (ref 65–117)
GLUCOSE BLD STRIP.AUTO-MCNC: 195 MG/DL (ref 65–117)
GLUCOSE BLD STRIP.AUTO-MCNC: 236 MG/DL (ref 65–117)
HDLC SERPL-MCNC: 36 MG/DL
HDLC SERPL: 3.6 (ref 0–5)
LDLC SERPL CALC-MCNC: 74.6 MG/DL (ref 0–100)
SERVICE CMNT-IMP: ABNORMAL
TRIGL SERPL-MCNC: 97 MG/DL (ref ?–150)
VLDLC SERPL CALC-MCNC: 19.4 MG/DL

## 2023-03-06 PROCEDURE — 97530 THERAPEUTIC ACTIVITIES: CPT

## 2023-03-06 PROCEDURE — 36415 COLL VENOUS BLD VENIPUNCTURE: CPT

## 2023-03-06 PROCEDURE — 82962 GLUCOSE BLOOD TEST: CPT

## 2023-03-06 PROCEDURE — 74011636637 HC RX REV CODE- 636/637: Performed by: STUDENT IN AN ORGANIZED HEALTH CARE EDUCATION/TRAINING PROGRAM

## 2023-03-06 PROCEDURE — 97530 THERAPEUTIC ACTIVITIES: CPT | Performed by: OCCUPATIONAL THERAPIST

## 2023-03-06 PROCEDURE — 97112 NEUROMUSCULAR REEDUCATION: CPT

## 2023-03-06 PROCEDURE — 74011250637 HC RX REV CODE- 250/637: Performed by: INTERNAL MEDICINE

## 2023-03-06 PROCEDURE — 97112 NEUROMUSCULAR REEDUCATION: CPT | Performed by: OCCUPATIONAL THERAPIST

## 2023-03-06 PROCEDURE — 74011250637 HC RX REV CODE- 250/637: Performed by: STUDENT IN AN ORGANIZED HEALTH CARE EDUCATION/TRAINING PROGRAM

## 2023-03-06 PROCEDURE — 80061 LIPID PANEL: CPT

## 2023-03-06 PROCEDURE — 74011000250 HC RX REV CODE- 250: Performed by: STUDENT IN AN ORGANIZED HEALTH CARE EDUCATION/TRAINING PROGRAM

## 2023-03-06 PROCEDURE — 65270000046 HC RM TELEMETRY

## 2023-03-06 PROCEDURE — 74011250637 HC RX REV CODE- 250/637: Performed by: PSYCHIATRY & NEUROLOGY

## 2023-03-06 RX ADMIN — Medication 2 UNITS: at 12:37

## 2023-03-06 RX ADMIN — Medication 2 UNITS: at 17:13

## 2023-03-06 RX ADMIN — SODIUM CHLORIDE, PRESERVATIVE FREE 10 ML: 5 INJECTION INTRAVENOUS at 17:23

## 2023-03-06 RX ADMIN — HYDROCHLOROTHIAZIDE 12.5 MG: 25 TABLET ORAL at 10:12

## 2023-03-06 RX ADMIN — APIXABAN 5 MG: 5 TABLET, FILM COATED ORAL at 10:12

## 2023-03-06 RX ADMIN — CARVEDILOL 12.5 MG: 12.5 TABLET, FILM COATED ORAL at 10:19

## 2023-03-06 RX ADMIN — LISINOPRIL 40 MG: 20 TABLET ORAL at 10:11

## 2023-03-06 RX ADMIN — Medication 2 UNITS: at 21:10

## 2023-03-06 RX ADMIN — LEVETIRACETAM 1000 MG: 500 TABLET, FILM COATED ORAL at 17:14

## 2023-03-06 RX ADMIN — CARVEDILOL 12.5 MG: 12.5 TABLET, FILM COATED ORAL at 17:17

## 2023-03-06 RX ADMIN — HYDRALAZINE HYDROCHLORIDE 100 MG: 50 TABLET, FILM COATED ORAL at 17:14

## 2023-03-06 RX ADMIN — LEVETIRACETAM 1000 MG: 500 TABLET, FILM COATED ORAL at 10:11

## 2023-03-06 RX ADMIN — SODIUM CHLORIDE, PRESERVATIVE FREE 10 ML: 5 INJECTION INTRAVENOUS at 05:24

## 2023-03-06 RX ADMIN — HYDRALAZINE HYDROCHLORIDE 100 MG: 50 TABLET, FILM COATED ORAL at 10:12

## 2023-03-06 RX ADMIN — AMLODIPINE BESYLATE 10 MG: 5 TABLET ORAL at 10:10

## 2023-03-06 RX ADMIN — ASPIRIN 325 MG: 325 TABLET, COATED ORAL at 10:12

## 2023-03-06 RX ADMIN — APIXABAN 5 MG: 5 TABLET, FILM COATED ORAL at 17:14

## 2023-03-06 RX ADMIN — Medication 2 UNITS: at 09:00

## 2023-03-06 NOTE — PROGRESS NOTES
Problem: Self Care Deficits Care Plan (Adult)  Goal: *Acute Goals and Plan of Care (Insert Text)  Description: FUNCTIONAL STATUS PRIOR TO ADMISSION: Patient was modified independent using a single point cane for functional mobility. Patient required moderate assistance for distal LB dressing and bathing from supportive wife. HOME SUPPORT: The patient lived with wife and required moderate assistance  for LB distal dressing and bathing. Occupational Therapy Goals  Initiated 3/1/2023   1. Patient will perform grooming sitting EOB with moderate assistance  within 7 day(s). 2.  Patient will perform upper body dressing with moderate assistance  within 7 day(s). 3.  Patient will perform toilet transfers with maximal assistance within 7 day(s). 4.  Patient will perform all aspects of toileting with minimal assistance/contact guard assist within 7 day(s). 5.  Patient will participate in upper extremity therapeutic exercise/activities with moderate assistance  within 7 day(s). 6.  Patient will utilize energy conservation techniques during functional activities with verbal and visual cues within 7 day(s). 7.  Patient will improve their Fugl Rhoades score by 5 points in prep for ADLs within 7 days. Outcome: Progressing Towards Goal     OCCUPATIONAL THERAPY TREATMENT  Patient: Sylvia Truong (70 y.o. male)  Date: 3/6/2023  Diagnosis: CVA (cerebral vascular accident) [facility]) [I63.9] <principal problem not specified>      Precautions: Bed Alarm  Chart, occupational therapy assessment, plan of care, and goals were reviewed. ASSESSMENT  Patient continues with skilled OT services and is slowly  progressing towards goals. Pt demonstrates significant LUE/LLE impairment s/p CVA (lacunar infarct R basal ganglia). He recovered well from his previous stroke after intensive rehab in 2015 when he suffered a R thalamic infarct, having some residual L weakness at his PTA baseline.    .    Today, pt c/o pain in R knee/LE - his R knee does appear to be somewhat swollen. He is very stiff on RLE. Despite this, he was agreeable to treatment and participated well. Pt continues to require assistance X2 for bed mobility- he is able respond to multimodal cues and sit EOB for approx 15 minutes, with decreasing the level of support needed from Max A to fair -  the longer he sat. Pt responds well to cues for spinal extension and upright posture in an unsupported/minimally supported position at the EOB. Initiated upper body dressing with gown requiring maximal assistance and worked towards increasing LUE awareness for [address]. Encouraged pt to self position LUE for support, safety and comfort. Pt was able to reposition LUE with cues. (Intensive repetition will be essential for recovery from cva)  Highly recommend inpatient rehab as pt demonstrates good participation during this treatment session and is slowly progressing towards goals. .  Considering that pt benefited from previous inpatient rehab experience, anticipate that intensive inpatient rehab program will ensure best recovery from his new stroke, and  facilitate return to baseline . Pt  will be able to tolerate 3 hours of therapy each day. Current Level of Function Impacting Discharge (ADLs): assist X2    Other factors to consider for discharge: An Intensive rehab program facilitates best recovery from cva when initiated asap, and with repetition, and intensity of neurofacilitation principles. PLAN :  Patient continues to benefit from skilled intervention to address the above impairments. Continue treatment per established plan of care to address goals.     Recommend with staff: encourage increasing awareness of LUE--positioning, sensory input, cue pt to reposition LUE using his RUE    Recommend next OT session: POC    Recommendation for discharge: (in order for the patient to meet his/her long term goals)  Therapy 3 hours per day 5-7 days per week    This discharge recommendation:  Has been made in collaboration with the attending provider and/or case management    IF patient discharges home will need the following DME: tbd in rehab setting       SUBJECTIVE:   Patient reported that initially his knee was hurting, but did not complain of this during tx session. OBJECTIVE DATA SUMMARY:   Cognitive/Behavioral Status:  Neurologic State: Alert  Orientation Level: Oriented to person  Cognition: Decreased attention/concentration; Follows commands; Impaired decision making  Perception: Cues to attend left visual field;Cues to attend to left side of body;Cues to attend to right side of body;Cues to maintain midline in sitting  Perseveration: No perseveration noted  Safety/Judgement: Decreased awareness of need for assistance;Decreased awareness of need for safety;Decreased insight into deficits    Functional Mobility and Transfers for ADLs:  Bed Mobility:  Rolling: Maximum assistance;Assist x2  Supine to Sit: Maximum assistance;Assist x2  Sit to Supine: Maximum assistance;Assist x2  Scooting: Total assistance (worked on bridging/glut squeeze in preparation)    Transfers:             Balance:  Sitting: Impaired  Sitting - Static: Poor (constant support) (consistent verbal and manual cues to progress to fair minus while seated EOB for approx 15 minutes)    ADL Intervention:                           Upper Body [address]: Maximum assistance (mod A to remove gown from RUE, total assist for L, mod A to don RUE, total A to don L UE while in supine)    Lower Body Dressing Assistance  Protective Undergarmet:  Total assistance (dependent) (total A to doff)    Toileting  Bowel Hygiene:  (placed on bedpan with max A X2)  Clothing Management: Total assistance (dependent)  Adaptive Equipment:  (bed rails to assist with rolling)    Cognitive Retraining  Following Commands:  (follows simple one step commands--L side 0/5 strength)  Safety/Judgement: Decreased awareness of need for assistance;Decreased awareness of need for safety;Decreased insight into deficits  Cues: Tactile cues provided;Verbal cues provided;Visual cues provided    Neuro Re-Education:   Worked with pt using multimodal cues--weight bearing, spinal ext cues-spine and sternum, positioning in supine, sitting to facilitate postural control and increase awareness of LUE/LLE     Sensory input to LUE-positioning, tapping, stroking, hand over hand assist for finger tip to shoulder long stroking, pt applied lotion to LUE. Pain:  C/o pain in R knee upon arrival--did not rate. Pt is generally stiff    Activity Tolerance:   Fair and SpO2 stable on RA    After treatment patient left in no apparent distress:   Supine in bed, Call bell within reach, Bed / chair alarm activated, Side rails x 3, and nurse informed re: bed pan placed    COMMUNICATION/COLLABORATION:   The patients plan of care was discussed with: Physical therapist and Certified nursing assistant/patient care technician.      Radha Babcock OTR/L  Time Calculation: 30 mins

## 2023-03-06 NOTE — PROGRESS NOTES
End of Shift Note     Bedside shift change report given to  [address] [place] [address] (oncoming nurse) by Terrance Baumann RN (offgoing nurse). Report included the following information SBAR, Kardex, and MAR     Shift worked:  Days   Shift summary and any significant changes:      Appeal for rehab auth by insurance,        Concerns for physician to address:  none   Zone phone for oncoming shift:   [phone]      Patient [address]  74 y.o.  2/28/2023  3:19 AM by Kevin Strauss DO.  Chayito Zamora was admitted from Home     Problem List          Patient Active Problem List     Diagnosis Date Noted    CVA (cerebral vascular accident) ([address] 02/28/2023    Thrombotic stroke involving right middle cerebral artery ([address] 02/28/2023    Bilateral carotid artery stenosis 02/28/2023    Stage 3a chronic kidney disease ([address] 10/28/2022    Malignant neoplasm of lower lobe, left bronchus or lung ([address] 04/22/2022    Left arm weakness 03/06/2021    Bilateral leg weakness 03/06/2021    Carcinoid tumor 02/13/2018    CKD stage 2 due to type 2 diabetes mellitus ([address] 05/14/2017    Polyneuropathy in diabetes(357.2) 07/21/2015    Hypertensive emergency 07/20/2015    Seizure disorder ([address] 07/20/2015    Type II or unspecified type diabetes mellitus with neurological manifestations, uncontrolled(250.62) ([address] 07/20/2015    History of atrial fibrillation 07/20/2015    Hyperlipidemia 07/20/2015    Diabetic neuropathy ([address] 11/21/2014    Seizures ([address] 08/08/2014    Syncope 07/13/2012    Cardiomyopathy, nonischemic ([address] 10/31/2009    HTN (hypertension), benign 10/26/2009    Pulmonary nodule 10/26/2009              Past Medical History:   Diagnosis Date    Atrial fibrillation ([address]      Cancer ([address] 12/2015     left lung; 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    Congestive heart failure, unspecified      Dissection of right carotid artery ([address] 09/2018    Hypertension      Overweight(278.02)      Seizures (HCC)      SOLITARY PULMONARY NODULE       1.6 cm    Stroke [facility]) 2015     right thalamus         Core Measures:  CVA: Yes Yes  CHF:Yes Not applicable  PNA:No No     Activity:  Activity Level: Bed Rest  Number times ambulated in hallways past shift: 0  Number of times OOB to chair past shift: 0     Cardiac:   Cardiac Monitoring: No      Cardiac Rhythm: Atrial Fib     Access:   Current line(s): PIV   Central Line? No   PICC LINE? No      Genitourinary:   Urinary status: external catheter  Urinary Catheter? No      Respiratory:   O2 Device: None (Room air)  Chronic home O2 use?: NO  Incentive spirometer at bedside: NO     GI:  Last Bowel Movement Date: 03/01/23  Current diet:  ADULT DIET Regular; 4 carb choices (60 gm/meal)  Passing flatus: NO  Tolerating current diet: YES     Pain Management:   Patient states pain is manageable on current regimen: YES     Skin:  Don Score: 15  Interventions: float heels, foam dressing, PT/OT consult, limit briefs, and Q2 hourly turning    Patient Safety:  Fall Score:  Total Score: 3  Interventions: bed/chair alarm and gripper socks  High Fall Risk: Yes  @Rollbelt  @dexterity to release roll belt  NA     DVT prophylaxis:  DVT prophylaxis Med- Yes  DVT prophylaxis SCD or HAZEL- No      Wounds: (If Applicable)  Wounds- No  Location      Active Consults:  IP CONSULT TO NEUROLOGY     Length of Stay:  Expected LOS: 2d 21h  Actual LOS: 6  Discharge Plan: Yes auth pending

## 2023-03-06 NOTE — PROGRESS NOTES
Problem: Mobility Impaired (Adult and Pediatric)  Goal: *Acute Goals and Plan of Care (Insert Text)  Description:   FUNCTIONAL STATUS PRIOR TO ADMISSION: Patient required assistance for ADLs from wife, was ambulatory with can for household distances, had residual left weakness from previous stroke. HOME SUPPORT PRIOR TO ADMISSION: The patient lived with spouse and required assistance for dressing and bathing. Physical Therapy Goals  Initiated 3/1/2023  1. Patient will move from supine to sit and sit to supine  in bed with moderate assistance  within 7 day(s). 2.  Patient will transfer from bed to chair and chair to bed with moderate assistance  using the least restrictive device within 7 day(s). 3.  Patient will perform sit to stand with moderate assistance  within 7 day(s). 4.  Patient will ambulate with moderate assistance  for 5 feet with the least restrictive device within 7 day(s). 5.  Patient will increase Stafford balance score by 5 points within 7 days. Outcome: Progressing Towards Goal   PHYSICAL THERAPY TREATMENT  Patient: Mortimer Mare (23 y.o. male)  Date: 3/6/2023  Diagnosis: CVA (cerebral vascular accident) ([facility] [I63.9] <principal problem not specified>      Precautions: Bed Alarm  Chart, physical therapy assessment, plan of care and goals were reviewed. ASSESSMENT  Patient continues with skilled PT services and is progressing towards goals. Patient received in bed and agreeable to participate, alert and conversational and able to exert good effort during therapy today. PROM perform to left LE and AAROM  to right LE through all planes, note stiffness and pain present bilaterally that improved with mobility. Patient continues to present with dense left hemiplegia and required max assist x 2 to transition to sitting position at EOB.   Patient tolerated approx 15 minutes in sitting position with constant support and manual facilitation to work on weight shifting, postural corrections, trunk rotations and lateral leans. After return to supine, patient rolled right and left with max assist, worked on bridging then  positioned with pillows under left UE. Patient is a good candidate for inpatient rehab, he is well below functional baseline, is alert and cooperative, able to tolerate 3 hours of therapy and will achieve the most gains and best outcome from an intensive rehab program as ample research has proven. Current Level of Function Impacting Discharge (mobility/balance): max assist x 2 for bed mobility, unable to stand or transfer due to left hemiplegia, needs intensive rehab to improve function    Other factors to consider for discharge: well below baseline, good tolerance of therapy, alert and able to participate         PLAN :  Patient continues to benefit from skilled intervention to address the above impairments. Continue treatment per established plan of care. to address goals. Recommendation for discharge: (in order for the patient to meet his/her long term goals)  Therapy 3 hours per day 5-7 days per week    This discharge recommendation:  Has been made in collaboration with the attending provider and/or case management    IF patient discharges home will need the following DME: to be determined (TBD)       SUBJECTIVE:   Patient stated I used to be strong.     OBJECTIVE DATA SUMMARY:   Critical Behavior:  Neurologic State: Alert  Orientation Level: Oriented to person  Cognition: Decreased attention/concentration, Follows commands, Impaired decision making  Safety/Judgement: Decreased awareness of need for assistance, Decreased awareness of need for safety, Decreased insight into deficits  Functional Mobility Training:  Bed Mobility:  Rolling: Maximum assistance;Assist x2  Supine to Sit: Maximum assistance;Assist x2  Sit to Supine: Maximum assistance;Assist x2  Scooting:  Total assistance (worked on bridging/glut squeeze in preparation)        Transfers: Balance:  Sitting: Impaired  Sitting - Static: Poor (constant support) (consistent verbal and manual cues to progress to fair minus while seated EOB for approx 15 minutes)  Sitting - Dynamic: Poor (constant support)  Ambulation/Gait Training:                                                        Stairs: Therapeutic Exercises:   PROM left LE, AAROM (due to stiffness and joint pain) right LE  Pain Rating:      Activity Tolerance:   Good    After treatment patient left in no apparent distress:   Supine in bed, Call bell within reach, Bed / chair alarm activated, and Side rails x 3    COMMUNICATION/COLLABORATION:   The patients plan of care was discussed with: Occupational therapist, Registered nurse, Physician, and Case management.      Pao Mcmahon, PT   Time Calculation: 35 mins

## 2023-03-06 NOTE — PROGRESS NOTES
Hospitalist Progress Note    NAME: Amaury Tirado   :  1955   MRN:  [id number]       Assessment / Plan:  Acute CVA POA  Causing Dense L Hemiplegia POA  CT head with no acute process. CTA technically sub-optimal. No large vessel occlusion. Mild artherosclerosis. Carotid duplex-done, no significant stenosis  Continue telemetry  MRI showing acute infarct posterior corona radiata R side  Echo showed 40 to 45% EF, no intracardiac shunt    Appreciate neurology recs  PT recommends acute rehab- IPR denied after P2P discussion/review with Insurance physician- recommended SNF level of rehab due to poor Out of bed participation--awaiting placement to SNF now  Speech therapy regular diet  Patient was already on Eliquis 5 mg twice daily at home  We will follow-up on neurology recommendation regarding anticoagulation  Currently on Eliquis 5 mg twice daily and aspirin started 325 mg daily  Continue statin , awaiting Lipid panel this admission-- still pending to be sent  A1c= 7.8  Echo 40-45% EF noted    HTN  Afib  H/o dissection of right carotid artery  Eliquis secondary hypercoagulable state due to A-fib  Resumed on home medications amlodipine, hydralazine, lisinopril, Coreg  Continue eliquis and lipitor  Resumed on home medications     DM  Home metformin and glipizide held. SSI with POC glucose checks     Seizure disorder  Continue PTA keppra 1000 mg po bid     Hypokalemia  Replete as needed          30.0 - 39.9 Obese / Body mass index is 30.44 kg/m². Estimated discharge date: ? Barriers: Medically stable, pending rehab placement    Code status: Full  Prophylaxis: Lovenox  Recommended Disposition:  rehab - SNF now as IPR denied by insurance     Subjective:     Chief Complaint / Reason for Physician Visit  Patient seen and examined -No new complains  Continues to have left flaccid paralysis  Remains otherwise pleasant,    Discussed with RN events overnight.      Review of Systems:  Symptom Y/N Comments Symptom Y/N Comments   Fever/Chills n   Chest Pain n    Poor Appetite n   Edema n    Cough n   Abdominal Pain n    Sputum n   Joint Pain n    SOB/LOPEZ n   Pruritis/Rash     Nausea/vomit    Tolerating PT/OT y minimal   Diarrhea    Tolerating Diet y    Constipation    Other       Could NOT obtain due to:      Objective:     VITALS:   Last 24hrs VS reviewed since prior progress note. Most recent are:  Patient Vitals for the past 24 hrs:   Temp Pulse Resp BP SpO2   03/06/23 1522 98.4 °F (36.9 °C) (!) 57 20 134/80 96 %   03/06/23 0719 96.8 °F (36 °C) 83 18 138/84 96 %   03/06/23 0303 99 °F (37.2 °C) (!) 57 18 (!) 140/80 94 %   03/05/23 2359 99.7 °F (37.6 °C) 90 18 (!) 149/90 97 %   03/05/23 1944 98.1 °F (36.7 °C) 99 18 127/78 98 %   03/05/23 1602 98.1 °F (36.7 °C) (!) 59 18 125/82 99 %         Intake/Output Summary (Last 24 hours) at 3/6/2023 1536  Last data filed at 3/6/2023 0630  Gross per 24 hour   Intake --   Output 600 ml   Net -600 ml          I had a face to face encounter and independently examined this patient on 3/6/2023, as outlined below:  PHYSICAL EXAM:  General: s, no acute distress  EENT:  EOMI. Anicteric sclerae. MMM  Resp:  CTA bilaterally, no wheezing or rales. No accessory muscle use  CV:  Regular  rhythm,  No edema  GI:  Soft, Non distended, Non tender. +Bowel sounds  Neurologic:  Left flaccid paralysis +, moving right upper and lower extremities  Psych:   t. Not anxious nor agitated  Skin:  No rashes.   No jaundice    Reviewed most current lab test results and cultures  YES  Reviewed most current radiology test results   YES  Review and summation of old records today    NO  Reviewed patient's current orders and MAR    YES  PMH/SH reviewed - no change compared to H&P  ________________________________________________________________________  Care Plan discussed with:    Comments   Patient x    Family      RN x    Care Manager x    Consultant  x Insurance P2P Review physician with [facility] x Multidiciplinary team rounds were held today with , nursing, pharmacist and clinical coordinator. Patient's plan of care was discussed; medications were reviewed and discharge planning was addressed. ________________________________________________________________________  Total NON critical care TIME:  46   Minutes    Total CRITICAL CARE TIME Spent:   Minutes non procedure based      Comments   >50% of visit spent in counseling and coordination of care x    ________________________________________________________________________  Kelin Bates MD     Procedures: see electronic medical records for all procedures/Xrays and details which were not copied into this note but were reviewed prior to creation of Plan. LABS:  I reviewed today's most current labs and imaging studies. Pertinent labs include:  No results for input(s): WBC, HGB, HCT, PLT, HGBEXT, HCTEXT, PLTEXT, HGBEXT, HCTEXT, PLTEXT in the last 72 hours. No results for input(s): NA, K, CL, CO2, GLU, BUN, CREA, CA, MG, PHOS, ALB, TBIL, TBILI, ALT, INR, INREXT, INREXT in the last 72 hours.     No lab exists for component: SGOT      Signed: Kelin Bates MD

## 2023-03-06 NOTE — PROGRESS NOTES
Problem: Pressure Injury - Risk of  Goal: *Prevention of pressure injury  Description: Document Don Scale and appropriate interventions in the flowsheet.   Outcome: Progressing Towards Goal  Note: Pressure Injury Interventions:  Sensory Interventions: Assess changes in LOC    Moisture Interventions: Absorbent underpads    Activity Interventions: PT/OT evaluation    Mobility Interventions: HOB 30 degrees or less    Nutrition Interventions: Document food/fluid/supplement intake    Friction and Shear Interventions: Apply protective barrier, creams and emollients, Foam dressings/transparent film/skin sealants, HOB 30 degrees or less                Problem: TIA/CVA Stroke: 0-24 hours  Goal: Off Pathway (Use only if patient is Off Pathway)  Outcome: Progressing Towards Goal  Goal: Activity/Safety  Outcome: Progressing Towards Goal  Goal: Consults, if ordered  Outcome: Progressing Towards Goal  Goal: Diagnostic Test/Procedures  Outcome: Progressing Towards Goal  Goal: Nutrition/Diet  Outcome: Progressing Towards Goal  Goal: Discharge Planning  Outcome: Progressing Towards Goal  Goal: Medications  Outcome: Progressing Towards Goal  Goal: Respiratory  Outcome: Progressing Towards Goal  Goal: Treatments/Interventions/Procedures  Outcome: Progressing Towards Goal  Goal: Minimize risk of bleeding post-thrombolytic infusion  Outcome: Progressing Towards Goal  Goal: Monitor for complications post-thrombolytic infusion  Outcome: Progressing Towards Goal  Goal: Psychosocial  Outcome: Progressing Towards Goal  Goal: *Hemodynamically stable  Outcome: Progressing Towards Goal  Goal: *Neurologically stable  Description: Absence of additional neurological deficits    Outcome: Progressing Towards Goal  Goal: *Verbalizes anxiety and depression are reduced or absent  Outcome: Progressing Towards Goal  Goal: *Absence of Signs of Aspiration on Current Diet  Outcome: Progressing Towards Goal  Goal: *Absence of deep venous thrombosis signs and symptoms(Stroke Metric)  Outcome: Progressing Towards Goal  Goal: *Ability to perform ADLs and demonstrates progressive mobility and function  Outcome: Progressing Towards Goal  Goal: *Stroke education started(Stroke Metric)  Outcome: Progressing Towards Goal  Goal: *Dysphagia screen performed(Stroke Metric)  Outcome: Progressing Towards Goal  Goal: *Rehab consulted(Stroke Metric)  Outcome: Progressing Towards Goal

## 2023-03-06 NOTE — PROGRESS NOTES
Transition of Care Plan:     RUR:  13%   SUSAN: Stable. Disposition: [facility]- [facility]: Auth  Pending. (SEE BELOW) PLEASE SEE BELOW FOR DETAILS 3-6-23 MD CALLED P2P  If SNF or IPR: Date FOC offered: 3-1  Date FOC received: 3-1 1223 PM   Date authorization started with reference number: see below  ref number [id number] 3-2-23  Date authorization received and expires:  Accepting facility:  [facility]      Follow up appointments: TO BE MADE  DME needed: TBD   Transportation at Discharge: [address] or means to access home: N/A        IM Medicare Letter: NEEDED  Is patient a  and connected with the [employer] A2[employer] ? NO  If yes, was [employer] transfer form completed and VA notified? Caregiver Contact: Spouse: Katty Romero: [phone]  Discharge Caregiver contacted prior to discharge? Will do prior to DC. Care Conference needed?:  NO    301 pm I received a call from Jese Fournier at [facility] ) they have denied IPR. I informed spouse Marly Metzger and she will be calling to file a fast track appeal at [phone] Per Carmen Avelar family has the right to file an appeal.  Updated care team as well. [phone] Faxed updated PT/OT notes to insurance. I spoke with [facility] [place]. Fax number [phone]. She states P2P was requested by MD this am and she is aware I am faxing the updated PT/OT as requested. 901 am  I spoke with Dr Michaelle Wells-  he called insurance. They are requesting updated PT/OT. I called this am and spoke with Flor Cortez in PT department. Care manager to follow. Covid PCR was negative 3/1.    ---------------------------------------------------------------------      Per [employer]-  P2P requested. Evert requesting P2p to be done by Monday 3/6 by Noon number is [phone] option 5 thank you     Will relay to MD this am.     Care manager to follow.      Kristen Norwood RN   6-1-11   835 am

## 2023-03-06 NOTE — ROUTINE PROCESS
End of Shift Note     Bedside shift change report given to Yasemin RN (oncoming nurse) by Olga Loyd RN (offgoing nurse). Report included the following information SBAR, Kardex, and MAR     Shift worked:  nights   Shift summary and any significant changes:      Pt slept between care q 2 hour turns       Concerns for physician to address:  none   Zone phone for oncoming shift:   [phone]      Patient [address]  74 y.o.  2/28/2023  3:19 AM by Kevin Strauss DO.  Chayito Zamora was admitted from Home     Problem List       Patient Active Problem List     Diagnosis Date Noted    CVA (cerebral vascular accident) ([address] 02/28/2023    Thrombotic stroke involving right middle cerebral artery ([address] 02/28/2023    Bilateral carotid artery stenosis 02/28/2023    Stage 3a chronic kidney disease ([address] 10/28/2022    Malignant neoplasm of lower lobe, left bronchus or lung ([address] 04/22/2022    Left arm weakness 03/06/2021    Bilateral leg weakness 03/06/2021    Carcinoid tumor 02/13/2018    CKD stage 2 due to type 2 diabetes mellitus ([address] 05/14/2017    Polyneuropathy in diabetes(357.2) 07/21/2015    Hypertensive emergency 07/20/2015    Seizure disorder ([address] 07/20/2015    Type II or unspecified type diabetes mellitus with neurological manifestations, uncontrolled(250.62) ([address] 07/20/2015    History of atrial fibrillation 07/20/2015    Hyperlipidemia 07/20/2015    Diabetic neuropathy ([address] 11/21/2014    Seizures ([address] 08/08/2014    Syncope 07/13/2012    Cardiomyopathy, nonischemic ([address] 10/31/2009    HTN (hypertension), benign 10/26/2009    Pulmonary nodule 10/26/2009           Past Medical History:   Diagnosis Date    Atrial fibrillation ([address]      Cancer ([address] 12/2015     left lung; 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    Congestive heart failure, unspecified      Dissection of right carotid artery ([address] 09/2018    Hypertension      Overweight(278.02)      Seizures ([address]      SOLITARY PULMONARY NODULE 1.6 cm    Stroke [facility]) 2015     right thalamus         Core Measures:  CVA: Yes Yes  CHF:Yes Not applicable  PNA:No No     Activity:  Activity Level: Bed Rest  Number times ambulated in hallways past shift: 0  Number of times OOB to chair past shift: 0     Cardiac:   Cardiac Monitoring: No      Cardiac Rhythm: Atrial Fib     Access:   Current line(s): PIV   Central Line? No   PICC LINE? No      Genitourinary:   Urinary status: external catheter  Urinary Catheter? No      Respiratory:   O2 Device: None (Room air)  Chronic home O2 use?: NO  Incentive spirometer at bedside: NO     GI:  Last Bowel Movement Date: 03/01/23  Current diet:  ADULT DIET Regular; 4 carb choices (60 gm/meal)  Passing flatus: NO  Tolerating current diet: YES     Pain Management:   Patient states pain is manageable on current regimen: YES     Skin:  Don Score: 15  Interventions: float heels, foam dressing, PT/OT consult, and limit briefs    Patient Safety:  Fall Score:  Total Score: 3  Interventions: bed/chair alarm and gripper socks  High Fall Risk: Yes  @Rollbelt  @dexterity to release roll belt  Yes/No ( must document dexterity  here by stating Yes or No here, otherwise this is a restraint and must follow restraint documentation policy.)     DVT prophylaxis:  DVT prophylaxis Med- Yes  DVT prophylaxis SCD or HAZEL- No      Wounds: (If Applicable)  Wounds- No  Location      Active Consults:  IP CONSULT TO NEUROLOGY     Length of Stay:  Expected LOS: 2d 21h  Actual LOS: 4  Discharge Plan: Yes auth pending        Darryle Michaelis, RN

## 2023-03-07 LAB
ANION GAP SERPL CALC-SCNC: 6 MMOL/L (ref 5–15)
BUN SERPL-MCNC: 46 MG/DL (ref 6–20)
BUN/CREAT SERPL: 28 (ref 12–20)
CALCIUM SERPL-MCNC: 8.1 MG/DL (ref 8.5–10.1)
CHLORIDE SERPL-SCNC: 104 MMOL/L (ref 97–108)
CO2 SERPL-SCNC: 28 MMOL/L (ref 21–32)
CREAT SERPL-MCNC: 1.66 MG/DL (ref 0.7–1.3)
GLUCOSE BLD STRIP.AUTO-MCNC: 164 MG/DL (ref 65–117)
GLUCOSE BLD STRIP.AUTO-MCNC: 220 MG/DL (ref 65–117)
GLUCOSE BLD STRIP.AUTO-MCNC: 262 MG/DL (ref 65–117)
GLUCOSE BLD STRIP.AUTO-MCNC: 325 MG/DL (ref 65–117)
GLUCOSE SERPL-MCNC: 177 MG/DL (ref 65–100)
POTASSIUM SERPL-SCNC: 3.6 MMOL/L (ref 3.5–5.1)
SERVICE CMNT-IMP: ABNORMAL
SODIUM SERPL-SCNC: 138 MMOL/L (ref 136–145)

## 2023-03-07 PROCEDURE — 97112 NEUROMUSCULAR REEDUCATION: CPT

## 2023-03-07 PROCEDURE — 74011636637 HC RX REV CODE- 636/637: Performed by: STUDENT IN AN ORGANIZED HEALTH CARE EDUCATION/TRAINING PROGRAM

## 2023-03-07 PROCEDURE — 36415 COLL VENOUS BLD VENIPUNCTURE: CPT

## 2023-03-07 PROCEDURE — 74011250637 HC RX REV CODE- 250/637: Performed by: INTERNAL MEDICINE

## 2023-03-07 PROCEDURE — 74011250637 HC RX REV CODE- 250/637: Performed by: STUDENT IN AN ORGANIZED HEALTH CARE EDUCATION/TRAINING PROGRAM

## 2023-03-07 PROCEDURE — 82962 GLUCOSE BLOOD TEST: CPT

## 2023-03-07 PROCEDURE — 74011250636 HC RX REV CODE- 250/636: Performed by: STUDENT IN AN ORGANIZED HEALTH CARE EDUCATION/TRAINING PROGRAM

## 2023-03-07 PROCEDURE — 80048 BASIC METABOLIC PNL TOTAL CA: CPT

## 2023-03-07 PROCEDURE — 74011000250 HC RX REV CODE- 250: Performed by: STUDENT IN AN ORGANIZED HEALTH CARE EDUCATION/TRAINING PROGRAM

## 2023-03-07 PROCEDURE — 65270000046 HC RM TELEMETRY

## 2023-03-07 PROCEDURE — 74011250637 HC RX REV CODE- 250/637: Performed by: PSYCHIATRY & NEUROLOGY

## 2023-03-07 PROCEDURE — 97530 THERAPEUTIC ACTIVITIES: CPT

## 2023-03-07 RX ORDER — SODIUM CHLORIDE 450 MG/100ML
75 INJECTION, SOLUTION INTRAVENOUS CONTINUOUS
Status: DISCONTINUED | OUTPATIENT
Start: 2023-03-07 | End: 2023-03-07

## 2023-03-07 RX ORDER — SODIUM CHLORIDE 9 MG/ML
75 INJECTION, SOLUTION INTRAVENOUS CONTINUOUS
Status: DISPENSED | OUTPATIENT
Start: 2023-03-07 | End: 2023-03-08

## 2023-03-07 RX ORDER — POLYETHYLENE GLYCOL 3350 17 G/17G
17 POWDER, FOR SOLUTION ORAL 2 TIMES DAILY
Status: DISCONTINUED | OUTPATIENT
Start: 2023-03-07 | End: 2023-03-09 | Stop reason: HOSPADM

## 2023-03-07 RX ORDER — DOCUSATE SODIUM 100 MG/1
100 CAPSULE, LIQUID FILLED ORAL 2 TIMES DAILY
Status: DISCONTINUED | OUTPATIENT
Start: 2023-03-07 | End: 2023-03-09 | Stop reason: HOSPADM

## 2023-03-07 RX ADMIN — SODIUM CHLORIDE, PRESERVATIVE FREE 10 ML: 5 INJECTION INTRAVENOUS at 17:14

## 2023-03-07 RX ADMIN — HYDRALAZINE HYDROCHLORIDE 100 MG: 50 TABLET, FILM COATED ORAL at 10:42

## 2023-03-07 RX ADMIN — LISINOPRIL 40 MG: 20 TABLET ORAL at 09:19

## 2023-03-07 RX ADMIN — SODIUM CHLORIDE, PRESERVATIVE FREE 10 ML: 5 INJECTION INTRAVENOUS at 22:00

## 2023-03-07 RX ADMIN — DOCUSATE SODIUM 100 MG: 100 CAPSULE, LIQUID FILLED ORAL at 22:16

## 2023-03-07 RX ADMIN — CARVEDILOL 12.5 MG: 12.5 TABLET, FILM COATED ORAL at 09:19

## 2023-03-07 RX ADMIN — LEVETIRACETAM 1000 MG: 500 TABLET, FILM COATED ORAL at 17:13

## 2023-03-07 RX ADMIN — SODIUM CHLORIDE, PRESERVATIVE FREE 10 ML: 5 INJECTION INTRAVENOUS at 00:22

## 2023-03-07 RX ADMIN — ASPIRIN 325 MG: 325 TABLET, COATED ORAL at 09:19

## 2023-03-07 RX ADMIN — Medication 7 UNITS: at 11:56

## 2023-03-07 RX ADMIN — ATORVASTATIN CALCIUM 80 MG: 40 TABLET, FILM COATED ORAL at 00:18

## 2023-03-07 RX ADMIN — ATORVASTATIN CALCIUM 80 MG: 40 TABLET, FILM COATED ORAL at 22:16

## 2023-03-07 RX ADMIN — LEVETIRACETAM 1000 MG: 500 TABLET, FILM COATED ORAL at 09:19

## 2023-03-07 RX ADMIN — AMLODIPINE BESYLATE 10 MG: 5 TABLET ORAL at 09:19

## 2023-03-07 RX ADMIN — APIXABAN 5 MG: 5 TABLET, FILM COATED ORAL at 09:18

## 2023-03-07 RX ADMIN — HYDRALAZINE HYDROCHLORIDE 100 MG: 50 TABLET, FILM COATED ORAL at 22:23

## 2023-03-07 RX ADMIN — HYDROCHLOROTHIAZIDE 12.5 MG: 25 TABLET ORAL at 09:19

## 2023-03-07 RX ADMIN — HYDRALAZINE HYDROCHLORIDE 100 MG: 50 TABLET, FILM COATED ORAL at 00:18

## 2023-03-07 RX ADMIN — Medication 3 UNITS: at 22:20

## 2023-03-07 RX ADMIN — SODIUM CHLORIDE 75 ML/HR: 9 INJECTION, SOLUTION INTRAVENOUS at 10:42

## 2023-03-07 RX ADMIN — HYDRALAZINE HYDROCHLORIDE 100 MG: 50 TABLET, FILM COATED ORAL at 17:13

## 2023-03-07 RX ADMIN — POLYETHYLENE GLYCOL 3350 17 G: 17 POWDER, FOR SOLUTION ORAL at 22:16

## 2023-03-07 RX ADMIN — APIXABAN 5 MG: 5 TABLET, FILM COATED ORAL at 17:13

## 2023-03-07 RX ADMIN — CARVEDILOL 12.5 MG: 12.5 TABLET, FILM COATED ORAL at 17:13

## 2023-03-07 RX ADMIN — POLYETHYLENE GLYCOL 3350 17 G: 17 POWDER, FOR SOLUTION ORAL at 13:18

## 2023-03-07 RX ADMIN — Medication 3 UNITS: at 17:13

## 2023-03-07 RX ADMIN — Medication 2 UNITS: at 09:20

## 2023-03-07 NOTE — PROGRESS NOTES
Comprehensive Nutrition Assessment    Type and Reason for Visit: Initial, RD nutrition re-screen/LOS    Nutrition Recommendations/Plan:   Continue current diet      Nutrition Assessment:    Patient medically noted for CVA. PMH HTN, CM, DM, seizures, and CKD. Chart reviewed for length of stay. Patient working with therapy at time of attempted visit. Receiving a consistent carb diet. No significant weight changes noted on chart review. Stable for d/c pending placement/authorization. Patient Vitals for the past 168 hrs:   % Diet Eaten   03/04/23 2007 76 - 100%     Nutrition Related Findings:    [phone]   1+ edema BLE   Norvasc, Atorvastatin, Coreg, Hydralazine, HCTZ, Humalog, keppra, Lisinopril   Wound Type: None    Current Nutrition Intake & Therapies:        ADULT DIET Regular; 4 carb choices (60 gm/meal); please bring prune juice with meal if available  DIET ONE TIME MESSAGE    Anthropometric Measures:  Height: 6' 0.99\" (185.4 cm)  Ideal Body Weight (IBW): 184 lbs (84 kg)     Current Body Wt:  104.6 kg (230 lb 9.6 oz), 125.3 % IBW. Current BMI (kg/m2): 30.4                          BMI Category: Obese class 1 (BMI 30.0-34. 9)    Estimated Daily Nutrient Needs:  Energy Requirements Based On: Formula  Weight Used for Energy Requirements: Current  Energy (kcal/day): 2151 kcals (BMR x 1. 3AF -300kcals)  Weight Used for Protein Requirements: Current  Protein (g/day): 84g (0.8 g/kg bw)  Method Used for Fluid Requirements: 1 ml/kcal  Fluid (ml/day): 2150 mL    Nutrition Diagnosis:   No nutrition diagnosis at this time     Nutrition Interventions:   Food and/or Nutrient Delivery: Continue current diet  Nutrition Education/Counseling: No recommendations at this time  Coordination of Nutrition Care: Continue to monitor while inpatient       Goals:     Goals: PO intake 75% or greater, by next RD assessment       Nutrition Monitoring and Evaluation:   Behavioral-Environmental Outcomes: None identified  Food/Nutrient Intake Outcomes: Food and nutrient intake  Physical Signs/Symptoms Outcomes: Biochemical data, Weight    Discharge Planning:    Continue current diet    Negro Vasquez RD  Contact: ext [phone]

## 2023-03-07 NOTE — PROGRESS NOTES
Problem: Mobility Impaired (Adult and Pediatric)  Goal: *Acute Goals and Plan of Care (Insert Text)  Description:   FUNCTIONAL STATUS PRIOR TO ADMISSION: Patient required assistance for ADLs from wife, was ambulatory with can for household distances, had residual left weakness from previous stroke. HOME SUPPORT PRIOR TO ADMISSION: The patient lived with spouse and required assistance for dressing and bathing. Physical Therapy Goals  Initiated 3/1/2023  1. Patient will move from supine to sit and sit to supine  in bed with moderate assistance  within 7 day(s). 2.  Patient will transfer from bed to chair and chair to bed with moderate assistance  using the least restrictive device within 7 day(s). 3.  Patient will perform sit to stand with moderate assistance  within 7 day(s). 4.  Patient will ambulate with moderate assistance  for 5 feet with the least restrictive device within 7 day(s). 5.  Patient will increase Stafford balance score by 5 points within 7 days. Outcome: Progressing Towards Goal   PHYSICAL THERAPY TREATMENT  Patient: Ragini Bashir (80 y.o. male)  Date: 3/7/2023  Diagnosis: CVA (cerebral vascular accident) ([facility] [I63.9] <principal problem not specified>      Precautions: Bed Alarm  Chart, physical therapy assessment, plan of care and goals were reviewed. ASSESSMENT  Patient continues with skilled PT services and is slowly progressing towards goals. Patient received in bed and agreeable to participate, wife also present in room and reports she has filed an appeal for patient to go to inpatient rehab. Patient continues to present with dense left hemiplegia and requires max assist x 2 to transition to EOB, then constant support due to multidirectional LOB. Good tolerance of activity and able to work on weight shifting, core activation, postural control, head turns and trunk rotations.    Patient does not demonstrate enough trunk stability to safely use Style[employer],  and requires inpatient rehab facility to progress OOB activity. Patient presents with significant neurological impairments from CVA, can tolerate three hours of therapy, has good family support and is appropriate for acute rehab. Left in supine with call bell in reach. Current Level of Function Impacting Discharge (mobility/balance): max assist x 2 to EOB    Other factors to consider for discharge: below baseline, high falls risk, dense left hemiplegia         PLAN :  Patient continues to benefit from skilled intervention to address the above impairments. Continue treatment per established plan of care. to address goals. Recommendation for discharge: (in order for the patient to meet his/her long term goals)  Therapy 3 hours per day 5-7 days per week    This discharge recommendation:  Has been made in collaboration with the attending provider and/or case management    IF patient discharges home will need the following DME: patient owns DME required for discharge       SUBJECTIVE:   Patient stated that leg is sore.     OBJECTIVE DATA SUMMARY:   Critical Behavior:  Neurologic State: Alert  Orientation Level: Oriented to person, Oriented to place, Oriented to situation, Disoriented to time  Cognition: Follows commands  Safety/Judgement: Decreased awareness of need for assistance, Decreased awareness of need for safety, Decreased insight into deficits  Functional Mobility Training:  Bed Mobility:     Supine to Sit: Maximum assistance;Assist x2; Additional time; Adaptive equipment  Sit to Supine: Maximum assistance;Assist x2; Additional time; Adaptive equipment  Scooting: Total assistance;Assist x2        Transfers:                                   Balance:  Sitting: Impaired  Sitting - Static: Poor (constant support)  Sitting - Dynamic: Poor (constant support)  Ambulation/Gait Training:                                                        Stairs:               Therapeutic Exercises:   KIARRA right LE  Pain Rating:      Activity Tolerance:   Good    After treatment patient left in no apparent distress:   Supine in bed, Call bell within reach, Bed / chair alarm activated, and Side rails x 3    COMMUNICATION/COLLABORATION:   The patients plan of care was discussed with: Occupational therapist and Registered nurse.      Ivan Denny, PT   Time Calculation: 34 mins

## 2023-03-07 NOTE — PROGRESS NOTES
THIS IS MY NOTE FROM 3-6-2023. THAT NOTE APPEARS TO HAVE CONTENT FROM A NSG NOTE. I COPIED AND PASTED TO CORRECT. Transition of Care Plan:     RUR:  13%   SUSAN: Stable. TBD 3/6? DC order is in. Disposition: [facility]PR- [facility]:   Auth  Pending. (SEE BELOW)  2:24 PM   CM sent another message via [employer] to see if there is any auth updates. Anticipating that Pt will be here through the weekend? 8:40 AM   CM completed chart review and per chart it indicated that authorization is pending. CM sent a message to [facility] via [employer] to see if it is still pending. If SNF or IPR: Date FOC offered: 3-1  Date FOC received: 3-1 1223 PM   Date authorization started with reference number: see below  ref number [id number] 3-2-23  Date authorization received and expires:  Accepting facility:  [facility]      Follow up appointments: TO BE MADE  DME needed: TBD   Transportation at Discharge: [address] [facility] or means to access home: N/A         Medicare Letter: NEEDED  Is patient a  and connected with the [place]? NO  If yes, was [employer] transfer form completed and VA notified? Caregiver Contact: Spouse: Candelaria Gilford: [phone]  Discharge Caregiver contacted prior to discharge? Will do prior to DC. Care Conference needed?:  NO       301 pm I received a call from Jeffy Fulton at [facility] ) they have denied IPR. I informed spouse Elaine Juarez and she will be calling to file a fast track appeal at [phone] Per Cheryal Case family has the right to file an appeal.  Updated care team as well. [phone] Faxed updated PT/OT notes to insurance. I spoke with [facility]. Fax number [phone]. She states P2P was requested by MD this am and she is aware I am faxing the updated PT/OT as requested. 901 am  I spoke with Dr John Justice-  he called insurance. They are requesting updated PT/OT. I called this am and spoke with Susan Andrea in PT department. Care manager to follow.   Covid PCR was negative 3/1.     ---------------------------------------------------------------------        Per [employer]-  P2P requested. [facility] requesting P2p to be done by Monday 3/6 by Noon number is [phone] option 5 thank you      Will relay to MD this am.      Care manager to follow. Kristen Norwood RN   3-6-23   767 am           THIS IS MY NOTE FROM 3-6-2023. THAT NOTE APPEARS TO HAVE CONTENT FROM A NSG NOTE.

## 2023-03-07 NOTE — PROGRESS NOTES
Transition of Care Plan: NOTE FOR 3-7-2023      RUR:  13%   SUSAN: Stable. DC order is in. Disposition: [facility]PR- [facility]:   Appeal Pending. (SEE BELOW)   If SNF or IPR: Date FOC offered: 3-1  Date FOC received: 3-1 1223 PM   Date authorization started with reference IPR denied, P2P denied, family filed appeal number: see below  ref number [id number] 3-2-23 ( new number given,  see below )  Date authorization received and expires: IPR denied 3-6 post P2P  Accepting facility:  [facility]    Follow up appointments: TO BE MADE  DME needed: TBD   Transportation at Discharge: [address] [place] [address] or means to access home: N/A        IM Medicare Letter: NEEDED  Is patient a [place] and connected with the [place]? NO  If yes, was [employer] transfer form completed and VA notified? Caregiver Contact: Spouse: Demetria Risk: [phone]  Discharge Caregiver contacted prior to discharge? Will do prior to DC. Care Conference needed?:  NO      151 pm I attempted to call spouse for a back up plan if IPR appeal is denied. The voice mail box was full. Will follow up. 844 AM I spoke with Marisol at fast track number-  [phone]. Case number [id number] Ref [id number]. She confirms spouse called appeal on 3-6-2023.      838 am I spoke with spouse Pedro Hernandez this am-  she states she filed fast track appeal.  I am calling to confirm.       Sumaya Ocasio, RN  Care manager  840 am   3-7-23

## 2023-03-07 NOTE — PROGRESS NOTES
Problem: Self Care Deficits Care Plan (Adult)  Goal: *Acute Goals and Plan of Care (Insert Text)  Description: FUNCTIONAL STATUS PRIOR TO ADMISSION: Patient was modified independent using a single point cane for functional mobility. Patient required moderate assistance for distal LB dressing and bathing from supportive wife. HOME SUPPORT: The patient lived with wife and required moderate assistance  for LB distal dressing and bathing. Occupational Therapy Goals  Initiated 3/1/2023   1. Patient will perform grooming sitting EOB with moderate assistance  within 7 day(s). 2.  Patient will perform upper body dressing with moderate assistance  within 7 day(s). 3.  Patient will perform toilet transfers with maximal assistance within 7 day(s). 4.  Patient will perform all aspects of toileting with minimal assistance/contact guard assist within 7 day(s). 5.  Patient will participate in upper extremity therapeutic exercise/activities with moderate assistance  within 7 day(s). 6.  Patient will utilize energy conservation techniques during functional activities with verbal and visual cues within 7 day(s). 7.  Patient will improve their Fugl Rhoades score by 5 points in prep for ADLs within 7 days. Outcome: Progressing Towards Goal   OCCUPATIONAL THERAPY TREATMENT  Patient: Chika Champion (57 y.o. male)  Date: 3/7/2023  Diagnosis: CVA (cerebral vascular accident) ([facility] [I63.9] <principal problem not specified>      Precautions: Bed Alarm  Chart, occupational therapy assessment, plan of care, and goals were reviewed. ASSESSMENT  Patient continues with skilled OT services and is progressing slowly towards goals. Unfortunately patient's progress is impacted in this setting by environmental constraints, limited seating options, and need for more specialized equipment to safely assist patient in mobilizing OOB. Patient has a dense and strong body, tall stature, which can be challenging to manage.  Patient's progress will continue to be limited without options for specialty equipment and ongoing therapy services found in rehab setting. Recommendation is for intensive, multi disciplinary approach in intensive inpatient rehab facility at discharge     Current Level of Function Impacting Discharge (ADLs): set up/supervision for self feeding, min assist for grooming, max to total assist for bathing, dressing, toileting     Other factors to consider for discharge:          PLAN :  Patient continues to benefit from skilled intervention to address the above impairments. Continue treatment per established plan of care to address goals. Recommend with staff: encourage increasing awareness of LUE--positioning, sensory input, cue pt to reposition LUE using his RUE     Recommend next OT session: 2 person assist for EOB activity/sitting balance training. Andre Overall to chair for chair based ADL (may improve in supported sitting)        Recommendation for discharge: (in order for the patient to meet his/her long term goals)  Therapy 3 hours per day 5-7 days per week    This discharge recommendation:  Has been made in collaboration with the attending provider and/or case management    IF patient discharges home will need the following DME: hospital bed, mechanical lift, and wheelchair- specialty        SUBJECTIVE:   Patient pleasant and cooperative     OBJECTIVE DATA SUMMARY:   Cognitive/Behavioral Status:  Neurologic State: Alert  Orientation Level: Oriented to person;Oriented to place;Oriented to situation;Disoriented to time  Cognition: Follows commands             Functional Mobility and Transfers for ADLs:  Bed Mobility:  Supine to Sit: Maximum assistance;Assist x2; Additional time; Adaptive equipment  Sit to Supine: Maximum assistance;Assist x2; Additional time; Adaptive equipment  Scooting:  Total assistance;Assist x2    Transfers:             Balance:  Sitting: Impaired  Sitting - Static: Poor (constant support)  Sitting - Dynamic: Poor (constant support)  Dynamic sitting balance activities completed with 2 person assistance, facilitation of postural support and engagement of appropriate muscles through manual handling techniques. Facilitation of proprioceptive input of LUE with manual assist. Difficulty maintaining positioning of LUE on sheet due to slickness    ADL Intervention:               Therapeutic Exercises:   LUE PROM - full ROM in all joint, no pain indicated     Pain:  RLE pain in hip, knee with movement     Activity Tolerance:   requires rest breaks    After treatment patient left in no apparent distress:   Supine in bed, Heels elevated for pressure relief, Call bell within reach, Bed / chair alarm activated, and Side rails x 3    COMMUNICATION/COLLABORATION:   The patients plan of care was discussed with: Physical therapist, Registered nurse, and Case management.      Randy Kinsey OT  Time Calculation: 31 mins

## 2023-03-07 NOTE — PROGRESS NOTES
Hospitalist Progress Note    NAME: Kaushik Ghotra   :  1955   MRN:  [id number]       Assessment / Plan:    Acute CVA POA  Causing Dense L Hemiplegia POA  CT head with no acute process. CTA technically sub-optimal. No large vessel occlusion. Mild artherosclerosis. Carotid duplex-done, no significant stenosis  Continue telemetry  MRI showing acute infarct posterior corona radiata R side  Echo showed 40 to 45% EF, no intracardiac shunt     Appreciate neurology recs  PT recommends acute rehab- IPR denied after P2P discussion/review with Insurance physician- recommended SNF level of rehab due to poor Out of bed participation--awaiting placement to SNF now  Speech therapy regular diet  Patient was already on Eliquis 5 mg twice daily at home  We will follow-up on neurology recommendation regarding anticoagulation  Currently on Eliquis 5 mg twice daily and aspirin started 325 mg daily  Continue statin  A1c= 7.8  Echo 40-45% EF noted     Acute kidney injury likely due to prerenal azotemia:  Started the patient on IVF    HTN  Afib  H/o dissection of right carotid artery  Eliquis secondary hypercoagulable state due to A-fib  Resumed on home medications amlodipine, hydralazine, lisinopril, Coreg  Continue eliquis and lipitor  Resumed on home medications     DM  Home metformin and glipizide held. SSI with POC glucose checks     Seizure disorder  Continue PTA keppra 1000 mg po bid     Hypokalemia  Replete as needed    30.0 - 39.9 Obese / Body mass index is 30.44 kg/m². Estimated discharge date: ? Barriers: Medically stable, pending rehab placement     Code status: Full  Prophylaxis: Lovenox  Recommended Disposition:  rehab - SNF now as IPR denied by insurance     Subjective:     Patient comfortably lying in the bed. No acute events overnight. No new complaints at this time. Patient pending placement. Started the patient on IV fluids for 24 hours for acute kidney injury.     Objective:     VITALS: Last 24hrs VS reviewed since prior progress note. Most recent are:  Patient Vitals for the past 24 hrs:   Temp Pulse Resp BP SpO2   03/07/23 1522 98.4 °F (36.9 °C) 66 18 (!) 139/96 100 %   03/07/23 1137 98.1 °F (36.7 °C) 64 16 (!) 125/93 96 %   03/07/23 1046 -- 70 -- (!) 123/92 --   03/07/23 0841 98.2 °F (36.8 °C) 69 16 138/74 99 %   03/07/23 0450 98.8 °F (37.1 °C) 74 18 (!) 136/94 96 %   03/06/23 2259 98.8 °F (37.1 °C) 74 20 (!) 148/91 96 %       Intake/Output Summary (Last 24 hours) at 3/7/2023 1525  Last data filed at 3/7/2023 0450  Gross per 24 hour   Intake --   Output 500 ml   Net -500 ml        I had a face to face encounter and independently examined this patient on 3/7/2023, as outlined below:  PHYSICAL EXAM:  General:          s, no acute distress  EENT:              EOMI. Anicteric sclerae. MMM  Resp:               CTA bilaterally, no wheezing or rales. No accessory muscle use  CV:                  Regular  rhythm,  No edema  GI:                   Soft, Non distended, Non tender. +Bowel sounds  Neurologic:       Left flaccid paralysis +, moving right upper and lower extremities  Psych:   Not anxious nor agitated  Skin:                No rashes. No jaundice    Reviewed most current lab test results and cultures  YES  Reviewed most current radiology test results   YES  Review and summation of old records today    NO  Reviewed patient's current orders and MAR    YES  PMH/ reviewed - no change compared to H&P  ________________________________________________________________________  Care Plan discussed with:    Comments   Patient Y    [address] [facility]     Consultant                       Y Multidiciplinary team rounds were held today with , nursing, pharmacist and clinical coordinator. Patient's plan of care was discussed; medications were reviewed and discharge planning was addressed.        Lei Quick MD     Procedures: see electronic medical records for all procedures/Xrays and details which were not copied into this note but were reviewed prior to creation of Plan. LABS:  I reviewed today's most current labs and imaging studies. Pertinent labs include:  No results for input(s): WBC, HGB, HCT, PLT, HGBEXT, HCTEXT, PLTEXT in the last 72 hours.   Recent Labs     03/07/23  0639      K 3.6      CO2 28   *   BUN 46*   CREA 1.66*   CA 8.1*       Signed: Vonzell Spurling, MD

## 2023-03-07 NOTE — PROGRESS NOTES
Problem: Pressure Injury - Risk of  Goal: *Prevention of pressure injury  Description: Document Don Scale and appropriate interventions in the flowsheet.   Outcome: Progressing Towards Goal     Problem: Patient Education: Go to Patient Education Activity  Goal: Patient/Family Education  Outcome: Progressing Towards Goal     Problem: Patient Education: Go to Patient Education Activity  Goal: Patient/Family Education  Outcome: Progressing Towards Goal     Problem: TIA/CVA Stroke: 0-24 hours  Goal: Off Pathway (Use only if patient is Off Pathway)  Outcome: Progressing Towards Goal  Goal: Activity/Safety  Outcome: Progressing Towards Goal  Goal: Consults, if ordered  Outcome: Progressing Towards Goal  Goal: Diagnostic Test/Procedures  Outcome: Progressing Towards Goal  Goal: Nutrition/Diet  Outcome: Progressing Towards Goal  Goal: Discharge Planning  Outcome: Progressing Towards Goal  Goal: Medications  Outcome: Progressing Towards Goal  Goal: Respiratory  Outcome: Progressing Towards Goal  Goal: Treatments/Interventions/Procedures  Outcome: Progressing Towards Goal  Goal: Minimize risk of bleeding post-thrombolytic infusion  Outcome: Progressing Towards Goal  Goal: Monitor for complications post-thrombolytic infusion  Outcome: Progressing Towards Goal  Goal: Psychosocial  Outcome: Progressing Towards Goal  Goal: *Hemodynamically stable  Outcome: Progressing Towards Goal  Goal: *Neurologically stable  Description: Absence of additional neurological deficits    Outcome: Progressing Towards Goal  Goal: *Verbalizes anxiety and depression are reduced or absent  Outcome: Progressing Towards Goal  Goal: *Absence of Signs of Aspiration on Current Diet  Outcome: Progressing Towards Goal  Goal: *Absence of deep venous thrombosis signs and symptoms(Stroke Metric)  Outcome: Progressing Towards Goal  Goal: *Ability to perform ADLs and demonstrates progressive mobility and function  Outcome: Progressing Towards Goal  Goal: *Stroke education started(Stroke Metric)  Outcome: Progressing Towards Goal  Goal: *Dysphagia screen performed(Stroke Metric)  Outcome: Progressing Towards Goal  Goal: *Rehab consulted(Stroke Metric)  Outcome: Progressing Towards Goal     Problem: TIA/CVA Stroke: Day 2 Until Discharge  Goal: Off Pathway (Use only if patient is Off Pathway)  Outcome: Progressing Towards Goal  Goal: Activity/Safety  Outcome: Progressing Towards Goal  Goal: Diagnostic Test/Procedures  Outcome: Progressing Towards Goal  Goal: Nutrition/Diet  Outcome: Progressing Towards Goal  Goal: Discharge Planning  Outcome: Progressing Towards Goal  Goal: Medications  Outcome: Progressing Towards Goal  Goal: Respiratory  Outcome: Progressing Towards Goal  Goal: Treatments/Interventions/Procedures  Outcome: Progressing Towards Goal  Goal: Psychosocial  Outcome: Progressing Towards Goal  Goal: *Verbalizes anxiety and depression are reduced or absent  Outcome: Progressing Towards Goal  Goal: *Absence of aspiration  Outcome: Progressing Towards Goal  Goal: *Absence of deep venous thrombosis signs and symptoms(Stroke Metric)  Outcome: Progressing Towards Goal  Goal: *Optimal pain control at patient's stated goal  Outcome: Progressing Towards Goal  Goal: *Tolerating diet  Outcome: Progressing Towards Goal  Goal: *Ability to perform ADLs and demonstrates progressive mobility and function  Outcome: Progressing Towards Goal  Goal: *Stroke education continued(Stroke Metric)  Outcome: Progressing Towards Goal     Problem: Ischemic Stroke: Discharge Outcomes  Goal: *Verbalizes anxiety and depression are reduced or absent  Outcome: Progressing Towards Goal  Goal: *Verbalize understanding of risk factor modification(Stroke Metric)  Outcome: Progressing Towards Goal  Goal: *Hemodynamically stable  Outcome: Progressing Towards Goal  Goal: *Absence of aspiration pneumonia  Outcome: Progressing Towards Goal  Goal: *Aware of needed dietary changes  Outcome: Progressing Towards Goal  Goal: *Verbalize understanding of prescribed medications including anti-coagulants, anti-lipid, and/or anti-platelets(Stroke Metric)  Outcome: Progressing Towards Goal  Goal: *Tolerating diet  Outcome: Progressing Towards Goal  Goal: *Aware of follow-up diagnostics related to anticoagulants  Outcome: Progressing Towards Goal  Goal: *Ability to perform ADLs and demonstrates progressive mobility and function  Outcome: Progressing Towards Goal  Goal: *Absence of DVT(Stroke Metric)  Outcome: Progressing Towards Goal  Goal: *Absence of aspiration  Outcome: Progressing Towards Goal  Goal: *Optimal pain control at patient's stated goal  Outcome: Progressing Towards Goal  Goal: *Home safety concerns addressed  Outcome: Progressing Towards Goal  Goal: *Describes available resources and support systems  Outcome: Progressing Towards Goal  Goal: *Verbalizes understanding of activation of EMS(911) for stroke symptoms(Stroke Metric)  Outcome: Progressing Towards Goal  Goal: *Understands and describes signs and symptoms to report to providers(Stroke Metric)  Outcome: Progressing Towards Goal  Goal: *Neurolgocially stable (absence of additional neurological deficits)  Outcome: Progressing Towards Goal  Goal: *Verbalizes importance of follow-up with primary care physician(Stroke Metric)  Outcome: Progressing Towards Goal  Goal: *Smoking cessation discussed,if applicable(Stroke Metric)  Outcome: Progressing Towards Goal  Goal: *Depression screening completed(Stroke Metric)  Outcome: Progressing Towards Goal     Problem: Falls - Risk of  Goal: *Absence of Falls  Description: Document Yenifer Fall Risk and appropriate interventions in the flowsheet.   Outcome: Progressing Towards Goal     Problem: Patient Education: Go to Patient Education Activity  Goal: Patient/Family Education  Outcome: Progressing Towards Goal     Problem: Patient Education: Go to Patient Education Activity  Goal: Patient/Family Education  Outcome: Progressing Towards Goal     Problem: Patient Education: Go to Patient Education Activity  Goal: Patient/Family Education  Outcome: Progressing Towards Goal     Problem: Diabetes Self-Management  Goal: *Disease process and treatment process  Description: Define diabetes and identify own type of diabetes; list 3 options for treating diabetes. Outcome: Progressing Towards Goal  Goal: *Incorporating nutritional management into lifestyle  Description: Describe effect of type, amount and timing of food on blood glucose; list 3 methods for planning meals. Outcome: Progressing Towards Goal  Goal: *Incorporating physical activity into lifestyle  Description: State effect of exercise on blood glucose levels. Outcome: Progressing Towards Goal  Goal: *Developing strategies to promote health/change behavior  Description: Define the ABC's of diabetes; identify appropriate screenings, schedule and personal plan for screenings. Outcome: Progressing Towards Goal  Goal: *Using medications safely  Description: State effect of diabetes medications on diabetes; name diabetes medication taking, action and side effects. Outcome: Progressing Towards Goal  Goal: *Monitoring blood glucose, interpreting and using results  Description: Identify recommended blood glucose targets  and personal targets. Outcome: Progressing Towards Goal  Goal: *Prevention, detection, treatment of acute complications  Description: List symptoms of hyper- and hypoglycemia; describe how to treat low blood sugar and actions for lowering  high blood glucose level. Outcome: Progressing Towards Goal  Goal: *Prevention, detection and treatment of chronic complications  Description: Define the natural course of diabetes and describe the relationship of blood glucose levels to long term complications of diabetes.   Outcome: Progressing Towards Goal  Goal: *Developing strategies to address psychosocial issues  Description: Describe feelings about living with diabetes; identify support needed and support network  Outcome: Progressing Towards Goal  Goal: *Insulin pump training  Outcome: Progressing Towards Goal  Goal: *Sick day guidelines  Outcome: Progressing Towards Goal  Goal: *Patient Specific Goal (EDIT GOAL, INSERT TEXT)  Outcome: Progressing Towards Goal     Problem: Patient Education: Go to Patient Education Activity  Goal: Patient/Family Education  Outcome: Progressing Towards Goal

## 2023-03-07 NOTE — PROGRESS NOTES
End of Shift Note     Bedside shift change report given to Shanika SINGH (oncoming nurse) by Swapna Ortiz  (offgoing nurse). Report included the following information SBAR, Kardex, and MAR     Shift worked:  7p-7a   Shift summary and any significant changes:     -Appeal for rehab auth by insurance   -Labs obtained  -Resting well overnight       Concerns for physician to address:  -No bowel movement since 3/1   Zone phone for oncoming shift:   [phone]      Patient [address]  74 y.o.  2/28/2023  3:19 AM by Figueroa Monroe DO.  Deann Dawkins was admitted from Home     Problem List          Patient Active Problem List     Diagnosis Date Noted    CVA (cerebral vascular accident) ([address] 02/28/2023    Thrombotic stroke involving right middle cerebral artery ([address] 02/28/2023    Bilateral carotid artery stenosis 02/28/2023    Stage 3a chronic kidney disease ([address] 10/28/2022    Malignant neoplasm of lower lobe, left bronchus or lung ([address] 04/22/2022    Left arm weakness 03/06/2021    Bilateral leg weakness 03/06/2021    Carcinoid tumor 02/13/2018    CKD stage 2 due to type 2 diabetes mellitus ([address] 05/14/2017    Polyneuropathy in diabetes(357.2) 07/21/2015    Hypertensive emergency 07/20/2015    Seizure disorder ([address] 07/20/2015    Type II or unspecified type diabetes mellitus with neurological manifestations, uncontrolled(250.62) ([address] 07/20/2015    History of atrial fibrillation 07/20/2015    Hyperlipidemia 07/20/2015    Diabetic neuropathy ([address] 11/21/2014    Seizures ([address] 08/08/2014    Syncope 07/13/2012    Cardiomyopathy, nonischemic ([address] 10/31/2009    HTN (hypertension), benign 10/26/2009    Pulmonary nodule 10/26/2009              Past Medical History:   Diagnosis Date    Atrial fibrillation ([address]      Cancer ([address] 12/2015     left lung; 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 No   PICC LINE? No      Genitourinary:   Urinary status: external catheter  Urinary Catheter? No      Respiratory:   O2 Device: None (Room air)  Chronic home O2 use?: NO  Incentive spirometer at bedside: NO     GI:  Last Bowel Movement Date: 03/01/23  Current diet:  ADULT DIET Regular; 4 carb choices (60 gm/meal)  Passing flatus: NO  Tolerating current diet: YES     Pain Management:   Patient states pain is manageable on current regimen: YES     Skin:  Don Score: 15  Interventions: float heels, foam dressing, PT/OT consult, limit briefs, and Q2 hourly turning    Patient Safety:  Fall Score:  Total Score: 3  Interventions: bed/chair alarm and gripper socks  High Fall Risk: Yes     DVT prophylaxis:  DVT prophylaxis Med- Yes  DVT prophylaxis SCD or HAZEL- No      Wounds: (If Applicable)  Wounds- No  Location      Active Consults:  IP CONSULT TO NEUROLOGY     Length of Stay:  Expected LOS: 2d 21h  Actual LOS: 7  Discharge Plan: Yes auth pending

## 2023-03-07 NOTE — PROGRESS NOTES
End of Shift Note     Bedside shift change report given to Mauricio Philip LPN (oncoming nurse) by Lesvia Reed RN  (offgoing nurse). Report included the following information SBAR, Kardex, and MAR     Shift worked:  days   Shift summary and any significant changes:     -Appeal for rehab auth by insurance   CM following for DC needs  Patient turned appropriately throughout shift   PRN miralax given,    Concerns for physician to address:       Zone phone for oncoming shift:   [phone]      Patient [address]  74 y.o.  2/28/2023  3:19 AM by Chidi Pond DO.  Julie Schaumann was admitted from Home     Problem List          Patient Active Problem List     Diagnosis Date Noted    CVA (cerebral vascular accident) ([address] 02/28/2023    Thrombotic stroke involving right middle cerebral artery ([address] 02/28/2023    Bilateral carotid artery stenosis 02/28/2023    Stage 3a chronic kidney disease ([address] 10/28/2022    Malignant neoplasm of lower lobe, left bronchus or lung ([address] 04/22/2022    Left arm weakness 03/06/2021    Bilateral leg weakness 03/06/2021    Carcinoid tumor 02/13/2018    CKD stage 2 due to type 2 diabetes mellitus ([address] 05/14/2017    Polyneuropathy in diabetes(357.2) 07/21/2015    Hypertensive emergency 07/20/2015    Seizure disorder ([address] 07/20/2015    Type II or unspecified type diabetes mellitus with neurological manifestations, uncontrolled(250.62) ([address] 07/20/2015    History of atrial fibrillation 07/20/2015    Hyperlipidemia 07/20/2015    Diabetic neuropathy ([address] 11/21/2014    Seizures ([address] 08/08/2014    Syncope 07/13/2012    Cardiomyopathy, nonischemic ([address] 10/31/2009    HTN (hypertension), benign 10/26/2009    Pulmonary nodule 10/26/2009              Past Medical History:   Diagnosis Date    Atrial fibrillation ([address]      Cancer ([address] 12/2015     left lung; 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    Congestive heart failure, unspecified      Dissection of right carotid artery ([address] 09/2018    Hypertension      Overweight(278.02)      Seizures ([address]      SOLITARY PULMONARY NODULE       1.6 cm    Stroke [facility]) 2015     right thalamus         Core Measures:  CVA: Yes Yes  CHF:Yes Not applicable  PNA:No No     Activity:  Activity Level: Bed Rest  Number times ambulated in hallways past shift: 0  Number of times OOB to chair past shift: 0     Cardiac:   Cardiac Monitoring: No      Cardiac Rhythm: Atrial Fib     Access:   Current line(s): PIV   Central Line? No   PICC LINE? No      Genitourinary:   Urinary status: external catheter  Urinary Catheter? No      Respiratory:   O2 Device: None (Room air)  Chronic home O2 use?: NO  Incentive spirometer at bedside: NO     GI:  Last Bowel Movement Date: 03/01/23  Current diet:  ADULT DIET Regular; 4 carb choices (60 gm/meal)  Passing flatus: NO  Tolerating current diet: YES     Pain Management:   Patient states pain is manageable on current regimen: YES     Skin:  Don Score: 15  Interventions: float heels, foam dressing, PT/OT consult, limit briefs, and Q2 hourly turning    Patient Safety:  Fall Score:  Total Score: 3  Interventions: bed/chair alarm and gripper socks  High Fall Risk: Yes     DVT prophylaxis:  DVT prophylaxis Med- Yes  DVT prophylaxis SCD or HAZEL- No      Wounds: (If Applicable)  Wounds- No  Location      Active Consults:  IP CONSULT TO NEUROLOGY     Length of Stay:  Expected LOS: 2d 21h  Actual LOS: 7  Discharge Plan: Yes auth pending

## 2023-03-08 LAB
ANION GAP SERPL CALC-SCNC: 3 MMOL/L (ref 5–15)
BUN SERPL-MCNC: 39 MG/DL (ref 6–20)
BUN/CREAT SERPL: 28 (ref 12–20)
CALCIUM SERPL-MCNC: 8.4 MG/DL (ref 8.5–10.1)
CHLORIDE SERPL-SCNC: 105 MMOL/L (ref 97–108)
CO2 SERPL-SCNC: 27 MMOL/L (ref 21–32)
CREAT SERPL-MCNC: 1.39 MG/DL (ref 0.7–1.3)
GLUCOSE BLD STRIP.AUTO-MCNC: 200 MG/DL (ref 65–117)
GLUCOSE BLD STRIP.AUTO-MCNC: 214 MG/DL (ref 65–117)
GLUCOSE BLD STRIP.AUTO-MCNC: 282 MG/DL (ref 65–117)
GLUCOSE BLD STRIP.AUTO-MCNC: 306 MG/DL (ref 65–117)
GLUCOSE SERPL-MCNC: 249 MG/DL (ref 65–100)
POTASSIUM SERPL-SCNC: 3.6 MMOL/L (ref 3.5–5.1)
SERVICE CMNT-IMP: ABNORMAL
SODIUM SERPL-SCNC: 135 MMOL/L (ref 136–145)

## 2023-03-08 PROCEDURE — 74011250637 HC RX REV CODE- 250/637: Performed by: INTERNAL MEDICINE

## 2023-03-08 PROCEDURE — 74011250637 HC RX REV CODE- 250/637: Performed by: STUDENT IN AN ORGANIZED HEALTH CARE EDUCATION/TRAINING PROGRAM

## 2023-03-08 PROCEDURE — 74011250637 HC RX REV CODE- 250/637: Performed by: PSYCHIATRY & NEUROLOGY

## 2023-03-08 PROCEDURE — 82962 GLUCOSE BLOOD TEST: CPT

## 2023-03-08 PROCEDURE — 36415 COLL VENOUS BLD VENIPUNCTURE: CPT

## 2023-03-08 PROCEDURE — 65270000046 HC RM TELEMETRY

## 2023-03-08 PROCEDURE — 80048 BASIC METABOLIC PNL TOTAL CA: CPT

## 2023-03-08 PROCEDURE — 74011636637 HC RX REV CODE- 636/637: Performed by: STUDENT IN AN ORGANIZED HEALTH CARE EDUCATION/TRAINING PROGRAM

## 2023-03-08 PROCEDURE — 74011000250 HC RX REV CODE- 250: Performed by: STUDENT IN AN ORGANIZED HEALTH CARE EDUCATION/TRAINING PROGRAM

## 2023-03-08 RX ADMIN — SODIUM CHLORIDE, PRESERVATIVE FREE 10 ML: 5 INJECTION INTRAVENOUS at 17:11

## 2023-03-08 RX ADMIN — Medication 3 UNITS: at 21:44

## 2023-03-08 RX ADMIN — HYDRALAZINE HYDROCHLORIDE 100 MG: 50 TABLET, FILM COATED ORAL at 09:04

## 2023-03-08 RX ADMIN — CARVEDILOL 12.5 MG: 12.5 TABLET, FILM COATED ORAL at 09:04

## 2023-03-08 RX ADMIN — Medication 7 UNITS: at 12:35

## 2023-03-08 RX ADMIN — HYDROCHLOROTHIAZIDE 12.5 MG: 25 TABLET ORAL at 09:03

## 2023-03-08 RX ADMIN — LISINOPRIL 40 MG: 20 TABLET ORAL at 09:05

## 2023-03-08 RX ADMIN — Medication 3 UNITS: at 09:04

## 2023-03-08 RX ADMIN — LEVETIRACETAM 1000 MG: 500 TABLET, FILM COATED ORAL at 17:10

## 2023-03-08 RX ADMIN — APIXABAN 5 MG: 5 TABLET, FILM COATED ORAL at 09:05

## 2023-03-08 RX ADMIN — APIXABAN 5 MG: 5 TABLET, FILM COATED ORAL at 17:10

## 2023-03-08 RX ADMIN — Medication 3 UNITS: at 17:10

## 2023-03-08 RX ADMIN — LEVETIRACETAM 1000 MG: 500 TABLET, FILM COATED ORAL at 09:04

## 2023-03-08 RX ADMIN — ASPIRIN 325 MG: 325 TABLET, COATED ORAL at 09:03

## 2023-03-08 RX ADMIN — CARVEDILOL 12.5 MG: 12.5 TABLET, FILM COATED ORAL at 17:10

## 2023-03-08 RX ADMIN — DOCUSATE SODIUM 100 MG: 100 CAPSULE, LIQUID FILLED ORAL at 09:05

## 2023-03-08 RX ADMIN — DOCUSATE SODIUM 100 MG: 100 CAPSULE, LIQUID FILLED ORAL at 17:10

## 2023-03-08 RX ADMIN — SODIUM CHLORIDE, PRESERVATIVE FREE 10 ML: 5 INJECTION INTRAVENOUS at 06:13

## 2023-03-08 RX ADMIN — HYDRALAZINE HYDROCHLORIDE 100 MG: 50 TABLET, FILM COATED ORAL at 21:35

## 2023-03-08 RX ADMIN — SODIUM CHLORIDE, PRESERVATIVE FREE 10 ML: 5 INJECTION INTRAVENOUS at 21:37

## 2023-03-08 RX ADMIN — AMLODIPINE BESYLATE 10 MG: 5 TABLET ORAL at 09:05

## 2023-03-08 RX ADMIN — HYDRALAZINE HYDROCHLORIDE 100 MG: 50 TABLET, FILM COATED ORAL at 17:10

## 2023-03-08 RX ADMIN — ATORVASTATIN CALCIUM 80 MG: 40 TABLET, FILM COATED ORAL at 21:33

## 2023-03-08 NOTE — PROGRESS NOTES
End of Shift Note     Bedside shift change report given to SAMANTHA Voss( (oncoming nurse) by Shashi Bailey (offgoing nurse). Report included the following information SBAR, Kardex, and MAR     Shift worked:  7p-7a   Shift summary and any significant changes:     No changes during the night    Concerns for physician to address:  None      Zone phone for oncoming shift:   [phone]      Patient [address]  74 y.o.  2/28/2023  3:19 AM by Sharda Mendoza DO.  Ray Méndez was admitted from Home     Problem List          Patient Active Problem List     Diagnosis Date Noted    CVA (cerebral vascular accident) ([address] 02/28/2023    Thrombotic stroke involving right middle cerebral artery ([address] 02/28/2023    Bilateral carotid artery stenosis 02/28/2023    Stage 3a chronic kidney disease ([address] 10/28/2022    Malignant neoplasm of lower lobe, left bronchus or lung ([address] 04/22/2022    Left arm weakness 03/06/2021    Bilateral leg weakness 03/06/2021    Carcinoid tumor 02/13/2018    CKD stage 2 due to type 2 diabetes mellitus ([address] 05/14/2017    Polyneuropathy in diabetes(357.2) 07/21/2015    Hypertensive emergency 07/20/2015    Seizure disorder ([address] 07/20/2015    Type II or unspecified type diabetes mellitus with neurological manifestations, uncontrolled(250.62) ([address] 07/20/2015    History of atrial fibrillation 07/20/2015    Hyperlipidemia 07/20/2015    Diabetic neuropathy ([address] 11/21/2014    Seizures ([address] 08/08/2014    Syncope 07/13/2012    Cardiomyopathy, nonischemic ([address] 10/31/2009    HTN (hypertension), benign 10/26/2009    Pulmonary nodule 10/26/2009              Past Medical History:   Diagnosis Date    Atrial fibrillation ([address]      Cancer ([address] 12/2015     left lung; 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 No   PICC LINE? No      Genitourinary:   Urinary status: external catheter  Urinary Catheter? No      Respiratory:   O2 Device: None (Room air)  Chronic home O2 use?: NO  Incentive spirometer at bedside: NO     GI:  Last Bowel Movement Date: 03/01/23  Current diet:  ADULT DIET Regular; 4 carb choices (60 gm/meal)  Passing flatus: NO  Tolerating current diet: YES     Pain Management:   Patient states pain is manageable on current regimen: YES     Skin:  Don Score: 15  Interventions: float heels, foam dressing, PT/OT consult, limit briefs, and Q2 hourly turning    Patient Safety:  Fall Score:  Total Score: 3  Interventions: bed/chair alarm and gripper socks  High Fall Risk: Yes     DVT prophylaxis:  DVT prophylaxis Med- Yes  DVT prophylaxis SCD or HAZEL- No      Wounds: (If Applicable)  Wounds- No  Location      Active Consults:  IP CONSULT TO NEUROLOGY     Length of Stay:  Expected LOS: 2d 21h  Actual LOS: 7  Discharge Plan: Yes auth pending

## 2023-03-08 NOTE — PROGRESS NOTES
Hospitalist Progress Note    NAME: Lewis Hightower   :  1955   MRN:  [id number]       Assessment / Plan:    Acute CVA POA  Causing Dense L Hemiplegia POA  CT head with no acute process. CTA technically sub-optimal. No large vessel occlusion. Mild artherosclerosis. Carotid duplex-done, no significant stenosis  MRI showing acute infarct posterior corona radiata R side  Echo showed 40 to 45% EF, no intracardiac shunt  A1c= 7.8  Echo 40-45% EF noted  Neurology's note is reviewed, recommended increase aspirin to 325 mg a day from 81 and continue his Eliquis for distal small vessel disease and his history of atrial fibrillation   Cont' statin  Appreciate neurology recs  PT recommends acute rehab- IPR denied after P2P discussion/review with Insurance physician- recommended SNF level of rehab due to poor Out of bed participation--awaiting placement to SNF now  Speech therapy regular diet    Acute kidney injury likely due to prerenal azotemia  Labs reviewed. Cr 1.66, started on IVF. Will obtain BMP today. Hold HCTZ and lisinopril. Monitor bmp to ensure cr is down trending    HTN  Afib  H/o dissection of right carotid artery  Eliquis secondary hypercoagulable state due to A-fib  Resumed on home medications amlodipine, hydralazine, Coreg  Continue eliquis and lipitor  Resumed on home medications     DM  Home metformin and glipizide held. SSI with POC glucose checks     Seizure disorder  Continue PTA keppra 1000 mg po bid     Hypokalemia  Replete as needed    30.0 - 39.9 Obese / Body mass index is 30.44 kg/m². Estimated discharge date: 3/9 pending placement     Code status: Full  Prophylaxis: Lovenox  Recommended Disposition:  rehab - SNF now as IPR denied by insurance     Subjective:     Patient with no new complaint. Objective:     VITALS:   Last 24hrs VS reviewed since prior progress note.  Most recent are:  Patient Vitals for the past 24 hrs:   Temp Pulse Resp BP SpO2   23 0755 97.9 °F (36.6 °C) 79 16 (!) 154/96 99 %   03/07/23 2319 98.2 °F (36.8 °C) 92 21 (!) 164/95 96 %   03/07/23 2222 98.8 °F (37.1 °C) 90 18 (!) 167/118 97 %   03/07/23 1522 98.4 °F (36.9 °C) 66 18 (!) 139/96 100 %   03/07/23 1137 98.1 °F (36.7 °C) 64 16 (!) 125/93 96 %   03/07/23 1046 -- 70 -- (!) 123/92 --       No intake or output data in the 24 hours ending 03/08/23 0918       I had a face to face encounter and independently examined this patient on 3/8/2023, as outlined below:  PHYSICAL EXAM:  General:          s, no acute distress  EENT:              EOMI. Anicteric sclerae. MMM  Resp:               CTA bilaterally, no wheezing or rales. No accessory muscle use  CV:                  Regular  rhythm,  No edema  GI:                   Soft, Non distended, Non tender. +Bowel sounds  Neurologic:       Left flaccid paralysis +, moving right upper and lower extremities  Psych:   Not anxious nor agitated  Skin:                No rashes. No jaundice    Reviewed most current lab test results and cultures  YES  Reviewed most current radiology test results   YES  Review and summation of old records today    NO  Reviewed patient's current orders and MAR    YES  PMH/ reviewed - no change compared to H&P  ________________________________________________________________________  Care Plan discussed with:    Comments   Patient Y    [id number] [facility]     Consultant                       Y Multidiciplinary team rounds were held today with , nursing, pharmacist and clinical coordinator. Patient's plan of care was discussed; medications were reviewed and discharge planning was addressed. Sherrill Barron MD     Procedures: see electronic medical records for all procedures/Xrays and details which were not copied into this note but were reviewed prior to creation of Plan. LABS:  I reviewed today's most current labs and imaging studies.   Pertinent labs include:  No results for input(s): WBC, HGB, HCT, PLT, HGBEXT, HCTEXT, PLTEXT, HGBEXT, HCTEXT, PLTEXT in the last 72 hours.   Recent Labs     03/07/23  0639      K 3.6      CO2 28   *   BUN 46*   CREA 1.66*   CA 8.1*         Signed: Mary Greene MD

## 2023-03-08 NOTE — PROGRESS NOTES
Transition of Care Plan     RUR:  13%   SUSAN: Stable. DC order is in. Disposition: IPR- [facility]:   Appeal Pending. (SEE BELOW)   If SNF or IPR: Date FOC offered: 3-1  Date FOC received: 3-1 1223 PM   Date authorization started with reference IPR denied, P2P denied, family filed appeal number: see below  ref number [id number] 3-2-23 ( new number given,  see below )  Date authorization received and expires: IPR denied 3-6 post P2P  Accepting facility:  [facility]    Follow up appointments: TO BE MADE  DME needed: TBD   Transportation at Discharge: [facility] or means to access home: N/A         Medicare Letter: NEEDED  Is patient a [place] and connected with the [place]? NO  If yes, was [employer] transfer form completed and VA notified? Caregiver Contact: Spouse: Isaac Larger: [phone]  Discharge Caregiver contacted prior to discharge? Will do prior to DC. Care Conference needed?:  NO    322 pm Received call from spouse. Auth number [id number]. Appeal won. I called insurance and spoke with Ajit. She states a letter will be generated to reflect this appeal decision later today or 3-9. Deanne from [facility] also notified. 235 pm I spoke with spouse Jairo Callejas-  she is aware we need a back up plan if appeal is denied. I left SNF list in room for her as she is coming here later this afternoon. Will follow. 1133 am I called today to reach spouse regarding a back up plan if appeal is denied. I spoke with daughter and she states she will relay message to her Mom asap. Fast track number-  . Case number [id number] Ref [id number]. Appeal  was called on 3-6-2023. Care management to follow.     Lisa Tovar RN   5260 am   3-8-2021

## 2023-03-08 NOTE — PROGRESS NOTES
Problem: Pressure Injury - Risk of  Goal: *Prevention of pressure injury  Description: Document Don Scale and appropriate interventions in the flowsheet.   Outcome: Progressing Towards Goal     Problem: Patient Education: Go to Patient Education Activity  Goal: Patient/Family Education  Outcome: Progressing Towards Goal     Problem: Patient Education: Go to Patient Education Activity  Goal: Patient/Family Education  Outcome: Progressing Towards Goal     Problem: TIA/CVA Stroke: 0-24 hours  Goal: Off Pathway (Use only if patient is Off Pathway)  Outcome: Progressing Towards Goal  Goal: Activity/Safety  Outcome: Progressing Towards Goal  Goal: Consults, if ordered  Outcome: Progressing Towards Goal  Goal: Diagnostic Test/Procedures  Outcome: Progressing Towards Goal  Goal: Nutrition/Diet  Outcome: Progressing Towards Goal  Goal: Discharge Planning  Outcome: Progressing Towards Goal  Goal: Medications  Outcome: Progressing Towards Goal  Goal: Respiratory  Outcome: Progressing Towards Goal  Goal: Treatments/Interventions/Procedures  Outcome: Progressing Towards Goal  Goal: Minimize risk of bleeding post-thrombolytic infusion  Outcome: Progressing Towards Goal  Goal: Monitor for complications post-thrombolytic infusion  Outcome: Progressing Towards Goal  Goal: Psychosocial  Outcome: Progressing Towards Goal  Goal: *Hemodynamically stable  Outcome: Progressing Towards Goal  Goal: *Neurologically stable  Description: Absence of additional neurological deficits    Outcome: Progressing Towards Goal  Goal: *Verbalizes anxiety and depression are reduced or absent  Outcome: Progressing Towards Goal  Goal: *Absence of Signs of Aspiration on Current Diet  Outcome: Progressing Towards Goal  Goal: *Absence of deep venous thrombosis signs and symptoms(Stroke Metric)  Outcome: Progressing Towards Goal  Goal: *Ability to perform ADLs and demonstrates progressive mobility and function  Outcome: Progressing Towards Goal  Goal: *Stroke education started(Stroke Metric)  Outcome: Progressing Towards Goal  Goal: *Dysphagia screen performed(Stroke Metric)  Outcome: Progressing Towards Goal  Goal: *Rehab consulted(Stroke Metric)  Outcome: Progressing Towards Goal     Problem: TIA/CVA Stroke: Day 2 Until Discharge  Goal: Off Pathway (Use only if patient is Off Pathway)  Outcome: Progressing Towards Goal  Goal: Activity/Safety  Outcome: Progressing Towards Goal  Goal: Diagnostic Test/Procedures  Outcome: Progressing Towards Goal  Goal: Nutrition/Diet  Outcome: Progressing Towards Goal  Goal: Discharge Planning  Outcome: Progressing Towards Goal  Goal: Medications  Outcome: Progressing Towards Goal  Goal: Respiratory  Outcome: Progressing Towards Goal  Goal: Treatments/Interventions/Procedures  Outcome: Progressing Towards Goal  Goal: Psychosocial  Outcome: Progressing Towards Goal  Goal: *Verbalizes anxiety and depression are reduced or absent  Outcome: Progressing Towards Goal  Goal: *Absence of aspiration  Outcome: Progressing Towards Goal  Goal: *Absence of deep venous thrombosis signs and symptoms(Stroke Metric)  Outcome: Progressing Towards Goal  Goal: *Optimal pain control at patient's stated goal  Outcome: Progressing Towards Goal  Goal: *Tolerating diet  Outcome: Progressing Towards Goal  Goal: *Ability to perform ADLs and demonstrates progressive mobility and function  Outcome: Progressing Towards Goal  Goal: *Stroke education continued(Stroke Metric)  Outcome: Progressing Towards Goal     Problem: Ischemic Stroke: Discharge Outcomes  Goal: *Verbalizes anxiety and depression are reduced or absent  Outcome: Progressing Towards Goal  Goal: *Verbalize understanding of risk factor modification(Stroke Metric)  Outcome: Progressing Towards Goal  Goal: *Hemodynamically stable  Outcome: Progressing Towards Goal  Goal: *Absence of aspiration pneumonia  Outcome: Progressing Towards Goal  Goal: *Aware of needed dietary changes  Outcome: Progressing Towards Goal  Goal: *Verbalize understanding of prescribed medications including anti-coagulants, anti-lipid, and/or anti-platelets(Stroke Metric)  Outcome: Progressing Towards Goal  Goal: *Tolerating diet  Outcome: Progressing Towards Goal  Goal: *Aware of follow-up diagnostics related to anticoagulants  Outcome: Progressing Towards Goal  Goal: *Ability to perform ADLs and demonstrates progressive mobility and function  Outcome: Progressing Towards Goal  Goal: *Absence of DVT(Stroke Metric)  Outcome: Progressing Towards Goal  Goal: *Absence of aspiration  Outcome: Progressing Towards Goal  Goal: *Optimal pain control at patient's stated goal  Outcome: Progressing Towards Goal  Goal: *Home safety concerns addressed  Outcome: Progressing Towards Goal  Goal: *Describes available resources and support systems  Outcome: Progressing Towards Goal  Goal: *Verbalizes understanding of activation of EMS(911) for stroke symptoms(Stroke Metric)  Outcome: Progressing Towards Goal  Goal: *Understands and describes signs and symptoms to report to providers(Stroke Metric)  Outcome: Progressing Towards Goal  Goal: *Neurolgocially stable (absence of additional neurological deficits)  Outcome: Progressing Towards Goal  Goal: *Verbalizes importance of follow-up with primary care physician(Stroke Metric)  Outcome: Progressing Towards Goal  Goal: *Smoking cessation discussed,if applicable(Stroke Metric)  Outcome: Progressing Towards Goal  Goal: *Depression screening completed(Stroke Metric)  Outcome: Progressing Towards Goal     Problem: Falls - Risk of  Goal: *Absence of Falls  Description: Document Yenifer Fall Risk and appropriate interventions in the flowsheet.   Outcome: Progressing Towards Goal     Problem: Patient Education: Go to Patient Education Activity  Goal: Patient/Family Education  Outcome: Progressing Towards Goal     Problem: Patient Education: Go to Patient Education Activity  Goal: Patient/Family Education  Outcome: Progressing Towards Goal     Problem: Patient Education: Go to Patient Education Activity  Goal: Patient/Family Education  Outcome: Progressing Towards Goal     Problem: Patient Education: Go to Patient Education Activity  Goal: Patient/Family Education  Outcome: Progressing Towards Goal

## 2023-03-08 NOTE — PROGRESS NOTES
End of Shift Note     Bedside shift change report given to Narinder Morfin, RN (oncoming nurse) by Elizabet Light RN  (offgoing nurse). Report included the following information SBAR, Kardex, and MAR     Shift worked:  days   Shift summary and any significant changes:     -Appeal for rehab auth by insurance   CM following for DC needs  Patient turned appropriately throughout shift  Patient had bowel movement    Concerns for physician to address:        Zone phone for oncoming shift:         Patient [address]  74 y.o.  2/28/2023  3:19 AM by Koki Wheeler DO.  Ulises Napoles was admitted from Home     Problem List          Patient Active Problem List     Diagnosis Date Noted    CVA (cerebral vascular accident) ([address] 02/28/2023    Thrombotic stroke involving right middle cerebral artery ([address] 02/28/2023    Bilateral carotid artery stenosis 02/28/2023    Stage 3a chronic kidney disease ([address] 10/28/2022    Malignant neoplasm of lower lobe, left bronchus or lung ([address] 04/22/2022    Left arm weakness 03/06/2021    Bilateral leg weakness 03/06/2021    Carcinoid tumor 02/13/2018    CKD stage 2 due to type 2 diabetes mellitus ([address] 05/14/2017    Polyneuropathy in diabetes(357.2) 07/21/2015    Hypertensive emergency 07/20/2015    Seizure disorder ([address] 07/20/2015    Type II or unspecified type diabetes mellitus with neurological manifestations, uncontrolled(250.62) ([address] 07/20/2015    History of atrial fibrillation 07/20/2015    Hyperlipidemia 07/20/2015    Diabetic neuropathy ([address] 11/21/2014    Seizures ([address] 08/08/2014    Syncope 07/13/2012    Cardiomyopathy, nonischemic ([address] 10/31/2009    HTN (hypertension), benign 10/26/2009    Pulmonary nodule 10/26/2009              Past Medical History:   Diagnosis Date    Atrial fibrillation ([address]      Cancer ([address] 12/2015     left lung; 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    Congestive heart failure, unspecified      Dissection of right carotid artery ([address] 09/2018    Hypertension      Overweight(278.02)      Seizures ([address]      SOLITARY PULMONARY NODULE       1.6 cm    Stroke [facility]) 2015     right thalamus         Core Measures:  CVA: Yes Yes  CHF:Yes Not applicable  PNA:No No     Activity:  Activity Level: Bed Rest  Number times ambulated in hallways past shift: 0  Number of times OOB to chair past shift: 0     Cardiac:   Cardiac Monitoring: No      Cardiac Rhythm: Atrial Fib     Access:   Current line(s): PIV   Central Line? No   PICC LINE? No      Genitourinary:   Urinary status: external catheter  Urinary Catheter? No      Respiratory:   O2 Device: None (Room air)  Chronic home O2 use?: NO  Incentive spirometer at bedside: NO     GI:  Last Bowel Movement Date: 03/01/23  Current diet:  ADULT DIET Regular; 4 carb choices (60 gm/meal)  Passing flatus: NO  Tolerating current diet: YES     Pain Management:   Patient states pain is manageable on current regimen: YES     Skin:  Don Score: 15  Interventions: float heels, foam dressing, PT/OT consult, limit briefs, and Q2 hourly turning    Patient Safety:  Fall Score:  Total Score: 3  Interventions: bed/chair alarm and gripper socks  High Fall Risk: Yes     DVT prophylaxis:  DVT prophylaxis Med- Yes  DVT prophylaxis SCD or HAZEL- No      Wounds: (If Applicable)  Wounds- No  Location      Active Consults:  IP CONSULT TO NEUROLOGY     Length of Stay:  Expected LOS: 2d 21h  Actual LOS: 7  Discharge Plan: Yes auth pending

## 2023-03-09 VITALS
BODY MASS INDEX: 30.57 KG/M2 | HEIGHT: 73 IN | OXYGEN SATURATION: 99 % | SYSTOLIC BLOOD PRESSURE: 150 MMHG | TEMPERATURE: 98.2 F | DIASTOLIC BLOOD PRESSURE: 100 MMHG | HEART RATE: 63 BPM | WEIGHT: 230.7 LBS | RESPIRATION RATE: 20 BRPM

## 2023-03-09 LAB
ANION GAP SERPL CALC-SCNC: 5 MMOL/L (ref 5–15)
BUN SERPL-MCNC: 36 MG/DL (ref 6–20)
BUN/CREAT SERPL: 27 (ref 12–20)
CALCIUM SERPL-MCNC: 8.8 MG/DL (ref 8.5–10.1)
CHLORIDE SERPL-SCNC: 106 MMOL/L (ref 97–108)
CO2 SERPL-SCNC: 30 MMOL/L (ref 21–32)
CREAT SERPL-MCNC: 1.35 MG/DL (ref 0.7–1.3)
GLUCOSE BLD STRIP.AUTO-MCNC: 183 MG/DL (ref 65–117)
GLUCOSE BLD STRIP.AUTO-MCNC: 244 MG/DL (ref 65–117)
GLUCOSE SERPL-MCNC: 214 MG/DL (ref 65–100)
POTASSIUM SERPL-SCNC: 3.5 MMOL/L (ref 3.5–5.1)
SERVICE CMNT-IMP: ABNORMAL
SERVICE CMNT-IMP: ABNORMAL
SODIUM SERPL-SCNC: 141 MMOL/L (ref 136–145)

## 2023-03-09 PROCEDURE — 74011250637 HC RX REV CODE- 250/637: Performed by: INTERNAL MEDICINE

## 2023-03-09 PROCEDURE — 74011000250 HC RX REV CODE- 250: Performed by: STUDENT IN AN ORGANIZED HEALTH CARE EDUCATION/TRAINING PROGRAM

## 2023-03-09 PROCEDURE — 74011250637 HC RX REV CODE- 250/637: Performed by: PSYCHIATRY & NEUROLOGY

## 2023-03-09 PROCEDURE — 74011250637 HC RX REV CODE- 250/637: Performed by: STUDENT IN AN ORGANIZED HEALTH CARE EDUCATION/TRAINING PROGRAM

## 2023-03-09 PROCEDURE — 74011636637 HC RX REV CODE- 636/637: Performed by: STUDENT IN AN ORGANIZED HEALTH CARE EDUCATION/TRAINING PROGRAM

## 2023-03-09 PROCEDURE — 80048 BASIC METABOLIC PNL TOTAL CA: CPT

## 2023-03-09 PROCEDURE — 82962 GLUCOSE BLOOD TEST: CPT

## 2023-03-09 PROCEDURE — 36415 COLL VENOUS BLD VENIPUNCTURE: CPT

## 2023-03-09 RX ADMIN — SODIUM CHLORIDE, PRESERVATIVE FREE 10 ML: 5 INJECTION INTRAVENOUS at 05:11

## 2023-03-09 RX ADMIN — DOCUSATE SODIUM 100 MG: 100 CAPSULE, LIQUID FILLED ORAL at 09:04

## 2023-03-09 RX ADMIN — POLYETHYLENE GLYCOL 3350 17 G: 17 POWDER, FOR SOLUTION ORAL at 09:05

## 2023-03-09 RX ADMIN — AMLODIPINE BESYLATE 10 MG: 5 TABLET ORAL at 09:04

## 2023-03-09 RX ADMIN — Medication 2 UNITS: at 11:51

## 2023-03-09 RX ADMIN — LEVETIRACETAM 1000 MG: 500 TABLET, FILM COATED ORAL at 09:04

## 2023-03-09 RX ADMIN — CARVEDILOL 12.5 MG: 12.5 TABLET, FILM COATED ORAL at 09:04

## 2023-03-09 RX ADMIN — Medication 3 UNITS: at 09:04

## 2023-03-09 RX ADMIN — HYDRALAZINE HYDROCHLORIDE 100 MG: 50 TABLET, FILM COATED ORAL at 09:04

## 2023-03-09 RX ADMIN — ASPIRIN 325 MG: 325 TABLET, COATED ORAL at 09:04

## 2023-03-09 RX ADMIN — APIXABAN 5 MG: 5 TABLET, FILM COATED ORAL at 09:04

## 2023-03-09 NOTE — PROGRESS NOTES
Problem: Pressure Injury - Risk of  Goal: *Prevention of pressure injury  Description: Document Don Scale and appropriate interventions in the flowsheet.   Outcome: Progressing Towards Goal  Note: Pressure Injury Interventions:  Sensory Interventions: Assess changes in LOC    Moisture Interventions: Absorbent underpads    Activity Interventions: Increase time out of bed    Mobility Interventions: HOB 30 degrees or less, Pressure redistribution bed/mattress (bed type)    Nutrition Interventions: Document food/fluid/supplement intake, Offer support with meals,snacks and hydration    Friction and Shear Interventions: HOB 30 degrees or less, Lift sheet, Minimize layers                Problem: Patient Education: Go to Patient Education Activity  Goal: Patient/Family Education  Outcome: Progressing Towards Goal     Problem: TIA/CVA Stroke: 0-24 hours  Goal: Off Pathway (Use only if patient is Off Pathway)  Outcome: Progressing Towards Goal  Goal: Activity/Safety  Outcome: Progressing Towards Goal  Goal: Consults, if ordered  Outcome: Progressing Towards Goal  Goal: Diagnostic Test/Procedures  Outcome: Progressing Towards Goal  Goal: Nutrition/Diet  Outcome: Progressing Towards Goal  Goal: Discharge Planning  Outcome: Progressing Towards Goal  Goal: Medications  Outcome: Progressing Towards Goal  Goal: Respiratory  Outcome: Progressing Towards Goal  Goal: Treatments/Interventions/Procedures  Outcome: Progressing Towards Goal  Goal: Minimize risk of bleeding post-thrombolytic infusion  Outcome: Progressing Towards Goal  Goal: Monitor for complications post-thrombolytic infusion  Outcome: Progressing Towards Goal  Goal: Psychosocial  Outcome: Progressing Towards Goal  Goal: *Hemodynamically stable  Outcome: Progressing Towards Goal  Goal: *Neurologically stable  Description: Absence of additional neurological deficits    Outcome: Progressing Towards Goal  Goal: *Verbalizes anxiety and depression are reduced or absent  Outcome: Progressing Towards Goal  Goal: *Absence of Signs of Aspiration on Current Diet  Outcome: Progressing Towards Goal  Goal: *Absence of deep venous thrombosis signs and symptoms(Stroke Metric)  Outcome: Progressing Towards Goal  Goal: *Ability to perform ADLs and demonstrates progressive mobility and function  Outcome: Progressing Towards Goal  Goal: *Stroke education started(Stroke Metric)  Outcome: Progressing Towards Goal  Goal: *Dysphagia screen performed(Stroke Metric)  Outcome: Progressing Towards Goal  Goal: *Rehab consulted(Stroke Metric)  Outcome: Progressing Towards Goal     Problem: TIA/CVA Stroke: Day 2 Until Discharge  Goal: Off Pathway (Use only if patient is Off Pathway)  Outcome: Progressing Towards Goal  Goal: Activity/Safety  Outcome: Progressing Towards Goal  Goal: Diagnostic Test/Procedures  Outcome: Progressing Towards Goal  Goal: Nutrition/Diet  Outcome: Progressing Towards Goal  Goal: Discharge Planning  Outcome: Progressing Towards Goal  Goal: Medications  Outcome: Progressing Towards Goal  Goal: Respiratory  Outcome: Progressing Towards Goal  Goal: Treatments/Interventions/Procedures  Outcome: Progressing Towards Goal  Goal: Psychosocial  Outcome: Progressing Towards Goal  Goal: *Verbalizes anxiety and depression are reduced or absent  Outcome: Progressing Towards Goal  Goal: *Absence of aspiration  Outcome: Progressing Towards Goal  Goal: *Absence of deep venous thrombosis signs and symptoms(Stroke Metric)  Outcome: Progressing Towards Goal  Goal: *Optimal pain control at patient's stated goal  Outcome: Progressing Towards Goal  Goal: *Tolerating diet  Outcome: Progressing Towards Goal  Goal: *Ability to perform ADLs and demonstrates progressive mobility and function  Outcome: Progressing Towards Goal  Goal: *Stroke education continued(Stroke Metric)  Outcome: Progressing Towards Goal

## 2023-03-09 NOTE — PROGRESS NOTES
Transition of Care Plan     RUR:  13%   SUSAN: Stable. DC order is in. Disposition: [facility]PR- [facility]:   Appeal Pending. (SEE BELOW)   If SNF or IPR: Date FOC offered: 3-1  Date FOC received: 3-1 1223 PM   Date authorization started with reference IPR denied, P2P denied, family filed appeal number: see below  ref number [id number] 3-2-23 ( new number given,  see below )  Date authorization received and expires: IPR denied 3-6 post P2P  Accepting facility:  [facility]    Follow up appointments: TO BE MADE  DME needed: TBD   Transportation at Discharge: [address] [facility] or means to access home: N/A        IM Medicare Letter: NEEDED ( done)   Is patient a  and connected with the [place]? NO  If yes, was [employer] transfer form completed and VA notified? Caregiver Contact: Spouse: Dennis Clos: [phone]  Discharge Caregiver contacted prior to discharge? Will do prior to DC. Care Conference needed?:  NO       COPY AND PASTE FROM YESTERDAY'S NOTE    322 pm Received call from spouse. Auth number [id number]. Appeal won. I called insurance and spoke with Ajit. She states a letter will be generated to reflect this appeal decision later today or 3-9. Ary from [employer] also notified. 3-9-23 843 AM I spoke with Ary this am from [employer]. She is anticipating she will receive letter today from [employer]. Or notification. Will follow. Tashi Malhotra RN  847 am   3-9-2023   -------------------------------------------------------------------    Transition of Care Plan to SNF/Rehab    SNF/Rehab Transition:  Patient has been accepted to [facility]  and meets criteria for admission. Patient will transported by Hospital to Home  and expected to leave at 1400 . Communication to Patient/Family:  Met with patient and spouse  (identified care giver) and they are agreeable to the transition plan.     Communication to SNF/Rehab:  Bedside RN,  has been notified to update the transition plan to the facility and call report (phone number [phone]). Discharge information has been updated on the AVS.     Discharge instructions to be fax'd to facility at [facility] # [phone]). Nursing Please include all hard scripts for controlled substances, med rec and dc summary, and AVS in packet. Reviewed and confirmed with facility,  can manage the patient care needs for the following:     Marjorie Wilson with (X) only those applicable:    Medication:  [x]  Medications will be available at the facility  []  IV Antibiotics   []  Controlled Substance - hard copy to be sent with patient   []  Weekly Labs   Documents:  [] Hard RX  [] MAR  [] Kardex  [x] AVS  []Transfer Summary  [x]Discharge   Equipment:  []  CPAP/BiPAP  []  Wound Vacuum  []  [place] or Urinary Device  []  PICC/Central Line  []  Nebulizer  []  Ventilator   Treatment:  []Isolation (for MRSA, VRE, etc.)  []Surgical Drain Management  []Tracheostomy Care  []Dressing Changes  []Dialysis with transportation and chair time n/a   []PEG Care  []Oxygen  []Daily Weights for Heart Failure   Dietary:  []Any diet limitations  []Tube Feedings   []Total Parenteral Management (TPN)   Eligible for Medicaid Long Term Services and Supports  Yes:  [] Eligible for medical assistance or will become eligible within 180 days and UAI completed. [] Provider/Patient and/or support system has requested screening. [] UAI copy provided to patient or responsible party,   [] UAI unavailable at discharge will send once processed to SNF provider. [] UAI unavailable at discharged mailed to patient  No:   [] Private pay and is not financially eligible for Medicaid within the next 180 days. [] Reside out-of-state.   [] A residents of a state owned/operated facility that is licensed  by [place] [address] [facility] or [facility]  [] Enrollment in [facility] hospice services  [] [address]  [x] Patient /Family declines to have screening completed or provide financial information for screening     Financial Resources:  Medicaid    [] Initiated and application pending   [] Full coverage     Advanced Care Plan:  []Surrogate Decision Maker of Care  []POA  []Communicated Code Status  full code  (DDNR\", \"Full\")    Other    REPORT to  Room number not yet given but may transfer per Crystal @ 1400. No new Covid swab needed. Appeal was won. Spouse aware of transfer this afternoon. Medicare IM reviewed and completed. Nursing team updated. Hospital to Home stretcher requested.

## 2023-03-09 NOTE — PROGRESS NOTES
Discharge order noted, AVS done and reviewed with patient and wife who is in attendance. Report was given to nurse Angel Morel at  [facility]. AVS, MED list and summary of care sent to facility. Patient left unit in stable condition. Skin intact.       Yasemin SINGH

## 2023-03-09 NOTE — DISCHARGE SUMMARY
Hospitalist Discharge Summary     Patient ID:  Deann Dawkins  [id number]  74 y.o.  1955 2/28/2023    PCP on record: Andre Fernandez MD    Admit date: 2/28/2023  Discharge date and time: 3/9/2023    DISCHARGE DIAGNOSIS:    Acute CVA  Hypertension  A-fib  History of dissection of right carotid artery  Diabetes mellitus  Seizure disorder  Hypokalemia    CONSULTATIONS:  IP CONSULT TO NEUROLOGY    Excerpted HPI from H&P of Figueroa Monroe DO:  Jessica Montoya is a 74 y.o.  male with PMH of afib on eliquis, lung ca 2015 s/p VATS resection left lower lobe, CHF, dissection of right carotid artery, HTN, seizure disorder and h/o right thalamic stroke who presents with left sided weakness. Patient reports that he was trying to get out of bed earlier this morning a little after midnight, last known normal was a little before midnight. He endorses a fall to the floor, without hitting his head due to left sided weakness. He was unable to get up and his wife called 911. He reports that he has a h/o stroke and this feels similar to his prior left sided weakness, however after last stroke he now only needed to ambulate with a cane. He denies CP, SOB, fever, chills, and palpitations. ______________________________________________________________________  DISCHARGE SUMMARY/HOSPITAL COURSE:  for full details see H&P, daily progress notes, labs, consult notes. Patient admitted with acute CVA with flaccid paralysis of left side MRI showed acute infarct posterior corona radiator. Patient was already on aspirin 81 mg and Eliquis. Neurology on board. Increased aspirin to 325 and continued on Eliquis. PT OT evaluated the patient and recommended acute rehab. Patient is medically stable for discharge and need follow-up with neurology in 4 to 6 weeks. Acute CVA  CT head with no acute process. CTA technically sub-optimal. No large vessel occlusion. Mild artherosclerosis.   Carotid duplex-done, no significant stenosis  MRI showing acute infarct posterior corona radiator  Echo showed 40 to 45% EF, no intracardiac shunt  Appreciate neurology recs neurology cleared for discharge  PT recommends acute rehab  Speech therapy regular diet  Currently on Eliquis 5 mg twice daily and aspirin started 325 mg daily  Continue statin   A1c 7.8, LDL 74  Blood sugar controlled 1 40-1 80 currently. Resume on home medication. Con't high-dose statin. Resumed on home medications amlodipine, hydralazine, lisinopril, Coreg    HTN  Afib  H/o dissection of right carotid artery  Eliquis secondary hypercoagulable state due to A-fib  norvasc, coreg, lisinopril. Increased hydralazine 100tid. Added hctz  Continue eliquis and lipitor     DM  Home metformin and glipizide. seizure disorder  Continue PTA keppra 1000 mg po bid     Hypokalemia  Replete as needed         _______________________________________________________________________  Patient seen and examined by me on discharge day. Pertinent Findings:  Gen:    Not in distress  Chest: Clear lungs  CVS:   Regular rhythm. No edema  Abd:  Soft, not distended, not tender  Neuro:  Alert,   _______________________________________________________________________  DISCHARGE MEDICATIONS:   Current Discharge Medication List        START taking these medications    Details   hydroCHLOROthiazide (HYDRODIURIL) 12.5 mg tablet Take 1 Tablet by mouth daily. Qty: 30 Tablet, Refills: 0  Start date: 3/5/2023           CONTINUE these medications which have CHANGED    Details   hydrALAZINE (APRESOLINE) 100 mg tablet Take 1 Tablet by mouth three (3) times daily. Qty: 90 Tablet, Refills: 0  Start date: 3/5/2023      aspirin delayed-release 325 mg tablet Take 1 Tablet by mouth daily.   Qty: 30 Tablet, Refills: 0  Start date: 3/4/2023           CONTINUE these medications which have NOT CHANGED    Details   levETIRAcetam (KEPPRA) 500 mg tablet Take 2 tablets by mouth twice daily  Qty: 120 Tablet, Refills: 5      carvediloL (COREG) 12.5 mg tablet Take 1 Tablet by mouth two (2) times daily (with meals). Qty: 180 Tablet, Refills: 3    Associated Diagnoses: NICM (nonischemic cardiomyopathy) ([facility])      atorvastatin (LIPITOR) 80 mg tablet TAKE 1 TABLET BY MOUTH ONCE DAILY AT NIGHT  Qty: 30 Tablet, Refills: 5    Associated Diagnoses: Hyperlipidemia, unspecified hyperlipidemia type      apixaban (ELIQUIS) 5 mg tablet Take 1 Tablet by mouth two (2) times a day. Qty: 2 Tablet, Refills: 0      amLODIPine (NORVASC) 10 mg tablet Take 1 tablet by mouth once daily for blood pressure  Qty: 30 Tablet, Refills: 5      glucose blood VI test strips (ASCENSIA AUTODISC VI, ONE TOUCH ULTRA TEST VI) strip Check fsbs every day E11  Qty: 50 Strip, Refills: 11      Blood-Glucose Meter monitoring kit Check fsbs every day E11  Qty: 1 Kit, Refills: 0      lancets misc Check fsbs every day E11  Qty: 1 Each, Refills: 11      albuterol (PROVENTIL HFA, VENTOLIN HFA, PROAIR HFA) 90 mcg/actuation inhaler Take 2 Puffs by inhalation every four (4) hours as needed for Wheezing. lisinopril (PRINIVIL, ZESTRIL) 40 mg tablet TAKE ONE TABLET BY MOUTH ONCE DAILY FOR BLOOD PRESSURE  Qty: 30 Tab, Refills: 11    Comments: Please consider 90 day supplies to promote better adherence      glimepiride (AMARYL) 4 mg tablet TAKE 1 TABLET BY MOUTH IN THE MORNING  Qty: 180 Tablet, Refills: 3      metFORMIN (GLUCOPHAGE) 500 mg tablet Take 2 Tablets by mouth two (2) times daily (with meals). Qty: 360 Tablet, Refills: 3    Associated Diagnoses: Type 2 diabetes mellitus with complication, with long-term current use of insulin ([facility])               Patient Follow Up Instructions: Activity: Activity as tolerated  Diet: Diabetic Diet  Wound Care: As directed    Follow-up with PCP in 1 to 2 weeks.   Neurology in 4 to 6 weeks in   Follow-up tests/labs CBC BMP in 1 week    Follow-up Information       Follow up With Specialties Details Why Contact Info Shantell Deutsch MD Internal Medicine Physician   [address]  [facility]  [phone]      Paco Ramsey MD Neurology Follow up in 1 month(s)  [address]  [address] [place]  [facility]  [phone]            ________________________________________________________________    Risk of deterioration: Moderate    Condition at Discharge:  Stable  __________________________________________________________________    Disposition  IP Rehab    ____________________________________________________________________    Code Status: Full Code  ___________________________________________________________________      Total time in minutes spent coordinating this discharge (includes going over instructions, follow-up, prescriptions, and preparing report for sign off to her PCP) :  45 minutes    Signed:  Devin Brown MD

## 2023-03-09 NOTE — ROUTINE PROCESS
End of Shift Note     Bedside shift change report given to SAMANTHA Hazel (oncoming nurse) by Timothy Kennedy RN  (offgoing nurse). Report included the following information SBAR, Kardex, and MAR     Shift worked:  7p - 7a   Shift summary and any significant changes:     None   Concerns for physician to address: None   Zone phone for oncoming shift:  [phone]      Patient [address]  74 y.o.  2/28/2023  3:19 AM by Meka Alvarez DO.  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 diabetes mellitus ([address] 05/14/2017    Polyneuropathy in diabetes(357.2) 07/21/2015    Hypertensive emergency 07/20/2015    Seizure disorder ([address] 07/20/2015    Type II or unspecified type diabetes mellitus with neurological manifestations, uncontrolled(250.62) ([address] 07/20/2015    History of atrial fibrillation 07/20/2015    Hyperlipidemia 07/20/2015    Diabetic neuropathy ([address] 11/21/2014    Seizures ([address] 08/08/2014    Syncope 07/13/2012    Cardiomyopathy, nonischemic ([address] 10/31/2009    HTN (hypertension), benign 10/26/2009    Pulmonary nodule 10/26/2009              Past Medical History:   Diagnosis Date    Atrial fibrillation ([address]      Cancer ([address] 12/2015     left lung; 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 No   PICC LINE? No      Genitourinary:   Urinary status: external catheter  Urinary Catheter? No      Respiratory:   O2 Device: None (Room air)  Chronic home O2 use?: NO  Incentive spirometer at bedside: NO     GI:  Last Bowel Movement Date: 03/01/23  Current diet:  ADULT DIET Regular; 4 carb choices (60 gm/meal)  Passing flatus: NO  Tolerating current diet: YES     Pain Management:   Patient states pain is manageable on current regimen: YES     Skin:  Don Score: 15  Interventions: float heels, foam dressing, PT/OT consult, limit briefs, and Q2 hourly turning    Patient Safety:  Fall Score:  Total Score: 3  Interventions: bed/chair alarm and gripper socks  High Fall Risk: Yes     DVT prophylaxis:  DVT prophylaxis Med- Yes  DVT prophylaxis SCD or HAZEL- No      Wounds: (If Applicable)  Wounds- No  Location      Active Consults:  IP CONSULT TO NEUROLOGY     Length of Stay:  Expected LOS: 2d 21h  Actual LOS: 7  Discharge Plan: Yes auth pending       Martha Townsend RN

## 2023-03-10 ENCOUNTER — PATIENT OUTREACH (OUTPATIENT)
Dept: CASE MANAGEMENT | Age: 68
End: 2023-03-10

## 2023-03-10 NOTE — PROGRESS NOTES
Transition of care outreach postponed for 7 days due to patient's discharge to inpatient rehab facility. Confirmed patient discharged to [facility], will continue to monitor patient progress.

## 2023-03-17 ENCOUNTER — PATIENT OUTREACH (OUTPATIENT)
Dept: CASE MANAGEMENT | Age: 68
End: 2023-03-17

## 2023-03-17 NOTE — PROGRESS NOTES
Outgoing call placed to [facility] (IRF) update of patient progress. Spoke to [email or web address] () patient identified utilizing 2 identifiers introduced self and reason for the call. [email or web address] reports patient is currently admitted to the facility. Will continue to monitor patient progress.

## 2023-03-24 ENCOUNTER — PATIENT OUTREACH (OUTPATIENT)
Dept: CASE MANAGEMENT | Age: 68
End: 2023-03-24

## 2023-03-24 NOTE — PROGRESS NOTES
Outgoing call placed to [facility] (IRF) regarding update of patient progress. Spoke to [employer] () patient identified utilizing 2 identifiers, introduced self and reason for the call. Shreya reports patient is being discharged from facility today. Will attempt to contact patient 3/27/2023 for post acute discharge follow up.

## 2023-03-27 ENCOUNTER — PATIENT OUTREACH (OUTPATIENT)
Dept: CASE MANAGEMENT | Age: 68
End: 2023-03-27

## 2023-03-27 NOTE — Clinical Note
Good afternoon Dr. Lawanda Valadez  I spoke with Mrs. Nika Lamas regarding patient discharged from [address] [place] [address], Mr. Nika Lamas will be followed by [facility] for SN, PT OT and possible hha. Mr. Nika Lamas next appointment is 4/27/2023, presently he requires a obi lift and wheelchair for mobility. Mrs. Nika Lamas states she will need extra help to get him in the office. They are out of the coverage area for [facility]. I will ask her if they could do  a virtual visit.    Thank you,  Deandra Quinteros, [address] [phone]

## 2023-03-27 NOTE — PROGRESS NOTES
[address] [place] [address] Dr Discharge Follow-Up      Date/Time:  3/27/2023 8:50 AM    Patient was admitted to [facility] on 2023 and discharged to  [facility]   on 3/9/2023. The patients discharge diagnosis was CVA. Patient was discharge on 3/24/2023 from  [facility] . The discharge summary from the post acute facilty was not available at the time of outreach. Call within 2 business days of discharge: Yes     Patient Current Location: [address] [place] [address] Coordinator contacted the family Fernando Mercedes (spouse) by telephone to perform post hospital discharge follow up. Verified name and  with family as identifiers. Provided introduction to self, and explanation of the LPN Care Coordinator role. Family received post acute facility discharge instructions. [facility]   reviewed discharge instructions and red flags with family who verbalized understanding. Family given an opportunity to ask questions and does not have any further questions or concerns at this time. The family agrees to contact the PCP office for questions related to their healthcare. LPN Care Coordinator provided contact information for future reference. Advance Care Planning:   Does patient have an Advance Directive:  not on file     [address] orders at discharge: PT, OT, [address]: [facility]  Date of initial visit: 3/27/2023    Durable Medical Equipment ordered at discharge:  wheel chair, obi lift bedside commode, hospital bed  [address]: name not given  Durable Medical Equipment received: yes    Medication(s):   The post acute facility medication discharge list was not available for this call. Medication review was not performed with family, who verbalizes understanding of administration of home medications. There were no barriers to obtaining medications identified at this time.     [facility] follow up appointment(s):   Future Appointments   Date Time Provider Bj Todd   4/27/2023 11:30 AM Pablo Darling MD [facility] BS AMB      Non-[facility] follow up appointment(s): n/a  [facility]:  out of service area

## 2023-03-28 ENCOUNTER — TELEPHONE (OUTPATIENT)
Dept: INTERNAL MEDICINE CLINIC | Age: 68
End: 2023-03-28

## 2023-03-28 NOTE — TELEPHONE ENCOUNTER
Marli Cuadra with [facility] called at this time. Detailed message left per instructions. Okay to add [facility] aide for adls.

## 2023-03-28 NOTE — TELEPHONE ENCOUNTER
Emily//[facility] states she needs a call back to get a Verbal Order for Patient to receive [facility] for ADL's per Wife request. Please call & a Detailed message can be left on voice Mail.  Thank you

## 2023-03-29 ENCOUNTER — PATIENT OUTREACH (OUTPATIENT)
Dept: CASE MANAGEMENT | Age: 68
End: 2023-03-29

## 2023-03-29 NOTE — Clinical Note
Good afternoon Dr. Poornima Goddard,   I spoke to Mrs. Fernando Velásquez today she will try to schedule a virtual hospital follow up appointment with you.    Thank you,  [address], [address] [phone]

## 2023-03-29 NOTE — PROGRESS NOTES
Care Transitions Follow Up Call    Patient Current Location: [address] [place] [address] to be reviewed by the provider   Additional needs identified to be addressed with provider: yes  Follow up appointment , spoke to patient's spouse they are able to do virtual appointment. Method of communication with provider : staff message    LPN Care Coordinator (LPN CC) contacted the family by telephone to follow up after admission on 2023. Verified name and  with family as identifiers. Addressed changes since last contact: personal care aide- order obtained   Follow up appointment completed? no.   Was follow up appointment scheduled within 7 days of discharge? no.       Patients top risk factors for readmission: functional physical ability and medical condition-s/p CVA     Interventions to address risk factors: Education of patient/family/caregiver/guardian to support self-management-Family to schedule virtual appointment with [facility] follow up appointment(s):   Future Appointments   Date Time Provider Bj Todd   2023 11:30 AM Kan Cm MD [facility] BS [facility]     Non-[facility] follow up appointment(s): N/A    LPN CCprovided contact information for future needs. Plan for follow-up call in 5-7 days based on severity of symptoms and risk factors.   Plan for next call: follow up appointment-Dr. Gallo Record

## 2023-04-05 ENCOUNTER — PATIENT OUTREACH (OUTPATIENT)
Dept: CASE MANAGEMENT | Age: 68
End: 2023-04-05

## 2023-04-05 NOTE — PROGRESS NOTES
Care Transitions Follow Up Call    Patient Current Location: [address] [place] [address] to be reviewed by the provider   Additional needs identified to be addressed with provider: yes  Patient's spouse attempting to schedule hospital follow up appointment            Method of communication with provider : staff message    LPN Care Coordinator(LPN CC) contacted the family by telephone to follow up after admission on 2023. Verified name and  with patient as identifiers. Addressed changes since last contact:  Spouse attempting to schedule hopsital follow up appointment  Follow up appointment completed? no.   Was follow up appointment scheduled within 7 days of discharge? Waiting to hear back from office for appointment date . Advance Care Planning:   Does patient have an Advance Directive:  health care decision makers updated    LPN CC reviewed discharge instructions, medical action plan and red flags with family and discussed any barriers to care and/or understanding of plan of care after discharge. Discussed appropriate site of care based on symptoms and resources available to patient including: PCP. The family agrees to contact the PCP office for questions related to their healthcare. Patients top risk factors for readmission: medical condition-s/p CVA    Interventions to address risk factors:  attempting to schedule follow up appointment, patient being followed by [facility] follow up appointment(s):   Future Appointments   Date Time Provider Bj Todd   2023 11:30 AM Deshaun Díaz MD [facility]     Non-[facility] follow up appointment(s): n/a    LPN CC provided contact information for future needs. Plan for follow-up call in 3-5 days based on severity of symptoms and risk factors.   Plan for next call: follow up appointment-date

## 2023-04-07 ENCOUNTER — PATIENT OUTREACH (OUTPATIENT)
Dept: CASE MANAGEMENT | Age: 68
End: 2023-04-07

## 2023-04-07 ENCOUNTER — TELEPHONE (OUTPATIENT)
Dept: INTERNAL MEDICINE CLINIC | Age: 68
End: 2023-04-07

## 2023-04-13 PROBLEM — I77.71 DISSECTION OF CAROTID ARTERY (HCC): Status: ACTIVE | Noted: 2023-04-13

## 2023-04-17 DIAGNOSIS — M10.9 ACUTE GOUT OF FOOT, UNSPECIFIED CAUSE, UNSPECIFIED LATERALITY: Primary | ICD-10-CM

## 2023-04-17 RX ORDER — PREDNISONE 10 MG/1
10 TABLET ORAL
Qty: 30 TABLET | Refills: 0 | Status: SHIPPED | OUTPATIENT
Start: 2023-04-17

## 2023-04-19 ENCOUNTER — PATIENT OUTREACH (OUTPATIENT)
Dept: CASE MANAGEMENT | Age: 68
End: 2023-04-19

## 2023-04-19 NOTE — PROGRESS NOTES
04/19/23   Spoke with patient's daughter. She says that she will have her mother call me back. LM    .04/19/23 3:59 PM   Patient called back earlier and left a message. Attempted to outreach both Mr. Cecile Fuentes and his wife. Unable to leave VM on either phone since MB full. Will attempt to call back tomorrow. LM    04/20/23  Spoke with patient's wife Shukri Cherry who says that they have [place] coming and do not need CCM. No further follow up. [facility]    Ambulatory Care Management Note    Date/Time:  4/20/2023 10:42 AM    Patient Current Location: [place]    Patient has graduated from the Complex Case Management  program on 4/20/2023. Patient/family has the ability to self-manage at this time. Care management goals have been completed. No further Ambulatory Care Manager follow up scheduled. Goals Addressed    None         Patient has Ambulatory Care Manager's contact information for any further questions, concerns, or needs.   Patients upcoming visits:    Future Appointments   Date Time Provider Bj Todd   7/18/2023 11:30 AM Yadi Lee MD [facility] BS AMB

## 2023-05-05 ENCOUNTER — HOSPITAL ENCOUNTER (INPATIENT)
Facility: HOSPITAL | Age: 68
LOS: 4 days | Discharge: HOME | DRG: 065 | End: 2023-05-10
Attending: EMERGENCY MEDICINE | Admitting: STUDENT IN AN ORGANIZED HEALTH CARE EDUCATION/TRAINING PROGRAM
Payer: MEDICARE

## 2023-05-05 ENCOUNTER — HOSPITAL ENCOUNTER (EMERGENCY)
Age: 68
Discharge: STILL ADMITTED OR ADMINISTRATIVE | End: 2023-05-06
Attending: EMERGENCY MEDICINE

## 2023-05-05 VITALS
WEIGHT: 209.44 LBS | HEART RATE: 81 BPM | DIASTOLIC BLOOD PRESSURE: 93 MMHG | OXYGEN SATURATION: 96 % | HEIGHT: 73 IN | SYSTOLIC BLOOD PRESSURE: 159 MMHG | RESPIRATION RATE: 20 BRPM | BODY MASS INDEX: 27.76 KG/M2

## 2023-05-05 DIAGNOSIS — M79.602 PAIN OF LEFT UPPER EXTREMITY: ICD-10-CM

## 2023-05-05 DIAGNOSIS — M79.605 PAIN OF LEFT LOWER EXTREMITY: ICD-10-CM

## 2023-05-05 DIAGNOSIS — E87.6 ACUTE HYPOKALEMIA: ICD-10-CM

## 2023-05-05 DIAGNOSIS — E83.51 HYPOCALCEMIA: ICD-10-CM

## 2023-05-05 DIAGNOSIS — M79.609 MUSCULOSKELETAL PAIN OF EXTREMITY: ICD-10-CM

## 2023-05-05 DIAGNOSIS — Z86.73 HISTORY OF CVA (CEREBROVASCULAR ACCIDENT): ICD-10-CM

## 2023-05-05 DIAGNOSIS — M62.50 MUSCULAR ATROPHY, UNSPECIFIED SITE: ICD-10-CM

## 2023-05-05 DIAGNOSIS — I63.9 CEREBROVASCULAR ACCIDENT (CVA), UNSPECIFIED MECHANISM (HCC): Primary | ICD-10-CM

## 2023-05-05 DIAGNOSIS — I63.311 THROMBOTIC STROKE INVOLVING RIGHT MIDDLE CEREBRAL ARTERY (HCC): ICD-10-CM

## 2023-05-05 DIAGNOSIS — E87.6 HYPOKALEMIA: Primary | ICD-10-CM

## 2023-05-05 LAB
BASOPHILS # BLD: 0 K/UL (ref 0–0.1)
BASOPHILS NFR BLD: 0 % (ref 0–1)
DIFFERENTIAL METHOD BLD: ABNORMAL
EOSINOPHIL # BLD: 0.1 K/UL (ref 0–0.4)
EOSINOPHIL NFR BLD: 2 % (ref 0–7)
ERYTHROCYTE [DISTWIDTH] IN BLOOD BY AUTOMATED COUNT: 15.9 % (ref 11.5–14.5)
HCT VFR BLD AUTO: 39.4 % (ref 36.6–50.3)
HGB BLD-MCNC: 12.4 G/DL (ref 12.1–17)
IMM GRANULOCYTES # BLD AUTO: 0 K/UL (ref 0–0.04)
IMM GRANULOCYTES NFR BLD AUTO: 0 % (ref 0–0.5)
LYMPHOCYTES # BLD: 1.9 K/UL (ref 0.8–3.5)
LYMPHOCYTES NFR BLD: 27 % (ref 12–49)
MCH RBC QN AUTO: 27.6 PG (ref 26–34)
MCHC RBC AUTO-ENTMCNC: 31.5 G/DL (ref 30–36.5)
MCV RBC AUTO: 87.8 FL (ref 80–99)
MONOCYTES # BLD: 0.7 K/UL (ref 0–1)
MONOCYTES NFR BLD: 11 % (ref 5–13)
NEUTS SEG # BLD: 4.2 K/UL (ref 1.8–8)
NEUTS SEG NFR BLD: 60 % (ref 32–75)
NRBC # BLD: 0 K/UL (ref 0–0.01)
NRBC BLD-RTO: 0 PER 100 WBC
PLATELET # BLD AUTO: 159 K/UL (ref 150–400)
PMV BLD AUTO: 11.7 FL (ref 8.9–12.9)
RBC # BLD AUTO: 4.49 M/UL (ref 4.1–5.7)
WBC # BLD AUTO: 7 K/UL (ref 4.1–11.1)

## 2023-05-05 PROCEDURE — 80053 COMPREHEN METABOLIC PANEL: CPT

## 2023-05-05 PROCEDURE — 99285 EMERGENCY DEPT VISIT HI MDM: CPT

## 2023-05-05 PROCEDURE — 85025 COMPLETE CBC W/AUTO DIFF WBC: CPT

## 2023-05-05 PROCEDURE — 82550 ASSAY OF CK (CPK): CPT

## 2023-05-05 RX ORDER — OXYCODONE HYDROCHLORIDE 5 MG/1
5 TABLET ORAL
Qty: 12 TABLET | Refills: 0 | Status: SHIPPED | OUTPATIENT
Start: 2023-05-05 | End: 2023-05-10

## 2023-05-05 RX ORDER — OXYCODONE HYDROCHLORIDE 5 MG/1
5 TABLET ORAL
Status: DISCONTINUED | OUTPATIENT
Start: 2023-05-05 | End: 2023-05-06 | Stop reason: HOSPADM

## 2023-05-06 ENCOUNTER — APPOINTMENT (OUTPATIENT)
Facility: HOSPITAL | Age: 68
DRG: 065 | End: 2023-05-06
Payer: MEDICARE

## 2023-05-06 PROBLEM — E87.6 HYPOKALEMIA: Status: ACTIVE | Noted: 2023-05-06

## 2023-05-06 LAB
ALBUMIN SERPL-MCNC: 2.1 G/DL (ref 3.5–5)
ALBUMIN/GLOB SERPL: 0.8 (ref 1.1–2.2)
ALP SERPL-CCNC: 50 U/L (ref 45–117)
ALT SERPL-CCNC: 17 U/L (ref 12–78)
ANION GAP SERPL CALC-SCNC: 3 MMOL/L (ref 5–15)
ANION GAP SERPL CALC-SCNC: 4 MMOL/L (ref 5–15)
AST SERPL-CCNC: 10 U/L (ref 15–37)
ATRIAL RATE: 131 BPM
BILIRUB SERPL-MCNC: 0.3 MG/DL (ref 0.2–1)
BUN SERPL-MCNC: 18 MG/DL (ref 6–20)
BUN SERPL-MCNC: 21 MG/DL (ref 6–20)
BUN/CREAT SERPL: 23 (ref 12–20)
BUN/CREAT SERPL: 29 (ref 12–20)
CA-I BLD-SCNC: 1.13 MMOL/L (ref 1.12–1.32)
CALCIUM SERPL-MCNC: 5.5 MG/DL (ref 8.5–10.1)
CALCIUM SERPL-MCNC: 7.9 MG/DL (ref 8.5–10.1)
CALCULATED R AXIS, ECG10: 9 DEGREES
CALCULATED T AXIS, ECG11: -12 DEGREES
CHLORIDE SERPL-SCNC: 111 MMOL/L (ref 97–108)
CHLORIDE SERPL-SCNC: 121 MMOL/L (ref 97–108)
CK SERPL-CCNC: 25 U/L (ref 39–308)
CO2 SERPL-SCNC: 24 MMOL/L (ref 21–32)
CO2 SERPL-SCNC: 28 MMOL/L (ref 21–32)
CREAT SERPL-MCNC: 0.62 MG/DL (ref 0.7–1.3)
CREAT SERPL-MCNC: 0.93 MG/DL (ref 0.7–1.3)
DIAGNOSIS, 93000: NORMAL
EKG ATRIAL RATE: 70 BPM
EKG DIAGNOSIS: NORMAL
EKG Q-T INTERVAL: 410 MS
EKG QRS DURATION: 100 MS
EKG QTC CALCULATION (BAZETT): 445 MS
EKG R AXIS: 5 DEGREES
EKG T AXIS: 12 DEGREES
EKG VENTRICULAR RATE: 71 BPM
EST. AVERAGE GLUCOSE BLD GHB EST-MCNC: 169 MG/DL
GLOBULIN SER CALC-MCNC: 2.6 G/DL (ref 2–4)
GLUCOSE BLD STRIP.AUTO-MCNC: 119 MG/DL (ref 65–117)
GLUCOSE BLD STRIP.AUTO-MCNC: 121 MG/DL (ref 65–117)
GLUCOSE SERPL-MCNC: 105 MG/DL (ref 65–100)
GLUCOSE SERPL-MCNC: 110 MG/DL (ref 65–100)
HBA1C MFR BLD: 7.5 % (ref 4–5.6)
MAGNESIUM SERPL-MCNC: 1.6 MG/DL (ref 1.6–2.4)
PHOSPHATE SERPL-MCNC: 3.5 MG/DL (ref 2.6–4.7)
POTASSIUM SERPL-SCNC: 2.5 MMOL/L (ref 3.5–5.1)
POTASSIUM SERPL-SCNC: 3.6 MMOL/L (ref 3.5–5.1)
PROT SERPL-MCNC: 4.7 G/DL (ref 6.4–8.2)
Q-T INTERVAL, ECG07: 422 MS
QRS DURATION, ECG06: 96 MS
QTC CALCULATION (BEZET), ECG08: 504 MS
SERVICE CMNT-IMP: ABNORMAL
SERVICE CMNT-IMP: ABNORMAL
SODIUM SERPL-SCNC: 143 MMOL/L (ref 136–145)
SODIUM SERPL-SCNC: 148 MMOL/L (ref 136–145)
VENTRICULAR RATE, ECG03: 86 BPM

## 2023-05-06 PROCEDURE — 82330 ASSAY OF CALCIUM: CPT

## 2023-05-06 PROCEDURE — 6370000000 HC RX 637 (ALT 250 FOR IP): Performed by: STUDENT IN AN ORGANIZED HEALTH CARE EDUCATION/TRAINING PROGRAM

## 2023-05-06 PROCEDURE — 36415 COLL VENOUS BLD VENIPUNCTURE: CPT

## 2023-05-06 PROCEDURE — 6360000002 HC RX W HCPCS: Performed by: EMERGENCY MEDICINE

## 2023-05-06 PROCEDURE — 6370000000 HC RX 637 (ALT 250 FOR IP): Performed by: EMERGENCY MEDICINE

## 2023-05-06 PROCEDURE — 80048 BASIC METABOLIC PNL TOTAL CA: CPT

## 2023-05-06 PROCEDURE — 96375 TX/PRO/DX INJ NEW DRUG ADDON: CPT

## 2023-05-06 PROCEDURE — 93005 ELECTROCARDIOGRAM TRACING: CPT | Performed by: STUDENT IN AN ORGANIZED HEALTH CARE EDUCATION/TRAINING PROGRAM

## 2023-05-06 PROCEDURE — 2500000003 HC RX 250 WO HCPCS: Performed by: STUDENT IN AN ORGANIZED HEALTH CARE EDUCATION/TRAINING PROGRAM

## 2023-05-06 PROCEDURE — 2580000003 HC RX 258: Performed by: STUDENT IN AN ORGANIZED HEALTH CARE EDUCATION/TRAINING PROGRAM

## 2023-05-06 PROCEDURE — 83735 ASSAY OF MAGNESIUM: CPT

## 2023-05-06 PROCEDURE — 93010 ELECTROCARDIOGRAM REPORT: CPT | Performed by: INTERNAL MEDICINE

## 2023-05-06 PROCEDURE — 84100 ASSAY OF PHOSPHORUS: CPT

## 2023-05-06 PROCEDURE — 70551 MRI BRAIN STEM W/O DYE: CPT

## 2023-05-06 PROCEDURE — 83036 HEMOGLOBIN GLYCOSYLATED A1C: CPT

## 2023-05-06 PROCEDURE — 96366 THER/PROPH/DIAG IV INF ADDON: CPT

## 2023-05-06 PROCEDURE — 82962 GLUCOSE BLOOD TEST: CPT

## 2023-05-06 PROCEDURE — 1100000003 HC PRIVATE W/ TELEMETRY

## 2023-05-06 PROCEDURE — 80047 BASIC METABLC PNL IONIZED CA: CPT

## 2023-05-06 PROCEDURE — 96365 THER/PROPH/DIAG IV INF INIT: CPT

## 2023-05-06 RX ORDER — ONDANSETRON 4 MG/1
4 TABLET, ORALLY DISINTEGRATING ORAL EVERY 8 HOURS PRN
Status: DISCONTINUED | OUTPATIENT
Start: 2023-05-06 | End: 2023-05-11 | Stop reason: HOSPADM

## 2023-05-06 RX ORDER — HYDRALAZINE HYDROCHLORIDE 50 MG/1
100 TABLET, FILM COATED ORAL 3 TIMES DAILY
Status: DISCONTINUED | OUTPATIENT
Start: 2023-05-06 | End: 2023-05-11 | Stop reason: HOSPADM

## 2023-05-06 RX ORDER — DEXTROSE MONOHYDRATE 100 MG/ML
INJECTION, SOLUTION INTRAVENOUS CONTINUOUS PRN
Status: DISCONTINUED | OUTPATIENT
Start: 2023-05-06 | End: 2023-05-11 | Stop reason: HOSPADM

## 2023-05-06 RX ORDER — AMLODIPINE BESYLATE 5 MG/1
10 TABLET ORAL DAILY
Status: DISCONTINUED | OUTPATIENT
Start: 2023-05-06 | End: 2023-05-11 | Stop reason: HOSPADM

## 2023-05-06 RX ORDER — ONDANSETRON 2 MG/ML
4 INJECTION INTRAMUSCULAR; INTRAVENOUS EVERY 6 HOURS PRN
Status: DISCONTINUED | OUTPATIENT
Start: 2023-05-06 | End: 2023-05-11 | Stop reason: HOSPADM

## 2023-05-06 RX ORDER — ACETAMINOPHEN 650 MG/1
650 SUPPOSITORY RECTAL EVERY 6 HOURS PRN
Status: DISCONTINUED | OUTPATIENT
Start: 2023-05-06 | End: 2023-05-11 | Stop reason: HOSPADM

## 2023-05-06 RX ORDER — POLYETHYLENE GLYCOL 3350 17 G/17G
17 POWDER, FOR SOLUTION ORAL DAILY PRN
Status: DISCONTINUED | OUTPATIENT
Start: 2023-05-06 | End: 2023-05-11 | Stop reason: HOSPADM

## 2023-05-06 RX ORDER — HYDROCHLOROTHIAZIDE 25 MG/1
12.5 TABLET ORAL DAILY
Status: DISCONTINUED | OUTPATIENT
Start: 2023-05-06 | End: 2023-05-11 | Stop reason: HOSPADM

## 2023-05-06 RX ORDER — CARVEDILOL 12.5 MG/1
12.5 TABLET ORAL 2 TIMES DAILY WITH MEALS
Status: DISCONTINUED | OUTPATIENT
Start: 2023-05-06 | End: 2023-05-11 | Stop reason: HOSPADM

## 2023-05-06 RX ORDER — MAGNESIUM SULFATE HEPTAHYDRATE 40 MG/ML
2000 INJECTION, SOLUTION INTRAVENOUS ONCE
Status: COMPLETED | OUTPATIENT
Start: 2023-05-06 | End: 2023-05-06

## 2023-05-06 RX ORDER — INSULIN LISPRO 100 [IU]/ML
0-4 INJECTION, SOLUTION INTRAVENOUS; SUBCUTANEOUS NIGHTLY
Status: DISCONTINUED | OUTPATIENT
Start: 2023-05-06 | End: 2023-05-11 | Stop reason: HOSPADM

## 2023-05-06 RX ORDER — POTASSIUM CHLORIDE 7.45 MG/ML
10 INJECTION INTRAVENOUS ONCE
Status: COMPLETED | OUTPATIENT
Start: 2023-05-06 | End: 2023-05-06

## 2023-05-06 RX ORDER — LISINOPRIL 20 MG/1
40 TABLET ORAL DAILY
Status: DISCONTINUED | OUTPATIENT
Start: 2023-05-06 | End: 2023-05-11 | Stop reason: HOSPADM

## 2023-05-06 RX ORDER — INSULIN LISPRO 100 [IU]/ML
0-8 INJECTION, SOLUTION INTRAVENOUS; SUBCUTANEOUS
Status: DISCONTINUED | OUTPATIENT
Start: 2023-05-06 | End: 2023-05-11 | Stop reason: HOSPADM

## 2023-05-06 RX ORDER — CALCIUM GLUCONATE 94 MG/ML
1000 INJECTION, SOLUTION INTRAVENOUS
Status: COMPLETED | OUTPATIENT
Start: 2023-05-06 | End: 2023-05-06

## 2023-05-06 RX ORDER — ACETAMINOPHEN 325 MG/1
650 TABLET ORAL EVERY 6 HOURS PRN
Status: DISCONTINUED | OUTPATIENT
Start: 2023-05-06 | End: 2023-05-11 | Stop reason: HOSPADM

## 2023-05-06 RX ORDER — SODIUM CHLORIDE 9 MG/ML
INJECTION, SOLUTION INTRAVENOUS PRN
Status: DISCONTINUED | OUTPATIENT
Start: 2023-05-06 | End: 2023-05-11 | Stop reason: HOSPADM

## 2023-05-06 RX ORDER — GABAPENTIN 100 MG/1
100 CAPSULE ORAL 3 TIMES DAILY
COMMUNITY

## 2023-05-06 RX ORDER — OXYCODONE HYDROCHLORIDE 5 MG/1
5 TABLET ORAL
Status: COMPLETED | OUTPATIENT
Start: 2023-05-06 | End: 2023-05-06

## 2023-05-06 RX ORDER — SODIUM CHLORIDE 0.9 % (FLUSH) 0.9 %
5-40 SYRINGE (ML) INJECTION PRN
Status: DISCONTINUED | OUTPATIENT
Start: 2023-05-06 | End: 2023-05-11 | Stop reason: HOSPADM

## 2023-05-06 RX ORDER — POTASSIUM CHLORIDE 750 MG/1
40 TABLET, FILM COATED, EXTENDED RELEASE ORAL ONCE
Status: COMPLETED | OUTPATIENT
Start: 2023-05-06 | End: 2023-05-06

## 2023-05-06 RX ORDER — SODIUM CHLORIDE 9 MG/ML
INJECTION, SOLUTION INTRAVENOUS CONTINUOUS
Status: DISCONTINUED | OUTPATIENT
Start: 2023-05-06 | End: 2023-05-08

## 2023-05-06 RX ORDER — SODIUM CHLORIDE 0.9 % (FLUSH) 0.9 %
5-40 SYRINGE (ML) INJECTION EVERY 12 HOURS SCHEDULED
Status: DISCONTINUED | OUTPATIENT
Start: 2023-05-06 | End: 2023-05-11 | Stop reason: HOSPADM

## 2023-05-06 RX ORDER — LEVETIRACETAM 500 MG/1
1000 TABLET ORAL 2 TIMES DAILY
Status: DISCONTINUED | OUTPATIENT
Start: 2023-05-06 | End: 2023-05-11 | Stop reason: HOSPADM

## 2023-05-06 RX ORDER — ATORVASTATIN CALCIUM 40 MG/1
80 TABLET, FILM COATED ORAL NIGHTLY
Status: DISCONTINUED | OUTPATIENT
Start: 2023-05-06 | End: 2023-05-11 | Stop reason: HOSPADM

## 2023-05-06 RX ADMIN — MAGNESIUM SULFATE HEPTAHYDRATE 2000 MG: 40 INJECTION, SOLUTION INTRAVENOUS at 19:33

## 2023-05-06 RX ADMIN — OXYCODONE HYDROCHLORIDE 5 MG: 5 TABLET ORAL at 04:27

## 2023-05-06 RX ADMIN — APIXABAN 5 MG: 5 TABLET, FILM COATED ORAL at 11:20

## 2023-05-06 RX ADMIN — CARVEDILOL 12.5 MG: 12.5 TABLET, FILM COATED ORAL at 16:24

## 2023-05-06 RX ADMIN — LEVETIRACETAM 1000 MG: 500 TABLET, FILM COATED ORAL at 22:30

## 2023-05-06 RX ADMIN — AMLODIPINE BESYLATE 10 MG: 5 TABLET ORAL at 11:21

## 2023-05-06 RX ADMIN — CALCIUM GLUCONATE 1000 MG: 94 INJECTION, SOLUTION INTRAVENOUS at 01:35

## 2023-05-06 RX ADMIN — ATORVASTATIN CALCIUM 80 MG: 40 TABLET, FILM COATED ORAL at 22:30

## 2023-05-06 RX ADMIN — HYDRALAZINE HYDROCHLORIDE 100 MG: 50 TABLET, FILM COATED ORAL at 22:30

## 2023-05-06 RX ADMIN — SODIUM CHLORIDE, PRESERVATIVE FREE 10 ML: 5 INJECTION INTRAVENOUS at 21:00

## 2023-05-06 RX ADMIN — ASPIRIN 325 MG: 325 TABLET, COATED ORAL at 11:54

## 2023-05-06 RX ADMIN — LEVETIRACETAM 1000 MG: 500 TABLET, FILM COATED ORAL at 11:19

## 2023-05-06 RX ADMIN — POTASSIUM CHLORIDE 40 MEQ: 750 TABLET, FILM COATED, EXTENDED RELEASE ORAL at 11:19

## 2023-05-06 RX ADMIN — POTASSIUM CHLORIDE 10 MEQ: 10 INJECTION, SOLUTION INTRAVENOUS at 01:35

## 2023-05-06 RX ADMIN — APIXABAN 5 MG: 5 TABLET, FILM COATED ORAL at 22:30

## 2023-05-06 RX ADMIN — HYDROCHLOROTHIAZIDE 12.5 MG: 25 TABLET ORAL at 11:20

## 2023-05-06 RX ADMIN — HYDRALAZINE HYDROCHLORIDE 100 MG: 50 TABLET, FILM COATED ORAL at 11:19

## 2023-05-06 RX ADMIN — LISINOPRIL 40 MG: 20 TABLET ORAL at 11:20

## 2023-05-06 RX ADMIN — CARVEDILOL 12.5 MG: 12.5 TABLET, FILM COATED ORAL at 11:20

## 2023-05-06 RX ADMIN — ACETAMINOPHEN 650 MG: 325 TABLET ORAL at 22:29

## 2023-05-06 RX ADMIN — HYDRALAZINE HYDROCHLORIDE 100 MG: 50 TABLET, FILM COATED ORAL at 16:25

## 2023-05-06 RX ADMIN — SODIUM CHLORIDE: 9 INJECTION, SOLUTION INTRAVENOUS at 12:05

## 2023-05-06 ASSESSMENT — PAIN DESCRIPTION - ORIENTATION
ORIENTATION: LEFT;RIGHT
ORIENTATION: LEFT
ORIENTATION: RIGHT

## 2023-05-06 ASSESSMENT — PAIN SCALES - GENERAL
PAINLEVEL_OUTOF10: 4
PAINLEVEL_OUTOF10: 6
PAINLEVEL_OUTOF10: 2
PAINLEVEL_OUTOF10: 8

## 2023-05-06 ASSESSMENT — PAIN DESCRIPTION - LOCATION
LOCATION: LEG;KNEE;ARM
LOCATION: LEG;HAND
LOCATION: ARM;LEG

## 2023-05-06 ASSESSMENT — PAIN DESCRIPTION - DESCRIPTORS
DESCRIPTORS: ACHING
DESCRIPTORS: ACHING

## 2023-05-06 ASSESSMENT — PAIN DESCRIPTION - PAIN TYPE: TYPE: ACUTE PAIN

## 2023-05-06 ASSESSMENT — PAIN SCALES - WONG BAKER: WONGBAKER_NUMERICALRESPONSE: 2

## 2023-05-06 ASSESSMENT — PAIN DESCRIPTION - FREQUENCY: FREQUENCY: OTHER (COMMENT)

## 2023-05-07 ENCOUNTER — APPOINTMENT (OUTPATIENT)
Facility: HOSPITAL | Age: 68
DRG: 065 | End: 2023-05-07
Payer: MEDICARE

## 2023-05-07 LAB
ANION GAP SERPL CALC-SCNC: 3 MMOL/L (ref 5–15)
ANION GAP SERPL CALC-SCNC: 3 MMOL/L (ref 5–15)
BASOPHILS # BLD: 0 K/UL (ref 0–0.1)
BASOPHILS NFR BLD: 0 % (ref 0–1)
BUN SERPL-MCNC: 14 MG/DL (ref 6–20)
BUN SERPL-MCNC: 15 MG/DL (ref 6–20)
BUN/CREAT SERPL: 17 (ref 12–20)
BUN/CREAT SERPL: 17 (ref 12–20)
CALCIUM SERPL-MCNC: 8.3 MG/DL (ref 8.5–10.1)
CALCIUM SERPL-MCNC: 8.7 MG/DL (ref 8.5–10.1)
CHLORIDE SERPL-SCNC: 107 MMOL/L (ref 97–108)
CHLORIDE SERPL-SCNC: 108 MMOL/L (ref 97–108)
CHOLEST SERPL-MCNC: 115 MG/DL
CO2 SERPL-SCNC: 28 MMOL/L (ref 21–32)
CO2 SERPL-SCNC: 30 MMOL/L (ref 21–32)
COMMENT:: NORMAL
CREAT SERPL-MCNC: 0.84 MG/DL (ref 0.7–1.3)
CREAT SERPL-MCNC: 0.87 MG/DL (ref 0.7–1.3)
DIFFERENTIAL METHOD BLD: ABNORMAL
EOSINOPHIL # BLD: 0.1 K/UL (ref 0–0.4)
EOSINOPHIL NFR BLD: 1 % (ref 0–7)
ERYTHROCYTE [DISTWIDTH] IN BLOOD BY AUTOMATED COUNT: 15.9 % (ref 11.5–14.5)
GLUCOSE BLD STRIP.AUTO-MCNC: 119 MG/DL (ref 65–117)
GLUCOSE BLD STRIP.AUTO-MCNC: 136 MG/DL (ref 65–117)
GLUCOSE BLD STRIP.AUTO-MCNC: 168 MG/DL (ref 65–117)
GLUCOSE BLD STRIP.AUTO-MCNC: 283 MG/DL (ref 65–117)
GLUCOSE SERPL-MCNC: 128 MG/DL (ref 65–100)
GLUCOSE SERPL-MCNC: 146 MG/DL (ref 65–100)
HCT VFR BLD AUTO: 38.9 % (ref 36.6–50.3)
HDLC SERPL-MCNC: 35 MG/DL
HDLC SERPL: 3.3 (ref 0–5)
HGB BLD-MCNC: 12.3 G/DL (ref 12.1–17)
IMM GRANULOCYTES # BLD AUTO: 0 K/UL (ref 0–0.04)
IMM GRANULOCYTES NFR BLD AUTO: 0 % (ref 0–0.5)
LDLC SERPL CALC-MCNC: 58.4 MG/DL (ref 0–100)
LYMPHOCYTES # BLD: 2 K/UL (ref 0.8–3.5)
LYMPHOCYTES NFR BLD: 30 % (ref 12–49)
MCH RBC QN AUTO: 27.6 PG (ref 26–34)
MCHC RBC AUTO-ENTMCNC: 31.6 G/DL (ref 30–36.5)
MCV RBC AUTO: 87.2 FL (ref 80–99)
MONOCYTES # BLD: 0.7 K/UL (ref 0–1)
MONOCYTES NFR BLD: 10 % (ref 5–13)
NEUTS SEG # BLD: 3.8 K/UL (ref 1.8–8)
NEUTS SEG NFR BLD: 59 % (ref 32–75)
NRBC # BLD: 0 K/UL (ref 0–0.01)
NRBC BLD-RTO: 0 PER 100 WBC
PHOSPHATE SERPL-MCNC: 3.1 MG/DL (ref 2.6–4.7)
PLATELET # BLD AUTO: 167 K/UL (ref 150–400)
PMV BLD AUTO: 11.7 FL (ref 8.9–12.9)
POTASSIUM SERPL-SCNC: 3.4 MMOL/L (ref 3.5–5.1)
POTASSIUM SERPL-SCNC: 3.6 MMOL/L (ref 3.5–5.1)
RBC # BLD AUTO: 4.46 M/UL (ref 4.1–5.7)
SERVICE CMNT-IMP: ABNORMAL
SODIUM SERPL-SCNC: 139 MMOL/L (ref 136–145)
SODIUM SERPL-SCNC: 140 MMOL/L (ref 136–145)
SPECIMEN HOLD: NORMAL
TRIGL SERPL-MCNC: 108 MG/DL
VLDLC SERPL CALC-MCNC: 21.6 MG/DL
WBC # BLD AUTO: 6.6 K/UL (ref 4.1–11.1)

## 2023-05-07 PROCEDURE — 6360000004 HC RX CONTRAST MEDICATION: Performed by: STUDENT IN AN ORGANIZED HEALTH CARE EDUCATION/TRAINING PROGRAM

## 2023-05-07 PROCEDURE — 84100 ASSAY OF PHOSPHORUS: CPT

## 2023-05-07 PROCEDURE — 80061 LIPID PANEL: CPT

## 2023-05-07 PROCEDURE — 85025 COMPLETE CBC W/AUTO DIFF WBC: CPT

## 2023-05-07 PROCEDURE — 1100000003 HC PRIVATE W/ TELEMETRY

## 2023-05-07 PROCEDURE — 6370000000 HC RX 637 (ALT 250 FOR IP): Performed by: STUDENT IN AN ORGANIZED HEALTH CARE EDUCATION/TRAINING PROGRAM

## 2023-05-07 PROCEDURE — 36415 COLL VENOUS BLD VENIPUNCTURE: CPT

## 2023-05-07 PROCEDURE — 2580000003 HC RX 258: Performed by: STUDENT IN AN ORGANIZED HEALTH CARE EDUCATION/TRAINING PROGRAM

## 2023-05-07 PROCEDURE — 99222 1ST HOSP IP/OBS MODERATE 55: CPT | Performed by: INTERNAL MEDICINE

## 2023-05-07 PROCEDURE — 82962 GLUCOSE BLOOD TEST: CPT

## 2023-05-07 PROCEDURE — 73206 CT ANGIO UPR EXTRM W/O&W/DYE: CPT

## 2023-05-07 PROCEDURE — 6370000000 HC RX 637 (ALT 250 FOR IP): Performed by: INTERNAL MEDICINE

## 2023-05-07 PROCEDURE — 80048 BASIC METABOLIC PNL TOTAL CA: CPT

## 2023-05-07 RX ORDER — CLOPIDOGREL BISULFATE 75 MG/1
75 TABLET ORAL DAILY
Status: DISCONTINUED | OUTPATIENT
Start: 2023-05-07 | End: 2023-05-11 | Stop reason: HOSPADM

## 2023-05-07 RX ORDER — OXYCODONE HYDROCHLORIDE 5 MG/1
5 TABLET ORAL EVERY 4 HOURS PRN
Status: DISCONTINUED | OUTPATIENT
Start: 2023-05-07 | End: 2023-05-09

## 2023-05-07 RX ADMIN — SODIUM CHLORIDE: 9 INJECTION, SOLUTION INTRAVENOUS at 20:40

## 2023-05-07 RX ADMIN — APIXABAN 5 MG: 5 TABLET, FILM COATED ORAL at 10:31

## 2023-05-07 RX ADMIN — Medication 4 UNITS: at 17:22

## 2023-05-07 RX ADMIN — ATORVASTATIN CALCIUM 80 MG: 40 TABLET, FILM COATED ORAL at 22:07

## 2023-05-07 RX ADMIN — SODIUM CHLORIDE, PRESERVATIVE FREE 10 ML: 5 INJECTION INTRAVENOUS at 22:08

## 2023-05-07 RX ADMIN — CLOPIDOGREL BISULFATE 75 MG: 75 TABLET ORAL at 13:19

## 2023-05-07 RX ADMIN — HYDRALAZINE HYDROCHLORIDE 100 MG: 50 TABLET, FILM COATED ORAL at 15:41

## 2023-05-07 RX ADMIN — LISINOPRIL 40 MG: 20 TABLET ORAL at 10:28

## 2023-05-07 RX ADMIN — ACETAMINOPHEN 650 MG: 325 TABLET ORAL at 10:32

## 2023-05-07 RX ADMIN — HYDRALAZINE HYDROCHLORIDE 100 MG: 50 TABLET, FILM COATED ORAL at 22:07

## 2023-05-07 RX ADMIN — APIXABAN 5 MG: 5 TABLET, FILM COATED ORAL at 22:07

## 2023-05-07 RX ADMIN — SODIUM CHLORIDE: 9 INJECTION, SOLUTION INTRAVENOUS at 03:51

## 2023-05-07 RX ADMIN — LEVETIRACETAM 1000 MG: 500 TABLET, FILM COATED ORAL at 10:33

## 2023-05-07 RX ADMIN — HYDRALAZINE HYDROCHLORIDE 100 MG: 50 TABLET, FILM COATED ORAL at 10:33

## 2023-05-07 RX ADMIN — IOPAMIDOL 100 ML: 755 INJECTION, SOLUTION INTRAVENOUS at 18:08

## 2023-05-07 RX ADMIN — CARVEDILOL 12.5 MG: 12.5 TABLET, FILM COATED ORAL at 10:33

## 2023-05-07 RX ADMIN — AMLODIPINE BESYLATE 10 MG: 5 TABLET ORAL at 10:28

## 2023-05-07 RX ADMIN — SODIUM CHLORIDE, PRESERVATIVE FREE 10 ML: 5 INJECTION INTRAVENOUS at 13:20

## 2023-05-07 RX ADMIN — LEVETIRACETAM 1000 MG: 500 TABLET, FILM COATED ORAL at 22:07

## 2023-05-07 RX ADMIN — CARVEDILOL 12.5 MG: 12.5 TABLET, FILM COATED ORAL at 17:22

## 2023-05-07 RX ADMIN — HYDROCHLOROTHIAZIDE 12.5 MG: 25 TABLET ORAL at 10:31

## 2023-05-07 ASSESSMENT — PAIN DESCRIPTION - ORIENTATION: ORIENTATION: LEFT

## 2023-05-07 ASSESSMENT — PAIN SCALES - GENERAL: PAINLEVEL_OUTOF10: 10

## 2023-05-07 ASSESSMENT — PAIN DESCRIPTION - DESCRIPTORS: DESCRIPTORS: JABBING

## 2023-05-07 ASSESSMENT — PAIN DESCRIPTION - LOCATION: LOCATION: ARM

## 2023-05-08 ENCOUNTER — APPOINTMENT (OUTPATIENT)
Facility: HOSPITAL | Age: 68
DRG: 065 | End: 2023-05-08
Payer: MEDICARE

## 2023-05-08 LAB
ANION GAP SERPL CALC-SCNC: 4 MMOL/L (ref 5–15)
BASOPHILS # BLD: 0 K/UL (ref 0–0.1)
BASOPHILS NFR BLD: 0 % (ref 0–1)
BUN SERPL-MCNC: 16 MG/DL (ref 6–20)
BUN/CREAT SERPL: 16 (ref 12–20)
CALCIUM SERPL-MCNC: 7.9 MG/DL (ref 8.5–10.1)
CHLORIDE SERPL-SCNC: 110 MMOL/L (ref 97–108)
CO2 SERPL-SCNC: 27 MMOL/L (ref 21–32)
CREAT SERPL-MCNC: 0.98 MG/DL (ref 0.7–1.3)
DIFFERENTIAL METHOD BLD: ABNORMAL
ECHO AO ROOT DIAM: 3.2 CM
ECHO AO ROOT INDEX: 1.47 CM/M2
ECHO BSA: 2.2 M2
ECHO LA DIAMETER INDEX: 1.83 CM/M2
ECHO LA DIAMETER: 4 CM
ECHO LA TO AORTIC ROOT RATIO: 1.25
ECHO LV EDV A4C: 131 ML
ECHO LV EDV INDEX A4C: 60 ML/M2
ECHO LV EJECTION FRACTION A4C: 50 %
ECHO LV ESV A4C: 65 ML
ECHO LV ESV INDEX A4C: 30 ML/M2
ECHO LV FRACTIONAL SHORTENING: 19 % (ref 28–44)
ECHO LV INTERNAL DIMENSION DIASTOLE INDEX: 2.39 CM/M2
ECHO LV INTERNAL DIMENSION DIASTOLIC: 5.2 CM (ref 4.2–5.9)
ECHO LV INTERNAL DIMENSION SYSTOLIC INDEX: 1.93 CM/M2
ECHO LV INTERNAL DIMENSION SYSTOLIC: 4.2 CM
ECHO LV IVSD: 1.6 CM (ref 0.6–1)
ECHO LV MASS 2D: 376.7 G (ref 88–224)
ECHO LV MASS INDEX 2D: 172.8 G/M2 (ref 49–115)
ECHO LV POSTERIOR WALL DIASTOLIC: 1.6 CM (ref 0.6–1)
ECHO LV RELATIVE WALL THICKNESS RATIO: 0.62
ECHO LVOT AREA: 4.2 CM2
ECHO LVOT DIAM: 2.3 CM
ECHO TV REGURGITANT MAX VELOCITY: 2.16 M/S
ECHO TV REGURGITANT PEAK GRADIENT: 19 MMHG
EOSINOPHIL # BLD: 0.1 K/UL (ref 0–0.4)
EOSINOPHIL NFR BLD: 1 % (ref 0–7)
ERYTHROCYTE [DISTWIDTH] IN BLOOD BY AUTOMATED COUNT: 15.9 % (ref 11.5–14.5)
GLUCOSE BLD STRIP.AUTO-MCNC: 116 MG/DL (ref 65–117)
GLUCOSE BLD STRIP.AUTO-MCNC: 150 MG/DL (ref 65–117)
GLUCOSE BLD STRIP.AUTO-MCNC: 156 MG/DL (ref 65–117)
GLUCOSE BLD STRIP.AUTO-MCNC: 248 MG/DL (ref 65–117)
GLUCOSE SERPL-MCNC: 143 MG/DL (ref 65–100)
HCT VFR BLD AUTO: 34.5 % (ref 36.6–50.3)
HGB BLD-MCNC: 11.3 G/DL (ref 12.1–17)
IMM GRANULOCYTES # BLD AUTO: 0 K/UL (ref 0–0.04)
IMM GRANULOCYTES NFR BLD AUTO: 0 % (ref 0–0.5)
LYMPHOCYTES # BLD: 1.7 K/UL (ref 0.8–3.5)
LYMPHOCYTES NFR BLD: 30 % (ref 12–49)
MAGNESIUM SERPL-MCNC: 1.8 MG/DL (ref 1.6–2.4)
MCH RBC QN AUTO: 28.1 PG (ref 26–34)
MCHC RBC AUTO-ENTMCNC: 32.8 G/DL (ref 30–36.5)
MCV RBC AUTO: 85.8 FL (ref 80–99)
MONOCYTES # BLD: 0.6 K/UL (ref 0–1)
MONOCYTES NFR BLD: 11 % (ref 5–13)
NEUTS SEG # BLD: 3.4 K/UL (ref 1.8–8)
NEUTS SEG NFR BLD: 58 % (ref 32–75)
NRBC # BLD: 0 K/UL (ref 0–0.01)
NRBC BLD-RTO: 0 PER 100 WBC
PHOSPHATE SERPL-MCNC: 3.2 MG/DL (ref 2.6–4.7)
PLATELET # BLD AUTO: 163 K/UL (ref 150–400)
PMV BLD AUTO: 11.6 FL (ref 8.9–12.9)
POTASSIUM SERPL-SCNC: 3.2 MMOL/L (ref 3.5–5.1)
RBC # BLD AUTO: 4.02 M/UL (ref 4.1–5.7)
SERVICE CMNT-IMP: ABNORMAL
SERVICE CMNT-IMP: NORMAL
SODIUM SERPL-SCNC: 141 MMOL/L (ref 136–145)
WBC # BLD AUTO: 5.9 K/UL (ref 4.1–11.1)

## 2023-05-08 PROCEDURE — 97535 SELF CARE MNGMENT TRAINING: CPT

## 2023-05-08 PROCEDURE — 6370000000 HC RX 637 (ALT 250 FOR IP): Performed by: INTERNAL MEDICINE

## 2023-05-08 PROCEDURE — 97530 THERAPEUTIC ACTIVITIES: CPT

## 2023-05-08 PROCEDURE — 36415 COLL VENOUS BLD VENIPUNCTURE: CPT

## 2023-05-08 PROCEDURE — 82962 GLUCOSE BLOOD TEST: CPT

## 2023-05-08 PROCEDURE — 2580000003 HC RX 258: Performed by: STUDENT IN AN ORGANIZED HEALTH CARE EDUCATION/TRAINING PROGRAM

## 2023-05-08 PROCEDURE — 83735 ASSAY OF MAGNESIUM: CPT

## 2023-05-08 PROCEDURE — 1100000003 HC PRIVATE W/ TELEMETRY

## 2023-05-08 PROCEDURE — 85025 COMPLETE CBC W/AUTO DIFF WBC: CPT

## 2023-05-08 PROCEDURE — 84100 ASSAY OF PHOSPHORUS: CPT

## 2023-05-08 PROCEDURE — 97166 OT EVAL MOD COMPLEX 45 MIN: CPT

## 2023-05-08 PROCEDURE — 80048 BASIC METABOLIC PNL TOTAL CA: CPT

## 2023-05-08 PROCEDURE — 6370000000 HC RX 637 (ALT 250 FOR IP): Performed by: STUDENT IN AN ORGANIZED HEALTH CARE EDUCATION/TRAINING PROGRAM

## 2023-05-08 PROCEDURE — 93308 TTE F-UP OR LMTD: CPT

## 2023-05-08 PROCEDURE — 92610 EVALUATE SWALLOWING FUNCTION: CPT | Performed by: SPEECH-LANGUAGE PATHOLOGIST

## 2023-05-08 PROCEDURE — 97161 PT EVAL LOW COMPLEX 20 MIN: CPT

## 2023-05-08 RX ORDER — POTASSIUM CHLORIDE 750 MG/1
40 TABLET, FILM COATED, EXTENDED RELEASE ORAL ONCE
Status: COMPLETED | OUTPATIENT
Start: 2023-05-08 | End: 2023-05-08

## 2023-05-08 RX ADMIN — SODIUM CHLORIDE: 9 INJECTION, SOLUTION INTRAVENOUS at 10:53

## 2023-05-08 RX ADMIN — SODIUM CHLORIDE, PRESERVATIVE FREE 10 ML: 5 INJECTION INTRAVENOUS at 21:59

## 2023-05-08 RX ADMIN — HYDRALAZINE HYDROCHLORIDE 100 MG: 50 TABLET, FILM COATED ORAL at 21:51

## 2023-05-08 RX ADMIN — CARVEDILOL 12.5 MG: 12.5 TABLET, FILM COATED ORAL at 08:54

## 2023-05-08 RX ADMIN — POTASSIUM CHLORIDE 40 MEQ: 750 TABLET, FILM COATED, EXTENDED RELEASE ORAL at 12:20

## 2023-05-08 RX ADMIN — APIXABAN 5 MG: 5 TABLET, FILM COATED ORAL at 21:51

## 2023-05-08 RX ADMIN — CLOPIDOGREL BISULFATE 75 MG: 75 TABLET ORAL at 08:55

## 2023-05-08 RX ADMIN — APIXABAN 5 MG: 5 TABLET, FILM COATED ORAL at 08:55

## 2023-05-08 RX ADMIN — ATORVASTATIN CALCIUM 80 MG: 40 TABLET, FILM COATED ORAL at 21:51

## 2023-05-08 RX ADMIN — HYDRALAZINE HYDROCHLORIDE 100 MG: 50 TABLET, FILM COATED ORAL at 08:54

## 2023-05-08 RX ADMIN — CARVEDILOL 12.5 MG: 12.5 TABLET, FILM COATED ORAL at 19:04

## 2023-05-08 RX ADMIN — AMLODIPINE BESYLATE 10 MG: 5 TABLET ORAL at 08:54

## 2023-05-08 RX ADMIN — SODIUM CHLORIDE, PRESERVATIVE FREE 10 ML: 5 INJECTION INTRAVENOUS at 09:01

## 2023-05-08 RX ADMIN — LEVETIRACETAM 1000 MG: 500 TABLET, FILM COATED ORAL at 08:54

## 2023-05-08 RX ADMIN — ACETAMINOPHEN 650 MG: 325 TABLET ORAL at 19:04

## 2023-05-08 RX ADMIN — HYDRALAZINE HYDROCHLORIDE 100 MG: 50 TABLET, FILM COATED ORAL at 14:48

## 2023-05-08 RX ADMIN — LISINOPRIL 40 MG: 20 TABLET ORAL at 08:53

## 2023-05-08 RX ADMIN — OXYCODONE 5 MG: 5 TABLET ORAL at 21:51

## 2023-05-08 RX ADMIN — OXYCODONE 5 MG: 5 TABLET ORAL at 12:20

## 2023-05-08 RX ADMIN — HYDROCHLOROTHIAZIDE 12.5 MG: 25 TABLET ORAL at 08:54

## 2023-05-08 RX ADMIN — LEVETIRACETAM 1000 MG: 500 TABLET, FILM COATED ORAL at 21:51

## 2023-05-08 ASSESSMENT — PAIN DESCRIPTION - FREQUENCY: FREQUENCY: CONTINUOUS

## 2023-05-08 ASSESSMENT — PAIN SCALES - GENERAL
PAINLEVEL_OUTOF10: 7
PAINLEVEL_OUTOF10: 7
PAINLEVEL_OUTOF10: 3

## 2023-05-08 ASSESSMENT — ENCOUNTER SYMPTOMS
VOMITING: 0
COLOR CHANGE: 0
COUGH: 0
TROUBLE SWALLOWING: 0
NAUSEA: 0
DIARRHEA: 0
BACK PAIN: 0
SHORTNESS OF BREATH: 0
ABDOMINAL PAIN: 0

## 2023-05-08 ASSESSMENT — PAIN DESCRIPTION - DESCRIPTORS
DESCRIPTORS: JABBING
DESCRIPTORS: ACHING

## 2023-05-08 ASSESSMENT — PAIN DESCRIPTION - LOCATION
LOCATION: BACK
LOCATION: ARM

## 2023-05-08 ASSESSMENT — PAIN DESCRIPTION - ORIENTATION: ORIENTATION: LEFT

## 2023-05-08 ASSESSMENT — PAIN - FUNCTIONAL ASSESSMENT: PAIN_FUNCTIONAL_ASSESSMENT: PREVENTS OR INTERFERES SOME ACTIVE ACTIVITIES AND ADLS

## 2023-05-08 ASSESSMENT — PAIN DESCRIPTION - PAIN TYPE: TYPE: CHRONIC PAIN

## 2023-05-08 NOTE — PROGRESS NOTES
Hospitalist Progress Note    NAME: Vinod Rodríguez   : 1955   MRN: [id number]     Date/Time: 2023 3:27 PM  Patient PCP: Deondre Miller MD    Estimated discharge date:  23  Barriers: arm pain improvement      Assessment / Plan:  Acute CVA in area of previous CVA  Aphasia vs encephalopathy? L arm pain  Previous MRI last admission showing infarct posterior corona radiata, was previously on aspirin and apixaban, thus was discharged on apixaban and aspirin increased to 325 daily. - had L sided flaccid paralysis and noted being alert but no comment on speech, attempting to determine baseline from family, called   - nonurgent MRI brain noncon obtainedto determine if any new strokes, no last known well  - MRI showed new stroke in posterior corona radiata, site of the old stroke  - consutled Neurology  - stopped aspirin, started plavix, continuing apixaban  - atorva 80  - TTE pending  - CTA arm ordered for arm pain out of proportion, showed subq edema, no fracture or arterial insufficiency  - duplex ordered to rule out DVT in L arm  - Orthopedics consulted to rule ut compartment syndrome, appreciate assistance  - oxy for pain  - PT OT Speech -> home health PT    Hypokalemia   K 2.5 on admission  - 3.2 today, gave oral repletion     Hypocalcemia - resolved  5.5 on admission, ionized calcium normal on repeat.  Received calcium gluconate in ED?  - trend BMP     Hypernatremia - resolved  - rechecking BMP, started IVF, now off     HTN  Afib  Hx of R carotid artery dissection  - continue home amlodipine, hydral, hctz, lisinopril, coreg, apixaban     Seizure disorder  - home keppra     DM  - hold home glimepiride, metformin while inpatient  - SSI     Medical Decision Making:   I personally reviewed labs: cbc, bmp  I personally reviewed imaging: CTA  Toxic drug monitoring: HgB on apixaban     Code Status: full  DVT Prophylaxis: apixaban   GI Prophylaxis: none  Baseline: unknown  Contact:  Liat Cadena

## 2023-05-09 ENCOUNTER — APPOINTMENT (OUTPATIENT)
Facility: HOSPITAL | Age: 68
DRG: 065 | End: 2023-05-09
Payer: MEDICARE

## 2023-05-09 LAB
ANION GAP BLD CALC-SCNC: ABNORMAL MMOL/L (ref 10–20)
ANION GAP BLD CALC-SCNC: ABNORMAL MMOL/L (ref 10–20)
ANION GAP SERPL CALC-SCNC: 4 MMOL/L (ref 5–15)
BASOPHILS # BLD: 0 K/UL (ref 0–0.1)
BASOPHILS NFR BLD: 1 % (ref 0–1)
BUN SERPL-MCNC: 18 MG/DL (ref 6–20)
BUN/CREAT SERPL: 17 (ref 12–20)
CA-I BLD-MCNC: 1.13 MMOL/L (ref 1.12–1.32)
CALCIUM SERPL-MCNC: 7.8 MG/DL (ref 8.5–10.1)
CHLORIDE SERPL-SCNC: 112 MMOL/L (ref 97–108)
CO2 SERPL-SCNC: 26 MMOL/L (ref 21–32)
CREAT SERPL-MCNC: 1.09 MG/DL (ref 0.7–1.3)
DIFFERENTIAL METHOD BLD: ABNORMAL
EOSINOPHIL # BLD: 0.1 K/UL (ref 0–0.4)
EOSINOPHIL NFR BLD: 1 % (ref 0–7)
ERYTHROCYTE [DISTWIDTH] IN BLOOD BY AUTOMATED COUNT: 16.3 % (ref 11.5–14.5)
GLUCOSE BLD STRIP.AUTO-MCNC: 139 MG/DL (ref 65–117)
GLUCOSE BLD STRIP.AUTO-MCNC: 139 MG/DL (ref 65–117)
GLUCOSE BLD STRIP.AUTO-MCNC: 155 MG/DL (ref 65–117)
GLUCOSE BLD STRIP.AUTO-MCNC: 172 MG/DL (ref 65–117)
GLUCOSE SERPL-MCNC: 132 MG/DL (ref 65–100)
HCT VFR BLD AUTO: 34.5 % (ref 36.6–50.3)
HGB BLD-MCNC: 10.9 G/DL (ref 12.1–17)
IMM GRANULOCYTES # BLD AUTO: 0 K/UL (ref 0–0.04)
IMM GRANULOCYTES NFR BLD AUTO: 0 % (ref 0–0.5)
LYMPHOCYTES # BLD: 2 K/UL (ref 0.8–3.5)
LYMPHOCYTES NFR BLD: 41 % (ref 12–49)
MAGNESIUM SERPL-MCNC: 1.8 MG/DL (ref 1.6–2.4)
MCH RBC QN AUTO: 27.2 PG (ref 26–34)
MCHC RBC AUTO-ENTMCNC: 31.6 G/DL (ref 30–36.5)
MCV RBC AUTO: 86 FL (ref 80–99)
MONOCYTES # BLD: 0.6 K/UL (ref 0–1)
MONOCYTES NFR BLD: 13 % (ref 5–13)
NEUTS SEG # BLD: 2.2 K/UL (ref 1.8–8)
NEUTS SEG NFR BLD: 44 % (ref 32–75)
NRBC # BLD: 0 K/UL (ref 0–0.01)
NRBC BLD-RTO: 0 PER 100 WBC
PHOSPHATE SERPL-MCNC: 3.5 MG/DL (ref 2.6–4.7)
PLATELET # BLD AUTO: 153 K/UL (ref 150–400)
PMV BLD AUTO: 11.6 FL (ref 8.9–12.9)
POTASSIUM BLD-SCNC: 3.3 MMOL/L (ref 3.5–5.1)
POTASSIUM BLD-SCNC: 3.3 MMOL/L (ref 3.5–5.1)
POTASSIUM SERPL-SCNC: 3.5 MMOL/L (ref 3.5–5.1)
RBC # BLD AUTO: 4.01 M/UL (ref 4.1–5.7)
SERVICE CMNT-IMP: ABNORMAL
SODIUM SERPL-SCNC: 142 MMOL/L (ref 136–145)
WBC # BLD AUTO: 4.9 K/UL (ref 4.1–11.1)

## 2023-05-09 PROCEDURE — 97535 SELF CARE MNGMENT TRAINING: CPT | Performed by: OCCUPATIONAL THERAPIST

## 2023-05-09 PROCEDURE — 93971 EXTREMITY STUDY: CPT

## 2023-05-09 PROCEDURE — 1100000003 HC PRIVATE W/ TELEMETRY

## 2023-05-09 PROCEDURE — 6370000000 HC RX 637 (ALT 250 FOR IP): Performed by: STUDENT IN AN ORGANIZED HEALTH CARE EDUCATION/TRAINING PROGRAM

## 2023-05-09 PROCEDURE — 85025 COMPLETE CBC W/AUTO DIFF WBC: CPT

## 2023-05-09 PROCEDURE — 36415 COLL VENOUS BLD VENIPUNCTURE: CPT

## 2023-05-09 PROCEDURE — 84100 ASSAY OF PHOSPHORUS: CPT

## 2023-05-09 PROCEDURE — 83735 ASSAY OF MAGNESIUM: CPT

## 2023-05-09 PROCEDURE — 97140 MANUAL THERAPY 1/> REGIONS: CPT | Performed by: OCCUPATIONAL THERAPIST

## 2023-05-09 PROCEDURE — 92526 ORAL FUNCTION THERAPY: CPT

## 2023-05-09 PROCEDURE — 82962 GLUCOSE BLOOD TEST: CPT

## 2023-05-09 PROCEDURE — 6370000000 HC RX 637 (ALT 250 FOR IP): Performed by: INTERNAL MEDICINE

## 2023-05-09 PROCEDURE — 80048 BASIC METABOLIC PNL TOTAL CA: CPT

## 2023-05-09 PROCEDURE — 2580000003 HC RX 258: Performed by: STUDENT IN AN ORGANIZED HEALTH CARE EDUCATION/TRAINING PROGRAM

## 2023-05-09 RX ORDER — OXYCODONE HYDROCHLORIDE 5 MG/1
2.5 TABLET ORAL EVERY 4 HOURS PRN
Status: DISCONTINUED | OUTPATIENT
Start: 2023-05-09 | End: 2023-05-11 | Stop reason: HOSPADM

## 2023-05-09 RX ORDER — DIPHENHYDRAMINE HYDROCHLORIDE, ZINC ACETATE 2; .1 G/100G; G/100G
CREAM TOPICAL 3 TIMES DAILY PRN
Status: DISCONTINUED | OUTPATIENT
Start: 2023-05-09 | End: 2023-05-11 | Stop reason: HOSPADM

## 2023-05-09 RX ADMIN — HYDRALAZINE HYDROCHLORIDE 100 MG: 50 TABLET, FILM COATED ORAL at 21:37

## 2023-05-09 RX ADMIN — LEVETIRACETAM 1000 MG: 500 TABLET, FILM COATED ORAL at 09:27

## 2023-05-09 RX ADMIN — SODIUM CHLORIDE, PRESERVATIVE FREE 10 ML: 5 INJECTION INTRAVENOUS at 21:42

## 2023-05-09 RX ADMIN — LISINOPRIL 40 MG: 20 TABLET ORAL at 09:26

## 2023-05-09 RX ADMIN — ATORVASTATIN CALCIUM 80 MG: 40 TABLET, FILM COATED ORAL at 21:37

## 2023-05-09 RX ADMIN — CARVEDILOL 12.5 MG: 12.5 TABLET, FILM COATED ORAL at 17:20

## 2023-05-09 RX ADMIN — LEVETIRACETAM 1000 MG: 500 TABLET, FILM COATED ORAL at 21:37

## 2023-05-09 RX ADMIN — HYDROCHLOROTHIAZIDE 12.5 MG: 25 TABLET ORAL at 09:27

## 2023-05-09 RX ADMIN — HYDRALAZINE HYDROCHLORIDE 100 MG: 50 TABLET, FILM COATED ORAL at 09:26

## 2023-05-09 RX ADMIN — APIXABAN 5 MG: 5 TABLET, FILM COATED ORAL at 21:37

## 2023-05-09 RX ADMIN — APIXABAN 5 MG: 5 TABLET, FILM COATED ORAL at 09:27

## 2023-05-09 RX ADMIN — AMLODIPINE BESYLATE 10 MG: 5 TABLET ORAL at 09:26

## 2023-05-09 RX ADMIN — CARVEDILOL 12.5 MG: 12.5 TABLET, FILM COATED ORAL at 09:27

## 2023-05-09 RX ADMIN — HYDRALAZINE HYDROCHLORIDE 100 MG: 50 TABLET, FILM COATED ORAL at 17:20

## 2023-05-09 RX ADMIN — OXYCODONE 2.5 MG: 5 TABLET ORAL at 17:20

## 2023-05-09 RX ADMIN — CLOPIDOGREL BISULFATE 75 MG: 75 TABLET ORAL at 09:27

## 2023-05-09 ASSESSMENT — PAIN DESCRIPTION - ORIENTATION: ORIENTATION: LEFT

## 2023-05-09 ASSESSMENT — PAIN SCALES - GENERAL
PAINLEVEL_OUTOF10: 8
PAINLEVEL_OUTOF10: 7

## 2023-05-09 ASSESSMENT — PAIN SCALES - WONG BAKER: WONGBAKER_NUMERICALRESPONSE: 2

## 2023-05-09 ASSESSMENT — PAIN DESCRIPTION - LOCATION: LOCATION: ARM

## 2023-05-09 NOTE — PROGRESS NOTES
Hospitalist Progress Note    NAME: Karen Unger   :    MRN: [id number]     Date/Time: 2023 12:41 PM  Patient PCP: Naomi Taylor MD    Estimated discharge date:  5/10/23  Barriers: MRI cervical,L arm duplex      Assessment / Plan:  Acute CVA in area of previous CVA  Aphasia vs encephalopathy? L arm pain  Previous MRI last admission showing infarct posterior corona radiata, was previously on aspirin and apixaban, thus was discharged on apixaban and aspirin increased to 325 daily. - had L sided flaccid paralysis and noted being alert but no comment on speech, attempting to determine baseline from family, called   - nonurgent MRI brain noncon obtainedto determine if any new strokes, no last known well  - MRI showed new stroke in posterior corona radiata, site of the old stroke  - consutled Neurology  - stopped aspirin, started plavix, continuing apixaban  - atorva 80  - TTE unchanged from prior, EF 40-45, no pfo  - CTA arm ordered for arm pain out of proportion, showed subq edema, no fracture or arterial insufficiency  - duplex ordered to rule out DVT in L arm, remains pending  - Orthopedics consulted to rule ut compartment syndrome, appreciate assistance, unlikely compartment syndrome per verbal report  - oxy for pain  - PT OT Speech -> home health PT  - MRI cervical to rule out cervical nerve impingement causing this significant pain to patient    Hypokalemia - improved  K 2.5 on admission  - trend, repleted prn     Hypocalcemia - resolved  5.5 on admission, ionized calcium normal on repeat.  Received calcium gluconate in ED?  - trend BMP     Hypernatremia - resolved  - rechecking BMP, started IVF, now off     HTN  Afib  Hx of R carotid artery dissection  - continue home amlodipine, hydral, hctz, lisinopril, coreg, apixaban     Seizure disorder  - home keppra     DM  - hold home glimepiride, metformin while inpatient  - SSI     Medical Decision Making:   I personally reviewed labs: cbc,

## 2023-05-09 NOTE — PROGRESS NOTES
Attempted to schedule hospital follow up for PCP appointment. PCP is having a schedule reduction and appointments are very limited for the first 2 weeks of the go-live Epic transition. CMA unable to schedule pt appointment at this time. Pending patient discharge.  Jovan Snyder, Care Management Assistant

## 2023-05-10 ENCOUNTER — APPOINTMENT (OUTPATIENT)
Facility: HOSPITAL | Age: 68
DRG: 065 | End: 2023-05-10
Payer: MEDICARE

## 2023-05-10 VITALS
BODY MASS INDEX: 27.43 KG/M2 | HEIGHT: 73 IN | TEMPERATURE: 98.5 F | HEART RATE: 65 BPM | OXYGEN SATURATION: 98 % | RESPIRATION RATE: 18 BRPM | WEIGHT: 207 LBS | DIASTOLIC BLOOD PRESSURE: 89 MMHG | SYSTOLIC BLOOD PRESSURE: 142 MMHG

## 2023-05-10 LAB
ANION GAP SERPL CALC-SCNC: 5 MMOL/L (ref 5–15)
BASOPHILS # BLD: 0 K/UL (ref 0–0.1)
BASOPHILS NFR BLD: 0 % (ref 0–1)
BUN SERPL-MCNC: 13 MG/DL (ref 6–20)
BUN/CREAT SERPL: 14 (ref 12–20)
CALCIUM SERPL-MCNC: 8.5 MG/DL (ref 8.5–10.1)
CHLORIDE SERPL-SCNC: 108 MMOL/L (ref 97–108)
CO2 SERPL-SCNC: 29 MMOL/L (ref 21–32)
CREAT SERPL-MCNC: 0.94 MG/DL (ref 0.7–1.3)
DIFFERENTIAL METHOD BLD: ABNORMAL
EOSINOPHIL # BLD: 0.1 K/UL (ref 0–0.4)
EOSINOPHIL NFR BLD: 2 % (ref 0–7)
ERYTHROCYTE [DISTWIDTH] IN BLOOD BY AUTOMATED COUNT: 15.8 % (ref 11.5–14.5)
GLUCOSE BLD STRIP.AUTO-MCNC: 156 MG/DL (ref 65–117)
GLUCOSE BLD STRIP.AUTO-MCNC: 163 MG/DL (ref 65–117)
GLUCOSE BLD STRIP.AUTO-MCNC: 167 MG/DL (ref 65–117)
GLUCOSE BLD STRIP.AUTO-MCNC: 179 MG/DL (ref 65–117)
GLUCOSE SERPL-MCNC: 132 MG/DL (ref 65–100)
HCT VFR BLD AUTO: 33.8 % (ref 36.6–50.3)
HGB BLD-MCNC: 10.9 G/DL (ref 12.1–17)
IMM GRANULOCYTES # BLD AUTO: 0 K/UL (ref 0–0.04)
IMM GRANULOCYTES NFR BLD AUTO: 0 % (ref 0–0.5)
LYMPHOCYTES # BLD: 1.5 K/UL (ref 0.8–3.5)
LYMPHOCYTES NFR BLD: 32 % (ref 12–49)
MAGNESIUM SERPL-MCNC: 1.7 MG/DL (ref 1.6–2.4)
MCH RBC QN AUTO: 27.5 PG (ref 26–34)
MCHC RBC AUTO-ENTMCNC: 32.2 G/DL (ref 30–36.5)
MCV RBC AUTO: 85.4 FL (ref 80–99)
MONOCYTES # BLD: 0.6 K/UL (ref 0–1)
MONOCYTES NFR BLD: 14 % (ref 5–13)
NEUTS SEG # BLD: 2.5 K/UL (ref 1.8–8)
NEUTS SEG NFR BLD: 52 % (ref 32–75)
NRBC # BLD: 0 K/UL (ref 0–0.01)
NRBC BLD-RTO: 0 PER 100 WBC
PHOSPHATE SERPL-MCNC: 3.6 MG/DL (ref 2.6–4.7)
PLATELET # BLD AUTO: 159 K/UL (ref 150–400)
PMV BLD AUTO: 10.6 FL (ref 8.9–12.9)
POTASSIUM SERPL-SCNC: 3.1 MMOL/L (ref 3.5–5.1)
RBC # BLD AUTO: 3.96 M/UL (ref 4.1–5.7)
SERVICE CMNT-IMP: ABNORMAL
SODIUM SERPL-SCNC: 142 MMOL/L (ref 136–145)
WBC # BLD AUTO: 4.7 K/UL (ref 4.1–11.1)

## 2023-05-10 PROCEDURE — 72141 MRI NECK SPINE W/O DYE: CPT

## 2023-05-10 PROCEDURE — 6370000000 HC RX 637 (ALT 250 FOR IP): Performed by: INTERNAL MEDICINE

## 2023-05-10 PROCEDURE — 36415 COLL VENOUS BLD VENIPUNCTURE: CPT

## 2023-05-10 PROCEDURE — 80048 BASIC METABOLIC PNL TOTAL CA: CPT

## 2023-05-10 PROCEDURE — 82962 GLUCOSE BLOOD TEST: CPT

## 2023-05-10 PROCEDURE — 83735 ASSAY OF MAGNESIUM: CPT

## 2023-05-10 PROCEDURE — 84100 ASSAY OF PHOSPHORUS: CPT

## 2023-05-10 PROCEDURE — 85025 COMPLETE CBC W/AUTO DIFF WBC: CPT

## 2023-05-10 PROCEDURE — 6370000000 HC RX 637 (ALT 250 FOR IP): Performed by: STUDENT IN AN ORGANIZED HEALTH CARE EDUCATION/TRAINING PROGRAM

## 2023-05-10 PROCEDURE — 2580000003 HC RX 258: Performed by: STUDENT IN AN ORGANIZED HEALTH CARE EDUCATION/TRAINING PROGRAM

## 2023-05-10 RX ORDER — TRAMADOL HYDROCHLORIDE 50 MG/1
50 TABLET ORAL EVERY 6 HOURS PRN
Qty: 15 TABLET | Refills: 0 | Status: SHIPPED | OUTPATIENT
Start: 2023-05-10 | End: 2023-05-13

## 2023-05-10 RX ORDER — CLOPIDOGREL BISULFATE 75 MG/1
75 TABLET ORAL DAILY
Qty: 30 TABLET | Refills: 1 | Status: SHIPPED | OUTPATIENT
Start: 2023-05-10

## 2023-05-10 RX ORDER — POTASSIUM CHLORIDE 20 MEQ/1
40 TABLET, EXTENDED RELEASE ORAL ONCE
Status: COMPLETED | OUTPATIENT
Start: 2023-05-10 | End: 2023-05-10

## 2023-05-10 RX ADMIN — CARVEDILOL 12.5 MG: 12.5 TABLET, FILM COATED ORAL at 18:22

## 2023-05-10 RX ADMIN — LISINOPRIL 40 MG: 20 TABLET ORAL at 10:11

## 2023-05-10 RX ADMIN — LEVETIRACETAM 1000 MG: 500 TABLET, FILM COATED ORAL at 10:12

## 2023-05-10 RX ADMIN — SODIUM CHLORIDE, PRESERVATIVE FREE 10 ML: 5 INJECTION INTRAVENOUS at 09:02

## 2023-05-10 RX ADMIN — LEVETIRACETAM 1000 MG: 500 TABLET, FILM COATED ORAL at 21:21

## 2023-05-10 RX ADMIN — CLOPIDOGREL BISULFATE 75 MG: 75 TABLET ORAL at 10:12

## 2023-05-10 RX ADMIN — HYDROCHLOROTHIAZIDE 12.5 MG: 25 TABLET ORAL at 10:12

## 2023-05-10 RX ADMIN — CARVEDILOL 12.5 MG: 12.5 TABLET, FILM COATED ORAL at 10:12

## 2023-05-10 RX ADMIN — HYDRALAZINE HYDROCHLORIDE 100 MG: 50 TABLET, FILM COATED ORAL at 10:12

## 2023-05-10 RX ADMIN — APIXABAN 5 MG: 5 TABLET, FILM COATED ORAL at 10:12

## 2023-05-10 RX ADMIN — ATORVASTATIN CALCIUM 80 MG: 40 TABLET, FILM COATED ORAL at 21:21

## 2023-05-10 RX ADMIN — OXYCODONE 2.5 MG: 5 TABLET ORAL at 22:02

## 2023-05-10 RX ADMIN — APIXABAN 5 MG: 5 TABLET, FILM COATED ORAL at 21:21

## 2023-05-10 RX ADMIN — AMLODIPINE BESYLATE 10 MG: 5 TABLET ORAL at 10:12

## 2023-05-10 RX ADMIN — HYDRALAZINE HYDROCHLORIDE 100 MG: 50 TABLET, FILM COATED ORAL at 14:24

## 2023-05-10 RX ADMIN — POTASSIUM CHLORIDE 40 MEQ: 1500 TABLET, EXTENDED RELEASE ORAL at 10:12

## 2023-05-10 RX ADMIN — OXYCODONE 2.5 MG: 5 TABLET ORAL at 11:59

## 2023-05-10 RX ADMIN — HYDRALAZINE HYDROCHLORIDE 100 MG: 50 TABLET, FILM COATED ORAL at 21:21

## 2023-05-10 ASSESSMENT — PAIN DESCRIPTION - ORIENTATION
ORIENTATION: LEFT
ORIENTATION: LEFT

## 2023-05-10 ASSESSMENT — PAIN DESCRIPTION - LOCATION
LOCATION: ARM
LOCATION: ARM

## 2023-05-10 ASSESSMENT — PAIN DESCRIPTION - DESCRIPTORS: DESCRIPTORS: THROBBING;TIGHTNESS;ACHING

## 2023-05-10 ASSESSMENT — PAIN DESCRIPTION - FREQUENCY: FREQUENCY: CONTINUOUS

## 2023-05-10 ASSESSMENT — PAIN SCALES - GENERAL
PAINLEVEL_OUTOF10: 8
PAINLEVEL_OUTOF10: 8

## 2023-05-10 ASSESSMENT — PAIN DESCRIPTION - PAIN TYPE: TYPE: CHRONIC PAIN

## 2023-05-10 NOTE — PROGRESS NOTES
Bedside and Verbal shift change report given to [address], LPN (oncoming nurse) by Jazmín Church RN (offgoing nurse). Report included the following information Nurse Handoff Report, Intake/Output, MAR, and Recent Results. Pt was in pain most of the day but refused to take pain meds. When his daughter came in the evening that is when he agreed to take the pain meds. He had periodic confusion throughout the shift. No other concerns to report.         Tanisha Hinkle

## 2023-05-10 NOTE — PROGRESS NOTES
Bedside shift change report given to Zaida Irene RN (oncoming nurse) by Julissa Barnett LPN (offgoing nurse). Report included the following information Nurse Handoff Report, Adult Overview, and MAR.

## 2023-05-10 NOTE — PROGRESS NOTES
I just spoke with Dr Concepcion Lopez about this patient. There is a significant amount of cord compression at C3,4 and C4,5 as I have just reviewed the MRI of the C spine  But the pt was recently placed on a regimen of Plavix and ASA for recent stroke. So not an immediate candidate for surgery.  I can see him in the office, he will call to make an appointment

## 2023-05-10 NOTE — CONSULTS
5/9/2023      CC: Left hand pain    HPI:      This is a 74y.o. year old male who has a history of stroke 3 months ago, this did leave his left arm unable to actively move. He states he had significant pain since then. He has not had any management of this. In general, he has not had any real management of this left arm, per his report as well as the report of his wife who is present at the time of examination. He has pain along the dorsum of his hand, its been worse over the past 3 days. No management before or after this.       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    Congestive heart failure, unspecified     Dissection of right carotid artery ([facility] 09/2018    Hypertension     Overweight(278.02)     Seizures ([facility]     Stroke ([facility] 2015    right thalamus       PSxHx:  Past Surgical History:   Procedure Laterality Date    CHEST SURGERY      VATS Video-assisted thoracic surgery       Meds:    Current Facility-Administered Medications:     diphenhydrAMINE-zinc acetate 2-0.1 % cream, , Topical, TID PRN, GER Marsh    oxyCODONE (ROXICODONE) immediate release tablet 2.5 mg, 2.5 mg, Oral, Q4H PRN, Nagi Roque MD, 2.5 mg at 05/09/23 1720    clopidogrel (PLAVIX) tablet 75 mg, 75 mg, Oral, Daily, Liv Werner DO, 75 mg at 05/09/23 0927    amLODIPine (NORVASC) tablet 10 mg, 10 mg, Oral, Daily, Nagi Roque MD, 10 mg at 05/09/23 0087    apixaban (ELIQUIS) tablet 5 mg, 5 mg, Oral, BID, Nagi Roque MD, 5 mg at 05/09/23 2137    atorvastatin (LIPITOR) tablet 80 mg, 80 mg, Oral, Nightly, Nagi Roque MD, 80 mg at 05/09/23 2137    carvedilol (COREG) tablet 12.5 mg, 12.5 mg, Oral, BID WC, Nagi Roque MD, 12.5 mg at 05/09/23 1720    hydrALAZINE (APRESOLINE) tablet 100 mg, 100 mg, Oral, TID, Nagi Roque MD, 100 mg at 05/09/23 2137    hydroCHLOROthiazide (HYDRODIURIL) tablet 12.5 mg, 12.5

## 2023-05-10 NOTE — CARE COORDINATION
Transition of Care Plan:     RUR: 14%  Prior Level of Functioning: primarily bed bound, unable to ambulate   Disposition: home with spouse,children with OP therapy at [facility]   If SNF or IPR: Date FOC offered:  Date FOC received:  Accepting facility:  Date authorization started with reference number:  Date authorization received and expires: Follow up appointments: PCP, specialists as indicated   DME needed: N/A, owns DME required for D/C  Transportation at discharge: [facility] medical transport, ETA 5PM  IM/IMM Medicare/ letter given: given on 5/10/23  Is patient a [place] and connected with [facility]? No              If yes, was [employer] transfer form completed and VA notified? Caregiver Contact: Alonzo Conroy (spouse)  Discharge Caregiver contacted prior to discharge? yes  Care Conference needed? No  Barriers to discharge: none        05/10/23 [address] Discharge   Transition of Care Consult (CM Consult) Discharge Planning   Services At/After Discharge Transport;OT;PT   [facility] Information Provided? No   Mode of Transport at Discharge [facility]   Hospital Transport Time of Discharge 1700   Confirm Follow Up Transport Family   Condition of Participation: Discharge Planning   The Plan for Transition of Care is related to the following treatment goals: home with OP therapy at [facility] Patient and/or Patient Representative was provided with a Choice of Provider? Patient Representative; Patient   The Patient and/Or Patient Representative agree with the Discharge Plan? Yes   Freedom of Choice list was provided with basic dialogue that supports the patient's individualized plan of care/goals, treatment preferences, and shares the quality data associated with the providers? Yes       Chart reviewed. CM aware of discharge order. 2nd IMM letter provided at bedside. Signed copy placed into chart. Please see scanned documents. Pt requires medical transport home today.  CM has secured [facility]

## 2023-05-10 NOTE — DISCHARGE INSTRUCTIONS
Patient Discharge Instructions    Hi Nair / [id number] : 1955    Admitted 2023 Discharged: 5/10/2023         DISCHARGE DIAGNOSIS:   Acute CVA in area of previous CVA  Aphasia vs encephalopathy? L arm pain  and weakness due to cervical spinal stenosis   Hypokalemia - improved  Hypocalcemia - resolved  Hypernatremia - resolved  HTN  Afib  Hx of R carotid artery dissection  Seizure disorder  DM      Take Home Medications     {Medication reconciliation information is now added to the patient's AVS automatically when it is printed. There is no need to use this SmartLink in discharge instructions. Highlight this text and delete it to clear this message}      General drug facts     If you have a very bad allergy, wear an allergy ID at all times. It is important that you take the medication exactly as they are prescribed. Keep your medication in the bottles provided by the pharmacist.  Keep a list of all your drugs (prescription, natural products, vitamins, OTC) with you. Give this list to your doctor. Do not take other medications without consulting your doctor. Do not share your drugs with others and do not take anyone else's drugs. Keep all drugs out of the reach of children and pets. Most drugs may be thrown away in household trash after mixing with coffee grounds or judith litter and sealing in a plastic bag. Keep a list Call your doctor for help with any side effects. If in the [place] you may also call the FDA at 7-956-FDA-5472    Talk with the doctor before starting any new drug, including OTC, natural products, or vitamins. What to do at Home    1. Recommended diet: Diabetic     2. Recommended activity: activity as tolerated    3. If you experience any of the following symptoms then please call your primary care physician or return to the emergency room if you cannot get hold of your doctor:    4. Wound Care: none     5. Lab work: cbc and bmp in 1 week     6. Bring

## 2023-05-11 ENCOUNTER — TELEPHONE (OUTPATIENT)
Age: 68
End: 2023-05-11

## 2023-05-11 DIAGNOSIS — M79.603 PAIN OF UPPER EXTREMITY, UNSPECIFIED LATERALITY: Primary | ICD-10-CM

## 2023-05-11 LAB — ECHO BSA: 2.2 M2

## 2023-05-11 RX ORDER — HYDROCODONE BITARTRATE AND ACETAMINOPHEN 5; 325 MG/1; MG/1
1 TABLET ORAL 2 TIMES DAILY PRN
Qty: 14 TABLET | Refills: 0 | Status: SHIPPED | OUTPATIENT
Start: 2023-05-11 | End: 2023-05-18

## 2023-05-11 RX ORDER — METHYLPREDNISOLONE 4 MG/1
TABLET ORAL
Qty: 1 KIT | Refills: 0 | Status: SHIPPED | OUTPATIENT
Start: 2023-05-11 | End: 2023-05-17

## 2023-05-11 NOTE — DISCHARGE SUMMARY
(90 Base) MCG/ACT inhaler  Commonly known as: PROVENTIL;VENTOLIN;PROAIR     amLODIPine 10 MG tablet  Commonly known as: NORVASC     apixaban 5 MG Tabs tablet  Commonly known as: ELIQUIS     atorvastatin 80 MG tablet  Commonly known as: LIPITOR     carvedilol 12.5 MG tablet  Commonly known as: COREG     gabapentin 100 MG capsule  Commonly known as: NEURONTIN     glimepiride 4 MG tablet  Commonly known as: AMARYL     hydrALAZINE 100 MG tablet  Commonly known as: APRESOLINE     hydroCHLOROthiazide 12.5 MG tablet  Commonly known as: HYDRODIURIL     Lancets Misc     levETIRAcetam 500 MG tablet  Commonly known as: KEPPRA     lisinopril 40 MG tablet  Commonly known as: PRINIVIL;ZESTRIL     metFORMIN 500 MG tablet  Commonly known as: GLUCOPHAGE            STOP taking these medications      aspirin 325 MG EC tablet               Where to Get Your Medications        These medications were sent to [address], [address] [phone] [address] [facility], [address]      Phone: [phone]   clopidogrel 75 MG tablet  traMADol 50 MG tablet             DISPOSITION:    Home with Family: y      Home with HH/PT/OT/RN:    SNF/LTC:    CESAR:    OTHER:      1. Recommended diet: Diabetic     2. Recommended activity: activity as tolerated    3. If you experience any of the following symptoms then please call your primary care physician or return to the emergency room if you cannot get hold of your doctor:    4. Wound Care: none     5. Lab work: cbc and bmp in 1 week     6. Bring these papers with you to your follow up appointments. The papers will help your doctors be sure to continue the care plan from the hospital.      If you have questions regarding the hospital related prescriptions or hospital related issues please call [facility] at [phone].  You can always direct your questions to your primary care doctor if you are unable to reach your hospital

## 2023-05-11 NOTE — PLAN OF CARE
Problem: Discharge Planning  Goal: Discharge to home or other facility with appropriate resources  Outcome: Completed     Problem: Pain  Goal: Verbalizes/displays adequate comfort level or baseline comfort level  Outcome: Completed     Problem: Chronic Conditions and Co-morbidities  Goal: Patient's chronic conditions and co-morbidity symptoms are monitored and maintained or improved  Outcome: Completed     Problem: Safety - Adult  Goal: Free from fall injury  Outcome: Completed     Problem: Skin/Tissue Integrity  Goal: Absence of new skin breakdown  Description: 1. Monitor for areas of redness and/or skin breakdown  2. Assess vascular access sites hourly  3. Every 4-6 hours minimum:  Change oxygen saturation probe site  4. Every 4-6 hours:  If on nasal continuous positive airway pressure, respiratory therapy assess nares and determine need for appliance change or resting period.   Outcome: Completed     Problem: ABCDS Injury Assessment  Goal: Absence of physical injury  Outcome: Completed
Problem: Physical Therapy - Adult  Goal: By Discharge: Performs mobility at highest level of function for planned discharge setting. See evaluation for individualized goals. Description: FUNCTIONAL STATUS PRIOR TO ADMISSION: Per chart/patient report, pt was primarily bedbound PTA. He report not walking or standing in \"a long time\". Per chart, pt had recent admit from home to [facility] in March 2023, 4 week [facility] stay. Pt was awaiting  PT to start when he had his new CVA. HOME SUPPORT PRIOR TO ADMISSION: Pt lives in a private home with his daughter and wife. Per chart, pt is primarily bedbound, requires assist with all mobility and self care. Physical Therapy Goals  Initiated 5/8/2023  1. Patient will move from supine to sit and sit to supine, scoot up and down, and roll side to side in bed with moderate assistance within 7 day(s). 2.  Patient will perform sit to stand with maximal assistance within 7 day(s). 3.  Patient will transfer from bed to chair and chair to bed with maximal assistance using the least restrictive device within 7 day(s). 4.  Patient will tolerate sitting upright EOB with assist as needed, >5min with UE support for improved tolerance to upright within 7 day(s). Outcome: Progressing   PHYSICAL THERAPY EVALUATION    Patient: Diana Major (63 y.o. male)  Date: 5/8/2023  Primary Diagnosis: Hypokalemia [E87.6]       Precautions: Fall Risk, L hemiplegia, Skin      ASSESSMENT :   DEFICITS/IMPAIRMENTS:   The patient is limited by decreased functional mobility, strength, sensation, activity tolerance, balance, increased pain levels     Based on the impairments listed above pt presents for PT evaluation supine in bed, agreeable to PT session. Pt has chronic R posterior corona radiata/lentiform nucleus CVA, found on imaging at admission to have new acute R posterior corona CVA in same area, severe increase in L UE/LE pain.  Pt significantly limited on eval by L UE/LE pain,
Problem: SLP Adult - Impaired Swallowing  Goal: By Discharge: Advance to least restrictive diet without signs or symptoms of aspiration for planned discharge setting. See evaluation for individualized goals. Description: Speech Pathology Goals  Initiated 5/8/23    1. Patient will tolerate least restrictive diet without overt s/s aspiration within 7 days. On reg/thins 5/9/23  Outcome: Completed   Speech LAnguage Pathology TREATMENT/DISCHARGE    Patient: Cuauhtemoc Canales (89 y.o. male)  Date: 5/9/2023  Primary Diagnosis: Hypokalemia [E87.6]       Precautions:  Fall Risk                  ASSESSMENT :  Based on the objective data described below, the patient presents with functional swallow of a raw apple and thins. He was biting into the apple with no difficulty. No overt s/s of aspiration. He has cognitive deficits and benefits from repetition of stimuli. He was not oriented to date. Poor recall for rehab after his stroke. He had a new stroke on this admission but no new deficits other than arm pain. Patient will be discharged from skilled speech-language pathology services at this time. PLAN :  Recommendations and Planned Interventions:  Diet: Regular thins        Acute SLP Services: none    Discharge Recommendations: home with home health     SUBJECTIVE:   Patient asked if we had pears in the kitchen.      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
Problem: SLP Adult - Impaired Swallowing  Goal: By Discharge: Advance to least restrictive diet without signs or symptoms of aspiration for planned discharge setting. See evaluation for individualized goals. Description: Speech Pathology Goals  Initiated 5/8/23    1. Patient will tolerate least restrictive diet without overt s/s aspiration within 7 days. Outcome: Progressing     Speech LAnguage Pathology EVALUATION    Patient: Mariane Mohs (20 y.o. male)  Date: 5/8/2023  Primary Diagnosis: Hypokalemia [E87.6]       Precautions: Aspiration Fall Risk                  ASSESSMENT :  Based on the objective data described below, the patient presents with mild oral dysphagia characterized by slow but complete mastication of solids. Suspect oral dysphagia related to missing and poor condition of teeth. Patient demonstrated delayed cough x 1 after successive sips of thin liquids that was not reproduced with subsequent trials. Patient slow to respond but answers simple questions and follow commands. Speech is intelligible at word and phrase level however little verbal output. Patient reports no speech/language/cognition deficits. Full SLP assessment of speech/language not completed at this time given patient report of baseline level of function. Patient will benefit from skilled intervention to address the above impairments. PLAN :  Recommendations and Planned Interventions:  Diet: Easy to chew  Thin liquids  Sit up for all meals  Small bites and single sips  Medication whole as tolerated     Acute SLP Services: Yes, SLP will continue to follow per plan of care. Discharge Recommendations: To Be Determined     SUBJECTIVE:   Patient stated, Juan Pennington. 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
pain. Patient is unable to perform functional mobility beyond rolling in bed today due to pain, total A (max A x1, min A x1). Per the patient he is bed bound mostly at baseline in his hospital bed with help from her wife and daughter. Recommend a trial of acute care OT to address patient/family education and safety with self care. Functional Outcome Measure: The patient scored 0 on the FM outcome measure which is indicative of total impairment in LUE. PLAN :  Recommendations and Planned Interventions:   self care training, therapeutic activities, functional mobility training, balance training, therapeutic exercise, neuromuscular re-education, visual/perceptual training, endurance activities, cognitive retraining, patient education, home safety training, and family training/education    Frequency/Duration: OT Plan of Care: 2 times/week    Recommendation for discharge: (in order for the patient to meet his/her long term goals): Therapy 2 days/week in the home and assist for safety    Other factors to consider for discharge:  Good support at home, has needed DME    IF patient discharges home will need the following DME: patient owns DME required for discharge       SUBJECTIVE:   Patient stated, I do that all from the bed. 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    Congestive heart failure, unspecified     Dissection of right carotid artery ([address] 09/2018    Hypertension     Overweight(278.02)     Seizures ([address]     Stroke ([facility] 2015    right thalamus     Past Surgical History:   Procedure Laterality Date    CHEST SURGERY      VATS Video-assisted thoracic surgery          Expanded or extensive additional review of patient history:   Lives With: Spouse, Daughter  Type of Home: House  Home Equipment: Katheen League, quad, Hospital bed  Has the patient had two or more falls in the past year
Fair  and limited by left sided pain  Please refer to the flowsheet for vital signs taken during this treatment.     After treatment:   Call bell within reach, Side rails x3, and sitting upright and eating    COMMUNICATION/EDUCATION:   The patient's plan of care was discussed with: speech therapist and registered nurse    Patient Education  Education Given To: Patient  Education Provided: Home Exercise Program;ADL Adaptive Strategies (edema management)  Education Method: Demonstration;Verbal  Barriers to Learning: Cognition  Education Outcome: Verbalized understanding;Continued education needed    Thank you for this referral.  Samantha Asher OTR/L  Minutes: 29

## 2023-05-11 NOTE — TELEPHONE ENCOUNTER
Mailbox is full        Wife states that pt was discharged from [facility] on 5-10-23. Pt is in a lot of pain with left arm. He will be having surgery and the therapist from today has suggested something stronger than tylenol to help until the surgery. Therapist will be sending an order for OT & speech.       [address] [facility]

## 2023-05-11 NOTE — PROGRESS NOTES
Patient discharged home via [facility] ambulance. Discharge paper with patient as well as personal belonging. Wife made aware of  time.  No further concern noted

## 2023-05-11 NOTE — TELEPHONE ENCOUNTER
Wife states that pt was discharged from [facility] on 5-10-23. Pt is in a lot of pain with left arm. He will be having surgery and the therapist from today has suggested something stronger than tylenol to help until the surgery. Therapist will be sending an order for OT & speech.       [address] [facility]

## 2023-05-12 NOTE — TELEPHONE ENCOUNTER
Wife called back at this time. 2 identifiers confirmed. Informed that meds were sent in to pharmacy. Also informed that lortab can have sedative effects. No further concerns voiced.

## 2023-05-23 ENCOUNTER — TELEPHONE (OUTPATIENT)
Age: 68
End: 2023-05-23

## 2023-05-31 DIAGNOSIS — M54.2 NECK PAIN: Primary | ICD-10-CM

## 2023-05-31 RX ORDER — METHYLPREDNISOLONE 4 MG/1
TABLET ORAL
Qty: 1 KIT | Refills: 0 | Status: SHIPPED | OUTPATIENT
Start: 2023-05-31 | End: 2023-06-06

## 2023-05-31 RX ORDER — TRAMADOL HYDROCHLORIDE 50 MG/1
50 TABLET ORAL EVERY 8 HOURS PRN
Qty: 30 TABLET | Refills: 0 | Status: SHIPPED | OUTPATIENT
Start: 2023-05-31 | End: 2023-06-10

## 2023-05-31 NOTE — TELEPHONE ENCOUNTER
Wife states that she is still waiting on a call about the surgery and how much pain pt is in. She has not gotten a call back and would like to know why?       If she doesn't answer you can call her daughter, Airam Mckeon #[phone]

## 2023-05-31 NOTE — TELEPHONE ENCOUNTER
Pt wife called back. 2 identifiers confirmed. Notified that medications were sent in to pharmacy. Dr. Noel Aas office called with message left. No further concerns voiced.

## 2023-06-02 NOTE — TELEPHONE ENCOUNTER
States that they still cannot get a hold of surgeon to schedule the surgery    Psr provided the caller with the surgeon's contact info again to try to schedule the appt.     States pt is in pain    States if they don't hear back from surgeon, they will contact dr Haroldo Schulz office every day until \"someone steps up to help resolve this\"

## 2023-06-21 ENCOUNTER — TELEPHONE (OUTPATIENT)
Age: 68
End: 2023-06-21

## 2023-06-21 NOTE — TELEPHONE ENCOUNTER
----- Message from Lali Zeng sent at 6/21/2023 12:51 PM EDT -----  Subject: Appointment Request    Reason for Call: Established Patient Appointment needed: Routine Existing   Condition Follow Up    QUESTIONS    Reason for appointment request? No appointments available during search     Additional Information for Provider? Patient is in need of a follow apt.    Please contact Mrs Pasquale Warren She stated that he needs a follow up  ---------------------------------------------------------------------------  --------------  [id number]  [phone]; OK to leave message on voicemail  ---------------------------------------------------------------------------  --------------  SCRIPT ANSWERS

## 2023-06-21 NOTE — TELEPHONE ENCOUNTER
Patient's wife states he needs a pre op appt either this week or next week. No availability. Please advise. Thanks.

## 2023-06-30 ENCOUNTER — OFFICE VISIT (OUTPATIENT)
Age: 68
End: 2023-06-30
Payer: MEDICARE

## 2023-06-30 VITALS
TEMPERATURE: 97 F | SYSTOLIC BLOOD PRESSURE: 104 MMHG | HEART RATE: 56 BPM | BODY MASS INDEX: 30.48 KG/M2 | RESPIRATION RATE: 16 BRPM | WEIGHT: 225 LBS | HEIGHT: 72 IN | OXYGEN SATURATION: 97 % | DIASTOLIC BLOOD PRESSURE: 80 MMHG

## 2023-06-30 DIAGNOSIS — I48.0 PAF (PAROXYSMAL ATRIAL FIBRILLATION) (HCC): ICD-10-CM

## 2023-06-30 DIAGNOSIS — I10 HYPERTENSION, UNSPECIFIED TYPE: ICD-10-CM

## 2023-06-30 DIAGNOSIS — I50.22 CHRONIC SYSTOLIC (CONGESTIVE) HEART FAILURE (HCC): ICD-10-CM

## 2023-06-30 DIAGNOSIS — Z86.73 HISTORY OF CEREBROVASCULAR ACCIDENT: ICD-10-CM

## 2023-06-30 DIAGNOSIS — Z01.818 PREOP EXAMINATION: Primary | ICD-10-CM

## 2023-06-30 DIAGNOSIS — E11.69 TYPE 2 DIABETES MELLITUS WITH OTHER SPECIFIED COMPLICATION, WITHOUT LONG-TERM CURRENT USE OF INSULIN (HCC): ICD-10-CM

## 2023-06-30 PROBLEM — D68.69 SECONDARY HYPERCOAGULABLE STATE (HCC): Status: ACTIVE | Noted: 2023-06-30

## 2023-06-30 PROCEDURE — 3051F HG A1C>EQUAL 7.0%<8.0%: CPT | Performed by: INTERNAL MEDICINE

## 2023-06-30 PROCEDURE — 1123F ACP DISCUSS/DSCN MKR DOCD: CPT | Performed by: INTERNAL MEDICINE

## 2023-06-30 PROCEDURE — 3079F DIAST BP 80-89 MM HG: CPT | Performed by: INTERNAL MEDICINE

## 2023-06-30 PROCEDURE — 3074F SYST BP LT 130 MM HG: CPT | Performed by: INTERNAL MEDICINE

## 2023-06-30 PROCEDURE — 99213 OFFICE O/P EST LOW 20 MIN: CPT | Performed by: INTERNAL MEDICINE

## 2023-06-30 RX ORDER — CLOPIDOGREL BISULFATE 75 MG/1
75 TABLET ORAL DAILY
Qty: 90 TABLET | Refills: 3 | Status: SHIPPED | OUTPATIENT
Start: 2023-06-30

## 2023-06-30 SDOH — ECONOMIC STABILITY: INCOME INSECURITY: HOW HARD IS IT FOR YOU TO PAY FOR THE VERY BASICS LIKE FOOD, HOUSING, MEDICAL CARE, AND HEATING?: NOT HARD AT ALL

## 2023-06-30 SDOH — ECONOMIC STABILITY: FOOD INSECURITY: WITHIN THE PAST 12 MONTHS, YOU WORRIED THAT YOUR FOOD WOULD RUN OUT BEFORE YOU GOT MONEY TO BUY MORE.: NEVER TRUE

## 2023-06-30 SDOH — ECONOMIC STABILITY: FOOD INSECURITY: WITHIN THE PAST 12 MONTHS, THE FOOD YOU BOUGHT JUST DIDN'T LAST AND YOU DIDN'T HAVE MONEY TO GET MORE.: NEVER TRUE

## 2023-06-30 SDOH — ECONOMIC STABILITY: HOUSING INSECURITY
IN THE LAST 12 MONTHS, WAS THERE A TIME WHEN YOU DID NOT HAVE A STEADY PLACE TO SLEEP OR SLEPT IN A SHELTER (INCLUDING NOW)?: NO

## 2023-06-30 ASSESSMENT — ENCOUNTER SYMPTOMS
GASTROINTESTINAL NEGATIVE: 1
ALLERGIC/IMMUNOLOGIC NEGATIVE: 1
RESPIRATORY NEGATIVE: 1
EYES NEGATIVE: 1

## 2023-07-06 DIAGNOSIS — I50.22 CHRONIC SYSTOLIC (CONGESTIVE) HEART FAILURE (HCC): Primary | ICD-10-CM

## 2023-07-18 ENCOUNTER — TELEMEDICINE (OUTPATIENT)
Age: 68
End: 2023-07-18
Payer: MEDICARE

## 2023-07-18 DIAGNOSIS — E11.9 TYPE 2 DIABETES MELLITUS WITHOUT COMPLICATION, WITHOUT LONG-TERM CURRENT USE OF INSULIN (HCC): ICD-10-CM

## 2023-07-18 DIAGNOSIS — I10 HYPERTENSION, UNSPECIFIED TYPE: ICD-10-CM

## 2023-07-18 DIAGNOSIS — I48.0 PAF (PAROXYSMAL ATRIAL FIBRILLATION) (HCC): ICD-10-CM

## 2023-07-18 DIAGNOSIS — Z86.73 HISTORY OF CEREBROVASCULAR ACCIDENT: Primary | ICD-10-CM

## 2023-07-18 DIAGNOSIS — M48.02 CERVICAL STENOSIS OF SPINE: ICD-10-CM

## 2023-07-18 PROBLEM — E11.22 TYPE 2 DIABETES MELLITUS WITH CHRONIC KIDNEY DISEASE (HCC): Status: ACTIVE | Noted: 2023-07-18

## 2023-07-18 PROCEDURE — 99213 OFFICE O/P EST LOW 20 MIN: CPT | Performed by: INTERNAL MEDICINE

## 2023-07-18 PROCEDURE — 1123F ACP DISCUSS/DSCN MKR DOCD: CPT | Performed by: INTERNAL MEDICINE

## 2023-07-18 PROCEDURE — 3051F HG A1C>EQUAL 7.0%<8.0%: CPT | Performed by: INTERNAL MEDICINE

## 2023-07-18 RX ORDER — ACETAMINOPHEN 500 MG
500 TABLET ORAL EVERY 4 HOURS PRN
COMMUNITY

## 2023-08-01 ENCOUNTER — HOSPITAL ENCOUNTER (OUTPATIENT)
Facility: HOSPITAL | Age: 68
Discharge: HOME | End: 2023-08-04
Payer: MEDICARE

## 2023-08-01 ENCOUNTER — APPOINTMENT (OUTPATIENT)
Facility: HOSPITAL | Age: 68
End: 2023-08-01
Payer: MEDICARE

## 2023-08-01 VITALS
SYSTOLIC BLOOD PRESSURE: 152 MMHG | OXYGEN SATURATION: 100 % | HEIGHT: 73 IN | WEIGHT: 220 LBS | RESPIRATION RATE: 16 BRPM | TEMPERATURE: 97.5 F | HEART RATE: 61 BPM | BODY MASS INDEX: 29.16 KG/M2 | DIASTOLIC BLOOD PRESSURE: 90 MMHG

## 2023-08-01 LAB
ABO + RH BLD: NORMAL
ALBUMIN SERPL-MCNC: 3.4 G/DL (ref 3.5–5)
ALBUMIN/GLOB SERPL: 0.7 (ref 1.1–2.2)
ALP SERPL-CCNC: 85 U/L (ref 45–117)
ALT SERPL-CCNC: 14 U/L (ref 12–78)
ANION GAP SERPL CALC-SCNC: 8 MMOL/L (ref 5–15)
APPEARANCE UR: CLEAR
APTT PPP: 29.9 SEC (ref 22.1–31)
AST SERPL-CCNC: 12 U/L (ref 15–37)
BACTERIA URNS QL MICRO: NEGATIVE /HPF
BASOPHILS # BLD: 0 K/UL (ref 0–0.1)
BASOPHILS NFR BLD: 0 % (ref 0–1)
BILIRUB SERPL-MCNC: 0.2 MG/DL (ref 0.2–1)
BILIRUB UR QL: NEGATIVE
BLASTS NFR BLD MANUAL: 0 %
BLOOD GROUP ANTIBODIES SERPL: NORMAL
BUN SERPL-MCNC: 12 MG/DL (ref 6–20)
BUN/CREAT SERPL: 10 (ref 12–20)
CALCIUM SERPL-MCNC: 8.6 MG/DL (ref 8.5–10.1)
CHLORIDE SERPL-SCNC: 102 MMOL/L (ref 97–108)
CO2 SERPL-SCNC: 27 MMOL/L (ref 21–32)
COLOR UR: ABNORMAL
CREAT SERPL-MCNC: 1.15 MG/DL (ref 0.7–1.3)
DIFFERENTIAL METHOD BLD: ABNORMAL
EOSINOPHIL # BLD: 0.1 K/UL (ref 0–0.4)
EOSINOPHIL NFR BLD: 1 % (ref 0–7)
EPITH CASTS URNS QL MICRO: ABNORMAL /LPF
ERYTHROCYTE [DISTWIDTH] IN BLOOD BY AUTOMATED COUNT: 13.2 % (ref 11.5–14.5)
EST. AVERAGE GLUCOSE BLD GHB EST-MCNC: 157 MG/DL
GLOBULIN SER CALC-MCNC: 5 G/DL (ref 2–4)
GLUCOSE SERPL-MCNC: 127 MG/DL (ref 65–100)
GLUCOSE UR STRIP.AUTO-MCNC: NEGATIVE MG/DL
GRAN CASTS URNS QL MICRO: ABNORMAL /LPF
HBA1C MFR BLD: 7.1 % (ref 4–5.6)
HCT VFR BLD AUTO: 40.1 % (ref 36.6–50.3)
HGB BLD-MCNC: 12.7 G/DL (ref 12.1–17)
HGB UR QL STRIP: NEGATIVE
HYALINE CASTS URNS QL MICRO: ABNORMAL /LPF (ref 0–5)
IMM GRANULOCYTES # BLD AUTO: 0 K/UL
IMM GRANULOCYTES NFR BLD AUTO: 0 %
INR PPP: 1.1 (ref 0.9–1.1)
KETONES UR QL STRIP.AUTO: ABNORMAL MG/DL
LEUKOCYTE ESTERASE UR QL STRIP.AUTO: NEGATIVE
LYMPHOCYTES # BLD: 1.6 K/UL (ref 0.8–3.5)
LYMPHOCYTES NFR BLD: 27 % (ref 12–49)
MCH RBC QN AUTO: 27.7 PG (ref 26–34)
MCHC RBC AUTO-ENTMCNC: 31.7 G/DL (ref 30–36.5)
MCV RBC AUTO: 87.6 FL (ref 80–99)
METAMYELOCYTES NFR BLD MANUAL: 0 %
MONOCYTES # BLD: 0.2 K/UL (ref 0–1)
MONOCYTES NFR BLD: 3 % (ref 5–13)
MYELOCYTES NFR BLD MANUAL: 0 %
NEUTS BAND NFR BLD MANUAL: 0 % (ref 0–6)
NEUTS SEG # BLD: 4 K/UL (ref 1.8–8)
NEUTS SEG NFR BLD: 69 % (ref 32–75)
NITRITE UR QL STRIP.AUTO: NEGATIVE
NRBC # BLD: 0 K/UL (ref 0–0.01)
NRBC BLD-RTO: 0 PER 100 WBC
OTHER CELLS NFR BLD MANUAL: 0
PH UR STRIP: 5 (ref 5–8)
PLATELET # BLD AUTO: 337 K/UL (ref 150–400)
PMV BLD AUTO: 10.2 FL (ref 8.9–12.9)
POTASSIUM SERPL-SCNC: 3.5 MMOL/L (ref 3.5–5.1)
PROMYELOCYTES NFR BLD MANUAL: 0 %
PROT SERPL-MCNC: 8.4 G/DL (ref 6.4–8.2)
PROT UR STRIP-MCNC: 100 MG/DL
PROTHROMBIN TIME: 11.4 SEC (ref 9–11.1)
RBC # BLD AUTO: 4.58 M/UL (ref 4.1–5.7)
RBC #/AREA URNS HPF: ABNORMAL /HPF (ref 0–5)
RBC MORPH BLD: ABNORMAL
SODIUM SERPL-SCNC: 137 MMOL/L (ref 136–145)
SP GR UR REFRACTOMETRY: 1.02
SPECIMEN EXP DATE BLD: NORMAL
THERAPEUTIC RANGE: NORMAL SECS (ref 58–77)
URINE CULTURE IF INDICATED: ABNORMAL
UROBILINOGEN UR QL STRIP.AUTO: 1 EU/DL (ref 0.2–1)
WBC # BLD AUTO: 5.9 K/UL (ref 4.1–11.1)
WBC URNS QL MICRO: ABNORMAL /HPF (ref 0–4)

## 2023-08-01 PROCEDURE — 85730 THROMBOPLASTIN TIME PARTIAL: CPT

## 2023-08-01 PROCEDURE — 86850 RBC ANTIBODY SCREEN: CPT

## 2023-08-01 PROCEDURE — 83036 HEMOGLOBIN GLYCOSYLATED A1C: CPT

## 2023-08-01 PROCEDURE — 86901 BLOOD TYPING SEROLOGIC RH(D): CPT

## 2023-08-01 PROCEDURE — 85610 PROTHROMBIN TIME: CPT

## 2023-08-01 PROCEDURE — 36415 COLL VENOUS BLD VENIPUNCTURE: CPT

## 2023-08-01 PROCEDURE — 97530 THERAPEUTIC ACTIVITIES: CPT

## 2023-08-01 PROCEDURE — 85007 BL SMEAR W/DIFF WBC COUNT: CPT

## 2023-08-01 PROCEDURE — 97161 PT EVAL LOW COMPLEX 20 MIN: CPT

## 2023-08-01 PROCEDURE — 81001 URINALYSIS AUTO W/SCOPE: CPT

## 2023-08-01 PROCEDURE — 86900 BLOOD TYPING SEROLOGIC ABO: CPT

## 2023-08-01 PROCEDURE — NSP99 NSP99 NON-BILLABLE CODE: Performed by: NURSE PRACTITIONER

## 2023-08-01 PROCEDURE — 80053 COMPREHEN METABOLIC PANEL: CPT

## 2023-08-01 PROCEDURE — 85027 COMPLETE CBC AUTOMATED: CPT

## 2023-08-01 RX ORDER — ENOXAPARIN SODIUM 300 MG/3ML
INJECTION INTRAVENOUS; SUBCUTANEOUS 2 TIMES DAILY
COMMUNITY
End: 2023-08-02

## 2023-08-01 RX ORDER — PREGABALIN 150 MG/1
150 CAPSULE ORAL ONCE
OUTPATIENT
Start: 2023-08-15

## 2023-08-01 RX ORDER — ACETAMINOPHEN 500 MG
1000 TABLET ORAL ONCE
OUTPATIENT
Start: 2023-08-15

## 2023-08-01 RX ORDER — SODIUM CHLORIDE, SODIUM LACTATE, POTASSIUM CHLORIDE, CALCIUM CHLORIDE 600; 310; 30; 20 MG/100ML; MG/100ML; MG/100ML; MG/100ML
INJECTION, SOLUTION INTRAVENOUS CONTINUOUS
OUTPATIENT
Start: 2023-08-15

## 2023-08-01 ASSESSMENT — PAIN SCALES - GENERAL: PAINLEVEL_OUTOF10: 0

## 2023-08-01 NOTE — PROGRESS NOTES
EKG done on 6/23/23 at Dr. Brook Mora office. Cardiac clearance in chart. The clearance states for patient to stop his Eliquis and Plavix 3 days prior to surgery and to take Lovenox twice a day on those 3 days. Patient's wife, Malorie Church, states Dr. Paris Landau is supposed to be sending in the prescription for the Lovenox. She verbalized understanding of the instructions. Pt. Has left side paraplegia since stroke in Feb. He is unable to stand. Has sushila lift and hospital bed at home and his wife takes care of him. He still goes to outpatient physical therapy as well. Left hand is swollen. Pt. Was transported to hospital today by wheelchair Jason Conception but on day of surgery patient's wife states she has family to help get him in/out of car for transport the morning of surgery.

## 2023-08-01 NOTE — PROGRESS NOTES

## 2023-08-01 NOTE — PROGRESS NOTES
[facility]    PHYSICAL THERAPY PRE-SURGERY EVALUATION       Date: 2023  Patient: Nirmala Dailey (56 y.o. 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 Patient has history of multiple CVAs and presents with dense left hemiplegia, is W/C bound and Veronica dependent. Discussed anticipated disposition to home with possible discharge within a 1 to 2 day time frame post-surgery. Patient's  was present for the session. Patient and  in agreement. Anticipate patient will need acute PT and OT orders based on present deficits. GOALS: (Goals have been discussed and agreed upon with patient.)  DISCHARGE GOALS: Time Frame: 1 DAY  Patient will demonstrate increased strength, range of motion, and pain control via a home exercise program in order to minimize functional deficits in preparation for their upcoming surgery. This will be achieved by using education, demonstration, and through the use of an informational handout including a home exercise program.  REHABILITATION POTENTIAL FOR STATED GOALS:  NA     RECOMMENDATIONS AND PLANNED INTERVENTIONS: (Benefits and precautions of physical therapy have been discussed with the patient.)  Home Exercise Program  TREATMENT PLAN EFFECTIVE DATES: 2023 to 2023  FREQUENCY/DURATION: Patient to continue to perform home exercise program at least twice daily until surgery.        SUBJECTIVE:

## 2023-08-01 NOTE — PROGRESS NOTES
[facility]  \"We've Got Your Back! Caring For Yourself Before and After Spine Surgery\" handbook was provided & reviewed with the patient during the pre-admission testing (PAT) appointment. An opportunity for questions was provided, patient verbalized understanding.

## 2023-08-01 NOTE — PROGRESS NOTES
BELINDA Stone at Dr. Carey Ying office to clarify procedure. The orders state C3,4 and C5,6 but what is scheduled in computer states C3,4 and C4,5.

## 2023-08-02 LAB
BACTERIA SPEC CULT: ABNORMAL
BACTERIA SPEC CULT: ABNORMAL
SERVICE CMNT-IMP: ABNORMAL

## 2023-08-03 PROBLEM — Z01.818 ENCOUNTER FOR PREADMISSION TESTING: Status: ACTIVE | Noted: 2023-08-03

## 2023-08-03 NOTE — PERIOP NOTE
Called pt and spoke to his wife about MRSA culture results. Mupirocin prescription sent to pt's pharmacy via ZOE Cárdenas NP. Pt told to take a pea sized amount on q tip and swab both nostrils twice a day for five days. Pt's wife verbalized understanding.
/or transitional urothelial cells. Renal tubular cells, if present, are separately identified as such. BACTERIA, URINE 08/01/2023 Negative  NEG /hpf Final    Urine Culture if Indicated 08/01/2023 CULTURE NOT INDICATED BY UA RESULT  CNI   Final    Hyaline Casts, UA 08/01/2023 10-20  0 - 5 /lpf Final    Granular Casts, UA 08/01/2023 0-2 (A)  NEG /lpf Final    Crossmatch expiration date 08/01/2023 08/15/2023,2359   Final    ABO/Rh 08/01/2023 O NEGATIVE   Final    Antibody Screen 08/01/2023 NEG   Final    Hemoglobin A1C 08/01/2023 7.1 (H)  4.0 - 5.6 % Final    Comment: NEW METHOD  PLEASE NOTE NEW REFERENCE RANGE  (NOTE)  HbA1C Interpretive Ranges  <5.7              Normal  5.7 - 6.4         Consider Prediabetes  >6.5              Consider Diabetes      eAG 08/01/2023 157  mg/dL Final        Xray Result (most recent):  XR CHEST PORTABLE 03/06/2021    Narrative  EXAM: XR CHEST PORT    INDICATION: CVA    COMPARISON: CT thorax 7/17/2020 and chest x-ray of 10/27/2017. FINDINGS: A portable AP radiograph of the chest was obtained at 12:54 hours. The  patient is on a cardiac monitor. There is redemonstration of elevation left  diaphragm and cardiomegaly. The mediastinal contours and pulmonary vascularity  are normal.  The chest wall structures and visualized upper abdomen show no  acute findings with incidental note of degenerative spine changes. Impression  No acute findings. Cardia megaly. Skin:   Denies open wounds, cuts, sores, rashes or other areas of concern in PAT assessment. Ray Keepers, APRN - NP     8/2/23 Dr Jose Enrique Colon recommends oral anticoagulation be held 3-5 days prior to surgery he should be covered w/ Lovenox 1 mg/kg SQ every 12 hours. Oral anticoagulation can be resumed after surgery (per 7/27/23 documentation from Dr. Jose Enrique Colon).

## 2023-08-12 ENCOUNTER — HOSPITAL ENCOUNTER (INPATIENT)
Facility: HOSPITAL | Age: 68
LOS: 3 days | Discharge: HOME | DRG: 552 | End: 2023-08-15
Attending: NEUROLOGICAL SURGERY | Admitting: INTERNAL MEDICINE
Payer: MEDICARE

## 2023-08-12 LAB
BASOPHILS # BLD: 0 K/UL (ref 0–0.1)
BASOPHILS NFR BLD: 1 % (ref 0–1)
DIFFERENTIAL METHOD BLD: NORMAL
EOSINOPHIL # BLD: 0.1 K/UL (ref 0–0.4)
EOSINOPHIL NFR BLD: 2 % (ref 0–7)
ERYTHROCYTE [DISTWIDTH] IN BLOOD BY AUTOMATED COUNT: 13.2 % (ref 11.5–14.5)
GLUCOSE BLD STRIP.AUTO-MCNC: 110 MG/DL (ref 65–117)
GLUCOSE BLD STRIP.AUTO-MCNC: 111 MG/DL (ref 65–117)
GLUCOSE BLD STRIP.AUTO-MCNC: 92 MG/DL (ref 65–117)
HCT VFR BLD AUTO: 39.4 % (ref 36.6–50.3)
HGB BLD-MCNC: 12.8 G/DL (ref 12.1–17)
IMM GRANULOCYTES # BLD AUTO: 0 K/UL (ref 0–0.04)
IMM GRANULOCYTES NFR BLD AUTO: 0 % (ref 0–0.5)
LYMPHOCYTES # BLD: 2.4 K/UL (ref 0.8–3.5)
LYMPHOCYTES NFR BLD: 37 % (ref 12–49)
MCH RBC QN AUTO: 27.9 PG (ref 26–34)
MCHC RBC AUTO-ENTMCNC: 32.5 G/DL (ref 30–36.5)
MCV RBC AUTO: 85.8 FL (ref 80–99)
MONOCYTES # BLD: 0.8 K/UL (ref 0–1)
MONOCYTES NFR BLD: 13 % (ref 5–13)
NEUTS SEG # BLD: 3 K/UL (ref 1.8–8)
NEUTS SEG NFR BLD: 47 % (ref 32–75)
NRBC # BLD: 0 K/UL (ref 0–0.01)
NRBC BLD-RTO: 0 PER 100 WBC
PLATELET # BLD AUTO: 265 K/UL (ref 150–400)
PMV BLD AUTO: 10.8 FL (ref 8.9–12.9)
RBC # BLD AUTO: 4.59 M/UL (ref 4.1–5.7)
SERVICE CMNT-IMP: NORMAL
WBC # BLD AUTO: 6.3 K/UL (ref 4.1–11.1)

## 2023-08-12 PROCEDURE — 85025 COMPLETE CBC W/AUTO DIFF WBC: CPT

## 2023-08-12 PROCEDURE — 6360000002 HC RX W HCPCS: Performed by: INTERNAL MEDICINE

## 2023-08-12 PROCEDURE — 6370000000 HC RX 637 (ALT 250 FOR IP): Performed by: INTERNAL MEDICINE

## 2023-08-12 PROCEDURE — 1100000000 HC RM PRIVATE

## 2023-08-12 PROCEDURE — 82962 GLUCOSE BLOOD TEST: CPT

## 2023-08-12 PROCEDURE — 36415 COLL VENOUS BLD VENIPUNCTURE: CPT

## 2023-08-12 PROCEDURE — 2580000003 HC RX 258: Performed by: INTERNAL MEDICINE

## 2023-08-12 RX ORDER — HYDRALAZINE HYDROCHLORIDE 50 MG/1
100 TABLET, FILM COATED ORAL 3 TIMES DAILY
Status: DISCONTINUED | OUTPATIENT
Start: 2023-08-12 | End: 2023-08-15 | Stop reason: HOSPADM

## 2023-08-12 RX ORDER — AMLODIPINE BESYLATE 5 MG/1
10 TABLET ORAL DAILY
Status: DISCONTINUED | OUTPATIENT
Start: 2023-08-12 | End: 2023-08-15 | Stop reason: HOSPADM

## 2023-08-12 RX ORDER — ALBUTEROL SULFATE 90 UG/1
2 AEROSOL, METERED RESPIRATORY (INHALATION) EVERY 4 HOURS PRN
Status: DISCONTINUED | OUTPATIENT
Start: 2023-08-12 | End: 2023-08-12 | Stop reason: CLARIF

## 2023-08-12 RX ORDER — GLIPIZIDE 5 MG/1
10 TABLET ORAL
Status: DISCONTINUED | OUTPATIENT
Start: 2023-08-13 | End: 2023-08-15 | Stop reason: HOSPADM

## 2023-08-12 RX ORDER — CARVEDILOL 12.5 MG/1
12.5 TABLET ORAL 2 TIMES DAILY WITH MEALS
Status: DISCONTINUED | OUTPATIENT
Start: 2023-08-12 | End: 2023-08-15 | Stop reason: HOSPADM

## 2023-08-12 RX ORDER — HYDRALAZINE HYDROCHLORIDE 20 MG/ML
10 INJECTION INTRAMUSCULAR; INTRAVENOUS EVERY 6 HOURS PRN
Status: DISCONTINUED | OUTPATIENT
Start: 2023-08-12 | End: 2023-08-15 | Stop reason: HOSPADM

## 2023-08-12 RX ORDER — LANOLIN ALCOHOL/MO/W.PET/CERES
3 CREAM (GRAM) TOPICAL NIGHTLY PRN
Status: DISCONTINUED | OUTPATIENT
Start: 2023-08-12 | End: 2023-08-15 | Stop reason: HOSPADM

## 2023-08-12 RX ORDER — ONDANSETRON 4 MG/1
4 TABLET, ORALLY DISINTEGRATING ORAL EVERY 8 HOURS PRN
Status: DISCONTINUED | OUTPATIENT
Start: 2023-08-12 | End: 2023-08-15 | Stop reason: HOSPADM

## 2023-08-12 RX ORDER — CLOPIDOGREL BISULFATE 75 MG/1
75 TABLET ORAL DAILY
COMMUNITY

## 2023-08-12 RX ORDER — ENOXAPARIN SODIUM 100 MG/ML
1 INJECTION SUBCUTANEOUS 2 TIMES DAILY
Status: DISCONTINUED | OUTPATIENT
Start: 2023-08-12 | End: 2023-08-15 | Stop reason: HOSPADM

## 2023-08-12 RX ORDER — POLYETHYLENE GLYCOL 3350 17 G/17G
17 POWDER, FOR SOLUTION ORAL DAILY PRN
Status: DISCONTINUED | OUTPATIENT
Start: 2023-08-12 | End: 2023-08-15 | Stop reason: HOSPADM

## 2023-08-12 RX ORDER — LISINOPRIL 20 MG/1
40 TABLET ORAL DAILY
Status: DISCONTINUED | OUTPATIENT
Start: 2023-08-12 | End: 2023-08-15 | Stop reason: HOSPADM

## 2023-08-12 RX ORDER — ALBUTEROL SULFATE 2.5 MG/3ML
2.5 SOLUTION RESPIRATORY (INHALATION) EVERY 4 HOURS PRN
Status: DISCONTINUED | OUTPATIENT
Start: 2023-08-12 | End: 2023-08-15 | Stop reason: HOSPADM

## 2023-08-12 RX ORDER — SODIUM CHLORIDE 0.9 % (FLUSH) 0.9 %
5-40 SYRINGE (ML) INJECTION PRN
Status: DISCONTINUED | OUTPATIENT
Start: 2023-08-12 | End: 2023-08-15 | Stop reason: HOSPADM

## 2023-08-12 RX ORDER — ONDANSETRON 2 MG/ML
4 INJECTION INTRAMUSCULAR; INTRAVENOUS EVERY 6 HOURS PRN
Status: DISCONTINUED | OUTPATIENT
Start: 2023-08-12 | End: 2023-08-15 | Stop reason: HOSPADM

## 2023-08-12 RX ORDER — CASTOR OIL AND BALSAM, PERU 788; 87 MG/G; MG/G
OINTMENT TOPICAL 2 TIMES DAILY
Status: DISCONTINUED | OUTPATIENT
Start: 2023-08-12 | End: 2023-08-15 | Stop reason: HOSPADM

## 2023-08-12 RX ORDER — SODIUM CHLORIDE 9 MG/ML
INJECTION, SOLUTION INTRAVENOUS PRN
Status: DISCONTINUED | OUTPATIENT
Start: 2023-08-12 | End: 2023-08-15 | Stop reason: HOSPADM

## 2023-08-12 RX ORDER — GLIMEPIRIDE 4 MG/1
4 TABLET ORAL
Status: DISCONTINUED | OUTPATIENT
Start: 2023-08-13 | End: 2023-08-12 | Stop reason: CLARIF

## 2023-08-12 RX ORDER — LEVETIRACETAM 500 MG/1
1000 TABLET ORAL 2 TIMES DAILY
Status: DISCONTINUED | OUTPATIENT
Start: 2023-08-12 | End: 2023-08-15 | Stop reason: HOSPADM

## 2023-08-12 RX ORDER — INSULIN LISPRO 100 [IU]/ML
0-8 INJECTION, SOLUTION INTRAVENOUS; SUBCUTANEOUS
Status: DISCONTINUED | OUTPATIENT
Start: 2023-08-12 | End: 2023-08-15 | Stop reason: HOSPADM

## 2023-08-12 RX ORDER — ACETAMINOPHEN 650 MG/1
650 SUPPOSITORY RECTAL EVERY 6 HOURS PRN
Status: DISCONTINUED | OUTPATIENT
Start: 2023-08-12 | End: 2023-08-15 | Stop reason: HOSPADM

## 2023-08-12 RX ORDER — CLOPIDOGREL BISULFATE 75 MG/1
75 TABLET ORAL DAILY
Status: DISCONTINUED | OUTPATIENT
Start: 2023-08-12 | End: 2023-08-15 | Stop reason: HOSPADM

## 2023-08-12 RX ORDER — INSULIN LISPRO 100 [IU]/ML
0-4 INJECTION, SOLUTION INTRAVENOUS; SUBCUTANEOUS NIGHTLY
Status: DISCONTINUED | OUTPATIENT
Start: 2023-08-12 | End: 2023-08-15 | Stop reason: HOSPADM

## 2023-08-12 RX ORDER — SODIUM CHLORIDE 0.9 % (FLUSH) 0.9 %
5-40 SYRINGE (ML) INJECTION EVERY 12 HOURS SCHEDULED
Status: DISCONTINUED | OUTPATIENT
Start: 2023-08-12 | End: 2023-08-15 | Stop reason: HOSPADM

## 2023-08-12 RX ORDER — ATORVASTATIN CALCIUM 40 MG/1
80 TABLET, FILM COATED ORAL NIGHTLY
Status: DISCONTINUED | OUTPATIENT
Start: 2023-08-12 | End: 2023-08-15 | Stop reason: HOSPADM

## 2023-08-12 RX ORDER — ACETAMINOPHEN 325 MG/1
650 TABLET ORAL EVERY 6 HOURS PRN
Status: DISCONTINUED | OUTPATIENT
Start: 2023-08-12 | End: 2023-08-15 | Stop reason: HOSPADM

## 2023-08-12 RX ORDER — DEXTROSE MONOHYDRATE 100 MG/ML
INJECTION, SOLUTION INTRAVENOUS CONTINUOUS PRN
Status: DISCONTINUED | OUTPATIENT
Start: 2023-08-12 | End: 2023-08-15 | Stop reason: HOSPADM

## 2023-08-12 RX ADMIN — ACETAMINOPHEN 650 MG: 325 TABLET ORAL at 15:24

## 2023-08-12 RX ADMIN — HYDRALAZINE HYDROCHLORIDE 100 MG: 50 TABLET, FILM COATED ORAL at 14:20

## 2023-08-12 RX ADMIN — Medication: at 21:26

## 2023-08-12 RX ADMIN — ENOXAPARIN SODIUM 100 MG: 100 INJECTION SUBCUTANEOUS at 21:43

## 2023-08-12 RX ADMIN — SODIUM CHLORIDE, PRESERVATIVE FREE 10 ML: 5 INJECTION INTRAVENOUS at 21:44

## 2023-08-12 RX ADMIN — LEVETIRACETAM 1000 MG: 500 TABLET, FILM COATED ORAL at 21:42

## 2023-08-12 RX ADMIN — CARVEDILOL 12.5 MG: 12.5 TABLET, FILM COATED ORAL at 16:43

## 2023-08-12 RX ADMIN — LEVETIRACETAM 1000 MG: 500 TABLET, FILM COATED ORAL at 14:20

## 2023-08-12 RX ADMIN — Medication: at 12:06

## 2023-08-12 RX ADMIN — LISINOPRIL 40 MG: 20 TABLET ORAL at 14:20

## 2023-08-12 RX ADMIN — HYDRALAZINE HYDROCHLORIDE 100 MG: 50 TABLET, FILM COATED ORAL at 21:41

## 2023-08-12 RX ADMIN — ENOXAPARIN SODIUM 100 MG: 100 INJECTION SUBCUTANEOUS at 13:28

## 2023-08-12 RX ADMIN — AMLODIPINE BESYLATE 10 MG: 5 TABLET ORAL at 14:18

## 2023-08-12 RX ADMIN — ATORVASTATIN CALCIUM 80 MG: 40 TABLET, FILM COATED ORAL at 21:41

## 2023-08-12 RX ADMIN — CLOPIDOGREL BISULFATE 75 MG: 75 TABLET ORAL at 13:28

## 2023-08-12 ASSESSMENT — PAIN DESCRIPTION - LOCATION: LOCATION: BACK;NECK

## 2023-08-12 ASSESSMENT — PAIN DESCRIPTION - DESCRIPTORS: DESCRIPTORS: SHOOTING;PRESSURE

## 2023-08-12 ASSESSMENT — PAIN SCALES - GENERAL
PAINLEVEL_OUTOF10: 8
PAINLEVEL_OUTOF10: 3
PAINLEVEL_OUTOF10: 0

## 2023-08-12 ASSESSMENT — PAIN DESCRIPTION - ORIENTATION: ORIENTATION: LOWER

## 2023-08-12 NOTE — PROGRESS NOTES
End of Shift Note    Bedside shift change report given to [employer] (oncoming nurse) by Rachid Padron RN (offgoing nurse). Report included the following information Nurse Handoff Report      Shift worked:  Days   Shift summary and any significant changes:     Patient admitted around 0930. Patient turned Q2, venelex applied, heelz up, kianna air pump in place, wound care consult placed. Patient did complain of pain tylenol - see MAR. SCDs in place. Emergency Department called for IV placement and labs.        Concerns for physician to address:  None   Zone phone for oncoming shift:   [phone]     Patient Information  Jade Overall  74 y.o.  8/12/2023  8:11 AM by Mike Keen MD. Jade Overall was admitted from Home    Problem List  Patient Active Problem List    Diagnosis Date Noted    Encounter for preadmission testing 08/03/2023    Type 2 diabetes mellitus with chronic kidney disease 07/18/2023    Chronic systolic (congestive) heart failure 06/30/2023    Secondary hypercoagulable state ([address]) 06/30/2023    Hypokalemia 05/06/2023    Dissection of carotid artery ([address]) 04/13/2023    CVA (cerebral vascular accident) ([address]) 02/28/2023    Thrombotic stroke involving right middle cerebral artery ([address]) 02/28/2023    Bilateral carotid artery stenosis 02/28/2023    Stage 3a chronic kidney disease ([address]) 10/28/2022    Malignant neoplasm of lower lobe, left bronchus or lung ([address]) 04/22/2022    Left arm weakness 03/06/2021    Bilateral leg weakness 03/06/2021    Carcinoid tumor 02/13/2018    CKD stage 2 due to type 2 diabetes mellitus ([address]) 05/14/2017    Seizure disorder ([address]) 07/20/2015    Type II or unspecified type diabetes mellitus with neurological manifestations, uncontrolled(250.62) 07/20/2015    Hypertensive emergency 07/20/2015    History of atrial fibrillation 07/20/2015    Hyperlipidemia 07/20/2015    Diabetic neuropathy ([address]) 11/21/2014    Seizures ([address]) 08/08/2014    Syncope 07/13/2012

## 2023-08-12 NOTE — PROGRESS NOTES
RN spoke with Dr. Renetta Rico about direct admission and patient needing orders. No further orders at this time.

## 2023-08-12 NOTE — PROGRESS NOTES
Patient does not have IV access. MD aware. RN and Charge Nurse attempted. Roderick Walker in the ER called for IV access.

## 2023-08-13 PROBLEM — I48.91 A-FIB (HCC): Status: ACTIVE | Noted: 2023-08-13

## 2023-08-13 LAB
ANION GAP SERPL CALC-SCNC: 5 MMOL/L (ref 5–15)
BUN SERPL-MCNC: 15 MG/DL (ref 6–20)
BUN/CREAT SERPL: 14 (ref 12–20)
CALCIUM SERPL-MCNC: 8.8 MG/DL (ref 8.5–10.1)
CHLORIDE SERPL-SCNC: 104 MMOL/L (ref 97–108)
CO2 SERPL-SCNC: 29 MMOL/L (ref 21–32)
CREAT SERPL-MCNC: 1.11 MG/DL (ref 0.7–1.3)
GLUCOSE BLD STRIP.AUTO-MCNC: 105 MG/DL (ref 65–117)
GLUCOSE BLD STRIP.AUTO-MCNC: 130 MG/DL (ref 65–117)
GLUCOSE BLD STRIP.AUTO-MCNC: 145 MG/DL (ref 65–117)
GLUCOSE BLD STRIP.AUTO-MCNC: 165 MG/DL (ref 65–117)
GLUCOSE SERPL-MCNC: 178 MG/DL (ref 65–100)
POTASSIUM SERPL-SCNC: 2.9 MMOL/L (ref 3.5–5.1)
SERVICE CMNT-IMP: ABNORMAL
SERVICE CMNT-IMP: NORMAL
SODIUM SERPL-SCNC: 138 MMOL/L (ref 136–145)

## 2023-08-13 PROCEDURE — 1100000000 HC RM PRIVATE

## 2023-08-13 PROCEDURE — 6370000000 HC RX 637 (ALT 250 FOR IP): Performed by: INTERNAL MEDICINE

## 2023-08-13 PROCEDURE — 80048 BASIC METABOLIC PNL TOTAL CA: CPT

## 2023-08-13 PROCEDURE — 36415 COLL VENOUS BLD VENIPUNCTURE: CPT

## 2023-08-13 PROCEDURE — 82962 GLUCOSE BLOOD TEST: CPT

## 2023-08-13 PROCEDURE — 6360000002 HC RX W HCPCS: Performed by: INTERNAL MEDICINE

## 2023-08-13 PROCEDURE — 2580000003 HC RX 258: Performed by: INTERNAL MEDICINE

## 2023-08-13 RX ORDER — POTASSIUM CHLORIDE 750 MG/1
40 TABLET, FILM COATED, EXTENDED RELEASE ORAL ONCE
Status: COMPLETED | OUTPATIENT
Start: 2023-08-13 | End: 2023-08-13

## 2023-08-13 RX ADMIN — SODIUM CHLORIDE, PRESERVATIVE FREE 10 ML: 5 INJECTION INTRAVENOUS at 21:35

## 2023-08-13 RX ADMIN — LEVETIRACETAM 1000 MG: 500 TABLET, FILM COATED ORAL at 21:33

## 2023-08-13 RX ADMIN — ENOXAPARIN SODIUM 100 MG: 100 INJECTION SUBCUTANEOUS at 21:27

## 2023-08-13 RX ADMIN — LEVETIRACETAM 1000 MG: 500 TABLET, FILM COATED ORAL at 09:02

## 2023-08-13 RX ADMIN — SODIUM CHLORIDE, PRESERVATIVE FREE 10 ML: 5 INJECTION INTRAVENOUS at 09:05

## 2023-08-13 RX ADMIN — CARVEDILOL 12.5 MG: 12.5 TABLET, FILM COATED ORAL at 17:01

## 2023-08-13 RX ADMIN — Medication: at 21:34

## 2023-08-13 RX ADMIN — HYDRALAZINE HYDROCHLORIDE 100 MG: 50 TABLET, FILM COATED ORAL at 21:31

## 2023-08-13 RX ADMIN — ENOXAPARIN SODIUM 100 MG: 100 INJECTION SUBCUTANEOUS at 09:03

## 2023-08-13 RX ADMIN — LISINOPRIL 40 MG: 20 TABLET ORAL at 09:02

## 2023-08-13 RX ADMIN — POTASSIUM CHLORIDE 40 MEQ: 750 TABLET, FILM COATED, EXTENDED RELEASE ORAL at 07:46

## 2023-08-13 RX ADMIN — HYDRALAZINE HYDROCHLORIDE 100 MG: 50 TABLET, FILM COATED ORAL at 09:02

## 2023-08-13 RX ADMIN — GLIPIZIDE 10 MG: 5 TABLET ORAL at 06:04

## 2023-08-13 RX ADMIN — ATORVASTATIN CALCIUM 80 MG: 40 TABLET, FILM COATED ORAL at 21:29

## 2023-08-13 RX ADMIN — CARVEDILOL 12.5 MG: 12.5 TABLET, FILM COATED ORAL at 07:46

## 2023-08-13 RX ADMIN — CLOPIDOGREL BISULFATE 75 MG: 75 TABLET ORAL at 09:03

## 2023-08-13 RX ADMIN — MELATONIN 3 MG: at 23:05

## 2023-08-13 RX ADMIN — AMLODIPINE BESYLATE 10 MG: 5 TABLET ORAL at 09:02

## 2023-08-13 RX ADMIN — Medication: at 09:05

## 2023-08-13 RX ADMIN — ACETAMINOPHEN 650 MG: 325 TABLET ORAL at 12:42

## 2023-08-13 RX ADMIN — HYDRALAZINE HYDROCHLORIDE 100 MG: 50 TABLET, FILM COATED ORAL at 13:51

## 2023-08-13 ASSESSMENT — PAIN SCALES - GENERAL
PAINLEVEL_OUTOF10: 0
PAINLEVEL_OUTOF10: 2
PAINLEVEL_OUTOF10: 7

## 2023-08-13 ASSESSMENT — PAIN DESCRIPTION - DESCRIPTORS: DESCRIPTORS: ACHING;DULL

## 2023-08-13 ASSESSMENT — PAIN DESCRIPTION - LOCATION: LOCATION: ARM

## 2023-08-13 ASSESSMENT — PAIN DESCRIPTION - ORIENTATION: ORIENTATION: LEFT

## 2023-08-13 NOTE — PROGRESS NOTES
End of Shift Note     Bedside shift change report given to Jorge Mccurdy (oncoming nurse) by Gabriela Resendez RN (offgoing nurse). Report included the following information Nurse Handoff Report        Shift worked:  Days   Shift summary and any significant changes:     Patient turned Q2, venelex applied, heelz up, kianna air pump in place, wound care consult placed. Patient did complain of pain tylenol - see MAR. SCDs in place.           Concerns for physician to address:  None   Zone phone for oncoming shift:   [phone]      Patient Information  Alvaro Huertas  74 y.o.  8/12/2023  8:11 AM by Slick Quiroga MD. Alvaro Huertas was admitted from Home     Problem List       Patient Active Problem List     Diagnosis Date Noted    Encounter for preadmission testing 08/03/2023    Type 2 diabetes mellitus with chronic kidney disease 07/18/2023    Chronic systolic (congestive) heart failure 06/30/2023    Secondary hypercoagulable state ([address]) 06/30/2023    Hypokalemia 05/06/2023    Dissection of carotid artery ([address]) 04/13/2023    CVA (cerebral vascular accident) ([address]) 02/28/2023    Thrombotic stroke involving right middle cerebral artery ([address]) 02/28/2023    Bilateral carotid artery stenosis 02/28/2023    Stage 3a chronic kidney disease ([address]) 10/28/2022    Malignant neoplasm of lower lobe, left bronchus or lung ([address]) 04/22/2022    Left arm weakness 03/06/2021    Bilateral leg weakness 03/06/2021    Carcinoid tumor 02/13/2018    CKD stage 2 due to type 2 diabetes mellitus ([address]) 05/14/2017    Seizure disorder ([address]) 07/20/2015    Type II or unspecified type diabetes mellitus with neurological manifestations, uncontrolled(250.62) 07/20/2015    Hypertensive emergency 07/20/2015    History of atrial fibrillation 07/20/2015    Hyperlipidemia 07/20/2015    Diabetic neuropathy ([address]) 11/21/2014    Seizures ([address]) 08/08/2014    Syncope 07/13/2012    Cardiomyopathy, nonischemic ([address]) 10/31/2009    HTN (hypertension), benign

## 2023-08-13 NOTE — PROGRESS NOTES
End of Shift Note    Bedside shift change report given to Yaa Miller RN (oncoming nurse) by Ailyn Velazco RN (offgoing nurse).   Report included the following information Nurse Handoff Report      Shift worked:  7p - 7a   Shift summary and any significant changes:    None     Concerns for physician to address:  None   Zone phone for oncoming shift:   [phone]     Patient Information  Jia Fuentes  74 y.o.  8/12/2023  8:11 AM by Veronica Eng MD. Jia Fuentes was admitted from Home    Problem List  Patient Active Problem List    Diagnosis Date Noted    Encounter for preadmission testing 08/03/2023    Type 2 diabetes mellitus with chronic kidney disease 07/18/2023    Chronic systolic (congestive) heart failure 06/30/2023    Secondary hypercoagulable state ([address]) 06/30/2023    Hypokalemia 05/06/2023    Dissection of carotid artery ([address]) 04/13/2023    CVA (cerebral vascular accident) ([address]) 02/28/2023    Thrombotic stroke involving right middle cerebral artery ([address]) 02/28/2023    Bilateral carotid artery stenosis 02/28/2023    Stage 3a chronic kidney disease ([address]) 10/28/2022    Malignant neoplasm of lower lobe, left bronchus or lung ([address]) 04/22/2022    Left arm weakness 03/06/2021    Bilateral leg weakness 03/06/2021    Carcinoid tumor 02/13/2018    CKD stage 2 due to type 2 diabetes mellitus ([address]) 05/14/2017    Seizure disorder ([address]) 07/20/2015    Type II or unspecified type diabetes mellitus with neurological manifestations, uncontrolled(250.62) 07/20/2015    Hypertensive emergency 07/20/2015    History of atrial fibrillation 07/20/2015    Hyperlipidemia 07/20/2015    Diabetic neuropathy ([address]) 11/21/2014    Seizures ([address]) 08/08/2014    Syncope 07/13/2012    Cardiomyopathy, nonischemic ([address]) 10/31/2009    HTN (hypertension), benign 10/26/2009    Pulmonary nodule 10/26/2009     Past Medical History:   Diagnosis Date    Atrial fibrillation ([address])     Cancer ([address]) 12/2015    left lung; carcinoid neuroendocrine on

## 2023-08-14 ENCOUNTER — ANESTHESIA EVENT (OUTPATIENT)
Facility: HOSPITAL | Age: 68
End: 2023-08-14

## 2023-08-14 LAB
ANION GAP SERPL CALC-SCNC: 6 MMOL/L (ref 5–15)
BUN SERPL-MCNC: 18 MG/DL (ref 6–20)
BUN/CREAT SERPL: 15 (ref 12–20)
CALCIUM SERPL-MCNC: 9.5 MG/DL (ref 8.5–10.1)
CHLORIDE SERPL-SCNC: 107 MMOL/L (ref 97–108)
CO2 SERPL-SCNC: 27 MMOL/L (ref 21–32)
CREAT SERPL-MCNC: 1.23 MG/DL (ref 0.7–1.3)
GLUCOSE BLD STRIP.AUTO-MCNC: 101 MG/DL (ref 65–117)
GLUCOSE BLD STRIP.AUTO-MCNC: 121 MG/DL (ref 65–117)
GLUCOSE BLD STRIP.AUTO-MCNC: 129 MG/DL (ref 65–117)
GLUCOSE BLD STRIP.AUTO-MCNC: 133 MG/DL (ref 65–117)
GLUCOSE SERPL-MCNC: 131 MG/DL (ref 65–100)
POTASSIUM SERPL-SCNC: 3.3 MMOL/L (ref 3.5–5.1)
SERVICE CMNT-IMP: ABNORMAL
SERVICE CMNT-IMP: NORMAL
SODIUM SERPL-SCNC: 140 MMOL/L (ref 136–145)

## 2023-08-14 PROCEDURE — 80048 BASIC METABOLIC PNL TOTAL CA: CPT

## 2023-08-14 PROCEDURE — 2580000003 HC RX 258: Performed by: INTERNAL MEDICINE

## 2023-08-14 PROCEDURE — 6360000002 HC RX W HCPCS: Performed by: INTERNAL MEDICINE

## 2023-08-14 PROCEDURE — 1100000000 HC RM PRIVATE

## 2023-08-14 PROCEDURE — 82962 GLUCOSE BLOOD TEST: CPT

## 2023-08-14 PROCEDURE — 36415 COLL VENOUS BLD VENIPUNCTURE: CPT

## 2023-08-14 PROCEDURE — 6370000000 HC RX 637 (ALT 250 FOR IP): Performed by: INTERNAL MEDICINE

## 2023-08-14 RX ADMIN — CARVEDILOL 12.5 MG: 12.5 TABLET, FILM COATED ORAL at 10:21

## 2023-08-14 RX ADMIN — LEVETIRACETAM 1000 MG: 500 TABLET, FILM COATED ORAL at 10:20

## 2023-08-14 RX ADMIN — LISINOPRIL 40 MG: 20 TABLET ORAL at 10:20

## 2023-08-14 RX ADMIN — ENOXAPARIN SODIUM 100 MG: 100 INJECTION SUBCUTANEOUS at 10:20

## 2023-08-14 RX ADMIN — Medication: at 22:17

## 2023-08-14 RX ADMIN — HYDRALAZINE HYDROCHLORIDE 100 MG: 50 TABLET, FILM COATED ORAL at 10:20

## 2023-08-14 RX ADMIN — SODIUM CHLORIDE, PRESERVATIVE FREE 10 ML: 5 INJECTION INTRAVENOUS at 10:21

## 2023-08-14 RX ADMIN — HYDRALAZINE HYDROCHLORIDE 100 MG: 50 TABLET, FILM COATED ORAL at 22:16

## 2023-08-14 RX ADMIN — ATORVASTATIN CALCIUM 80 MG: 40 TABLET, FILM COATED ORAL at 22:16

## 2023-08-14 RX ADMIN — CLOPIDOGREL BISULFATE 75 MG: 75 TABLET ORAL at 10:20

## 2023-08-14 RX ADMIN — GLIPIZIDE 10 MG: 5 TABLET ORAL at 06:14

## 2023-08-14 RX ADMIN — HYDRALAZINE HYDROCHLORIDE 100 MG: 50 TABLET, FILM COATED ORAL at 14:20

## 2023-08-14 RX ADMIN — AMLODIPINE BESYLATE 10 MG: 5 TABLET ORAL at 10:20

## 2023-08-14 RX ADMIN — SODIUM CHLORIDE, PRESERVATIVE FREE 10 ML: 5 INJECTION INTRAVENOUS at 22:19

## 2023-08-14 RX ADMIN — LEVETIRACETAM 1000 MG: 500 TABLET, FILM COATED ORAL at 22:16

## 2023-08-14 RX ADMIN — Medication: at 10:24

## 2023-08-14 RX ADMIN — CARVEDILOL 12.5 MG: 12.5 TABLET, FILM COATED ORAL at 17:36

## 2023-08-14 NOTE — PROGRESS NOTES
End of Shift Note     Bedside shift change report given to Esperanza Villalobos (oncoming nurse) by Joshua Zavaleta RN (offgoing nurse).   Report included the following information Nurse Handoff Report        Shift worked:  7p - 7a   Shift summary and any significant changes:     None   Concerns for physician to address:  None   Zone phone for oncoming shift:   [phone]      Patient Information  Dania Goodwin  74 y.o.  8/12/2023  8:11 AM by Sumaya Katz MD. Dania Goodwin was admitted from Home     Problem List       Patient Active Problem List     Diagnosis Date Noted    Encounter for preadmission testing 08/03/2023    Type 2 diabetes mellitus with chronic kidney disease 07/18/2023    Chronic systolic (congestive) heart failure 06/30/2023    Secondary hypercoagulable state ([address]) 06/30/2023    Hypokalemia 05/06/2023    Dissection of carotid artery ([address]) 04/13/2023    CVA (cerebral vascular accident) ([address]) 02/28/2023    Thrombotic stroke involving right middle cerebral artery ([address]) 02/28/2023    Bilateral carotid artery stenosis 02/28/2023    Stage 3a chronic kidney disease ([address]) 10/28/2022    Malignant neoplasm of lower lobe, left bronchus or lung ([address]) 04/22/2022    Left arm weakness 03/06/2021    Bilateral leg weakness 03/06/2021    Carcinoid tumor 02/13/2018    CKD stage 2 due to type 2 diabetes mellitus ([address]) 05/14/2017    Seizure disorder ([address]) 07/20/2015    Type II or unspecified type diabetes mellitus with neurological manifestations, uncontrolled(250.62) 07/20/2015    Hypertensive emergency 07/20/2015    History of atrial fibrillation 07/20/2015    Hyperlipidemia 07/20/2015    Diabetic neuropathy ([address]) 11/21/2014    Seizures ([address]) 08/08/2014    Syncope 07/13/2012    Cardiomyopathy, nonischemic ([address]) 10/31/2009    HTN (hypertension), benign 10/26/2009    Pulmonary nodule 10/26/2009      Past Medical History        Past Medical History:   Diagnosis Date    Atrial fibrillation ([address])      Cancer ([address]) 12/2015

## 2023-08-14 NOTE — PROGRESS NOTES
Attempted blood draw - unsuccessful,  Contacted ER and RR - unable to come.   Left message with PICC team.

## 2023-08-15 ENCOUNTER — ANESTHESIA (OUTPATIENT)
Facility: HOSPITAL | Age: 68
End: 2023-08-15

## 2023-08-15 VITALS
SYSTOLIC BLOOD PRESSURE: 126 MMHG | BODY MASS INDEX: 29.16 KG/M2 | TEMPERATURE: 97.9 F | HEIGHT: 73 IN | OXYGEN SATURATION: 99 % | RESPIRATION RATE: 18 BRPM | HEART RATE: 89 BPM | WEIGHT: 220 LBS | DIASTOLIC BLOOD PRESSURE: 90 MMHG

## 2023-08-15 LAB
ANION GAP SERPL CALC-SCNC: 8 MMOL/L (ref 5–15)
BUN SERPL-MCNC: 21 MG/DL (ref 6–20)
BUN/CREAT SERPL: 17 (ref 12–20)
CALCIUM SERPL-MCNC: 9.1 MG/DL (ref 8.5–10.1)
CHLORIDE SERPL-SCNC: 106 MMOL/L (ref 97–108)
CO2 SERPL-SCNC: 25 MMOL/L (ref 21–32)
CREAT SERPL-MCNC: 1.26 MG/DL (ref 0.7–1.3)
GLUCOSE BLD STRIP.AUTO-MCNC: 107 MG/DL (ref 65–117)
GLUCOSE BLD STRIP.AUTO-MCNC: 111 MG/DL (ref 65–117)
GLUCOSE BLD STRIP.AUTO-MCNC: 89 MG/DL (ref 65–117)
GLUCOSE BLD STRIP.AUTO-MCNC: 98 MG/DL (ref 65–117)
GLUCOSE SERPL-MCNC: 110 MG/DL (ref 65–100)
POTASSIUM SERPL-SCNC: 3.1 MMOL/L (ref 3.5–5.1)
SERVICE CMNT-IMP: NORMAL
SODIUM SERPL-SCNC: 139 MMOL/L (ref 136–145)

## 2023-08-15 PROCEDURE — 2580000003 HC RX 258: Performed by: INTERNAL MEDICINE

## 2023-08-15 PROCEDURE — 80048 BASIC METABOLIC PNL TOTAL CA: CPT

## 2023-08-15 PROCEDURE — 6370000000 HC RX 637 (ALT 250 FOR IP): Performed by: INTERNAL MEDICINE

## 2023-08-15 PROCEDURE — 36415 COLL VENOUS BLD VENIPUNCTURE: CPT

## 2023-08-15 PROCEDURE — 82962 GLUCOSE BLOOD TEST: CPT

## 2023-08-15 RX ADMIN — SODIUM CHLORIDE, PRESERVATIVE FREE 10 ML: 5 INJECTION INTRAVENOUS at 11:52

## 2023-08-15 RX ADMIN — Medication: at 11:52

## 2023-08-15 RX ADMIN — CARVEDILOL 12.5 MG: 12.5 TABLET, FILM COATED ORAL at 17:31

## 2023-08-15 RX ADMIN — HYDRALAZINE HYDROCHLORIDE 100 MG: 50 TABLET, FILM COATED ORAL at 14:08

## 2023-08-15 NOTE — WOUND CARE
Wound care nurse consult for skin tear to left thigh, gluteal cleft fissure and denuded skin to perineum/buttocks. Chart reviewed    75 y/o male admitted for a-fib,with hx of seizures, CVA with left hemiplegia, DM type 2, HTN, cervical stenosis of spine. Past Medical History:   Diagnosis Date    Atrial fibrillation ([address])     Cancer ([address]) 12/2015    left lung; 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     Dissection of right carotid artery ([address]) 09/2018    Hypertension     Renal insufficiency     stage 3    Seizures ([address])     Stroke ([address]) 02/2023    prior to feb. , had mini stroke; left side paralysis     Past Surgical History:   Procedure Laterality Date    CHEST SURGERY      VATS Video-assisted thoracic surgery         Skin tears: EOD, cleanse with NS. Cover with a piece of Xeroform gauze, then a a foam dressing vs dry 4x4/ABD pad and secure with christin. Date and time each dressing. Moisture associated skin damage with fissure in gluteal cleft: BID and PRN soiling from incontinence, cleanse gently with soap and water, pat dry and apply Z-guard zinc cream to heal and protect skin.     [address], RN, CWON

## 2023-08-15 NOTE — PROGRESS NOTES
End of Shift Note     Bedside shift change report given to MEREDITH Chatterjee  (oncoming nurse) by Pauline Son RN (offgoing nurse). Report included the following information Nurse Handoff Report        Shift worked:  [phone]   Shift summary and any significant changes:     Meds given.     Concerns for physician to address:  None   Zone phone for oncoming shift:   [phone]      Patient Information  Dania Dodge  74 y.o.  8/12/2023  8:11 AM by Curt Topete MD. Dania Dodge was admitted from Home     Problem List          Patient Active Problem List     Diagnosis Date Noted    Encounter for preadmission testing 08/03/2023    Type 2 diabetes mellitus with chronic kidney disease 07/18/2023    Chronic systolic (congestive) heart failure 06/30/2023    Secondary hypercoagulable state ([address]) 06/30/2023    Hypokalemia 05/06/2023    Dissection of carotid artery ([address]) 04/13/2023    CVA (cerebral vascular accident) ([address]) 02/28/2023    Thrombotic stroke involving right middle cerebral artery ([address]) 02/28/2023    Bilateral carotid artery stenosis 02/28/2023    Stage 3a chronic kidney disease ([address]) 10/28/2022    Malignant neoplasm of lower lobe, left bronchus or lung ([address]) 04/22/2022    Left arm weakness 03/06/2021    Bilateral leg weakness 03/06/2021    Carcinoid tumor 02/13/2018    CKD stage 2 due to type 2 diabetes mellitus ([address]) 05/14/2017    Seizure disorder ([address]) 07/20/2015    Type II or unspecified type diabetes mellitus with neurological manifestations, uncontrolled(250.62) 07/20/2015    Hypertensive emergency 07/20/2015    History of atrial fibrillation 07/20/2015    Hyperlipidemia 07/20/2015    Diabetic neuropathy ([address]) 11/21/2014    Seizures ([address]) 08/08/2014    Syncope 07/13/2012    Cardiomyopathy, nonischemic ([address]) 10/31/2009    HTN (hypertension), benign 10/26/2009    Pulmonary nodule 10/26/2009      Past Medical History           Past Medical History:   Diagnosis Date    Atrial fibrillation ([address])      Cancer ([address])

## 2023-08-15 NOTE — DISCHARGE SUMMARY
Home with Family:    x   Home with HH/PT/OT/RN:    SNF/LTC:    CESAR:    OTHER:          Follow up with:   PCP : MD Aisha Koroma MD  [address] [facility] ([place], [place])  [phone]    Follow up in 2 week(s)      Zita Soler MD  [address] [facility] [address] [place] [address]  [phone]    Follow up in 1 week(s)      Zita Soler MD  [address] [facility]  [phone]                Total time in minutes spent coordinating this discharge (includes going over instructions, follow-up, prescriptions, and preparing report for sign off to her PCP) :  35 minutes

## 2023-08-15 NOTE — PROGRESS NOTES
Discharge instructions given to wife via the phone. Medications  and appointments confirmed. Spouse verbalized understanding. Unit phone number provided to call back in case they have a question. Paperwork sent home with patient. Hospital to home to take him home.

## 2023-08-15 NOTE — CARE COORDINATION
Care Management Initial Assessment       RUR: 14  Readmission? No  1st IM letter given? Yes - 8/13/23  1st  letter given: No    Pt planning to go to surgery with Neurosurgeon 8/15. CM will look for therapy rec after surgery to help with dispo planning  Anticipating that Pt will need Acute Rehab or SNF? Insurance is [employer] and will require auth for rehab. CM will continue to monitor discharge plan. [place], [place]  Ext [phone]         08/14/23 6824   Service Assessment   Patient Orientation Alert and Oriented   Cognition Alert   History Provided By Patient   Primary Caregiver Spouse   Support Systems Spouse/Significant Other;Children;Friends/Neighbors   Patient's Healthcare Decision Maker is: Patient Declined (Legal Next of Kin Remains as Decision Maker)  (Pt not interested in completing AMD at this time. NOK: Wife: Iveth Kapoor: [phone])   PCP Verified by CM Yes   Last Visit to PCP Within last 3 months   Prior Functional Level Assistance with the following:;Bathing;Dressing; Toileting;Cooking;Housework; Shopping;Mobility   Current Functional Level Assistance with the following:;Bathing;Dressing; Toileting;Cooking;Housework; Shopping;Mobility   Can patient return to prior living arrangement Yes   Ability to make needs known: Good   Family able to assist with home care needs: Yes   Would you like for me to discuss the discharge plan with any other family members/significant others, and if so, who? Yes   Social/Functional History   Lives With Spouse;Daughter   Type of [address] [place] [address] Dr One level   Home Access Ramped entrance   [place], rolling; [address] Help From Family   Active  No   Patient's  Info Wife: Howard Reza   Mode of Transportation Car   Discharge Planning   Type of Residence Acute Rehab   Living Arrangements Spouse/Significant Other;Children   Current Services Prior To Admission None   Potential Assistance Needed Other
Care Management Initial Assessment       RUR:14%  Readmission? No  1st IM letter given? Yes  1st  letter given: No     08/14/23 1731   Service Assessment   Patient Orientation Alert and Oriented   Cognition Alert   History Provided By Significant Other   Primary Caregiver Spouse   Support Systems Spouse/Significant Other   Patient's Healthcare Decision Maker is: Legal Next of Kin   PCP Verified by CM Yes   Prior Functional Level Assistance with the following:;Bathing;Dressing; Toileting;Feeding;Cooking;Housework; Shopping;Mobility   Current Functional Level Assistance with the following:;Bathing;Dressing; Toileting;Feeding;Cooking;Housework; Shopping;Mobility   Can patient return to prior living arrangement Yes   Ability to make needs known: Good   Family able to assist with home care needs: Yes   Would you like for me to discuss the discharge plan with any other family members/significant others, and if so, who? Yes  (wife)   Financial Resources Medicare   Community Resources None   Social/Functional History   Lives With Spouse   Type of [address] bed; [address] [place] Help From Family   ADL Assistance Needs assistance   Toileting Needs assistance   Homemaking Assistance Needs assistance   Homemaking Responsibilities No   Ambulation Assistance Needs assistance   Transfer Assistance Needs assistance   Active  No   Mode of Transportation Family   Occupation On disability   Discharge Planning   Type of Residence House   Living Arrangements Spouse/Significant Other   Current Services Prior To Admission None   Potential Assistance Needed N/A   DME Ordered? No   Potential Assistance Purchasing Medications No   Type of Home Care Services None   Patient expects to be discharged to: House   History of falls?  0   Services At/After Discharge   Transition of Care Consult (CM Consult) N/A   Services At/After Discharge None   [employer]
Resource Information Provided? No   Mode of Transport at Discharge [facility] Transport Time of Discharge 1700   Confirm Follow Up Transport Wheelchair Cite Sherwin Araujo   Condition of Participation: Discharge Planning   The Plan for Transition of Care is related to the following treatment goals: PLan Home with OP follow up appt   The Patient and/or Patient Representative was provided with a Choice of Provider? Patient   The Patient and/Or Patient Representative agree with the Discharge Plan? Yes   Freedom of Choice list was provided with basic dialogue that supports the patient's individualized plan of care/goals, treatment preferences, and shares the quality data associated with the providers?   Yes

## 2023-08-15 NOTE — PROGRESS NOTES
4 Eyes Skin Assessment     NAME:  Wendy Carter  YOB: 1955  MEDICAL RECORD NUMBER:  [id number]    The patient is being assessed for  Shift Handoff    I agree that at least one RN has performed a thorough Head to Toe Skin Assessment on the patient. ALL assessment sites listed below have been assessed. Areas assessed by both nurses:    Head, Face, Ears, Shoulders, Back, Chest, Arms, Elbows, Hands, Sacrum. Buttock, Coccyx, Ischium, and Legs. Feet and Heels        Does the Patient have a Wound? Yes wound(s) were present on assessment.  LDA wound assessment was Initiated and completed by RN       [place] Prevention initiated by RN: Yes  Wound Care Orders initiated by RN: Yes    Pressure Injury (Stage 3,4, Unstageable, DTI, NWPT, and Complex wounds) if present, place Wound referral order by RN under : Yes    New Ostomies, if present place, Ostomy referral order under : No     Nurse 1 eSignature: Electronically signed by Cornell Gallo LPN on 6/42/08 at 6:25 AM EDT    **SHARE this note so that the co-signing nurse can place an eSignature**    Nurse 2 eSignature: Electronically signed by  Janet Mendez on 8/15/23 at 6:30 AM EDT

## 2023-08-15 NOTE — ANESTHESIA PRE PROCEDURE
Department of Anesthesiology  Preprocedure Note       Name:  Sofia Duvall   Age:  74 y.o.  :  1955                                          MRN:  [id number]         Date:  8/15/2023      Surgeon: Vero Gonzalez):  Karmen Ruiz MD    Procedure: Procedure(s):  C3-4, C4-5 ANTERIOR CERVICAL DISCECTOMY AND FUSION    Medications prior to admission:   Prior to Admission medications    Medication Sig Start Date End Date Taking?  Authorizing Provider   clopidogrel (PLAVIX) 75 MG tablet Take 1 tablet by mouth daily   Yes Historical Provider, MD   mupirocin (BACTROBAN) 2 % ointment Apply pea sized amount inside of both nostrils twice daily for five days  Patient not taking: Reported on 2023 8/3/23   JEANNETTE Collazo NP   acetaminophen (TYLENOL) 500 MG tablet Take 1 tablet by mouth every 4 hours as needed    Historical Provider, MD   metFORMIN (GLUCOPHAGE) 500 MG tablet TAKE 2 TABLETS BY MOUTH TWICE DAILY WITH MEALS  Patient taking differently: Take 2 tablets by mouth 2 times daily (with meals) 23   Yary Garcia MD   apixaban Birdena Dane) 5 MG TABS tablet Take 1 tablet by mouth 2 times daily 23   Yary Garcia MD   Lancets MISC Check fsbs every day E11  Patient not taking: Reported on 2023   Ar Automatic Reconciliation   albuterol sulfate HFA (PROVENTIL;VENTOLIN;PROAIR) 108 (90 Base) MCG/ACT inhaler Inhale 2 puffs into the lungs every 4 hours as needed    Ar Automatic Reconciliation   amLODIPine (NORVASC) 10 MG tablet Take 1 tablet by mouth daily 22   Ar Automatic Reconciliation   atorvastatin (LIPITOR) 80 MG tablet TAKE 1 TABLET BY MOUTH ONCE DAILY AT NIGHT 10/27/22   Ar Automatic Reconciliation   carvedilol (COREG) 12.5 MG tablet Take 1 tablet by mouth 2 times daily (with meals) 10/27/22   Ar Automatic Reconciliation   glimepiride (AMARYL) 4 MG tablet Take 1 tablet by mouth every morning (before breakfast) 10/27/22   Ar Automatic Reconciliation   hydrALAZINE (APRESOLINE) 100 MG

## 2023-08-15 NOTE — PROGRESS NOTES
End of Shift Note     Bedside shift change report given to Devan Wong  (oncoming nurse) by Varun Cannon (offgoing nurse).   Report included the following information Nurse Handoff Report        Shift worked: Nights   Shift summary and any significant changes:     No significant changes, Labs done,Turn every 2 hrs, lovenox was held    Concerns for physician to address:  None   Zone phone for oncoming shift:   [phone]      Patient [address]  74 y.o.  8/12/2023  8:11 AM by Aisha Clifton MD. Nirmala Daliey was admitted from Home     Problem List          Patient Active Problem List     Diagnosis Date Noted    Encounter for preadmission testing 08/03/2023    Type 2 diabetes mellitus with chronic kidney disease 07/18/2023    Chronic systolic (congestive) heart failure 06/30/2023    Secondary hypercoagulable state ([address]) 06/30/2023    Hypokalemia 05/06/2023    Dissection of carotid artery ([address]) 04/13/2023    CVA (cerebral vascular accident) ([address]) 02/28/2023    Thrombotic stroke involving right middle cerebral artery ([address]) 02/28/2023    Bilateral carotid artery stenosis 02/28/2023    Stage 3a chronic kidney disease ([address]) 10/28/2022    Malignant neoplasm of lower lobe, left bronchus or lung ([address]) 04/22/2022    Left arm weakness 03/06/2021    Bilateral leg weakness 03/06/2021    Carcinoid tumor 02/13/2018    CKD stage 2 due to type 2 diabetes mellitus ([address]) 05/14/2017    Seizure disorder ([address]) 07/20/2015    Type II or unspecified type diabetes mellitus with neurological manifestations, uncontrolled(250.62) 07/20/2015    Hypertensive emergency 07/20/2015    History of atrial fibrillation 07/20/2015    Hyperlipidemia 07/20/2015    Diabetic neuropathy ([address]) 11/21/2014    Seizures ([address]) 08/08/2014    Syncope 07/13/2012    Cardiomyopathy, nonischemic ([address]) 10/31/2009    HTN (hypertension), benign 10/26/2009    Pulmonary nodule 10/26/2009      Past Medical History           Past Medical History:   Diagnosis

## 2023-08-22 RX ORDER — LISINOPRIL 40 MG/1
40 TABLET ORAL DAILY
Qty: 90 TABLET | Refills: 3 | Status: SHIPPED | OUTPATIENT
Start: 2023-08-22

## 2023-09-05 NOTE — TELEPHONE ENCOUNTER
ADHERENCE    Insurance Records claims through 9/5/23  (Prior Year [address] = not reported; YTD [address] [place] [address] = 100%; Potential Fail Date: 12/23/2023): Metformin 500 mg  last filled on 7/13/2023 for 90 day supply. Next refill due: 10/11/23  Per Reconcile Dispense (Epic):    0 refills remaining. Will need refill before NOV    Glimepiride 4mg  last filled on 9/2/23 for 30 day supply. Next refill due: 10/2/23  Per [facility]:   2 refills remaining. Rx will exp 10/21/23  Hemoglobin A1C, POC   Date Value Ref Range Status   10/27/2022 8.5 % Final   10/21/2019 7.9 (A) 4.8 - 5.6 % Final     Hemoglobin A1C   Date Value Ref Range Status   08/01/2023 7.1 (H) 4.0 - 5.6 % Final   05/06/2023 7.5 (H) 4.0 - 5.6 % Final   03/03/2023 7.8 (H) 4.0 - 5.6 % Final      NOTE: A1c <9%  STATIN ADHERENCE    Insurance Records claims through 9/5/23 (Prior Year [address] [place] [address] = not reported; [address] = 72%; Potential Fail Date: 9/14/23): Atorvastatin 80 mg last filled on 5/10/23 for 90 day supply. Next refill due: 8/8/23 -PAST DUE 31 DAYS   Per [facility]:    0 refills remaining. Lab Results   Component Value Date    CHOL 115 05/07/2023    TRIG 108 05/07/2023    HDL 35 05/07/2023    LDLCALC 58.4 05/07/2023    LABVLDL 21.6 05/07/2023     ALT   Date Value Ref Range Status   08/01/2023 14 12 - 78 U/L Final     AST   Date Value Ref Range Status   08/01/2023 12 (L) 15 - 37 U/L Final     The ASCVD Risk score (Eldon ACUÑA, et al., 2019) failed to calculate for the following reasons: The patient has a prior MI or stroke diagnosis     PLAN  The following are interventions that have been identified:   Patient overdue refilling Atorvastatin 80 mg  and active on home medication list.   Patient needs refills for Atorvastatin, Metformin and Glimepiride  Outreach:  Provider:  Pending a request for a refill for Atorvastatin, Metformin and Glimepiride and to change to 100 day supply.  Send to [address] [place] [address] Farhana Pimentel, [facility]

## 2023-09-08 RX ORDER — GLIMEPIRIDE 4 MG/1
4 TABLET ORAL
Qty: 100 TABLET | Refills: 1 | Status: SHIPPED | OUTPATIENT
Start: 2023-09-08

## 2023-09-08 RX ORDER — ATORVASTATIN CALCIUM 80 MG/1
80 TABLET, FILM COATED ORAL NIGHTLY
Qty: 100 TABLET | Refills: 1 | Status: SHIPPED | OUTPATIENT
Start: 2023-09-08

## 2023-09-15 ENCOUNTER — TELEPHONE (OUTPATIENT)
Age: 68
End: 2023-09-15

## 2023-09-15 NOTE — TELEPHONE ENCOUNTER
States regarding referral for surgery    States surgeon still hasn't contacted pt    States may need referral to different specialist    Please call to advise

## 2023-09-22 NOTE — PROGRESS NOTES
1. \"Have you been to the ER, urgent care clinic since your last visit? Hospitalized since your last visit? \"         ER visit for pain // 7/17/2023    2. \"Have you seen or consulted any other health care providers outside of the [address] [place] [address] since your last visit? \" No     3. For patients aged 43-73: Has the patient had a colonoscopy / FIT/ Cologuard? No      If the patient is female:    4. For patients aged 43-66: Has the patient had a mammogram within the past 2 years? No      5. For patients aged 21-65: Has the patient had a pap smear?   No
05/10/2023 12:16 AM     05/10/2023 12:16 AM    CO2 29 05/10/2023 12:16 AM    BUN 13 05/10/2023 12:16 AM    CREATININE 0.94 05/10/2023 12:16 AM    GLUCOSE 132 05/10/2023 12:16 AM    CALCIUM 8.5 05/10/2023 12:16 AM      CBC:   Lab Results   Component Value Date/Time    WBC 4.7 05/10/2023 12:16 AM    RBC 3.96 05/10/2023 12:16 AM    HGB 10.9 05/10/2023 12:16 AM    HCT 33.8 05/10/2023 12:16 AM    MCV 85.4 05/10/2023 12:16 AM    MCH 27.5 05/10/2023 12:16 AM    MCHC 32.2 05/10/2023 12:16 AM    RDW 15.8 05/10/2023 12:16 AM     05/10/2023 12:16 AM    MPV 10.6 05/10/2023 12:16 AM      Lipids   Lab Results   Component Value Date/Time    CHOL 115 05/07/2023 04:54 AM    TRIG 108 05/07/2023 04:54 AM    LDLCALC 58.4 05/07/2023 04:54 AM    LABVLDL 21.6 05/07/2023 04:54 AM    CHOLHDLRATIO 3.3 05/07/2023 04:54 AM     Hemoglobin A1C:   Hemoglobin A1C   Date Value Ref Range Status   05/06/2023 7.5 (H) 4.0 - 5.6 % Final     Comment:     NEW METHOD  PLEASE NOTE NEW REFERENCE RANGE  (NOTE)  HbA1C Interpretive Ranges  <5.7              Normal  5.7 - 6.4         Consider Prediabetes  >6.5              Consider Diabetes       Hemoglobin A1C, POC   Date Value Ref Range Status   10/27/2022 8.5 % Final        Review of Systems     Physical Exam  Constitutional:       Appearance: Normal appearance. Musculoskeletal:      Cervical back: Normal range of motion. Neurological:      Mental Status: He is alert. ASSESSMENT and Nette Parkinson was seen today for follow-up.     Diagnoses and all orders for this visit:    History of cerebrovascular accident   On plavix   Left hemiplegia  Cervical stenosis of spine   Surgery per Dr Renetta Rojo has been done recently   Left arm pain-es tylenol for now  Type 2 diabetes mellitus without complication, without long-term current use of insulin (HCC)   Reasonable control  Hypertension, unspecified type   Continue medicines  PAF (paroxysmal atrial fibrillation) ([address])   on eliquis     RTC 4
intact

## 2023-09-25 ENCOUNTER — HOSPITAL ENCOUNTER (OUTPATIENT)
Facility: HOSPITAL | Age: 68
Discharge: HOME | End: 2023-09-28
Payer: MEDICARE

## 2023-09-25 VITALS
WEIGHT: 220 LBS | HEIGHT: 73 IN | RESPIRATION RATE: 16 BRPM | TEMPERATURE: 98.6 F | OXYGEN SATURATION: 100 % | BODY MASS INDEX: 29.16 KG/M2 | HEART RATE: 76 BPM | SYSTOLIC BLOOD PRESSURE: 123 MMHG | DIASTOLIC BLOOD PRESSURE: 97 MMHG

## 2023-09-25 LAB
ABO + RH BLD: NORMAL
ALBUMIN SERPL-MCNC: 3.3 G/DL (ref 3.5–5)
ALBUMIN/GLOB SERPL: 0.8 (ref 1.1–2.2)
ALP SERPL-CCNC: 80 U/L (ref 45–117)
ALT SERPL-CCNC: 13 U/L (ref 12–78)
ANION GAP SERPL CALC-SCNC: 5 MMOL/L (ref 5–15)
APTT PPP: 29 SEC (ref 22.1–31)
AST SERPL-CCNC: 7 U/L (ref 15–37)
BASOPHILS # BLD: 0 K/UL (ref 0–0.1)
BASOPHILS NFR BLD: 1 % (ref 0–1)
BILIRUB SERPL-MCNC: 0.4 MG/DL (ref 0.2–1)
BLOOD GROUP ANTIBODIES SERPL: NORMAL
BUN SERPL-MCNC: 11 MG/DL (ref 6–20)
BUN/CREAT SERPL: 9 (ref 12–20)
CALCIUM SERPL-MCNC: 8.6 MG/DL (ref 8.5–10.1)
CHLORIDE SERPL-SCNC: 106 MMOL/L (ref 97–108)
CO2 SERPL-SCNC: 30 MMOL/L (ref 21–32)
CREAT SERPL-MCNC: 1.17 MG/DL (ref 0.7–1.3)
DIFFERENTIAL METHOD BLD: NORMAL
EOSINOPHIL # BLD: 0 K/UL (ref 0–0.4)
EOSINOPHIL NFR BLD: 1 % (ref 0–7)
ERYTHROCYTE [DISTWIDTH] IN BLOOD BY AUTOMATED COUNT: 14.1 % (ref 11.5–14.5)
GLOBULIN SER CALC-MCNC: 4.3 G/DL (ref 2–4)
GLUCOSE SERPL-MCNC: 170 MG/DL (ref 65–100)
HCT VFR BLD AUTO: 38.5 % (ref 36.6–50.3)
HGB BLD-MCNC: 12.2 G/DL (ref 12.1–17)
IMM GRANULOCYTES # BLD AUTO: 0 K/UL (ref 0–0.04)
IMM GRANULOCYTES NFR BLD AUTO: 0 % (ref 0–0.5)
INR PPP: 1.1 (ref 0.9–1.1)
LYMPHOCYTES # BLD: 1.4 K/UL (ref 0.8–3.5)
LYMPHOCYTES NFR BLD: 29 % (ref 12–49)
MCH RBC QN AUTO: 27.7 PG (ref 26–34)
MCHC RBC AUTO-ENTMCNC: 31.7 G/DL (ref 30–36.5)
MCV RBC AUTO: 87.3 FL (ref 80–99)
MONOCYTES # BLD: 0.5 K/UL (ref 0–1)
MONOCYTES NFR BLD: 10 % (ref 5–13)
NEUTS SEG # BLD: 2.9 K/UL (ref 1.8–8)
NEUTS SEG NFR BLD: 59 % (ref 32–75)
NRBC # BLD: 0 K/UL (ref 0–0.01)
NRBC BLD-RTO: 0 PER 100 WBC
PLATELET # BLD AUTO: 248 K/UL (ref 150–400)
PMV BLD AUTO: 11.1 FL (ref 8.9–12.9)
POTASSIUM SERPL-SCNC: 3.5 MMOL/L (ref 3.5–5.1)
PROT SERPL-MCNC: 7.6 G/DL (ref 6.4–8.2)
PROTHROMBIN TIME: 11.9 SEC (ref 9–11.1)
RBC # BLD AUTO: 4.41 M/UL (ref 4.1–5.7)
SODIUM SERPL-SCNC: 141 MMOL/L (ref 136–145)
SPECIMEN EXP DATE BLD: NORMAL
THERAPEUTIC RANGE: NORMAL SECS (ref 58–77)
WBC # BLD AUTO: 4.8 K/UL (ref 4.1–11.1)

## 2023-09-25 PROCEDURE — 80053 COMPREHEN METABOLIC PANEL: CPT

## 2023-09-25 PROCEDURE — 85610 PROTHROMBIN TIME: CPT

## 2023-09-25 PROCEDURE — 86850 RBC ANTIBODY SCREEN: CPT

## 2023-09-25 PROCEDURE — 85730 THROMBOPLASTIN TIME PARTIAL: CPT

## 2023-09-25 PROCEDURE — 36415 COLL VENOUS BLD VENIPUNCTURE: CPT

## 2023-09-25 PROCEDURE — 85025 COMPLETE CBC W/AUTO DIFF WBC: CPT

## 2023-09-25 PROCEDURE — 86900 BLOOD TYPING SEROLOGIC ABO: CPT

## 2023-09-25 PROCEDURE — 86901 BLOOD TYPING SEROLOGIC RH(D): CPT

## 2023-09-25 PROCEDURE — NSP99 NSP99 NON-BILLABLE CODE: Performed by: NURSE PRACTITIONER

## 2023-09-25 RX ORDER — SODIUM CHLORIDE, SODIUM LACTATE, POTASSIUM CHLORIDE, CALCIUM CHLORIDE 600; 310; 30; 20 MG/100ML; MG/100ML; MG/100ML; MG/100ML
INJECTION, SOLUTION INTRAVENOUS CONTINUOUS
OUTPATIENT
Start: 2023-10-03

## 2023-09-25 RX ORDER — PREGABALIN 150 MG/1
150 CAPSULE ORAL ONCE
OUTPATIENT
Start: 2023-10-03

## 2023-09-25 RX ORDER — ACETAMINOPHEN 500 MG
1000 TABLET ORAL ONCE
OUTPATIENT
Start: 2023-10-03

## 2023-09-25 ASSESSMENT — PAIN SCALES - GENERAL: PAINLEVEL_OUTOF10: 0

## 2023-09-25 NOTE — PROGRESS NOTES
EKG done on 6/23/23 and in media. Patient has clearance from Dr. Gianna Griffith in media as well. Patient has left side paraplegia from previous stroke and is wheelchair bound. Patient's wife is his caretaker.

## 2023-09-25 NOTE — PROGRESS NOTES

## 2023-09-26 LAB
BACTERIA SPEC CULT: NORMAL
BACTERIA SPEC CULT: NORMAL
SERVICE CMNT-IMP: NORMAL

## 2023-09-27 NOTE — PERIOP NOTE
Patient unable to void and incontinence at times. I spoke to Dr. Shade Junior and will d/c ua order.
0.0 - 0.1 K/UL Final    Absolute Immature Granulocyte 09/25/2023 0.0  0.00 - 0.04 K/UL Final    Differential Type 09/25/2023 AUTOMATED    Final    Sodium 09/25/2023 141  136 - 145 mmol/L Final    Potassium 09/25/2023 3.5  3.5 - 5.1 mmol/L Final    Chloride 09/25/2023 106  97 - 108 mmol/L Final    CO2 09/25/2023 30  21 - 32 mmol/L Final    Anion Gap 09/25/2023 5  5 - 15 mmol/L Final    Glucose 09/25/2023 170 (H)  65 - 100 mg/dL Final    BUN 09/25/2023 11  6 - 20 MG/DL Final    Creatinine 09/25/2023 1.17  0.70 - 1.30 MG/DL Final    Bun/Cre Ratio 09/25/2023 9 (L)  12 - 20   Final    Est, Glom Filt Rate 09/25/2023 >60  >60 ml/min/1.73m2 Final    Comment:    Pediatric calculator link: [email or web address].at. org/professionals/kdoqi/gfr_calculatorped     These results are not intended for use in patients <25years of age. eGFR results are calculated without a race factor using  the 2021 CKD-EPI equation. Careful clinical correlation is recommended, particularly when comparing to results calculated using previous equations. The CKD-EPI equation is less accurate in patients with extremes of muscle mass, extra-renal metabolism of creatinine, excessive creatine ingestion, or following therapy that affects renal tubular secretion.       Calcium 09/25/2023 8.6  8.5 - 10.1 MG/DL Final    Total Bilirubin 09/25/2023 0.4  0.2 - 1.0 MG/DL Final    ALT 09/25/2023 13  12 - 78 U/L Final    AST 09/25/2023 7 (L)  15 - 37 U/L Final    Alk Phosphatase 09/25/2023 80  45 - 117 U/L Final    Total Protein 09/25/2023 7.6  6.4 - 8.2 g/dL Final    Albumin 09/25/2023 3.3 (L)  3.5 - 5.0 g/dL Final    Globulin 09/25/2023 4.3 (H)  2.0 - 4.0 g/dL Final    Albumin/Globulin Ratio 09/25/2023 0.8 (L)  1.1 - 2.2   Final    Special Requests 09/25/2023 NO SPECIAL REQUESTS    Final    Culture 09/25/2023 MRSA NOT PRESENT    Final    Culture 09/25/2023     Final                    Value:Screening of patient nares for MRSA is for surveillance purposes and, if

## 2023-10-03 ENCOUNTER — ANESTHESIA (OUTPATIENT)
Facility: HOSPITAL | Age: 68
End: 2023-10-03
Payer: MEDICARE

## 2023-10-03 ENCOUNTER — ANESTHESIA EVENT (OUTPATIENT)
Facility: HOSPITAL | Age: 68
End: 2023-10-03
Payer: MEDICARE

## 2023-10-03 ENCOUNTER — APPOINTMENT (OUTPATIENT)
Facility: HOSPITAL | Age: 68
DRG: 472 | End: 2023-10-03
Attending: NEUROLOGICAL SURGERY
Payer: MEDICARE

## 2023-10-03 ENCOUNTER — HOSPITAL ENCOUNTER (INPATIENT)
Facility: HOSPITAL | Age: 68
LOS: 7 days | Discharge: NURSING, REHAB OR LONG-TERM CARE | DRG: 472 | End: 2023-10-13
Attending: NEUROLOGICAL SURGERY | Admitting: NEUROLOGICAL SURGERY
Payer: MEDICARE

## 2023-10-03 DIAGNOSIS — Z98.1 S/P CERVICAL SPINAL FUSION: Primary | ICD-10-CM

## 2023-10-03 PROBLEM — M50.20 HERNIATION OF CERVICAL INTERVERTEBRAL DISC WITHOUT MYELOPATHY: Status: ACTIVE | Noted: 2023-10-03

## 2023-10-03 LAB
GLUCOSE BLD STRIP.AUTO-MCNC: 98 MG/DL (ref 65–117)
SERVICE CMNT-IMP: NORMAL

## 2023-10-03 PROCEDURE — 00NW0ZZ RELEASE CERVICAL SPINAL CORD, OPEN APPROACH: ICD-10-PCS | Performed by: NEUROLOGICAL SURGERY

## 2023-10-03 PROCEDURE — 6370000000 HC RX 637 (ALT 250 FOR IP): Performed by: NEUROLOGICAL SURGERY

## 2023-10-03 PROCEDURE — 01N10ZZ RELEASE CERVICAL NERVE, OPEN APPROACH: ICD-10-PCS | Performed by: NEUROLOGICAL SURGERY

## 2023-10-03 PROCEDURE — 3700000000 HC ANESTHESIA ATTENDED CARE: Performed by: NEUROLOGICAL SURGERY

## 2023-10-03 PROCEDURE — 7100000000 HC PACU RECOVERY - FIRST 15 MIN: Performed by: NEUROLOGICAL SURGERY

## 2023-10-03 PROCEDURE — C1821 INTERSPINOUS IMPLANT: HCPCS | Performed by: NEUROLOGICAL SURGERY

## 2023-10-03 PROCEDURE — 7100000001 HC PACU RECOVERY - ADDTL 15 MIN: Performed by: NEUROLOGICAL SURGERY

## 2023-10-03 PROCEDURE — 0RG20A0 FUSION OF 2 OR MORE CERVICAL VERTEBRAL JOINTS WITH INTERBODY FUSION DEVICE, ANTERIOR APPROACH, ANTERIOR COLUMN, OPEN APPROACH: ICD-10-PCS | Performed by: NEUROLOGICAL SURGERY

## 2023-10-03 PROCEDURE — 6360000002 HC RX W HCPCS: Performed by: NURSE ANESTHETIST, CERTIFIED REGISTERED

## 2023-10-03 PROCEDURE — 2709999900 HC NON-CHARGEABLE SUPPLY: Performed by: NEUROLOGICAL SURGERY

## 2023-10-03 PROCEDURE — G0378 HOSPITAL OBSERVATION PER HR: HCPCS

## 2023-10-03 PROCEDURE — 6360000002 HC RX W HCPCS: Performed by: NEUROLOGICAL SURGERY

## 2023-10-03 PROCEDURE — 82962 GLUCOSE BLOOD TEST: CPT

## 2023-10-03 PROCEDURE — 2580000003 HC RX 258: Performed by: ANESTHESIOLOGY

## 2023-10-03 PROCEDURE — 0RB30ZZ EXCISION OF CERVICAL VERTEBRAL DISC, OPEN APPROACH: ICD-10-PCS | Performed by: NEUROLOGICAL SURGERY

## 2023-10-03 PROCEDURE — APPNB180 APP NON BILLABLE TIME > 60 MINS

## 2023-10-03 PROCEDURE — 3700000001 HC ADD 15 MINUTES (ANESTHESIA): Performed by: NEUROLOGICAL SURGERY

## 2023-10-03 PROCEDURE — 2580000003 HC RX 258: Performed by: NEUROLOGICAL SURGERY

## 2023-10-03 PROCEDURE — 3600000004 HC SURGERY LEVEL 4 BASE: Performed by: NEUROLOGICAL SURGERY

## 2023-10-03 PROCEDURE — C1713 ANCHOR/SCREW BN/BN,TIS/BN: HCPCS | Performed by: NEUROLOGICAL SURGERY

## 2023-10-03 PROCEDURE — 2500000003 HC RX 250 WO HCPCS: Performed by: NURSE ANESTHETIST, CERTIFIED REGISTERED

## 2023-10-03 PROCEDURE — 2580000003 HC RX 258: Performed by: NURSE ANESTHETIST, CERTIFIED REGISTERED

## 2023-10-03 PROCEDURE — 3600000014 HC SURGERY LEVEL 4 ADDTL 15MIN: Performed by: NEUROLOGICAL SURGERY

## 2023-10-03 DEVICE — 3.5MM VARIABLE SCREW, SELF DRILLING, 14MM
Type: IMPLANTABLE DEVICE | Site: NECK | Status: FUNCTIONAL
Brand: SHORELINE ACS

## 2023-10-03 DEVICE — INTERBODY, 14X13X6MM, 7 DEGREE, RT
Type: IMPLANTABLE DEVICE | Site: NECK | Status: FUNCTIONAL
Brand: SHORELINE RT

## 2023-10-03 DEVICE — 6MM, LOCKING COVER
Type: IMPLANTABLE DEVICE | Site: NECK | Status: FUNCTIONAL
Brand: SHORELINE ACS

## 2023-10-03 DEVICE — I-FACTOR PUTTY, 1.0CC SYRINGE
Type: IMPLANTABLE DEVICE | Site: NECK | Status: FUNCTIONAL
Brand: I-FACTOR PEPTIDE ENHANCED BONE GRAFT

## 2023-10-03 DEVICE — 6MM, 2-HOLE ANTERIOR PLATE
Type: IMPLANTABLE DEVICE | Site: NECK | Status: FUNCTIONAL
Brand: SHORELINE ACS

## 2023-10-03 RX ORDER — LIDOCAINE HYDROCHLORIDE 20 MG/ML
INJECTION, SOLUTION EPIDURAL; INFILTRATION; INTRACAUDAL; PERINEURAL PRN
Status: DISCONTINUED | OUTPATIENT
Start: 2023-10-03 | End: 2023-10-03 | Stop reason: SDUPTHER

## 2023-10-03 RX ORDER — SODIUM CHLORIDE 9 MG/ML
INJECTION, SOLUTION INTRAVENOUS CONTINUOUS
Status: DISCONTINUED | OUTPATIENT
Start: 2023-10-03 | End: 2023-10-04

## 2023-10-03 RX ORDER — HYDRALAZINE HYDROCHLORIDE 20 MG/ML
10 INJECTION INTRAMUSCULAR; INTRAVENOUS
Status: DISCONTINUED | OUTPATIENT
Start: 2023-10-03 | End: 2023-10-03 | Stop reason: HOSPADM

## 2023-10-03 RX ORDER — HYDRALAZINE HYDROCHLORIDE 50 MG/1
100 TABLET, FILM COATED ORAL 3 TIMES DAILY
Status: DISCONTINUED | OUTPATIENT
Start: 2023-10-03 | End: 2023-10-13 | Stop reason: HOSPADM

## 2023-10-03 RX ORDER — ONDANSETRON 4 MG/1
4 TABLET, ORALLY DISINTEGRATING ORAL EVERY 8 HOURS PRN
Status: DISCONTINUED | OUTPATIENT
Start: 2023-10-03 | End: 2023-10-13 | Stop reason: HOSPADM

## 2023-10-03 RX ORDER — ACETAMINOPHEN 500 MG
1000 TABLET ORAL ONCE
Status: COMPLETED | OUTPATIENT
Start: 2023-10-03 | End: 2023-10-03

## 2023-10-03 RX ORDER — OXYCODONE HYDROCHLORIDE 5 MG/1
10 TABLET ORAL EVERY 4 HOURS PRN
Status: DISCONTINUED | OUTPATIENT
Start: 2023-10-03 | End: 2023-10-04

## 2023-10-03 RX ORDER — POLYETHYLENE GLYCOL 3350 17 G/17G
17 POWDER, FOR SOLUTION ORAL DAILY
Status: DISCONTINUED | OUTPATIENT
Start: 2023-10-03 | End: 2023-10-13 | Stop reason: HOSPADM

## 2023-10-03 RX ORDER — SODIUM CHLORIDE 0.9 % (FLUSH) 0.9 %
5-40 SYRINGE (ML) INJECTION PRN
Status: DISCONTINUED | OUTPATIENT
Start: 2023-10-03 | End: 2023-10-13 | Stop reason: HOSPADM

## 2023-10-03 RX ORDER — PROPOFOL 10 MG/ML
INJECTION, EMULSION INTRAVENOUS PRN
Status: DISCONTINUED | OUTPATIENT
Start: 2023-10-03 | End: 2023-10-03 | Stop reason: SDUPTHER

## 2023-10-03 RX ORDER — PROCHLORPERAZINE EDISYLATE 5 MG/ML
5 INJECTION INTRAMUSCULAR; INTRAVENOUS
Status: DISCONTINUED | OUTPATIENT
Start: 2023-10-03 | End: 2023-10-03 | Stop reason: HOSPADM

## 2023-10-03 RX ORDER — ONDANSETRON 2 MG/ML
4 INJECTION INTRAMUSCULAR; INTRAVENOUS EVERY 6 HOURS PRN
Status: DISCONTINUED | OUTPATIENT
Start: 2023-10-03 | End: 2023-10-13 | Stop reason: HOSPADM

## 2023-10-03 RX ORDER — OXYCODONE HYDROCHLORIDE 5 MG/1
5 TABLET ORAL
Status: DISCONTINUED | OUTPATIENT
Start: 2023-10-03 | End: 2023-10-03 | Stop reason: HOSPADM

## 2023-10-03 RX ORDER — SODIUM CHLORIDE 0.9 % (FLUSH) 0.9 %
5-40 SYRINGE (ML) INJECTION PRN
Status: DISCONTINUED | OUTPATIENT
Start: 2023-10-03 | End: 2023-10-03 | Stop reason: HOSPADM

## 2023-10-03 RX ORDER — SODIUM CHLORIDE 0.9 % (FLUSH) 0.9 %
5-40 SYRINGE (ML) INJECTION EVERY 12 HOURS SCHEDULED
Status: DISCONTINUED | OUTPATIENT
Start: 2023-10-03 | End: 2023-10-03 | Stop reason: HOSPADM

## 2023-10-03 RX ORDER — SENNA AND DOCUSATE SODIUM 50; 8.6 MG/1; MG/1
1 TABLET, FILM COATED ORAL 2 TIMES DAILY
Status: DISCONTINUED | OUTPATIENT
Start: 2023-10-03 | End: 2023-10-13 | Stop reason: HOSPADM

## 2023-10-03 RX ORDER — ALBUTEROL SULFATE 2.5 MG/3ML
2.5 SOLUTION RESPIRATORY (INHALATION) EVERY 6 HOURS PRN
Status: DISCONTINUED | OUTPATIENT
Start: 2023-10-03 | End: 2023-10-13 | Stop reason: HOSPADM

## 2023-10-03 RX ORDER — HYDROMORPHONE HYDROCHLORIDE 2 MG/ML
INJECTION, SOLUTION INTRAMUSCULAR; INTRAVENOUS; SUBCUTANEOUS PRN
Status: DISCONTINUED | OUTPATIENT
Start: 2023-10-03 | End: 2023-10-03 | Stop reason: SDUPTHER

## 2023-10-03 RX ORDER — ACETAMINOPHEN 500 MG
1000 TABLET ORAL ONCE
Status: DISCONTINUED | OUTPATIENT
Start: 2023-10-03 | End: 2023-10-03 | Stop reason: HOSPADM

## 2023-10-03 RX ORDER — PREGABALIN 150 MG/1
150 CAPSULE ORAL ONCE
Status: COMPLETED | OUTPATIENT
Start: 2023-10-03 | End: 2023-10-03

## 2023-10-03 RX ORDER — DEXAMETHASONE SODIUM PHOSPHATE 4 MG/ML
4 INJECTION, SOLUTION INTRA-ARTICULAR; INTRALESIONAL; INTRAMUSCULAR; INTRAVENOUS; SOFT TISSUE
Status: DISCONTINUED | OUTPATIENT
Start: 2023-10-03 | End: 2023-10-03 | Stop reason: HOSPADM

## 2023-10-03 RX ORDER — LISINOPRIL 20 MG/1
40 TABLET ORAL DAILY
Status: DISCONTINUED | OUTPATIENT
Start: 2023-10-03 | End: 2023-10-13 | Stop reason: HOSPADM

## 2023-10-03 RX ORDER — CARVEDILOL 12.5 MG/1
12.5 TABLET ORAL 2 TIMES DAILY WITH MEALS
Status: DISCONTINUED | OUTPATIENT
Start: 2023-10-03 | End: 2023-10-06

## 2023-10-03 RX ORDER — ACETAMINOPHEN 325 MG/1
650 TABLET ORAL EVERY 6 HOURS
Status: DISCONTINUED | OUTPATIENT
Start: 2023-10-03 | End: 2023-10-13 | Stop reason: HOSPADM

## 2023-10-03 RX ORDER — SODIUM CHLORIDE, SODIUM LACTATE, POTASSIUM CHLORIDE, CALCIUM CHLORIDE 600; 310; 30; 20 MG/100ML; MG/100ML; MG/100ML; MG/100ML
INJECTION, SOLUTION INTRAVENOUS CONTINUOUS
Status: DISCONTINUED | OUTPATIENT
Start: 2023-10-03 | End: 2023-10-03 | Stop reason: HOSPADM

## 2023-10-03 RX ORDER — ROCURONIUM BROMIDE 10 MG/ML
INJECTION, SOLUTION INTRAVENOUS PRN
Status: DISCONTINUED | OUTPATIENT
Start: 2023-10-03 | End: 2023-10-03 | Stop reason: SDUPTHER

## 2023-10-03 RX ORDER — FENTANYL CITRATE 50 UG/ML
100 INJECTION, SOLUTION INTRAMUSCULAR; INTRAVENOUS
Status: DISCONTINUED | OUTPATIENT
Start: 2023-10-03 | End: 2023-10-03 | Stop reason: HOSPADM

## 2023-10-03 RX ORDER — ACETAMINOPHEN 325 MG/1
650 TABLET ORAL
Status: DISCONTINUED | OUTPATIENT
Start: 2023-10-03 | End: 2023-10-03 | Stop reason: HOSPADM

## 2023-10-03 RX ORDER — DIPHENHYDRAMINE HCL 25 MG
25 CAPSULE ORAL EVERY 6 HOURS PRN
Status: DISCONTINUED | OUTPATIENT
Start: 2023-10-03 | End: 2023-10-13 | Stop reason: HOSPADM

## 2023-10-03 RX ORDER — FENTANYL CITRATE 50 UG/ML
INJECTION, SOLUTION INTRAMUSCULAR; INTRAVENOUS PRN
Status: DISCONTINUED | OUTPATIENT
Start: 2023-10-03 | End: 2023-10-03 | Stop reason: SDUPTHER

## 2023-10-03 RX ORDER — ONDANSETRON 2 MG/ML
4 INJECTION INTRAMUSCULAR; INTRAVENOUS ONCE
Status: DISCONTINUED | OUTPATIENT
Start: 2023-10-03 | End: 2023-10-03 | Stop reason: HOSPADM

## 2023-10-03 RX ORDER — AMLODIPINE BESYLATE 5 MG/1
10 TABLET ORAL DAILY
Status: DISCONTINUED | OUTPATIENT
Start: 2023-10-03 | End: 2023-10-13 | Stop reason: HOSPADM

## 2023-10-03 RX ORDER — SODIUM CHLORIDE 9 MG/ML
INJECTION, SOLUTION INTRAVENOUS PRN
Status: DISCONTINUED | OUTPATIENT
Start: 2023-10-03 | End: 2023-10-03 | Stop reason: HOSPADM

## 2023-10-03 RX ORDER — HYDROMORPHONE HYDROCHLORIDE 1 MG/ML
0.25 INJECTION, SOLUTION INTRAMUSCULAR; INTRAVENOUS; SUBCUTANEOUS EVERY 5 MIN PRN
Status: DISCONTINUED | OUTPATIENT
Start: 2023-10-03 | End: 2023-10-03 | Stop reason: HOSPADM

## 2023-10-03 RX ORDER — MIDAZOLAM HYDROCHLORIDE 2 MG/2ML
2 INJECTION, SOLUTION INTRAMUSCULAR; INTRAVENOUS
Status: DISCONTINUED | OUTPATIENT
Start: 2023-10-03 | End: 2023-10-03 | Stop reason: HOSPADM

## 2023-10-03 RX ORDER — FENTANYL CITRATE 50 UG/ML
25 INJECTION, SOLUTION INTRAMUSCULAR; INTRAVENOUS EVERY 5 MIN PRN
Status: DISCONTINUED | OUTPATIENT
Start: 2023-10-03 | End: 2023-10-03 | Stop reason: HOSPADM

## 2023-10-03 RX ORDER — SODIUM CHLORIDE 0.9 % (FLUSH) 0.9 %
5-40 SYRINGE (ML) INJECTION EVERY 12 HOURS SCHEDULED
Status: DISCONTINUED | OUTPATIENT
Start: 2023-10-03 | End: 2023-10-13 | Stop reason: HOSPADM

## 2023-10-03 RX ORDER — LEVETIRACETAM 500 MG/1
1000 TABLET ORAL 2 TIMES DAILY
Status: DISCONTINUED | OUTPATIENT
Start: 2023-10-03 | End: 2023-10-13 | Stop reason: HOSPADM

## 2023-10-03 RX ORDER — ATORVASTATIN CALCIUM 40 MG/1
80 TABLET, FILM COATED ORAL NIGHTLY
Status: DISCONTINUED | OUTPATIENT
Start: 2023-10-03 | End: 2023-10-13 | Stop reason: HOSPADM

## 2023-10-03 RX ORDER — DEXMEDETOMIDINE HYDROCHLORIDE 100 UG/ML
INJECTION, SOLUTION INTRAVENOUS PRN
Status: DISCONTINUED | OUTPATIENT
Start: 2023-10-03 | End: 2023-10-03 | Stop reason: SDUPTHER

## 2023-10-03 RX ORDER — ONDANSETRON 2 MG/ML
INJECTION INTRAMUSCULAR; INTRAVENOUS PRN
Status: DISCONTINUED | OUTPATIENT
Start: 2023-10-03 | End: 2023-10-03 | Stop reason: SDUPTHER

## 2023-10-03 RX ORDER — DIPHENHYDRAMINE HYDROCHLORIDE 50 MG/ML
25 INJECTION INTRAMUSCULAR; INTRAVENOUS EVERY 6 HOURS PRN
Status: DISCONTINUED | OUTPATIENT
Start: 2023-10-03 | End: 2023-10-13 | Stop reason: HOSPADM

## 2023-10-03 RX ORDER — OXYCODONE HYDROCHLORIDE 5 MG/1
5 TABLET ORAL EVERY 4 HOURS PRN
Status: DISCONTINUED | OUTPATIENT
Start: 2023-10-03 | End: 2023-10-04

## 2023-10-03 RX ADMIN — ATORVASTATIN CALCIUM 80 MG: 40 TABLET, FILM COATED ORAL at 20:44

## 2023-10-03 RX ADMIN — HYDROMORPHONE HYDROCHLORIDE 0.5 MG: 2 INJECTION INTRAMUSCULAR; INTRAVENOUS; SUBCUTANEOUS at 08:22

## 2023-10-03 RX ADMIN — PHENYLEPHRINE HYDROCHLORIDE 50 MCG/MIN: 10 INJECTION INTRAVENOUS at 08:42

## 2023-10-03 RX ADMIN — PREGABALIN 150 MG: 150 CAPSULE ORAL at 07:22

## 2023-10-03 RX ADMIN — FENTANYL CITRATE 50 MCG: 50 INJECTION, SOLUTION INTRAMUSCULAR; INTRAVENOUS at 08:19

## 2023-10-03 RX ADMIN — ACETAMINOPHEN 1000 MG: 500 TABLET ORAL at 07:22

## 2023-10-03 RX ADMIN — ACETAMINOPHEN 650 MG: 325 TABLET ORAL at 20:44

## 2023-10-03 RX ADMIN — HYDRALAZINE HYDROCHLORIDE 100 MG: 50 TABLET, FILM COATED ORAL at 20:44

## 2023-10-03 RX ADMIN — SENNOSIDES AND DOCUSATE SODIUM 1 TABLET: 50; 8.6 TABLET ORAL at 20:47

## 2023-10-03 RX ADMIN — ROCURONIUM BROMIDE 20 MG: 10 INJECTION INTRAVENOUS at 08:31

## 2023-10-03 RX ADMIN — ACETAMINOPHEN 650 MG: 325 TABLET ORAL at 17:08

## 2023-10-03 RX ADMIN — ROCURONIUM BROMIDE 10 MG: 10 INJECTION INTRAVENOUS at 09:23

## 2023-10-03 RX ADMIN — SODIUM CHLORIDE, POTASSIUM CHLORIDE, SODIUM LACTATE AND CALCIUM CHLORIDE: 600; 310; 30; 20 INJECTION, SOLUTION INTRAVENOUS at 07:22

## 2023-10-03 RX ADMIN — LIDOCAINE HYDROCHLORIDE 40 MG: 20 INJECTION, SOLUTION EPIDURAL; INFILTRATION; INTRACAUDAL; PERINEURAL at 08:47

## 2023-10-03 RX ADMIN — FENTANYL CITRATE 50 MCG: 50 INJECTION, SOLUTION INTRAMUSCULAR; INTRAVENOUS at 07:44

## 2023-10-03 RX ADMIN — SUGAMMADEX 200 MG: 100 INJECTION, SOLUTION INTRAVENOUS at 09:58

## 2023-10-03 RX ADMIN — METFORMIN HYDROCHLORIDE 1000 MG: 500 TABLET ORAL at 17:08

## 2023-10-03 RX ADMIN — HYDROMORPHONE HYDROCHLORIDE 0.5 MG: 2 INJECTION INTRAMUSCULAR; INTRAVENOUS; SUBCUTANEOUS at 09:47

## 2023-10-03 RX ADMIN — LEVETIRACETAM 1000 MG: 500 TABLET, FILM COATED ORAL at 20:44

## 2023-10-03 RX ADMIN — LIDOCAINE HYDROCHLORIDE 100 MG: 20 INJECTION, SOLUTION EPIDURAL; INFILTRATION; INTRACAUDAL; PERINEURAL at 07:44

## 2023-10-03 RX ADMIN — ONDANSETRON 4 MG: 2 INJECTION INTRAMUSCULAR; INTRAVENOUS at 09:58

## 2023-10-03 RX ADMIN — ROCURONIUM BROMIDE 30 MG: 10 INJECTION INTRAVENOUS at 07:44

## 2023-10-03 RX ADMIN — SODIUM CHLORIDE, PRESERVATIVE FREE 10 ML: 5 INJECTION INTRAVENOUS at 20:45

## 2023-10-03 RX ADMIN — SODIUM CHLORIDE: 9 INJECTION, SOLUTION INTRAVENOUS at 16:52

## 2023-10-03 RX ADMIN — PROPOFOL 150 MG: 10 INJECTION, EMULSION INTRAVENOUS at 07:44

## 2023-10-03 RX ADMIN — WATER 2000 MG: 1 INJECTION INTRAMUSCULAR; INTRAVENOUS; SUBCUTANEOUS at 17:13

## 2023-10-03 RX ADMIN — DEXMEDETOMIDINE HYDROCHLORIDE 6 MCG: 100 INJECTION, SOLUTION, CONCENTRATE INTRAVENOUS at 09:52

## 2023-10-03 RX ADMIN — ROCURONIUM BROMIDE 20 MG: 10 INJECTION INTRAVENOUS at 08:02

## 2023-10-03 RX ADMIN — WATER 2000 MG: 1 INJECTION INTRAMUSCULAR; INTRAVENOUS; SUBCUTANEOUS at 07:54

## 2023-10-03 RX ADMIN — CARVEDILOL 12.5 MG: 12.5 TABLET, FILM COATED ORAL at 17:07

## 2023-10-03 RX ADMIN — Medication 3 AMPULE: at 07:22

## 2023-10-03 ASSESSMENT — PAIN SCALES - GENERAL: PAINLEVEL_OUTOF10: 0

## 2023-10-03 NOTE — ANESTHESIA PRE PROCEDURE
Department of Anesthesiology  Preprocedure Note       Name:  Nirmala Dailey   Age:  79 y.o.  :  1955                                          MRN:  [id number]         Date:  10/3/2023      Surgeon: Viktoriya Garcia):  Aisha Clifton MD    Procedure: Procedure(s):  ANTERIOR CERVICAL DISCECTOMY AND FUSION, C3-4, C4-5    Medications prior to admission:   Prior to Admission medications    Medication Sig Start Date End Date Taking? Authorizing Provider   atorvastatin (LIPITOR) 80 MG tablet Take 1 tablet by mouth at bedtime 23   Zita Soler MD   glimepiride (AMARYL) 4 MG tablet Take 1 tablet by mouth every morning (before breakfast) 23   Zita Soler MD   metFORMIN (GLUCOPHAGE) 500 MG tablet Take 2 tablets by mouth 2 times daily (with meals) 23   Zita Soler MD   lisinopril (PRINIVIL;ZESTRIL) 40 MG tablet Take 1 tablet by mouth daily 23   Zita Soler MD   enoxaparin (LOVENOX) 100 MG/ML Inject into the skin 2 times daily Patient to stop Plavix and Eliquis 5 days prior to surgery-take Lovenox twice a day until 2 days prior to surgery and then no anticoagulant for 2 days prior to surgery-per Dr. Tova Woodward instructions.  23   Historical Provider, MD   clopidogrel (PLAVIX) 75 MG tablet Take 1 tablet by mouth daily    Historical Provider, MD   acetaminophen (TYLENOL) 500 MG tablet Take 1 tablet by mouth every 4 hours as needed    Historical Provider, MD   apixaban (ELIQUIS) 5 MG TABS tablet Take 1 tablet by mouth 2 times daily 23   Zita Soler MD   albuterol sulfate HFA (PROVENTIL;VENTOLIN;PROAIR) 108 (90 Base) MCG/ACT inhaler Inhale 2 puffs into the lungs every 4 hours as needed    Ar Automatic Reconciliation   amLODIPine (NORVASC) 10 MG tablet Take 1 tablet by mouth daily 22   Ar Automatic Reconciliation   carvedilol (COREG) 12.5 MG tablet Take 1 tablet by mouth 2 times daily (with meals) 10/27/22   Ar Automatic Reconciliation   hydrALAZINE (APRESOLINE) 100 MG tablet Take 1 tablet by mouth

## 2023-10-03 NOTE — PERIOP NOTE
[id number]: Dr Brandie Dinh in pre op room speaking with patient and patients wife. Wound care consulted for non-blanchable excoriation on patients sacrum. Patients wife given patients wheelchair and patients upper partials. Clothing sent to PACU.

## 2023-10-03 NOTE — PERIOP NOTE
Irrisept Wound Debridement and Cleansing System  Ref: Hue Terrie: [id number] LOT: [id number] Expiration Date: 05/31/2026    Used PRN for irrigation    Floseal Hemostatic Matrix 10mL Reference: [id number] Lot number: [id number] Expiration date: 06/30/2024

## 2023-10-03 NOTE — ANESTHESIA POSTPROCEDURE EVALUATION
Department of Anesthesiology  Postprocedure Note    Patient: Jia Fuentes  MRN: [id number]  YOB: 1955  Date of evaluation: 10/3/2023      Procedure Summary     Date: 10/03/23 Room / Location: [facility] MA[facility] OR M2 / [facility] MA[facility] OR    Anesthesia Start: 1792 Anesthesia Stop: 4250    Procedure: ANTERIOR CERVICAL DISCECTOMY AND FUSION, C3-4, C4-5 (Right: Neck) Diagnosis:       Cervical disc disorder with myelopathy of mid-cervical region      Cervical disc disorder at C4-C5 level with myelopathy      (Cervical disc disorder with myelopathy of mid-cervical region [M50.020])      (Cervical disc disorder at C4-C5 level with myelopathy [M50.021])    Providers: Veronica Eng MD Responsible Provider: Dwain Ferrera MD    Anesthesia Type: General ASA Status: 3          Anesthesia Type: General    Bahman Phase I: Bahman Score: 6    Bahman Phase II:        Anesthesia Post Evaluation    Patient location during evaluation: PACU  Patient participation: complete - patient participated  Level of consciousness: sleepy but conscious and responsive to verbal stimuli  Pain score: 3  Airway patency: patent  Nausea & Vomiting: no vomiting and no nausea  Complications: no  Cardiovascular status: blood pressure returned to baseline and hemodynamically stable  Respiratory status: acceptable  Hydration status: stable  Multimodal analgesia pain management approach  Pain management: adequate

## 2023-10-03 NOTE — OP NOTE
interspace at C4-5 and an anterior plate applied to the anterior surface of the graft and the graft locked in place with a 14-mm screw into the vertebral body above and below respectively. A locking screw was used to cover the two 14-mm screws. Attention was then directed to the C3-4 interspace where in a similar fashion a similar graft was placed with an anterior plate applied to the anterior surface of the graft and locked in place to the vertebral body above and below at C3-4 respectively. The constructs appeared quite strong x-ray was obtained to confirm position. The area was irrigated copiously with antibiotic irrigation. The platysma closed with 3-0 Vicryl and then a running 4-0 Monocryl in the subcuticular layer. The needle, sponge, instrument count were correct at the end of the procedure. Dermabond was applied to the skin edges.       Janay Jaeger MD      [id number]  D:  10/03/2023 10:12  T:  10/03/2023 11:55  JOB #:  [id number]  CC:  Janay Jaeger MD

## 2023-10-04 LAB
ANION GAP SERPL CALC-SCNC: 3 MMOL/L (ref 5–15)
BUN SERPL-MCNC: 19 MG/DL (ref 6–20)
BUN/CREAT SERPL: 16 (ref 12–20)
CALCIUM SERPL-MCNC: 8.2 MG/DL (ref 8.5–10.1)
CHLORIDE SERPL-SCNC: 108 MMOL/L (ref 97–108)
CO2 SERPL-SCNC: 31 MMOL/L (ref 21–32)
CREAT SERPL-MCNC: 1.2 MG/DL (ref 0.7–1.3)
GLUCOSE SERPL-MCNC: 122 MG/DL (ref 65–100)
HCT VFR BLD AUTO: 38.1 % (ref 36.6–50.3)
HGB BLD-MCNC: 11.7 G/DL (ref 12.1–17)
POTASSIUM SERPL-SCNC: 3.9 MMOL/L (ref 3.5–5.1)
SODIUM SERPL-SCNC: 142 MMOL/L (ref 136–145)

## 2023-10-04 PROCEDURE — APPNB180 APP NON BILLABLE TIME > 60 MINS

## 2023-10-04 PROCEDURE — 6370000000 HC RX 637 (ALT 250 FOR IP): Performed by: NEUROLOGICAL SURGERY

## 2023-10-04 PROCEDURE — 85014 HEMATOCRIT: CPT

## 2023-10-04 PROCEDURE — 85018 HEMOGLOBIN: CPT

## 2023-10-04 PROCEDURE — 80048 BASIC METABOLIC PNL TOTAL CA: CPT

## 2023-10-04 PROCEDURE — G0378 HOSPITAL OBSERVATION PER HR: HCPCS

## 2023-10-04 PROCEDURE — 2580000003 HC RX 258: Performed by: NEUROLOGICAL SURGERY

## 2023-10-04 PROCEDURE — 97165 OT EVAL LOW COMPLEX 30 MIN: CPT

## 2023-10-04 PROCEDURE — 97530 THERAPEUTIC ACTIVITIES: CPT

## 2023-10-04 PROCEDURE — 97161 PT EVAL LOW COMPLEX 20 MIN: CPT

## 2023-10-04 PROCEDURE — 97535 SELF CARE MNGMENT TRAINING: CPT

## 2023-10-04 PROCEDURE — 36415 COLL VENOUS BLD VENIPUNCTURE: CPT

## 2023-10-04 PROCEDURE — 51798 US URINE CAPACITY MEASURE: CPT

## 2023-10-04 PROCEDURE — 6360000002 HC RX W HCPCS: Performed by: NEUROLOGICAL SURGERY

## 2023-10-04 RX ORDER — TRAMADOL HYDROCHLORIDE 50 MG/1
50 TABLET ORAL EVERY 6 HOURS PRN
Status: DISCONTINUED | OUTPATIENT
Start: 2023-10-04 | End: 2023-10-13 | Stop reason: HOSPADM

## 2023-10-04 RX ORDER — CASTOR OIL AND BALSAM, PERU 788; 87 MG/G; MG/G
OINTMENT TOPICAL 2 TIMES DAILY
Status: DISCONTINUED | OUTPATIENT
Start: 2023-10-04 | End: 2023-10-13 | Stop reason: HOSPADM

## 2023-10-04 RX ADMIN — AMLODIPINE BESYLATE 10 MG: 5 TABLET ORAL at 10:22

## 2023-10-04 RX ADMIN — HYDRALAZINE HYDROCHLORIDE 100 MG: 50 TABLET, FILM COATED ORAL at 14:09

## 2023-10-04 RX ADMIN — ACETAMINOPHEN 650 MG: 325 TABLET ORAL at 03:24

## 2023-10-04 RX ADMIN — LEVETIRACETAM 1000 MG: 500 TABLET, FILM COATED ORAL at 10:22

## 2023-10-04 RX ADMIN — POLYETHYLENE GLYCOL 3350 17 G: 17 POWDER, FOR SOLUTION ORAL at 10:26

## 2023-10-04 RX ADMIN — OXYCODONE HYDROCHLORIDE 10 MG: 5 TABLET ORAL at 03:24

## 2023-10-04 RX ADMIN — LEVETIRACETAM 1000 MG: 500 TABLET, FILM COATED ORAL at 21:41

## 2023-10-04 RX ADMIN — METFORMIN HYDROCHLORIDE 1000 MG: 500 TABLET ORAL at 18:27

## 2023-10-04 RX ADMIN — LISINOPRIL 40 MG: 20 TABLET ORAL at 10:22

## 2023-10-04 RX ADMIN — METFORMIN HYDROCHLORIDE 1000 MG: 500 TABLET ORAL at 10:21

## 2023-10-04 RX ADMIN — CARVEDILOL 12.5 MG: 12.5 TABLET, FILM COATED ORAL at 18:29

## 2023-10-04 RX ADMIN — Medication: at 21:42

## 2023-10-04 RX ADMIN — ACETAMINOPHEN 650 MG: 325 TABLET ORAL at 10:22

## 2023-10-04 RX ADMIN — SENNOSIDES AND DOCUSATE SODIUM 1 TABLET: 50; 8.6 TABLET ORAL at 10:22

## 2023-10-04 RX ADMIN — SODIUM CHLORIDE, PRESERVATIVE FREE 10 ML: 5 INJECTION INTRAVENOUS at 21:40

## 2023-10-04 RX ADMIN — ACETAMINOPHEN 650 MG: 325 TABLET ORAL at 15:55

## 2023-10-04 RX ADMIN — HYDRALAZINE HYDROCHLORIDE 100 MG: 50 TABLET, FILM COATED ORAL at 21:41

## 2023-10-04 RX ADMIN — ACETAMINOPHEN 650 MG: 325 TABLET ORAL at 21:42

## 2023-10-04 RX ADMIN — HYDRALAZINE HYDROCHLORIDE 100 MG: 50 TABLET, FILM COATED ORAL at 10:22

## 2023-10-04 RX ADMIN — SODIUM CHLORIDE: 9 INJECTION, SOLUTION INTRAVENOUS at 00:37

## 2023-10-04 RX ADMIN — SENNOSIDES AND DOCUSATE SODIUM 1 TABLET: 50; 8.6 TABLET ORAL at 21:42

## 2023-10-04 RX ADMIN — CARVEDILOL 12.5 MG: 12.5 TABLET, FILM COATED ORAL at 10:21

## 2023-10-04 RX ADMIN — WATER 2000 MG: 1 INJECTION INTRAMUSCULAR; INTRAVENOUS; SUBCUTANEOUS at 00:37

## 2023-10-04 RX ADMIN — ATORVASTATIN CALCIUM 80 MG: 40 TABLET, FILM COATED ORAL at 21:41

## 2023-10-04 ASSESSMENT — PAIN SCALES - GENERAL
PAINLEVEL_OUTOF10: 0
PAINLEVEL_OUTOF10: 6
PAINLEVEL_OUTOF10: 0

## 2023-10-04 ASSESSMENT — PAIN DESCRIPTION - LOCATION: LOCATION: BACK

## 2023-10-04 ASSESSMENT — PAIN DESCRIPTION - ORIENTATION: ORIENTATION: POSTERIOR

## 2023-10-04 NOTE — WOUND CARE
Wound care nurse consult for non blanchable excoriation on sacrum    Chart reviewed and patient assessed    78 y/o AAM admitted to [facility] for herniation of cervical intervertebral disc without myelopathy. 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 , had mini stroke; left side paralysis     Past Surgical History:   Procedure Laterality Date    CERVICAL FUSION Right 10/3/2023    ANTERIOR CERVICAL DISCECTOMY AND FUSION, C3-4, C4-5 performed by Aisha Clifton MD at \[facility]\"" MAIN OR    CHEST SURGERY      VATS Video-assisted thoracic surgery     Stroke left patient with left sided paralysis    Patient turned an no wounds, no excoriation, no erythema found. 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          Recommend:    Incontinence skin care: prompt clean up of urine and/feces.   Turn patient off of sacrum q 2hrs  Float heels    ABDIFATAH HANNA [address] Brent Geller RN, CWON

## 2023-10-04 NOTE — CONSULTS
Comprehensive Nutrition Assessment    Type and Reason for Visit:  Initial, Consult (ONS)    Nutrition Recommendations/Plan:   Continue regular diet  Ensure Plus 2x/d  Monitor and document intake %, supplement tolerance, bms, wts in I/Os thanks     Malnutrition Assessment:  Malnutrition Status:  Mild malnutrition (10/04/23 1148)    Context:  Chronic Illness     Findings of the 6 clinical characteristics of malnutrition:  Energy Intake:  Unable to assess  Weight Loss:  Greater than 7.5% over 3 months     Body Fat Loss:  No significant body fat loss (per visual assessment)     Muscle Mass Loss:  No significant muscle mass loss (per visual assessment)    Fluid Accumulation:  No significant fluid accumulation     Strength:  Not Performed    Nutrition Assessment:    Pt admitted w/ herniation of cervical intervertebral disc. PMH ofDM, HTN, CKD stg 3, CVA (2/2023), HLD. NKFA. Pt noted w/ wt loss 10% <6 mo's per stated weights, no MST entered at this time. Intakes noted >75%. POD1 s/p spinal surgery. RN and pt state intakes vary, pt confused today but open to ONS BID to support intake needs. Nutrition Related Findings:    NPFE deferred d/t pt eating lunch, no signficant visual s/s wasting. No n/v/c/d. BM 10/1. No hx chew/swallow issues, RN reports some swallowing issues w/ pills today, pt requiring set-up assistance w/ meals. T  race BLE edema. Labs: No new results. Meds: norvasc, lipitor, hydralazine, keppra, lisinopril, metformin, glycolax, senokot. Wound Type: Surgical Incision       Current Nutrition Intake & Therapies:    Average Meal Intake: 51-75%, %, 26-50%  Average Supplements Intake: None Ordered  ADULT DIET; Regular    Anthropometric Measures:  Height: 185.4 cm (6' 0.99\")  Ideal Body Weight (IBW): 184 lbs (84 kg)    Current Body Weight: 202 lb 6.1 oz (91.8 kg), 110 % IBW.  Weight Source: Standing Scale  Current BMI (kg/m2): 26.7  Weight Adjustment For: No Adjustment   BMI Categories: Overweight

## 2023-10-04 NOTE — CARE COORDINATION
CM following planned spinal surgery patient to assist with potential d/c needs.      Colby Izaguirre LCSW,RONNA

## 2023-10-05 PROCEDURE — 96374 THER/PROPH/DIAG INJ IV PUSH: CPT

## 2023-10-05 PROCEDURE — 97110 THERAPEUTIC EXERCISES: CPT

## 2023-10-05 PROCEDURE — 2580000003 HC RX 258: Performed by: NEUROLOGICAL SURGERY

## 2023-10-05 PROCEDURE — APPNB180 APP NON BILLABLE TIME > 60 MINS

## 2023-10-05 PROCEDURE — 6370000000 HC RX 637 (ALT 250 FOR IP)

## 2023-10-05 PROCEDURE — G0378 HOSPITAL OBSERVATION PER HR: HCPCS

## 2023-10-05 PROCEDURE — 6370000000 HC RX 637 (ALT 250 FOR IP): Performed by: NEUROLOGICAL SURGERY

## 2023-10-05 PROCEDURE — 6360000002 HC RX W HCPCS: Performed by: NEUROLOGICAL SURGERY

## 2023-10-05 RX ORDER — SCOLOPAMINE TRANSDERMAL SYSTEM 1 MG/1
1 PATCH, EXTENDED RELEASE TRANSDERMAL ONCE
Status: COMPLETED | OUTPATIENT
Start: 2023-10-05 | End: 2023-10-06

## 2023-10-05 RX ORDER — CLOPIDOGREL BISULFATE 75 MG/1
75 TABLET ORAL DAILY
Status: DISCONTINUED | OUTPATIENT
Start: 2023-10-05 | End: 2023-10-13 | Stop reason: HOSPADM

## 2023-10-05 RX ADMIN — SENNOSIDES AND DOCUSATE SODIUM 1 TABLET: 50; 8.6 TABLET ORAL at 08:47

## 2023-10-05 RX ADMIN — SODIUM CHLORIDE, PRESERVATIVE FREE 10 ML: 5 INJECTION INTRAVENOUS at 20:17

## 2023-10-05 RX ADMIN — AMLODIPINE BESYLATE 10 MG: 5 TABLET ORAL at 08:48

## 2023-10-05 RX ADMIN — Medication: at 08:50

## 2023-10-05 RX ADMIN — SODIUM CHLORIDE, PRESERVATIVE FREE 10 ML: 5 INJECTION INTRAVENOUS at 09:15

## 2023-10-05 RX ADMIN — ACETAMINOPHEN 650 MG: 325 TABLET ORAL at 03:18

## 2023-10-05 RX ADMIN — CLOPIDOGREL BISULFATE 75 MG: 75 TABLET ORAL at 10:56

## 2023-10-05 RX ADMIN — ATORVASTATIN CALCIUM 80 MG: 40 TABLET, FILM COATED ORAL at 20:16

## 2023-10-05 RX ADMIN — APIXABAN 5 MG: 5 TABLET, FILM COATED ORAL at 20:13

## 2023-10-05 RX ADMIN — HYDRALAZINE HYDROCHLORIDE 100 MG: 50 TABLET, FILM COATED ORAL at 08:47

## 2023-10-05 RX ADMIN — METFORMIN HYDROCHLORIDE 1000 MG: 500 TABLET ORAL at 18:18

## 2023-10-05 RX ADMIN — LEVETIRACETAM 1000 MG: 500 TABLET, FILM COATED ORAL at 08:47

## 2023-10-05 RX ADMIN — LEVETIRACETAM 1000 MG: 500 TABLET, FILM COATED ORAL at 20:13

## 2023-10-05 RX ADMIN — HYDRALAZINE HYDROCHLORIDE 100 MG: 50 TABLET, FILM COATED ORAL at 20:13

## 2023-10-05 RX ADMIN — CARVEDILOL 12.5 MG: 12.5 TABLET, FILM COATED ORAL at 18:17

## 2023-10-05 RX ADMIN — ACETAMINOPHEN 650 MG: 325 TABLET ORAL at 14:16

## 2023-10-05 RX ADMIN — ACETAMINOPHEN 650 MG: 325 TABLET ORAL at 08:46

## 2023-10-05 RX ADMIN — METFORMIN HYDROCHLORIDE 1000 MG: 500 TABLET ORAL at 08:47

## 2023-10-05 RX ADMIN — SENNOSIDES AND DOCUSATE SODIUM 1 TABLET: 50; 8.6 TABLET ORAL at 20:17

## 2023-10-05 RX ADMIN — LISINOPRIL 40 MG: 20 TABLET ORAL at 08:47

## 2023-10-05 RX ADMIN — ONDANSETRON 4 MG: 2 INJECTION INTRAMUSCULAR; INTRAVENOUS at 11:26

## 2023-10-05 RX ADMIN — CARVEDILOL 12.5 MG: 12.5 TABLET, FILM COATED ORAL at 08:50

## 2023-10-05 RX ADMIN — HYDRALAZINE HYDROCHLORIDE 100 MG: 50 TABLET, FILM COATED ORAL at 14:17

## 2023-10-05 RX ADMIN — POLYETHYLENE GLYCOL 3350 17 G: 17 POWDER, FOR SOLUTION ORAL at 08:51

## 2023-10-05 RX ADMIN — Medication: at 20:16

## 2023-10-05 RX ADMIN — APIXABAN 5 MG: 5 TABLET, FILM COATED ORAL at 10:56

## 2023-10-05 RX ADMIN — ACETAMINOPHEN 650 MG: 325 TABLET ORAL at 20:13

## 2023-10-05 ASSESSMENT — PAIN SCALES - GENERAL
PAINLEVEL_OUTOF10: 0

## 2023-10-05 NOTE — CARE COORDINATION
Planned spinal surgery patient. Per NP, patient in need of rehab. CM made room visit with patient and wife at bedside. Pt's wife requested referral be sent to [facility] and [facility]. Referral sent. SNF list provided to wife to review for additional choices.      [facility], [address] [place] [address]

## 2023-10-06 ENCOUNTER — APPOINTMENT (OUTPATIENT)
Facility: HOSPITAL | Age: 68
DRG: 472 | End: 2023-10-06
Attending: NEUROLOGICAL SURGERY
Payer: MEDICARE

## 2023-10-06 LAB
GLUCOSE BLD STRIP.AUTO-MCNC: 117 MG/DL (ref 65–117)
GLUCOSE BLD STRIP.AUTO-MCNC: 119 MG/DL (ref 65–117)
GLUCOSE BLD STRIP.AUTO-MCNC: 123 MG/DL (ref 65–117)
SERVICE CMNT-IMP: ABNORMAL
SERVICE CMNT-IMP: ABNORMAL
SERVICE CMNT-IMP: NORMAL

## 2023-10-06 PROCEDURE — 6370000000 HC RX 637 (ALT 250 FOR IP): Performed by: NURSE PRACTITIONER

## 2023-10-06 PROCEDURE — APPNB180 APP NON BILLABLE TIME > 60 MINS

## 2023-10-06 PROCEDURE — G0378 HOSPITAL OBSERVATION PER HR: HCPCS

## 2023-10-06 PROCEDURE — 72040 X-RAY EXAM NECK SPINE 2-3 VW: CPT

## 2023-10-06 PROCEDURE — 1100000000 HC RM PRIVATE

## 2023-10-06 PROCEDURE — 6370000000 HC RX 637 (ALT 250 FOR IP)

## 2023-10-06 PROCEDURE — 2580000003 HC RX 258: Performed by: NEUROLOGICAL SURGERY

## 2023-10-06 PROCEDURE — 6360000002 HC RX W HCPCS: Performed by: NEUROLOGICAL SURGERY

## 2023-10-06 PROCEDURE — 6370000000 HC RX 637 (ALT 250 FOR IP): Performed by: NEUROLOGICAL SURGERY

## 2023-10-06 PROCEDURE — 82962 GLUCOSE BLOOD TEST: CPT

## 2023-10-06 PROCEDURE — 97535 SELF CARE MNGMENT TRAINING: CPT

## 2023-10-06 RX ORDER — CARVEDILOL 12.5 MG/1
25 TABLET ORAL 2 TIMES DAILY WITH MEALS
Status: DISCONTINUED | OUTPATIENT
Start: 2023-10-06 | End: 2023-10-13 | Stop reason: HOSPADM

## 2023-10-06 RX ORDER — PANTOPRAZOLE SODIUM 40 MG/1
40 TABLET, DELAYED RELEASE ORAL
Status: DISCONTINUED | OUTPATIENT
Start: 2023-10-07 | End: 2023-10-13 | Stop reason: HOSPADM

## 2023-10-06 RX ORDER — TRAMADOL HYDROCHLORIDE 50 MG/1
50 TABLET ORAL EVERY 8 HOURS PRN
Qty: 15 TABLET | Refills: 0 | Status: SHIPPED | OUTPATIENT
Start: 2023-10-06 | End: 2023-10-11

## 2023-10-06 RX ORDER — SENNA AND DOCUSATE SODIUM 50; 8.6 MG/1; MG/1
1 TABLET, FILM COATED ORAL 2 TIMES DAILY
Qty: 28 TABLET | Refills: 0 | Status: SHIPPED
Start: 2023-10-06 | End: 2023-10-13 | Stop reason: SDUPTHER

## 2023-10-06 RX ADMIN — HYDRALAZINE HYDROCHLORIDE 100 MG: 50 TABLET, FILM COATED ORAL at 20:36

## 2023-10-06 RX ADMIN — ACETAMINOPHEN 650 MG: 325 TABLET ORAL at 20:36

## 2023-10-06 RX ADMIN — LISINOPRIL 40 MG: 20 TABLET ORAL at 09:14

## 2023-10-06 RX ADMIN — POLYETHYLENE GLYCOL 3350 17 G: 17 POWDER, FOR SOLUTION ORAL at 09:14

## 2023-10-06 RX ADMIN — SENNOSIDES AND DOCUSATE SODIUM 1 TABLET: 50; 8.6 TABLET ORAL at 09:14

## 2023-10-06 RX ADMIN — SODIUM CHLORIDE, PRESERVATIVE FREE 10 ML: 5 INJECTION INTRAVENOUS at 20:37

## 2023-10-06 RX ADMIN — CARVEDILOL 25 MG: 12.5 TABLET, FILM COATED ORAL at 17:37

## 2023-10-06 RX ADMIN — ACETAMINOPHEN 650 MG: 325 TABLET ORAL at 15:30

## 2023-10-06 RX ADMIN — TRAMADOL HYDROCHLORIDE 50 MG: 50 TABLET ORAL at 06:32

## 2023-10-06 RX ADMIN — SENNOSIDES AND DOCUSATE SODIUM 1 TABLET: 50; 8.6 TABLET ORAL at 20:36

## 2023-10-06 RX ADMIN — Medication: at 09:14

## 2023-10-06 RX ADMIN — LEVETIRACETAM 1000 MG: 500 TABLET, FILM COATED ORAL at 20:36

## 2023-10-06 RX ADMIN — METFORMIN HYDROCHLORIDE 1000 MG: 500 TABLET ORAL at 09:13

## 2023-10-06 RX ADMIN — ACETAMINOPHEN 650 MG: 325 TABLET ORAL at 09:13

## 2023-10-06 RX ADMIN — AMLODIPINE BESYLATE 10 MG: 5 TABLET ORAL at 09:13

## 2023-10-06 RX ADMIN — ONDANSETRON 4 MG: 2 INJECTION INTRAMUSCULAR; INTRAVENOUS at 20:45

## 2023-10-06 RX ADMIN — METFORMIN HYDROCHLORIDE 1000 MG: 500 TABLET ORAL at 17:37

## 2023-10-06 RX ADMIN — APIXABAN 5 MG: 5 TABLET, FILM COATED ORAL at 20:36

## 2023-10-06 RX ADMIN — SODIUM CHLORIDE, PRESERVATIVE FREE 10 ML: 5 INJECTION INTRAVENOUS at 09:14

## 2023-10-06 RX ADMIN — CARVEDILOL 12.5 MG: 12.5 TABLET, FILM COATED ORAL at 09:13

## 2023-10-06 RX ADMIN — ACETAMINOPHEN 650 MG: 325 TABLET ORAL at 03:02

## 2023-10-06 RX ADMIN — HYDRALAZINE HYDROCHLORIDE 100 MG: 50 TABLET, FILM COATED ORAL at 15:30

## 2023-10-06 RX ADMIN — LEVETIRACETAM 1000 MG: 500 TABLET, FILM COATED ORAL at 09:13

## 2023-10-06 RX ADMIN — APIXABAN 5 MG: 5 TABLET, FILM COATED ORAL at 09:13

## 2023-10-06 RX ADMIN — ATORVASTATIN CALCIUM 80 MG: 40 TABLET, FILM COATED ORAL at 20:36

## 2023-10-06 RX ADMIN — Medication: at 20:36

## 2023-10-06 RX ADMIN — HYDRALAZINE HYDROCHLORIDE 100 MG: 50 TABLET, FILM COATED ORAL at 09:14

## 2023-10-06 RX ADMIN — CLOPIDOGREL BISULFATE 75 MG: 75 TABLET ORAL at 09:13

## 2023-10-06 ASSESSMENT — PAIN SCALES - GENERAL
PAINLEVEL_OUTOF10: 0

## 2023-10-06 NOTE — H&P
Hospitalist Consult Note    NAME:   Sofia Duvall   : [birth year]   MRN: [id number]     Date/Time: 10/6/2023 3:49 PM    Patient PCP: Yary Garcia MD    ______________________________________________________________________  Given the patient's current clinical presentation, I have a high level of concern for decompensation if discharged from the emergency department. Complex decision making was performed, which includes reviewing the patient's available past medical records, laboratory results, and x-ray films. My assessment of this patient's clinical condition and my plan of care is as follows. Assessment / Plan:  Cervical spondylosis, disk herniation C3-C4, C4-5 with myelopathy  - status post two-level anterior cervical diskectomy and fusion POD#3  - MRI of cervical spine on 5/10/23 shows:  Reversal curvature of the cervical spine with multilevel spondylosis. Worst at C3-4 and C4-5. Significant degenerative edema surrounds C3-4.  - xray of cervical spine on 10/6/23 shows: Anterior fusion of C3-4 and C4-5. Devices are slightly eccentric to the left. C3-4 screws are more horizontal than the C4-5 screws. No fracture or subluxation. - continue current pain medications  - ortho following    2. History of CVA 2023 and 2023     Left hemiplegia     HTN     History of atrial fibrillation  - echocardiogram on 23 shows:  EF of 40%-45%  Increased wall thickness  Normal wall motion. Abnormal diastolic function  No significant valvular disease  No pericardial effusion  - will increase coreg from 12.5mg to 25mg bid with parameters  - continue norvasc, eliquis, lipitor, plavix, lisinopril, hydralazine  - fall precautions      3. History of diabetes mellitus  - hemoglobin A1c 7.1  - continue metformin  - accuchecks   - hypoglycemia protocol      4. History of seizures  - continue keppra  - seizure and fall precautions    5.  History of CKD III  - creatinine 1.20  - avoid nephrotoxic medications  -

## 2023-10-07 LAB
ALBUMIN SERPL-MCNC: 3 G/DL (ref 3.5–5)
ALBUMIN/GLOB SERPL: 0.7 (ref 1.1–2.2)
ALP SERPL-CCNC: 74 U/L (ref 45–117)
ALT SERPL-CCNC: 16 U/L (ref 12–78)
ANION GAP SERPL CALC-SCNC: 6 MMOL/L (ref 5–15)
AST SERPL-CCNC: 14 U/L (ref 15–37)
BASOPHILS # BLD: 0 K/UL (ref 0–0.1)
BASOPHILS NFR BLD: 0 % (ref 0–1)
BILIRUB SERPL-MCNC: 0.4 MG/DL (ref 0.2–1)
BUN SERPL-MCNC: 16 MG/DL (ref 6–20)
BUN/CREAT SERPL: 17 (ref 12–20)
CALCIUM SERPL-MCNC: 8.9 MG/DL (ref 8.5–10.1)
CHLORIDE SERPL-SCNC: 108 MMOL/L (ref 97–108)
CO2 SERPL-SCNC: 28 MMOL/L (ref 21–32)
CREAT SERPL-MCNC: 0.95 MG/DL (ref 0.7–1.3)
DIFFERENTIAL METHOD BLD: ABNORMAL
EOSINOPHIL # BLD: 0 K/UL (ref 0–0.4)
EOSINOPHIL NFR BLD: 0 % (ref 0–7)
ERYTHROCYTE [DISTWIDTH] IN BLOOD BY AUTOMATED COUNT: 15.1 % (ref 11.5–14.5)
GLOBULIN SER CALC-MCNC: 4.4 G/DL (ref 2–4)
GLUCOSE SERPL-MCNC: 108 MG/DL (ref 65–100)
HCT VFR BLD AUTO: 37.5 % (ref 36.6–50.3)
HGB BLD-MCNC: 11.6 G/DL (ref 12.1–17)
IMM GRANULOCYTES # BLD AUTO: 0 K/UL (ref 0–0.04)
IMM GRANULOCYTES NFR BLD AUTO: 1 % (ref 0–0.5)
LYMPHOCYTES # BLD: 1 K/UL (ref 0.8–3.5)
LYMPHOCYTES NFR BLD: 12 % (ref 12–49)
MAGNESIUM SERPL-MCNC: 1.9 MG/DL (ref 1.6–2.4)
MCH RBC QN AUTO: 27 PG (ref 26–34)
MCHC RBC AUTO-ENTMCNC: 30.9 G/DL (ref 30–36.5)
MCV RBC AUTO: 87.4 FL (ref 80–99)
MONOCYTES # BLD: 0.7 K/UL (ref 0–1)
MONOCYTES NFR BLD: 9 % (ref 5–13)
NEUTS SEG # BLD: 6.9 K/UL (ref 1.8–8)
NEUTS SEG NFR BLD: 78 % (ref 32–75)
NRBC # BLD: 0 K/UL (ref 0–0.01)
NRBC BLD-RTO: 0 PER 100 WBC
PLATELET # BLD AUTO: 250 K/UL (ref 150–400)
PMV BLD AUTO: 11.4 FL (ref 8.9–12.9)
POTASSIUM SERPL-SCNC: 3.7 MMOL/L (ref 3.5–5.1)
PROT SERPL-MCNC: 7.4 G/DL (ref 6.4–8.2)
RBC # BLD AUTO: 4.29 M/UL (ref 4.1–5.7)
SODIUM SERPL-SCNC: 142 MMOL/L (ref 136–145)
WBC # BLD AUTO: 8.7 K/UL (ref 4.1–11.1)

## 2023-10-07 PROCEDURE — 6370000000 HC RX 637 (ALT 250 FOR IP): Performed by: NURSE PRACTITIONER

## 2023-10-07 PROCEDURE — 6370000000 HC RX 637 (ALT 250 FOR IP)

## 2023-10-07 PROCEDURE — 80053 COMPREHEN METABOLIC PANEL: CPT

## 2023-10-07 PROCEDURE — 1100000000 HC RM PRIVATE

## 2023-10-07 PROCEDURE — 97530 THERAPEUTIC ACTIVITIES: CPT

## 2023-10-07 PROCEDURE — 83735 ASSAY OF MAGNESIUM: CPT

## 2023-10-07 PROCEDURE — 6370000000 HC RX 637 (ALT 250 FOR IP): Performed by: NEUROLOGICAL SURGERY

## 2023-10-07 PROCEDURE — 6370000000 HC RX 637 (ALT 250 FOR IP): Performed by: INTERNAL MEDICINE

## 2023-10-07 PROCEDURE — 36415 COLL VENOUS BLD VENIPUNCTURE: CPT

## 2023-10-07 PROCEDURE — 2580000003 HC RX 258: Performed by: NEUROLOGICAL SURGERY

## 2023-10-07 PROCEDURE — 85025 COMPLETE CBC W/AUTO DIFF WBC: CPT

## 2023-10-07 RX ORDER — POTASSIUM CHLORIDE 20 MEQ/1
40 TABLET, EXTENDED RELEASE ORAL ONCE
Status: COMPLETED | OUTPATIENT
Start: 2023-10-07 | End: 2023-10-07

## 2023-10-07 RX ADMIN — TRAMADOL HYDROCHLORIDE 50 MG: 50 TABLET ORAL at 08:47

## 2023-10-07 RX ADMIN — LEVETIRACETAM 1000 MG: 500 TABLET, FILM COATED ORAL at 21:31

## 2023-10-07 RX ADMIN — ACETAMINOPHEN 650 MG: 325 TABLET ORAL at 08:47

## 2023-10-07 RX ADMIN — CLOPIDOGREL BISULFATE 75 MG: 75 TABLET ORAL at 08:47

## 2023-10-07 RX ADMIN — LEVETIRACETAM 1000 MG: 500 TABLET, FILM COATED ORAL at 08:47

## 2023-10-07 RX ADMIN — ACETAMINOPHEN 650 MG: 325 TABLET ORAL at 13:59

## 2023-10-07 RX ADMIN — HYDRALAZINE HYDROCHLORIDE 100 MG: 50 TABLET, FILM COATED ORAL at 13:59

## 2023-10-07 RX ADMIN — CARVEDILOL 25 MG: 12.5 TABLET, FILM COATED ORAL at 17:10

## 2023-10-07 RX ADMIN — Medication: at 08:49

## 2023-10-07 RX ADMIN — POLYETHYLENE GLYCOL 3350 17 G: 17 POWDER, FOR SOLUTION ORAL at 08:48

## 2023-10-07 RX ADMIN — SENNOSIDES AND DOCUSATE SODIUM 1 TABLET: 50; 8.6 TABLET ORAL at 08:47

## 2023-10-07 RX ADMIN — HYDRALAZINE HYDROCHLORIDE 100 MG: 50 TABLET, FILM COATED ORAL at 08:47

## 2023-10-07 RX ADMIN — SODIUM CHLORIDE, PRESERVATIVE FREE 10 ML: 5 INJECTION INTRAVENOUS at 08:48

## 2023-10-07 RX ADMIN — TRAMADOL HYDROCHLORIDE 50 MG: 50 TABLET ORAL at 17:10

## 2023-10-07 RX ADMIN — APIXABAN 5 MG: 5 TABLET, FILM COATED ORAL at 08:49

## 2023-10-07 RX ADMIN — METFORMIN HYDROCHLORIDE 1000 MG: 500 TABLET ORAL at 17:10

## 2023-10-07 RX ADMIN — APIXABAN 5 MG: 5 TABLET, FILM COATED ORAL at 21:31

## 2023-10-07 RX ADMIN — AMLODIPINE BESYLATE 10 MG: 5 TABLET ORAL at 08:49

## 2023-10-07 RX ADMIN — SENNOSIDES AND DOCUSATE SODIUM 1 TABLET: 50; 8.6 TABLET ORAL at 21:32

## 2023-10-07 RX ADMIN — HYDRALAZINE HYDROCHLORIDE 100 MG: 50 TABLET, FILM COATED ORAL at 21:31

## 2023-10-07 RX ADMIN — CARVEDILOL 25 MG: 12.5 TABLET, FILM COATED ORAL at 06:29

## 2023-10-07 RX ADMIN — LISINOPRIL 40 MG: 20 TABLET ORAL at 08:49

## 2023-10-07 RX ADMIN — SODIUM CHLORIDE, PRESERVATIVE FREE 5 ML: 5 INJECTION INTRAVENOUS at 21:32

## 2023-10-07 RX ADMIN — POTASSIUM CHLORIDE 40 MEQ: 1500 TABLET, EXTENDED RELEASE ORAL at 13:59

## 2023-10-07 RX ADMIN — Medication: at 21:33

## 2023-10-07 RX ADMIN — ATORVASTATIN CALCIUM 80 MG: 40 TABLET, FILM COATED ORAL at 21:31

## 2023-10-07 RX ADMIN — PANTOPRAZOLE SODIUM 40 MG: 40 TABLET, DELAYED RELEASE ORAL at 06:29

## 2023-10-07 RX ADMIN — ACETAMINOPHEN 650 MG: 325 TABLET ORAL at 21:31

## 2023-10-07 RX ADMIN — METFORMIN HYDROCHLORIDE 1000 MG: 500 TABLET ORAL at 06:29

## 2023-10-07 ASSESSMENT — PAIN SCALES - GENERAL
PAINLEVEL_OUTOF10: 0
PAINLEVEL_OUTOF10: 7
PAINLEVEL_OUTOF10: 0
PAINLEVEL_OUTOF10: 0
PAINLEVEL_OUTOF10: 6
PAINLEVEL_OUTOF10: 0

## 2023-10-07 ASSESSMENT — PAIN DESCRIPTION - DESCRIPTORS
DESCRIPTORS: ACHING
DESCRIPTORS: SORE

## 2023-10-07 ASSESSMENT — PAIN DESCRIPTION - PAIN TYPE
TYPE: ACUTE PAIN
TYPE: ACUTE PAIN

## 2023-10-07 ASSESSMENT — PAIN DESCRIPTION - LOCATION
LOCATION: BACK
LOCATION: BACK

## 2023-10-07 ASSESSMENT — PAIN DESCRIPTION - ORIENTATION
ORIENTATION: MID;LOWER
ORIENTATION: MID

## 2023-10-07 NOTE — CARE COORDINATION
Contacted Angie Spangler at [phone]. Stormy Mon is still pending.      Felisa Rocha, MSW  Care Management

## 2023-10-08 LAB
ALBUMIN SERPL-MCNC: 2.8 G/DL (ref 3.5–5)
ALBUMIN/GLOB SERPL: 0.6 (ref 1.1–2.2)
ALP SERPL-CCNC: 66 U/L (ref 45–117)
ALT SERPL-CCNC: 16 U/L (ref 12–78)
ANION GAP SERPL CALC-SCNC: 5 MMOL/L (ref 5–15)
AST SERPL-CCNC: 11 U/L (ref 15–37)
BASOPHILS # BLD: 0 K/UL (ref 0–0.1)
BASOPHILS NFR BLD: 0 % (ref 0–1)
BILIRUB SERPL-MCNC: 0.7 MG/DL (ref 0.2–1)
BUN SERPL-MCNC: 15 MG/DL (ref 6–20)
BUN/CREAT SERPL: 16 (ref 12–20)
CALCIUM SERPL-MCNC: 8.4 MG/DL (ref 8.5–10.1)
CHLORIDE SERPL-SCNC: 111 MMOL/L (ref 97–108)
CO2 SERPL-SCNC: 27 MMOL/L (ref 21–32)
CREAT SERPL-MCNC: 0.92 MG/DL (ref 0.7–1.3)
DIFFERENTIAL METHOD BLD: ABNORMAL
EOSINOPHIL # BLD: 0.1 K/UL (ref 0–0.4)
EOSINOPHIL NFR BLD: 1 % (ref 0–7)
ERYTHROCYTE [DISTWIDTH] IN BLOOD BY AUTOMATED COUNT: 14.9 % (ref 11.5–14.5)
GLOBULIN SER CALC-MCNC: 4.4 G/DL (ref 2–4)
GLUCOSE BLD STRIP.AUTO-MCNC: 111 MG/DL (ref 65–117)
GLUCOSE BLD STRIP.AUTO-MCNC: 114 MG/DL (ref 65–117)
GLUCOSE BLD STRIP.AUTO-MCNC: 157 MG/DL (ref 65–117)
GLUCOSE SERPL-MCNC: 153 MG/DL (ref 65–100)
HCT VFR BLD AUTO: 38.3 % (ref 36.6–50.3)
HGB BLD-MCNC: 11.9 G/DL (ref 12.1–17)
IMM GRANULOCYTES # BLD AUTO: 0 K/UL (ref 0–0.04)
IMM GRANULOCYTES NFR BLD AUTO: 1 % (ref 0–0.5)
LYMPHOCYTES # BLD: 1.1 K/UL (ref 0.8–3.5)
LYMPHOCYTES NFR BLD: 20 % (ref 12–49)
MAGNESIUM SERPL-MCNC: 1.9 MG/DL (ref 1.6–2.4)
MCH RBC QN AUTO: 27 PG (ref 26–34)
MCHC RBC AUTO-ENTMCNC: 31.1 G/DL (ref 30–36.5)
MCV RBC AUTO: 87 FL (ref 80–99)
MONOCYTES # BLD: 0.6 K/UL (ref 0–1)
MONOCYTES NFR BLD: 11 % (ref 5–13)
NEUTS SEG # BLD: 3.6 K/UL (ref 1.8–8)
NEUTS SEG NFR BLD: 67 % (ref 32–75)
NRBC # BLD: 0 K/UL (ref 0–0.01)
NRBC BLD-RTO: 0 PER 100 WBC
PLATELET # BLD AUTO: 260 K/UL (ref 150–400)
PMV BLD AUTO: 11 FL (ref 8.9–12.9)
POTASSIUM SERPL-SCNC: 3.7 MMOL/L (ref 3.5–5.1)
PROT SERPL-MCNC: 7.2 G/DL (ref 6.4–8.2)
RBC # BLD AUTO: 4.4 M/UL (ref 4.1–5.7)
SERVICE CMNT-IMP: ABNORMAL
SERVICE CMNT-IMP: NORMAL
SERVICE CMNT-IMP: NORMAL
SODIUM SERPL-SCNC: 143 MMOL/L (ref 136–145)
WBC # BLD AUTO: 5.4 K/UL (ref 4.1–11.1)

## 2023-10-08 PROCEDURE — 6370000000 HC RX 637 (ALT 250 FOR IP): Performed by: NEUROLOGICAL SURGERY

## 2023-10-08 PROCEDURE — 36415 COLL VENOUS BLD VENIPUNCTURE: CPT

## 2023-10-08 PROCEDURE — 6370000000 HC RX 637 (ALT 250 FOR IP): Performed by: NURSE PRACTITIONER

## 2023-10-08 PROCEDURE — 6370000000 HC RX 637 (ALT 250 FOR IP)

## 2023-10-08 PROCEDURE — 1100000000 HC RM PRIVATE

## 2023-10-08 PROCEDURE — 85025 COMPLETE CBC W/AUTO DIFF WBC: CPT

## 2023-10-08 PROCEDURE — 2580000003 HC RX 258: Performed by: NEUROLOGICAL SURGERY

## 2023-10-08 PROCEDURE — 82962 GLUCOSE BLOOD TEST: CPT

## 2023-10-08 PROCEDURE — 83735 ASSAY OF MAGNESIUM: CPT

## 2023-10-08 PROCEDURE — 80053 COMPREHEN METABOLIC PANEL: CPT

## 2023-10-08 RX ADMIN — HYDRALAZINE HYDROCHLORIDE 100 MG: 50 TABLET, FILM COATED ORAL at 10:51

## 2023-10-08 RX ADMIN — POLYETHYLENE GLYCOL 3350 17 G: 17 POWDER, FOR SOLUTION ORAL at 10:52

## 2023-10-08 RX ADMIN — METFORMIN HYDROCHLORIDE 1000 MG: 500 TABLET ORAL at 10:46

## 2023-10-08 RX ADMIN — APIXABAN 5 MG: 5 TABLET, FILM COATED ORAL at 10:51

## 2023-10-08 RX ADMIN — LISINOPRIL 40 MG: 20 TABLET ORAL at 10:51

## 2023-10-08 RX ADMIN — Medication: at 10:52

## 2023-10-08 RX ADMIN — SENNOSIDES AND DOCUSATE SODIUM 1 TABLET: 50; 8.6 TABLET ORAL at 10:46

## 2023-10-08 RX ADMIN — SODIUM CHLORIDE, PRESERVATIVE FREE 10 ML: 5 INJECTION INTRAVENOUS at 10:53

## 2023-10-08 RX ADMIN — APIXABAN 5 MG: 5 TABLET, FILM COATED ORAL at 20:06

## 2023-10-08 RX ADMIN — ACETAMINOPHEN 650 MG: 325 TABLET ORAL at 20:06

## 2023-10-08 RX ADMIN — ACETAMINOPHEN 650 MG: 325 TABLET ORAL at 14:55

## 2023-10-08 RX ADMIN — CARVEDILOL 25 MG: 12.5 TABLET, FILM COATED ORAL at 18:02

## 2023-10-08 RX ADMIN — AMLODIPINE BESYLATE 10 MG: 5 TABLET ORAL at 10:51

## 2023-10-08 RX ADMIN — LEVETIRACETAM 1000 MG: 500 TABLET, FILM COATED ORAL at 10:46

## 2023-10-08 RX ADMIN — METFORMIN HYDROCHLORIDE 1000 MG: 500 TABLET ORAL at 18:02

## 2023-10-08 RX ADMIN — ACETAMINOPHEN 650 MG: 325 TABLET ORAL at 10:47

## 2023-10-08 RX ADMIN — HYDRALAZINE HYDROCHLORIDE 100 MG: 50 TABLET, FILM COATED ORAL at 14:56

## 2023-10-08 RX ADMIN — Medication: at 20:07

## 2023-10-08 RX ADMIN — HYDRALAZINE HYDROCHLORIDE 100 MG: 50 TABLET, FILM COATED ORAL at 20:06

## 2023-10-08 RX ADMIN — SENNOSIDES AND DOCUSATE SODIUM 1 TABLET: 50; 8.6 TABLET ORAL at 20:06

## 2023-10-08 RX ADMIN — ATORVASTATIN CALCIUM 80 MG: 40 TABLET, FILM COATED ORAL at 20:06

## 2023-10-08 RX ADMIN — CARVEDILOL 25 MG: 12.5 TABLET, FILM COATED ORAL at 10:47

## 2023-10-08 RX ADMIN — SODIUM CHLORIDE, PRESERVATIVE FREE 10 ML: 5 INJECTION INTRAVENOUS at 20:07

## 2023-10-08 RX ADMIN — CLOPIDOGREL BISULFATE 75 MG: 75 TABLET ORAL at 10:53

## 2023-10-08 RX ADMIN — LEVETIRACETAM 1000 MG: 500 TABLET, FILM COATED ORAL at 20:06

## 2023-10-08 RX ADMIN — PANTOPRAZOLE SODIUM 40 MG: 40 TABLET, DELAYED RELEASE ORAL at 10:47

## 2023-10-08 ASSESSMENT — PAIN SCALES - GENERAL
PAINLEVEL_OUTOF10: 0

## 2023-10-09 LAB
ALBUMIN SERPL-MCNC: 3.1 G/DL (ref 3.5–5)
ALBUMIN/GLOB SERPL: 0.7 (ref 1.1–2.2)
ALP SERPL-CCNC: 73 U/L (ref 45–117)
ALT SERPL-CCNC: 18 U/L (ref 12–78)
ANION GAP SERPL CALC-SCNC: 6 MMOL/L (ref 5–15)
AST SERPL-CCNC: 15 U/L (ref 15–37)
BASOPHILS # BLD: 0 K/UL (ref 0–0.1)
BASOPHILS NFR BLD: 0 % (ref 0–1)
BILIRUB SERPL-MCNC: 0.3 MG/DL (ref 0.2–1)
BUN SERPL-MCNC: 16 MG/DL (ref 6–20)
BUN/CREAT SERPL: 13 (ref 12–20)
CALCIUM SERPL-MCNC: 8.5 MG/DL (ref 8.5–10.1)
CHLORIDE SERPL-SCNC: 111 MMOL/L (ref 97–108)
CO2 SERPL-SCNC: 27 MMOL/L (ref 21–32)
CREAT SERPL-MCNC: 1.25 MG/DL (ref 0.7–1.3)
DIFFERENTIAL METHOD BLD: ABNORMAL
EOSINOPHIL # BLD: 0.1 K/UL (ref 0–0.4)
EOSINOPHIL NFR BLD: 1 % (ref 0–7)
ERYTHROCYTE [DISTWIDTH] IN BLOOD BY AUTOMATED COUNT: 14.7 % (ref 11.5–14.5)
GLOBULIN SER CALC-MCNC: 4.3 G/DL (ref 2–4)
GLUCOSE BLD STRIP.AUTO-MCNC: 101 MG/DL (ref 65–117)
GLUCOSE BLD STRIP.AUTO-MCNC: 133 MG/DL (ref 65–117)
GLUCOSE SERPL-MCNC: 120 MG/DL (ref 65–100)
HCT VFR BLD AUTO: 36.8 % (ref 36.6–50.3)
HGB BLD-MCNC: 11.7 G/DL (ref 12.1–17)
IMM GRANULOCYTES # BLD AUTO: 0 K/UL (ref 0–0.04)
IMM GRANULOCYTES NFR BLD AUTO: 1 % (ref 0–0.5)
LYMPHOCYTES # BLD: 1.5 K/UL (ref 0.8–3.5)
LYMPHOCYTES NFR BLD: 24 % (ref 12–49)
MCH RBC QN AUTO: 27.5 PG (ref 26–34)
MCHC RBC AUTO-ENTMCNC: 31.8 G/DL (ref 30–36.5)
MCV RBC AUTO: 86.6 FL (ref 80–99)
MONOCYTES # BLD: 0.7 K/UL (ref 0–1)
MONOCYTES NFR BLD: 11 % (ref 5–13)
NEUTS SEG # BLD: 3.9 K/UL (ref 1.8–8)
NEUTS SEG NFR BLD: 63 % (ref 32–75)
NRBC # BLD: 0 K/UL (ref 0–0.01)
NRBC BLD-RTO: 0 PER 100 WBC
PLATELET # BLD AUTO: 265 K/UL (ref 150–400)
PMV BLD AUTO: 10.8 FL (ref 8.9–12.9)
POTASSIUM SERPL-SCNC: 3.7 MMOL/L (ref 3.5–5.1)
PROT SERPL-MCNC: 7.4 G/DL (ref 6.4–8.2)
RBC # BLD AUTO: 4.25 M/UL (ref 4.1–5.7)
SERVICE CMNT-IMP: ABNORMAL
SERVICE CMNT-IMP: NORMAL
SODIUM SERPL-SCNC: 144 MMOL/L (ref 136–145)
WBC # BLD AUTO: 6.1 K/UL (ref 4.1–11.1)

## 2023-10-09 PROCEDURE — 85025 COMPLETE CBC W/AUTO DIFF WBC: CPT

## 2023-10-09 PROCEDURE — 80053 COMPREHEN METABOLIC PANEL: CPT

## 2023-10-09 PROCEDURE — 82962 GLUCOSE BLOOD TEST: CPT

## 2023-10-09 PROCEDURE — 6370000000 HC RX 637 (ALT 250 FOR IP)

## 2023-10-09 PROCEDURE — 1100000000 HC RM PRIVATE

## 2023-10-09 PROCEDURE — 2580000003 HC RX 258: Performed by: NEUROLOGICAL SURGERY

## 2023-10-09 PROCEDURE — 6370000000 HC RX 637 (ALT 250 FOR IP): Performed by: NEUROLOGICAL SURGERY

## 2023-10-09 PROCEDURE — 97112 NEUROMUSCULAR REEDUCATION: CPT

## 2023-10-09 PROCEDURE — 36415 COLL VENOUS BLD VENIPUNCTURE: CPT

## 2023-10-09 PROCEDURE — 97530 THERAPEUTIC ACTIVITIES: CPT

## 2023-10-09 PROCEDURE — APPNB60 APP NON BILLABLE TIME 46-60 MINS: Performed by: NURSE PRACTITIONER

## 2023-10-09 PROCEDURE — 6370000000 HC RX 637 (ALT 250 FOR IP): Performed by: NURSE PRACTITIONER

## 2023-10-09 RX ADMIN — CARVEDILOL 25 MG: 12.5 TABLET, FILM COATED ORAL at 17:51

## 2023-10-09 RX ADMIN — ACETAMINOPHEN 650 MG: 325 TABLET ORAL at 15:57

## 2023-10-09 RX ADMIN — LEVETIRACETAM 1000 MG: 500 TABLET, FILM COATED ORAL at 23:20

## 2023-10-09 RX ADMIN — LISINOPRIL 40 MG: 20 TABLET ORAL at 08:53

## 2023-10-09 RX ADMIN — Medication: at 08:56

## 2023-10-09 RX ADMIN — CLOPIDOGREL BISULFATE 75 MG: 75 TABLET ORAL at 08:51

## 2023-10-09 RX ADMIN — ATORVASTATIN CALCIUM 80 MG: 40 TABLET, FILM COATED ORAL at 23:20

## 2023-10-09 RX ADMIN — SODIUM CHLORIDE, PRESERVATIVE FREE 10 ML: 5 INJECTION INTRAVENOUS at 09:02

## 2023-10-09 RX ADMIN — LEVETIRACETAM 1000 MG: 500 TABLET, FILM COATED ORAL at 08:48

## 2023-10-09 RX ADMIN — ACETAMINOPHEN 650 MG: 325 TABLET ORAL at 08:48

## 2023-10-09 RX ADMIN — HYDRALAZINE HYDROCHLORIDE 100 MG: 50 TABLET, FILM COATED ORAL at 08:51

## 2023-10-09 RX ADMIN — ACETAMINOPHEN 650 MG: 325 TABLET ORAL at 23:20

## 2023-10-09 RX ADMIN — APIXABAN 5 MG: 5 TABLET, FILM COATED ORAL at 08:50

## 2023-10-09 RX ADMIN — ACETAMINOPHEN 650 MG: 325 TABLET ORAL at 03:43

## 2023-10-09 RX ADMIN — METFORMIN HYDROCHLORIDE 1000 MG: 500 TABLET ORAL at 18:01

## 2023-10-09 RX ADMIN — Medication: at 23:21

## 2023-10-09 RX ADMIN — SENNOSIDES AND DOCUSATE SODIUM 1 TABLET: 50; 8.6 TABLET ORAL at 08:52

## 2023-10-09 RX ADMIN — POLYETHYLENE GLYCOL 3350 17 G: 17 POWDER, FOR SOLUTION ORAL at 08:53

## 2023-10-09 RX ADMIN — METFORMIN HYDROCHLORIDE 1000 MG: 500 TABLET ORAL at 08:48

## 2023-10-09 RX ADMIN — APIXABAN 5 MG: 5 TABLET, FILM COATED ORAL at 23:20

## 2023-10-09 RX ADMIN — PANTOPRAZOLE SODIUM 40 MG: 40 TABLET, DELAYED RELEASE ORAL at 06:35

## 2023-10-09 RX ADMIN — HYDRALAZINE HYDROCHLORIDE 100 MG: 50 TABLET, FILM COATED ORAL at 23:20

## 2023-10-09 RX ADMIN — AMLODIPINE BESYLATE 10 MG: 5 TABLET ORAL at 08:51

## 2023-10-09 RX ADMIN — CARVEDILOL 25 MG: 12.5 TABLET, FILM COATED ORAL at 08:52

## 2023-10-09 RX ADMIN — HYDRALAZINE HYDROCHLORIDE 100 MG: 50 TABLET, FILM COATED ORAL at 15:57

## 2023-10-09 RX ADMIN — SODIUM CHLORIDE, PRESERVATIVE FREE 5 ML: 5 INJECTION INTRAVENOUS at 23:21

## 2023-10-09 ASSESSMENT — PAIN DESCRIPTION - LOCATION: LOCATION: BACK

## 2023-10-09 ASSESSMENT — PAIN SCALES - GENERAL
PAINLEVEL_OUTOF10: 4
PAINLEVEL_OUTOF10: 0

## 2023-10-09 ASSESSMENT — PAIN DESCRIPTION - ORIENTATION: ORIENTATION: POSTERIOR

## 2023-10-09 NOTE — CARE COORDINATION
Planned spinal surgery patient    Update  P2P was denied and appeal information provided to NP per request.   Shelbie Adali is [phone] and fax is [phone]. Initial note  Insurance requesting P2P, following message sent to MARY Fuentes called on 10/8/23 1812 to offer a P2P for [employer]. The deadline is today 10/9/23 1200.  The MD, PA, or NP can all [phone] opt 5 to complete the call    March Ara, [address] [place] [address]

## 2023-10-10 LAB
GLUCOSE BLD STRIP.AUTO-MCNC: 103 MG/DL (ref 65–117)
GLUCOSE BLD STRIP.AUTO-MCNC: 145 MG/DL (ref 65–117)
GLUCOSE BLD STRIP.AUTO-MCNC: 152 MG/DL (ref 65–117)
GLUCOSE BLD STRIP.AUTO-MCNC: 179 MG/DL (ref 65–117)
SERVICE CMNT-IMP: ABNORMAL
SERVICE CMNT-IMP: NORMAL

## 2023-10-10 PROCEDURE — 1100000000 HC RM PRIVATE

## 2023-10-10 PROCEDURE — 6370000000 HC RX 637 (ALT 250 FOR IP): Performed by: NEUROLOGICAL SURGERY

## 2023-10-10 PROCEDURE — APPNB180 APP NON BILLABLE TIME > 60 MINS

## 2023-10-10 PROCEDURE — 6370000000 HC RX 637 (ALT 250 FOR IP): Performed by: NURSE PRACTITIONER

## 2023-10-10 PROCEDURE — 82962 GLUCOSE BLOOD TEST: CPT

## 2023-10-10 PROCEDURE — 2580000003 HC RX 258: Performed by: NEUROLOGICAL SURGERY

## 2023-10-10 PROCEDURE — 6370000000 HC RX 637 (ALT 250 FOR IP)

## 2023-10-10 RX ADMIN — CARVEDILOL 25 MG: 12.5 TABLET, FILM COATED ORAL at 16:28

## 2023-10-10 RX ADMIN — CLOPIDOGREL BISULFATE 75 MG: 75 TABLET ORAL at 09:29

## 2023-10-10 RX ADMIN — LEVETIRACETAM 1000 MG: 500 TABLET, FILM COATED ORAL at 09:29

## 2023-10-10 RX ADMIN — LISINOPRIL 40 MG: 20 TABLET ORAL at 09:29

## 2023-10-10 RX ADMIN — Medication: at 21:31

## 2023-10-10 RX ADMIN — SODIUM CHLORIDE, PRESERVATIVE FREE 10 ML: 5 INJECTION INTRAVENOUS at 09:38

## 2023-10-10 RX ADMIN — HYDRALAZINE HYDROCHLORIDE 100 MG: 50 TABLET, FILM COATED ORAL at 13:50

## 2023-10-10 RX ADMIN — CARVEDILOL 25 MG: 12.5 TABLET, FILM COATED ORAL at 09:29

## 2023-10-10 RX ADMIN — METFORMIN HYDROCHLORIDE 1000 MG: 500 TABLET ORAL at 16:28

## 2023-10-10 RX ADMIN — Medication: at 09:28

## 2023-10-10 RX ADMIN — HYDRALAZINE HYDROCHLORIDE 100 MG: 50 TABLET, FILM COATED ORAL at 09:29

## 2023-10-10 RX ADMIN — APIXABAN 5 MG: 5 TABLET, FILM COATED ORAL at 09:29

## 2023-10-10 RX ADMIN — AMLODIPINE BESYLATE 10 MG: 5 TABLET ORAL at 09:29

## 2023-10-10 RX ADMIN — ACETAMINOPHEN 650 MG: 325 TABLET ORAL at 09:29

## 2023-10-10 RX ADMIN — ACETAMINOPHEN 650 MG: 325 TABLET ORAL at 16:28

## 2023-10-10 RX ADMIN — PANTOPRAZOLE SODIUM 40 MG: 40 TABLET, DELAYED RELEASE ORAL at 08:31

## 2023-10-10 RX ADMIN — METFORMIN HYDROCHLORIDE 1000 MG: 500 TABLET ORAL at 09:29

## 2023-10-10 RX ADMIN — SODIUM CHLORIDE, PRESERVATIVE FREE 5 ML: 5 INJECTION INTRAVENOUS at 21:30

## 2023-10-10 ASSESSMENT — PAIN SCALES - GENERAL: PAINLEVEL_OUTOF10: 0

## 2023-10-10 NOTE — CARE COORDINATION
Transition of Care Plan:    RUR:  18%  Prior Level of Functioning: needs some assistance at home  Disposition: SNF - P2P denied- appeal pending  If SNF or IPR: Date FOC offered: 10/05/2023  Date FOC received: 10/05/2023  Accepting facility: [facility]  Date authorization started with reference number:   Date authorization received and expires: Follow up appointments: ortho and specialist  DME needed: per facility - has w/c manual - hospital bed (sushila lift)  Transportation at discharge: BLS  IM/IMM Medicare/ letter given: will need prior to discharge  Is patient a  and connected with [place]? If yes, was [employer] [place] completed and [place] notified? Caregiver Contact: Frederick Vazquez wife [phone]  Discharge Caregiver contacted prior to discharge? yes  Care Conference needed? Barriers to discharge: appeal pending with [facility] insurance       notified via Kristen Lugo NP that P2P denied -wife wants to appeal decision - appeal contact information # [phone] fax#[phone]  faxed updated clinicals per NP and family request- will await outcome at this time. JOE Huertas    2:37 pm Notified by Kristen Lugo NP that clinicals were received and appeal has been started with [facility] - will await outcome at this time.  JOE Huertas

## 2023-10-11 LAB
GLUCOSE BLD STRIP.AUTO-MCNC: 112 MG/DL (ref 65–117)
GLUCOSE BLD STRIP.AUTO-MCNC: 121 MG/DL (ref 65–117)
GLUCOSE BLD STRIP.AUTO-MCNC: 123 MG/DL (ref 65–117)
GLUCOSE BLD STRIP.AUTO-MCNC: 158 MG/DL (ref 65–117)
SERVICE CMNT-IMP: ABNORMAL
SERVICE CMNT-IMP: NORMAL

## 2023-10-11 PROCEDURE — 97535 SELF CARE MNGMENT TRAINING: CPT

## 2023-10-11 PROCEDURE — 2580000003 HC RX 258: Performed by: NEUROLOGICAL SURGERY

## 2023-10-11 PROCEDURE — 6370000000 HC RX 637 (ALT 250 FOR IP): Performed by: NEUROLOGICAL SURGERY

## 2023-10-11 PROCEDURE — 82962 GLUCOSE BLOOD TEST: CPT

## 2023-10-11 PROCEDURE — 6370000000 HC RX 637 (ALT 250 FOR IP)

## 2023-10-11 PROCEDURE — APPNB180 APP NON BILLABLE TIME > 60 MINS

## 2023-10-11 PROCEDURE — 97530 THERAPEUTIC ACTIVITIES: CPT

## 2023-10-11 PROCEDURE — 1100000000 HC RM PRIVATE

## 2023-10-11 PROCEDURE — 6370000000 HC RX 637 (ALT 250 FOR IP): Performed by: NURSE PRACTITIONER

## 2023-10-11 PROCEDURE — 97110 THERAPEUTIC EXERCISES: CPT

## 2023-10-11 RX ADMIN — SODIUM CHLORIDE, PRESERVATIVE FREE 10 ML: 5 INJECTION INTRAVENOUS at 08:34

## 2023-10-11 RX ADMIN — Medication: at 08:32

## 2023-10-11 RX ADMIN — CARVEDILOL 25 MG: 12.5 TABLET, FILM COATED ORAL at 17:37

## 2023-10-11 RX ADMIN — APIXABAN 5 MG: 5 TABLET, FILM COATED ORAL at 08:31

## 2023-10-11 RX ADMIN — SODIUM CHLORIDE, PRESERVATIVE FREE 5 ML: 5 INJECTION INTRAVENOUS at 21:54

## 2023-10-11 RX ADMIN — METFORMIN HYDROCHLORIDE 1000 MG: 500 TABLET ORAL at 17:37

## 2023-10-11 RX ADMIN — METFORMIN HYDROCHLORIDE 1000 MG: 500 TABLET ORAL at 08:31

## 2023-10-11 RX ADMIN — HYDRALAZINE HYDROCHLORIDE 100 MG: 50 TABLET, FILM COATED ORAL at 21:53

## 2023-10-11 RX ADMIN — LEVETIRACETAM 1000 MG: 500 TABLET, FILM COATED ORAL at 21:54

## 2023-10-11 RX ADMIN — ATORVASTATIN CALCIUM 80 MG: 40 TABLET, FILM COATED ORAL at 21:53

## 2023-10-11 RX ADMIN — LEVETIRACETAM 1000 MG: 500 TABLET, FILM COATED ORAL at 08:29

## 2023-10-11 RX ADMIN — APIXABAN 5 MG: 5 TABLET, FILM COATED ORAL at 21:54

## 2023-10-11 RX ADMIN — LISINOPRIL 40 MG: 20 TABLET ORAL at 08:31

## 2023-10-11 RX ADMIN — CARVEDILOL 25 MG: 12.5 TABLET, FILM COATED ORAL at 08:29

## 2023-10-11 RX ADMIN — ACETAMINOPHEN 650 MG: 325 TABLET ORAL at 08:31

## 2023-10-11 RX ADMIN — AMLODIPINE BESYLATE 10 MG: 5 TABLET ORAL at 08:29

## 2023-10-11 RX ADMIN — PANTOPRAZOLE SODIUM 40 MG: 40 TABLET, DELAYED RELEASE ORAL at 08:29

## 2023-10-11 RX ADMIN — ACETAMINOPHEN 650 MG: 325 TABLET ORAL at 21:53

## 2023-10-11 RX ADMIN — HYDRALAZINE HYDROCHLORIDE 100 MG: 50 TABLET, FILM COATED ORAL at 08:31

## 2023-10-11 RX ADMIN — Medication: at 21:55

## 2023-10-11 RX ADMIN — HYDRALAZINE HYDROCHLORIDE 100 MG: 50 TABLET, FILM COATED ORAL at 13:55

## 2023-10-11 RX ADMIN — CLOPIDOGREL BISULFATE 75 MG: 75 TABLET ORAL at 08:29

## 2023-10-11 ASSESSMENT — PAIN SCALES - GENERAL: PAINLEVEL_OUTOF10: 0

## 2023-10-11 NOTE — BSMART NOTE
Liaison spoke with pt's RN, Ever, who reports the psych consult has been cancelled. Ever confirms she will cancel the consult in the system. Liaison updated psychiatric NP, Ronnie Fry.

## 2023-10-12 LAB
GLUCOSE BLD STRIP.AUTO-MCNC: 102 MG/DL (ref 65–117)
GLUCOSE BLD STRIP.AUTO-MCNC: 119 MG/DL (ref 65–117)
GLUCOSE BLD STRIP.AUTO-MCNC: 122 MG/DL (ref 65–117)
GLUCOSE BLD STRIP.AUTO-MCNC: 123 MG/DL (ref 65–117)
SERVICE CMNT-IMP: ABNORMAL
SERVICE CMNT-IMP: NORMAL

## 2023-10-12 PROCEDURE — 6370000000 HC RX 637 (ALT 250 FOR IP)

## 2023-10-12 PROCEDURE — APPNB180 APP NON BILLABLE TIME > 60 MINS

## 2023-10-12 PROCEDURE — 6370000000 HC RX 637 (ALT 250 FOR IP): Performed by: NEUROLOGICAL SURGERY

## 2023-10-12 PROCEDURE — 82962 GLUCOSE BLOOD TEST: CPT

## 2023-10-12 PROCEDURE — 1100000000 HC RM PRIVATE

## 2023-10-12 PROCEDURE — 97530 THERAPEUTIC ACTIVITIES: CPT

## 2023-10-12 PROCEDURE — 97535 SELF CARE MNGMENT TRAINING: CPT

## 2023-10-12 PROCEDURE — 2580000003 HC RX 258: Performed by: NEUROLOGICAL SURGERY

## 2023-10-12 PROCEDURE — 6370000000 HC RX 637 (ALT 250 FOR IP): Performed by: NURSE PRACTITIONER

## 2023-10-12 RX ADMIN — AMLODIPINE BESYLATE 10 MG: 5 TABLET ORAL at 09:02

## 2023-10-12 RX ADMIN — APIXABAN 5 MG: 5 TABLET, FILM COATED ORAL at 22:01

## 2023-10-12 RX ADMIN — LISINOPRIL 40 MG: 20 TABLET ORAL at 09:00

## 2023-10-12 RX ADMIN — PANTOPRAZOLE SODIUM 40 MG: 40 TABLET, DELAYED RELEASE ORAL at 09:01

## 2023-10-12 RX ADMIN — HYDRALAZINE HYDROCHLORIDE 100 MG: 50 TABLET, FILM COATED ORAL at 14:14

## 2023-10-12 RX ADMIN — HYDRALAZINE HYDROCHLORIDE 100 MG: 50 TABLET, FILM COATED ORAL at 22:05

## 2023-10-12 RX ADMIN — ACETAMINOPHEN 650 MG: 325 TABLET ORAL at 14:14

## 2023-10-12 RX ADMIN — LEVETIRACETAM 1000 MG: 500 TABLET, FILM COATED ORAL at 09:02

## 2023-10-12 RX ADMIN — METFORMIN HYDROCHLORIDE 1000 MG: 500 TABLET ORAL at 09:00

## 2023-10-12 RX ADMIN — APIXABAN 5 MG: 5 TABLET, FILM COATED ORAL at 09:02

## 2023-10-12 RX ADMIN — ACETAMINOPHEN 650 MG: 325 TABLET ORAL at 22:05

## 2023-10-12 RX ADMIN — ACETAMINOPHEN 650 MG: 325 TABLET ORAL at 09:02

## 2023-10-12 RX ADMIN — HYDRALAZINE HYDROCHLORIDE 100 MG: 50 TABLET, FILM COATED ORAL at 09:01

## 2023-10-12 RX ADMIN — LEVETIRACETAM 1000 MG: 500 TABLET, FILM COATED ORAL at 22:01

## 2023-10-12 RX ADMIN — CARVEDILOL 25 MG: 12.5 TABLET, FILM COATED ORAL at 09:01

## 2023-10-12 RX ADMIN — CLOPIDOGREL BISULFATE 75 MG: 75 TABLET ORAL at 09:02

## 2023-10-12 RX ADMIN — Medication: at 22:10

## 2023-10-12 RX ADMIN — ATORVASTATIN CALCIUM 80 MG: 40 TABLET, FILM COATED ORAL at 22:05

## 2023-10-12 RX ADMIN — METFORMIN HYDROCHLORIDE 1000 MG: 500 TABLET ORAL at 17:14

## 2023-10-12 RX ADMIN — SODIUM CHLORIDE, PRESERVATIVE FREE 10 ML: 5 INJECTION INTRAVENOUS at 22:11

## 2023-10-12 RX ADMIN — Medication: at 09:04

## 2023-10-12 RX ADMIN — SODIUM CHLORIDE, PRESERVATIVE FREE 10 ML: 5 INJECTION INTRAVENOUS at 09:02

## 2023-10-12 RX ADMIN — CARVEDILOL 25 MG: 12.5 TABLET, FILM COATED ORAL at 17:15

## 2023-10-12 ASSESSMENT — PAIN SCALES - GENERAL: PAINLEVEL_OUTOF10: 0

## 2023-10-12 NOTE — CARE COORDINATION
Transition of Care Plan:    RUR: 17%  Disposition: [facility] and [facility] at discharge: BLS  IM/IMM Medicare/ letter given: to be provided  Caregiver Contact: wife Abraham Mora [phone]  Discharge Caregiver contacted prior to discharge? yes    DEANDRE spoke with NP who reported she spoke with pt's insurance and the P2P denial has been over turned through the appeal. Per insurance, NP will need to call again in the morning to obtain final confirmation. DEANDRE has contacted [facility] and informed them of approval. [facility]      [address] [place] [address]

## 2023-10-13 VITALS
HEART RATE: 72 BPM | DIASTOLIC BLOOD PRESSURE: 87 MMHG | TEMPERATURE: 97.7 F | BODY MASS INDEX: 26.82 KG/M2 | WEIGHT: 202.38 LBS | HEIGHT: 73 IN | SYSTOLIC BLOOD PRESSURE: 135 MMHG | OXYGEN SATURATION: 100 % | RESPIRATION RATE: 16 BRPM

## 2023-10-13 LAB
GLUCOSE BLD STRIP.AUTO-MCNC: 101 MG/DL (ref 65–117)
GLUCOSE BLD STRIP.AUTO-MCNC: 101 MG/DL (ref 65–117)
GLUCOSE BLD STRIP.AUTO-MCNC: 111 MG/DL (ref 65–117)
GLUCOSE BLD STRIP.AUTO-MCNC: 91 MG/DL (ref 65–117)
GLUCOSE BLD STRIP.AUTO-MCNC: 96 MG/DL (ref 65–117)
SERVICE CMNT-IMP: NORMAL

## 2023-10-13 PROCEDURE — 82962 GLUCOSE BLOOD TEST: CPT

## 2023-10-13 PROCEDURE — 6370000000 HC RX 637 (ALT 250 FOR IP)

## 2023-10-13 PROCEDURE — 6370000000 HC RX 637 (ALT 250 FOR IP): Performed by: NEUROLOGICAL SURGERY

## 2023-10-13 PROCEDURE — APPNB180 APP NON BILLABLE TIME > 60 MINS

## 2023-10-13 PROCEDURE — 6370000000 HC RX 637 (ALT 250 FOR IP): Performed by: NURSE PRACTITIONER

## 2023-10-13 RX ORDER — SENNA AND DOCUSATE SODIUM 50; 8.6 MG/1; MG/1
1 TABLET, FILM COATED ORAL 2 TIMES DAILY PRN
Qty: 28 TABLET | Refills: 0 | Status: SHIPPED
Start: 2023-10-13 | End: 2023-10-27

## 2023-10-13 RX ORDER — PANTOPRAZOLE SODIUM 40 MG/1
40 TABLET, DELAYED RELEASE ORAL
Qty: 30 TABLET | Refills: 3 | Status: SHIPPED
Start: 2023-10-14

## 2023-10-13 RX ORDER — CARVEDILOL 25 MG/1
25 TABLET ORAL 2 TIMES DAILY WITH MEALS
Qty: 60 TABLET | Refills: 3 | Status: SHIPPED
Start: 2023-10-13

## 2023-10-13 RX ADMIN — METFORMIN HYDROCHLORIDE 1000 MG: 500 TABLET ORAL at 08:50

## 2023-10-13 RX ADMIN — ACETAMINOPHEN 650 MG: 325 TABLET ORAL at 15:36

## 2023-10-13 RX ADMIN — PANTOPRAZOLE SODIUM 40 MG: 40 TABLET, DELAYED RELEASE ORAL at 08:50

## 2023-10-13 RX ADMIN — LISINOPRIL 40 MG: 20 TABLET ORAL at 08:49

## 2023-10-13 RX ADMIN — CARVEDILOL 25 MG: 12.5 TABLET, FILM COATED ORAL at 08:50

## 2023-10-13 RX ADMIN — APIXABAN 5 MG: 5 TABLET, FILM COATED ORAL at 08:50

## 2023-10-13 RX ADMIN — LEVETIRACETAM 1000 MG: 500 TABLET, FILM COATED ORAL at 08:51

## 2023-10-13 RX ADMIN — METFORMIN HYDROCHLORIDE 1000 MG: 500 TABLET ORAL at 16:00

## 2023-10-13 RX ADMIN — ACETAMINOPHEN 650 MG: 325 TABLET ORAL at 08:50

## 2023-10-13 RX ADMIN — HYDRALAZINE HYDROCHLORIDE 100 MG: 50 TABLET, FILM COATED ORAL at 15:36

## 2023-10-13 RX ADMIN — HYDRALAZINE HYDROCHLORIDE 100 MG: 50 TABLET, FILM COATED ORAL at 08:50

## 2023-10-13 RX ADMIN — CLOPIDOGREL BISULFATE 75 MG: 75 TABLET ORAL at 08:50

## 2023-10-13 RX ADMIN — AMLODIPINE BESYLATE 10 MG: 5 TABLET ORAL at 08:50

## 2023-10-13 RX ADMIN — CARVEDILOL 25 MG: 12.5 TABLET, FILM COATED ORAL at 16:00

## 2023-10-13 RX ADMIN — Medication: at 08:52

## 2023-10-13 ASSESSMENT — PAIN SCALES - GENERAL: PAINLEVEL_OUTOF10: 0

## 2023-10-13 NOTE — CARE COORDINATION
Transition of Care Plan:     RUR: 17%  Disposition: [facility] [phone]- going to room 406A  Transportation at discharge: [facility] @ 4:30pm  IM/IMM Medicare/ letter given: provided  Caregiver Contact: wife Violet Reyes [phone]  Discharge Caregiver contacted prior to discharge? yes    Insurance authorization # [id number]        [facility] confirmed they are still able to accept patient today. [facility] transport arranged for 4:30pm. CM contacted pt's wife and informed her of d/c plan. Wife agreed, reported she would be at hospital around 12:30pm today. Transition of Care Plan to SNF/Rehab    Communication to Patient/Family:  Met with patient and family and they are agreeable to the transition plan. The Plan for Transition of Care is related to the following treatment goals: SNF    The Patient and/or patient representative was provided with a choice of provider and agrees  with the discharge plan. Yes [x] No []    A Freedom of choice list was provided with basic dialogue that supports the patient's individualized plan of care/goals and shares the quality data associated with the providers. Yes [x] No []    SNF/Rehab Transition:  Patient has been accepted to [facility]  SNF/Rehab and meets criteria for admission. Patient will transported by [facility] and expected to leave at 4:30pm.    Communication to SNF/Rehab:  Bedside RN, Catherine Guerra, has been notified to update the transition plan to the facility and call report (phone number [phone]). Discharge information has been updated on the AVS. And communicated to facility via [email or web address], or CC link. Discharge instructions to be fax'd to facility at [facility] # [phone]). Nursing Please include all hard scripts for controlled substances, med rec and dc summary, and AVS in packet.      Reviewed and confirmed with facility, [facility], can manage the patient care needs for the following:     Kalin Michaud with (X)

## 2023-10-26 ENCOUNTER — TELEPHONE (OUTPATIENT)
Age: 68
End: 2023-10-26

## 2023-10-26 NOTE — TELEPHONE ENCOUNTER
Farrukh states pt is discharging on 10-28-23. Needs to be seen within 1-2 wks. Call Rebeca back to schedule a hospital follow up.

## 2023-11-21 NOTE — PROGRESS NOTES
HISTORY OF PRESENT ILLNESS   [id number]   is a 79 y.o.  male. fu  dm-2 and RAE, chronic afib, hx lung carcinoid  hx cva's HTN HLD NICM (  EF 40-45 2023) s/p CVA 2/2023  and 5/2023 corona radiata with left weakness  and medicare wellness -here with wife    S/p ant cervical diskectomy and fusion 10/3/23  LEFT HEMIPLEGIA.    Right side weaker-sent to SNF-d/cllib[facility] rehab 10/28- seeing pt now  Nonambulatory due to left hemiplegia from CVA this year   Right side getting slightly stronger-able to use a sushila lift and get OOB with help of the sushila  BP up after surgery--coreg dose increased 25 mg bid    C/o left arm pain yoday in elbow area    Some episodes of depressed mood and crying    Last OV     Here for preop cervical spine DDD with cord compression  Saw Dr Tawana Kelley who advised preop from card MD and PCP     Pt has CVA in February cororona radiata infarct with left weakness  Readmitted for left arm pain in May--MRI brain -new cva in area of old cva and MRI C spine -cord compression--decadron     Aspirin stopped, plavix started last hospitalization and continued on eliquis     Has dense left hemiplegia from CVA in February not improved  Has to use sushila lift to get from bed to chair  Has ongoing pain in left arm which may be radicular and wife concerned the right side could get weak too     She understands the left side will likely remain weak even if spine surgery is done to relieve cord compression     Pt denies any CP or SOB     Patient Active Problem List    Diagnosis Date Noted    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

## 2023-11-22 ENCOUNTER — OFFICE VISIT (OUTPATIENT)
Age: 68
End: 2023-11-22
Payer: MEDICARE

## 2023-11-22 VITALS
RESPIRATION RATE: 18 BRPM | OXYGEN SATURATION: 97 % | HEART RATE: 58 BPM | DIASTOLIC BLOOD PRESSURE: 80 MMHG | SYSTOLIC BLOOD PRESSURE: 138 MMHG | TEMPERATURE: 97.7 F

## 2023-11-22 DIAGNOSIS — I48.20 CHRONIC ATRIAL FIBRILLATION (HCC): ICD-10-CM

## 2023-11-22 DIAGNOSIS — Z23 NEEDS FLU SHOT: Primary | ICD-10-CM

## 2023-11-22 DIAGNOSIS — F32.A DEPRESSION, UNSPECIFIED DEPRESSION TYPE: ICD-10-CM

## 2023-11-22 DIAGNOSIS — Z98.1 S/P CERVICAL SPINAL FUSION: ICD-10-CM

## 2023-11-22 DIAGNOSIS — Z00.00 MEDICARE ANNUAL WELLNESS VISIT, SUBSEQUENT: ICD-10-CM

## 2023-11-22 DIAGNOSIS — I10 HYPERTENSION, UNSPECIFIED TYPE: ICD-10-CM

## 2023-11-22 DIAGNOSIS — Z12.5 PROSTATE CANCER SCREENING: ICD-10-CM

## 2023-11-22 DIAGNOSIS — Z23 ENCOUNTER FOR IMMUNIZATION: ICD-10-CM

## 2023-11-22 DIAGNOSIS — E78.5 HYPERLIPIDEMIA, UNSPECIFIED HYPERLIPIDEMIA TYPE: ICD-10-CM

## 2023-11-22 DIAGNOSIS — E11.9 TYPE 2 DIABETES MELLITUS WITHOUT COMPLICATION, WITHOUT LONG-TERM CURRENT USE OF INSULIN (HCC): ICD-10-CM

## 2023-11-22 DIAGNOSIS — Z86.73 HISTORY OF CEREBROVASCULAR ACCIDENT: ICD-10-CM

## 2023-11-22 LAB
EST. AVERAGE GLUCOSE BLD GHB EST-MCNC: 134 MG/DL
HBA1C MFR BLD: 6.3 % (ref 4–5.6)
PSA SERPL-MCNC: 1.5 NG/ML (ref 0.01–4)

## 2023-11-22 PROCEDURE — 3075F SYST BP GE 130 - 139MM HG: CPT | Performed by: INTERNAL MEDICINE

## 2023-11-22 PROCEDURE — 90694 VACC AIIV4 NO PRSRV 0.5ML IM: CPT | Performed by: INTERNAL MEDICINE

## 2023-11-22 PROCEDURE — PBSHW INFLUENZA, FLUAD, (AGE 65 Y+), IM, PF, 0.5 ML: Performed by: INTERNAL MEDICINE

## 2023-11-22 PROCEDURE — PBSHW PNEUMOCOCCAL, PCV20, PREVNAR 20, (AGE 6W+), IM, PF: Performed by: INTERNAL MEDICINE

## 2023-11-22 PROCEDURE — 3051F HG A1C>EQUAL 7.0%<8.0%: CPT | Performed by: INTERNAL MEDICINE

## 2023-11-22 PROCEDURE — 1124F ACP DISCUSS-NO DSCNMKR DOCD: CPT | Performed by: INTERNAL MEDICINE

## 2023-11-22 PROCEDURE — 99214 OFFICE O/P EST MOD 30 MIN: CPT | Performed by: INTERNAL MEDICINE

## 2023-11-22 PROCEDURE — G0439 PPPS, SUBSEQ VISIT: HCPCS | Performed by: INTERNAL MEDICINE

## 2023-11-22 PROCEDURE — 90677 PCV20 VACCINE IM: CPT | Performed by: INTERNAL MEDICINE

## 2023-11-22 PROCEDURE — 3079F DIAST BP 80-89 MM HG: CPT | Performed by: INTERNAL MEDICINE

## 2023-11-22 RX ORDER — GLUCOSAMINE HCL/CHONDROITIN SU 500-400 MG
CAPSULE ORAL
Qty: 50 STRIP | Refills: 0 | Status: SHIPPED | OUTPATIENT
Start: 2023-11-22

## 2023-11-22 RX ORDER — SERTRALINE HYDROCHLORIDE 25 MG/1
25 TABLET, FILM COATED ORAL DAILY
Qty: 90 TABLET | Refills: 1 | Status: SHIPPED | OUTPATIENT
Start: 2023-11-22

## 2023-11-22 RX ORDER — LANCETS 30 GAUGE
1 EACH MISCELLANEOUS DAILY
Qty: 100 EACH | Refills: 5 | Status: SHIPPED | OUTPATIENT
Start: 2023-11-22

## 2023-11-22 RX ORDER — BLOOD-GLUCOSE METER
1 KIT MISCELLANEOUS DAILY
Qty: 1 KIT | Refills: 0 | Status: SHIPPED | OUTPATIENT
Start: 2023-11-22

## 2023-11-22 ASSESSMENT — PATIENT HEALTH QUESTIONNAIRE - PHQ9
SUM OF ALL RESPONSES TO PHQ9 QUESTIONS 1 & 2: 0
1. LITTLE INTEREST OR PLEASURE IN DOING THINGS: 0
SUM OF ALL RESPONSES TO PHQ QUESTIONS 1-9: 0
SUM OF ALL RESPONSES TO PHQ QUESTIONS 1-9: 0
2. FEELING DOWN, DEPRESSED OR HOPELESS: 0
SUM OF ALL RESPONSES TO PHQ QUESTIONS 1-9: 0
SUM OF ALL RESPONSES TO PHQ QUESTIONS 1-9: 0

## 2023-11-22 ASSESSMENT — LIFESTYLE VARIABLES
HOW MANY STANDARD DRINKS CONTAINING ALCOHOL DO YOU HAVE ON A TYPICAL DAY: PATIENT DOES NOT DRINK
HOW OFTEN DO YOU HAVE A DRINK CONTAINING ALCOHOL: NEVER

## 2023-11-22 NOTE — PROGRESS NOTES
After obtaining consent, and per verbal order from Marlen Campo MD, patient received influenza vaccine given in  Right deltoid  Influenza Vaccine 0.5 mL IM now. Patient was observed for 10 minutes post injection. Patient tolerated injection well. After obtaining consent, and per  verbal orders of Marlen Campo MD, injection of Prevnar 20 given in Right deltoid by [facility], LPN. Patient instructed to remain in clinic for 20 minutes afterwards, and to report any adverse reaction to me immediately.

## 2023-11-28 ENCOUNTER — TELEPHONE (OUTPATIENT)
Age: 68
End: 2023-11-28

## 2023-11-28 NOTE — TELEPHONE ENCOUNTER
Aileen Moncada states pt's b/p 160/high 80's today and pt took all meds today. Pt has pain on left leg. Wife would like to give tylenol but pt doesn't want this. He states it does not work. No dizziness, headaches, no chest pain and no sob, but heart rate 105 this could be from pain, but then down to 88. Goes between  irregular. Please call Aileen Moncada if you have info for her.

## 2023-11-28 NOTE — TELEPHONE ENCOUNTER
Spoke with Baldomero Woodson from [facility]. Advised. Verbalized understanding. Per Dr. Naman Garcia  Pt has chronic afib. Take tylenol prn . Use ice carly prn.  Probably has pain due to left hemiplegia and leaning in left side when sitting

## 2023-12-01 ENCOUNTER — TELEPHONE (OUTPATIENT)
Age: 68
End: 2023-12-01

## 2023-12-01 NOTE — TELEPHONE ENCOUNTER
Per Dr. Jose Carlos Varela patient advised to go to Urgent Care or ER. Patient's wife informed and will try to convince patient to go.

## 2023-12-01 NOTE — TELEPHONE ENCOUNTER
Paulina Farooq states that pt's b/p is running high. This morning it is 192/130  no other symptoms. Will not eat. Pt will not go to ED. Pt is in a lot of pain as well. Had not taken med yet as he had not ate yet. Yesterday it was high even with the medication. Please call to advise.

## 2024-01-30 ENCOUNTER — OFFICE VISIT (OUTPATIENT)
Facility: CLINIC | Age: 69
End: 2024-01-30
Payer: MEDICARE

## 2024-01-30 VITALS
RESPIRATION RATE: 18 BRPM | TEMPERATURE: 97.9 F | SYSTOLIC BLOOD PRESSURE: 149 MMHG | DIASTOLIC BLOOD PRESSURE: 87 MMHG | WEIGHT: 178.2 LBS | HEART RATE: 69 BPM | OXYGEN SATURATION: 99 % | BODY MASS INDEX: 23.52 KG/M2

## 2024-01-30 DIAGNOSIS — N18.31 STAGE 3A CHRONIC KIDNEY DISEASE (HCC): ICD-10-CM

## 2024-01-30 DIAGNOSIS — G40.909 SEIZURE DISORDER (HCC): ICD-10-CM

## 2024-01-30 DIAGNOSIS — R62.7 FAILURE TO THRIVE IN ADULT: ICD-10-CM

## 2024-01-30 DIAGNOSIS — N18.30 TYPE 2 DIABETES MELLITUS WITH STAGE 3 CHRONIC KIDNEY DISEASE, WITHOUT LONG-TERM CURRENT USE OF INSULIN, UNSPECIFIED WHETHER STAGE 3A OR 3B CKD (HCC): ICD-10-CM

## 2024-01-30 DIAGNOSIS — I50.22 CHRONIC SYSTOLIC (CONGESTIVE) HEART FAILURE (HCC): ICD-10-CM

## 2024-01-30 DIAGNOSIS — M50.021 DISORDER OF INTERVERTEBRAL DISC AT C4-C5 LEVEL WITH MYELOPATHY: ICD-10-CM

## 2024-01-30 DIAGNOSIS — E11.22 TYPE 2 DIABETES MELLITUS WITH STAGE 3 CHRONIC KIDNEY DISEASE, WITHOUT LONG-TERM CURRENT USE OF INSULIN, UNSPECIFIED WHETHER STAGE 3A OR 3B CKD (HCC): ICD-10-CM

## 2024-01-30 DIAGNOSIS — I48.11 LONGSTANDING PERSISTENT ATRIAL FIBRILLATION (HCC): ICD-10-CM

## 2024-01-30 DIAGNOSIS — I10 PRIMARY HYPERTENSION: ICD-10-CM

## 2024-01-30 DIAGNOSIS — R13.10 DYSPHAGIA, UNSPECIFIED TYPE: ICD-10-CM

## 2024-01-30 DIAGNOSIS — E78.5 HYPERLIPIDEMIA, UNSPECIFIED HYPERLIPIDEMIA TYPE: ICD-10-CM

## 2024-01-30 DIAGNOSIS — I63.9 CEREBROVASCULAR ACCIDENT (CVA), UNSPECIFIED MECHANISM (HCC): Primary | ICD-10-CM

## 2024-01-30 PROBLEM — R13.19 OTHER DYSPHAGIA: Status: ACTIVE | Noted: 2024-01-30

## 2024-01-30 PROCEDURE — G8484 FLU IMMUNIZE NO ADMIN: HCPCS | Performed by: NURSE PRACTITIONER

## 2024-01-30 PROCEDURE — 1124F ACP DISCUSS-NO DSCNMKR DOCD: CPT | Performed by: NURSE PRACTITIONER

## 2024-01-30 PROCEDURE — 99309 SBSQ NF CARE MODERATE MDM 30: CPT | Performed by: NURSE PRACTITIONER

## 2024-01-30 PROCEDURE — 3046F HEMOGLOBIN A1C LEVEL >9.0%: CPT | Performed by: NURSE PRACTITIONER

## 2024-01-30 RX ORDER — INSULIN GLARGINE 100 [IU]/ML
10 INJECTION, SOLUTION SUBCUTANEOUS NIGHTLY
COMMUNITY

## 2024-01-30 RX ORDER — LANOLIN ALCOHOL/MO/W.PET/CERES
6 CREAM (GRAM) TOPICAL DAILY
COMMUNITY

## 2024-01-30 RX ORDER — THIAMINE MONONITRATE (VIT B1) 100 MG
100 TABLET ORAL DAILY
COMMUNITY

## 2024-01-30 RX ORDER — FERROUS SULFATE 220 (44)/5
7.5 ELIXIR ORAL DAILY
COMMUNITY

## 2024-01-30 RX ORDER — M-VIT,TX,IRON,MINS/CALC/FOLIC 27MG-0.4MG
1 TABLET ORAL DAILY
COMMUNITY

## 2024-01-30 RX ORDER — OMEPRAZOLE 20 MG/1
20 CAPSULE, DELAYED RELEASE ORAL DAILY
COMMUNITY

## 2024-01-30 RX ORDER — ACETAMINOPHEN 325 MG/1
325 TABLET ORAL EVERY 6 HOURS PRN
COMMUNITY

## 2024-01-30 NOTE — PROGRESS NOTES
PLACE OF SERVICE:  [facility] [address] [place] [address]    SKILLED VISIT    1/30/2024    Chief Complaint:   Chief Complaint   Patient presents with    Follow-up         HPI : Brii Engle is a 68 y.o. male here for follow up.    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  Concerning for worsening delirium in the setting of vascular dementia seen on  imaging, prolonged hospitalization age. AMS may be exacerbated by worsening  hypernatremia and improved slowly with treatment. Patient had extensive  neurological workup. CT stroke perfusion, CTA head and neck and CT stroke head  without contrast did not reveal any acute findings. EEG negative for seizures. MRI  without any definitive acute intracranial abnormality. It did note ventriculomegaly with  extensive chronic white matter disease. Multiple chronic lacunar infarcts in the basal  ganglia and thalami. Multiple foci of chronic microhemorrhages/susceptibility noted in  the basal ganglia, midbrain and left temporal lobe as well as right cerebellum similar  to the previous.  Toxic metabolic workup done showed serum drug screen negative, UA negative, TSH  normal blood cultures NGTD. RPP was positive for COVID patient was treated with  remdesivir for 3 days with some improvement initially in mentation.  Repeat routine EEG on 12/16 consistent with generalized slowing. Repeated  noncontrast CT of the head on 12/16 with ventriculomegaly. He continues on Keppra  1000 mg twice daily for seizures and Lipitor 80 mg daily. Speech therapy consulted  after worsening mental status who recommended puréed diet with thin liquids. Diet  was advanced per speech therapy on 1/5 to

## 2024-02-01 ENCOUNTER — OFFICE VISIT (OUTPATIENT)
Facility: CLINIC | Age: 69
End: 2024-02-01

## 2024-02-01 VITALS
BODY MASS INDEX: 23.52 KG/M2 | RESPIRATION RATE: 18 BRPM | SYSTOLIC BLOOD PRESSURE: 146 MMHG | WEIGHT: 178.2 LBS | HEART RATE: 97 BPM | TEMPERATURE: 98.5 F | DIASTOLIC BLOOD PRESSURE: 86 MMHG | OXYGEN SATURATION: 97 %

## 2024-02-01 DIAGNOSIS — R13.10 DYSPHAGIA, UNSPECIFIED TYPE: ICD-10-CM

## 2024-02-01 DIAGNOSIS — E11.22 TYPE 2 DIABETES MELLITUS WITH STAGE 3 CHRONIC KIDNEY DISEASE, WITHOUT LONG-TERM CURRENT USE OF INSULIN, UNSPECIFIED WHETHER STAGE 3A OR 3B CKD (HCC): ICD-10-CM

## 2024-02-01 DIAGNOSIS — G40.909 SEIZURE DISORDER (HCC): ICD-10-CM

## 2024-02-01 DIAGNOSIS — I63.9 CEREBROVASCULAR ACCIDENT (CVA), UNSPECIFIED MECHANISM (HCC): Primary | ICD-10-CM

## 2024-02-01 DIAGNOSIS — R62.7 FAILURE TO THRIVE IN ADULT: ICD-10-CM

## 2024-02-01 DIAGNOSIS — E78.5 HYPERLIPIDEMIA, UNSPECIFIED HYPERLIPIDEMIA TYPE: ICD-10-CM

## 2024-02-01 DIAGNOSIS — I10 PRIMARY HYPERTENSION: ICD-10-CM

## 2024-02-01 DIAGNOSIS — N18.31 STAGE 3A CHRONIC KIDNEY DISEASE (HCC): ICD-10-CM

## 2024-02-01 DIAGNOSIS — I50.22 CHRONIC SYSTOLIC (CONGESTIVE) HEART FAILURE (HCC): ICD-10-CM

## 2024-02-01 DIAGNOSIS — M50.021 DISORDER OF INTERVERTEBRAL DISC AT C4-C5 LEVEL WITH MYELOPATHY: ICD-10-CM

## 2024-02-01 DIAGNOSIS — N18.30 TYPE 2 DIABETES MELLITUS WITH STAGE 3 CHRONIC KIDNEY DISEASE, WITHOUT LONG-TERM CURRENT USE OF INSULIN, UNSPECIFIED WHETHER STAGE 3A OR 3B CKD (HCC): ICD-10-CM

## 2024-02-01 DIAGNOSIS — I48.11 LONGSTANDING PERSISTENT ATRIAL FIBRILLATION (HCC): ICD-10-CM

## 2024-02-01 NOTE — PROGRESS NOTES
weight   10. 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 Primary hypertension     Blood pressure currently well-controlled  Continue Coreg 25 mg p.o. twice daily amlodipine 10 mg p.o. daily lisinopril 40 mg p.o. daily                   Renata North APRN - NP

## 2024-02-06 ENCOUNTER — OFFICE VISIT (OUTPATIENT)
Facility: CLINIC | Age: 69
End: 2024-02-06
Payer: MEDICARE

## 2024-02-06 VITALS
RESPIRATION RATE: 18 BRPM | BODY MASS INDEX: 23.41 KG/M2 | DIASTOLIC BLOOD PRESSURE: 85 MMHG | TEMPERATURE: 98.6 F | HEART RATE: 62 BPM | WEIGHT: 177.4 LBS | SYSTOLIC BLOOD PRESSURE: 148 MMHG | OXYGEN SATURATION: 98 %

## 2024-02-06 DIAGNOSIS — E78.5 HYPERLIPIDEMIA, UNSPECIFIED HYPERLIPIDEMIA TYPE: ICD-10-CM

## 2024-02-06 DIAGNOSIS — R13.10 DYSPHAGIA, UNSPECIFIED TYPE: ICD-10-CM

## 2024-02-06 DIAGNOSIS — M50.021 DISORDER OF INTERVERTEBRAL DISC AT C4-C5 LEVEL WITH MYELOPATHY: ICD-10-CM

## 2024-02-06 DIAGNOSIS — I63.9 CEREBROVASCULAR ACCIDENT (CVA), UNSPECIFIED MECHANISM (HCC): Primary | ICD-10-CM

## 2024-02-06 DIAGNOSIS — G40.909 SEIZURE DISORDER (HCC): ICD-10-CM

## 2024-02-06 DIAGNOSIS — I50.22 CHRONIC SYSTOLIC (CONGESTIVE) HEART FAILURE (HCC): ICD-10-CM

## 2024-02-06 DIAGNOSIS — N18.30 TYPE 2 DIABETES MELLITUS WITH STAGE 3 CHRONIC KIDNEY DISEASE, WITHOUT LONG-TERM CURRENT USE OF INSULIN, UNSPECIFIED WHETHER STAGE 3A OR 3B CKD (HCC): ICD-10-CM

## 2024-02-06 DIAGNOSIS — I10 PRIMARY HYPERTENSION: ICD-10-CM

## 2024-02-06 DIAGNOSIS — R62.7 FAILURE TO THRIVE IN ADULT: ICD-10-CM

## 2024-02-06 DIAGNOSIS — I48.11 LONGSTANDING PERSISTENT ATRIAL FIBRILLATION (HCC): ICD-10-CM

## 2024-02-06 DIAGNOSIS — E11.22 TYPE 2 DIABETES MELLITUS WITH STAGE 3 CHRONIC KIDNEY DISEASE, WITHOUT LONG-TERM CURRENT USE OF INSULIN, UNSPECIFIED WHETHER STAGE 3A OR 3B CKD (HCC): ICD-10-CM

## 2024-02-06 DIAGNOSIS — N18.31 STAGE 3A CHRONIC KIDNEY DISEASE (HCC): ICD-10-CM

## 2024-02-06 PROCEDURE — G8484 FLU IMMUNIZE NO ADMIN: HCPCS | Performed by: NURSE PRACTITIONER

## 2024-02-06 PROCEDURE — 3046F HEMOGLOBIN A1C LEVEL >9.0%: CPT | Performed by: NURSE PRACTITIONER

## 2024-02-06 PROCEDURE — 1124F ACP DISCUSS-NO DSCNMKR DOCD: CPT | Performed by: NURSE PRACTITIONER

## 2024-02-06 PROCEDURE — 99309 SBSQ NF CARE MODERATE MDM 30: CPT | Performed by: NURSE PRACTITIONER

## 2024-02-06 NOTE — PROGRESS NOTES
disc at C4-C5 level with myelopathy     Bilateral lower extremity weakness  Nonsurgical candidate, continue PT OT   11. Primary hypertension     Blood pressure currently well-controlled  Continue Coreg 25 mg p.o. twice daily amlodipine 10 mg p.o. daily lisinopril 40 mg p.o. daily                   Renata North APRN - NP

## 2024-02-08 ENCOUNTER — OFFICE VISIT (OUTPATIENT)
Facility: CLINIC | Age: 69
End: 2024-02-08
Payer: MEDICARE

## 2024-02-08 VITALS
WEIGHT: 175.3 LBS | RESPIRATION RATE: 18 BRPM | OXYGEN SATURATION: 97 % | DIASTOLIC BLOOD PRESSURE: 89 MMHG | TEMPERATURE: 97.5 F | HEART RATE: 70 BPM | BODY MASS INDEX: 23.13 KG/M2 | SYSTOLIC BLOOD PRESSURE: 155 MMHG

## 2024-02-08 DIAGNOSIS — R62.7 FAILURE TO THRIVE IN ADULT: ICD-10-CM

## 2024-02-08 DIAGNOSIS — I48.11 LONGSTANDING PERSISTENT ATRIAL FIBRILLATION (HCC): ICD-10-CM

## 2024-02-08 DIAGNOSIS — R13.10 DYSPHAGIA, UNSPECIFIED TYPE: ICD-10-CM

## 2024-02-08 DIAGNOSIS — E11.22 TYPE 2 DIABETES MELLITUS WITH STAGE 3 CHRONIC KIDNEY DISEASE, WITHOUT LONG-TERM CURRENT USE OF INSULIN, UNSPECIFIED WHETHER STAGE 3A OR 3B CKD (HCC): ICD-10-CM

## 2024-02-08 DIAGNOSIS — G40.909 SEIZURE DISORDER (HCC): ICD-10-CM

## 2024-02-08 DIAGNOSIS — N18.31 STAGE 3A CHRONIC KIDNEY DISEASE (HCC): ICD-10-CM

## 2024-02-08 DIAGNOSIS — I50.22 CHRONIC SYSTOLIC (CONGESTIVE) HEART FAILURE (HCC): ICD-10-CM

## 2024-02-08 DIAGNOSIS — M50.021 DISORDER OF INTERVERTEBRAL DISC AT C4-C5 LEVEL WITH MYELOPATHY: ICD-10-CM

## 2024-02-08 DIAGNOSIS — N18.30 TYPE 2 DIABETES MELLITUS WITH STAGE 3 CHRONIC KIDNEY DISEASE, WITHOUT LONG-TERM CURRENT USE OF INSULIN, UNSPECIFIED WHETHER STAGE 3A OR 3B CKD (HCC): ICD-10-CM

## 2024-02-08 DIAGNOSIS — F33.1 MAJOR DEPRESSIVE DISORDER, RECURRENT, MODERATE (HCC): ICD-10-CM

## 2024-02-08 DIAGNOSIS — F33.0 MAJOR DEPRESSIVE DISORDER, RECURRENT, MILD (HCC): ICD-10-CM

## 2024-02-08 DIAGNOSIS — I63.9 CEREBROVASCULAR ACCIDENT (CVA), UNSPECIFIED MECHANISM (HCC): Primary | ICD-10-CM

## 2024-02-08 DIAGNOSIS — I10 PRIMARY HYPERTENSION: ICD-10-CM

## 2024-02-08 DIAGNOSIS — E78.5 HYPERLIPIDEMIA, UNSPECIFIED HYPERLIPIDEMIA TYPE: ICD-10-CM

## 2024-02-08 PROBLEM — F33.9 MAJOR DEPRESSIVE DISORDER, RECURRENT, UNSPECIFIED (HCC): Status: ACTIVE | Noted: 2024-02-08

## 2024-02-08 PROCEDURE — 3046F HEMOGLOBIN A1C LEVEL >9.0%: CPT | Performed by: NURSE PRACTITIONER

## 2024-02-08 PROCEDURE — 99309 SBSQ NF CARE MODERATE MDM 30: CPT | Performed by: NURSE PRACTITIONER

## 2024-02-08 PROCEDURE — G8484 FLU IMMUNIZE NO ADMIN: HCPCS | Performed by: NURSE PRACTITIONER

## 2024-02-08 PROCEDURE — 1124F ACP DISCUSS-NO DSCNMKR DOCD: CPT | Performed by: NURSE PRACTITIONER

## 2024-02-08 NOTE — PROGRESS NOTES
PLACE OF SERVICE:  [facility] [address] [place] [address]    SKILLED VISIT    2/8/2024    Chief Complaint:   Chief Complaint   Patient presents with    Follow-up         HPI : Brii Engle is a 68 y.o. male here for follow up.    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  Concerning for worsening delirium in the setting of vascular dementia seen on  imaging, prolonged hospitalization age. AMS may be exacerbated by worsening  hypernatremia and improved slowly with treatment. Patient had extensive  neurological workup. CT stroke perfusion, CTA head and neck and CT stroke head  without contrast did not reveal any acute findings. EEG negative for seizures. MRI  without any definitive acute intracranial abnormality. It did note ventriculomegaly with  extensive chronic white matter disease. Multiple chronic lacunar infarcts in the basal  ganglia and thalami. Multiple foci of chronic microhemorrhages/susceptibility noted in  the basal ganglia, midbrain and left temporal lobe as well as right cerebellum similar  to the previous.  Toxic metabolic workup done showed serum drug screen negative, UA negative, TSH  normal blood cultures NGTD. RPP was positive for COVID patient was treated with  remdesivir for 3 days with some improvement initially in mentation.  Repeat routine EEG on 12/16 consistent with generalized slowing. Repeated  noncontrast CT of the head on 12/16 with ventriculomegaly. He continues on Keppra  1000 mg twice daily for seizures and Lipitor 80 mg daily. Speech therapy consulted  after worsening mental status who recommended puréed diet with thin liquids. Diet  was advanced per speech therapy on 1/5 to

## 2024-02-13 ENCOUNTER — TELEPHONE (OUTPATIENT)
Age: 69
End: 2024-02-13

## 2024-02-13 ENCOUNTER — OFFICE VISIT (OUTPATIENT)
Facility: CLINIC | Age: 69
End: 2024-02-13
Payer: MEDICARE

## 2024-02-13 VITALS
TEMPERATURE: 98.2 F | SYSTOLIC BLOOD PRESSURE: 140 MMHG | RESPIRATION RATE: 18 BRPM | DIASTOLIC BLOOD PRESSURE: 74 MMHG | WEIGHT: 168 LBS | OXYGEN SATURATION: 98 % | BODY MASS INDEX: 22.17 KG/M2 | HEART RATE: 74 BPM

## 2024-02-13 DIAGNOSIS — R13.10 DYSPHAGIA, UNSPECIFIED TYPE: ICD-10-CM

## 2024-02-13 DIAGNOSIS — I48.11 LONGSTANDING PERSISTENT ATRIAL FIBRILLATION (HCC): ICD-10-CM

## 2024-02-13 DIAGNOSIS — E11.22 TYPE 2 DIABETES MELLITUS WITH STAGE 3 CHRONIC KIDNEY DISEASE, WITHOUT LONG-TERM CURRENT USE OF INSULIN, UNSPECIFIED WHETHER STAGE 3A OR 3B CKD (HCC): ICD-10-CM

## 2024-02-13 DIAGNOSIS — N18.30 TYPE 2 DIABETES MELLITUS WITH STAGE 3 CHRONIC KIDNEY DISEASE, WITHOUT LONG-TERM CURRENT USE OF INSULIN, UNSPECIFIED WHETHER STAGE 3A OR 3B CKD (HCC): ICD-10-CM

## 2024-02-13 DIAGNOSIS — I50.22 CHRONIC SYSTOLIC (CONGESTIVE) HEART FAILURE (HCC): ICD-10-CM

## 2024-02-13 DIAGNOSIS — I63.9 CEREBROVASCULAR ACCIDENT (CVA), UNSPECIFIED MECHANISM (HCC): Primary | ICD-10-CM

## 2024-02-13 DIAGNOSIS — D63.8 ANEMIA IN OTHER CHRONIC DISEASES CLASSIFIED ELSEWHERE: ICD-10-CM

## 2024-02-13 DIAGNOSIS — M50.021 DISORDER OF INTERVERTEBRAL DISC AT C4-C5 LEVEL WITH MYELOPATHY: ICD-10-CM

## 2024-02-13 DIAGNOSIS — N18.31 STAGE 3A CHRONIC KIDNEY DISEASE (HCC): ICD-10-CM

## 2024-02-13 DIAGNOSIS — G40.909 SEIZURE DISORDER (HCC): ICD-10-CM

## 2024-02-13 DIAGNOSIS — E78.5 HYPERLIPIDEMIA, UNSPECIFIED HYPERLIPIDEMIA TYPE: ICD-10-CM

## 2024-02-13 DIAGNOSIS — I10 PRIMARY HYPERTENSION: ICD-10-CM

## 2024-02-13 DIAGNOSIS — R62.7 FAILURE TO THRIVE IN ADULT: ICD-10-CM

## 2024-02-13 PROCEDURE — 1124F ACP DISCUSS-NO DSCNMKR DOCD: CPT | Performed by: NURSE PRACTITIONER

## 2024-02-13 PROCEDURE — 99309 SBSQ NF CARE MODERATE MDM 30: CPT | Performed by: NURSE PRACTITIONER

## 2024-02-13 PROCEDURE — G8484 FLU IMMUNIZE NO ADMIN: HCPCS | Performed by: NURSE PRACTITIONER

## 2024-02-13 PROCEDURE — 3046F HEMOGLOBIN A1C LEVEL >9.0%: CPT | Performed by: NURSE PRACTITIONER

## 2024-02-13 NOTE — TELEPHONE ENCOUNTER
----- Message from Ruiz Boaz sent at 2/13/2024 12:49 PM EST -----  Subject: Hospital Follow Up    QUESTIONS  What hospital was the Patient Discharged from? [facility]  Date of Discharge? 2024-02-15  Discharge Location? Home  Reason for hospitalization as patient stated? Admitted for a fall from [facility]   Medical.  What question does the patient have, if applicable? Pt needs a f/u appt   within 2 weeks of discharge  ---------------------------------------------------------------------------  --------------  CALL BACK INFO  What is the best way for the office to contact you? Do not leave any   message, patient will call back for answer  Preferred Call Back Phone Number? [phone]  ---------------------------------------------------------------------------  --------------  SCRIPT ANSWERS  Relationship to Patient? Covered Entity  Covered Entity Type? Nursing Home?  Representative Name? Julee

## 2024-02-13 NOTE — TELEPHONE ENCOUNTER
Spoke with Lorna with [facility]. Advised Dr. Michaels will follow    Needs MENDEZ appt in 1-2 weeks.     Per Dr. Michaels  I can folllow. Mendez in 1-2 weeks

## 2024-02-13 NOTE — PROGRESS NOTES
PO intake and during stay was upgraded to regular diet dysphagia advanced texture thin liquid consistency with minced chopped meat this is an addition to tube feeds, notes show intake this method varies.  Nursing noted that missing the caps on tube for feeding. One cap was placed, a stopper was placed on another. Nursing leadership are aware and have ordered replacement caps nursing report no acute complaints or changes. Continue to monitor  closely.      PMH: Congestive heart failure seizures hypertension atrial fibrillation type 2 diabetes anemia vascular dementia cervical myelopathy history of CVA GERD    ROS:   Systems reviewed were negative unless noted below.  Poor historian    Medications: Reviewed in EMR and assessment and plan    Social History: Unable to obtain due to dementia  Family History: Unobtainable       Vitals:   Vitals:    02/13/24 0915   BP: (!) 140/74   Pulse: 74   Resp: 18   Temp: 98.2 °F (36.8 °C)   SpO2: 98%           Exam:  Constitutional: No acute distress; confused at baseline,   Eyes: Sclera clear, PERRLA;   Ears/nose/mouth/throat:mmm, OP clear, trachea midline;  Cardiovascular: RRR,nml S1 and S2, no rubs murmurs or gallops, no edema, no cyanosis;   Respiratory: Clear to auscultation, symmetric, no respiratory distress;  Gastrointestinal: Abdomen soft, NT, ND, no masses, normal bowel sounds; PEG in abdomen no surrounding erythema or edema  Neurologic: Cranial nerves II through VII grossly intact, no speech or motor deficits A&O to self only, bilateral upper extremity weakness but has left hemiplegia  Skin: No rash, warm and dry;  Musculoskeletal: Left hemiplegia at baseline, no erythema swelling or joint tenderness, extremities without cyanosis, neck supple, ROM intact spine and extremities;  Psychiatric: Not agitated.  Appropriate affect, mood, poor judgment and insight.  Genitourinary: No suprapubic tenderness or flank tenderness  Heme, lymph, immuno: No pallor;       Labs: Last labs

## 2024-02-13 NOTE — TELEPHONE ENCOUNTER
----- Message from Jermaine Sotomayor sent at 2/13/2024  2:44 PM EST -----  Subject: Message to Provider    QUESTIONS  Information for Provider? Home Health ([facility]) would like   to know if Dr. Michaels will follow patient to [facility]. Please   contact Lorna [phone] Please leave a VM.   ---------------------------------------------------------------------------  --------------  CALL BACK INFO  [phone]; OK to leave message on voicemail  ---------------------------------------------------------------------------  --------------  SCRIPT ANSWERS  Relationship to Patient? Covered Entity  Covered Entity Type? Home Health Care?  Representative Name? Lorna

## 2024-02-14 ENCOUNTER — TELEPHONE (OUTPATIENT)
Age: 69
End: 2024-02-14

## 2024-02-14 ENCOUNTER — OFFICE VISIT (OUTPATIENT)
Facility: CLINIC | Age: 69
End: 2024-02-14
Payer: MEDICARE

## 2024-02-14 VITALS
HEART RATE: 68 BPM | TEMPERATURE: 98.2 F | RESPIRATION RATE: 18 BRPM | BODY MASS INDEX: 21.95 KG/M2 | DIASTOLIC BLOOD PRESSURE: 81 MMHG | WEIGHT: 166.3 LBS | SYSTOLIC BLOOD PRESSURE: 130 MMHG | OXYGEN SATURATION: 98 %

## 2024-02-14 DIAGNOSIS — E11.22 TYPE 2 DIABETES MELLITUS WITH STAGE 3 CHRONIC KIDNEY DISEASE, WITHOUT LONG-TERM CURRENT USE OF INSULIN, UNSPECIFIED WHETHER STAGE 3A OR 3B CKD (HCC): ICD-10-CM

## 2024-02-14 DIAGNOSIS — R13.10 DYSPHAGIA, UNSPECIFIED TYPE: ICD-10-CM

## 2024-02-14 DIAGNOSIS — D63.8 ANEMIA IN OTHER CHRONIC DISEASES CLASSIFIED ELSEWHERE: ICD-10-CM

## 2024-02-14 DIAGNOSIS — E78.5 HYPERLIPIDEMIA, UNSPECIFIED HYPERLIPIDEMIA TYPE: ICD-10-CM

## 2024-02-14 DIAGNOSIS — N18.30 TYPE 2 DIABETES MELLITUS WITH STAGE 3 CHRONIC KIDNEY DISEASE, WITHOUT LONG-TERM CURRENT USE OF INSULIN, UNSPECIFIED WHETHER STAGE 3A OR 3B CKD (HCC): ICD-10-CM

## 2024-02-14 DIAGNOSIS — N18.31 STAGE 3A CHRONIC KIDNEY DISEASE (HCC): ICD-10-CM

## 2024-02-14 DIAGNOSIS — I50.22 CHRONIC SYSTOLIC (CONGESTIVE) HEART FAILURE (HCC): ICD-10-CM

## 2024-02-14 DIAGNOSIS — R62.7 FAILURE TO THRIVE IN ADULT: ICD-10-CM

## 2024-02-14 DIAGNOSIS — G40.909 SEIZURE DISORDER (HCC): ICD-10-CM

## 2024-02-14 DIAGNOSIS — I48.11 LONGSTANDING PERSISTENT ATRIAL FIBRILLATION (HCC): ICD-10-CM

## 2024-02-14 DIAGNOSIS — M50.021 DISORDER OF INTERVERTEBRAL DISC AT C4-C5 LEVEL WITH MYELOPATHY: ICD-10-CM

## 2024-02-14 DIAGNOSIS — I10 PRIMARY HYPERTENSION: ICD-10-CM

## 2024-02-14 DIAGNOSIS — K94.23 MALFUNCTION OF PERCUTANEOUS ENDOSCOPIC GASTROSTOMY (PEG) TUBE (HCC): Primary | ICD-10-CM

## 2024-02-14 DIAGNOSIS — I63.9 CEREBROVASCULAR ACCIDENT (CVA), UNSPECIFIED MECHANISM (HCC): ICD-10-CM

## 2024-02-14 PROCEDURE — 3046F HEMOGLOBIN A1C LEVEL >9.0%: CPT | Performed by: NURSE PRACTITIONER

## 2024-02-14 PROCEDURE — 99310 SBSQ NF CARE HIGH MDM 45: CPT | Performed by: NURSE PRACTITIONER

## 2024-02-14 PROCEDURE — 1124F ACP DISCUSS-NO DSCNMKR DOCD: CPT | Performed by: NURSE PRACTITIONER

## 2024-02-14 PROCEDURE — G0317 PR PROLONG NURSIN FAC EVAL 15M: HCPCS | Performed by: NURSE PRACTITIONER

## 2024-02-14 PROCEDURE — G8484 FLU IMMUNIZE NO ADMIN: HCPCS | Performed by: NURSE PRACTITIONER

## 2024-02-14 NOTE — PROGRESS NOTES
bite-size and easy to chew with thin  liquids. PEG tube placed on 1/4. Switch from continuous tube feeds to bolus feeds.  He was also hypokalemic and hyponatremic which has resolved. Sodium was 138  and potassium 3.9 on discharge. He was also diagnosed with NSTEMI. He was seen  by cardiology. TTE showed ejection fraction 35 to 40% with moderate global  hypokinesis. Cardiology did not recommend any further inpatient ischemic workup.  He will need follow-up as outpatient with cardiology. After discharge from hospital he  is now admitted to [facility] for skilled rehab.      Current visit:    Patient was seen today due to leaking gastrostomy tube.  It was noted that patient was having leaking gastrostomy tube and caps were missing.  Spoke with leadership at [facility] to see if proper caps could be found.  Was able to temporarily block off port where was leaking while was awaiting caps.  However [employer] portGastrostomy adapter does fit in the port but it it loosely fits.  Provider flushed port is not blocked.  Has 2 ports 1 for medications and 1 for feeding.  Both are routed down the same tube.  However there are that were present on original tube.  Patient has [employer] 16 Urdu tube.  There are 3 buttons on external abdomen.  Has 5 to 7 cc balloon port.  He denies any complaints of pain.  Attempted to call [employer] supply company to see if there was replacement caps available that were appropriate or if suggestions could be made.  They referred me back to placing physician.  Reviewed [facility] medical record and it was placed on January 4, 2024 by Dr. Torres in interventional radiology.  Attempted to call interventional radiology and left message for administration.  Was able to obtain a phone number that directed me straight to interventional radiology department and was able to speak with nurse practitioner MARY Lozoya.  Reviewed what feeding tube was in place, the buttons on external abdomen,

## 2024-02-14 NOTE — TELEPHONE ENCOUNTER
----- Message from Leighton Frank sent at 2/14/2024 11:23 AM EST -----  Subject: Message to Provider    QUESTIONS  Information for Provider? medical supplies for formula for into supplies   would like to know if you Maurilio Michaels will be following pt once released   from medical rehab at [facility]. please call tatianna before 2/15/24   that is not direct line you will need to ask. hes there till 5:30pm today  ---------------------------------------------------------------------------  --------------  CALL BACK INFO  [phone]; OK to leave message on voicemail  ---------------------------------------------------------------------------  --------------  SCRIPT ANSWERS  Relationship to Patient? Covered Entity  Covered Entity Type? Durable Medical Equipment?  Representative Name? tatianna

## 2024-02-15 ENCOUNTER — OFFICE VISIT (OUTPATIENT)
Facility: CLINIC | Age: 69
End: 2024-02-15
Payer: MEDICARE

## 2024-02-15 VITALS
DIASTOLIC BLOOD PRESSURE: 80 MMHG | OXYGEN SATURATION: 98 % | TEMPERATURE: 97.9 F | HEART RATE: 72 BPM | WEIGHT: 166.3 LBS | BODY MASS INDEX: 21.95 KG/M2 | SYSTOLIC BLOOD PRESSURE: 140 MMHG | RESPIRATION RATE: 17 BRPM

## 2024-02-15 DIAGNOSIS — R62.7 FAILURE TO THRIVE IN ADULT: ICD-10-CM

## 2024-02-15 DIAGNOSIS — I48.11 LONGSTANDING PERSISTENT ATRIAL FIBRILLATION (HCC): ICD-10-CM

## 2024-02-15 DIAGNOSIS — G40.909 SEIZURE DISORDER (HCC): ICD-10-CM

## 2024-02-15 DIAGNOSIS — E78.5 HYPERLIPIDEMIA, UNSPECIFIED HYPERLIPIDEMIA TYPE: ICD-10-CM

## 2024-02-15 DIAGNOSIS — R13.10 DYSPHAGIA, UNSPECIFIED TYPE: ICD-10-CM

## 2024-02-15 DIAGNOSIS — E11.22 TYPE 2 DIABETES MELLITUS WITH STAGE 3 CHRONIC KIDNEY DISEASE, WITHOUT LONG-TERM CURRENT USE OF INSULIN, UNSPECIFIED WHETHER STAGE 3A OR 3B CKD (HCC): ICD-10-CM

## 2024-02-15 DIAGNOSIS — I63.9 CEREBROVASCULAR ACCIDENT (CVA), UNSPECIFIED MECHANISM (HCC): ICD-10-CM

## 2024-02-15 DIAGNOSIS — M50.021 DISORDER OF INTERVERTEBRAL DISC AT C4-C5 LEVEL WITH MYELOPATHY: ICD-10-CM

## 2024-02-15 DIAGNOSIS — N18.31 STAGE 3A CHRONIC KIDNEY DISEASE (HCC): ICD-10-CM

## 2024-02-15 DIAGNOSIS — K94.23 MALFUNCTION OF PERCUTANEOUS ENDOSCOPIC GASTROSTOMY (PEG) TUBE (HCC): Primary | ICD-10-CM

## 2024-02-15 DIAGNOSIS — I10 PRIMARY HYPERTENSION: ICD-10-CM

## 2024-02-15 DIAGNOSIS — I50.22 CHRONIC SYSTOLIC (CONGESTIVE) HEART FAILURE (HCC): ICD-10-CM

## 2024-02-15 DIAGNOSIS — N18.30 TYPE 2 DIABETES MELLITUS WITH STAGE 3 CHRONIC KIDNEY DISEASE, WITHOUT LONG-TERM CURRENT USE OF INSULIN, UNSPECIFIED WHETHER STAGE 3A OR 3B CKD (HCC): ICD-10-CM

## 2024-02-15 DIAGNOSIS — D63.8 ANEMIA IN OTHER CHRONIC DISEASES CLASSIFIED ELSEWHERE: ICD-10-CM

## 2024-02-15 PROCEDURE — 99310 SBSQ NF CARE HIGH MDM 45: CPT | Performed by: NURSE PRACTITIONER

## 2024-02-15 PROCEDURE — G8484 FLU IMMUNIZE NO ADMIN: HCPCS | Performed by: NURSE PRACTITIONER

## 2024-02-15 PROCEDURE — 3046F HEMOGLOBIN A1C LEVEL >9.0%: CPT | Performed by: NURSE PRACTITIONER

## 2024-02-15 PROCEDURE — 1124F ACP DISCUSS-NO DSCNMKR DOCD: CPT | Performed by: NURSE PRACTITIONER

## 2024-02-15 NOTE — PROGRESS NOTES
PLACE OF SERVICE:  [facility] [address] [place] [address]    SKILLED VISIT    2/15/2024    Chief Complaint:   Chief Complaint   Patient presents with    Follow-up         HPI : Brii Engle is a 68 y.o. male here for follow up.    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  Concerning for worsening delirium in the setting of vascular dementia seen on  imaging, prolonged hospitalization age. AMS may be exacerbated by worsening  hypernatremia and improved slowly with treatment. Patient had extensive  neurological workup. CT stroke perfusion, CTA head and neck and CT stroke head  without contrast did not reveal any acute findings. EEG negative for seizures. MRI  without any definitive acute intracranial abnormality. It did note ventriculomegaly with  extensive chronic white matter disease. Multiple chronic lacunar infarcts in the basal  ganglia and thalami. Multiple foci of chronic microhemorrhages/susceptibility noted in  the basal ganglia, midbrain and left temporal lobe as well as right cerebellum similar  to the previous.  Toxic metabolic workup done showed serum drug screen negative, UA negative, TSH  normal blood cultures NGTD. RPP was positive for COVID patient was treated with  remdesivir for 3 days with some improvement initially in mentation.  Repeat routine EEG on 12/16 consistent with generalized slowing. Repeated  noncontrast CT of the head on 12/16 with ventriculomegaly. He continues on Keppra  1000 mg twice daily for seizures and Lipitor 80 mg daily. Speech therapy consulted  after worsening mental status who recommended puréed diet with thin liquids. Diet  was advanced per speech therapy on 1/5 to

## 2024-02-16 ENCOUNTER — OFFICE VISIT (OUTPATIENT)
Facility: CLINIC | Age: 69
End: 2024-02-16

## 2024-02-16 VITALS
BODY MASS INDEX: 21.95 KG/M2 | WEIGHT: 166.3 LBS | SYSTOLIC BLOOD PRESSURE: 120 MMHG | RESPIRATION RATE: 18 BRPM | OXYGEN SATURATION: 98 % | HEART RATE: 70 BPM | DIASTOLIC BLOOD PRESSURE: 87 MMHG | TEMPERATURE: 98.2 F

## 2024-02-16 DIAGNOSIS — I48.11 LONGSTANDING PERSISTENT ATRIAL FIBRILLATION (HCC): ICD-10-CM

## 2024-02-16 DIAGNOSIS — K94.23 MALFUNCTION OF PERCUTANEOUS ENDOSCOPIC GASTROSTOMY (PEG) TUBE (HCC): ICD-10-CM

## 2024-02-16 DIAGNOSIS — R13.10 DYSPHAGIA, UNSPECIFIED TYPE: ICD-10-CM

## 2024-02-16 DIAGNOSIS — E78.5 HYPERLIPIDEMIA, UNSPECIFIED HYPERLIPIDEMIA TYPE: ICD-10-CM

## 2024-02-16 DIAGNOSIS — F33.1 MAJOR DEPRESSIVE DISORDER, RECURRENT, MODERATE (HCC): ICD-10-CM

## 2024-02-16 DIAGNOSIS — I10 PRIMARY HYPERTENSION: ICD-10-CM

## 2024-02-16 DIAGNOSIS — N18.31 STAGE 3A CHRONIC KIDNEY DISEASE (HCC): ICD-10-CM

## 2024-02-16 DIAGNOSIS — G40.909 SEIZURE DISORDER (HCC): ICD-10-CM

## 2024-02-16 DIAGNOSIS — E11.22 TYPE 2 DIABETES MELLITUS WITH STAGE 3 CHRONIC KIDNEY DISEASE, WITHOUT LONG-TERM CURRENT USE OF INSULIN, UNSPECIFIED WHETHER STAGE 3A OR 3B CKD (HCC): ICD-10-CM

## 2024-02-16 DIAGNOSIS — D63.8 ANEMIA IN OTHER CHRONIC DISEASES CLASSIFIED ELSEWHERE: ICD-10-CM

## 2024-02-16 DIAGNOSIS — N18.30 TYPE 2 DIABETES MELLITUS WITH STAGE 3 CHRONIC KIDNEY DISEASE, WITHOUT LONG-TERM CURRENT USE OF INSULIN, UNSPECIFIED WHETHER STAGE 3A OR 3B CKD (HCC): ICD-10-CM

## 2024-02-16 DIAGNOSIS — I50.22 CHRONIC SYSTOLIC (CONGESTIVE) HEART FAILURE (HCC): ICD-10-CM

## 2024-02-16 DIAGNOSIS — I63.9 CEREBROVASCULAR ACCIDENT (CVA), UNSPECIFIED MECHANISM (HCC): Primary | ICD-10-CM

## 2024-02-16 DIAGNOSIS — R62.7 FAILURE TO THRIVE IN ADULT: ICD-10-CM

## 2024-02-16 DIAGNOSIS — M50.021 DISORDER OF INTERVERTEBRAL DISC AT C4-C5 LEVEL WITH MYELOPATHY: ICD-10-CM

## 2024-02-16 RX ORDER — ATORVASTATIN CALCIUM 80 MG/1
80 TABLET, FILM COATED ORAL NIGHTLY
Qty: 100 TABLET | Refills: 1 | Status: SHIPPED | OUTPATIENT
Start: 2024-02-16

## 2024-02-16 RX ORDER — M-VIT,TX,IRON,MINS/CALC/FOLIC 27MG-0.4MG
1 TABLET ORAL DAILY
Qty: 30 TABLET | Refills: 1 | Status: SHIPPED | OUTPATIENT
Start: 2024-02-16 | End: 2024-04-16

## 2024-02-16 RX ORDER — LEVETIRACETAM 500 MG/1
1000 TABLET ORAL 2 TIMES DAILY
Qty: 120 TABLET | Refills: 1 | Status: SHIPPED | OUTPATIENT
Start: 2024-02-16 | End: 2024-04-16

## 2024-02-16 RX ORDER — AMLODIPINE BESYLATE 10 MG/1
10 TABLET ORAL DAILY
Qty: 30 TABLET | Refills: 1 | Status: SHIPPED | OUTPATIENT
Start: 2024-02-16 | End: 2024-04-16

## 2024-02-16 RX ORDER — OMEPRAZOLE 20 MG/1
CAPSULE, DELAYED RELEASE ORAL
Qty: 30 CAPSULE | Refills: 1 | Status: SHIPPED | OUTPATIENT
Start: 2024-02-16

## 2024-02-16 RX ORDER — CARVEDILOL 25 MG/1
25 TABLET ORAL 2 TIMES DAILY WITH MEALS
Qty: 60 TABLET | Refills: 1 | Status: SHIPPED | OUTPATIENT
Start: 2024-02-16 | End: 2024-04-16

## 2024-02-16 RX ORDER — FERROUS SULFATE 220 (44)/5
7.5 ELIXIR ORAL DAILY
Qty: 473 ML | Refills: 1 | Status: SHIPPED | OUTPATIENT
Start: 2024-02-16 | End: 2024-04-16

## 2024-02-16 RX ORDER — SERTRALINE HYDROCHLORIDE 25 MG/1
25 TABLET, FILM COATED ORAL DAILY
Qty: 30 TABLET | Refills: 1 | Status: SHIPPED | OUTPATIENT
Start: 2024-02-16 | End: 2024-04-16

## 2024-02-16 RX ORDER — THIAMINE MONONITRATE (VIT B1) 100 MG
100 TABLET ORAL DAILY
Qty: 30 TABLET | Refills: 1 | Status: SHIPPED | OUTPATIENT
Start: 2024-02-16 | End: 2024-04-16

## 2024-02-16 RX ORDER — LISINOPRIL 40 MG/1
40 TABLET ORAL DAILY
Qty: 90 TABLET | Refills: 3 | Status: SHIPPED | OUTPATIENT
Start: 2024-02-16

## 2024-02-16 RX ORDER — LANOLIN ALCOHOL/MO/W.PET/CERES
6 CREAM (GRAM) TOPICAL DAILY
Qty: 60 TABLET | Refills: 1 | Status: SHIPPED | OUTPATIENT
Start: 2024-02-16 | End: 2024-04-16

## 2024-02-16 RX ORDER — CLOPIDOGREL BISULFATE 75 MG/1
75 TABLET ORAL DAILY
Qty: 30 TABLET | Refills: 1 | Status: SHIPPED | OUTPATIENT
Start: 2024-02-16 | End: 2024-04-16

## 2024-02-16 RX ORDER — INSULIN GLARGINE 100 [IU]/ML
10 INJECTION, SOLUTION SUBCUTANEOUS NIGHTLY
Qty: 10 ML | Refills: 1 | Status: SHIPPED | OUTPATIENT
Start: 2024-02-16 | End: 2024-04-16

## 2024-02-16 NOTE — PROGRESS NOTES
Place of Service:  [facility] [address] [place] [address]                                                                     Discharge Summary       Admit Dx: Acute encephalopathy, COVID-19, hypokalemia, hyponatremia history of CVA with left-sided residual deficits and failure to thrive.    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  Concerning for worsening delirium in the setting of vascular dementia seen on  imaging, prolonged hospitalization age. AMS may be exacerbated by worsening  hypernatremia and improved slowly with treatment. Patient had extensive  neurological workup. CT stroke perfusion, CTA head and neck and CT stroke head  without contrast did not reveal any acute findings. EEG negative for seizures. MRI  without any definitive acute intracranial abnormality. It did note ventriculomegaly with  extensive chronic white matter disease. Multiple chronic lacunar infarcts in the basal  ganglia and thalami. Multiple foci of chronic microhemorrhages/susceptibility noted in  the basal ganglia, midbrain and left temporal lobe as well as right cerebellum similar  to the previous.  Toxic metabolic workup done showed serum drug screen negative, UA negative, TSH  normal blood cultures NGTD. RPP was positive for COVID patient was treated with  remdesivir for 3 days with some improvement initially in mentation.  Repeat routine EEG on 12/16 consistent with generalized slowing. Repeated  noncontrast CT of the head on 12/16 with ventriculomegaly. He continues on Keppra  1000 mg twice daily for seizures and Lipitor 80 mg daily. Speech therapy consulted  after worsening mental status who

## 2024-02-19 RX ORDER — BLOOD SUGAR DIAGNOSTIC
STRIP MISCELLANEOUS
Qty: 50 EACH | Refills: 5 | Status: SHIPPED | OUTPATIENT
Start: 2024-02-19

## 2024-02-21 ENCOUNTER — TELEPHONE (OUTPATIENT)
Age: 69
End: 2024-02-21

## 2024-02-21 NOTE — TELEPHONE ENCOUNTER
----- Message from Tammi Tripp sent at 2/21/2024  2:40 PM EST -----  Subject: Message to Provider    QUESTIONS  Information for Provider? Letty from [facility] will Dr. Brando correa for home health PT. Patient & patient's wife to benefit from fully   motorized hospital bed.   ---------------------------------------------------------------------------  --------------  CALL BACK INFO  [phone]; OK to leave message on voicemail  ---------------------------------------------------------------------------  --------------  SCRIPT ANSWERS  Relationship to Patient? Covered Entity  Covered Entity Type? Other  Other Covered Entity Type? [facility]   Representative Name? Letty

## 2024-02-22 ENCOUNTER — TELEPHONE (OUTPATIENT)
Age: 69
End: 2024-02-22

## 2024-02-22 NOTE — TELEPHONE ENCOUNTER
Called Letty with [facility] home [facility].   Left voicemail for return phone call in regards to this.

## 2024-02-23 NOTE — TELEPHONE ENCOUNTER
Spoke with Letty from [facility]. Advised Dr. Michaels will follow.     On behalf of patient and spouse, Letty requested to see if patient will qualify for a fully automatic bed as wife is having to crank bed//elevate bed.     Requested through [employer].

## 2024-02-29 ENCOUNTER — TELEPHONE (OUTPATIENT)
Age: 69
End: 2024-02-29

## 2024-02-29 NOTE — TELEPHONE ENCOUNTER
Mara with [facility] called in to report pts heart rate was 55 today. States wife was confused about medication regimen. Pt has not had Eliquis since 1/16/24. Was given dosage instructions today wife states understanding and states will give from this point forward. Please call Malu with any follow up questions.

## 2024-03-01 NOTE — TELEPHONE ENCOUNTER
Future Appointments:  Future Appointments   Date Time Provider [address]   11/22/2023  9:45 AM Joseph Becker MD [facility] BS AMB        Last Appointment With Me:  7/18/2023     Requested Prescriptions     Pending Prescriptions Disp Refills    lisinopril (PRINIVIL;ZESTRIL) 40 MG tablet 90 tablet 3     Sig: Take 1 tablet by mouth daily Mailed letter, copy of insurance card,and payment receipt for 12/19/23 to [facility] address via regular mail.

## 2024-03-04 RX ORDER — BLOOD SUGAR DIAGNOSTIC
STRIP MISCELLANEOUS
Qty: 50 EACH | Refills: 0 | Status: SHIPPED | OUTPATIENT
Start: 2024-03-04

## 2024-03-08 RX ORDER — LANCETS 30 GAUGE
1 EACH MISCELLANEOUS DAILY
Qty: 100 EACH | Refills: 0 | Status: SHIPPED | OUTPATIENT
Start: 2024-03-08

## 2024-03-08 RX ORDER — BLOOD-GLUCOSE METER
1 KIT MISCELLANEOUS DAILY
Qty: 1 KIT | Refills: 0 | Status: SHIPPED | OUTPATIENT
Start: 2024-03-08

## 2024-03-08 NOTE — TELEPHONE ENCOUNTER
Patient's wife came in asking for briefs, gloves, wipes, and cushion for patient's wheelchair. Patient's wife says she was told to go through the primary care doctor to get these items refilled, to go through insurance.   Home nurse that comes to them,  Mara -[phone].   Patient's wife says to reach out to herself or Mara if any questions.

## 2024-03-08 NOTE — TELEPHONE ENCOUNTER
PCP: Maurilio Michaels MD    Last appt: 2023   Future Appointments   Date Time Provider Department Center   2024 11:00 AM Maurilio Michaels MD [facility] BS AMB       Requested Prescriptions     Pending Prescriptions Disp Refills    glucose monitoring kit 1 kit 0     Si kit by Does not apply route daily PLEASE DISPENSE WHAT INSURANCE COVERS    Lancets MISC 100 each 0     Si each by Does not apply route daily PLEASE DISPENSE WHAT INSURANCE COVERS

## 2024-03-08 NOTE — TELEPHONE ENCOUNTER
Spoke with patient wife, Clarisa. Advised to contact patient insurance company for DME company they are partnered with for supplies and let PCP know. Advised will order wheelchair cushion through [employer] and to be advised will be contacted by company regarding payment and scheduling.   Wife states patient needs glucose monitor.   Rx pended.     Wife verbalized understanding.

## 2024-03-13 RX ORDER — BLOOD-GLUCOSE METER
1 KIT MISCELLANEOUS DAILY
Qty: 1 KIT | Refills: 0 | Status: SHIPPED | OUTPATIENT
Start: 2024-03-13

## 2024-03-13 RX ORDER — BLOOD-GLUCOSE METER
1 KIT MISCELLANEOUS DAILY
Qty: 1 KIT | Refills: 0 | Status: SHIPPED | OUTPATIENT
Start: 2024-03-13 | End: 2024-03-13 | Stop reason: SDUPTHER

## 2024-03-13 RX ORDER — BLOOD SUGAR DIAGNOSTIC
STRIP MISCELLANEOUS
Qty: 50 EACH | Refills: 11 | Status: SHIPPED | OUTPATIENT
Start: 2024-03-13

## 2024-03-13 RX ORDER — LANCETS 30 GAUGE
1 EACH MISCELLANEOUS DAILY
Qty: 100 EACH | Refills: 11 | Status: SHIPPED | OUTPATIENT
Start: 2024-03-13

## 2024-03-13 NOTE — TELEPHONE ENCOUNTER
Received faxed refill request from pharmacy      PCP: Maurilio Michaels MD    Last appt: 2023  Future Appointments   Date Time Provider Department Center   2024 11:00 AM Maurilio Michaels MD [facility] BS AMB       Requested Prescriptions     Pending Prescriptions Disp Refills    glucose monitoring kit 1 kit 0     Si kit by Does not apply route daily

## 2024-03-13 NOTE — TELEPHONE ENCOUNTER
PCP: Maurilio Michaels MD    Last appt: 2023  Future Appointments   Date Time Provider Department Center   2024 11:00 AM Maurilio Michaels MD [facility] BS AMB       Requested Prescriptions     Pending Prescriptions Disp Refills    blood glucose test strips (ONETOUCH ULTRA) strip 50 each 0     Sig: Test once daily. DX: E11.22    Lancets MISC 100 each 0     Si each by Does not apply route daily PLEASE DISPENSE WHAT INSURANCE COVERS Test once daily. DX: E11.22    glucose monitoring kit 1 kit 0     Si kit by Does not apply route daily PLEASE DISPENSE WHAT INSURANCE COVERS Test once daily. DX:

## 2024-03-14 DIAGNOSIS — E11.9 TYPE 2 DIABETES MELLITUS WITHOUT COMPLICATION, WITHOUT LONG-TERM CURRENT USE OF INSULIN (HCC): Primary | ICD-10-CM

## 2024-03-14 RX ORDER — BLOOD-GLUCOSE METER
1 KIT MISCELLANEOUS DAILY
Qty: 1 KIT | Refills: 0 | Status: SHIPPED | OUTPATIENT
Start: 2024-03-14

## 2024-03-14 NOTE — TELEPHONE ENCOUNTER
Received faxed refill request from pharmacy      PCP: Maurilio Michaels MD    Last appt: 2023  Future Appointments   Date Time Provider Department Center   2024 11:00 AM Maurilio Michaels MD [facility] BS AMB       Requested Prescriptions     Pending Prescriptions Disp Refills    glucose monitoring kit 1 kit 0     Si kit by Does not apply route daily One touch ultra

## 2024-03-15 ENCOUNTER — TELEPHONE (OUTPATIENT)
Age: 69
End: 2024-03-15

## 2024-03-15 NOTE — TELEPHONE ENCOUNTER
Spoke with patient wife in office. She was advised to contact insurance company. Needs to know DME company they are in network with.   She states they have fax over information to office. Advised we did not received anything. She ws given office main fax and alt fax [phone] to contact insurance company again to fax over paperwork to order DME supplies.  She confirmed that [facility] pharmacy also has patient diabetic supplies including meter ready for pick u.    Wife verbalized all understanding.

## 2024-03-15 NOTE — TELEPHONE ENCOUNTER
Patient has been waiting on supply order: Gloves, Briefs, Pads, etc.    Patient's wife is asking for a call back. She has been buying the briefs because they are out.

## 2024-05-14 RX ORDER — AMLODIPINE BESYLATE 10 MG/1
10 TABLET ORAL DAILY
Qty: 90 TABLET | Refills: 3 | Status: SHIPPED | OUTPATIENT
Start: 2024-05-14 | End: 2025-05-14

## 2024-05-14 NOTE — TELEPHONE ENCOUNTER
Received faxed refill request from pharmacy      PCP: Maurilio Michaels MD    Last appt: 2023  Future Appointments   Date Time Provider Department Center   2024 11:00 AM Maurilio Michaels MD [facility] BS AMB       Requested Prescriptions     Pending Prescriptions Disp Refills    amLODIPine (NORVASC) 10 MG tablet 30 tablet 1     Si tablet by PEG Tube route daily

## 2024-05-22 ENCOUNTER — TELEMEDICINE (OUTPATIENT)
Age: 69
End: 2024-05-22
Payer: MEDICARE

## 2024-05-22 DIAGNOSIS — F32.A DEPRESSION, UNSPECIFIED DEPRESSION TYPE: ICD-10-CM

## 2024-05-22 DIAGNOSIS — I10 HYPERTENSION, UNSPECIFIED TYPE: ICD-10-CM

## 2024-05-22 DIAGNOSIS — E78.5 HYPERLIPIDEMIA, UNSPECIFIED HYPERLIPIDEMIA TYPE: ICD-10-CM

## 2024-05-22 DIAGNOSIS — I48.20 CHRONIC A-FIB (HCC): ICD-10-CM

## 2024-05-22 DIAGNOSIS — G81.94 LEFT HEMIPLEGIA (HCC): ICD-10-CM

## 2024-05-22 DIAGNOSIS — C34.32 MALIGNANT NEOPLASM OF LOWER LOBE, LEFT BRONCHUS OR LUNG (HCC): ICD-10-CM

## 2024-05-22 DIAGNOSIS — E11.9 TYPE 2 DIABETES MELLITUS WITHOUT COMPLICATION, WITHOUT LONG-TERM CURRENT USE OF INSULIN (HCC): ICD-10-CM

## 2024-05-22 DIAGNOSIS — Z86.73 HISTORY OF CVA (CEREBROVASCULAR ACCIDENT): Primary | ICD-10-CM

## 2024-05-22 PROBLEM — I77.71 DISSECTION OF CAROTID ARTERY (HCC): Status: RESOLVED | Noted: 2023-04-13 | Resolved: 2024-05-22

## 2024-05-22 PROBLEM — D68.69 SECONDARY HYPERCOAGULABLE STATE (HCC): Status: RESOLVED | Noted: 2023-06-30 | Resolved: 2024-05-22

## 2024-05-22 PROCEDURE — 1124F ACP DISCUSS-NO DSCNMKR DOCD: CPT | Performed by: INTERNAL MEDICINE

## 2024-05-22 PROCEDURE — 3017F COLORECTAL CA SCREEN DOC REV: CPT | Performed by: INTERNAL MEDICINE

## 2024-05-22 PROCEDURE — G8427 DOCREV CUR MEDS BY ELIG CLIN: HCPCS | Performed by: INTERNAL MEDICINE

## 2024-05-22 PROCEDURE — 99214 OFFICE O/P EST MOD 30 MIN: CPT | Performed by: INTERNAL MEDICINE

## 2024-05-22 PROCEDURE — 2022F DILAT RTA XM EVC RTNOPTHY: CPT | Performed by: INTERNAL MEDICINE

## 2024-05-22 PROCEDURE — 3046F HEMOGLOBIN A1C LEVEL >9.0%: CPT | Performed by: INTERNAL MEDICINE

## 2024-05-22 ASSESSMENT — PATIENT HEALTH QUESTIONNAIRE - PHQ9
SUM OF ALL RESPONSES TO PHQ QUESTIONS 1-9: 0
7. TROUBLE CONCENTRATING ON THINGS, SUCH AS READING THE NEWSPAPER OR WATCHING TELEVISION: NOT AT ALL
3. TROUBLE FALLING OR STAYING ASLEEP: NOT AT ALL
1. LITTLE INTEREST OR PLEASURE IN DOING THINGS: NOT AT ALL
4. FEELING TIRED OR HAVING LITTLE ENERGY: NOT AT ALL
SUM OF ALL RESPONSES TO PHQ QUESTIONS 1-9: 0
SUM OF ALL RESPONSES TO PHQ QUESTIONS 1-9: 0
10. IF YOU CHECKED OFF ANY PROBLEMS, HOW DIFFICULT HAVE THESE PROBLEMS MADE IT FOR YOU TO DO YOUR WORK, TAKE CARE OF THINGS AT HOME, OR GET ALONG WITH OTHER PEOPLE: NOT DIFFICULT AT ALL
2. FEELING DOWN, DEPRESSED OR HOPELESS: NOT AT ALL
SUM OF ALL RESPONSES TO PHQ9 QUESTIONS 1 & 2: 0
5. POOR APPETITE OR OVEREATING: NOT AT ALL
6. FEELING BAD ABOUT YOURSELF - OR THAT YOU ARE A FAILURE OR HAVE LET YOURSELF OR YOUR FAMILY DOWN: NOT AT ALL
8. MOVING OR SPEAKING SO SLOWLY THAT OTHER PEOPLE COULD HAVE NOTICED. OR THE OPPOSITE, BEING SO FIGETY OR RESTLESS THAT YOU HAVE BEEN MOVING AROUND A LOT MORE THAN USUAL: NOT AT ALL
SUM OF ALL RESPONSES TO PHQ QUESTIONS 1-9: 0
9. THOUGHTS THAT YOU WOULD BE BETTER OFF DEAD, OR OF HURTING YOURSELF: NOT AT ALL

## 2024-05-22 NOTE — PROGRESS NOTES
Chief Complaint   Patient presents with    DM. HTN     Routine follow up       \"Have you been to the ER, urgent care clinic since your last visit?  Hospitalized since your last visit?\"    NO    “Have you seen or consulted any other health care providers outside of [facility] since your last visit?”    NO            Click Here for Release of Records Request     
plavix   Homebound but able ot get OOB   Declines referral to homecare team  Malignant neoplasm of lower lobe, left bronchus or lung (HCC)   Hx carcinoid tumor  Left hemiplegia (HCC)   chronic  Chronic a-fib (HCC)   On eliquis   Fu CARD next month  Hypertension, unspecified type   controlled  Type 2 diabetes mellitus without complication, without long-term current use of insulin (HCC)   Fsbs ok   Order labs  Depression, unspecified depression type   Controlled on zoloft   Rtc 6 months wellness--in person at end of the clinic day  Maurilio Michaels MD

## 2024-05-31 RX ORDER — OMEPRAZOLE 20 MG/1
CAPSULE, DELAYED RELEASE ORAL
Qty: 30 CAPSULE | Refills: 11 | Status: SHIPPED | OUTPATIENT
Start: 2024-05-31

## 2024-05-31 NOTE — TELEPHONE ENCOUNTER
PCP: Maurilio Michaels MD    Last appt: 2024   Future Appointments   Date Time Provider Department Center   2024  3:30 PM Maurilio Michaels MD [facility] BS AMB       Requested Prescriptions     Pending Prescriptions Disp Refills    omeprazole (PRILOSEC) 20 MG delayed release capsule 30 capsule 1     Simg open capsule to give via peg tube daily

## 2024-06-06 ENCOUNTER — NURSE ONLY (OUTPATIENT)
Age: 69
End: 2024-06-06

## 2024-06-06 DIAGNOSIS — E78.5 HYPERLIPIDEMIA, UNSPECIFIED HYPERLIPIDEMIA TYPE: ICD-10-CM

## 2024-06-06 DIAGNOSIS — E11.9 TYPE 2 DIABETES MELLITUS WITHOUT COMPLICATION, WITHOUT LONG-TERM CURRENT USE OF INSULIN (HCC): ICD-10-CM

## 2024-06-06 DIAGNOSIS — I10 HYPERTENSION, UNSPECIFIED TYPE: ICD-10-CM

## 2024-06-06 LAB
ANION GAP SERPL CALC-SCNC: 8 MMOL/L (ref 5–15)
BUN SERPL-MCNC: 16 MG/DL (ref 6–20)
BUN/CREAT SERPL: 11 (ref 12–20)
CALCIUM SERPL-MCNC: 9.3 MG/DL (ref 8.5–10.1)
CHLORIDE SERPL-SCNC: 105 MMOL/L (ref 97–108)
CHOLEST SERPL-MCNC: 113 MG/DL
CO2 SERPL-SCNC: 31 MMOL/L (ref 21–32)
CREAT SERPL-MCNC: 1.43 MG/DL (ref 0.7–1.3)
ERYTHROCYTE [DISTWIDTH] IN BLOOD BY AUTOMATED COUNT: 16.1 % (ref 11.5–14.5)
EST. AVERAGE GLUCOSE BLD GHB EST-MCNC: 148 MG/DL
GLUCOSE SERPL-MCNC: 202 MG/DL (ref 65–100)
HBA1C MFR BLD: 6.8 % (ref 4–5.6)
HCT VFR BLD AUTO: 37.8 % (ref 36.6–50.3)
HDLC SERPL-MCNC: 33 MG/DL
HDLC SERPL: 3.4 (ref 0–5)
HGB BLD-MCNC: 11.8 G/DL (ref 12.1–17)
LDLC SERPL CALC-MCNC: 64.6 MG/DL (ref 0–100)
MCH RBC QN AUTO: 25.7 PG (ref 26–34)
MCHC RBC AUTO-ENTMCNC: 31.2 G/DL (ref 30–36.5)
MCV RBC AUTO: 82.4 FL (ref 80–99)
NRBC # BLD: 0 K/UL (ref 0–0.01)
NRBC BLD-RTO: 0 PER 100 WBC
PLATELET # BLD AUTO: 219 K/UL (ref 150–400)
PMV BLD AUTO: 11.6 FL (ref 8.9–12.9)
POTASSIUM SERPL-SCNC: 3.4 MMOL/L (ref 3.5–5.1)
RBC # BLD AUTO: 4.59 M/UL (ref 4.1–5.7)
SODIUM SERPL-SCNC: 144 MMOL/L (ref 136–145)
TRIGL SERPL-MCNC: 77 MG/DL
VLDLC SERPL CALC-MCNC: 15.4 MG/DL
WBC # BLD AUTO: 3.1 K/UL (ref 4.1–11.1)

## 2024-06-07 DIAGNOSIS — N17.9 AKI (ACUTE KIDNEY INJURY) (HCC): Primary | ICD-10-CM

## 2024-06-07 RX ORDER — POTASSIUM CHLORIDE 750 MG/1
10 TABLET, FILM COATED, EXTENDED RELEASE ORAL 2 TIMES DAILY
Qty: 6 TABLET | Refills: 0 | Status: SHIPPED | OUTPATIENT
Start: 2024-06-07 | End: 2024-06-10

## 2024-07-15 RX ORDER — CARVEDILOL 25 MG/1
TABLET ORAL
Qty: 60 TABLET | Refills: 0 | OUTPATIENT
Start: 2024-07-15

## 2024-07-15 RX ORDER — OMEPRAZOLE 20 MG/1
CAPSULE, DELAYED RELEASE ORAL
Qty: 30 CAPSULE | Refills: 0 | OUTPATIENT
Start: 2024-07-15

## 2024-07-15 RX ORDER — AMLODIPINE BESYLATE 10 MG/1
TABLET ORAL
Qty: 30 TABLET | Refills: 0 | OUTPATIENT
Start: 2024-07-15

## 2024-09-03 RX ORDER — SERTRALINE HYDROCHLORIDE 25 MG/1
25 TABLET, FILM COATED ORAL DAILY
Qty: 90 TABLET | Refills: 3 | Status: SHIPPED | OUTPATIENT
Start: 2024-09-03

## 2024-09-05 ENCOUNTER — TELEPHONE (OUTPATIENT)
Age: 69
End: 2024-09-05

## 2024-09-05 RX ORDER — METHYLPREDNISOLONE 4 MG
TABLET, DOSE PACK ORAL
Qty: 1 KIT | Refills: 0 | Status: SHIPPED | OUTPATIENT
Start: 2024-09-05 | End: 2024-09-11

## 2024-09-05 NOTE — TELEPHONE ENCOUNTER
Spoke with patient wife, Clarisa.     Pt c/o right knee swelling. Elevating legs. Rubbing alcohol on area. Hurt to touch and pressed down.   Onset about 4-5 days ago.     No red. Not hot to touch (has blanket on legs and warm)   Not on any fluid medications.   No sob/chest pain.   No diet changes.     Staying hydrated.    Please advise.

## 2024-09-05 NOTE — TELEPHONE ENCOUNTER
Wife states that pt has fluid on his right knee.    She is asking that something be called into the pharm for this as soon as possible.     Send to [facility] #[phone]  [place]

## 2024-10-11 RX ORDER — CLOPIDOGREL BISULFATE 75 MG/1
75 TABLET ORAL DAILY
Qty: 90 TABLET | Refills: 3 | Status: SHIPPED | OUTPATIENT
Start: 2024-10-11

## 2025-02-11 ENCOUNTER — APPOINTMENT (OUTPATIENT)
Facility: HOSPITAL | Age: 70
End: 2025-02-11
Payer: MEDICARE

## 2025-02-11 ENCOUNTER — HOSPITAL ENCOUNTER (INPATIENT)
Facility: HOSPITAL | Age: 70
LOS: 11 days | Discharge: HOME HEALTH | End: 2025-02-22
Attending: EMERGENCY MEDICINE | Admitting: INTERNAL MEDICINE
Payer: MEDICARE

## 2025-02-11 DIAGNOSIS — I48.21 PERMANENT ATRIAL FIBRILLATION (HCC): ICD-10-CM

## 2025-02-11 DIAGNOSIS — I48.91 ATRIAL FIBRILLATION, UNSPECIFIED TYPE (HCC): ICD-10-CM

## 2025-02-11 DIAGNOSIS — R41.82 ALTERED MENTAL STATUS, UNSPECIFIED ALTERED MENTAL STATUS TYPE: ICD-10-CM

## 2025-02-11 DIAGNOSIS — I63.311 THROMBOTIC STROKE INVOLVING RIGHT MIDDLE CEREBRAL ARTERY (HCC): ICD-10-CM

## 2025-02-11 DIAGNOSIS — G40.919 BREAKTHROUGH SEIZURE (HCC): Primary | ICD-10-CM

## 2025-02-11 DIAGNOSIS — J90 PLEURAL EFFUSION: ICD-10-CM

## 2025-02-11 DIAGNOSIS — J96.01 ACUTE RESPIRATORY FAILURE WITH HYPOXIA (HCC): ICD-10-CM

## 2025-02-11 PROBLEM — R56.9 SEIZURE (HCC): Status: ACTIVE | Noted: 2025-02-11

## 2025-02-11 LAB
ALBUMIN SERPL-MCNC: 3.8 G/DL (ref 3.5–5)
ALBUMIN/GLOB SERPL: 0.8 (ref 1.1–2.2)
ALP SERPL-CCNC: 121 U/L (ref 45–117)
ALT SERPL-CCNC: 20 U/L (ref 12–78)
AMPHET UR QL SCN: NEGATIVE
ANION GAP BLD CALC-SCNC: 12 (ref 10–20)
ANION GAP SERPL CALC-SCNC: 14 MMOL/L (ref 2–12)
APPEARANCE UR: ABNORMAL
AST SERPL-CCNC: 19 U/L (ref 15–37)
BACTERIA URNS QL MICRO: NEGATIVE /HPF
BARBITURATES UR QL SCN: NEGATIVE
BASE EXCESS BLD CALC-SCNC: 0 MMOL/L
BASOPHILS # BLD: 0.03 K/UL (ref 0–0.1)
BASOPHILS NFR BLD: 0.3 % (ref 0–1)
BENZODIAZ UR QL: POSITIVE
BILIRUB SERPL-MCNC: 0.4 MG/DL (ref 0.2–1)
BILIRUB UR QL: NEGATIVE
BUN SERPL-MCNC: 11 MG/DL (ref 6–20)
BUN/CREAT SERPL: 6 (ref 12–20)
CA-I BLD-MCNC: 1.18 MMOL/L (ref 1.15–1.33)
CALCIUM SERPL-MCNC: 9.1 MG/DL (ref 8.5–10.1)
CANNABINOIDS UR QL SCN: NEGATIVE
CHLORIDE BLD-SCNC: 112 MMOL/L (ref 100–111)
CHLORIDE SERPL-SCNC: 108 MMOL/L (ref 97–108)
CK SERPL-CCNC: 173 U/L (ref 39–308)
CO2 BLD-SCNC: 25 MMOL/L (ref 22–29)
CO2 SERPL-SCNC: 23 MMOL/L (ref 21–32)
COCAINE UR QL SCN: NEGATIVE
COLOR UR: ABNORMAL
COMMENT:: NORMAL
CREAT SERPL-MCNC: 1.87 MG/DL (ref 0.7–1.3)
CREAT UR-MCNC: 1.4 MG/DL (ref 0.6–1.3)
DIFFERENTIAL METHOD BLD: ABNORMAL
EOSINOPHIL # BLD: 0.03 K/UL (ref 0–0.4)
EOSINOPHIL NFR BLD: 0.3 % (ref 0–7)
EPITH CASTS URNS QL MICRO: ABNORMAL /LPF
ERYTHROCYTE [DISTWIDTH] IN BLOOD BY AUTOMATED COUNT: 14.8 % (ref 11.5–14.5)
ETHANOL SERPL-MCNC: <10 MG/DL (ref 0–0.08)
FLUAV RNA SPEC QL NAA+PROBE: NOT DETECTED
FLUBV RNA SPEC QL NAA+PROBE: NOT DETECTED
GLOBULIN SER CALC-MCNC: 4.6 G/DL (ref 2–4)
GLUCOSE BLD STRIP.AUTO-MCNC: 173 MG/DL (ref 65–117)
GLUCOSE BLD STRIP.AUTO-MCNC: 190 MG/DL (ref 74–99)
GLUCOSE BLD STRIP.AUTO-MCNC: 212 MG/DL (ref 65–117)
GLUCOSE SERPL-MCNC: 180 MG/DL (ref 65–100)
GLUCOSE UR STRIP.AUTO-MCNC: 250 MG/DL
GRAN CASTS URNS QL MICRO: ABNORMAL /LPF
HCO3 BLDA-SCNC: 26 MMOL/L
HCT VFR BLD AUTO: 44.9 % (ref 36.6–50.3)
HGB BLD-MCNC: 14.4 G/DL (ref 12.1–17)
HGB UR QL STRIP: ABNORMAL
IMM GRANULOCYTES # BLD AUTO: 0.06 K/UL (ref 0–0.04)
IMM GRANULOCYTES NFR BLD AUTO: 0.6 % (ref 0–0.5)
KETONES UR QL STRIP.AUTO: NEGATIVE MG/DL
LACTATE BLD-SCNC: 6.72 MMOL/L (ref 0.4–2)
LEUKOCYTE ESTERASE UR QL STRIP.AUTO: NEGATIVE
LYMPHOCYTES # BLD: 1.74 K/UL (ref 0.8–3.5)
LYMPHOCYTES NFR BLD: 16.1 % (ref 12–49)
Lab: ABNORMAL
MAGNESIUM SERPL-MCNC: 1.9 MG/DL (ref 1.6–2.4)
MCH RBC QN AUTO: 26.7 PG (ref 26–34)
MCHC RBC AUTO-ENTMCNC: 32.1 G/DL (ref 30–36.5)
MCV RBC AUTO: 83.3 FL (ref 80–99)
METHADONE UR QL: NEGATIVE
MONOCYTES # BLD: 0.69 K/UL (ref 0–1)
MONOCYTES NFR BLD: 6.4 % (ref 5–13)
NEUTS SEG # BLD: 8.29 K/UL (ref 1.8–8)
NEUTS SEG NFR BLD: 76.3 % (ref 32–75)
NITRITE UR QL STRIP.AUTO: NEGATIVE
NRBC # BLD: 0 K/UL (ref 0–0.01)
NRBC BLD-RTO: 0 PER 100 WBC
NT PRO BNP: 4896 PG/ML
OPIATES UR QL: NEGATIVE
PCO2 BLDV: 45.1 MMHG (ref 41–51)
PCP UR QL: NEGATIVE
PH BLDV: 7.37 (ref 7.32–7.42)
PH UR STRIP: 8 (ref 5–8)
PLATELET # BLD AUTO: 250 K/UL (ref 150–400)
PMV BLD AUTO: 12.3 FL (ref 8.9–12.9)
PO2 BLDV: 66 MMHG (ref 25–40)
POTASSIUM BLD-SCNC: 3.9 MMOL/L (ref 3.5–5.5)
POTASSIUM SERPL-SCNC: 3.7 MMOL/L (ref 3.5–5.1)
PROT SERPL-MCNC: 8.4 G/DL (ref 6.4–8.2)
PROT UR STRIP-MCNC: >300 MG/DL
RBC # BLD AUTO: 5.39 M/UL (ref 4.1–5.7)
RBC #/AREA URNS HPF: ABNORMAL /HPF (ref 0–5)
SAO2 % BLD: 92 % (ref 94–98)
SARS-COV-2 RNA RESP QL NAA+PROBE: NOT DETECTED
SERVICE CMNT-IMP: ABNORMAL
SODIUM BLD-SCNC: 149 MMOL/L (ref 136–145)
SODIUM SERPL-SCNC: 145 MMOL/L (ref 136–145)
SOURCE: NORMAL
SP GR UR REFRACTOMETRY: 1.01
SPECIMEN HOLD: NORMAL
SPECIMEN SITE: ABNORMAL
TROPONIN I SERPL HS-MCNC: 116 NG/L (ref 0–76)
URINE CULTURE IF INDICATED: ABNORMAL
UROBILINOGEN UR QL STRIP.AUTO: 1 EU/DL (ref 0.2–1)
WBC # BLD AUTO: 10.8 K/UL (ref 4.1–11.1)
WBC URNS QL MICRO: ABNORMAL /HPF (ref 0–4)

## 2025-02-11 PROCEDURE — 82962 GLUCOSE BLOOD TEST: CPT

## 2025-02-11 PROCEDURE — 51702 INSERT TEMP BLADDER CATH: CPT

## 2025-02-11 PROCEDURE — 99291 CRITICAL CARE FIRST HOUR: CPT

## 2025-02-11 PROCEDURE — 82803 BLOOD GASES ANY COMBINATION: CPT

## 2025-02-11 PROCEDURE — 96361 HYDRATE IV INFUSION ADD-ON: CPT

## 2025-02-11 PROCEDURE — 82330 ASSAY OF CALCIUM: CPT

## 2025-02-11 PROCEDURE — 70450 CT HEAD/BRAIN W/O DYE: CPT

## 2025-02-11 PROCEDURE — 2580000003 HC RX 258: Performed by: EMERGENCY MEDICINE

## 2025-02-11 PROCEDURE — 2500000003 HC RX 250 WO HCPCS

## 2025-02-11 PROCEDURE — 95717 EEG PHYS/QHP 2-12 HR W/O VID: CPT | Performed by: PSYCHIATRY & NEUROLOGY

## 2025-02-11 PROCEDURE — 5A1945Z RESPIRATORY VENTILATION, 24-96 CONSECUTIVE HOURS: ICD-10-PCS | Performed by: EMERGENCY MEDICINE

## 2025-02-11 PROCEDURE — 94002 VENT MGMT INPAT INIT DAY: CPT

## 2025-02-11 PROCEDURE — 0BH17EZ INSERTION OF ENDOTRACHEAL AIRWAY INTO TRACHEA, VIA NATURAL OR ARTIFICIAL OPENING: ICD-10-PCS | Performed by: EMERGENCY MEDICINE

## 2025-02-11 PROCEDURE — 80048 BASIC METABOLIC PNL TOTAL CA: CPT

## 2025-02-11 PROCEDURE — 71045 X-RAY EXAM CHEST 1 VIEW: CPT

## 2025-02-11 PROCEDURE — 96374 THER/PROPH/DIAG INJ IV PUSH: CPT

## 2025-02-11 PROCEDURE — 84145 PROCALCITONIN (PCT): CPT

## 2025-02-11 PROCEDURE — 82077 ASSAY SPEC XCP UR&BREATH IA: CPT

## 2025-02-11 PROCEDURE — 83880 ASSAY OF NATRIURETIC PEPTIDE: CPT

## 2025-02-11 PROCEDURE — 31500 INSERT EMERGENCY AIRWAY: CPT

## 2025-02-11 PROCEDURE — 82947 ASSAY GLUCOSE BLOOD QUANT: CPT

## 2025-02-11 PROCEDURE — 2500000003 HC RX 250 WO HCPCS: Performed by: EMERGENCY MEDICINE

## 2025-02-11 PROCEDURE — 6360000002 HC RX W HCPCS: Performed by: EMERGENCY MEDICINE

## 2025-02-11 PROCEDURE — 83605 ASSAY OF LACTIC ACID: CPT

## 2025-02-11 PROCEDURE — 80307 DRUG TEST PRSMV CHEM ANLYZR: CPT

## 2025-02-11 PROCEDURE — 84295 ASSAY OF SERUM SODIUM: CPT

## 2025-02-11 PROCEDURE — 96375 TX/PRO/DX INJ NEW DRUG ADDON: CPT

## 2025-02-11 PROCEDURE — 82550 ASSAY OF CK (CPK): CPT

## 2025-02-11 PROCEDURE — 36415 COLL VENOUS BLD VENIPUNCTURE: CPT

## 2025-02-11 PROCEDURE — 81001 URINALYSIS AUTO W/SCOPE: CPT

## 2025-02-11 PROCEDURE — 87636 SARSCOV2 & INF A&B AMP PRB: CPT

## 2025-02-11 PROCEDURE — 84484 ASSAY OF TROPONIN QUANT: CPT

## 2025-02-11 PROCEDURE — 80053 COMPREHEN METABOLIC PANEL: CPT

## 2025-02-11 PROCEDURE — 3E033XZ INTRODUCTION OF VASOPRESSOR INTO PERIPHERAL VEIN, PERCUTANEOUS APPROACH: ICD-10-PCS | Performed by: STUDENT IN AN ORGANIZED HEALTH CARE EDUCATION/TRAINING PROGRAM

## 2025-02-11 PROCEDURE — 71275 CT ANGIOGRAPHY CHEST: CPT

## 2025-02-11 PROCEDURE — 84132 ASSAY OF SERUM POTASSIUM: CPT

## 2025-02-11 PROCEDURE — 2000000000 HC ICU R&B

## 2025-02-11 PROCEDURE — 85025 COMPLETE CBC W/AUTO DIFF WBC: CPT

## 2025-02-11 PROCEDURE — 87040 BLOOD CULTURE FOR BACTERIA: CPT

## 2025-02-11 PROCEDURE — 96365 THER/PROPH/DIAG IV INF INIT: CPT

## 2025-02-11 PROCEDURE — 93005 ELECTROCARDIOGRAM TRACING: CPT | Performed by: INTERNAL MEDICINE

## 2025-02-11 PROCEDURE — 6370000000 HC RX 637 (ALT 250 FOR IP): Performed by: EMERGENCY MEDICINE

## 2025-02-11 PROCEDURE — 80177 DRUG SCRN QUAN LEVETIRACETAM: CPT

## 2025-02-11 PROCEDURE — 83735 ASSAY OF MAGNESIUM: CPT

## 2025-02-11 PROCEDURE — 6360000004 HC RX CONTRAST MEDICATION: Performed by: INTERNAL MEDICINE

## 2025-02-11 RX ORDER — ACETAMINOPHEN 650 MG/1
650 SUPPOSITORY RECTAL EVERY 6 HOURS PRN
Status: DISCONTINUED | OUTPATIENT
Start: 2025-02-11 | End: 2025-02-22 | Stop reason: HOSPADM

## 2025-02-11 RX ORDER — ACETAMINOPHEN 325 MG/1
650 TABLET ORAL EVERY 6 HOURS PRN
Status: DISCONTINUED | OUTPATIENT
Start: 2025-02-11 | End: 2025-02-22 | Stop reason: HOSPADM

## 2025-02-11 RX ORDER — POLYETHYLENE GLYCOL 3350 17 G/17G
17 POWDER, FOR SOLUTION ORAL DAILY PRN
Status: DISCONTINUED | OUTPATIENT
Start: 2025-02-11 | End: 2025-02-22 | Stop reason: HOSPADM

## 2025-02-11 RX ORDER — NOREPINEPHRINE BITARTRATE 0.06 MG/ML
.5-2 INJECTION, SOLUTION INTRAVENOUS CONTINUOUS
Status: DISCONTINUED | OUTPATIENT
Start: 2025-02-11 | End: 2025-02-12

## 2025-02-11 RX ORDER — 0.9 % SODIUM CHLORIDE 0.9 %
500 INTRAVENOUS SOLUTION INTRAVENOUS ONCE
Status: COMPLETED | OUTPATIENT
Start: 2025-02-11 | End: 2025-02-11

## 2025-02-11 RX ORDER — ENOXAPARIN SODIUM 100 MG/ML
1 INJECTION SUBCUTANEOUS 2 TIMES DAILY
Status: DISCONTINUED | OUTPATIENT
Start: 2025-02-12 | End: 2025-02-13

## 2025-02-11 RX ORDER — SODIUM CHLORIDE, SODIUM LACTATE, POTASSIUM CHLORIDE, AND CALCIUM CHLORIDE .6; .31; .03; .02 G/100ML; G/100ML; G/100ML; G/100ML
500 INJECTION, SOLUTION INTRAVENOUS ONCE
Status: DISCONTINUED | OUTPATIENT
Start: 2025-02-11 | End: 2025-02-17

## 2025-02-11 RX ORDER — ACETAMINOPHEN 500 MG
1000 TABLET ORAL
Status: COMPLETED | OUTPATIENT
Start: 2025-02-11 | End: 2025-02-11

## 2025-02-11 RX ORDER — ENOXAPARIN SODIUM 100 MG/ML
1 INJECTION SUBCUTANEOUS 2 TIMES DAILY
Status: DISCONTINUED | OUTPATIENT
Start: 2025-02-11 | End: 2025-02-11

## 2025-02-11 RX ORDER — ENOXAPARIN SODIUM 100 MG/ML
40 INJECTION SUBCUTANEOUS DAILY
Status: DISCONTINUED | OUTPATIENT
Start: 2025-02-12 | End: 2025-02-11

## 2025-02-11 RX ORDER — ONDANSETRON 2 MG/ML
4 INJECTION INTRAMUSCULAR; INTRAVENOUS EVERY 6 HOURS PRN
Status: DISCONTINUED | OUTPATIENT
Start: 2025-02-11 | End: 2025-02-14

## 2025-02-11 RX ORDER — LEVETIRACETAM 500 MG/5ML
1000 INJECTION, SOLUTION, CONCENTRATE INTRAVENOUS EVERY 12 HOURS
Status: DISCONTINUED | OUTPATIENT
Start: 2025-02-12 | End: 2025-02-22

## 2025-02-11 RX ORDER — FUROSEMIDE 10 MG/ML
20 INJECTION INTRAMUSCULAR; INTRAVENOUS ONCE
Status: COMPLETED | OUTPATIENT
Start: 2025-02-11 | End: 2025-02-11

## 2025-02-11 RX ORDER — FENTANYL CITRATE-0.9 % NACL/PF 10 MCG/ML
25-200 PLASTIC BAG, INJECTION (ML) INTRAVENOUS CONTINUOUS
Status: DISCONTINUED | OUTPATIENT
Start: 2025-02-11 | End: 2025-02-11

## 2025-02-11 RX ORDER — NOREPINEPHRINE BITARTRATE 0.06 MG/ML
INJECTION, SOLUTION INTRAVENOUS
Status: COMPLETED
Start: 2025-02-11 | End: 2025-02-11

## 2025-02-11 RX ORDER — SODIUM CHLORIDE 0.9 % (FLUSH) 0.9 %
5-40 SYRINGE (ML) INJECTION EVERY 12 HOURS SCHEDULED
Status: DISCONTINUED | OUTPATIENT
Start: 2025-02-11 | End: 2025-02-22 | Stop reason: HOSPADM

## 2025-02-11 RX ORDER — IOPAMIDOL 755 MG/ML
100 INJECTION, SOLUTION INTRAVASCULAR
Status: COMPLETED | OUTPATIENT
Start: 2025-02-11 | End: 2025-02-11

## 2025-02-11 RX ORDER — 0.9 % SODIUM CHLORIDE 0.9 %
1000 INTRAVENOUS SOLUTION INTRAVENOUS ONCE
Status: DISCONTINUED | OUTPATIENT
Start: 2025-02-11 | End: 2025-02-11

## 2025-02-11 RX ORDER — DILTIAZEM HYDROCHLORIDE 5 MG/ML
10 INJECTION INTRAVENOUS
Status: COMPLETED | OUTPATIENT
Start: 2025-02-11 | End: 2025-02-11

## 2025-02-11 RX ORDER — ACETAMINOPHEN 650 MG/1
650 SUPPOSITORY RECTAL
Status: DISCONTINUED | OUTPATIENT
Start: 2025-02-11 | End: 2025-02-11

## 2025-02-11 RX ORDER — INSULIN LISPRO 100 [IU]/ML
0-8 INJECTION, SOLUTION INTRAVENOUS; SUBCUTANEOUS EVERY 6 HOURS
Status: DISCONTINUED | OUTPATIENT
Start: 2025-02-11 | End: 2025-02-19 | Stop reason: SDUPTHER

## 2025-02-11 RX ORDER — FENTANYL CITRATE-0.9 % NACL/PF 20 MCG/2ML
50 SYRINGE (ML) INTRAVENOUS EVERY 30 MIN PRN
Status: DISCONTINUED | OUTPATIENT
Start: 2025-02-11 | End: 2025-02-11

## 2025-02-11 RX ORDER — SODIUM CHLORIDE 0.9 % (FLUSH) 0.9 %
5-40 SYRINGE (ML) INJECTION PRN
Status: DISCONTINUED | OUTPATIENT
Start: 2025-02-11 | End: 2025-02-22 | Stop reason: HOSPADM

## 2025-02-11 RX ORDER — SODIUM CHLORIDE 9 MG/ML
INJECTION, SOLUTION INTRAVENOUS PRN
Status: DISCONTINUED | OUTPATIENT
Start: 2025-02-11 | End: 2025-02-22 | Stop reason: HOSPADM

## 2025-02-11 RX ORDER — CHLORHEXIDINE GLUCONATE ORAL RINSE 1.2 MG/ML
15 SOLUTION DENTAL 2 TIMES DAILY
Status: DISCONTINUED | OUTPATIENT
Start: 2025-02-12 | End: 2025-02-22 | Stop reason: HOSPADM

## 2025-02-11 RX ORDER — PROPOFOL 10 MG/ML
5-50 INJECTION, EMULSION INTRAVENOUS CONTINUOUS
Status: DISCONTINUED | OUTPATIENT
Start: 2025-02-11 | End: 2025-02-14

## 2025-02-11 RX ORDER — DILTIAZEM HYDROCHLORIDE 5 MG/ML
15 INJECTION INTRAVENOUS
Status: COMPLETED | OUTPATIENT
Start: 2025-02-11 | End: 2025-02-11

## 2025-02-11 RX ORDER — SUCCINYLCHOLINE/SOD CL,ISO/PF 200MG/10ML
1 SYRINGE (ML) INTRAVENOUS ONCE
Status: COMPLETED | OUTPATIENT
Start: 2025-02-11 | End: 2025-02-11

## 2025-02-11 RX ORDER — ONDANSETRON 4 MG/1
4 TABLET, ORALLY DISINTEGRATING ORAL EVERY 8 HOURS PRN
Status: DISCONTINUED | OUTPATIENT
Start: 2025-02-11 | End: 2025-02-14

## 2025-02-11 RX ORDER — LEVETIRACETAM 500 MG/5ML
1000 INJECTION, SOLUTION, CONCENTRATE INTRAVENOUS ONCE
Status: COMPLETED | OUTPATIENT
Start: 2025-02-11 | End: 2025-02-11

## 2025-02-11 RX ORDER — LEVETIRACETAM 500 MG/5ML
1000 INJECTION, SOLUTION, CONCENTRATE INTRAVENOUS ONCE
Status: COMPLETED | OUTPATIENT
Start: 2025-02-11 | End: 2025-02-12

## 2025-02-11 RX ORDER — ACETAMINOPHEN 650 MG/1
650 SUPPOSITORY RECTAL
Status: COMPLETED | OUTPATIENT
Start: 2025-02-11 | End: 2025-02-11

## 2025-02-11 RX ADMIN — SODIUM CHLORIDE 5 MG/HR: 900 INJECTION, SOLUTION INTRAVENOUS at 21:24

## 2025-02-11 RX ADMIN — DILTIAZEM HYDROCHLORIDE 10 MG: 5 INJECTION, SOLUTION INTRAVENOUS at 20:03

## 2025-02-11 RX ADMIN — SODIUM CHLORIDE 500 ML: 9 INJECTION, SOLUTION INTRAVENOUS at 20:23

## 2025-02-11 RX ADMIN — NOREPINEPHRINE BITARTRATE 10 MCG/MIN: 64 SOLUTION INTRAVENOUS at 22:22

## 2025-02-11 RX ADMIN — Medication 100 MG: at 21:30

## 2025-02-11 RX ADMIN — IOPAMIDOL 70 ML: 755 INJECTION, SOLUTION INTRAVENOUS at 23:02

## 2025-02-11 RX ADMIN — Medication 50 MCG/HR: at 21:48

## 2025-02-11 RX ADMIN — Medication 120 MG: at 21:30

## 2025-02-11 RX ADMIN — FUROSEMIDE 20 MG: 10 INJECTION, SOLUTION INTRAMUSCULAR; INTRAVENOUS at 21:15

## 2025-02-11 RX ADMIN — WATER 2000 MG: 1 INJECTION INTRAMUSCULAR; INTRAVENOUS; SUBCUTANEOUS at 21:18

## 2025-02-11 RX ADMIN — ACETAMINOPHEN 650 MG: 650 SUPPOSITORY RECTAL at 20:27

## 2025-02-11 RX ADMIN — LEVETIRACETAM 1000 MG: 100 INJECTION INTRAVENOUS at 20:05

## 2025-02-11 RX ADMIN — DILTIAZEM HYDROCHLORIDE 15 MG: 5 INJECTION, SOLUTION INTRAVENOUS at 21:28

## 2025-02-11 RX ADMIN — NOREPINEPHRINE BITARTRATE 10 MCG/MIN: 0.06 INJECTION, SOLUTION INTRAVENOUS at 22:22

## 2025-02-11 RX ADMIN — PROPOFOL 20 MCG/KG/MIN: 10 INJECTION, EMULSION INTRAVENOUS at 21:34

## 2025-02-11 ASSESSMENT — PULMONARY FUNCTION TESTS
PIF_VALUE: 29
PIF_VALUE: 24

## 2025-02-11 ASSESSMENT — PAIN SCALES - GENERAL: PAINLEVEL_OUTOF10: 3

## 2025-02-11 ASSESSMENT — PAIN - FUNCTIONAL ASSESSMENT
PAIN_FUNCTIONAL_ASSESSMENT: ADULT NONVERBAL PAIN SCALE (NPVS)
PAIN_FUNCTIONAL_ASSESSMENT: ADULT NONVERBAL PAIN SCALE (NPVS)

## 2025-02-12 ENCOUNTER — APPOINTMENT (OUTPATIENT)
Facility: HOSPITAL | Age: 70
End: 2025-02-12
Payer: MEDICARE

## 2025-02-12 PROBLEM — G40.901 STATUS EPILEPTICUS (HCC): Status: ACTIVE | Noted: 2025-02-12

## 2025-02-12 LAB
ALBUMIN SERPL-MCNC: 3 G/DL (ref 3.5–5)
ALBUMIN/GLOB SERPL: 0.7 (ref 1.1–2.2)
ALP SERPL-CCNC: 86 U/L (ref 45–117)
ALT SERPL-CCNC: 27 U/L (ref 12–78)
ANION GAP SERPL CALC-SCNC: 10 MMOL/L (ref 2–12)
ANION GAP SERPL CALC-SCNC: 10 MMOL/L (ref 2–12)
ANION GAP SERPL CALC-SCNC: 11 MMOL/L (ref 2–12)
ANION GAP SERPL CALC-SCNC: 11 MMOL/L (ref 2–12)
AST SERPL-CCNC: 29 U/L (ref 15–37)
BASE DEFICIT BLD-SCNC: 1.8 MMOL/L
BASE EXCESS BLD CALC-SCNC: 0 MMOL/L
BDY SITE: ABNORMAL
BDY SITE: ABNORMAL
BILIRUB DIRECT SERPL-MCNC: 0.1 MG/DL (ref 0–0.2)
BILIRUB SERPL-MCNC: 0.6 MG/DL (ref 0.2–1)
BUN SERPL-MCNC: 12 MG/DL (ref 6–20)
BUN SERPL-MCNC: 12 MG/DL (ref 6–20)
BUN SERPL-MCNC: 13 MG/DL (ref 6–20)
BUN SERPL-MCNC: 15 MG/DL (ref 6–20)
BUN/CREAT SERPL: 7 (ref 12–20)
BUN/CREAT SERPL: 7 (ref 12–20)
BUN/CREAT SERPL: 8 (ref 12–20)
BUN/CREAT SERPL: 9 (ref 12–20)
CALCIUM SERPL-MCNC: 7.7 MG/DL (ref 8.5–10.1)
CALCIUM SERPL-MCNC: 8.1 MG/DL (ref 8.5–10.1)
CALCIUM SERPL-MCNC: 8.1 MG/DL (ref 8.5–10.1)
CALCIUM SERPL-MCNC: 8.5 MG/DL (ref 8.5–10.1)
CHLORIDE SERPL-SCNC: 107 MMOL/L (ref 97–108)
CHLORIDE SERPL-SCNC: 108 MMOL/L (ref 97–108)
CO2 SERPL-SCNC: 24 MMOL/L (ref 21–32)
CO2 SERPL-SCNC: 25 MMOL/L (ref 21–32)
CO2 SERPL-SCNC: 25 MMOL/L (ref 21–32)
CO2 SERPL-SCNC: 27 MMOL/L (ref 21–32)
CREAT SERPL-MCNC: 1.47 MG/DL (ref 0.7–1.3)
CREAT SERPL-MCNC: 1.61 MG/DL (ref 0.7–1.3)
CREAT SERPL-MCNC: 1.81 MG/DL (ref 0.7–1.3)
CREAT SERPL-MCNC: 1.86 MG/DL (ref 0.7–1.3)
ERYTHROCYTE [DISTWIDTH] IN BLOOD BY AUTOMATED COUNT: 14.9 % (ref 11.5–14.5)
GAS FLOW.O2 O2 DELIVERY SYS: ABNORMAL
GAS FLOW.O2 O2 DELIVERY SYS: ABNORMAL
GAS FLOW.O2 SETTING OXYMISER: 18 BPM
GAS FLOW.O2 SETTING OXYMISER: 24 BPM
GLOBULIN SER CALC-MCNC: 4.1 G/DL (ref 2–4)
GLUCOSE BLD STRIP.AUTO-MCNC: 100 MG/DL (ref 65–117)
GLUCOSE BLD STRIP.AUTO-MCNC: 102 MG/DL (ref 65–117)
GLUCOSE BLD STRIP.AUTO-MCNC: 140 MG/DL (ref 65–117)
GLUCOSE BLD STRIP.AUTO-MCNC: 147 MG/DL (ref 65–117)
GLUCOSE BLD STRIP.AUTO-MCNC: 151 MG/DL (ref 65–117)
GLUCOSE SERPL-MCNC: 103 MG/DL (ref 65–100)
GLUCOSE SERPL-MCNC: 119 MG/DL (ref 65–100)
GLUCOSE SERPL-MCNC: 151 MG/DL (ref 65–100)
GLUCOSE SERPL-MCNC: 169 MG/DL (ref 65–100)
HCO3 BLD-SCNC: 20.4 MMOL/L (ref 21–28)
HCO3 BLD-SCNC: 21.2 MMOL/L (ref 21–28)
HCT VFR BLD AUTO: 39.8 % (ref 36.6–50.3)
HGB BLD-MCNC: 12.8 G/DL (ref 12.1–17)
LACTATE SERPL-SCNC: 3.1 MMOL/L (ref 0.4–2)
LACTATE SERPL-SCNC: 5.4 MMOL/L (ref 0.4–2)
LACTATE SERPL-SCNC: 5.4 MMOL/L (ref 0.4–2)
LACTATE SERPL-SCNC: 6.8 MMOL/L (ref 0.4–2)
MAGNESIUM SERPL-MCNC: 1.6 MG/DL (ref 1.6–2.4)
MAGNESIUM SERPL-MCNC: 1.7 MG/DL (ref 1.6–2.4)
MAGNESIUM SERPL-MCNC: 2.1 MG/DL (ref 1.6–2.4)
MAGNESIUM SERPL-MCNC: 2.2 MG/DL (ref 1.6–2.4)
MCH RBC QN AUTO: 27.1 PG (ref 26–34)
MCHC RBC AUTO-ENTMCNC: 32.2 G/DL (ref 30–36.5)
MCV RBC AUTO: 84.3 FL (ref 80–99)
NRBC # BLD: 0 K/UL (ref 0–0.01)
NRBC BLD-RTO: 0 PER 100 WBC
NT PRO BNP: 5592 PG/ML
O2/TOTAL GAS SETTING VFR VENT: 100 %
O2/TOTAL GAS SETTING VFR VENT: 40 %
PCO2 BLD: 24.9 MMHG (ref 35–48)
PCO2 BLD: 27 MMHG (ref 35–48)
PEEP RESPIRATORY: 8 CMH2O
PEEP RESPIRATORY: 8 CMH2O
PH BLD: 7.49 (ref 7.35–7.45)
PH BLD: 7.54 (ref 7.35–7.45)
PHOSPHATE SERPL-MCNC: 0.9 MG/DL (ref 2.6–4.7)
PHOSPHATE SERPL-MCNC: 3.5 MG/DL (ref 2.6–4.7)
PHOSPHATE SERPL-MCNC: 4.2 MG/DL (ref 2.6–4.7)
PLATELET # BLD AUTO: 188 K/UL (ref 150–400)
PMV BLD AUTO: 12.2 FL (ref 8.9–12.9)
PO2 BLD: 167 MMHG (ref 83–108)
PO2 BLD: 451 MMHG (ref 83–108)
POTASSIUM SERPL-SCNC: 3.3 MMOL/L (ref 3.5–5.1)
POTASSIUM SERPL-SCNC: 3.5 MMOL/L (ref 3.5–5.1)
POTASSIUM SERPL-SCNC: 3.6 MMOL/L (ref 3.5–5.1)
POTASSIUM SERPL-SCNC: 4 MMOL/L (ref 3.5–5.1)
PROCALCITONIN SERPL-MCNC: 1.88 NG/ML
PROT SERPL-MCNC: 7.1 G/DL (ref 6.4–8.2)
RBC # BLD AUTO: 4.72 M/UL (ref 4.1–5.7)
SAO2 % BLD: 100 % (ref 92–97)
SAO2 % BLD: 99.6 % (ref 92–97)
SERVICE CMNT-IMP: ABNORMAL
SERVICE CMNT-IMP: NORMAL
SERVICE CMNT-IMP: NORMAL
SODIUM SERPL-SCNC: 142 MMOL/L (ref 136–145)
SODIUM SERPL-SCNC: 143 MMOL/L (ref 136–145)
SODIUM SERPL-SCNC: 143 MMOL/L (ref 136–145)
SODIUM SERPL-SCNC: 146 MMOL/L (ref 136–145)
SPECIMEN TYPE: ABNORMAL
SPECIMEN TYPE: ABNORMAL
TROPONIN I SERPL HS-MCNC: 172 NG/L (ref 0–76)
TROPONIN I SERPL HS-MCNC: 179 NG/L (ref 0–76)
TROPONIN I SERPL HS-MCNC: 244 NG/L (ref 0–76)
TROPONIN I SERPL HS-MCNC: 272 NG/L (ref 0–76)
TROPONIN I SERPL HS-MCNC: 308 NG/L (ref 0–76)
VENTILATION MODE VENT: ABNORMAL
VENTILATION MODE VENT: ABNORMAL
VT SETTING VENT: 500 ML
VT SETTING VENT: 500 ML
WBC # BLD AUTO: 10.1 K/UL (ref 4.1–11.1)

## 2025-02-12 PROCEDURE — 83605 ASSAY OF LACTIC ACID: CPT

## 2025-02-12 PROCEDURE — 84484 ASSAY OF TROPONIN QUANT: CPT

## 2025-02-12 PROCEDURE — 93005 ELECTROCARDIOGRAM TRACING: CPT | Performed by: EMERGENCY MEDICINE

## 2025-02-12 PROCEDURE — 95816 EEG AWAKE AND DROWSY: CPT | Performed by: PSYCHIATRY & NEUROLOGY

## 2025-02-12 PROCEDURE — 6370000000 HC RX 637 (ALT 250 FOR IP): Performed by: NURSE PRACTITIONER

## 2025-02-12 PROCEDURE — 2580000003 HC RX 258: Performed by: INTERNAL MEDICINE

## 2025-02-12 PROCEDURE — 6370000000 HC RX 637 (ALT 250 FOR IP): Performed by: INTERNAL MEDICINE

## 2025-02-12 PROCEDURE — 95816 EEG AWAKE AND DROWSY: CPT

## 2025-02-12 PROCEDURE — 6360000004 HC RX CONTRAST MEDICATION: Performed by: PSYCHIATRY & NEUROLOGY

## 2025-02-12 PROCEDURE — 80076 HEPATIC FUNCTION PANEL: CPT

## 2025-02-12 PROCEDURE — 95706 EEG WO VID 2-12HR INTMT MNTR: CPT

## 2025-02-12 PROCEDURE — 85027 COMPLETE CBC AUTOMATED: CPT

## 2025-02-12 PROCEDURE — 2500000003 HC RX 250 WO HCPCS: Performed by: NURSE PRACTITIONER

## 2025-02-12 PROCEDURE — 71045 X-RAY EXAM CHEST 1 VIEW: CPT

## 2025-02-12 PROCEDURE — A9579 GAD-BASE MR CONTRAST NOS,1ML: HCPCS | Performed by: PSYCHIATRY & NEUROLOGY

## 2025-02-12 PROCEDURE — 6360000002 HC RX W HCPCS: Performed by: NURSE PRACTITIONER

## 2025-02-12 PROCEDURE — XX20X89 MONITORING OF BRAIN ELECTRICAL ACTIVITY, COMPUTER-AIDED DETECTION AND NOTIFICATION, NEW TECHNOLOGY GROUP 9: ICD-10-PCS | Performed by: PSYCHIATRY & NEUROLOGY

## 2025-02-12 PROCEDURE — 70553 MRI BRAIN STEM W/O & W/DYE: CPT

## 2025-02-12 PROCEDURE — 36600 WITHDRAWAL OF ARTERIAL BLOOD: CPT

## 2025-02-12 PROCEDURE — 80048 BASIC METABOLIC PNL TOTAL CA: CPT

## 2025-02-12 PROCEDURE — 84100 ASSAY OF PHOSPHORUS: CPT

## 2025-02-12 PROCEDURE — 6360000002 HC RX W HCPCS: Performed by: INTERNAL MEDICINE

## 2025-02-12 PROCEDURE — 82803 BLOOD GASES ANY COMBINATION: CPT

## 2025-02-12 PROCEDURE — 94003 VENT MGMT INPAT SUBQ DAY: CPT

## 2025-02-12 PROCEDURE — 2000000000 HC ICU R&B

## 2025-02-12 PROCEDURE — 83735 ASSAY OF MAGNESIUM: CPT

## 2025-02-12 PROCEDURE — 83880 ASSAY OF NATRIURETIC PEPTIDE: CPT

## 2025-02-12 PROCEDURE — 6360000002 HC RX W HCPCS: Performed by: EMERGENCY MEDICINE

## 2025-02-12 PROCEDURE — 82962 GLUCOSE BLOOD TEST: CPT

## 2025-02-12 PROCEDURE — 2580000003 HC RX 258: Performed by: NURSE PRACTITIONER

## 2025-02-12 PROCEDURE — 99223 1ST HOSP IP/OBS HIGH 75: CPT | Performed by: PSYCHIATRY & NEUROLOGY

## 2025-02-12 RX ORDER — HYDRALAZINE HYDROCHLORIDE 20 MG/ML
10 INJECTION INTRAMUSCULAR; INTRAVENOUS EVERY 6 HOURS PRN
Status: DISCONTINUED | OUTPATIENT
Start: 2025-02-12 | End: 2025-02-22 | Stop reason: HOSPADM

## 2025-02-12 RX ORDER — MAGNESIUM SULFATE IN WATER 40 MG/ML
2000 INJECTION, SOLUTION INTRAVENOUS ONCE
Status: COMPLETED | OUTPATIENT
Start: 2025-02-12 | End: 2025-02-12

## 2025-02-12 RX ORDER — FENTANYL CITRATE 50 UG/ML
50 INJECTION, SOLUTION INTRAMUSCULAR; INTRAVENOUS
Status: DISCONTINUED | OUTPATIENT
Start: 2025-02-12 | End: 2025-02-14

## 2025-02-12 RX ORDER — LANSOPRAZOLE 30 MG/1
30 TABLET, ORALLY DISINTEGRATING, DELAYED RELEASE ORAL DAILY
Status: DISCONTINUED | OUTPATIENT
Start: 2025-02-12 | End: 2025-02-16

## 2025-02-12 RX ORDER — VANCOMYCIN 2 G/400ML
2000 INJECTION, SOLUTION INTRAVENOUS ONCE
Status: COMPLETED | OUTPATIENT
Start: 2025-02-12 | End: 2025-02-12

## 2025-02-12 RX ORDER — SODIUM CHLORIDE, SODIUM LACTATE, POTASSIUM CHLORIDE, AND CALCIUM CHLORIDE .6; .31; .03; .02 G/100ML; G/100ML; G/100ML; G/100ML
1000 INJECTION, SOLUTION INTRAVENOUS ONCE
Status: COMPLETED | OUTPATIENT
Start: 2025-02-12 | End: 2025-02-12

## 2025-02-12 RX ORDER — VANCOMYCIN 1.75 G/350ML
1250 INJECTION, SOLUTION INTRAVENOUS EVERY 24 HOURS
Status: DISCONTINUED | OUTPATIENT
Start: 2025-02-13 | End: 2025-02-12

## 2025-02-12 RX ADMIN — Medication 1 AMPULE: at 00:33

## 2025-02-12 RX ADMIN — SODIUM CHLORIDE, PRESERVATIVE FREE 10 ML: 5 INJECTION INTRAVENOUS at 19:36

## 2025-02-12 RX ADMIN — LEVETIRACETAM 1000 MG: 100 INJECTION INTRAVENOUS at 08:48

## 2025-02-12 RX ADMIN — LEVETIRACETAM 1000 MG: 100 INJECTION INTRAVENOUS at 19:48

## 2025-02-12 RX ADMIN — MAGNESIUM SULFATE HEPTAHYDRATE 2000 MG: 40 INJECTION, SOLUTION INTRAVENOUS at 06:08

## 2025-02-12 RX ADMIN — PIPERACILLIN AND TAZOBACTAM 3375 MG: 3; .375 INJECTION, POWDER, LYOPHILIZED, FOR SOLUTION INTRAVENOUS at 05:53

## 2025-02-12 RX ADMIN — ACETAMINOPHEN 650 MG: 650 SUPPOSITORY RECTAL at 02:07

## 2025-02-12 RX ADMIN — PROPOFOL 20 MCG/KG/MIN: 10 INJECTION, EMULSION INTRAVENOUS at 21:39

## 2025-02-12 RX ADMIN — SODIUM CHLORIDE: 9 INJECTION, SOLUTION INTRAVENOUS at 06:05

## 2025-02-12 RX ADMIN — PROPOFOL 25 MCG/KG/MIN: 10 INJECTION, EMULSION INTRAVENOUS at 18:04

## 2025-02-12 RX ADMIN — PROPOFOL 25 MCG/KG/MIN: 10 INJECTION, EMULSION INTRAVENOUS at 06:01

## 2025-02-12 RX ADMIN — Medication 1 AMPULE: at 19:48

## 2025-02-12 RX ADMIN — SODIUM CHLORIDE, POTASSIUM CHLORIDE, SODIUM LACTATE AND CALCIUM CHLORIDE 1000 ML: 600; 310; 30; 20 INJECTION, SOLUTION INTRAVENOUS at 05:49

## 2025-02-12 RX ADMIN — VANCOMYCIN 2000 MG: 2 INJECTION, SOLUTION INTRAVENOUS at 01:30

## 2025-02-12 RX ADMIN — LEVETIRACETAM 1000 MG: 100 INJECTION INTRAVENOUS at 00:03

## 2025-02-12 RX ADMIN — POTASSIUM BICARBONATE 40 MEQ: 782 TABLET, EFFERVESCENT ORAL at 06:17

## 2025-02-12 RX ADMIN — POTASSIUM BICARBONATE 40 MEQ: 782 TABLET, EFFERVESCENT ORAL at 23:20

## 2025-02-12 RX ADMIN — SODIUM CHLORIDE, PRESERVATIVE FREE 10 ML: 5 INJECTION INTRAVENOUS at 09:43

## 2025-02-12 RX ADMIN — PIPERACILLIN AND TAZOBACTAM 4500 MG: 4; .5 INJECTION, POWDER, LYOPHILIZED, FOR SOLUTION INTRAVENOUS at 00:20

## 2025-02-12 RX ADMIN — CHLORHEXIDINE GLUCONATE 15 ML: 1.2 RINSE ORAL at 08:47

## 2025-02-12 RX ADMIN — GADOTERIDOL 17 ML: 279.3 INJECTION, SOLUTION INTRAVENOUS at 15:14

## 2025-02-12 RX ADMIN — SODIUM CHLORIDE, POTASSIUM CHLORIDE, SODIUM LACTATE AND CALCIUM CHLORIDE 1000 ML: 600; 310; 30; 20 INJECTION, SOLUTION INTRAVENOUS at 01:26

## 2025-02-12 RX ADMIN — PIPERACILLIN AND TAZOBACTAM 3375 MG: 3; .375 INJECTION, POWDER, LYOPHILIZED, FOR SOLUTION INTRAVENOUS at 21:40

## 2025-02-12 RX ADMIN — Medication 1 AMPULE: at 08:47

## 2025-02-12 RX ADMIN — PIPERACILLIN AND TAZOBACTAM 3375 MG: 3; .375 INJECTION, POWDER, LYOPHILIZED, FOR SOLUTION INTRAVENOUS at 13:36

## 2025-02-12 RX ADMIN — POTASSIUM PHOSPHATE, MONOBASIC POTASSIUM PHOSPHATE, DIBASIC 30 MMOL: 224; 236 INJECTION, SOLUTION, CONCENTRATE INTRAVENOUS at 06:28

## 2025-02-12 RX ADMIN — CHLORHEXIDINE GLUCONATE 15 ML: 1.2 RINSE ORAL at 00:32

## 2025-02-12 RX ADMIN — LANSOPRAZOLE 30 MG: 30 TABLET, ORALLY DISINTEGRATING, DELAYED RELEASE ORAL at 08:49

## 2025-02-12 RX ADMIN — ENOXAPARIN SODIUM 90 MG: 100 INJECTION SUBCUTANEOUS at 08:47

## 2025-02-12 RX ADMIN — CHLORHEXIDINE GLUCONATE 15 ML: 1.2 RINSE ORAL at 19:48

## 2025-02-12 RX ADMIN — HYDRALAZINE HYDROCHLORIDE 10 MG: 20 INJECTION INTRAMUSCULAR; INTRAVENOUS at 06:17

## 2025-02-12 ASSESSMENT — PULMONARY FUNCTION TESTS
PIF_VALUE: 24
PIF_VALUE: 26
PIF_VALUE: 21
PIF_VALUE: 26
PIF_VALUE: 23

## 2025-02-12 ASSESSMENT — PAIN SCALES - GENERAL
PAINLEVEL_OUTOF10: 0
PAINLEVEL_OUTOF10: 0

## 2025-02-12 NOTE — PROGRESS NOTES
Palliative note:    Team ÁNGELAW Rita and I visited patient this morning. Daughters Linda and Eunice and granddaughter Jesica were present. Introduced ourselves and role of Palliative. Daughter called her mom for a portion of the conversation. We provided general overview of hospital course. We discussed taking things one day at a time to RN arrived to complete MRI checklist.  AMD: Daughters report there is no official AMD. His wife as legal NOK would therefore be patient's healthcare surrogate.  Goals of Care: Family wishes to continue current evaluations and treatments. We discussed that we would need to talk to his wife regarding major decisions such as code status and asked when we may be able to meet his wife in person. By phone wife shares she will call us when she arrives to the hospital Thursday so we can meet her.  Code status: Daughter states this was discussed last night and their mom said she wanted patient to be Full Code. Daughter did ask her mom about this by phone, and daughter did not report that wife had changed her mind, so Full Code remains in place at this time.  We will try to meet with wife and family Thursday.  Thank you for asking our team to participate in the care of Brii Engle. Full consult note to follow.

## 2025-02-12 NOTE — CONSULTS
Palliative Medicine  Patient Name: Brii Engle  YOB: 1955  MRN: [id number]  Age: 69 y.o.  Gender: male    Date of Initial Consult: 2/12/2025  Date of Service: 2/12/2025  Time: 6:45 PM  Provider: Dhara Juarez MD  Hospital Day: 2  Admit Date: 2/11/2025  Referring Provider: Dr. Haynes       Reasons for Consultation:  Goals of Care    HISTORY OF PRESENT ILLNESS (HPI):   Brii Engle is a 69 y.o. male with a past medical history of HFrEF, Afib, HTN, T2DM, seizure disorder, h/o carcinoid tumor s/p LLL wedge resection who was admitted on 2/11/2025 from home with a diagnosis of status epilepticus, AHRF, Afib with RVR, RAE, lactic acidosis. He was treated for seizure with keppra; had received versed by EMS. He was intubated and is sedated. He was treated for afib with RVR with diltiazem. He become hypotensive and diltiazem was discontinued and he required pressors during the evening. He is being treated for possible pneumonia to see if there is clinical improvement; there is possibility acute lung issues are related to prior malignancy. He has new left gaze preference which may be related to new stroke vs seizure. Neuro is following. MRI completed on 2/12--small areas of acute infarction involving the inferior cerebellar vermis and medial right cerebellum without mass effect.    Psychosocial: Patient is  to wife Clarisa. They have 3 daughters, Ruth Ann, Linda, Eunice.    PALLIATIVE DIAGNOSES:    Encounter for Palliative Care  Goals of Care discussion  Advance Care Plan discussion  Code Status discussion  Impaired cognitive ability  Physical Debility, Generalized weakness    ASSESSMENT AND PLAN:   Chart reviewed as summarized within this note.  Team LCSW Rita and IZZY visited patient this morning. Daughters Linda and Eunice and granddaughter Jesica were present. Introduced ourselves and role of Palliative. Daughter called her mom for a portion of the conversation. We provided general  Patient Advocacy, Ethics, etc.)    Spiritual Affiliation: Denominational    Any spiritual / Restorationist concerns:  [] Yes /  [] No   If \"Yes\" to discuss with pastoral care during IDT     Does caregiver feel burdened by caring for their loved one:   [] Yes /  [] No /  [x] No Caregiver Present/Available [] No Caregiver [] Pt Lives at Facility  If \"Yes\" to discuss with social work during IDT    Anticipatory grief assessment:   [] Normal  / [] Maladaptive     If \"Maladaptive\" to discuss with social work during IDT    ESAS Anxiety:      ESAS Depression:          LAB AND IMAGING FINDINGS:   Objective data reviewed:  labs, images, records, medication use, vitals, and chart     FINAL COMMENTS   Thank you for allowing Palliative Medicine to participate in the care of Brii Engle.    Only check if applicable and billing time based rather than MDM  [x] The total encounter time on this service date was __60__ minutes which was spent performing a face-to-face encounter and personally completing the provider-level activities documented in the note. This includes time spent prior to the visit and after the visit in direct care of the patient. This time does not include time spent in any separately reportable services.    Electronically signed by   Dhara Juarez MD  Palliative Care Team  on 2/12/2025 at 6:45 PM

## 2025-02-12 NOTE — PROGRESS NOTES
0700: Bedside and Verbal shift change report given to GURMEET Vargas (oncoming nurse) by GURMEET Mireles / GURMEET Myers (offgoing nurse). Report included the following information Nurse Handoff Report, ED SBAR, Adult Overview, Intake/Output, MAR, Recent Results, Cardiac Rhythm Atrial Fib, and Alarm Parameters.     0800- Shift assessment done. See flow sheet for details.Patient is sedated with propofol; is not able to open eyes to pain, not following command. Palpable pulses noted to all extremities; pupils are sluggish reactive, 2mm in size bial. VS stable at this time; cardiac rhythm noted is A fib. Patient remains intubated with ETT size 8.0, on mechanical ventilator; parameters: AC/VC mode, FiO2 30, , PEEP 5, RR 14. Lung sounds  rhonchi bial;  OGT at level 74, connected to low intermittent suction; Abdomen is soft and non tender, hypoactive BS on all quadrants. Temp sensing schrader in place draining adequate clear output.    0825- Neurology, Dr. Roger at the bedside. Plan for bedside EEG today.    0910- EEG tech at the bedside. Propofol gtt stopped for EEG at this time.      1200- Reassessment done, no change to previous assessment.     1215- Labs sent to lab as ordered    1340- Labs called to notify the labs sent earlier were contaminated. Will resend the labs.     1430- Patient was taken to MRI. Temperature sensing schrader was removed due to MRI restrictions.     1600- Patient is back to the unit from MRI. New 16fr temp sensing schrader is inserted. Pt tolerated well.     1600- Reassessment done, no change to previous assessment.     1900- Bedside and Verbal shift change report given to GURMEET Cruz (oncoming nurse) by Alicia (offgoing nurse). Report included the following information Nurse Handoff Report, ED SBAR, Adult Overview, Intake/Output, MAR, Recent Results, Cardiac Rhythm Atrial Fib, and Alarm Parameters.

## 2025-02-12 NOTE — PROGRESS NOTES
Spiritual Health History and Assessment/Progress Note  [facility]    Spiritual/Emotional Needs, Palliative Care, Initial Encounter,  ,  ,      Name: Brii Engle MRN: [id number]    Age: 69 y.o.     Sex: male   Language: English   Mandaen: Advent   Seizure (HCC)     Date: 2/12/2025            Total Time Calculated: 7 min              Spiritual Assessment began in [facility] 2 CRITICAL CARE        Referral/Consult From: Palliative Care, Rounding   Encounter Overview/Reason: Spiritual/Emotional Needs, Palliative Care, Initial Encounter  Service Provided For: Patient    [place], Belief, Meaning:   Patient unable to assess at this time  Family/Friends No family/friends present      Importance and Influence:  Patient unable to assess at this time  Family/Friends No family/friends present    Community:  Patient  Unable to assess.   Family/Friends No family/friends present    Assessment and Plan of Care:     Patient Interventions include: Provided sacramental/Christianity ritual  said a silent prayer at bedside.   Family/Friends Interventions include: No family/friends present    Patient Plan of Care: Spiritual Care available upon further referral  Family/Friends Plan of Care: Spiritual Care available upon further referral    Electronically signed by Rochelle De Leónin Intern on 2/12/2025 at 1:10 PM

## 2025-02-12 NOTE — ED PROVIDER NOTES
Refills: 3      blood glucose test strips (ONETOUCH ULTRA) strip Test once daily. DX: E11.22  Qty: 50 each, Refills: 11      Lancets MISC 1 each by Does not apply route daily PLEASE DISPENSE WHAT INSURANCE COVERS Test once daily. DX: E11.22  Qty: 100 each, Refills: 11      glucose monitoring kit 1 kit by Does not apply route daily  Qty: 1 kit, Refills: 0      levETIRAcetam (KEPPRA) 500 MG tablet Take 2 tablets by mouth 2 times daily May crush and give through peg, patient is able to swallow crushed pills  Qty: 120 tablet, Refills: 1      carvedilol (COREG) 25 MG tablet 1 tablet by PEG Tube route 2 times daily (with meals)  Qty: 60 tablet, Refills: 1      atorvastatin (LIPITOR) 80 MG tablet Take 1 tablet by mouth at bedtime  Qty: 100 tablet, Refills: 1      lisinopril (PRINIVIL;ZESTRIL) 40 MG tablet Take 1 tablet by mouth daily  Qty: 90 tablet, Refills: 3      metFORMIN (GLUCOPHAGE) 500 MG tablet 2 tablets by PEG Tube route 2 times daily (with meals)  Qty: 120 tablet, Refills: 1      melatonin 3 MG TABS tablet 2 tablets by PEG Tube route daily  Qty: 60 tablet, Refills: 1      Multiple Vitamins-Minerals (THERAPEUTIC MULTIVITAMIN-MINERALS) tablet 1 tablet by PEG Tube route daily  Qty: 30 tablet, Refills: 1      apixaban (ELIQUIS) 5 MG TABS tablet 1 tablet by Per G Tube route 2 times daily  Qty: 60 tablet, Refills: 1      acetaminophen (TYLENOL) 325 MG tablet Take 1 tablet by mouth every 6 hours as needed             SCREENINGS               No data recorded      NIH Stroke Scale  NIH Stroke Scale Assessed: No     PHYSICAL EXAM      ED Triage Vitals   Encounter Vitals Group      BP       Systolic BP Percentile       Diastolic BP Percentile       Pulse       Resp       Temp       Temp src       SpO2       Weight       Height       Head Circumference       Peak Flow       Pain Score       Pain Loc       Pain Education       Exclude from Growth Chart         Physical Exam  Vitals and nursing note reviewed.  Preliminary EKG interpreted by me.  Shows atrial fibrillation with a HR of 169.  No ST elevations. [MB]   2006 Notified patient febrile, will give HI tylenol, 500 cc fluids, diltiazem x1. [MB]   2031 Suspect lactated elevated in setting of seizure, doubt sepsis. [MB]   2048 Chest x-ray is reviewed and shows stable cardiac silhouette, there is appear to be a left pleural effusion noted. [MB]   2106 Patient remains lethargic with sonorous respirations, tachypneic, tachycardic. Will updated family and plan airway.    I will also initiate at diltiazem gtt and give lasix IV. [MB]   2109 Cerebell with 0% seizure burden. [MB]   2112 CT head negative. [MB]   2141 Patient intubated with ETT 8-0 24 at teeth. OG placed for airway protection, not sepsis. [MB]   2142 Updated patient's wife via telephone.  [MB]   2142 Placed on propofol and fentanyl for sedation. [MB]   2146 D/w Ali, ICU NP for admission. [MB]      ED Course User Index  [MB] Jermaine Tubbs MD     Initial labs reviewed, these were unremarkable mild elevation of renal function    FINAL IMPRESSION     1. Breakthrough seizure (HCC)    2. Altered mental status, unspecified altered mental status type    3. Atrial fibrillation, unspecified type (HCC)    4. Acute respiratory failure with hypoxia    5. Pleural effusion          DISPOSITION/PLAN     DISPOSITION Admitted 02/11/2025 09:48:31 PM         I am the Primary Clinician of Record.   Jermaine Tubbs MD (electronically signed)    (Please note that parts of this dictation were completed with voice recognition software. Quite often unanticipated grammatical, syntax, homophones, and other interpretive errors are inadvertently transcribed by the computer software. Please disregards these errors. Please excuse any errors that have escaped final proofreading.)         Jermaine Tubbs MD  02/12/25 0144

## 2025-02-12 NOTE — PROCEDURES
EEG REPORT    Patient Name: Brii Engle  : 1955  Age: 69 y.o.    Ordering physician: Dr. Haynes  Date of EE2025  9:22 - 9:45  Diagnosis: seizures  Interpreting physician: Dax Roger D.O. FAAN    Procedure: EEG    CLINICAL INDICATION: The patient is a 69 y.o. male who is being evaluated for baseline electro cerebral activities and to rule out seizure focus.      Current Facility-Administered Medications   Medication Dose Route Frequency    [START ON 2025] vancomycin (VANCOCIN) 1250 mg in 250 mL IVPB  1,250 mg IntraVENous Q24H    potassium phosphate 30 mmol in sodium chloride 0.9 % 500 mL IVPB  30 mmol IntraVENous Once    hydrALAZINE (APRESOLINE) injection 10 mg  10 mg IntraVENous Q6H PRN    lansoprazole (PREVACID SOLUTAB) disintegrating tablet 30 mg  30 mg Orogastric Daily    fentaNYL (SUBLIMAZE) injection 50 mcg  50 mcg IntraVENous Q1H PRN    propofol infusion  5-50 mcg/kg/min IntraVENous Continuous    sodium chloride flush 0.9 % injection 5-40 mL  5-40 mL IntraVENous 2 times per day    sodium chloride flush 0.9 % injection 5-40 mL  5-40 mL IntraVENous PRN    0.9 % sodium chloride infusion   IntraVENous PRN    ondansetron (ZOFRAN-ODT) disintegrating tablet 4 mg  4 mg Oral Q8H PRN    Or    ondansetron (ZOFRAN) injection 4 mg  4 mg IntraVENous Q6H PRN    polyethylene glycol (GLYCOLAX) packet 17 g  17 g Oral Daily PRN    acetaminophen (TYLENOL) tablet 650 mg  650 mg Oral Q6H PRN    Or    acetaminophen (TYLENOL) suppository 650 mg  650 mg Rectal Q6H PRN    insulin lispro (HUMALOG,ADMELOG) injection vial 0-8 Units  0-8 Units SubCUTAneous Q6H    levETIRAcetam (KEPPRA) injection 1,000 mg  1,000 mg IntraVENous Q12H    lactated ringers bolus 500 mL  500 mL IntraVENous Once    [Held by provider] enoxaparin (LOVENOX) injection 90 mg  1 mg/kg SubCUTAneous BID    alcohol 62% (NOZIN) nasal  1 ampule  1 ampule Nasal BID    chlorhexidine (PERIDEX) 0.12 % solution 15 mL  15 mL Mouth/Throat

## 2025-02-12 NOTE — H&P
CRITICAL CARE NOTE      Name: Brii Engle   : 1955   MRN: [id number]   Date: 2025        PRINCIPAL ICU DIAGNOSIS   Status epilepticus  Acute hypoxic respiratory failure  Atrial fibrillation with RVR  Acute kidney injury  Lactic acidosis  Elevated troponin    BRIEF PATIENT SUMMARY   68 y/o male PMHx of CVA (residual left sided deficit) T2DM, Afib (Eliquis), HFrEF HTN. HLD, carcinoid tumor s/p LLL wedge resection and seizure disorder presented to ED via EMS after reported tonic clonic seizure at home witnessed by wife ~30-45 till EMS arrival d/t incliment weather. Administered 10 mg IM versed by EMS, followed by 5 mg IM with subsequent seizure cessation. ED arrival, unresponsive, snoring respirations, A-fib RVR-started on diltiazem drip, emergently placed on mechanical ventilation for airway protection. CT head- negative.  CXR with large left effusion and complete left lung opacification.  Rapid EEG no evidence of seizure burden.  Administered 1000 mg Keppra.  ICU consulted for admission.    ICU evaluation: Sedated on propofol/fentanyl.  Subsequently became hypotensive, A-fib rate 70s on monitor.  Stopped diltiazem infusion, 500 cc IV crystalloids bolus. Levophed, Additional 1g Keppra load (on 1000 mg BID at home) CTA chest- pending.Left gaze preference, intermittent movement of R hand, no withdrawal to tactile stimuli. Rapid EEG- negative thus far.     COMPREHENSIVE ASSESSMENT & PLAN:SYSTEM BASED   Status epilepticus (Hx of seizure on Keppra 1000 mg BID)  CT head-negative  Loaded with 1000 mg in ED, administer additional 1000 mg now, then continue 1000 mg BID  Rapid EEG in place, Administer Ativan for any evidence of clinical breakthrough seizures  Close neurological monitoring, seizure precautions  Neurology consult in a.m.    Acute hypoxic respiratory failure in setting of seizure required emergent intubation for airway protection -current  Large left effusion  Hx of carcinoid tumor s/p

## 2025-02-12 NOTE — PROCEDURES
EEG REPORT    Patient Name: Brii Engle  : 1955  Age: 69 y.o.    Ordering physician: Dr. Haynes  Date of EE2025  20:59 -22:43, 23:35 -  01:57  Diagnosis: status epilepticus  Interpreting physician: Dax Roger D.O. FAAN    Procedure: EEG    CLINICAL INDICATION: The patient is a 69 y.o. male who is being evaluated for baseline electro cerebral activities and to rule out seizure focus.      Current Facility-Administered Medications   Medication Dose Route Frequency    [START ON 2025] vancomycin (VANCOCIN) 1250 mg in 250 mL IVPB  1,250 mg IntraVENous Q24H    potassium phosphate 30 mmol in sodium chloride 0.9 % 500 mL IVPB  30 mmol IntraVENous Once    hydrALAZINE (APRESOLINE) injection 10 mg  10 mg IntraVENous Q6H PRN    lansoprazole (PREVACID SOLUTAB) disintegrating tablet 30 mg  30 mg Orogastric Daily    propofol infusion  5-50 mcg/kg/min IntraVENous Continuous    sodium chloride flush 0.9 % injection 5-40 mL  5-40 mL IntraVENous 2 times per day    sodium chloride flush 0.9 % injection 5-40 mL  5-40 mL IntraVENous PRN    0.9 % sodium chloride infusion   IntraVENous PRN    ondansetron (ZOFRAN-ODT) disintegrating tablet 4 mg  4 mg Oral Q8H PRN    Or    ondansetron (ZOFRAN) injection 4 mg  4 mg IntraVENous Q6H PRN    polyethylene glycol (GLYCOLAX) packet 17 g  17 g Oral Daily PRN    acetaminophen (TYLENOL) tablet 650 mg  650 mg Oral Q6H PRN    Or    acetaminophen (TYLENOL) suppository 650 mg  650 mg Rectal Q6H PRN    insulin lispro (HUMALOG,ADMELOG) injection vial 0-8 Units  0-8 Units SubCUTAneous Q6H    levETIRAcetam (KEPPRA) injection 1,000 mg  1,000 mg IntraVENous Q12H    lactated ringers bolus 500 mL  500 mL IntraVENous Once    enoxaparin (LOVENOX) injection 90 mg  1 mg/kg SubCUTAneous BID    alcohol 62% (NOZIN) nasal  1 ampule  1 ampule Nasal BID    chlorhexidine (PERIDEX) 0.12 % solution 15 mL  15 mL Mouth/Throat BID    piperacillin-tazobactam (ZOSYN) 3,375 mg in

## 2025-02-12 NOTE — ED NOTES
Patient's blood pressure dropped to 45/33 so the Diltiazem drip, Propofol drip, and Fentanyl drips were all stopped at this time.

## 2025-02-12 NOTE — PROGRESS NOTES
Palliative Medicine  Per Dr. Juarez: Brii Engle is a 69 y.o. male with a past medical history of CVA, cancer, who was admitted on 2025 from home with a diagnosis of status epilepticus, AHRF, Afib with RVR, RAE, lactic acidosis. He was treated for seizure with keppra. He was intubated. He was treated for afib with RVR with diltiazem. He become hypotensive and diltiazem was discontinued.      Code Status: Full Code    [facility]/12/2025     9:18 AM   Demographics   Marital Status    No AMD so spouse, Clarisa is primary HCDM and 3 daughters are secondary HCDM    Patient / Family Encounter Documentation    Participants (names): 2 daughters, Eunice Mckeon, and granddaughter, Jesica, with brief call to spouse Clarisa, Dr. Juarez, Rita Lopez, [facility]    Narrative:   --Palliative team met family briefly in front of his room as they were preparing to leave after being here overnight. (Spouse did not come due to inclement weather.)  --Introduced ourselves as supportive service and Dr. Juarez gave update and answered questions.  --Palliative Medicine contact information given to daughter.  --Assigned nurse, Alicia, spoke with spouse for MRI screening.  --Mrs. Engle is planning to come tomorrow and will call once she is at bedside for Palliative team to come meet with her.    Psychosocial Issues Identified/ Resilience Factors: Patient is  to wife Clarisa and [place], [place]. They have 3 daughters, Ruth Ann, Linda, Eunice. They also have at least one grandchild. Daughter reported that patient has been paralyzed on his left side since a CVA.     Caregiver [place]: Not assessed.  Does the caregiver feel confident administering medication?   Does the caregiver need any help connecting with community resources?   Does the caregiver feel confident assisting with activities of daily living?     Goals of Care / Plan: (Please see Dr. Juarez note.)  Full code (

## 2025-02-12 NOTE — ED NOTES
Patient started on Cerebell at this time. Patient's breathing is still snoring and labored despite placement of nasal trumpet. Patient's heart rate jumped up to 202. He was also tachypneic in the high 20's upon arrival but is now breathing 40-50 times a minute. MD notified and is coming back to evaluate.

## 2025-02-12 NOTE — INTERDISCIPLINARY ROUNDS
Critical care interdisciplinary rounds today.  Following members present: Case Management, , Clinical Lead, Diabetes Team, Nursing, Nutrition, Pharmacy, and Physician. Family invited to participate.  Plan of care discussed.  See clinical pathway for plan of care and interventions and desired outcomes.    Cardoza in place for strict I/O's.

## 2025-02-12 NOTE — PROGRESS NOTES
2258: Received bedside report from Deisy, ED RN. Preparing patient to CT department.    2302: Levophed to 16mcg/min for /95 taken at [facility].    2310: Brought the patient to CCU via stretcher accompanied by Nancy RN, BEKA Oswald and DANNA Murguia together with Deisy, ED RN. Attached to cardiac monitor showing atrial fibrillation, labile blood pressure and afebrile but warm to touch. Patient open eyes to pain, not following command, noted jerky movement on right upper extremities but no movement on bilateral lower extremities. Both pupils are sluggish reactive, 3mm in size. Patient is intubated with ETT size 8.0, lip level 24, on mechanical ventilator with the following parameters: AC/VC mode, FiO2 100, , PEEP 5, RR 24. Lung sounds coarse crackles to diminished bilaterally. OGT at level 74, connected to low intermittent wall suction draining to yellowish output. Abdomen is soft and non tender, hypoactive on all quadrants. Cardoza catheter is in place, temp sensing draining to clear output. CHG  bathed given. Dual skin assessment done, no pressure injury. PIV on right forearm gauge 20 connected to Levophed 16mcg/min, PIV on right hand gauge 20 and left hand gauge 20, no blood return.    2311: Levophed to 14mcg/min as per [place], ICU NP.    2317: MAP > 65mmHg, Levophed to 10mcg/min as per [place], ICU NP.     2331: MAP > 65mmHg, Levophed to 8mcg/min as per [place], ICU NP.     2335: Rapid EEG initiated.    2339: MAP > 65mmHg, Levophed to 5mcg/min as per [place], ICU NP.     2346: Restarted Propofol gtt at 20mcg/kg/min as per [place], ICU NP.    2347: MAP >65mmHg, Levophed off.    2356: Blood drawn for BMP, Procalcitonin, Troponin, Magnesium and Lactic Acid.    0026: Patient's Troponin 179, notified Hejuancho, ICU NP thru perfect serve.    0030:  Notified [place], ICU NP that LR 500ml was not given in ED, latest /87.    0054: Troponin 272, notified Unique, ICU NP thru perfect  serve.    0055: ABG done, see results. FiO2 to 50% from 100% and RR 18 from 24, changes made by RT. Unique Ibarra, ICU NP aware.    0110: LA 6.8, notified Unique, ICU NP thru perfect serve with an order to give LR 1L bolus. See MAR.    0150: Rapid EEG done, no seizure burden activity, Unique, ICU NP aware.    0204: Applied soft restraint on right wrist as ordered by Unique ICU NP, informed patient's wife Clarisa.    0300: Reassessment done, no change to previous assessment.    0400: Blood drawn for CBC, BMP, Mg, Phos, Hepatic Function Panel, Lactic Acid and BNP.    0530: Temp 100.9, cooling blanket applied. LA 5.4, Potassium 3.3, Phos 0.9, Mg 1.6, notified Hejuancho, ICU NP, electrolytes replacement ordered. See MAR.    0533: -160s, DBP 110s, 2 consecutive cycles, notified Hejuancho, ICU NP with an order to give Hydralazine IV PRN according to parameters.    0555: ABG done, see results. RR 14 from 18 and PEEP 6 from 5, changes made by RT. Unique Ibarra, ICU NP aware.    0615: CXR done, no need for KUB as per Unique ICU NP. Okay to give Effer-K thru OGT.     0700: Bedside and Verbal shift change report given to GURMEET Vargas (oncoming nurse) by GURMEET Mireles / GURMEET Myers (offgoing nurse). Report included the following information Nurse Handoff Report, ED SBAR, Adult Overview, Intake/Output, MAR, Recent Results, Cardiac Rhythm Atrial Fib, and Alarm Parameters.

## 2025-02-12 NOTE — PLAN OF CARE
Problem: Respiratory - Adult  Goal: Achieves optimal ventilation and oxygenation  Outcome: Progressing  Flowsheets (Taken 2/11/2025 2310)  Achieves optimal ventilation and oxygenation:   Assess for changes in respiratory status   Assess for changes in mentation and behavior     Problem: Cardiovascular - Adult  Goal: Maintains optimal cardiac output and hemodynamic stability  Recent Flowsheet Documentation  Taken 2/11/2025 2310 by Aline Mae, RN  Maintains optimal cardiac output and hemodynamic stability:   Monitor blood pressure and heart rate   Monitor urine output and notify Licensed Independent Practitioner for values outside of normal range   Assess for signs of decreased cardiac output     Problem: Safety - Adult  Goal: Free from fall injury  Outcome: Progressing  Flowsheets (Taken 2/11/2025 2310)  Free From Fall Injury:   Instruct family/caregiver on patient safety   Based on caregiver fall risk screen, instruct family/caregiver to ask for assistance with transferring infant if caregiver noted to have fall risk factors     Problem: Chronic Conditions and Co-morbidities  Goal: Patient's chronic conditions and co-morbidity symptoms are monitored and maintained or improved  Outcome: Progressing  Flowsheets (Taken 2/11/2025 2310)  Care Plan - Patient's Chronic Conditions and Co-Morbidity Symptoms are Monitored and Maintained or Improved:   Monitor and assess patient's chronic conditions and comorbid symptoms for stability, deterioration, or improvement   Collaborate with multidisciplinary team to address chronic and comorbid conditions and prevent exacerbation or deterioration   Update acute care plan with appropriate goals if chronic or comorbid symptoms are exacerbated and prevent overall improvement and discharge     Problem: Pain  Goal: Verbalizes/displays adequate comfort level or baseline comfort level  Outcome: Progressing  Flowsheets (Taken 2/11/2025 2310)  Verbalizes/displays  adequate comfort level or baseline comfort level: Assess pain using appropriate pain scale     Problem: Safety - Medical Restraint  Goal: Remains free of injury from restraints (Restraint for Interference with Medical Device)  Description: INTERVENTIONS:  1. Determine that other, less restrictive measures have been tried or would not be effective before applying the restraint  2. Evaluate the patient's condition at the time of restraint application  3. Inform patient/family regarding the reason for restraint  4. Q2H: Monitor safety, psychosocial status, comfort, nutrition and hydration  Outcome: Progressing  Flowsheets (Taken 2/12/2025 0204)  Remains free of injury from restraints (restraint for interference with medical device):   Determine that other, less restrictive measures have been tried or would not be effective before applying the restraint   Evaluate the patient's condition at the time of restraint application   Inform patient/family regarding the reason for restraint   Every 2 hours: Monitor safety, psychosocial status, comfort, nutrition and hydration

## 2025-02-12 NOTE — PROGRESS NOTES
ICU Progress Note        Subjective:      - currently intubated and on the mechanical ventilator.     HPI:  The patient is a 68 y/o male PMHx of CVA (residual left sided deficit) T2DM, Afib (Eliquis), HFrEF HTN. HLD, carcinoid tumor s/p LLL wedge resection and seizure disorder presented to ED via EMS after reported tonic clonic seizure at home witnessed by wife ~30-45 till EMS arrival d/t incliment weather. Administered 10 mg IM versed by EMS, followed by 5 mg IM with subsequent seizure cessation. ED arrival, unresponsive, snoring respirations, A-fib RVR-started on diltiazem drip, emergently placed on mechanical ventilation for airway protection. CT head- negative.  CXR with large left effusion and complete left lung opacification.  Rapid EEG no evidence of seizure burden.  Administered 1000 mg Keppra.  ICU consulted for admission.     Vital Signs:    BP (!) 143/95   Pulse 88   Temp 100 °F (37.8 °C) (Bladder)   Resp 22   Ht 1.88 m (6' 2\")   Wt 83.9 kg (184 lb 15.5 oz)   SpO2 100%   BMI 23.75 kg/m²             Temp (24hrs), Av.3 °F (37.9 °C), Min:99.4 °F (37.4 °C), Max:100.9 °F (38.3 °C)       Intake/Output:   Last shift:      No intake/output data recorded.  Last 3 shifts: 02/10 1901 -  0700  In: 2407.9 [I.V.:118.2]  Out: 1400 [Urine:1400]    Intake/Output Summary (Last 24 hours) at 2025 0813  Last data filed at 2025 0700  Gross per 24 hour   Intake 2407.86 ml   Output 1400 ml   Net 1007.86 ml       Physical Exam:    GEN: Intubated, sedated, exam limited by sedation.   HEENT: anicteric sclerae, pink conj, ETT in place.   NECK: Supple, -LAD, no neck mass, CVC in place with dressing C/D/I  CV: no murmurs noted.   LUNGS: Coarse BS at bases, otherwise no wheezes, rhonchi, rales  ABD: soft, non-tender, no masses  EXT: No cyanosis, distal pulses palpable, no edema  SKIN: warm, dry and intact.   NEURO: Sedated, exam is limited by sedation.    DATA:     Current Facility-Administered

## 2025-02-12 NOTE — ED NOTES
Patient just had a run of 5 beats of V-Tach. Will attach the patient to pads in case he needs to be defibrillated. MD is wanting to talk to family about possible intubation.

## 2025-02-12 NOTE — CONSULTS
Neurology Note    Patient ID:  Brii GONZALEZ [place]  [id number]  69 y.o.  1955      Date of Consultation:  February 12, 2025    Referring Physician: Dr. Haynes    Reason for Consultation:  seizures      Assessment and Plan:    The patient is a 69-year-old male with multiple medical conditions who presents to the hospital with status epilepticus.  Patient does have a history of seizures and prior stroke with residual left-sided weakness.  His current neurological examination does reveal a left gaze preference with no abnormal spontaneous movement.  Decreased withdrawal in the right upper extremity.    Status epilepticus:  Etiology for seizures includes breakthrough seizures, systemic impact of infection lowering seizure threshold, new ischemic event, metabolic derangements, CNS structural abnormality  Head CT with no acute abnormality  Rapid EEG with no increasing seizure burden  I will order a full channel EEG for today  When able, the patient should receive a brain MRI.  Ideally this should be performed with and without contrast  Continue Keppra 1000 mg twice a day  Dosing of medication may be adjusted depending on the results of the EEG from this morning.    History of stroke:  Patient does have risk factors including atrial fibrillation, diabetes, hypertension  Patient was on Eliquis and Plavix  No hemorrhage seen on the head ct  Brain MRI when able  Hypertension: Aggressive control goal blood pressure of less than 140/90  Continue aggressive control of lipids and glucose.  When sedation as able.  Will continue to follow neurological examination closely  Depending on results of MRI, additional testing may be necessary.    Left lung abnormality:  Differential includes effusion, malignancy, infection  The patient had been started on broad-spectrum antibiotics  Management through primary team      A-fib with RVR  Elevated troponin  History of congestive heart failure  Acute kidney injury      Neurology will

## 2025-02-12 NOTE — PROGRESS NOTES
Pharmacy Antimicrobial Kinetic Dosing    Indication for Antimicrobials: PNA    Current Regimen of Each Antimicrobial:  Vancomycin Pharmacy to Dose; Start Date ; Day # 1  Zosyn 3.375g IV q8h; Start Date ; Day # 1    Previous Antimicrobial Therapy:  Ceftriaxone       Goal Level: Vancomycin random level < 20     Date Dose & Interval Measured (mcg/mL) Predicted AUC 24-48 Predicted AUC 24,ss                          Significant Cultures:    Blood, paired: pending     Labs:  Recent Labs     Units 25  0400 25  2356 25  2000   CREATININE MG/DL 1.86* 1.81* 1.4* 1.87*   BUN MG/DL 13   --  11   PROCAL ng/mL  --  1.88  --   --    WBC K/uL 10.1  --   --  10.8     Temp (24hrs), Av.3 °F (37.9 °C), Min:99.3 °F (37.4 °C), Max:100.9 °F (38.3 °C)    Conditions for Dosing Consideration: RAE    Creatinine Clearance (mL/min): Estimated Creatinine Clearance: 45 mL/min (A) (based on SCr of 1.86 mg/dL (H)).     Impression/Plan:   RAE (baseline SCr 1-1.2) - will intermittently dose vancomycin for now   Vancomycin 2000mg IV loading dose administered   Level scheduled for 100 - will dose vancomycin 750mg x 1 dose if level <20  BMP and CBC ordered per protocol  Antimicrobial stop date 7 days     Pharmacy will follow daily and adjust medications as appropriate for renal function and/or serum levels.    Thank you,  OG RODNEY [facility]

## 2025-02-13 ENCOUNTER — APPOINTMENT (OUTPATIENT)
Facility: HOSPITAL | Age: 70
End: 2025-02-13
Attending: PSYCHIATRY & NEUROLOGY
Payer: MEDICARE

## 2025-02-13 PROBLEM — Z71.89 ACP (ADVANCE CARE PLANNING): Status: ACTIVE | Noted: 2025-02-13

## 2025-02-13 PROBLEM — Z51.5 PALLIATIVE CARE BY SPECIALIST: Status: ACTIVE | Noted: 2025-02-13

## 2025-02-13 LAB
ANION GAP SERPL CALC-SCNC: 8 MMOL/L (ref 2–12)
ARTERIAL PATENCY WRIST A: POSITIVE
BASE EXCESS BLD CALC-SCNC: 3.2 MMOL/L
BDY SITE: ABNORMAL
BUN SERPL-MCNC: 17 MG/DL (ref 6–20)
BUN/CREAT SERPL: 11 (ref 12–20)
CALCIUM SERPL-MCNC: 7.9 MG/DL (ref 8.5–10.1)
CHLORIDE SERPL-SCNC: 110 MMOL/L (ref 97–108)
CHOLEST SERPL-MCNC: 120 MG/DL
CO2 SERPL-SCNC: 26 MMOL/L (ref 21–32)
CREAT SERPL-MCNC: 1.55 MG/DL (ref 0.7–1.3)
EKG ATRIAL RATE: 153 BPM
EKG DIAGNOSIS: NORMAL
EKG DIAGNOSIS: NORMAL
EKG Q-T INTERVAL: 262 MS
EKG Q-T INTERVAL: 436 MS
EKG QRS DURATION: 102 MS
EKG QRS DURATION: 98 MS
EKG QTC CALCULATION (BAZETT): 439 MS
EKG QTC CALCULATION (BAZETT): 509 MS
EKG R AXIS: -4 DEGREES
EKG R AXIS: -7 DEGREES
EKG T AXIS: -80 DEGREES
EKG T AXIS: 35 DEGREES
EKG VENTRICULAR RATE: 169 BPM
EKG VENTRICULAR RATE: 82 BPM
ERYTHROCYTE [DISTWIDTH] IN BLOOD BY AUTOMATED COUNT: 15.1 % (ref 11.5–14.5)
EST. AVERAGE GLUCOSE BLD GHB EST-MCNC: 148 MG/DL
GAS FLOW.O2 O2 DELIVERY SYS: ABNORMAL
GAS FLOW.O2 SETTING OXYMISER: 14 BPM
GLUCOSE BLD STRIP.AUTO-MCNC: 127 MG/DL (ref 65–117)
GLUCOSE BLD STRIP.AUTO-MCNC: 82 MG/DL (ref 65–117)
GLUCOSE BLD STRIP.AUTO-MCNC: 89 MG/DL (ref 65–117)
GLUCOSE SERPL-MCNC: 101 MG/DL (ref 65–100)
HBA1C MFR BLD: 6.8 % (ref 4–5.6)
HCO3 BLD-SCNC: 25.9 MMOL/L (ref 21–28)
HCT VFR BLD AUTO: 34.4 % (ref 36.6–50.3)
HDLC SERPL-MCNC: 40 MG/DL
HDLC SERPL: 3 (ref 0–5)
HGB BLD-MCNC: 11.1 G/DL (ref 12.1–17)
LDLC SERPL CALC-MCNC: 64 MG/DL (ref 0–100)
LEVETIRACETAM SERPL-MCNC: 23.8 UG/ML (ref 10–40)
MAGNESIUM SERPL-MCNC: 2 MG/DL (ref 1.6–2.4)
MCH RBC QN AUTO: 26.6 PG (ref 26–34)
MCHC RBC AUTO-ENTMCNC: 32.3 G/DL (ref 30–36.5)
MCV RBC AUTO: 82.5 FL (ref 80–99)
NRBC # BLD: 0 K/UL (ref 0–0.01)
NRBC BLD-RTO: 0 PER 100 WBC
O2/TOTAL GAS SETTING VFR VENT: 30 %
PCO2 BLD: 32 MMHG (ref 35–48)
PEEP RESPIRATORY: 5 CMH2O
PH BLD: 7.52 (ref 7.35–7.45)
PHOSPHATE SERPL-MCNC: 3.5 MG/DL (ref 2.6–4.7)
PLATELET # BLD AUTO: 160 K/UL (ref 150–400)
PMV BLD AUTO: 12.2 FL (ref 8.9–12.9)
PO2 BLD: 144 MMHG (ref 83–108)
POTASSIUM SERPL-SCNC: 3.6 MMOL/L (ref 3.5–5.1)
PROCALCITONIN SERPL-MCNC: 3.4 NG/ML
RBC # BLD AUTO: 4.17 M/UL (ref 4.1–5.7)
SAO2 % BLD: 99.5 % (ref 92–97)
SERVICE CMNT-IMP: ABNORMAL
SERVICE CMNT-IMP: NORMAL
SERVICE CMNT-IMP: NORMAL
SODIUM SERPL-SCNC: 144 MMOL/L (ref 136–145)
SPECIMEN TYPE: ABNORMAL
TRIGL SERPL-MCNC: 117 MG/DL
TRIGL SERPL-MCNC: 80 MG/DL
VANCOMYCIN SERPL-MCNC: 11.5 UG/ML
VAS LEFT CCA DIST EDV: 18 CM/S
VAS LEFT CCA DIST PSV: 52.9 CM/S
VAS LEFT CCA PROX EDV: 18.2 CM/S
VAS LEFT CCA PROX PSV: 64.4 CM/S
VAS LEFT ECA EDV: 9.2 CM/S
VAS LEFT ECA PSV: 54.6 CM/S
VAS LEFT ICA DIST EDV: 19.3 CM/S
VAS LEFT ICA DIST PSV: 44.8 CM/S
VAS LEFT ICA MID EDV: 25.8 CM/S
VAS LEFT ICA MID PSV: 70.3 CM/S
VAS LEFT ICA PROX EDV: 16.4 CM/S
VAS LEFT ICA PROX PSV: 30.5 CM/S
VAS LEFT ICA/CCA PSV: 1.3 NO UNITS
VAS LEFT SUBCLAVIAN PROX EDV: 10.9 CM/S
VAS LEFT SUBCLAVIAN PROX PSV: 98.1 CM/S
VAS LEFT VERTEBRAL EDV: 14.9 CM/S
VAS LEFT VERTEBRAL PSV: 37.1 CM/S
VAS RIGHT CCA DIST EDV: 17.6 CM/S
VAS RIGHT CCA DIST PSV: 54.4 CM/S
VAS RIGHT CCA PROX EDV: 16.2 CM/S
VAS RIGHT CCA PROX PSV: 72.8 CM/S
VAS RIGHT ECA EDV: 16 CM/S
VAS RIGHT ECA PSV: 71 CM/S
VAS RIGHT ICA DIST EDV: 30.1 CM/S
VAS RIGHT ICA DIST PSV: 61.3 CM/S
VAS RIGHT ICA MID EDV: 19.6 CM/S
VAS RIGHT ICA MID PSV: 38.1 CM/S
VAS RIGHT ICA PROX EDV: 17.7 CM/S
VAS RIGHT ICA PROX PSV: 29.8 CM/S
VAS RIGHT ICA/CCA PSV: 1.1 NO UNITS
VAS RIGHT SUBCLAVIAN PROX EDV: 0 CM/S
VAS RIGHT SUBCLAVIAN PROX PSV: 88.3 CM/S
VAS RIGHT VERTEBRAL EDV: 8.5 CM/S
VAS RIGHT VERTEBRAL PSV: 23.9 CM/S
VENTILATION MODE VENT: ABNORMAL
VLDLC SERPL CALC-MCNC: 16 MG/DL
VT SETTING VENT: 500 ML
WBC # BLD AUTO: 9.8 K/UL (ref 4.1–11.1)

## 2025-02-13 PROCEDURE — 36600 WITHDRAWAL OF ARTERIAL BLOOD: CPT

## 2025-02-13 PROCEDURE — 2580000003 HC RX 258: Performed by: INTERNAL MEDICINE

## 2025-02-13 PROCEDURE — 6360000002 HC RX W HCPCS: Performed by: NURSE PRACTITIONER

## 2025-02-13 PROCEDURE — 82803 BLOOD GASES ANY COMBINATION: CPT

## 2025-02-13 PROCEDURE — 36415 COLL VENOUS BLD VENIPUNCTURE: CPT

## 2025-02-13 PROCEDURE — 84145 PROCALCITONIN (PCT): CPT

## 2025-02-13 PROCEDURE — 6370000000 HC RX 637 (ALT 250 FOR IP): Performed by: INTERNAL MEDICINE

## 2025-02-13 PROCEDURE — 99231 SBSQ HOSP IP/OBS SF/LOW 25: CPT | Performed by: NURSE PRACTITIONER

## 2025-02-13 PROCEDURE — 2000000000 HC ICU R&B

## 2025-02-13 PROCEDURE — 6360000002 HC RX W HCPCS: Performed by: INTERNAL MEDICINE

## 2025-02-13 PROCEDURE — 80202 ASSAY OF VANCOMYCIN: CPT

## 2025-02-13 PROCEDURE — 94003 VENT MGMT INPAT SUBQ DAY: CPT

## 2025-02-13 PROCEDURE — 2580000003 HC RX 258: Performed by: NURSE PRACTITIONER

## 2025-02-13 PROCEDURE — 99233 SBSQ HOSP IP/OBS HIGH 50: CPT | Performed by: PSYCHIATRY & NEUROLOGY

## 2025-02-13 PROCEDURE — 84478 ASSAY OF TRIGLYCERIDES: CPT

## 2025-02-13 PROCEDURE — 99497 ADVNCD CARE PLAN 30 MIN: CPT | Performed by: NURSE PRACTITIONER

## 2025-02-13 PROCEDURE — 85027 COMPLETE CBC AUTOMATED: CPT

## 2025-02-13 PROCEDURE — 83735 ASSAY OF MAGNESIUM: CPT

## 2025-02-13 PROCEDURE — 80048 BASIC METABOLIC PNL TOTAL CA: CPT

## 2025-02-13 PROCEDURE — 93880 EXTRACRANIAL BILAT STUDY: CPT

## 2025-02-13 PROCEDURE — 93880 EXTRACRANIAL BILAT STUDY: CPT | Performed by: PSYCHIATRY & NEUROLOGY

## 2025-02-13 PROCEDURE — 2500000003 HC RX 250 WO HCPCS: Performed by: NURSE PRACTITIONER

## 2025-02-13 PROCEDURE — 82962 GLUCOSE BLOOD TEST: CPT

## 2025-02-13 PROCEDURE — 83036 HEMOGLOBIN GLYCOSYLATED A1C: CPT

## 2025-02-13 PROCEDURE — 80061 LIPID PANEL: CPT

## 2025-02-13 PROCEDURE — 84100 ASSAY OF PHOSPHORUS: CPT

## 2025-02-13 RX ORDER — SODIUM CHLORIDE, SODIUM LACTATE, POTASSIUM CHLORIDE, CALCIUM CHLORIDE 600; 310; 30; 20 MG/100ML; MG/100ML; MG/100ML; MG/100ML
INJECTION, SOLUTION INTRAVENOUS CONTINUOUS
Status: DISCONTINUED | OUTPATIENT
Start: 2025-02-13 | End: 2025-02-18

## 2025-02-13 RX ORDER — ATORVASTATIN CALCIUM 40 MG/1
80 TABLET, FILM COATED ORAL NIGHTLY
Status: DISCONTINUED | OUTPATIENT
Start: 2025-02-13 | End: 2025-02-16

## 2025-02-13 RX ORDER — CARVEDILOL 12.5 MG/1
25 TABLET ORAL 2 TIMES DAILY
Status: DISCONTINUED | OUTPATIENT
Start: 2025-02-13 | End: 2025-02-16

## 2025-02-13 RX ORDER — AMLODIPINE BESYLATE 5 MG/1
10 TABLET ORAL DAILY
Status: DISCONTINUED | OUTPATIENT
Start: 2025-02-13 | End: 2025-02-16

## 2025-02-13 RX ORDER — SODIUM CHLORIDE, SODIUM LACTATE, POTASSIUM CHLORIDE, AND CALCIUM CHLORIDE .6; .31; .03; .02 G/100ML; G/100ML; G/100ML; G/100ML
500 INJECTION, SOLUTION INTRAVENOUS ONCE
Status: COMPLETED | OUTPATIENT
Start: 2025-02-13 | End: 2025-02-13

## 2025-02-13 RX ADMIN — SODIUM CHLORIDE, POTASSIUM CHLORIDE, SODIUM LACTATE AND CALCIUM CHLORIDE: 600; 310; 30; 20 INJECTION, SOLUTION INTRAVENOUS at 22:13

## 2025-02-13 RX ADMIN — CHLORHEXIDINE GLUCONATE 15 ML: 1.2 RINSE ORAL at 08:05

## 2025-02-13 RX ADMIN — LANSOPRAZOLE 30 MG: 30 TABLET, ORALLY DISINTEGRATING, DELAYED RELEASE ORAL at 08:05

## 2025-02-13 RX ADMIN — PIPERACILLIN AND TAZOBACTAM 3375 MG: 3; .375 INJECTION, POWDER, LYOPHILIZED, FOR SOLUTION INTRAVENOUS at 16:15

## 2025-02-13 RX ADMIN — ATORVASTATIN CALCIUM 80 MG: 40 TABLET, FILM COATED ORAL at 20:46

## 2025-02-13 RX ADMIN — CARVEDILOL 25 MG: 12.5 TABLET, FILM COATED ORAL at 20:46

## 2025-02-13 RX ADMIN — PIPERACILLIN AND TAZOBACTAM 3375 MG: 3; .375 INJECTION, POWDER, LYOPHILIZED, FOR SOLUTION INTRAVENOUS at 22:16

## 2025-02-13 RX ADMIN — APIXABAN 5 MG: 5 TABLET, FILM COATED ORAL at 20:46

## 2025-02-13 RX ADMIN — SODIUM CHLORIDE, POTASSIUM CHLORIDE, SODIUM LACTATE AND CALCIUM CHLORIDE 500 ML: 600; 310; 30; 20 INJECTION, SOLUTION INTRAVENOUS at 00:24

## 2025-02-13 RX ADMIN — AMLODIPINE BESYLATE 10 MG: 5 TABLET ORAL at 10:29

## 2025-02-13 RX ADMIN — CARVEDILOL 25 MG: 12.5 TABLET, FILM COATED ORAL at 10:29

## 2025-02-13 RX ADMIN — CHLORHEXIDINE GLUCONATE 15 ML: 1.2 RINSE ORAL at 20:45

## 2025-02-13 RX ADMIN — SODIUM CHLORIDE, POTASSIUM CHLORIDE, SODIUM LACTATE AND CALCIUM CHLORIDE: 600; 310; 30; 20 INJECTION, SOLUTION INTRAVENOUS at 10:55

## 2025-02-13 RX ADMIN — VANCOMYCIN HYDROCHLORIDE 750 MG: 750 INJECTION, POWDER, LYOPHILIZED, FOR SOLUTION INTRAVENOUS at 08:22

## 2025-02-13 RX ADMIN — SODIUM CHLORIDE, PRESERVATIVE FREE 10 ML: 5 INJECTION INTRAVENOUS at 21:01

## 2025-02-13 RX ADMIN — LEVETIRACETAM 1000 MG: 100 INJECTION INTRAVENOUS at 20:46

## 2025-02-13 RX ADMIN — Medication 1 AMPULE: at 20:47

## 2025-02-13 RX ADMIN — HYDRALAZINE HYDROCHLORIDE 10 MG: 20 INJECTION INTRAMUSCULAR; INTRAVENOUS at 05:33

## 2025-02-13 RX ADMIN — Medication 1 AMPULE: at 08:06

## 2025-02-13 RX ADMIN — SODIUM CHLORIDE, PRESERVATIVE FREE 10 ML: 5 INJECTION INTRAVENOUS at 08:06

## 2025-02-13 RX ADMIN — LEVETIRACETAM 1000 MG: 100 INJECTION INTRAVENOUS at 08:22

## 2025-02-13 RX ADMIN — APIXABAN 5 MG: 5 TABLET, FILM COATED ORAL at 10:29

## 2025-02-13 RX ADMIN — PIPERACILLIN AND TAZOBACTAM 3375 MG: 3; .375 INJECTION, POWDER, LYOPHILIZED, FOR SOLUTION INTRAVENOUS at 05:42

## 2025-02-13 RX ADMIN — FENTANYL CITRATE 50 MCG: 50 INJECTION INTRAMUSCULAR; INTRAVENOUS at 22:19

## 2025-02-13 RX ADMIN — FENTANYL CITRATE 50 MCG: 50 INJECTION INTRAMUSCULAR; INTRAVENOUS at 07:58

## 2025-02-13 ASSESSMENT — PULMONARY FUNCTION TESTS
PIF_VALUE: 25
PIF_VALUE: 25
PIF_VALUE: 22
PIF_VALUE: 22
PIF_VALUE: 24
PIF_VALUE: 15
PIF_VALUE: 27

## 2025-02-13 ASSESSMENT — PAIN SCALES - GENERAL
PAINLEVEL_OUTOF10: 0

## 2025-02-13 NOTE — PLAN OF CARE
Problem: Respiratory - Adult  Goal: Achieves optimal ventilation and oxygenation  Outcome: Progressing  Flowsheets (Taken 2/12/2025 2000)  Achieves optimal ventilation and oxygenation: Assess for changes in respiratory status     Problem: Cardiovascular - Adult  Goal: Maintains optimal cardiac output and hemodynamic stability  Recent Flowsheet Documentation  Taken 2/12/2025 2000 by Aline Mae, GURMEET  Maintains optimal cardiac output and hemodynamic stability:   Monitor blood pressure and heart rate   Monitor urine output and notify Licensed Independent Practitioner for values outside of normal range     Problem: Safety - Adult  Goal: Free from fall injury  Outcome: Progressing  Flowsheets (Taken 2/12/2025 2000)  Free From Fall Injury: Based on caregiver fall risk screen, instruct family/caregiver to ask for assistance with transferring infant if caregiver noted to have fall risk factors     Problem: Chronic Conditions and Co-morbidities  Goal: Patient's chronic conditions and co-morbidity symptoms are monitored and maintained or improved  Outcome: Progressing  Flowsheets (Taken 2/12/2025 2000)  Care Plan - Patient's Chronic Conditions and Co-Morbidity Symptoms are Monitored and Maintained or Improved: Monitor and assess patient's chronic conditions and comorbid symptoms for stability, deterioration, or improvement     Problem: Pain  Goal: Verbalizes/displays adequate comfort level or baseline comfort level  Outcome: Progressing  Flowsheets (Taken 2/12/2025 2000)  Verbalizes/displays adequate comfort level or baseline comfort level: Assess pain using appropriate pain scale     Problem: ABCDS Injury Assessment  Goal: Absence of physical injury  Outcome: Progressing

## 2025-02-13 NOTE — PROGRESS NOTES
0700: Bedside and Verbal shift change report given to GURMEET Vargas (oncoming nurse) by Aline CASTILLO RN (offgoing nurse). Report included the following information Nurse Handoff Report, ED SBAR, Adult Overview, Intake/Output, MAR, Recent Results, Cardiac Rhythm Atrial Fib, and Alarm Parameters.      0800- Shift assessment done. Patient is alert able to open eyes spontaneously, unable to follow commands, VS stable at this time; cardiac rhythm noted is A fib; Palpable pulses noted to all extremities; pupils are sluggish reactive, 2mm in size billaterally.  Patient remains intubated with ETT size 8.0, at 24 to teeth , on mechanical ventilator; parameters: AC/VC mode, FiO2 30, , PEEP 5, RR 14. Lung sounds clear to diminished bial;  OGT at level 74, connected to low intermittent suction; Abdomen is soft and non tender, hypoactive BS on all quadrants. Temp sensing schrader in place with adequate clear output.     0900- Neurology, Dr. Roger at the bedside.       1200- Reassessment done, no change to previous assessment.     1530- Dr. Roger, Dr. Haynes and Palliative NP Anjali Arnold updated patient's spouse about the patient's condition at the bedside.     1600- Reassessment done, no change to previous assessment.   Tube feed, standard with fiber started at 15ml/hr.    1615- Notify Dr. Haynes if it was appropriate to retry SBT as patient is more awake and is able to follow some commands. Dr. Haynes agreed. RT, Susan notified about the plan.     1715- Rechecked with RT about spontaneous breathing trials. RT busy at this time.     0700: Bedside and Verbal shift change report given to GURMEET Rosas (oncoming nurse) by Aline CASTILLO RN (offgoing nurse). Report included the following information Nurse Handoff Report, ED SBAR, Adult Overview, Intake/Output, MAR, Recent Results, Cardiac Rhythm Atrial Fib, and Alarm Parameters.

## 2025-02-13 NOTE — ACP (ADVANCE CARE PLANNING)
Advance Care Planning      Palliative Medicine Provider (MD/NP)  Advance Care Planning (ACP) Conversation      Date of Conversation: 02/13/25  The patient and/or authorized decision maker consented to a voluntary Advance Care Planning conversation.   Individuals present for the conversation:   Spouse Clarisa    Legal Healthcare Agent(s):    Primary Decision Maker (Active): Clarisa Engle - Spouse - [phone]    ACP documents available in EMR prior to discussion:  -None    Primary Palliative Diagnosis(es):  Impaired cognitive ability  Physical Debility, Generalized weakness  Seizures  Left lung consolidation (h/o carcinoid tumor s/p LLL wedge resection) concern is malignancy vs PNA    Conversation Summary:  Met with wife Clarisa and was present with her during 's and 's visits.   Met again with wife and Palliative LCSW Rita: introduced palliative services, had wife tell me what she heard the doctors tell her, clarified with her the difference between ischemic and hemorrhagic strokes, she understands that pt has had both, that he has to stop one of the AC medications to reduce the risk of the bleeding stroke even though it puts him at risk of an ischemic stroke.  She understands that at this point he is slowly improving however, at this time we cannot predict his recovery, only time will tell.  Also discussed his h/o lung tumor years ago, that at this time we are hoping he has PNA rather than a malignancy, and that we are treating him empirically for PNA; if it is PNA he should get better.   Wife verbalized understanding that pt is at high risk for getting worse and cardiac arrest, and that in his current condition if he undergoes CPR he is not expected to have a meaningful recovery, however, at this time she wants to continue aggressive medical care and full code status.  If with more time he gets worse or does not recover she is open to continuing goals of care discussion and reconsidering his code

## 2025-02-13 NOTE — INTERDISCIPLINARY ROUNDS
Critical care interdisciplinary rounds today.  Following members present: Case Management, ,Nursing, Nutrition, Pharmacy, and Physician. Nani is in for volume management. Remains critically ill.  Being followed by [facility]   Plan of care discussed.  See clinical pathway for plan of care and interventions and desired outcomes.

## 2025-02-13 NOTE — CARE COORDINATION
Care Management Initial Assessment       RUR:1%  Readmission? No  1st IM letter given? Yes - 2/13/2025  1st  letter given: No    Met with spouse at bedside briefly prior to palliative care coming to meet with wife - verified demographics on file - lives with wife in 1 story home with 5 steps to enter- patient is dependent with ADL's and IADL's - uses hospital bed, sushila lift and w/c - has a BSC and RW he no longer uses - uses [facility] in [place] - receives HH from [facility] and wife would like to resume services at discharge- CM to follow and await attending and palliative care GOC discussion for disposition plans at this time. Patient will require BLS transport upon discharge. JOE NIXON  [phone]         02/13/25 3908   Service Assessment   Patient Orientation Other (see comment)  (AMS - confused)   Cognition Severely Impaired   History Provided By Spouse   Primary Caregiver Family   Support Systems Spouse/Significant Other;Children;Family Members  (spouse, children and gradnchild)   Patient's Healthcare Decision Maker is: Legal Next of Kin   PCP Verified by CM Yes   Last Visit to PCP Within last 6 months   Prior Functional Level Assistance with the following:;Bathing;Dressing;Toileting;Feeding;Cooking;Housework;Shopping;Mobility   Current Functional Level Assistance with the following:;Bathing;Dressing;Toileting;Feeding;Cooking;Housework;Shopping;Mobility   Can patient return to prior living arrangement Yes   Ability to make needs known: Poor   Family able to assist with home care needs: Yes  (Family provides 24/7 care from family)   Financial Resources Medicare  (can send referral to [facility] for medicaid LTC screening)   Social/Functional History   Lives With Spouse   Type of Home House  (5 steps into home)   Home Layout One level   Home Equipment Hospital bed;Wheelchair - Manual;Walker - Rolling;Mechanical lift  (does not use his RW or BSC - requires total

## 2025-02-13 NOTE — CARE COORDINATION
CM attempted to call wife with phone number on file for CM initial assessment- no answer and VM was full - per notes wife to be in to visit today - CM asked nursing staff to call CM if/when wife arrives. LAURA KUMAR, MSW  [phone]

## 2025-02-13 NOTE — PROGRESS NOTES
Comprehensive Nutrition Assessment    Type and Reason for Visit:  Initial, Positive nutrition screen    Nutrition Recommendations/Plan:   Initiate TF via OGT:  Start Jevity 1.5 @ 15mL/h, advance rate 10mL q 8h as tolerated to Goal of 55mL/h + Prosource BID + 150mL flush q 4h (provides 2140kcals/124gPro/284gCHO/1905mL)   If no BM in next 24h would add daily glycolax and pericolace      Malnutrition Assessment:  Malnutrition Status:  No malnutrition (02/13/25 1329)    Context:  Acute Illness     Findings of the 6 clinical characteristics of malnutrition:  Energy Intake:  No decrease in energy intake  Weight Loss:  No weight loss     Body Fat Loss:  No body fat loss     Muscle Mass Loss:  No muscle mass loss    Fluid Accumulation:  No fluid accumulation     Strength:  Not Performed    Nutrition Assessment:  Pt admitted with seizures.  PMH: HTN, CVA, DM.  Chart reviewed, case discussed during CCU rounds.  Pt intubated, wife at the bedside.  MST negative for previous nutritional risk factors.  His appetite was great PTA and per wife he has had no wt loss, in fact some wt gain PTA.  No known food allergies.  Per IDR discussion okay to start TF, see orders above.  Discussed with pt's wife, no questions at this time.  Lytes WNL and BG well controlled at this time.  Palliative is following.   Wt Readings from Last 10 Encounters:   02/13/25 88.6 kg (195 lb 5.2 oz)   02/16/24 75.4 kg (166 lb 4.8 oz)   02/15/24 75.4 kg (166 lb 4.8 oz)   02/14/24 75.4 kg (166 lb 4.8 oz)   02/13/24 76.2 kg (168 lb)   02/08/24 79.5 kg (175 lb 4.8 oz)   02/06/24 80.5 kg (177 lb 6.4 oz)   02/01/24 80.8 kg (178 lb 3.2 oz)   01/30/24 80.8 kg (178 lb 3.2 oz)   10/03/23 91.8 kg (202 lb 6.1 oz)            Nutrition Related Findings:    Meds: lispro, prevacid, zosyn, vancomycin, LR@100mL/h.    Edema: trace-BUE.    BM: PTA   Wound Type: None       Current Nutrition Intake & Therapies:    Average Meal Intake: NPO         Anthropometric

## 2025-02-13 NOTE — PROGRESS NOTES
Palliative Medicine  Patient Name: Brii Engle  YOB: 1955  MRN: [id number]  Age: 69 y.o.  Gender: male    Date of Initial Consult: 2/12/2025  Date of Service: 2/13/2025  Time: 4:59 PM  Provider: JEANNETTE Humphreys NP  Hospital Day: 3  Admit Date: 2/11/2025  Referring Provider: Dr. Haynes       Reasons for Consultation:  Goals of Care    HISTORY OF PRESENT ILLNESS (HPI):   Brii Engle is a 69 y.o. male with a past medical history of HFrEF, Afib, HTN, T2DM, seizure disorder, h/o carcinoid tumor s/p LLL wedge resection who was admitted on 2/11/2025 from home with a diagnosis of status epilepticus, AHRF, Afib with RVR, RAE, lactic acidosis. He was treated for seizure with keppra; had received versed by EMS. He was intubated and is sedated. He was treated for afib with RVR with diltiazem. He became hypotensive and diltiazem was discontinued and he required pressors during the evening. He is being treated for possible pneumonia to see if there is clinical improvement; there is possibility acute lung issues are related to prior malignancy. He has new left gaze preference which may be related to new stroke vs seizure. Neuro is following. MRI completed on 2/12--small areas of acute infarction involving the inferior cerebellar vermis and medial right cerebellum without mass effect.    2/13: intubated, awake, making eye contact, nods to simple questions, moving right upper and right lower extremities.  Failed SAT/SBT this am.    Psychosocial: Patient is  to wife Clarisa (used to work at [employer] as [employer]). They have 3 daughters, Ruth Ann, Linda, Eunice.    PALLIATIVE DIAGNOSES:    Encounter for Palliative Care  Goals of Care discussion  Advance Care Plan discussion  Code Status discussion  Impaired cognitive ability  Physical Debility, Generalized weakness  Seizures  Left lung consolidation (h/o carcinoid tumor s/p LLL wedge resection) concern is malignancy vs PNA    ASSESSMENT AND PLAN:

## 2025-02-13 NOTE — PROGRESS NOTES
1900: Bedside and Verbal shift change report given to GURMEET Mireles (oncoming nurse) by GURMEET Vargas (offgoing nurse). Report included the following information Nurse Handoff Report, ED Encounter Summary, ED SBAR, Adult Overview, Intake/Output, MAR, Recent Results, Cardiac Rhythm Atrial Fib, and Alarm Parameters.     2000: Shift assessment done, see flowsheet for details.    2033: Called [facility], spoke to Sulema Akers, [facility] coordinator, they said they will follow-up.    2155: Frequent PVCs, notified Heelíasnridge, ICU NP with an order to send BMP, Mg,Phos and Lactic Acid. Blood sample sent.    2244: As per [facility] coordinator, to call for TOD if applicable.    2306: LA 3.1, K 3.5, notified Heelíasnridge, ICU NP with an order to give Effer-K 40meqs thru OGT. See MAR.    0000: Reassessment done, no change to previous assessment.    0011: UO for 7813-6584 was 25ml/hour then at 0000 15ml, notified Heelíasnridge, ICU NP with an order to give LR 500ml IV bolus. See MAR.    0025: Blood drawn for Vancomycin level.    0400: Reassessment done, no change to previous assessment.    0404: Blood drawn for CBC, BMP, Mg, Phos, Procalcitonin and Triglycerides.    0440: Stopped Propofol gtt for SAT and SBT.    0510: Patient open his eyes spontaneously but not following command, notified LORI Thorpe. Initiated SBT by LORI Thorpe. Patient now on PS.    0527: Apneic episodes, notified LORI Thorpe, switched back to rate.    0631: Patient tried to reach his ETT, soft restraint applied on his right wrist.    0700: Bedside and Verbal shift change report given to GURMEET Vargas (oncoming nurse) by GURMEET Mireles (offgoing nurse). Report included the following information Nurse Handoff Report, Adult Overview, Intake/Output, MAR, Recent Results, Cardiac Rhythm Atrial Fib, and Alarm Parameters.

## 2025-02-13 NOTE — PROGRESS NOTES
ICU Progress Note        Subjective:      - currently intubated and on the mechanical ventilator.    - failed SAT/SBT trial. MRI with small stroke.     HPI:  The patient is a 70 y/o male PMHx of CVA (residual left sided deficit) T2DM, Afib (Eliquis), HFrEF HTN. HLD, carcinoid tumor s/p LLL wedge resection and seizure disorder presented to ED via EMS after reported tonic clonic seizure at home witnessed by wife ~30-45 till EMS arrival d/t incliment weather. Administered 10 mg IM versed by EMS, followed by 5 mg IM with subsequent seizure cessation. ED arrival, unresponsive, snoring respirations, A-fib RVR-started on diltiazem drip, emergently placed on mechanical ventilation for airway protection. CT head- negative.  CXR with large left effusion and complete left lung opacification.  Rapid EEG no evidence of seizure burden.  Administered 1000 mg Keppra.  ICU consulted for admission.     Vital Signs:    BP (!) 155/86   Pulse (!) 104   Temp 99.9 °F (37.7 °C) (Bladder)   Resp 16   Ht 1.88 m (6' 2\")   Wt 88.6 kg (195 lb 5.2 oz)   SpO2 100%   BMI 25.08 kg/m²             Temp (24hrs), Av °F (37.2 °C), Min:97 °F (36.1 °C), Max:100 °F (37.8 °C)       Intake/Output:   Last shift:       07 -  1900  In: 12.5   Out: 90 [Urine:90]  Last 3 shifts:  190 -  0700  In: 4408.4 [I.V.:402.3]  Out: 2585 [Urine:2585]    Intake/Output Summary (Last 24 hours) at 2025 0912  Last data filed at 2025 0855  Gross per 24 hour   Intake 1367.36 ml   Output 775 ml   Net 592.36 ml       Physical Exam:    GEN: Intubated, sedated, exam limited by sedation.   HEENT: anicteric sclerae, pink conj, ETT in place.   NECK: Supple, -LAD, no neck mass  CV: S1S2  LUNGS: Coarse BS at bases  ABD: Soft, non-tender, no masses  EXT: No cyanosis  SKIN: Warm, dry and intact.   NEURO: Sedated, exam is limited by sedation.    DATA:     Current Facility-Administered Medications   Medication Dose Route Frequency     vancomycin (VANCOCIN) 750 mg in sodium chloride 0.9 % 250 mL IVPB (Rwmb4Oxc)  750 mg IntraVENous Once    hydrALAZINE (APRESOLINE) injection 10 mg  10 mg IntraVENous Q6H PRN    lansoprazole (PREVACID SOLUTAB) disintegrating tablet 30 mg  30 mg Orogastric Daily    fentaNYL (SUBLIMAZE) injection 50 mcg  50 mcg IntraVENous Q1H PRN    vancomycin (VANCOCIN) intermittent dosing (placeholder)   Other RX Placeholder    propofol infusion  5-50 mcg/kg/min IntraVENous Continuous    sodium chloride flush 0.9 % injection 5-40 mL  5-40 mL IntraVENous 2 times per day    sodium chloride flush 0.9 % injection 5-40 mL  5-40 mL IntraVENous PRN    0.9 % sodium chloride infusion   IntraVENous PRN    ondansetron (ZOFRAN-ODT) disintegrating tablet 4 mg  4 mg Oral Q8H PRN    Or    ondansetron (ZOFRAN) injection 4 mg  4 mg IntraVENous Q6H PRN    polyethylene glycol (GLYCOLAX) packet 17 g  17 g Oral Daily PRN    acetaminophen (TYLENOL) tablet 650 mg  650 mg Oral Q6H PRN    Or    acetaminophen (TYLENOL) suppository 650 mg  650 mg Rectal Q6H PRN    insulin lispro (HUMALOG,ADMELOG) injection vial 0-8 Units  0-8 Units SubCUTAneous Q6H    levETIRAcetam (KEPPRA) injection 1,000 mg  1,000 mg IntraVENous Q12H    lactated ringers bolus 500 mL  500 mL IntraVENous Once    [Held by provider] enoxaparin (LOVENOX) injection 90 mg  1 mg/kg SubCUTAneous BID    alcohol 62% (NOZIN) nasal  1 ampule  1 ampule Nasal BID    chlorhexidine (PERIDEX) 0.12 % solution 15 mL  15 mL Mouth/Throat BID    piperacillin-tazobactam (ZOSYN) 3,375 mg in sodium chloride 0.9 % 50 mL IVPB (mini-bag)  3,375 mg IntraVENous Q8H         Recent Results (from the past 24 hour(s))   EKG 12 Lead    Collection Time: 02/12/25 10:44 AM   Result Value Ref Range    Ventricular Rate 82 BPM    QRS Duration 102 ms    Q-T Interval 436 ms    QTc Calculation (Bazett) 509 ms    R Axis -7 degrees    T Axis -80 degrees    Diagnosis       Atrial fibrillation  Abnormal QRS-T angle, consider

## 2025-02-13 NOTE — PROGRESS NOTES
Neurology Note    Patient ID:  Brii GONZALEZ Harris  [id number]  69 y.o.  1955      Date of Consultation:  February 13, 2025      Assessment and Plan:    The patient is a 69-year-old male with multiple medical conditions who presents to the hospital with status epilepticus.  Patient does have a history of seizures and prior stroke with residual left-sided weakness.  His current neurological examination does reveal a left gaze preference with generalized weakness (worse on the right).       Status epilepticus:  Etiologies contributing to seizures includes breakthrough seizures, systemic impact of infection lowering seizure threshold, new ischemic event, metabolic derangements  Head CT with no acute abnormality  Rapid EEG with no increasing seizure burden  full channel EEG  with generalized slowing  Continue Keppra 1000 mg twice a day    Acute stroke:  History of prior stroke with significant chronic microvascular ischemic changes and chronic hemorrhages  Brain MRI did reveal area of acute stroke in the inferior cerebellar vermis and right cerebellum as well as the right hippocampus.  There was also extensive chronic white matter changes and scattered chronic microhemorrhages throughout.  Patient does have risk factors for stroke including atrial fibrillation, diabetes, hypertension  This is a difficult situation due to acute ischemic stroke, afib, and multiple chronic micro hemorrhages.   I Would recommend continuing anti-coagulation for time being. Would not restart plavix as well due to chronic microhemorrhages.  I will order follow up vascular study - carotid doppler  Should be considered for watchman device after the acute setting.  Hypertension: Aggressive control goal blood pressure of less than 140/90  Continue aggressive control of lipids and glucose.  Lipid panel and A1c ordered  Will continue to follow neurological examination closely      Left lung consolidation:  Differential includes effusion,  negative  Endocrine: negative  Neurological: negative, except for HPI  Hematologic/lymphatic: negative  Allergy/immunology: negative    Objective:     BP (!) 155/86   Pulse (!) 104   Temp 99.9 °F (37.7 °C)   Resp 16   Ht 1.88 m (6' 2\")   Wt 88.6 kg (195 lb 5.2 oz)   SpO2 100%   BMI 25.08 kg/m²     Physical Exam:    General:  intubated, sedated  Neck: no carotid bruits  Lungs: clear to auscultation  Heart:  no murmurs, regular rate  Lower extremity: peripheral pulses palpable and no edema  Skin: intact    Neurological exam:    Intubated, sedated    Cranial nerves:   II-XII were tested    Perrrla  Left gaze preference  Facial sensation:  symmetric grimace  No obvious facial asymmetry, however et tube in place    Motor:   Tone decreased throughout    No evidence of fasciculations    Strength testing:  There was no spontaneous movement  Patient does have withdrawal to painful stimulation in his bilateral legs and left upper extremity.  There is no withdrawal to painful stimulation in his right upper extremity        Reflexes:    Right Left  Biceps  1 1  Triceps 1 1  Brachiorad. 1 1  Patella  1 1  Achilles - -    Plantar response:  flexor bilaterally    Cerebellar testing:  no tremor apparent      Labs:     Lab Results   Component Value Date/Time     02/13/2025 04:04 AM    K 3.6 02/13/2025 04:04 AM     02/13/2025 04:04 AM    BUN 17 02/13/2025 04:04 AM    WBC 9.8 02/13/2025 04:04 AM    HCT 34.4 02/13/2025 04:04 AM    HGB 11.1 02/13/2025 04:04 AM     02/13/2025 04:04 AM    LDL 64.6 06/06/2024 10:45 AM    LDL 58.4 05/07/2023 04:54 AM                      Principal Problem:    Seizure (HCC)  Active Problems:    Breakthrough seizure (HCC)    Status epilepticus (HCC)  Resolved Problems:    * No resolved hospital problems. *        Complex decision making was necessary due to the patient's current medical condition and other medical co-morbidities.            Signed By:  Dax Roger DO FAAN

## 2025-02-13 NOTE — PROGRESS NOTES
Palliative Medicine  Per Dr. Juarez: Brii Engle is a 69 y.o. male with a past medical history of CVA, cancer, who was admitted on 2025 from home with a diagnosis of status epilepticus, AHRF, Afib with RVR, RAE, lactic acidosis. He was treated for seizure with keppra. He was intubated. He was treated for afib with RVR with diltiazem. He become hypotensive and diltiazem was discontinued.      Code Status: Full Code    [facility]/13/2025     3:05 PM   Demographics   Marital Status    No AMD so spouse, Clarisa is primary HCDM and 3 daughters are secondary HCDM    Patient / Family Encounter Documentation    Participants (names): Brii Engle, minimally responsive, intubated, spouse Clarisa, granddaughter Bia, Dr. Roger, assigned nurse Alicia, Anjali [place], NP, Rita Lopez, [facility]    Narrative:   --Palliative team joined encounter with Dr. Roger and family as he explained recent imaging and multiple CVA evidence.    --After his update, Anjali and this writer met with spouse in conference room to further explore her understanding of her 's medical issues and begin GOC discussion.  --She expressed basic understanding of patient's brain and lung issues. (That damage may be irreversible and that lung problems could be pneumonia or recurrence of cancer from ).  --Empathetic, reflective listening given with encouragement for her self-expression.  --Spouse shared some information about patient and family which is large and supportive.  --Palliative team will continue to follow along for support.    Psychosocial Issues Identified/ Resilience Factors: Patient is  to wife Clarisa and [place], [place]. They have 3 daughters, Ruth Ann, Linda, Eunice. They also have grandchildren. Patient has been paralyzed on his left side since a CVA. 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 He is described by his wife

## 2025-02-13 NOTE — PROGRESS NOTES
participated in Interdisciplinary Rounds. The patient's medical status was discussed.    Rev. IZZY Montejo, [facility]   Paging Service [phone]

## 2025-02-13 NOTE — PLAN OF CARE
Problem: Safety - Medical Restraint  Goal: Remains free of injury from restraints (Restraint for Interference with Medical Device)  Description: INTERVENTIONS:  1. Determine that other, less restrictive measures have been tried or would not be effective before applying the restraint  2. Evaluate the patient's condition at the time of restraint application  3. Inform patient/family regarding the reason for restraint  4. Q2H: Monitor safety, psychosocial status, comfort, nutrition and hydration  Outcome: Progressing  Flowsheets  Taken 2/13/2025 0631  Remains free of injury from restraints (restraint for interference with medical device): Every 2 hours: Monitor safety, psychosocial status, comfort, nutrition and hydration  Taken 2/12/2025 2027  Remains free of injury from restraints (restraint for interference with medical device): Every 2 hours: Monitor safety, psychosocial status, comfort, nutrition and hydration

## 2025-02-14 ENCOUNTER — TELEPHONE (OUTPATIENT)
Age: 70
End: 2025-02-14

## 2025-02-14 LAB
ANION GAP SERPL CALC-SCNC: 9 MMOL/L (ref 2–12)
ARTERIAL PATENCY WRIST A: POSITIVE
BASE EXCESS BLD CALC-SCNC: 2.7 MMOL/L
BDY SITE: ABNORMAL
BUN SERPL-MCNC: 17 MG/DL (ref 6–20)
BUN/CREAT SERPL: 14 (ref 12–20)
CALCIUM SERPL-MCNC: 7.8 MG/DL (ref 8.5–10.1)
CHLORIDE SERPL-SCNC: 112 MMOL/L (ref 97–108)
CO2 SERPL-SCNC: 25 MMOL/L (ref 21–32)
CREAT SERPL-MCNC: 1.22 MG/DL (ref 0.7–1.3)
ERYTHROCYTE [DISTWIDTH] IN BLOOD BY AUTOMATED COUNT: 15.7 % (ref 11.5–14.5)
GAS FLOW.O2 O2 DELIVERY SYS: ABNORMAL
GAS FLOW.O2 SETTING OXYMISER: 14 BPM
GLUCOSE BLD STRIP.AUTO-MCNC: 120 MG/DL (ref 65–117)
GLUCOSE BLD STRIP.AUTO-MCNC: 123 MG/DL (ref 65–117)
GLUCOSE BLD STRIP.AUTO-MCNC: 97 MG/DL (ref 65–117)
GLUCOSE BLD STRIP.AUTO-MCNC: 99 MG/DL (ref 65–117)
GLUCOSE SERPL-MCNC: 109 MG/DL (ref 65–100)
HCO3 BLD-SCNC: 27.1 MMOL/L (ref 21–28)
HCT VFR BLD AUTO: 33.1 % (ref 36.6–50.3)
HGB BLD-MCNC: 10.5 G/DL (ref 12.1–17)
MAGNESIUM SERPL-MCNC: 1.8 MG/DL (ref 1.6–2.4)
MCH RBC QN AUTO: 26.5 PG (ref 26–34)
MCHC RBC AUTO-ENTMCNC: 31.7 G/DL (ref 30–36.5)
MCV RBC AUTO: 83.6 FL (ref 80–99)
NRBC # BLD: 0 K/UL (ref 0–0.01)
NRBC BLD-RTO: 0 PER 100 WBC
O2/TOTAL GAS SETTING VFR VENT: 30 %
PCO2 BLD: 40 MMHG (ref 35–48)
PEEP RESPIRATORY: 5 CMH2O
PH BLD: 7.44 (ref 7.35–7.45)
PHOSPHATE SERPL-MCNC: 3.2 MG/DL (ref 2.6–4.7)
PLATELET # BLD AUTO: 145 K/UL (ref 150–400)
PMV BLD AUTO: 12.2 FL (ref 8.9–12.9)
PO2 BLD: 140 MMHG (ref 83–108)
POTASSIUM SERPL-SCNC: 3.3 MMOL/L (ref 3.5–5.1)
PROCALCITONIN SERPL-MCNC: 1.48 NG/ML
RBC # BLD AUTO: 3.96 M/UL (ref 4.1–5.7)
SAO2 % BLD: 99.2 % (ref 92–97)
SERVICE CMNT-IMP: ABNORMAL
SERVICE CMNT-IMP: ABNORMAL
SERVICE CMNT-IMP: NORMAL
SERVICE CMNT-IMP: NORMAL
SODIUM SERPL-SCNC: 146 MMOL/L (ref 136–145)
SPECIMEN TYPE: ABNORMAL
VANCOMYCIN SERPL-MCNC: 10 UG/ML
VENTILATION MODE VENT: ABNORMAL
VT SETTING VENT: 450 ML
WBC # BLD AUTO: 7.3 K/UL (ref 4.1–11.1)

## 2025-02-14 PROCEDURE — 2580000003 HC RX 258: Performed by: NURSE PRACTITIONER

## 2025-02-14 PROCEDURE — 94003 VENT MGMT INPAT SUBQ DAY: CPT

## 2025-02-14 PROCEDURE — 80202 ASSAY OF VANCOMYCIN: CPT

## 2025-02-14 PROCEDURE — 97162 PT EVAL MOD COMPLEX 30 MIN: CPT

## 2025-02-14 PROCEDURE — 36600 WITHDRAWAL OF ARTERIAL BLOOD: CPT

## 2025-02-14 PROCEDURE — 6370000000 HC RX 637 (ALT 250 FOR IP): Performed by: NURSE PRACTITIONER

## 2025-02-14 PROCEDURE — 83735 ASSAY OF MAGNESIUM: CPT

## 2025-02-14 PROCEDURE — 2580000003 HC RX 258: Performed by: INTERNAL MEDICINE

## 2025-02-14 PROCEDURE — 6360000002 HC RX W HCPCS: Performed by: INTERNAL MEDICINE

## 2025-02-14 PROCEDURE — 84100 ASSAY OF PHOSPHORUS: CPT

## 2025-02-14 PROCEDURE — 97166 OT EVAL MOD COMPLEX 45 MIN: CPT

## 2025-02-14 PROCEDURE — 6360000002 HC RX W HCPCS: Performed by: NURSE PRACTITIONER

## 2025-02-14 PROCEDURE — 97530 THERAPEUTIC ACTIVITIES: CPT

## 2025-02-14 PROCEDURE — 84145 PROCALCITONIN (PCT): CPT

## 2025-02-14 PROCEDURE — 6370000000 HC RX 637 (ALT 250 FOR IP): Performed by: INTERNAL MEDICINE

## 2025-02-14 PROCEDURE — 82962 GLUCOSE BLOOD TEST: CPT

## 2025-02-14 PROCEDURE — 2500000003 HC RX 250 WO HCPCS: Performed by: NURSE PRACTITIONER

## 2025-02-14 PROCEDURE — 99232 SBSQ HOSP IP/OBS MODERATE 35: CPT | Performed by: PSYCHIATRY & NEUROLOGY

## 2025-02-14 PROCEDURE — 92610 EVALUATE SWALLOWING FUNCTION: CPT

## 2025-02-14 PROCEDURE — 36415 COLL VENOUS BLD VENIPUNCTURE: CPT

## 2025-02-14 PROCEDURE — 2000000000 HC ICU R&B

## 2025-02-14 PROCEDURE — 80048 BASIC METABOLIC PNL TOTAL CA: CPT

## 2025-02-14 PROCEDURE — 82803 BLOOD GASES ANY COMBINATION: CPT

## 2025-02-14 PROCEDURE — 97535 SELF CARE MNGMENT TRAINING: CPT

## 2025-02-14 PROCEDURE — 85027 COMPLETE CBC AUTOMATED: CPT

## 2025-02-14 RX ORDER — POTASSIUM CHLORIDE 7.45 MG/ML
10 INJECTION INTRAVENOUS ONCE
Status: COMPLETED | OUTPATIENT
Start: 2025-02-14 | End: 2025-02-14

## 2025-02-14 RX ORDER — LISINOPRIL 20 MG/1
40 TABLET ORAL DAILY
Status: DISCONTINUED | OUTPATIENT
Start: 2025-02-14 | End: 2025-02-16

## 2025-02-14 RX ADMIN — CARVEDILOL 25 MG: 12.5 TABLET, FILM COATED ORAL at 21:07

## 2025-02-14 RX ADMIN — APIXABAN 5 MG: 5 TABLET, FILM COATED ORAL at 21:07

## 2025-02-14 RX ADMIN — APIXABAN 5 MG: 5 TABLET, FILM COATED ORAL at 08:31

## 2025-02-14 RX ADMIN — VANCOMYCIN HYDROCHLORIDE 750 MG: 750 INJECTION, POWDER, LYOPHILIZED, FOR SOLUTION INTRAVENOUS at 21:07

## 2025-02-14 RX ADMIN — SODIUM CHLORIDE, PRESERVATIVE FREE 10 ML: 5 INJECTION INTRAVENOUS at 08:21

## 2025-02-14 RX ADMIN — SODIUM CHLORIDE, POTASSIUM CHLORIDE, SODIUM LACTATE AND CALCIUM CHLORIDE: 600; 310; 30; 20 INJECTION, SOLUTION INTRAVENOUS at 18:04

## 2025-02-14 RX ADMIN — LANSOPRAZOLE 30 MG: 30 TABLET, ORALLY DISINTEGRATING, DELAYED RELEASE ORAL at 08:18

## 2025-02-14 RX ADMIN — Medication 1 AMPULE: at 21:12

## 2025-02-14 RX ADMIN — SODIUM CHLORIDE, PRESERVATIVE FREE 10 ML: 5 INJECTION INTRAVENOUS at 21:10

## 2025-02-14 RX ADMIN — CHLORHEXIDINE GLUCONATE 15 ML: 1.2 RINSE ORAL at 21:08

## 2025-02-14 RX ADMIN — POTASSIUM BICARBONATE 40 MEQ: 782 TABLET, EFFERVESCENT ORAL at 05:40

## 2025-02-14 RX ADMIN — SODIUM CHLORIDE: 9 INJECTION, SOLUTION INTRAVENOUS at 08:43

## 2025-02-14 RX ADMIN — PIPERACILLIN AND TAZOBACTAM 3375 MG: 3; .375 INJECTION, POWDER, LYOPHILIZED, FOR SOLUTION INTRAVENOUS at 21:04

## 2025-02-14 RX ADMIN — PIPERACILLIN AND TAZOBACTAM 3375 MG: 3; .375 INJECTION, POWDER, LYOPHILIZED, FOR SOLUTION INTRAVENOUS at 14:51

## 2025-02-14 RX ADMIN — CARVEDILOL 25 MG: 12.5 TABLET, FILM COATED ORAL at 08:18

## 2025-02-14 RX ADMIN — PIPERACILLIN AND TAZOBACTAM 3375 MG: 3; .375 INJECTION, POWDER, LYOPHILIZED, FOR SOLUTION INTRAVENOUS at 05:43

## 2025-02-14 RX ADMIN — LISINOPRIL 40 MG: 20 TABLET ORAL at 10:47

## 2025-02-14 RX ADMIN — AMLODIPINE BESYLATE 10 MG: 5 TABLET ORAL at 08:18

## 2025-02-14 RX ADMIN — ATORVASTATIN CALCIUM 80 MG: 40 TABLET, FILM COATED ORAL at 21:07

## 2025-02-14 RX ADMIN — POTASSIUM CHLORIDE 10 MEQ: 7.46 INJECTION, SOLUTION INTRAVENOUS at 05:42

## 2025-02-14 RX ADMIN — LEVETIRACETAM 1000 MG: 100 INJECTION INTRAVENOUS at 21:07

## 2025-02-14 RX ADMIN — LEVETIRACETAM 1000 MG: 100 INJECTION INTRAVENOUS at 08:18

## 2025-02-14 RX ADMIN — SODIUM CHLORIDE, POTASSIUM CHLORIDE, SODIUM LACTATE AND CALCIUM CHLORIDE: 600; 310; 30; 20 INJECTION, SOLUTION INTRAVENOUS at 08:20

## 2025-02-14 RX ADMIN — CHLORHEXIDINE GLUCONATE 15 ML: 1.2 RINSE ORAL at 08:18

## 2025-02-14 RX ADMIN — HYDRALAZINE HYDROCHLORIDE 10 MG: 20 INJECTION INTRAMUSCULAR; INTRAVENOUS at 09:33

## 2025-02-14 RX ADMIN — Medication 1 AMPULE: at 08:18

## 2025-02-14 RX ADMIN — VANCOMYCIN HYDROCHLORIDE 750 MG: 750 INJECTION, POWDER, LYOPHILIZED, FOR SOLUTION INTRAVENOUS at 08:43

## 2025-02-14 ASSESSMENT — PULMONARY FUNCTION TESTS
PIF_VALUE: 13
PIF_VALUE: 22

## 2025-02-14 ASSESSMENT — PAIN SCALES - GENERAL
PAINLEVEL_OUTOF10: 0

## 2025-02-14 NOTE — PROGRESS NOTES
Orders received, chart reviewed and patient evaluated by physical therapy. Pending progression with skilled acute physical therapy, recommend:    Intermittent physical therapy up to 2-3x/week in previous living setting- resume    Patient is sushila lifted at baseline.     Full evaluation to follow.      [place], PT, DPT

## 2025-02-14 NOTE — PROGRESS NOTES
PHYSICAL THERAPY EVALUATION/DISCHARGE    Patient: Brii Engle (69 y.o. male)  Date: 2/14/2025  Primary Diagnosis: Seizure (HCC) [R56.9]       Precautions: Seizure, Contact Precautions                      ASSESSMENT AND RECOMMENDATIONS:  Based on the objective data below, the patient presents at his dependent baseline. Patient required max-total A for all bed mobility, less assist to roll to L compared to R due to functional R side. Patient incontinent of bowel and required total A for hygiene. Patient not safe to mobilize further than bed due to prior level of function.     Recommend nursing utilize sushila lift to transfer OOB to chair or utilize chair position in the bed to maintain strength and upright sitting tolerance during hospital stay.     Functional Outcome Measure:  The patient scored 0/56 on the Patel balance  outcome measure which is indicative of high fall risk.          Further skilled acute physical therapy is not indicated at this time.       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

## 2025-02-14 NOTE — PROGRESS NOTES
0720- Bedside and Verbal shift change report given to NikoRN/DanniRN (oncoming nurse) by GURMEET Rosas  (offgoing nurse). Report included the following information Nurse Handoff Report, MAR, Recent Results, and Cardiac Rhythm A Fib  .     0748- Respiratory Therapist at bedside, patient extubated, restraint removed.    0818- Performed AF83 swallow screen, patient passed    0933- Patient's BP is 184/168, gave prn hydralazine.     1015- Cardoza catheter removed.     1200- Reassessment completed.    1620- Reassessment completed.

## 2025-02-14 NOTE — PROGRESS NOTES
OCCUPATIONAL THERAPY EVALUATION/DISCHARGE  Patient: Brii Engle (69 y.o. male)  Date: 2/14/2025  Primary Diagnosis: Seizure (HCC) [R56.9]         Precautions: Seizure, Contact Precautions                  ASSESSMENT :  Based on the objective data below, the patient is functioning at his dependent baseline for ADLs and functional mobility. Patient received semisupine in bed on RA and cleared for therapy by nursing. He was oriented to name and place only. He demonstrated no active movement in LUE/LLE and required total assist for positioning in bed. He experienced bowel incontinence and required total assist for pericare and rolling side to side in bed. Patient repositioned in bed for comfort and left with all needs met, VSS. Unable to attempt Fugl-Nogueira assessment as patient has baseline deficits in LUE and difficulty following commands.     Functional Outcome Measure:  The patient scored 6/23 on the Fugl-Nogueira Assessment outcome measure which is indicative of patient functioning at his dependent baseline for ADLs.      Further skilled acute occupational therapy is not indicated at this time.     PLAN :    Recommendation for discharge: (in order for the patient to meet his/her long term goals):   Intermittent occupational therapy up to 2-3x/week in previous living setting with additional support needed for all ADLs    Other factors to consider for discharge: poor safety awareness, impaired cognition, high risk for falls, not safe to be alone, and concern for safely navigating or managing the home environment    IF patient discharges home will need the following DME: patient owns DME required for discharge     SUBJECTIVE:   Patient stated, “My wife uses a lift.”    OBJECTIVE DATA SUMMARY:     Past Medical History:   Diagnosis Date    Atrial fibrillation (HCC)     Cancer (HCC) 12/2015    left lung; 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

## 2025-02-14 NOTE — PROGRESS NOTES
0720: Bedside and Verbal shift change report given to Niko LARSON RN/GURMEET Razo (oncoming nurse) by GURMEET Rosas (offgoing nurse). Report included the following information Nurse Handoff Report, ED Encounter Summary, ED SBAR, Adult Overview, Intake/Output, MAR, Recent Results, and Cardiac Rhythm A-fib .     GURMEET Razo as primary, this RN precepting. See primary RN notes/flowsheets for details.

## 2025-02-14 NOTE — PLAN OF CARE
Problem: Neurosensory - Adult  Goal: Achieves stable or improved neurological status  Recent Flowsheet Documentation  Taken 2/14/2025 0800 by Danni Moya RN  Achieves stable or improved neurological status:   Assess for and report changes in neurological status   Initiate measures to prevent increased intracranial pressure  2/14/2025 0035 by Azalea Vincent RN  Outcome: Progressing  Flowsheets (Taken 2/13/2025 2000)  Achieves stable or improved neurological status:   Assess for and report changes in neurological status   Initiate measures to prevent increased intracranial pressure  Goal: Absence of seizures  Recent Flowsheet Documentation  Taken 2/14/2025 0800 by Danni Moya RN  Absence of seizures:   Monitor for seizure activity.  If seizure occurs, document type and location of movements and any associated apnea   If seizure occurs, turn head to side and suction secretions as needed   Administer anticonvulsants as ordered   Support airway/breathing, administer oxygen as needed  2/14/2025 0035 by Azalea Vincent RN  Outcome: Progressing  Flowsheets (Taken 2/13/2025 2000)  Absence of seizures:   Monitor for seizure activity.  If seizure occurs, document type and location of movements and any associated apnea   If seizure occurs, turn head to side and suction secretions as needed   Administer anticonvulsants as ordered   Support airway/breathing, administer oxygen as needed  Goal: Remains free of injury related to seizures activity  Recent Flowsheet Documentation  Taken 2/14/2025 0800 by Danni Moya RN  Remains free of injury related to seizure activity:   Maintain airway, patient safety  and administer oxygen as ordered   Monitor patient for seizure activity, document and report duration and description of seizure to Licensed Independent Practitioner   If seizure occurs, turn patient to side and suction secretions as needed   Seizure pads on all 4 side rails  2/14/2025 0035 by Azalea Vincent  RN  Outcome: Progressing  Flowsheets (Taken 2/13/2025 2000)  Remains free of injury related to seizure activity:   Maintain airway, patient safety  and administer oxygen as ordered   Monitor patient for seizure activity, document and report duration and description of seizure to Licensed Independent Practitioner   If seizure occurs, turn patient to side and suction secretions as needed   Reorient patient post seizure   Seizure pads on all 4 side rails  Goal: Achieves maximal functionality and self care  Recent Flowsheet Documentation  Taken 2/14/2025 0800 by Danni Moya RN  Achieves maximal functionality and self care:   Monitor swallowing and airway patency with patient fatigue and changes in neurological status   Encourage and assist patient to increase activity and self care with guidance from physical therapy/occupational therapy  2/14/2025 0035 by Azalea Vincent RN  Outcome: Progressing     Problem: Neurosensory - Adult  Goal: Absence of seizures  Recent Flowsheet Documentation  Taken 2/14/2025 0800 by Danni Moya RN  Absence of seizures:   Monitor for seizure activity.  If seizure occurs, document type and location of movements and any associated apnea   If seizure occurs, turn head to side and suction secretions as needed   Administer anticonvulsants as ordered   Support airway/breathing, administer oxygen as needed  2/14/2025 0035 by Azalea Vincent RN  Outcome: Progressing  Flowsheets (Taken 2/13/2025 2000)  Absence of seizures:   Monitor for seizure activity.  If seizure occurs, document type and location of movements and any associated apnea   If seizure occurs, turn head to side and suction secretions as needed   Administer anticonvulsants as ordered   Support airway/breathing, administer oxygen as needed     Problem: Neurosensory - Adult  Goal: Remains free of injury related to seizures activity  Recent Flowsheet Documentation  Taken 2/14/2025 0800 by Danni Moay RN  Remains free of

## 2025-02-14 NOTE — PROGRESS NOTES
0720- Bedside and Verbal shift change report given to GURMEET Pope/GURMEET Razo (oncoming nurse) by GURMEET Rosas  (offgoing nurse). Report included the following information Nurse Handoff Report, MAR, Recent Results, and Cardiac Rhythm A Fib  .     0748- Respiratory Therapist at bedside, patient extubated, restraint removed.    0818- Performed tyrel swallow screen, patient passed    0933- Patient's BP is 184/168, gave prn hydralazine.     1015- Schrader catheter removed.     1200- Reassessment completed.    1620- Reassessment completed.     End of Shift Note    Bedside shift change report given to GURMEET Rosas  (oncoming nurse) by Danni Moya RN/GURMEET Pope  (offgoing nurse).  Report included the following information SBAR, MAR, Recent Results, and Cardiac Rhythm A Fib     Shift worked:  8466-1155     Shift summary and any significant changes:     Patient extubated and patient on room air, schrader removed, small Bms during shift, patient on full liquids.      Concerns for physician to address:  None      Zone phone for oncoming shift:          Activity:  Level of Assistance: Dependent, patient does less than 25%  Number times ambulated in hallways past shift: 0  Number of times OOB to chair past shift: 0    Cardiac:   Cardiac Monitoring: Yes      Cardiac Rhythm: Atrial fib    Access:  Current line(s): PIV     Genitourinary:   Urinary Status: Draining, Schrader    Respiratory:   O2 Device: None (Room air)  Chronic home O2 use?: NO  Incentive spirometer at bedside: NO    GI:  Last BM (including prior to admit): 02/14/25  Current diet:  ADULT DIET; Full Liquid  Passing flatus: YES    Pain Management:   Patient states pain is manageable on current regimen: YES    Skin:  Dominik Scale Score: 12  Interventions: Wound Offloading (Prevention Methods): Bed, pressure reduction mattress, Elevate heels, Repositioning, Pillows, Turning, Wedge pillows    Patient Safety:  Fall Risk: Nursing Judgement-Fall Risk High(Add Comments): Yes  Fall Risk

## 2025-02-14 NOTE — PROGRESS NOTES
End of Shift Note    Bedside shift change report given to Niko RN/Thuy RN (oncoming nurse) by Ralph RN (offgoing nurse).  Report included the following information SBAR, Intake/Output, Recent Results, and Cardiac Rhythm AFib    Shift worked:  1350-6044     Shift summary and any significant changes:     Patient tolerating TF. SBT this am. VSS this shift, no need for pressors. Fentanyl x1 given for vent compliance. Started SBT this am, tolerating.      Concerns for physician to address:       Zone phone for oncoming shift:          Activity:  Level of Assistance: Dependent, patient does less than 25%  Number times ambulated in hallways past shift: 0  Number of times OOB to chair past shift: 0    Cardiac:   Cardiac Monitoring: Yes      Cardiac Rhythm: Atrial fib    Access:  Current line(s): PIV     Genitourinary:   Urinary Status: Draining, Cardoaz    Respiratory:   O2 Device: Ventilator  Chronic home O2 use?: NO  Incentive spirometer at bedside: NO    GI:  Last BM (including prior to admit): 02/13/25  Current diet:  Diet NPO  ADULT TUBE FEEDING; Orogastric; Standard with Fiber; Continuous; 15; Yes; 10; Q 8 hours; 55; 150; Q 4 hours; Protein; 1 Dose; BID  DIET ONE TIME MESSAGE;  Passing flatus: YES    Pain Management:   Patient states pain is manageable on current regimen: N/A    Skin:  Dominik Scale Score: 13  Interventions: Wound Offloading (Prevention Methods): Bed, pressure redistribution/air, Elevate heels, Pillows, Repositioning    Patient Safety:  Fall Risk: Nursing Judgement-Fall Risk High(Add Comments): Yes  Fall Risk Interventions  Nursing Judgement-Fall Risk High(Add Comments): Yes  Toilet Every 2 Hours-In Advance of Need: Yes  Hourly Visual Checks: Awake, In bed  Fall Visual Posted: Armband  Room Door Open: Yes  Alarm On: Bed  Patient Moved Closer to Nursing Station: No    Active Consults:   IP CONSULT TO NEUROLOGY  IP CONSULT TO PHARMACY  IP CONSULT TO PALLIATIVE CARE  IP CONSULT TO DIETITIAN    Length of

## 2025-02-14 NOTE — PROGRESS NOTES
participated in Interdisciplinary Rounds. The patient's medical status was discussed.    Rev. IZZY Montejo, [facility]   Paging Service [phone])

## 2025-02-14 NOTE — PROGRESS NOTES
Palliative Medicine  Per Dr. Juarez: Brii Engle is a 69 y.o. male with a past medical history of CVA, cancer, who was admitted on 2025 from home with a diagnosis of status epilepticus, AHRF, Afib with RVR, RAE, lactic acidosis. He was treated for seizure with keppra. He was intubated. He was treated for afib with RVR with diltiazem. He become hypotensive and diltiazem was discontinued.      Code Status: Full Code    [facility]/14/2025    10:55 AM   Demographics   Marital Status    No AMD so spouse, Clarisa is primary HCDM and 3 daughters are secondary HCDM    Patient / Family Encounter Documentation    Participants (names): Brii Engle, daughter Eunice, Ritadaniel Lopez, DOLORES    Narrative:   --Palliative SW reviewed chart, checked in with assigned nurse Niko, who reported patient was alert and following directions after extubation.  --Stepped in after donning PPE to offer support to patient and daughter, who was assisting him with some soup.  --Patient was alert to self and family, smiling, responding appropriately to simple questions and making his needs known.  --Daughter requested assistance with repositioning him for comfort and for maximum elevation of his head while drinking.  --LCSW notified Niko of request. No other needs or concerns at this time.  --Palliative team will continue to follow along for support.    Psychosocial Issues Identified/ Resilience Factors: Patient is  to wife Clarisa and [place], [place]. They have 3 daughters, Linda Vallecillo, Eunice. They also have grandchildren. Patient has been paralyzed on his left side since a CVA. 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 He is described by his wife as a man of few words, who enjoys being outdoors hunting, fishing, watching car racing, and sitting on his porch. His large family is present often visiting in the home and helping

## 2025-02-14 NOTE — PROGRESS NOTES
ICU Progress Note        Subjective:      - currently intubated and on the mechanical ventilator.    - failed SAT/SBT trial. MRI with small stroke.   - awake, following commands.     HPI:  The patient is a 70 y/o male PMHx of CVA (residual left sided deficit) T2DM, Afib (Eliquis), HFrEF HTN. HLD, carcinoid tumor s/p LLL wedge resection and seizure disorder presented to ED via EMS after reported tonic clonic seizure at home witnessed by wife ~30-45 till EMS arrival d/t incliment weather. Administered 10 mg IM versed by EMS, followed by 5 mg IM with subsequent seizure cessation. ED arrival, unresponsive, snoring respirations, A-fib RVR-started on diltiazem drip, emergently placed on mechanical ventilation for airway protection. CT head- negative.  CXR with large left effusion and complete left lung opacification.  Rapid EEG no evidence of seizure burden.  Administered 1000 mg Keppra.  ICU consulted for admission.     Vital Signs:    BP (!) 178/115   Pulse 83   Temp 99.3 °F (37.4 °C)   Resp 25   Ht 1.88 m (6' 2\")   Wt 89 kg (196 lb 3.4 oz)   SpO2 98%   BMI 25.19 kg/m²             Temp (24hrs), Av.4 °F (37.4 °C), Min:98.8 °F (37.1 °C), Max:99.9 °F (37.7 °C)       Intake/Output:   Last shift:       07 -  1900  In: 10 [I.V.:10]  Out: -   Last 3 shifts: 1901 -  0700  In: 3816.6 [I.V.:1951.6]  Out: 1050 [Urine:1050]    Intake/Output Summary (Last 24 hours) at 2025 0829  Last data filed at 2025 0821  Gross per 24 hour   Intake 3063.09 ml   Output 730 ml   Net 2333.09 ml       Physical Exam:    GEN: Intubated.   HEENT: anicteric sclerae, pink conj, ETT in place.   NECK: Supple, -LAD, no neck mass  CV: S1S2  LUNGS: Coarse BS at bases  ABD: Soft, non-tender, no masses  EXT: No cyanosis  SKIN: Warm, dry and intact.   NEURO: Alert, awake, follows commands. Left hemiplegia.     DATA:     Current Facility-Administered Medications   Medication Dose Route Frequency

## 2025-02-14 NOTE — PROGRESS NOTES
Pharmacy Antimicrobial Kinetic Dosing    Indication for Antimicrobials: PNA    Current Regimen of Each Antimicrobial:  Vancomycin Pharmacy to Dose; Start Date ; Day # 3  Zosyn 3.375g IV q8h; Start Date ; Day # 3    Previous Antimicrobial Therapy:  Ceftriaxone       Goal Level: Vancomycin random level < 20     Date Dose & Interval Measured (mcg/mL) Predicted AUC 24-48 Predicted AUC 24,ss    2000mg load 11.5 N/A N/A    04:05  10.0              Significant Cultures:    Blood, paired: ngtd, prelim     Labs:  Recent Labs     Units 25  0405 25  0404 25  2204 25  1349 25  0400 25  2356 253 25  2000   CREATININE MG/DL 1.22 1.55* 1.61*   < > 1.86* 1.81*   < > 1.87*   BUN MG/DL 17 17 15   < > 13 12  --  11   PROCAL ng/mL 1.48 3.40  --   --   --  1.88  --   --    WBC K/uL 7.3 9.8  --   --  10.1  --   --  10.8    < > = values in this interval not displayed.     Temp (24hrs), Av.4 °F (37.4 °C), Min:98.8 °F (37.1 °C), Max:99.9 °F (37.7 °C)    Conditions for Dosing Consideration: RAE    Creatinine Clearance (mL/min): Estimated Creatinine Clearance: 68 mL/min (based on SCr of 1.22 mg/dL).     Impression/Plan:   RAE (baseline SCr 1-1.2) , Scr at baseline  WBC wnl, afebrile, Cx ng  Continue Vancomycin 750mg IV q12h  Repeat Vanc level ordered 2/15 6am    ? De-escalate Vanc    Continue Zosyn    BMP and CBC ordered per protocol  Antimicrobial stop date 7 days     Pharmacy will follow daily and adjust medications as appropriate for renal function and/or serum levels.    Thank you,  ABELINO CARNEY, [facility]

## 2025-02-14 NOTE — PROGRESS NOTES
Critical care interdisciplinary rounds today.  Following members present: Case Management, , Clinical Lead, Diabetes Team, Nursing, Nutrition, Pharmacy, and Physician. Family invited to participate.  Plan of care discussed.  See clinical pathway for plan of care and interventions and desired outcomes.     Nani LAW/valerie nevarez am.

## 2025-02-14 NOTE — PROGRESS NOTES
Neurology Note    Patient ID:  Brii GONZALEZ Mcgrew  [id number]  69 y.o.  1955      Date of Consultation:  February 14, 2025      Assessment and Plan:    The patient is a 69-year-old male with multiple medical conditions who presents to the hospital with status epilepticus.  Patient does have a history of seizures and prior stroke with residual left-sided weakness.  His current neurological examination has improved.  Will follow commands.  Significant left sided weakness (present prior to hospitalization)      Status epilepticus:  Etiologies contributing to seizures includes breakthrough seizures, systemic impact of infection lowering seizure threshold, new ischemic event, metabolic derangements  Head CT with no acute abnormality  Rapid EEG with no increasing seizure burden  full channel EEG  with generalized slowing  Continue Keppra 1000 mg twice a day  Follow up eeg testing can be performed in the outpatient setting after discharge    Acute stroke:  Examination improving today  History of prior stroke with significant chronic microvascular ischemic changes and chronic hemorrhages  Significant chronic left sided weakness at baseline  Brain MRI did reveal area of acute stroke in the inferior cerebellar vermis and right cerebellum as well as the right hippocampus.  There was also extensive chronic white matter changes and scattered chronic microhemorrhages throughout.  Patient does have risk factors for stroke including atrial fibrillation, diabetes, hypertension  This is a difficult situation due to acute ischemic stroke, afib, and multiple chronic micro hemorrhages.   I Would recommend continuing anti-coagulation for time being. Would not restart plavix as well due to chronic microhemorrhages.  I had a lengthly discussion with patient's wife and patient yesterday  carotid doppler study with no significant stenosis  Should be considered for watchman device after the acute setting.  Hypertension: goal blood

## 2025-02-14 NOTE — TELEPHONE ENCOUNTER
Please schedule patient for a hospital follow up appointment in clinic    Neurology provider: any provider      In person or virtual:     When: 4 weeks    Diagnosis/reason for follow up:  stroke, status epilepticus    Thanks!

## 2025-02-14 NOTE — PLAN OF CARE
Speech LAnguage Pathology EVALUATION    Patient: Brii Engle (69 y.o. male)  Date: 2/14/2025  Primary Diagnosis: Seizure (HCC) [R56.9]       Precautions: Aspiration precautions, Seizure, Contact Precautions                  ASSESSMENT :  Patient with no overt clinical signs of aspiration, however aspiration risk is elevated given patient's recent extubation (note patient intubated 2/11-2/14), acute stroke, as well as history of stroke and CHF. MRI from 2/12 revealed \"There are small areas of acute infarction involving the posterior medial right cerebellum and adjacent inferior vermis without mass effect.\" Patient's voice assessed to be mildly rough with patient's daughter noting vocal quality is approximately at baseline. Initially, patient observed with multiple swallows per bolus, however this improved as swallow trials progressed. Patient's RR observed to increase during mastication of solids up to 35, which recovered quickly. Patient denied SOB. Note patient currently on room air.    Recommend cautious initiation of Full Liquid diet with thin liquids with safe swallow and aspiration precautions outlined below. Closely monitor diet tolerance and swallow safety, if s/s aspiration or changes in respiratory status (i.e. increased O2 requirements, changes in lung sounds, changes in chest imaging), recommend holding PO intake. SLP will continue to follow acutely.    Patient will benefit from skilled intervention to address the above impairments.     PLAN :  Recommendations and Planned Interventions:  Diet: Full liquid (thin liquids)  -- Medication as tolerated  -- 1:1 supervision with feeding with assistance as needed  -- Upright for all PO  -- Small bites/sips  -- Straws OK  -- Single sips (1 at a time)  -- Ensure oral cavity clearance after meals  -- Closely monitor diet tolerance and swallow safety, if s/s aspiration or changes in respiratory status (i.e. increased O2 requirements, changes in lung sounds,

## 2025-02-15 LAB
ANION GAP SERPL CALC-SCNC: 7 MMOL/L (ref 2–12)
BUN SERPL-MCNC: 9 MG/DL (ref 6–20)
BUN/CREAT SERPL: 10 (ref 12–20)
CALCIUM SERPL-MCNC: 7.9 MG/DL (ref 8.5–10.1)
CHLORIDE SERPL-SCNC: 111 MMOL/L (ref 97–108)
CO2 SERPL-SCNC: 26 MMOL/L (ref 21–32)
CREAT SERPL-MCNC: 0.93 MG/DL (ref 0.7–1.3)
ERYTHROCYTE [DISTWIDTH] IN BLOOD BY AUTOMATED COUNT: 15.2 % (ref 11.5–14.5)
GLUCOSE BLD STRIP.AUTO-MCNC: 111 MG/DL (ref 65–117)
GLUCOSE BLD STRIP.AUTO-MCNC: 111 MG/DL (ref 65–117)
GLUCOSE BLD STRIP.AUTO-MCNC: 126 MG/DL (ref 65–117)
GLUCOSE BLD STRIP.AUTO-MCNC: 166 MG/DL (ref 65–117)
GLUCOSE SERPL-MCNC: 129 MG/DL (ref 65–100)
HCT VFR BLD AUTO: 33.2 % (ref 36.6–50.3)
HGB BLD-MCNC: 10.9 G/DL (ref 12.1–17)
MAGNESIUM SERPL-MCNC: 1.8 MG/DL (ref 1.6–2.4)
MCH RBC QN AUTO: 26.9 PG (ref 26–34)
MCHC RBC AUTO-ENTMCNC: 32.8 G/DL (ref 30–36.5)
MCV RBC AUTO: 82 FL (ref 80–99)
NRBC # BLD: 0 K/UL (ref 0–0.01)
NRBC BLD-RTO: 0 PER 100 WBC
PHOSPHATE SERPL-MCNC: 3.1 MG/DL (ref 2.6–4.7)
PLATELET # BLD AUTO: 158 K/UL (ref 150–400)
PMV BLD AUTO: 11.6 FL (ref 8.9–12.9)
POTASSIUM SERPL-SCNC: 3.2 MMOL/L (ref 3.5–5.1)
PROCALCITONIN SERPL-MCNC: 0.62 NG/ML
RBC # BLD AUTO: 4.05 M/UL (ref 4.1–5.7)
SERVICE CMNT-IMP: ABNORMAL
SERVICE CMNT-IMP: ABNORMAL
SERVICE CMNT-IMP: NORMAL
SERVICE CMNT-IMP: NORMAL
SODIUM SERPL-SCNC: 144 MMOL/L (ref 136–145)
VANCOMYCIN SERPL-MCNC: 12.6 UG/ML
WBC # BLD AUTO: 6.2 K/UL (ref 4.1–11.1)

## 2025-02-15 PROCEDURE — 6360000002 HC RX W HCPCS: Performed by: INTERNAL MEDICINE

## 2025-02-15 PROCEDURE — 82962 GLUCOSE BLOOD TEST: CPT

## 2025-02-15 PROCEDURE — 80202 ASSAY OF VANCOMYCIN: CPT

## 2025-02-15 PROCEDURE — 85027 COMPLETE CBC AUTOMATED: CPT

## 2025-02-15 PROCEDURE — 6360000002 HC RX W HCPCS: Performed by: NURSE PRACTITIONER

## 2025-02-15 PROCEDURE — 2580000003 HC RX 258: Performed by: INTERNAL MEDICINE

## 2025-02-15 PROCEDURE — 92526 ORAL FUNCTION THERAPY: CPT

## 2025-02-15 PROCEDURE — 2500000003 HC RX 250 WO HCPCS: Performed by: NURSE PRACTITIONER

## 2025-02-15 PROCEDURE — 84100 ASSAY OF PHOSPHORUS: CPT

## 2025-02-15 PROCEDURE — 84145 PROCALCITONIN (PCT): CPT

## 2025-02-15 PROCEDURE — 6370000000 HC RX 637 (ALT 250 FOR IP): Performed by: INTERNAL MEDICINE

## 2025-02-15 PROCEDURE — 36415 COLL VENOUS BLD VENIPUNCTURE: CPT

## 2025-02-15 PROCEDURE — 83735 ASSAY OF MAGNESIUM: CPT

## 2025-02-15 PROCEDURE — 6370000000 HC RX 637 (ALT 250 FOR IP)

## 2025-02-15 PROCEDURE — 6370000000 HC RX 637 (ALT 250 FOR IP): Performed by: NURSE PRACTITIONER

## 2025-02-15 PROCEDURE — 80048 BASIC METABOLIC PNL TOTAL CA: CPT

## 2025-02-15 PROCEDURE — 1100000000 HC RM PRIVATE

## 2025-02-15 RX ORDER — TRAZODONE HYDROCHLORIDE 50 MG/1
50 TABLET ORAL NIGHTLY
Status: DISCONTINUED | OUTPATIENT
Start: 2025-02-15 | End: 2025-02-22 | Stop reason: HOSPADM

## 2025-02-15 RX ORDER — POTASSIUM CHLORIDE 1.5 G/1.58G
40 POWDER, FOR SOLUTION ORAL ONCE
Status: COMPLETED | OUTPATIENT
Start: 2025-02-15 | End: 2025-02-15

## 2025-02-15 RX ADMIN — TRAZODONE HYDROCHLORIDE 50 MG: 50 TABLET, FILM COATED ORAL at 01:19

## 2025-02-15 RX ADMIN — AMLODIPINE BESYLATE 10 MG: 5 TABLET ORAL at 08:09

## 2025-02-15 RX ADMIN — POTASSIUM BICARBONATE 40 MEQ: 782 TABLET, EFFERVESCENT ORAL at 05:03

## 2025-02-15 RX ADMIN — Medication 1 AMPULE: at 08:07

## 2025-02-15 RX ADMIN — LEVETIRACETAM 1000 MG: 100 INJECTION INTRAVENOUS at 22:37

## 2025-02-15 RX ADMIN — VANCOMYCIN HYDROCHLORIDE 750 MG: 750 INJECTION, POWDER, LYOPHILIZED, FOR SOLUTION INTRAVENOUS at 22:36

## 2025-02-15 RX ADMIN — LISINOPRIL 40 MG: 20 TABLET ORAL at 08:28

## 2025-02-15 RX ADMIN — CHLORHEXIDINE GLUCONATE 15 ML: 1.2 RINSE ORAL at 22:37

## 2025-02-15 RX ADMIN — TRAZODONE HYDROCHLORIDE 50 MG: 50 TABLET, FILM COATED ORAL at 22:44

## 2025-02-15 RX ADMIN — LEVETIRACETAM 1000 MG: 100 INJECTION INTRAVENOUS at 08:08

## 2025-02-15 RX ADMIN — VANCOMYCIN HYDROCHLORIDE 750 MG: 750 INJECTION, POWDER, LYOPHILIZED, FOR SOLUTION INTRAVENOUS at 08:38

## 2025-02-15 RX ADMIN — SODIUM CHLORIDE, PRESERVATIVE FREE 10 ML: 5 INJECTION INTRAVENOUS at 08:09

## 2025-02-15 RX ADMIN — LANSOPRAZOLE 30 MG: 30 TABLET, ORALLY DISINTEGRATING, DELAYED RELEASE ORAL at 08:09

## 2025-02-15 RX ADMIN — PIPERACILLIN AND TAZOBACTAM 3375 MG: 3; .375 INJECTION, POWDER, LYOPHILIZED, FOR SOLUTION INTRAVENOUS at 05:21

## 2025-02-15 RX ADMIN — POTASSIUM BICARBONATE 40 MEQ: 782 TABLET, EFFERVESCENT ORAL at 22:44

## 2025-02-15 RX ADMIN — PIPERACILLIN AND TAZOBACTAM 3375 MG: 3; .375 INJECTION, POWDER, LYOPHILIZED, FOR SOLUTION INTRAVENOUS at 16:02

## 2025-02-15 RX ADMIN — CARVEDILOL 25 MG: 12.5 TABLET, FILM COATED ORAL at 22:43

## 2025-02-15 RX ADMIN — CARVEDILOL 25 MG: 12.5 TABLET, FILM COATED ORAL at 08:07

## 2025-02-15 RX ADMIN — CHLORHEXIDINE GLUCONATE 15 ML: 1.2 RINSE ORAL at 08:06

## 2025-02-15 RX ADMIN — ATORVASTATIN CALCIUM 80 MG: 40 TABLET, FILM COATED ORAL at 22:43

## 2025-02-15 RX ADMIN — ACETAMINOPHEN 650 MG: 325 TABLET ORAL at 01:19

## 2025-02-15 RX ADMIN — Medication 1 AMPULE: at 22:44

## 2025-02-15 RX ADMIN — APIXABAN 5 MG: 5 TABLET, FILM COATED ORAL at 08:09

## 2025-02-15 RX ADMIN — Medication 3 MG: at 01:19

## 2025-02-15 RX ADMIN — APIXABAN 5 MG: 5 TABLET, FILM COATED ORAL at 22:43

## 2025-02-15 RX ADMIN — SODIUM CHLORIDE, PRESERVATIVE FREE 10 ML: 5 INJECTION INTRAVENOUS at 22:45

## 2025-02-15 RX ADMIN — SODIUM CHLORIDE, POTASSIUM CHLORIDE, SODIUM LACTATE AND CALCIUM CHLORIDE: 600; 310; 30; 20 INJECTION, SOLUTION INTRAVENOUS at 03:40

## 2025-02-15 RX ADMIN — POTASSIUM CHLORIDE 40 MEQ: 1.5 FOR SOLUTION ORAL at 09:54

## 2025-02-15 ASSESSMENT — PAIN SCALES - GENERAL
PAINLEVEL_OUTOF10: 0

## 2025-02-15 NOTE — PROGRESS NOTES
End of Shift Note    Bedside shift change report given to Ara LEVI (oncoming nurse) by Ralph LEVI (offgoing nurse).  Report included the following information SBAR, Intake/Output, Recent Results, and Cardiac Rhythm AFib    Shift worked:  0542-2354     Shift summary and any significant changes:    Patient did not sleep this shift, has been confused. Tolerating diet. VSS, remains on RA     Concerns for physician to address:       Zone phone for oncoming shift:          Activity:  Level of Assistance: Dependent, patient does less than 25%  Number times ambulated in hallways past shift: 0  Number of times OOB to chair past shift: 0    Cardiac:   Cardiac Monitoring: Yes      Cardiac Rhythm: Atrial fib    Access:  Current line(s): PIV     Genitourinary:   Urinary Status: Draining, Cardoza    Respiratory:   O2 Device: None (Room air)  Chronic home O2 use?: NO  Incentive spirometer at bedside: NO    GI:  Last BM (including prior to admit): 02/15/25  Current diet:  ADULT DIET; Full Liquid  Passing flatus: YES    Pain Management:   Patient states pain is manageable on current regimen: N/A    Skin:  Dominik Scale Score: 14  Interventions: Wound Offloading (Prevention Methods): Bed, pressure redistribution/air, Elevate heels, Pillows, Repositioning    Patient Safety:  Fall Risk: Nursing Judgement-Fall Risk High(Add Comments): Yes  Fall Risk Interventions  Nursing Judgement-Fall Risk High(Add Comments): Yes  Toilet Every 2 Hours-In Advance of Need: Yes  Hourly Visual Checks: Awake, In bed  Fall Visual Posted: Armband  Room Door Open: Yes  Alarm On: Bed  Patient Moved Closer to Nursing Station: No    Active Consults:   IP CONSULT TO NEUROLOGY  IP CONSULT TO PHARMACY  IP CONSULT TO PALLIATIVE CARE  IP CONSULT TO DIETITIAN    Length of Stay:  Expected LOS: 6  Actual LOS: 4    Ralph LEVI

## 2025-02-15 NOTE — PROGRESS NOTES
Pharmacy Antimicrobial Kinetic Dosing    Indication for Antimicrobials: PNA    Current Regimen of Each Antimicrobial:  Vancomycin Pharmacy to Dose; Start Date ; Day # 4  Zosyn 3.375g IV q8h; Start Date ; Day # 4    Previous Antimicrobial Therapy:  Ceftriaxone       Goal Level: Vancomycin random level < 20     Date Dose & Interval Measured (mcg/mL) Predicted AUC 24-48 Predicted AUC 24,ss    2000mg load 11.5 N/A N/A    04:05  10.0     2/15  0339 750mg q 12h 12.6 411 421     Significant Cultures:    Blood, paired: ngtd, prelim     Labs:  Recent Labs     Units 02/15/25  0339 25  0405 25  0404   CREATININE MG/DL 0.93 1.22 1.55*   BUN MG/DL 9 17 17   PROCAL ng/mL 0.62 1.48 3.40   WBC K/uL 6.2 7.3 9.8     Temp (24hrs), Av.3 °F (36.8 °C), Min:97.9 °F (36.6 °C), Max:98.9 °F (37.2 °C)    Conditions for Dosing Consideration: RAE    Creatinine Clearance (mL/min): Estimated Creatinine Clearance: 90 mL/min (based on SCr of 0.93 mg/dL).     Impression/Plan:   RAE (baseline SCr 1-1.2) , Scr at baseline  WBC wnl, afebrile, Cx ng  Continue Vancomycin 750mg IV q12h, thearpeutic for now. Will need dose increase if Scr improves.     ? De-escalate Vanc    Continue Zosyn    BMP and CBC ordered per protocol  Antimicrobial stop date 7 days     Pharmacy will follow daily and adjust medications as appropriate for renal function and/or serum levels.    Thank you,  Gualberto Vincent [facility]

## 2025-02-15 NOTE — PROGRESS NOTES
ICU Progress Note        Subjective:      - currently intubated and on the mechanical ventilator.    - failed SAT/SBT trial. MRI with small stroke.   - awake, following commands.   2/15 - Extubated yesterday, alert/awake and oriented x 3.     HPI:  The patient is a 70 y/o male PMHx of CVA (residual left sided deficit) T2DM, Afib (Eliquis), HFrEF HTN. HLD, carcinoid tumor s/p LLL wedge resection and seizure disorder presented to ED via EMS after reported tonic clonic seizure at home witnessed by wife ~30-45 till EMS arrival d/t incliment weather. Administered 10 mg IM versed by EMS, followed by 5 mg IM with subsequent seizure cessation. ED arrival, unresponsive, snoring respirations, A-fib RVR-started on diltiazem drip, emergently placed on mechanical ventilation for airway protection. CT head- negative.  CXR with large left effusion and complete left lung opacification.  Rapid EEG no evidence of seizure burden.  Administered 1000 mg Keppra.  ICU consulted for admission.     Vital Signs:    BP (!) 168/109   Pulse 76   Temp 98.3 °F (36.8 °C) (Oral)   Resp 23   Ht 1.88 m (6' 2\")   Wt 89 kg (196 lb 3.4 oz)   SpO2 97%   BMI 25.19 kg/m²             Temp (24hrs), Av.7 °F (37.1 °C), Min:97.6 °F (36.4 °C), Max:99.7 °F (37.6 °C)       Intake/Output:   Last shift:      02/15 0701 - 02/15 1900  In: -   Out: 600 [Urine:600]  Last 3 shifts: 1901 - 02/15 0700  In: 4863.6 [P.O.:245; I.V.:3450.2]  Out: 3735 [Urine:3735]    Intake/Output Summary (Last 24 hours) at 2/15/2025 0835  Last data filed at 2/15/2025 0800  Gross per 24 hour   Intake 2782.92 ml   Output 3600 ml   Net -817.08 ml       Physical Exam:    GEN: Sitting comfortably on the bed.    HEENT: anicteric sclerae, pink conj   NECK: Supple, -LAD, no neck mass  CV: S1S2  LUNGS: Coarse BS at bases  ABD: Soft, non-tender, no masses  EXT: No cyanosis  SKIN: Warm, dry and intact.   NEURO: Alert, awake, follows commands. Left hemiplegia.     DATA:  arrival d/t incliment weather. Intubated in ED, 2/11.    Seizure - history of seizure. Cont. Keppra 1 gm bid. Neurology following.     Respiratory insufficiency - Extubated, 2/14. No supplemental oxygen needs. Normal work of breathing.    Left lung consolidation - history of carcinoid tumor with left lower lobe wedge resection. Given lack of high grade fever and significant elevation in WBC count I am concerned about this being malignant but will treat for pneumonia first and see if there is any clinical improvement. Cont. Zosyn/Vancomycin empirically x 7 days. REPEAT lung imaging in 2-3 months.      New stroke - Known history of stroke with L sided hemiplegia. MRI showed multiple new strokes. Patient is on Eliquis for a.fib, per neurology recommendation DO NOT start anti-platelet as he is high risk of bleeding. Okay to continue Eliquis.     A.fib with RVR - rate control. Discussed with neurology, okay to start anticoagulation but recommended to hold anti-platelets.     Full code.     2/13  - Updated wife at bedside. She works at [employer] as technician. I answered all the questions.     Transfer to medicine.     Ludwig Haynes MD,KAIA, FCCM, FCCP, ATSF, FACP, DAABIP  Interventional Pulmonology/Critical Care Medicine  [facility]  [facility]

## 2025-02-15 NOTE — PROGRESS NOTES
Hospitalist Progress Note        Demographics    Patient Name  Brii Engle   Date of Birth 1955   Medical Record Number  [id number]      Age  69 y.o.   PCP Maurilio Michaels MD   Admit date:  2/11/2025  7:46 PM     Room Number  142/01  @ [facility]           Admission Diagnoses:  Seizure (HCC)   Admission Summary:  68 y/o male PMHx of CVA (residual left sided deficit) T2DM, Afib (Eliquis), HFrEF HTN. HLD, carcinoid tumor s/p LLL wedge resection and seizure disorder presented to ED via EMS after reported tonic clonic seizure at home witnessed by wife ~30-45 till EMS arrival d/t incliment weather. Administered 10 mg IM versed by EMS, followed by 5 mg IM with subsequent seizure cessation. ED arrival, unresponsive, snoring respirations, A-fib RVR-started on diltiazem drip, emergently placed on mechanical ventilation for airway protection. CT head- negative.  CXR with large left effusion and complete left lung opacification.  Rapid EEG no evidence of seizure burden.  Administered 1000 mg Keppra.  ICU consulted for admission.      Hospital Medicine: Attestation of advanced practitioner note    Additional Recommendations:   This patient's clinical data and care plan were discussed with Case discussed with Ms. Jonn Grimaldo CNP  Agree with current plan of care           The patient was seen and evaluated by hospitalist advanced practitioner.  Collaborative approach of chart review, discussion of clinical plan of care completed with above advanced practitioner .     I personally made/approved the management plan and take responsibility for the patient management.          Orville Shearer MD MPH FACP CHCQM -PhyAdv  Hospital Medicine         Assessment and plan:     Seizures disorder  Community-acquired pneumonia  Patient originally admitted with tonic-clonic seizures.  Was in the ICU for airway protection while intubated and on mechanical ventilator  Patient was extubated 02/14/2025.  Patient is

## 2025-02-15 NOTE — PLAN OF CARE
Problem: SLP Adult - Impaired Swallowing  Goal: By Discharge: Advance to least restrictive diet without signs or symptoms of aspiration for planned discharge setting.  See evaluation for individualized goals.  Description: Speech Pathology Goals  Initiated 2/14/2025     1. Patient will tolerate least restrictive diet without signs of aspiration or decline in respiratory status within 7 days.  2. Patient will resume baseline diet of Regular/Thin Liquids within 7 days.  2/15/2025 1029 by Savannah Masters, SLP  Outcome: Not Progressing     Problem: SLP Adult - Impaired Swallowing  Goal: By Discharge: Advance to least restrictive diet without signs or symptoms of aspiration for planned discharge setting.  See evaluation for individualized goals.  Description: Speech Pathology Goals  Initiated 2/14/2025     1. Patient will tolerate least restrictive diet without signs of aspiration or decline in respiratory status within 7 days.  2. Patient will resume baseline diet of Regular/Thin Liquids within 7 days.  2/15/2025 1029 by Savannah Masters, SLP  Outcome: Not Progressing

## 2025-02-15 NOTE — PROGRESS NOTES
0745 - Handoff report received    0837 - Pt face cleansed, lotion applied, repositioned    1135 - Pt assigned to room 142. Report called.      1145 - Pt transferred to room 142. Receiving RN at bedside.    1324 - Spouse updated on room reassignment.

## 2025-02-15 NOTE — PLAN OF CARE
Speech LAnguage Pathology TREATMENT    Patient: Brii Engle (69 y.o. male)  Date: 2/15/2025  Primary Diagnosis: Seizure (HCC) [R56.9]       Precautions:  Seizure, Contact Precautions        aspiration          ASSESSMENT :  Patient tolerating full liquid diet. He is not ready for diet upgrade due to AMS that impacts his safety. Aspiration risks include 1) feeder status 2) AMS, impulsive behavior  vs delayed responses at times. 3) h/o ACDF, CVA and PEG.   Can continue full liquid diet.     Patient will benefit from skilled intervention to address the above impairments.     PLAN :  Recommendations and Planned Interventions:  Diet: Full liquid  Upright for all PO         Acute SLP Services: Yes, SLP will continue to follow per plan of care.  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     Dissection of right carotid artery (HCC) 09/2018    History of MRSA infection 08/01/2023    +MRSA in nasal swab    Hypertension     Renal insufficiency     stage 3    Seizures (HCC)     Stroke (HCC) 02/2023    prior to feb., had mini stroke; left side paralysis     Past Surgical History:   Procedure Laterality Date    CERVICAL FUSION Right 10/03/2023    ANTERIOR CERVICAL DISCECTOMY AND FUSION, C3-4, C4-5 performed by Manuel Kelly MD at [facility] MAIN OR    CHEST SURGERY      VATS Video-assisted thoracic surgery    PEG TUBE REMOVAL  2024    replaced     Prior Level of Function/Home Situation:   Social/Functional History  Lives With: Spouse  Type of Home: House  Home Layout: One level  Home Equipment: Hospital bed, Wheelchair - Manual, Walker - Rolling, Mechanical lift  Receives Help From:

## 2025-02-15 NOTE — PROGRESS NOTES
End of Shift Note    Bedside shift change report given to GURMEET Putnam (oncoming nurse) by Ralph LEVI (offgoing nurse).  Report included the following information SBAR, Intake/Output, Recent Results, and Cardiac Rhythm AFib    Shift worked:  Days     Shift summary and any significant changes:    Patient has been confused; A & O. Tolerating diet. remains on RA  Portable CRX set for 2/16     Concerns for physician to address:  See above     Zone phone for oncoming shift:   [phone]       Activity:  Level of Assistance: Maximum assist, patient does 25-49%  Number times ambulated in hallways past shift: 0  Number of times OOB to chair past shift: 0    Cardiac:   Cardiac Monitoring: Yes      Cardiac Rhythm: Atrial fib    Access:  Current line(s): PIV     Genitourinary:   Urinary Status: Voiding, Incontinent briefs    Respiratory:   O2 Device: None (Room air)  Chronic home O2 use?: NO  Incentive spirometer at bedside: NO    GI:  Last BM (including prior to admit): 02/15/25  Current diet:  ADULT DIET; Full Liquid  DIET ONE TIME MESSAGE;  Passing flatus: YES    Pain Management:   Patient states pain is manageable on current regimen: N/A    Skin:  Dominik Scale Score: 14  Interventions: Wound Offloading (Prevention Methods): Pillows, Repositioning, Turning    Patient Safety:  Fall Risk: Nursing Judgement-Fall Risk High(Add Comments): Yes  Fall Risk Interventions  Nursing Judgement-Fall Risk High(Add Comments): Yes  Toilet Every 2 Hours-In Advance of Need: Yes  Hourly Visual Checks: Awake, In bed  Fall Visual Posted: Armband  Room Door Open: Yes  Alarm On: Bed  Patient Moved Closer to Nursing Station: No    Active Consults:   IP CONSULT TO NEUROLOGY  IP CONSULT TO PHARMACY  IP CONSULT TO PALLIATIVE CARE  IP CONSULT TO DIETITIAN    Length of Stay:  Expected LOS: 6  Actual LOS: 4    Ralph LEVI

## 2025-02-15 NOTE — PROGRESS NOTES
0100: Patient very confused, doesn't realize he is in the hospital. Has pulled the call light out of the wall and is pulling on the pulse ox cord. Attempted to reorient patient to environment. RA sats 96%, no resp distress noted. Patient has not slept, doesn't believe that it is the middle of the night. Provider updated with new orders received.     0200: Patient not sleeping yet; but much calmer and more cooperative with staff. Listening to relaxing music and says he happy now.

## 2025-02-16 ENCOUNTER — APPOINTMENT (OUTPATIENT)
Facility: HOSPITAL | Age: 70
End: 2025-02-16
Payer: MEDICARE

## 2025-02-16 LAB
ANION GAP SERPL CALC-SCNC: 6 MMOL/L (ref 2–12)
BACTERIA SPEC CULT: NORMAL
BUN SERPL-MCNC: 5 MG/DL (ref 6–20)
BUN/CREAT SERPL: 6 (ref 12–20)
CALCIUM SERPL-MCNC: 8.7 MG/DL (ref 8.5–10.1)
CHLORIDE SERPL-SCNC: 107 MMOL/L (ref 97–108)
CO2 SERPL-SCNC: 25 MMOL/L (ref 21–32)
CREAT SERPL-MCNC: 0.86 MG/DL (ref 0.7–1.3)
EST. AVERAGE GLUCOSE BLD GHB EST-MCNC: 140 MG/DL
GLUCOSE BLD STRIP.AUTO-MCNC: 102 MG/DL (ref 65–117)
GLUCOSE BLD STRIP.AUTO-MCNC: 141 MG/DL (ref 65–117)
GLUCOSE BLD STRIP.AUTO-MCNC: 162 MG/DL (ref 65–117)
GLUCOSE BLD STRIP.AUTO-MCNC: 163 MG/DL (ref 65–117)
GLUCOSE BLD STRIP.AUTO-MCNC: 97 MG/DL (ref 65–117)
GLUCOSE SERPL-MCNC: 101 MG/DL (ref 65–100)
HBA1C MFR BLD: 6.5 % (ref 4–5.6)
POTASSIUM SERPL-SCNC: 3.8 MMOL/L (ref 3.5–5.1)
SERVICE CMNT-IMP: ABNORMAL
SERVICE CMNT-IMP: NORMAL
SODIUM SERPL-SCNC: 138 MMOL/L (ref 136–145)

## 2025-02-16 PROCEDURE — 6370000000 HC RX 637 (ALT 250 FOR IP): Performed by: INTERNAL MEDICINE

## 2025-02-16 PROCEDURE — 6360000002 HC RX W HCPCS: Performed by: NURSE PRACTITIONER

## 2025-02-16 PROCEDURE — 1100000000 HC RM PRIVATE

## 2025-02-16 PROCEDURE — 71045 X-RAY EXAM CHEST 1 VIEW: CPT

## 2025-02-16 PROCEDURE — 6360000002 HC RX W HCPCS: Performed by: INTERNAL MEDICINE

## 2025-02-16 PROCEDURE — 80048 BASIC METABOLIC PNL TOTAL CA: CPT

## 2025-02-16 PROCEDURE — 36415 COLL VENOUS BLD VENIPUNCTURE: CPT

## 2025-02-16 PROCEDURE — 2500000003 HC RX 250 WO HCPCS: Performed by: NURSE PRACTITIONER

## 2025-02-16 PROCEDURE — 82962 GLUCOSE BLOOD TEST: CPT

## 2025-02-16 PROCEDURE — 2580000003 HC RX 258: Performed by: INTERNAL MEDICINE

## 2025-02-16 PROCEDURE — 83036 HEMOGLOBIN GLYCOSYLATED A1C: CPT

## 2025-02-16 RX ORDER — M-VIT,TX,IRON,MINS/CALC/FOLIC 27MG-0.4MG
1 TABLET ORAL DAILY
Status: DISCONTINUED | OUTPATIENT
Start: 2025-02-16 | End: 2025-02-22 | Stop reason: HOSPADM

## 2025-02-16 RX ORDER — SERTRALINE HYDROCHLORIDE 25 MG/1
25 TABLET, FILM COATED ORAL DAILY
Status: DISCONTINUED | OUTPATIENT
Start: 2025-02-16 | End: 2025-02-22 | Stop reason: HOSPADM

## 2025-02-16 RX ORDER — INSULIN LISPRO 100 [IU]/ML
0-8 INJECTION, SOLUTION INTRAVENOUS; SUBCUTANEOUS
Status: DISCONTINUED | OUTPATIENT
Start: 2025-02-16 | End: 2025-02-22 | Stop reason: HOSPADM

## 2025-02-16 RX ORDER — PANTOPRAZOLE SODIUM 40 MG/1
40 TABLET, DELAYED RELEASE ORAL
Status: DISCONTINUED | OUTPATIENT
Start: 2025-02-17 | End: 2025-02-22 | Stop reason: HOSPADM

## 2025-02-16 RX ORDER — DEXTROSE MONOHYDRATE 100 MG/ML
INJECTION, SOLUTION INTRAVENOUS CONTINUOUS PRN
Status: DISCONTINUED | OUTPATIENT
Start: 2025-02-16 | End: 2025-02-22 | Stop reason: HOSPADM

## 2025-02-16 RX ORDER — GLUCAGON 1 MG/ML
1 KIT INJECTION PRN
Status: DISCONTINUED | OUTPATIENT
Start: 2025-02-16 | End: 2025-02-22 | Stop reason: HOSPADM

## 2025-02-16 RX ORDER — ATORVASTATIN CALCIUM 40 MG/1
80 TABLET, FILM COATED ORAL NIGHTLY
Status: DISCONTINUED | OUTPATIENT
Start: 2025-02-16 | End: 2025-02-22 | Stop reason: HOSPADM

## 2025-02-16 RX ORDER — CLOPIDOGREL BISULFATE 75 MG/1
75 TABLET ORAL DAILY
Status: DISCONTINUED | OUTPATIENT
Start: 2025-02-16 | End: 2025-02-17

## 2025-02-16 RX ORDER — INSULIN LISPRO 100 [IU]/ML
0-8 INJECTION, SOLUTION INTRAVENOUS; SUBCUTANEOUS
Status: DISCONTINUED | OUTPATIENT
Start: 2025-02-16 | End: 2025-02-16

## 2025-02-16 RX ORDER — CARVEDILOL 12.5 MG/1
25 TABLET ORAL 2 TIMES DAILY WITH MEALS
Status: DISCONTINUED | OUTPATIENT
Start: 2025-02-16 | End: 2025-02-20

## 2025-02-16 RX ORDER — AMLODIPINE BESYLATE 5 MG/1
10 TABLET ORAL DAILY
Status: DISCONTINUED | OUTPATIENT
Start: 2025-02-16 | End: 2025-02-22 | Stop reason: HOSPADM

## 2025-02-16 RX ORDER — LISINOPRIL 20 MG/1
40 TABLET ORAL DAILY
Status: DISCONTINUED | OUTPATIENT
Start: 2025-02-16 | End: 2025-02-18

## 2025-02-16 RX ORDER — ACETAMINOPHEN 325 MG/1
325 TABLET ORAL EVERY 6 HOURS PRN
Status: DISCONTINUED | OUTPATIENT
Start: 2025-02-16 | End: 2025-02-22 | Stop reason: HOSPADM

## 2025-02-16 RX ORDER — POTASSIUM CHLORIDE 750 MG/1
10 TABLET, EXTENDED RELEASE ORAL 2 TIMES DAILY
Status: DISCONTINUED | OUTPATIENT
Start: 2025-02-16 | End: 2025-02-22 | Stop reason: HOSPADM

## 2025-02-16 RX ADMIN — LISINOPRIL 40 MG: 20 TABLET ORAL at 14:16

## 2025-02-16 RX ADMIN — SODIUM CHLORIDE, PRESERVATIVE FREE 10 ML: 5 INJECTION INTRAVENOUS at 09:23

## 2025-02-16 RX ADMIN — PIPERACILLIN AND TAZOBACTAM 3375 MG: 3; .375 INJECTION, POWDER, LYOPHILIZED, FOR SOLUTION INTRAVENOUS at 09:41

## 2025-02-16 RX ADMIN — SERTRALINE HYDROCHLORIDE 25 MG: 25 TABLET ORAL at 14:16

## 2025-02-16 RX ADMIN — APIXABAN 5 MG: 5 TABLET, FILM COATED ORAL at 14:16

## 2025-02-16 RX ADMIN — PIPERACILLIN AND TAZOBACTAM 3375 MG: 3; .375 INJECTION, POWDER, LYOPHILIZED, FOR SOLUTION INTRAVENOUS at 22:28

## 2025-02-16 RX ADMIN — APIXABAN 5 MG: 5 TABLET, FILM COATED ORAL at 09:23

## 2025-02-16 RX ADMIN — PIPERACILLIN AND TAZOBACTAM 3375 MG: 3; .375 INJECTION, POWDER, LYOPHILIZED, FOR SOLUTION INTRAVENOUS at 01:34

## 2025-02-16 RX ADMIN — Medication 1 TABLET: at 15:39

## 2025-02-16 RX ADMIN — CHLORHEXIDINE GLUCONATE 15 ML: 1.2 RINSE ORAL at 09:23

## 2025-02-16 RX ADMIN — LEVETIRACETAM 1000 MG: 100 INJECTION INTRAVENOUS at 20:30

## 2025-02-16 RX ADMIN — AMLODIPINE BESYLATE 10 MG: 5 TABLET ORAL at 09:23

## 2025-02-16 RX ADMIN — CARVEDILOL 25 MG: 12.5 TABLET, FILM COATED ORAL at 09:22

## 2025-02-16 RX ADMIN — LISINOPRIL 40 MG: 20 TABLET ORAL at 09:22

## 2025-02-16 RX ADMIN — LEVETIRACETAM 1000 MG: 100 INJECTION INTRAVENOUS at 09:22

## 2025-02-16 RX ADMIN — PIPERACILLIN AND TAZOBACTAM 3375 MG: 3; .375 INJECTION, POWDER, LYOPHILIZED, FOR SOLUTION INTRAVENOUS at 14:19

## 2025-02-16 RX ADMIN — CLOPIDOGREL BISULFATE 75 MG: 75 TABLET ORAL at 14:16

## 2025-02-16 RX ADMIN — AMLODIPINE BESYLATE 10 MG: 5 TABLET ORAL at 14:17

## 2025-02-16 RX ADMIN — CARVEDILOL 25 MG: 12.5 TABLET, FILM COATED ORAL at 17:29

## 2025-02-16 RX ADMIN — LANSOPRAZOLE 30 MG: 30 TABLET, ORALLY DISINTEGRATING, DELAYED RELEASE ORAL at 09:23

## 2025-02-16 NOTE — PLAN OF CARE
Problem: Neurosensory - Adult  Goal: Achieves stable or improved neurological status  Outcome: Progressing  Goal: Absence of seizures  Outcome: Progressing  Goal: Remains free of injury related to seizures activity  Outcome: Progressing  Goal: Achieves maximal functionality and self care  Outcome: Progressing     Problem: Respiratory - Adult  Goal: Achieves optimal ventilation and oxygenation  Outcome: Progressing     Problem: Cardiovascular - Adult  Goal: Maintains optimal cardiac output and hemodynamic stability  Outcome: Progressing  Goal: Absence of cardiac dysrhythmias or at baseline  Outcome: Progressing     Problem: Safety - Adult  Goal: Free from fall injury  Outcome: Progressing     Problem: Chronic Conditions and Co-morbidities  Goal: Patient's chronic conditions and co-morbidity symptoms are monitored and maintained or improved  Outcome: Progressing     Problem: Discharge Planning  Goal: Discharge to home or other facility with appropriate resources  Outcome: Progressing     Problem: Pain  Goal: Verbalizes/displays adequate comfort level or baseline comfort level  Outcome: Progressing     Problem: Skin/Tissue Integrity  Goal: Skin integrity remains intact  Description: 1.  Monitor for areas of redness and/or skin breakdown  2.  Assess vascular access sites hourly  3.  Every 4-6 hours minimum:  Change oxygen saturation probe site  4.  Every 4-6 hours:  If on nasal continuous positive airway pressure, respiratory therapy assess nares and determine need for appliance change or resting period  Outcome: Progressing     Problem: ABCDS Injury Assessment  Goal: Absence of physical injury  Outcome: Progressing     Problem: Nutrition Deficit:  Goal: Optimize nutritional status  Outcome: Progressing

## 2025-02-16 NOTE — PROGRESS NOTES
Hospitalist Progress Note        Demographics    Patient Name  Brii Engle   Date of Birth 1955   Medical Record Number  [id number]      Age  69 y.o.   PCP Maurilio Michaels MD   Admit date:  2/11/2025  7:46 PM     Room Number  142/01  @ [facility]           Admission Diagnoses:  Seizure (HCC)   Admission Summary:  70 y/o male PMHx of CVA (residual left sided deficit) T2DM, Afib (Eliquis), HFrEF HTN. HLD, carcinoid tumor s/p LLL wedge resection and seizure disorder presented to ED via EMS after reported tonic clonic seizure at home witnessed by wife ~30-45 till EMS arrival d/t incliment weather. Administered 10 mg IM versed by EMS, followed by 5 mg IM with subsequent seizure cessation. ED arrival, unresponsive, snoring respirations, A-fib RVR-started on diltiazem drip, emergently placed on mechanical ventilation for airway protection. CT head- negative.  CXR with large left effusion and complete left lung opacification.  Rapid EEG no evidence of seizure burden.  Administered 1000 mg Keppra.  ICU consulted for admission.      Assessment and plan:     Seizures disorder  Community-acquired pneumonia  Patient originally admitted with tonic-clonic seizures.  Was in the ICU for airway protection while intubated and on mechanical ventilator  Patient was extubated 02/14/2025.  Patient is currently on room air.  My examination at bedside patient is alert to self, disoriented to time place and situation.  Chest x-ray on 2/11/2025 with impression of  1. Increased, now complete opacification of the left lung with a large left  pleural effusion.  2. Support apparatus, as above.    Chest x-ray on 212/25 with impression of  Interval reexpansion of the left lung, with patchy left-sided airspace opacities  and small left pleural effusion with basilar atelectasis. Right lung is clear.  Influenza and COVID negative  No growth in blood cultures day #4  Continue piperacillin   I do not know why patient

## 2025-02-16 NOTE — PROGRESS NOTES
Physical Therapy     Chart reviewed up to date - duplicate order acknowledged. Pt was seen 2/14/2025 fot PT bernard and is at his total A baseline. Not appropriate for skilled PT at this time. Will complete duplicate order.     Michelle Long PT, DPT, NCS

## 2025-02-17 ENCOUNTER — APPOINTMENT (OUTPATIENT)
Facility: HOSPITAL | Age: 70
End: 2025-02-17
Attending: INTERNAL MEDICINE
Payer: MEDICARE

## 2025-02-17 ENCOUNTER — TELEPHONE (OUTPATIENT)
Age: 70
End: 2025-02-17

## 2025-02-17 LAB
ALBUMIN SERPL-MCNC: 2.8 G/DL (ref 3.5–5)
ALBUMIN/GLOB SERPL: 0.6 (ref 1.1–2.2)
ALP SERPL-CCNC: 67 U/L (ref 45–117)
ALT SERPL-CCNC: 13 U/L (ref 12–78)
AMMONIA PLAS-SCNC: 29 UMOL/L
ANION GAP SERPL CALC-SCNC: 8 MMOL/L (ref 2–12)
AST SERPL-CCNC: 8 U/L (ref 15–37)
BASOPHILS # BLD: 0.02 K/UL (ref 0–0.1)
BASOPHILS NFR BLD: 0.5 % (ref 0–1)
BILIRUB DIRECT SERPL-MCNC: 0.2 MG/DL (ref 0–0.2)
BILIRUB SERPL-MCNC: 0.6 MG/DL (ref 0.2–1)
BUN SERPL-MCNC: 7 MG/DL (ref 6–20)
BUN/CREAT SERPL: 6 (ref 12–20)
CA-I BLD-SCNC: 1.08 MMOL/L (ref 1.13–1.32)
CALCIUM SERPL-MCNC: 7.9 MG/DL (ref 8.5–10.1)
CHLORIDE SERPL-SCNC: 106 MMOL/L (ref 97–108)
CO2 SERPL-SCNC: 27 MMOL/L (ref 21–32)
CREAT SERPL-MCNC: 1.26 MG/DL (ref 0.7–1.3)
DIFFERENTIAL METHOD BLD: ABNORMAL
ECHO AO ROOT DIAM: 2.8 CM
ECHO AO ROOT INDEX: 1.31 CM/M2
ECHO AV AREA PEAK VELOCITY: 2 CM2
ECHO AV AREA VTI: 1.4 CM2
ECHO AV AREA/BSA PEAK VELOCITY: 0.9 CM2/M2
ECHO AV AREA/BSA VTI: 0.7 CM2/M2
ECHO AV MEAN GRADIENT: 4 MMHG
ECHO AV MEAN VELOCITY: 0.9 M/S
ECHO AV PEAK GRADIENT: 8 MMHG
ECHO AV PEAK VELOCITY: 1.4 M/S
ECHO AV VELOCITY RATIO: 0.64
ECHO AV VTI: 31 CM
ECHO LA DIAMETER INDEX: 2.11 CM/M2
ECHO LA DIAMETER: 4.5 CM
ECHO LA TO AORTIC ROOT RATIO: 1.61
ECHO LA VOL A-L A2C: 105 ML (ref 18–58)
ECHO LA VOL A-L A4C: 128 ML (ref 18–58)
ECHO LA VOL MOD A2C: 95 ML (ref 18–58)
ECHO LA VOL MOD A4C: 112 ML (ref 18–58)
ECHO LA VOLUME AREA LENGTH: 119 ML
ECHO LA VOLUME INDEX A-L A2C: 49 ML/M2 (ref 16–34)
ECHO LA VOLUME INDEX A-L A4C: 60 ML/M2 (ref 16–34)
ECHO LA VOLUME INDEX AREA LENGTH: 56 ML/M2 (ref 16–34)
ECHO LA VOLUME INDEX MOD A2C: 45 ML/M2 (ref 16–34)
ECHO LA VOLUME INDEX MOD A4C: 53 ML/M2 (ref 16–34)
ECHO LV E' LATERAL VELOCITY: 5.33 CM/S
ECHO LV E' SEPTAL VELOCITY: 4.9 CM/S
ECHO LV EDV A2C: 137 ML
ECHO LV EDV A4C: 199 ML
ECHO LV EDV BP: 165 ML (ref 67–155)
ECHO LV EDV INDEX A4C: 93 ML/M2
ECHO LV EDV INDEX BP: 77 ML/M2
ECHO LV EDV NDEX A2C: 64 ML/M2
ECHO LV EF PHYSICIAN: 20 %
ECHO LV EJECTION FRACTION A2C: 46 %
ECHO LV EJECTION FRACTION A4C: 9 %
ECHO LV ESV A2C: 74 ML
ECHO LV ESV A4C: 181 ML
ECHO LV ESV BP: 129 ML (ref 22–58)
ECHO LV ESV INDEX A2C: 35 ML/M2
ECHO LV ESV INDEX A4C: 85 ML/M2
ECHO LV ESV INDEX BP: 61 ML/M2
ECHO LV FRACTIONAL SHORTENING: 9 % (ref 28–44)
ECHO LV INTERNAL DIMENSION DIASTOLE INDEX: 2.54 CM/M2
ECHO LV INTERNAL DIMENSION DIASTOLIC: 5.4 CM (ref 4.2–5.9)
ECHO LV INTERNAL DIMENSION SYSTOLIC INDEX: 2.3 CM/M2
ECHO LV INTERNAL DIMENSION SYSTOLIC: 4.9 CM
ECHO LV IVSD: 1.7 CM (ref 0.6–1)
ECHO LV MASS 2D: 398.8 G (ref 88–224)
ECHO LV MASS INDEX 2D: 187.2 G/M2 (ref 49–115)
ECHO LV POSTERIOR WALL DIASTOLIC: 1.5 CM (ref 0.6–1)
ECHO LV RELATIVE WALL THICKNESS RATIO: 0.56
ECHO LVOT AREA: 3.5 CM2
ECHO LVOT AV VTI INDEX: 0.41
ECHO LVOT DIAM: 2.1 CM
ECHO LVOT MEAN GRADIENT: 2 MMHG
ECHO LVOT PEAK GRADIENT: 3 MMHG
ECHO LVOT PEAK VELOCITY: 0.9 M/S
ECHO LVOT STROKE VOLUME INDEX: 20.8 ML/M2
ECHO LVOT SV: 44.3 ML
ECHO LVOT VTI: 12.8 CM
ECHO MV EROA PISA: 0.1 CM2
ECHO MV REGURGITANT ALIASING (NYQUIST) VELOCITY: 47 CM/S
ECHO MV REGURGITANT RADIUS PISA: 0.48 CM
ECHO MV REGURGITANT VELOCITY PISA: 5.8 M/S
ECHO MV REGURGITANT VOLUME PISA: 24.21 ML
ECHO MV REGURGITANT VTIA: 206.5 CM
ECHO RV INTERNAL DIMENSION: 3.1 CM
ECHO RV TAPSE: 1.4 CM (ref 1.7–?)
ECHO TV REGURGITANT MAX VELOCITY: 2.27 M/S
ECHO TV REGURGITANT PEAK GRADIENT: 21 MMHG
EOSINOPHIL # BLD: 0.11 K/UL (ref 0–0.4)
EOSINOPHIL NFR BLD: 2.6 % (ref 0–7)
ERYTHROCYTE [DISTWIDTH] IN BLOOD BY AUTOMATED COUNT: 15 % (ref 11.5–14.5)
GLOBULIN SER CALC-MCNC: 4.5 G/DL (ref 2–4)
GLUCOSE BLD STRIP.AUTO-MCNC: 116 MG/DL (ref 65–117)
GLUCOSE BLD STRIP.AUTO-MCNC: 121 MG/DL (ref 65–117)
GLUCOSE BLD STRIP.AUTO-MCNC: 127 MG/DL (ref 65–117)
GLUCOSE BLD STRIP.AUTO-MCNC: 127 MG/DL (ref 65–117)
GLUCOSE BLD STRIP.AUTO-MCNC: 201 MG/DL (ref 65–117)
GLUCOSE BLD STRIP.AUTO-MCNC: 89 MG/DL (ref 65–117)
GLUCOSE SERPL-MCNC: 109 MG/DL (ref 65–100)
HCT VFR BLD AUTO: 35.2 % (ref 36.6–50.3)
HGB BLD-MCNC: 11.3 G/DL (ref 12.1–17)
IMM GRANULOCYTES # BLD AUTO: 0.01 K/UL (ref 0–0.04)
IMM GRANULOCYTES NFR BLD AUTO: 0.2 % (ref 0–0.5)
LACTATE SERPL-SCNC: 1.1 MMOL/L (ref 0.4–2)
LYMPHOCYTES # BLD: 1.57 K/UL (ref 0.8–3.5)
LYMPHOCYTES NFR BLD: 37.3 % (ref 12–49)
MCH RBC QN AUTO: 27 PG (ref 26–34)
MCHC RBC AUTO-ENTMCNC: 32.1 G/DL (ref 30–36.5)
MCV RBC AUTO: 84.2 FL (ref 80–99)
MONOCYTES # BLD: 0.62 K/UL (ref 0–1)
MONOCYTES NFR BLD: 14.7 % (ref 5–13)
NEUTS SEG # BLD: 1.88 K/UL (ref 1.8–8)
NEUTS SEG NFR BLD: 44.7 % (ref 32–75)
NRBC # BLD: 0 K/UL (ref 0–0.01)
NRBC BLD-RTO: 0 PER 100 WBC
PLATELET # BLD AUTO: 171 K/UL (ref 150–400)
PMV BLD AUTO: 11.4 FL (ref 8.9–12.9)
POTASSIUM SERPL-SCNC: 3 MMOL/L (ref 3.5–5.1)
PROCALCITONIN SERPL-MCNC: 0.13 NG/ML
PROT SERPL-MCNC: 7.3 G/DL (ref 6.4–8.2)
RBC # BLD AUTO: 4.18 M/UL (ref 4.1–5.7)
SERVICE CMNT-IMP: ABNORMAL
SERVICE CMNT-IMP: NORMAL
SERVICE CMNT-IMP: NORMAL
SODIUM SERPL-SCNC: 141 MMOL/L (ref 136–145)
WBC # BLD AUTO: 4.2 K/UL (ref 4.1–11.1)

## 2025-02-17 PROCEDURE — 6360000002 HC RX W HCPCS: Performed by: INTERNAL MEDICINE

## 2025-02-17 PROCEDURE — 92526 ORAL FUNCTION THERAPY: CPT

## 2025-02-17 PROCEDURE — 1100000000 HC RM PRIVATE

## 2025-02-17 PROCEDURE — 2500000003 HC RX 250 WO HCPCS: Performed by: INTERNAL MEDICINE

## 2025-02-17 PROCEDURE — 6360000004 HC RX CONTRAST MEDICATION: Performed by: INTERNAL MEDICINE

## 2025-02-17 PROCEDURE — 6370000000 HC RX 637 (ALT 250 FOR IP): Performed by: NURSE PRACTITIONER

## 2025-02-17 PROCEDURE — 99231 SBSQ HOSP IP/OBS SF/LOW 25: CPT | Performed by: PSYCHIATRY & NEUROLOGY

## 2025-02-17 PROCEDURE — 82330 ASSAY OF CALCIUM: CPT

## 2025-02-17 PROCEDURE — 2580000003 HC RX 258: Performed by: INTERNAL MEDICINE

## 2025-02-17 PROCEDURE — 6370000000 HC RX 637 (ALT 250 FOR IP): Performed by: INTERNAL MEDICINE

## 2025-02-17 PROCEDURE — 93005 ELECTROCARDIOGRAM TRACING: CPT | Performed by: NURSE PRACTITIONER

## 2025-02-17 PROCEDURE — 84145 PROCALCITONIN (PCT): CPT

## 2025-02-17 PROCEDURE — 6360000002 HC RX W HCPCS: Performed by: NURSE PRACTITIONER

## 2025-02-17 PROCEDURE — C8929 TTE W OR WO FOL WCON,DOPPLER: HCPCS

## 2025-02-17 PROCEDURE — 36415 COLL VENOUS BLD VENIPUNCTURE: CPT

## 2025-02-17 PROCEDURE — 83605 ASSAY OF LACTIC ACID: CPT

## 2025-02-17 PROCEDURE — 80048 BASIC METABOLIC PNL TOTAL CA: CPT

## 2025-02-17 PROCEDURE — 82140 ASSAY OF AMMONIA: CPT

## 2025-02-17 PROCEDURE — 85025 COMPLETE CBC W/AUTO DIFF WBC: CPT

## 2025-02-17 PROCEDURE — 6370000000 HC RX 637 (ALT 250 FOR IP): Performed by: STUDENT IN AN ORGANIZED HEALTH CARE EDUCATION/TRAINING PROGRAM

## 2025-02-17 PROCEDURE — 80076 HEPATIC FUNCTION PANEL: CPT

## 2025-02-17 PROCEDURE — 82962 GLUCOSE BLOOD TEST: CPT

## 2025-02-17 RX ORDER — POTASSIUM CHLORIDE 7.45 MG/ML
10 INJECTION INTRAVENOUS
Status: COMPLETED | OUTPATIENT
Start: 2025-02-17 | End: 2025-02-17

## 2025-02-17 RX ORDER — POTASSIUM CHLORIDE 7.45 MG/ML
10 INJECTION INTRAVENOUS
Status: DISCONTINUED | OUTPATIENT
Start: 2025-02-17 | End: 2025-02-17

## 2025-02-17 RX ORDER — POTASSIUM CHLORIDE 1.5 G/1.58G
40 POWDER, FOR SOLUTION ORAL
Status: DISPENSED | OUTPATIENT
Start: 2025-02-17 | End: 2025-02-17

## 2025-02-17 RX ADMIN — AMLODIPINE BESYLATE 10 MG: 5 TABLET ORAL at 10:50

## 2025-02-17 RX ADMIN — LISINOPRIL 40 MG: 20 TABLET ORAL at 10:50

## 2025-02-17 RX ADMIN — PIPERACILLIN AND TAZOBACTAM 3375 MG: 3; .375 INJECTION, POWDER, LYOPHILIZED, FOR SOLUTION INTRAVENOUS at 05:38

## 2025-02-17 RX ADMIN — TRAZODONE HYDROCHLORIDE 50 MG: 50 TABLET, FILM COATED ORAL at 21:10

## 2025-02-17 RX ADMIN — PIPERACILLIN AND TAZOBACTAM 3375 MG: 3; .375 INJECTION, POWDER, LYOPHILIZED, FOR SOLUTION INTRAVENOUS at 21:19

## 2025-02-17 RX ADMIN — INSULIN LISPRO 2 UNITS: 100 INJECTION, SOLUTION INTRAVENOUS; SUBCUTANEOUS at 17:27

## 2025-02-17 RX ADMIN — SODIUM CHLORIDE, POTASSIUM CHLORIDE, SODIUM LACTATE AND CALCIUM CHLORIDE: 600; 310; 30; 20 INJECTION, SOLUTION INTRAVENOUS at 02:12

## 2025-02-17 RX ADMIN — SODIUM CHLORIDE, PRESERVATIVE FREE 10 ML: 5 INJECTION INTRAVENOUS at 10:56

## 2025-02-17 RX ADMIN — CARVEDILOL 25 MG: 12.5 TABLET, FILM COATED ORAL at 10:59

## 2025-02-17 RX ADMIN — LEVETIRACETAM 1000 MG: 100 INJECTION INTRAVENOUS at 10:59

## 2025-02-17 RX ADMIN — LEVETIRACETAM 1000 MG: 100 INJECTION INTRAVENOUS at 21:07

## 2025-02-17 RX ADMIN — CLOPIDOGREL BISULFATE 75 MG: 75 TABLET ORAL at 10:49

## 2025-02-17 RX ADMIN — CHLORHEXIDINE GLUCONATE 15 ML: 1.2 RINSE ORAL at 21:10

## 2025-02-17 RX ADMIN — ATORVASTATIN CALCIUM 80 MG: 40 TABLET, FILM COATED ORAL at 21:10

## 2025-02-17 RX ADMIN — POTASSIUM CHLORIDE 40 MEQ: 1.5 FOR SOLUTION ORAL at 14:59

## 2025-02-17 RX ADMIN — PERFLUTREN 1.5 ML: 6.52 INJECTION, SUSPENSION INTRAVENOUS at 12:32

## 2025-02-17 RX ADMIN — SODIUM CHLORIDE, POTASSIUM CHLORIDE, SODIUM LACTATE AND CALCIUM CHLORIDE: 600; 310; 30; 20 INJECTION, SOLUTION INTRAVENOUS at 15:01

## 2025-02-17 RX ADMIN — Medication 1 AMPULE: at 21:11

## 2025-02-17 RX ADMIN — Medication 1 TABLET: at 10:50

## 2025-02-17 RX ADMIN — POTASSIUM CHLORIDE 10 MEQ: 7.46 INJECTION, SOLUTION INTRAVENOUS at 10:53

## 2025-02-17 RX ADMIN — CARVEDILOL 25 MG: 12.5 TABLET, FILM COATED ORAL at 17:27

## 2025-02-17 RX ADMIN — SODIUM CHLORIDE, PRESERVATIVE FREE 10 ML: 5 INJECTION INTRAVENOUS at 00:46

## 2025-02-17 RX ADMIN — PIPERACILLIN AND TAZOBACTAM 3375 MG: 3; .375 INJECTION, POWDER, LYOPHILIZED, FOR SOLUTION INTRAVENOUS at 15:03

## 2025-02-17 RX ADMIN — SERTRALINE HYDROCHLORIDE 25 MG: 25 TABLET ORAL at 10:50

## 2025-02-17 RX ADMIN — POTASSIUM CHLORIDE 10 MEQ: 7.46 INJECTION, SOLUTION INTRAVENOUS at 05:41

## 2025-02-17 RX ADMIN — POTASSIUM CHLORIDE 10 MEQ: 7.46 INJECTION, SOLUTION INTRAVENOUS at 06:30

## 2025-02-17 RX ADMIN — APIXABAN 5 MG: 5 TABLET, FILM COATED ORAL at 21:10

## 2025-02-17 RX ADMIN — SODIUM CHLORIDE, PRESERVATIVE FREE 10 ML: 5 INJECTION INTRAVENOUS at 21:10

## 2025-02-17 RX ADMIN — CHLORHEXIDINE GLUCONATE 15 ML: 1.2 RINSE ORAL at 10:49

## 2025-02-17 RX ADMIN — APIXABAN 5 MG: 5 TABLET, FILM COATED ORAL at 10:50

## 2025-02-17 ASSESSMENT — PAIN SCALES - GENERAL
PAINLEVEL_OUTOF10: 0
PAINLEVEL_OUTOF10: 0

## 2025-02-17 NOTE — TELEPHONE ENCOUNTER
Attempted to reach [place] with [facility]. Left  message on vm to return call  VORB.   PCP will follow pt. Pt scheduled 2/19/2025

## 2025-02-17 NOTE — PLAN OF CARE
Problem: Neurosensory - Adult  Goal: Achieves stable or improved neurological status  2/17/2025 0310 by Mohamud Osman RN  Outcome: Progressing  2/16/2025 1605 by June Vincent RN  Outcome: Progressing  Goal: Absence of seizures  2/17/2025 0310 by Mohamud Osman RN  Outcome: Progressing  2/16/2025 1605 by June Vincent RN  Outcome: Progressing  Goal: Remains free of injury related to seizures activity  2/17/2025 0310 by Mohamud Osman RN  Outcome: Progressing  2/16/2025 1605 by June Vincent RN  Outcome: Progressing  Goal: Achieves maximal functionality and self care  2/17/2025 0310 by Mohamud Osman RN  Outcome: Progressing  2/16/2025 1605 by June Vincent RN  Outcome: Progressing     Problem: Respiratory - Adult  Goal: Achieves optimal ventilation and oxygenation  2/17/2025 0310 by Mohamud Osman RN  Outcome: Progressing  2/16/2025 1605 by June Vincent RN  Outcome: Progressing     Problem: Cardiovascular - Adult  Goal: Maintains optimal cardiac output and hemodynamic stability  2/17/2025 0310 by Mohamud Osman RN  Outcome: Progressing  2/16/2025 1605 by June Vincent RN  Outcome: Progressing  Goal: Absence of cardiac dysrhythmias or at baseline  2/17/2025 0310 by Mohamud Osman RN  Outcome: Progressing  2/16/2025 1605 by June Vincent RN  Outcome: Progressing     Problem: Safety - Adult  Goal: Free from fall injury  2/17/2025 0310 by Mohamud Osman RN  Outcome: Progressing  2/16/2025 1605 by June Vincent RN  Outcome: Progressing     Problem: Chronic Conditions and Co-morbidities  Goal: Patient's chronic conditions and co-morbidity symptoms are monitored and maintained or improved  2/17/2025 0310 by Mohamud Osman RN  Outcome: Progressing  2/16/2025 1605 by June Vincent RN  Outcome: Progressing     Problem: Discharge Planning  Goal: Discharge to home or other facility with appropriate resources  2/17/2025 0310 by Mohamud Osman RN  Outcome: Progressing  2/16/2025 1605 by

## 2025-02-17 NOTE — PROGRESS NOTES
End of Shift Note    Bedside shift change report given to GURMEET Dunbar (oncoming nurse) by Mohamud Osman RN (offgoing nurse).  Report included the following information SBAR, Kardex, Intake/Output, MAR, Recent Results, Cardiac Rhythm Atrial fib, and Quality Measures    Shift worked:  3515-7437     Shift summary and any significant changes:     Pt has been lethargic since bedside shift report. Notified NP-Dann Enriquez and she ordered some sets of labs and EKG. NP ordered potassium IV for for potassium level 3.0 which started to give to patient. Q2 turns. No other complaints at the time of shift change.       Concerns for physician to address:  See above     Zone phone for oncoming shift:   [phone]       Activity:  Level of Assistance: Maximum assist, patient does 25-49%  Number times ambulated in hallways past shift: 0  Number of times OOB to chair past shift: 0    Cardiac:   Cardiac Monitoring: Yes      Cardiac Rhythm: Sinus rhythm    Access:  Current line(s): PIV     Genitourinary:   Urinary Status: Voiding, External catheter    Respiratory:   O2 Device: None (Room air)  Chronic home O2 use?: NO  Incentive spirometer at bedside: NO    GI:  Last BM (including prior to admit): 02/16/25  Current diet:  ADULT DIET; Full Liquid  DIET ONE TIME MESSAGE;  Passing flatus: YES    Pain Management:   Patient states pain is manageable on current regimen: N/A    Skin:  Dominik Scale Score: 14  Interventions: Wound Offloading (Prevention Methods): Repositioning, Pillows, Turning, Elevate heels    Patient Safety:  Fall Risk: Nursing Judgement-Fall Risk High(Add Comments): Yes  Fall Risk Interventions  Nursing Judgement-Fall Risk High(Add Comments): Yes  Toilet Every 2 Hours-In Advance of Need: Yes  Hourly Visual Checks: In bed, Awake  Fall Visual Posted: Armband  Room Door Open: Deferred to promote rest  Alarm On: Bed  Patient Moved Closer to Nursing Station: No    Active Consults:   IP CONSULT TO NEUROLOGY  IP CONSULT TO

## 2025-02-17 NOTE — CARE COORDINATION
Transition of Care Plan:    RUR: 18% (moderate RUR)   Prior Level of Functioning: Needing assistance  Disposition: Home with [facility]  - [facility]   MARILIA: 02/18  If SNF or IPR: Date FOC offered:   Date FOC received:   Accepting facility:   Date authorization started with reference number:   Date authorization received and expires:   Follow up appointments: PCP/specialists as indicated  DME needed: None att  Transportation at discharge: BLS  IM/IMM Medicare/ letter given: Last given 02/13  Is patient a [place] and connected with [facility]? N/a   If yes, was  transfer form completed and [facility] notified? N/a  Caregiver Contact: Clarisa Duncan; wife; [phone]  Discharge Caregiver contacted prior to discharge? CM to contact  Care Conference needed? Not at this time  Barriers to discharge: Medical clearance, confirm     0941 - Assumed transitions of care planning from DEANDRE Sunshine. Chart reviewed. Current plan for home with [facility]  via [facility], CM will upload orders and referrals. Will need BLS at d/c.    1154 - Opened patient back up to [facility], will upload [facility] orders closer to d/c.     JOE Kohli  Care Management  [facility]  [phone]

## 2025-02-17 NOTE — PROGRESS NOTES
Comprehensive Nutrition Assessment    Type and Reason for Visit:  Reassess    Nutrition Recommendations/Plan:   Continue current diet; diet advancement per SLP     Malnutrition Assessment:  Malnutrition Status:  No malnutrition (02/13/25 1329)    Context:  Acute Illness     Findings of the 6 clinical characteristics of malnutrition:  Energy Intake:  No decrease in energy intake  Weight Loss:  No weight loss     Body Fat Loss:  No body fat loss     Muscle Mass Loss:  No muscle mass loss    Fluid Accumulation:  No fluid accumulation     Strength:  Not Performed    Nutrition Assessment:    Chart reviewed; medically noted for status epilepticus and acute stroke. PMH HTN, CVA, and DM. Patient extubated and diet advanced since last RD assessment. Initially on full liquids this morning but advanced to pureed per SLP today. Staff feeding him lunch at time of visit; no family at bedside. Reports he's hungry and requesting salt. Encouraged intake of meals. Will monitor PO and labs.     Patient Vitals for the past 120 hrs:   PO Meals Eaten (%)   02/16/25 1537 76 - 100%   02/16/25 1017 76 - 100%   02/15/25 1655 76 - 100%   02/14/25 1848 26 - 50%   02/14/25 1317 1 - 25%     Nutrition Related Findings:    K+ 3.0, --127, A1c 6.5   BM 2/16   2+ edema BLE   Norvasc, Atorvastatin, Plavix, Humalog, Keppra, Protonix, KCl, Zoloft, MVI   Wound Type: None       Current Nutrition Intake & Therapies:    Average Meal Intake: %  Average Supplements Intake: None Ordered  DIET ONE TIME MESSAGE;  ADULT DIET; Dysphagia - Pureed    Anthropometric Measures:  Height: 188 cm (6' 2\")  Ideal Body Weight (IBW): 190 lbs (86 kg)       Current Body Weight: 86.4 kg (190 lb 7.6 oz),   IBW. Weight Source: Bed scale  Current BMI (kg/m2): 24.4                             BMI Categories: Normal Weight (BMI 18.5-24.9)    Estimated Daily Nutrient Needs:  Energy Requirements Based On: Formula  Weight Used for Energy Requirements:

## 2025-02-17 NOTE — PROGRESS NOTES
Hospitalist Progress Note        Demographics    Patient Name  Brii Engle   Date of Birth 1955   Medical Record Number  [id number]      Age  69 y.o.   PCP Maurilio Michaels MD   Admit date:  2/11/2025  7:46 PM     Room Number  142/01  @ [facility]           Admission Diagnoses:  Seizure (HCC)   Admission Summary:  68 y/o male PMHx of CVA (residual left sided deficit) T2DM, Afib (Eliquis), HFrEF HTN. HLD, carcinoid tumor s/p LLL wedge resection and seizure disorder presented to ED via EMS after reported tonic clonic seizure at home witnessed by wife ~30-45 till EMS arrival d/t incliment weather. Administered 10 mg IM versed by EMS, followed by 5 mg IM with subsequent seizure cessation. ED arrival, unresponsive, snoring respirations, A-fib RVR-started on diltiazem drip, emergently placed on mechanical ventilation for airway protection. CT head- negative.  CXR with large left effusion and complete left lung opacification.  Rapid EEG no evidence of seizure burden.  Administered 1000 mg Keppra.  ICU consulted for admission.     Hospital course:  Patient originally admitted with tonic-clonic seizures.  Was in the ICU for airway protection while intubated and on mechanical ventilator  Patient was extubated 02/14/2025.             Assessment and plan:     Seizures disorder  Concern of major depressive disorder    Continue seizure precautions  Fall precautions  Aspiration precautions/speech following/appreciate recommendations  Continue Keppra 1000 mg injection every 12 hours  Involved in neurology again, for less responsiveness---> concern of depression, increasing the sertraline in 1 to 2 weeks  And need to see psychiatrist as outpatient  Discontinued Plavix per neurology recommendation        Community-acquired pneumonia  Chest x-ray on 2/11/2025 with impression of  1. Increased, now complete opacification of the left lung with a large left  pleural effusion.   Chest x-ray on 212/25

## 2025-02-17 NOTE — PROGRESS NOTES
End of Shift Note    Bedside shift change report given to GURMEET Mallory (oncoming nurse) by Ralph LEVI (offgoing nurse).  Report included the following information SBAR, Intake/Output, Recent Results, and Cardiac Rhythm AFib    Shift worked:  Days     Shift summary and any significant changes:    Patient has been confused; A & O to self Tolerating diet. remains on RA  Portable CRX set for 2/16; still pending; called RAD department ; stated they will check on it      Concerns for physician to address:  See above     Zone phone for oncoming shift:   [phone]       Activity:  Level of Assistance: Maximum assist, patient does 25-49%  Number times ambulated in hallways past shift: 0  Number of times OOB to chair past shift: 0    Cardiac:   Cardiac Monitoring: Yes      Cardiac Rhythm: Atrial fib    Access:  Current line(s): PIV     Genitourinary:   Urinary Status: Voiding, External catheter    Respiratory:   O2 Device: None (Room air)  Chronic home O2 use?: NO  Incentive spirometer at bedside: NO    GI:  Last BM (including prior to admit): 02/16/25  Current diet:  ADULT DIET; Full Liquid  DIET ONE TIME MESSAGE;  Passing flatus: YES    Pain Management:   Patient states pain is manageable on current regimen: N/A    Skin:  Dominik Scale Score: 14  Interventions: Wound Offloading (Prevention Methods): Pillows, Repositioning, Turning    Patient Safety:  Fall Risk: Nursing Judgement-Fall Risk High(Add Comments): Yes  Fall Risk Interventions  Nursing Judgement-Fall Risk High(Add Comments): Yes  Toilet Every 2 Hours-In Advance of Need: Yes  Hourly Visual Checks: Awake, In bed  Fall Visual Posted: Armband  Room Door Open: Deferred to promote rest  Alarm On: Bed  Patient Moved Closer to Nursing Station: No    Active Consults:   IP CONSULT TO NEUROLOGY  IP CONSULT TO PHARMACY  IP CONSULT TO PALLIATIVE CARE  IP CONSULT TO DIETITIAN    Length of Stay:  Expected LOS: 6  Actual LOS: 5    Ralph LEVI

## 2025-02-17 NOTE — CONSULTS
[facility]  [address] [place] [address]    Hospital Progress Note - Neurology    Date of progress Note: February 17, 2025    Brii Engle  [id number]  1955  [address] [facility] [address]    Primary Care Physician:  Maurilio Michaels MD  [unfilled]  [phone]    Referring Physician:  [unfilled]    Impression: This a gentleman who presented with seizures and some recurrent albeit small acute strokes.  He is on Eliquis in the face of atrial fibrillation.  Dr. Roger had recommended not putting him back on the Plavix on top of the Eliquis given the bleeding with evidence of microhemorrhages on the imaging study.  However that has been restarted as of yesterday.  He seems depressed to me.  He was started on sertraline yesterday.  Patient's can also get some mental status changes with Zosyn.    Plan: I would recommend discontinuing the Plavix as outlined in Dr. Roger's last note.  Consider increasing the sertraline in a week or 2 and/or having him seen by psychiatry as I suspect he is significantly depressed.  We will sign off but available with any questions or concerns.    Faraz Mary., M.D.    Diplomate, American Board of Neurology and Psychiatry  Diplomate, American Board of Electrodiagnostic Medicine  Subspecialty Certification, Headache Medicine, [facility]  ____________________________________________________________________    Reason for Visit: Brii Engle is a 69 y.o. y/o male who is seen in the hospital for follow up of encephalopathy.    Interval History: This patient was seen a few days ago by Dr. Roger.  He presented in status epilepticus and also was found to have evidence of a few small posterior circulation strokes.  He is aware acute.  He has atrial fibrillation.  His Eliquis was continued.  And looks like Plavix was restarted yesterday despite Dr. Roger's recommendation to keep him off of it and just use the Eliquis.  The      Financial Resource Strain: Low Risk  (6/30/2023)    Overall Financial Resource Strain (CARDIA)     Difficulty of Paying Living Expenses: Not hard at all   Food Insecurity: No Food Insecurity (2/12/2025)    Hunger Vital Sign     Worried About Running Out of Food in the Last Year: Never true     Ran Out of Food in the Last Year: Never true   Transportation Needs: No Transportation Needs (2/12/2025)    PRAPARE - Transportation     Lack of Transportation (Medical): No     Lack of Transportation (Non-Medical): No   Physical Activity: Inactive (11/22/2023)    Exercise Vital Sign     Days of Exercise per Week: 0 days     Minutes of Exercise per Session: 0 min   Stress: No Stress Concern Present (9/6/2023)    [employer] - Occupational Stress Questionnaire     Feeling of Stress : Not at all   Social Connections: Feeling Socially Integrated (11/1/2023)    Received from [facility], [facility]    OASIS : Social Isolation     Frequency of experiencing loneliness or isolation: Never   Intimate Partner Violence: Not At Risk (9/6/2023)    Humiliation, Afraid, Rape, and Kick questionnaire     Fear of Current or Ex-Partner: No     Emotionally Abused: No     Physically Abused: No     Sexually Abused: No   Housing Stability: High Risk (2/12/2025)    Housing Stability Vital Sign     Unable to Pay for Housing in the Last Year: Yes     Number of Times Moved in the Last Year: 25     Homeless in the Last Year: No       Family Medical History:  Family History   Problem Relation Age of Onset    Cancer Father     Stroke Mother          Review of Systems: Quite limited from the patient.    Physical Examination:         VITAL SIGNS:   Vitals:    02/17/25 0915   BP: (!) 127/99   Pulse: 55   Resp: 18   Temp: 97.9 °F (36.6 °C)   SpO2: 98%       The patient is alert and will respond to some questions with a very soft voice.  His affect seems quite depressed.  Despite the soft voice I

## 2025-02-17 NOTE — PLAN OF CARE
Problem: Neurosensory - Adult  Goal: Achieves stable or improved neurological status  2/17/2025 1232 by Bettina Felipe RN  Outcome: Progressing  2/17/2025 0310 by Mohamud Osman RN  Outcome: Progressing  Goal: Absence of seizures  2/17/2025 1232 by Bettina Felipe RN  Outcome: Progressing  2/17/2025 0310 by Mohamud Osman RN  Outcome: Progressing  Goal: Remains free of injury related to seizures activity  2/17/2025 1232 by Bettina Felipe RN  Outcome: Progressing  2/17/2025 0310 by Mohamud Osman RN  Outcome: Progressing  Goal: Achieves maximal functionality and self care  2/17/2025 1232 by Bettina Felipe RN  Outcome: Progressing  2/17/2025 0310 by Mohamud Osman RN  Outcome: Progressing     Problem: Respiratory - Adult  Goal: Achieves optimal ventilation and oxygenation  2/17/2025 1232 by Bettina Felipe RN  Outcome: Progressing  2/17/2025 0310 by Mohamud Osman RN  Outcome: Progressing     Problem: Cardiovascular - Adult  Goal: Maintains optimal cardiac output and hemodynamic stability  2/17/2025 1232 by Bettina Felipe RN  Outcome: Progressing  2/17/2025 0310 by Mohamud Osman RN  Outcome: Progressing  Goal: Absence of cardiac dysrhythmias or at baseline  2/17/2025 1232 by Bettina Felipe RN  Outcome: Progressing  2/17/2025 0310 by Mohamud Osman RN  Outcome: Progressing     Problem: Safety - Adult  Goal: Free from fall injury  2/17/2025 1232 by Bettina Felipe RN  Outcome: Progressing  2/17/2025 0310 by Mohamud Osman RN  Outcome: Progressing     Problem: Chronic Conditions and Co-morbidities  Goal: Patient's chronic conditions and co-morbidity symptoms are monitored and maintained or improved  2/17/2025 1232 by Bettina Felipe RN  Outcome: Progressing  2/17/2025 0310 by Mohamud Osman RN  Outcome: Progressing     Problem: Discharge Planning  Goal: Discharge to home or other facility with appropriate resources  2/17/2025 1232 by Bettina Felipe RN  Outcome: Progressing  2/17/2025 0310 by Mohamud Osman

## 2025-02-17 NOTE — PROGRESS NOTES
Nurse Mohamud contacted Nocturnist/cross cover provider via non-urgent messaging system [email or web address] and notified patient presented with seizure activity at home, states pt has been lethargic since prior to change of shift, states night time po meds held due to concern for swallowing.  Nurse reported Accu-Chek glucose was 127, vital signs stable with temperature 98.4, respiratory rate 16, pulse 61, blood pressure 129/83, O2 sat 98% on room air.  No increased O2 consumption, no increased work of breathing, no acute focal deficits reported.  Appears patient was documented for a transient bradycardia at 43 at 2028-unsure if that was accurate at the time.  Presently no rhythm changes reported.  No other concerns reported. No acute distress reported. No other information provided by nurse. VSS. Patient denies any further complaints or concerns. See prior hospitalist group notes for complete details of course of treatment. Recent lab work and documented vs reviewed.    Ordered in addition to a.m. A1c, BMP which asked nurse to obtain now along with a CBC, procalcitonin, lactic acid, ammonia level, VBG, hepatic function panel, stat EKG-results pending.  SLP consult routine in a.m due to nurse concern patient unable to swallow pills.  Continuous pulse ox and telemetry monitoring ordered.  Nurse to notify for further concerns overnight, nurse to consider escalation of care if needs urgent provider evaluation to call a rapid response as discussed with them.  In addition nurse may call the rapid response nurse to have them look at the patient at the bedside with them if needed.  Appreciate Nursing assistance in the care of this patient.    Continue remaining plan/orders as per dayshift team. Will defer further evaluation/management and timing of discontinuation of regimens to the day shift primary attending care team. Nursing to notify dayshift Hospitalist team in the AM of overnight events and for further/continued concerns.

## 2025-02-17 NOTE — PROGRESS NOTES
Occupational Therapy     Chart reviewed up to date and new orders acknowledged. PT spoke with MD during IDRs, pt is at his total A baseline and is not appropriate for skilled acute care OT services. Pt was evaluated 2/14/2025 and is dependent/total A, recommended HH OT services at [facility]. Will complete duplicate order.     Red Morales, DAVID, OTR/L

## 2025-02-17 NOTE — PLAN OF CARE
Speech LAnguage Pathology TREATMENT    Patient: Brii Engle (69 y.o. male)  Date: 2/17/2025  Primary Diagnosis: Seizure (HCC) [R56.9]       Precautions:  Seizure, Contact Precautions                  ASSESSMENT :  SLP re-consulted due to unable to swallow pills per nurse. Per chart review, this was due to patient being too lethargic for safe medication administration. Patient received asleep however awoke and maintained alertness with max verbal/tactile stimulation. Patient continues with oral holding, however this improved with offering next bite. No overt s/s aspiration observed. Suspect swallow function will be variable with alertness.    Patient will benefit from skilled intervention to address the above impairments.     PLAN :  Recommendations and Planned Interventions:  Diet: Puree and thin liquids when awake, alert, and actively accepting  General aspiration precautions, including upright for all PO intake, small/single bites and sips, slow rate, liquid wash, 1:1 feeding assistance, if prolonged oral holding then offer next bite or empty spoon as this improves timeliness of posterior propulsion  Meds crushed in puree     Recommend next SLP session: dysphagia management    Acute SLP Services: Yes, SLP will continue to follow per plan of care.  Discharge Recommendations: Continue to assess pending progress     SUBJECTIVE:   Patient stated no verbalizations.    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

## 2025-02-17 NOTE — TELEPHONE ENCOUNTER
Lorna with [facility] states pt will be discharging from [facility] in a couple of days.    Will Dr. Michaels follow for HH?  Does doctor need to see pt first?      You may leave this on vm

## 2025-02-17 NOTE — PROGRESS NOTES
Attempted to schedule PCP hospital follow up. Sent PCP office a message, awaiting return call from PCP office with appt information. [facility] information on AVS for patient resource.  Pending patient discharge.

## 2025-02-17 NOTE — PROGRESS NOTES
Physical Therapy       Chart reviewed up to date and new orders acknowledged. Spoke with MD during IDRs, pt is at his total A baseline and is not appropriate for skilled acute care PT services. Pt was evaluated 2/14/2025 and is dependent/total A, recommended  PT services at [facility]. Will complete duplicate order.     Michelle Long PT, DPT, NCS

## 2025-02-18 LAB
ALBUMIN SERPL-MCNC: 2.7 G/DL (ref 3.5–5)
ALBUMIN/GLOB SERPL: 0.6 (ref 1.1–2.2)
ALP SERPL-CCNC: 66 U/L (ref 45–117)
ALT SERPL-CCNC: 11 U/L (ref 12–78)
ANION GAP SERPL CALC-SCNC: 3 MMOL/L (ref 2–12)
ANION GAP SERPL CALC-SCNC: 6 MMOL/L (ref 2–12)
AST SERPL-CCNC: 8 U/L (ref 15–37)
BACTERIA SPEC CULT: NORMAL
BASOPHILS # BLD: 0.02 K/UL (ref 0–0.1)
BASOPHILS # BLD: 0.03 K/UL (ref 0–0.1)
BASOPHILS NFR BLD: 0.3 % (ref 0–1)
BASOPHILS NFR BLD: 0.6 % (ref 0–1)
BILIRUB SERPL-MCNC: 0.3 MG/DL (ref 0.2–1)
BUN SERPL-MCNC: 10 MG/DL (ref 6–20)
BUN SERPL-MCNC: 7 MG/DL (ref 6–20)
BUN/CREAT SERPL: 6 (ref 12–20)
BUN/CREAT SERPL: 9 (ref 12–20)
CALCIUM SERPL-MCNC: 8.3 MG/DL (ref 8.5–10.1)
CALCIUM SERPL-MCNC: 8.3 MG/DL (ref 8.5–10.1)
CHLORIDE SERPL-SCNC: 107 MMOL/L (ref 97–108)
CHLORIDE SERPL-SCNC: 108 MMOL/L (ref 97–108)
CO2 SERPL-SCNC: 27 MMOL/L (ref 21–32)
CO2 SERPL-SCNC: 32 MMOL/L (ref 21–32)
CREAT SERPL-MCNC: 1.15 MG/DL (ref 0.7–1.3)
CREAT SERPL-MCNC: 1.19 MG/DL (ref 0.7–1.3)
DIFFERENTIAL METHOD BLD: ABNORMAL
DIFFERENTIAL METHOD BLD: ABNORMAL
EKG ATRIAL RATE: 48 BPM
EKG DIAGNOSIS: NORMAL
EKG Q-T INTERVAL: 572 MS
EKG QRS DURATION: 118 MS
EKG QTC CALCULATION (BAZETT): 510 MS
EKG R AXIS: 185 DEGREES
EKG T AXIS: -26 DEGREES
EKG VENTRICULAR RATE: 48 BPM
EOSINOPHIL # BLD: 0.08 K/UL (ref 0–0.4)
EOSINOPHIL # BLD: 0.09 K/UL (ref 0–0.4)
EOSINOPHIL NFR BLD: 1.4 % (ref 0–7)
EOSINOPHIL NFR BLD: 1.5 % (ref 0–7)
ERYTHROCYTE [DISTWIDTH] IN BLOOD BY AUTOMATED COUNT: 15 % (ref 11.5–14.5)
ERYTHROCYTE [DISTWIDTH] IN BLOOD BY AUTOMATED COUNT: 15 % (ref 11.5–14.5)
GLOBULIN SER CALC-MCNC: 4.3 G/DL (ref 2–4)
GLUCOSE BLD STRIP.AUTO-MCNC: 133 MG/DL (ref 65–117)
GLUCOSE BLD STRIP.AUTO-MCNC: 138 MG/DL (ref 65–117)
GLUCOSE BLD STRIP.AUTO-MCNC: 148 MG/DL (ref 65–117)
GLUCOSE BLD STRIP.AUTO-MCNC: 154 MG/DL (ref 65–117)
GLUCOSE BLD STRIP.AUTO-MCNC: 95 MG/DL (ref 65–117)
GLUCOSE SERPL-MCNC: 148 MG/DL (ref 65–100)
GLUCOSE SERPL-MCNC: 153 MG/DL (ref 65–100)
HCT VFR BLD AUTO: 35.2 % (ref 36.6–50.3)
HCT VFR BLD AUTO: 37.1 % (ref 36.6–50.3)
HGB BLD-MCNC: 11.1 G/DL (ref 12.1–17)
HGB BLD-MCNC: 12 G/DL (ref 12.1–17)
IMM GRANULOCYTES # BLD AUTO: 0.01 K/UL (ref 0–0.04)
IMM GRANULOCYTES # BLD AUTO: 0.02 K/UL (ref 0–0.04)
IMM GRANULOCYTES NFR BLD AUTO: 0.2 % (ref 0–0.5)
IMM GRANULOCYTES NFR BLD AUTO: 0.3 % (ref 0–0.5)
LYMPHOCYTES # BLD: 1.28 K/UL (ref 0.8–3.5)
LYMPHOCYTES # BLD: 2.11 K/UL (ref 0.8–3.5)
LYMPHOCYTES NFR BLD: 24.6 % (ref 12–49)
LYMPHOCYTES NFR BLD: 32.3 % (ref 12–49)
MAGNESIUM SERPL-MCNC: 1.7 MG/DL (ref 1.6–2.4)
MCH RBC QN AUTO: 26.9 PG (ref 26–34)
MCH RBC QN AUTO: 27.1 PG (ref 26–34)
MCHC RBC AUTO-ENTMCNC: 31.5 G/DL (ref 30–36.5)
MCHC RBC AUTO-ENTMCNC: 32.3 G/DL (ref 30–36.5)
MCV RBC AUTO: 83.7 FL (ref 80–99)
MCV RBC AUTO: 85.4 FL (ref 80–99)
MONOCYTES # BLD: 0.58 K/UL (ref 0–1)
MONOCYTES # BLD: 0.71 K/UL (ref 0–1)
MONOCYTES NFR BLD: 10.9 % (ref 5–13)
MONOCYTES NFR BLD: 11.1 % (ref 5–13)
NEUTS SEG # BLD: 3.23 K/UL (ref 1.8–8)
NEUTS SEG # BLD: 3.59 K/UL (ref 1.8–8)
NEUTS SEG NFR BLD: 54.8 % (ref 32–75)
NEUTS SEG NFR BLD: 62 % (ref 32–75)
NRBC # BLD: 0 K/UL (ref 0–0.01)
NRBC # BLD: 0 K/UL (ref 0–0.01)
NRBC BLD-RTO: 0 PER 100 WBC
NRBC BLD-RTO: 0 PER 100 WBC
PHOSPHATE SERPL-MCNC: 3 MG/DL (ref 2.6–4.7)
PLATELET # BLD AUTO: 189 K/UL (ref 150–400)
PLATELET # BLD AUTO: 199 K/UL (ref 150–400)
PMV BLD AUTO: 10.9 FL (ref 8.9–12.9)
PMV BLD AUTO: 11.4 FL (ref 8.9–12.9)
POTASSIUM SERPL-SCNC: 3.2 MMOL/L (ref 3.5–5.1)
POTASSIUM SERPL-SCNC: 3.4 MMOL/L (ref 3.5–5.1)
PROT SERPL-MCNC: 7 G/DL (ref 6.4–8.2)
RBC # BLD AUTO: 4.12 M/UL (ref 4.1–5.7)
RBC # BLD AUTO: 4.43 M/UL (ref 4.1–5.7)
SERVICE CMNT-IMP: ABNORMAL
SERVICE CMNT-IMP: NORMAL
SERVICE CMNT-IMP: NORMAL
SODIUM SERPL-SCNC: 141 MMOL/L (ref 136–145)
SODIUM SERPL-SCNC: 142 MMOL/L (ref 136–145)
WBC # BLD AUTO: 5.2 K/UL (ref 4.1–11.1)
WBC # BLD AUTO: 6.5 K/UL (ref 4.1–11.1)

## 2025-02-18 PROCEDURE — 6360000002 HC RX W HCPCS

## 2025-02-18 PROCEDURE — 6360000002 HC RX W HCPCS: Performed by: INTERNAL MEDICINE

## 2025-02-18 PROCEDURE — 92523 SPEECH SOUND LANG COMPREHEN: CPT

## 2025-02-18 PROCEDURE — 80053 COMPREHEN METABOLIC PANEL: CPT

## 2025-02-18 PROCEDURE — 2580000003 HC RX 258: Performed by: INTERNAL MEDICINE

## 2025-02-18 PROCEDURE — 1100000000 HC RM PRIVATE

## 2025-02-18 PROCEDURE — 2500000003 HC RX 250 WO HCPCS: Performed by: INTERNAL MEDICINE

## 2025-02-18 PROCEDURE — 6370000000 HC RX 637 (ALT 250 FOR IP): Performed by: NURSE PRACTITIONER

## 2025-02-18 PROCEDURE — 83735 ASSAY OF MAGNESIUM: CPT

## 2025-02-18 PROCEDURE — 85025 COMPLETE CBC W/AUTO DIFF WBC: CPT

## 2025-02-18 PROCEDURE — 6370000000 HC RX 637 (ALT 250 FOR IP): Performed by: STUDENT IN AN ORGANIZED HEALTH CARE EDUCATION/TRAINING PROGRAM

## 2025-02-18 PROCEDURE — 6370000000 HC RX 637 (ALT 250 FOR IP): Performed by: INTERNAL MEDICINE

## 2025-02-18 PROCEDURE — 36415 COLL VENOUS BLD VENIPUNCTURE: CPT

## 2025-02-18 PROCEDURE — 82962 GLUCOSE BLOOD TEST: CPT

## 2025-02-18 PROCEDURE — 92526 ORAL FUNCTION THERAPY: CPT

## 2025-02-18 PROCEDURE — 84100 ASSAY OF PHOSPHORUS: CPT

## 2025-02-18 RX ORDER — POTASSIUM CHLORIDE 7.45 MG/ML
10 INJECTION INTRAVENOUS
Status: COMPLETED | OUTPATIENT
Start: 2025-02-18 | End: 2025-02-19

## 2025-02-18 RX ORDER — SACUBITRIL AND VALSARTAN 24; 26 MG/1; MG/1
1 TABLET, FILM COATED ORAL 2 TIMES DAILY
Status: DISCONTINUED | OUTPATIENT
Start: 2025-02-20 | End: 2025-02-20

## 2025-02-18 RX ORDER — HYDRALAZINE HYDROCHLORIDE 25 MG/1
25 TABLET, FILM COATED ORAL EVERY 8 HOURS SCHEDULED
Status: DISPENSED | OUTPATIENT
Start: 2025-02-18 | End: 2025-02-19

## 2025-02-18 RX ORDER — POTASSIUM CHLORIDE 1.5 G/1.58G
40 POWDER, FOR SOLUTION ORAL ONCE
Status: COMPLETED | OUTPATIENT
Start: 2025-02-18 | End: 2025-02-18

## 2025-02-18 RX ADMIN — Medication 1 TABLET: at 10:24

## 2025-02-18 RX ADMIN — SERTRALINE HYDROCHLORIDE 25 MG: 25 TABLET ORAL at 10:24

## 2025-02-18 RX ADMIN — LEVETIRACETAM 1000 MG: 100 INJECTION INTRAVENOUS at 10:21

## 2025-02-18 RX ADMIN — AMLODIPINE BESYLATE 10 MG: 5 TABLET ORAL at 10:22

## 2025-02-18 RX ADMIN — CHLORHEXIDINE GLUCONATE 15 ML: 1.2 RINSE ORAL at 10:22

## 2025-02-18 RX ADMIN — POTASSIUM CHLORIDE 10 MEQ: 7.45 INJECTION INTRAVENOUS at 23:24

## 2025-02-18 RX ADMIN — CARVEDILOL 25 MG: 12.5 TABLET, FILM COATED ORAL at 10:21

## 2025-02-18 RX ADMIN — SODIUM CHLORIDE, PRESERVATIVE FREE 10 ML: 5 INJECTION INTRAVENOUS at 10:25

## 2025-02-18 RX ADMIN — POTASSIUM CHLORIDE 40 MEQ: 1.5 FOR SOLUTION ORAL at 12:37

## 2025-02-18 RX ADMIN — HYDRALAZINE HYDROCHLORIDE 25 MG: 25 TABLET ORAL at 12:37

## 2025-02-18 RX ADMIN — PIPERACILLIN AND TAZOBACTAM 3375 MG: 3; .375 INJECTION, POWDER, LYOPHILIZED, FOR SOLUTION INTRAVENOUS at 06:01

## 2025-02-18 RX ADMIN — PIPERACILLIN AND TAZOBACTAM 3375 MG: 3; .375 INJECTION, POWDER, LYOPHILIZED, FOR SOLUTION INTRAVENOUS at 14:03

## 2025-02-18 RX ADMIN — PANTOPRAZOLE SODIUM 40 MG: 40 TABLET, DELAYED RELEASE ORAL at 06:31

## 2025-02-18 RX ADMIN — PIPERACILLIN AND TAZOBACTAM 3375 MG: 3; .375 INJECTION, POWDER, LYOPHILIZED, FOR SOLUTION INTRAVENOUS at 20:51

## 2025-02-18 RX ADMIN — ACETAMINOPHEN 650 MG: 325 TABLET ORAL at 17:13

## 2025-02-18 RX ADMIN — SODIUM CHLORIDE, PRESERVATIVE FREE 10 ML: 5 INJECTION INTRAVENOUS at 20:55

## 2025-02-18 RX ADMIN — EMPAGLIFLOZIN 10 MG: 10 TABLET, FILM COATED ORAL at 12:37

## 2025-02-18 RX ADMIN — APIXABAN 5 MG: 5 TABLET, FILM COATED ORAL at 10:23

## 2025-02-18 RX ADMIN — SODIUM CHLORIDE, POTASSIUM CHLORIDE, SODIUM LACTATE AND CALCIUM CHLORIDE: 600; 310; 30; 20 INJECTION, SOLUTION INTRAVENOUS at 17:15

## 2025-02-18 RX ADMIN — CARVEDILOL 25 MG: 12.5 TABLET, FILM COATED ORAL at 17:12

## 2025-02-18 RX ADMIN — LEVETIRACETAM 1000 MG: 100 INJECTION INTRAVENOUS at 20:56

## 2025-02-18 RX ADMIN — LISINOPRIL 40 MG: 20 TABLET ORAL at 10:23

## 2025-02-18 ASSESSMENT — PAIN SCALES - GENERAL
PAINLEVEL_OUTOF10: 0
PAINLEVEL_OUTOF10: 0
PAINLEVEL_OUTOF10: 4

## 2025-02-18 ASSESSMENT — PAIN DESCRIPTION - LOCATION: LOCATION: LEG

## 2025-02-18 NOTE — TELEPHONE ENCOUNTER
Spoke with Lorna with [facility]  [facility] - PCP will follow.   Verbalized understanding.

## 2025-02-18 NOTE — PLAN OF CARE
Problem: Neurosensory - Adult  Goal: Achieves stable or improved neurological status  2/18/2025 1103 by Bettina Felipe RN  Outcome: Progressing  2/17/2025 2224 by Tori Song RN  Outcome: Progressing  Goal: Absence of seizures  2/18/2025 1103 by Bettina Felipe RN  Outcome: Progressing  2/17/2025 2224 by Tori Song RN  Outcome: Progressing  Goal: Remains free of injury related to seizures activity  2/18/2025 1103 by Bettina Felipe RN  Outcome: Progressing  2/17/2025 2224 by Tori Song RN  Outcome: Progressing  Goal: Achieves maximal functionality and self care  2/18/2025 1103 by Bettina Felipe RN  Outcome: Progressing  2/17/2025 2224 by Tori Song RN  Outcome: Progressing     Problem: Respiratory - Adult  Goal: Achieves optimal ventilation and oxygenation  2/18/2025 1103 by Bettina Felipe RN  Outcome: Progressing  2/17/2025 2224 by Tori Song RN  Outcome: Progressing     Problem: Cardiovascular - Adult  Goal: Maintains optimal cardiac output and hemodynamic stability  2/18/2025 1103 by Bettina Felipe RN  Outcome: Progressing  2/17/2025 2224 by Tori Song RN  Outcome: Progressing  Goal: Absence of cardiac dysrhythmias or at baseline  2/18/2025 1103 by Bettina Felipe RN  Outcome: Progressing  2/17/2025 2224 by Tori Song RN  Outcome: Progressing     Problem: Safety - Adult  Goal: Free from fall injury  2/18/2025 1103 by Bettina Felipe RN  Outcome: Progressing  2/17/2025 2224 by Tori Song RN  Outcome: Progressing     Problem: Chronic Conditions and Co-morbidities  Goal: Patient's chronic conditions and co-morbidity symptoms are monitored and maintained or improved  2/18/2025 1103 by Bettina Felipe RN  Outcome: Progressing  2/17/2025 2224 by Tori Song RN  Outcome: Progressing     Problem: Discharge Planning  Goal: Discharge to home or other facility with appropriate resources  2/18/2025 1103 by Bettina Felipe RN  Outcome: Progressing  2/17/2025 2224 by

## 2025-02-18 NOTE — PROGRESS NOTES
End of Shift Note    Bedside shift change report given to  GURMEET Antonio (oncoming nurse) by GURMEET Dunbar(offgoing nurse).  Report included the following information SBAR, Kardex, Intake/Output, MAR, Recent Results, Cardiac Rhythm Atrial fib, and Quality Measures    Shift worked:  Days     Shift summary and any significant changes:     Patient alert today, Patient turned Q2. IV Abx administered as ordered.   Concerns for physician to address:  See above     Zone phone for oncoming shift:   [phone]       Activity:  Level of Assistance: Maximum assist, patient does 25-49%  Number times ambulated in hallways past shift: 0  Number of times OOB to chair past shift: 0    Cardiac:   Cardiac Monitoring: Yes      Cardiac Rhythm: Sinus rhythm    Access:  Current line(s): PIV     Genitourinary:   Urinary Status: Voiding, External catheter    Respiratory:   O2 Device: None (Room air)  Chronic home O2 use?: NO  Incentive spirometer at bedside: NO    GI:  Last BM (including prior to admit): 02/18/25  Current diet:  DIET ONE TIME MESSAGE;  ADULT DIET; Dysphagia - Soft and Bite Sized  Passing flatus: YES    Pain Management:   Patient states pain is manageable on current regimen: N/A    Skin:  Dominik Scale Score: 18  Interventions: Wound Offloading (Prevention Methods): Bed, pressure reduction mattress, Pillows, Repositioning, Turning    Patient Safety:  Fall Risk: Nursing Judgement-Fall Risk High(Add Comments): Yes  Fall Risk Interventions  Nursing Judgement-Fall Risk High(Add Comments): Yes  Toilet Every 2 Hours-In Advance of Need: Yes  Hourly Visual Checks: Awake, In bed  Fall Visual Posted: Armband, Fall sign posted, Socks  Room Door Open: Deferred to decrease stimulation  Alarm On: Bed  Patient Moved Closer to Nursing Station: No    Active Consults:   IP CONSULT TO NEUROLOGY  IP CONSULT TO PHARMACY  IP CONSULT TO PALLIATIVE CARE  IP CONSULT TO DIETITIAN  IP CONSULT TO NEUROLOGY  IP CONSULT TO CARDIOLOGY    Length of

## 2025-02-18 NOTE — CARE COORDINATION
Transition of Care Plan:     RUR: 18% (moderate RUR)   Prior Level of Functioning: Needing assistance  Disposition: Home with [facility]   MARILIA: 02/18-19  If SNF or IPR: Date FOC offered:   Date FOC received:   Accepting facility:   Date authorization started with reference number:   Date authorization received and expires:   Follow up appointments: PCP/specialists as indicated  DME needed: None att  Transportation at discharge: BLS  IM/IMM Medicare/ letter given: Last given 02/13  Is patient a  and connected with [facility]? N/a              If yes, was [place] transfer form completed and [facility] notified? N/a  Caregiver Contact: Clarisa Duncan; wife; [phone]  Discharge Caregiver contacted prior to discharge? CM to contact  Care Conference needed? Not at this time  Barriers to discharge: Medical clearance    0936 - Chart reviewed. Plan remains for home with  [facility] following. BLS at d/c. CM to upload HH order closer to d/c.     JOE Kohli  Care Management  [facility]  [id number]

## 2025-02-18 NOTE — PROGRESS NOTES
Hospitalist Progress Note        Demographics    Patient Name  Brii Engle   Date of Birth 1955   Medical Record Number  [id number]      Age  69 y.o.   PCP Maurilio Michaels MD   Admit date:  2/11/2025  7:46 PM     Room Number  142/01  @ [facility]           Admission Diagnoses:  Seizure (HCC)   Admission Summary:  70 y/o male PMHx of CVA (residual left sided deficit) T2DM, Afib (Eliquis), HFrEF HTN. HLD, carcinoid tumor s/p LLL wedge resection and seizure disorder presented to ED via EMS after reported tonic clonic seizure at home witnessed by wife ~30-45 till EMS arrival d/t incliment weather. Administered 10 mg IM versed by EMS, followed by 5 mg IM with subsequent seizure cessation. ED arrival, unresponsive, snoring respirations, A-fib RVR-started on diltiazem drip, emergently placed on mechanical ventilation for airway protection. CT head- negative.  CXR with large left effusion and complete left lung opacification.  Rapid EEG no evidence of seizure burden.  Administered 1000 mg Keppra.  ICU consulted for admission.     Hospital course:  Patient originally admitted with tonic-clonic seizures.  Was in the ICU for airway protection while intubated and on mechanical ventilator  Patient was extubated 02/14/2025.             Assessment and plan:     Seizures disorder  Concern of major depressive disorder    Continue seizure precautions  Fall precautions  Aspiration precautions/speech following/appreciate recommendations  Continue Keppra 1000 mg injection every 12 hours  Involved in neurology again, for less responsiveness---> concern of depression, increasing the sertraline in 1 to 2 weeks  And need to see psychiatrist as outpatient  Discontinued Plavix per neurology recommendation        Community-acquired pneumonia  Chest x-ray on 2/11/2025 with impression of  1. Increased, now complete opacification of the left lung with a large left  pleural effusion.   Chest x-ray on 212/25

## 2025-02-18 NOTE — PROGRESS NOTES
End of Shift Note    Bedside shift change report given to  GURMEET Valle (oncoming nurse) by GURMEET Dunbar(offgoing nurse).  Report included the following information SBAR, Kardex, Intake/Output, MAR, Recent Results, Cardiac Rhythm Atrial fib, and Quality Measures    Shift worked:  Days     Shift summary and any significant changes:     Patient alert today, Ionized calcium sent, Echo completed. Patient turned Q2. IV Abx administered as ordered. 2 smear BM's.   Concerns for physician to address:  See above     Zone phone for oncoming shift:   [phone]       Activity:  Level of Assistance: Maximum assist, patient does 25-49%  Number times ambulated in hallways past shift: 0  Number of times OOB to chair past shift: 0    Cardiac:   Cardiac Monitoring: Yes      Cardiac Rhythm: Sinus rhythm    Access:  Current line(s): PIV     Genitourinary:   Urinary Status: Voiding, Diaper, External catheter    Respiratory:   O2 Device: None (Room air)  Chronic home O2 use?: NO  Incentive spirometer at bedside: NO    GI:  Last BM (including prior to admit): 02/17/25  Current diet:  DIET ONE TIME MESSAGE;  ADULT DIET; Dysphagia - Pureed  Passing flatus: YES    Pain Management:   Patient states pain is manageable on current regimen: N/A    Skin:  Dominik Scale Score: 15  Interventions: Wound Offloading (Prevention Methods): Bed, pressure reduction mattress, Pillows, Repositioning, Turning    Patient Safety:  Fall Risk: Nursing Judgement-Fall Risk High(Add Comments): Yes  Fall Risk Interventions  Nursing Judgement-Fall Risk High(Add Comments): Yes  Toilet Every 2 Hours-In Advance of Need: Yes  Hourly Visual Checks: Patient not in room  Fall Visual Posted: Armband, Fall sign posted, Socks  Room Door Open: Deferred to decrease stimulation  Alarm On: Bed  Patient Moved Closer to Nursing Station: No    Active Consults:   IP CONSULT TO NEUROLOGY  IP CONSULT TO PHARMACY  IP CONSULT TO PALLIATIVE CARE  IP CONSULT TO DIETITIAN  IP CONSULT TO

## 2025-02-18 NOTE — PLAN OF CARE
Problem: Neurosensory - Adult  Goal: Achieves stable or improved neurological status  2/17/2025 2224 by Tori Song RN  Outcome: Progressing  2/17/2025 1232 by Bettina Felipe RN  Outcome: Progressing  Goal: Absence of seizures  2/17/2025 2224 by Tori Song RN  Outcome: Progressing  2/17/2025 1232 by Bettina Felipe RN  Outcome: Progressing  Goal: Remains free of injury related to seizures activity  2/17/2025 2224 by Tori Song RN  Outcome: Progressing  2/17/2025 1232 by Bettina Felipe RN  Outcome: Progressing  Goal: Achieves maximal functionality and self care  2/17/2025 2224 by Tori Song RN  Outcome: Progressing  2/17/2025 1232 by Bettina Felipe RN  Outcome: Progressing     Problem: Respiratory - Adult  Goal: Achieves optimal ventilation and oxygenation  2/17/2025 2224 by Tori Song RN  Outcome: Progressing  2/17/2025 1232 by Bettina Felipe RN  Outcome: Progressing     Problem: Cardiovascular - Adult  Goal: Maintains optimal cardiac output and hemodynamic stability  2/17/2025 2224 by Tori Song RN  Outcome: Progressing  2/17/2025 1232 by Bettina Felipe RN  Outcome: Progressing  Goal: Absence of cardiac dysrhythmias or at baseline  2/17/2025 2224 by Tori Song RN  Outcome: Progressing  2/17/2025 1232 by Bettina Felipe RN  Outcome: Progressing     Problem: Safety - Adult  Goal: Free from fall injury  2/17/2025 2224 by Tori Song RN  Outcome: Progressing  2/17/2025 1232 by Bettina Felipe RN  Outcome: Progressing     Problem: Chronic Conditions and Co-morbidities  Goal: Patient's chronic conditions and co-morbidity symptoms are monitored and maintained or improved  2/17/2025 2224 by Tori Song RN  Outcome: Progressing  2/17/2025 1232 by Bettina Felipe RN  Outcome: Progressing     Problem: Discharge Planning  Goal: Discharge to home or other facility with appropriate resources  2/17/2025 2224 by Tori Song RN  Outcome: Progressing  2/17/2025 1232 by  Matteo, Bettina, RN  Outcome: Progressing     Problem: Pain  Goal: Verbalizes/displays adequate comfort level or baseline comfort level  2/17/2025 2224 by Tori Song RN  Outcome: Progressing  2/17/2025 1232 by Bettina Felipe RN  Outcome: Progressing     Problem: Skin/Tissue Integrity  Goal: Skin integrity remains intact  Description: 1.  Monitor for areas of redness and/or skin breakdown  2.  Assess vascular access sites hourly  3.  Every 4-6 hours minimum:  Change oxygen saturation probe site  4.  Every 4-6 hours:  If on nasal continuous positive airway pressure, respiratory therapy assess nares and determine need for appliance change or resting period  2/17/2025 2224 by Tori Song RN  Outcome: Progressing  2/17/2025 1232 by Bettina Felipe RN  Outcome: Progressing     Problem: ABCDS Injury Assessment  Goal: Absence of physical injury  2/17/2025 2224 by Tori Song RN  Outcome: Progressing  2/17/2025 1232 by Bettina Felipe RN  Outcome: Progressing     Problem: Nutrition Deficit:  Goal: Optimize nutritional status  2/17/2025 2224 by Tori Song RN  Outcome: Progressing  Flowsheets (Taken 2/17/2025 1355 by Sharlene Culp, KEHINDE)  Nutrient intake appropriate for improving, restoring, or maintaining nutritional needs: Monitor oral intake, labs, and treatment plans  2/17/2025 1232 by Bettina Felipe RN  Outcome: Progressing     Problem: SLP Adult - Impaired Swallowing  Goal: By Discharge: Advance to least restrictive diet without signs or symptoms of aspiration for planned discharge setting.  See evaluation for individualized goals.  Description: Speech Pathology Goals  Initiated 2/14/2025     1. Patient will tolerate least restrictive diet without signs of aspiration or decline in respiratory status within 7 days.  2. Patient will resume baseline diet of Regular/Thin Liquids within 7 days.  2/17/2025 0845 by Alexia Palacios SLP  Outcome: Progressing  2/17/2025 0845 by Alexia Palacios, BREE  Outcome:

## 2025-02-18 NOTE — PROGRESS NOTES
Palliative Medicine  Per Dr. Juarez: Brii Engle is a 69 y.o. male with a past medical history of CVA, cancer, who was admitted on 2025 from home with a diagnosis of status epilepticus, AHRF, Afib with RVR, RAE, lactic acidosis. He was treated for seizure with keppra. He was intubated. He was treated for afib with RVR with diltiazem. He become hypotensive and diltiazem was discontinued.      Code Status: Full Code    Advance Care Plannin/17/2025    12:49 PM   Demographics   Marital Status    No AMD so spouse, Clarisa is primary HCDM and 3 daughters are secondary HCDM    Patient / Family Encounter Documentation    Participants (names): Brii Engle, (by phone spouse, Clarisa), Rita Lopez LCSW    Narrative:   --Palliative SW reviewed chart and stopped in to offer support to patient, who was received lying in bed with tv on alert, pleasant, receptive to encounter.  --Mr. Engle, when asked why he was in the hospital said, \"I had a stroke.\" He also said he feels ready to go home and wants his family to bring his clothes and come get him.  --LCSW informed him I would be calling his wife and he said  he is ready to go home and wants his family to bring his clothes and come get him.   --This writer encouraged him to be patient. Asked if he was having any pain and he reported pain of 7-8 in his right leg, which he attributes to a fall. LCSW reported pain to assigned nurse, Bettina.  --Called spouse to check in and offer support. She said she feels he is close to his baseline and is relieved that he has improved from where he was last week.  --Goals are clear for full code and full restorative care. Informed spouse we will sign off and let her know our team is available if she wants our support during any future hospitalization. She expressed appreciation and understanding.  --Referred spouse to DEANDRE Mariee for next steps/discharge plans.    Psychosocial Issues Identified/ Resilience

## 2025-02-18 NOTE — PALLIATIVE CARE DISCHARGE
Goals of Care/Treatment Preferences    The Palliative Medicine team was consulted as part of your/your loved one's care in the hospital. Our team is a supportive service; we strive to relieve suffering and improve quality of life.    We reviewed advance care planning information, which includes the following:    Primary Decision Maker (Active): Clarisa Engle - Spouse - [phone]  Patient's Healthcare Decision Maker is:: Legal Next of Kin  Confirm Advance Directive: None  Patient Would Like to Complete Advance Directive: Unable    Patient/Health Care Proxy Stated Goals: Prolong life    We reviewed / discussed your code status as:   Code Status: Full Code     “Full Code” means perform CPR in the event of cardiac arrest.      “DNR” means do NOT perform CPR in the event of cardiac arrest.      “Partial Code” means you have specific preferences, please discuss with your healthcare team.      “No Order” means this issue was not addressed / resolved during your stay    Medical Interventions: Full interventions     Because of the importance of this information, we are providing you with a printed copy to share with other healthcare providers after this hospitalization is complete.    Thank you for including Palliative team in the care of Mr. Brii Engle.   We wish you the best and want you to know we are here for you if you find yourself here at [facility] again and want our support.    Rita Lopez, [facility]  Palliative Medicine [phone]         ANIMAL BITE

## 2025-02-18 NOTE — CONSULTS
[facility]        Consult    NAME: Brii Engle   :  1955   MRN:  [id number]     Date/Time:  2025 11:08 AM    Patient PCP: Maurilio Michaels MD  ________________________________________________________________________     Assessment:     Cardiomyopathy  HFrEF  Permanent Atrial Fibrillation  Hypertension  Dyslipidemia  Type 2 Diabetes  Acute CVA on Brain MRI inferior cerebellar vermis and medial right cerebellum with History of CVA x 2 and spontaneous EVA dissection  Seizure Disorder  Lung Cancer s/p wedge resection  CKD   Community Acquired Pneumonia/ needs repeat imaging in 2 months  Hypokalemia  Degenerative Disc Disease of Spine  Presence of PEG tube    Full Code  -t/-e/-s      Plan:   Further plan per Dr Marquez  EKG with Afib, R Axis, Qtc 510 ms. TTE  with LVEF 20-25%, severe global hypokinesis, Mildly reduced RV systolic Mod MR . TTE  with LVEF 35-40%. Had concern for L sided pleural effusion on admission and Pro BNP 5592. Transition from Lisinopril to Entresto. Will bridge with Hydralazine for 36 hours and then start. Added Jardiance. Can add MRA tomorrow to help with diuresis. Likely due to HTN. Had nuclear stress test a few years ago which was negative. Consider cath at some point. ASA, Statin , BB     [x]       High complexity decision making was performed in this patient at high risk for decompensation with multiple organ involvement.      Subjective:   CHIEF COMPLAINT: Cardiomyopathy    HISTORY OF PRESENT ILLNESS:     Brii is a 69 y.o.   male who was brought to [facility] after witnessed seizure on 2025. He was found to have community acquired pneumonia and new CVA. TTE for CVA work up demonstrated a worsened LVEF from 35% to 25%. He is minimally interactive with me. I discussed patient with RN and hospitalist. He denied chest pain, shortness of breath, palpitations, edema, lightheadedness, presyncope and syncope.       We were  Neg Carotid - Bilateral Normal Pulse.  Radial - Bilateral Normal Pulse.  Posterior Tibial - Bilateral Normal Pulse.  Dorsalis Pedis - Bilateral Normal Pulse.   Skin Neg Venous Stasis Ulcer - Absent.   M/S Pos Gait - Uses Wheelchair.  Able to Exercise - No.   EXT Neg Clubbing - Absent.   EXT Pos Lower Extremity Edema - Mild, Bilateral.   EXT Note Residual left sided weakness   Psych Neg Orientation - Oriented to Time, Person, Place.       IMPRESSION AND PLAN:  01. Encounter for preprocedural cardiovascular examination (Z01.810):  The proposed procedure in general carries an intermediate risk of cardiac complications.  The patient has intermediate clinical predictors of increased risk of periprocedural cardiac complications.  He is inactive secondary to his previous stroke.  I will consult Dr. Anderson regarding preoperative testing and holding his anticoagulation.  ECG done   02. Back pain (M54.9):  Managed by: Other Physician.   03. Cardiomyopathy, unspecified (I42.9):  This condition is stable.  The patient's systolic function has normalized.  No congestive heart failure manifestations.  The patient was given instructions on congestive heart failure.   04. Permanent atrial fibrillation (I48.21):  Chronic.  Rate controlled.  Patient has a CHADSVASc score of 4 or greater.  He remains on Eliquis for stroke prevention.   05. Essential (primary) hypertension (I10):  Adequately controlled.   06. Mixed hyperlipidemia (E78.2):  The patient is tolerating medical therapy.  Managed by: PCP.   07. Type 2 diabetes mellitus without complications (E11.9):  Managed by: PCP.   08. Long term (current) use of anticoagulants (Z79.01):  Indicated for atrial fibrillation.  No apparent bleeding.   09. Nicotine dependence, unspecified, uncomplicated (F17.200):  The patient was instructed on smoking cessation.   10. Body mass index [BMI] 31.0-31.9, adult (Z68.31):  This condition is stable.       ORDERS:  1 ECG done    2 Tobacco cessation

## 2025-02-18 NOTE — PLAN OF CARE
Speech LAnguage Pathology TREATMENT    Patient: Brii Engle (69 y.o. male)  Date: 2/18/2025  Primary Diagnosis: Seizure (HCC) [R56.9]       Precautions:  Seizure, Contact Precautions                  ASSESSMENT :  Patient with significantly improved alertness on this date in comparison to prior visits. Assisted patient with oral care prior to PO trials. RN at bedside for medication administration. Patient tolerated pills whole without overt difficulty. Patient's mastication assessed to be timely and complete. No oral residue observed.    Patient with decreased sustained and selective attention to task and benefited from elimination of distractions (TV turned off, door closed, etc.) during SLP visit. Patient independently oriented to self and place and partially to situation. Patient benefited from cueing to reference whiteboard visual aid for recall of date. Orientation Log (O-Log) completed on this date, with patient scoring 21/30. Note a score of 25 or better is associated with normal orientation. Will continue to follow acutely.    Patient will benefit from skilled intervention to address the above impairments.     PLAN :  Recommendations and Planned Interventions:  Diet: Soft and bite sized and thin liquids for ease of self-feeding  -- Medication whole with sips of thin liquid  -- Supervision with feeding  -- Upright for all PO  -- Small bites/sips   -- Alternate bites/sips    Cognitive-Communication Recommendations:   -- Keep windows open/lights during the day   -- Eliminate distractions  -- Redirect to task as needed (i.e. stating patient's name)  -- Have date updated on whiteboard and clock visible to patient, etc.       Recommend next SLP session: dysphagia management, continued cognitive-communication diagnostic rehab    Acute SLP Services: Yes, SLP will continue to follow per plan of care.  Discharge Recommendations: Continue to assess pending progress     SUBJECTIVE:   Patient stated, “I like the  Signs/Symptoms: None  Pharyngeal Phase Characteristics: No impairment, issues, or problems               Neuro-Linguistics:   Orientation Log (O-Log)    City: 2  Kind of Place: 3  Name of Hospital: 3  Month: 1  Date: 2  Year: 1  Day of the Week: 3  Clock Time: 1  Etiology/Event: 3  Pathology/Deficits: 2  Raw Score:  21/30      O-Log Scoring Key:  3 = correct spontaneously or upon first free recall attempt;  2 = correct upon logical cueing (e.g., \"That was yesterday, so today must be …\");  1 = correct upon multiple choice or phonemic cuing; and  0 = incorrect despite cueing, inappropriate response, or unable to respond.    Respiratory Status/Airway:  Room air                         After treatment:   Patient left in no apparent distress in bed, Call bell left within reach, Nursing notified, and Updated patient's board with:  diet recommendations    COMMUNICATION/EDUCATION:   The patient's plan of care including recommendations, planned interventions, and recommended diet changes were discussed with: Registered nurse    Patient/family have participated as able in goal setting and plan of care and Patient/family agree to work toward stated goals and plan of care    Thank you,  Dexter Hernandez, SLP    SLP Individual Minutes  Time In: 1015  Time Out: 1030  Minutes: 15      Problem: SLP Adult - Impaired Swallowing  Goal: By Discharge: Advance to least restrictive diet without signs or symptoms of aspiration for planned discharge setting.  See evaluation for individualized goals.  Description: Speech Pathology Goals  Initiated 2/14/2025     1. Patient will tolerate least restrictive diet without signs of aspiration or decline in respiratory status within 7 days.  2. Patient will resume baseline diet of Regular/Thin Liquids within 7 days.  3. Patient will continue with ongoing cognitive-communicative diagnostic rehab while in house within 7 days.  Outcome: Progressing

## 2025-02-19 LAB
ANION GAP SERPL CALC-SCNC: 6 MMOL/L (ref 2–12)
BASOPHILS # BLD: 0.03 K/UL (ref 0–0.1)
BASOPHILS NFR BLD: 0.6 % (ref 0–1)
BUN SERPL-MCNC: 8 MG/DL (ref 6–20)
BUN/CREAT SERPL: 7 (ref 12–20)
CALCIUM SERPL-MCNC: 8.7 MG/DL (ref 8.5–10.1)
CHLORIDE SERPL-SCNC: 106 MMOL/L (ref 97–108)
CO2 SERPL-SCNC: 29 MMOL/L (ref 21–32)
CREAT SERPL-MCNC: 1.07 MG/DL (ref 0.7–1.3)
DIFFERENTIAL METHOD BLD: ABNORMAL
EOSINOPHIL # BLD: 0.09 K/UL (ref 0–0.4)
EOSINOPHIL NFR BLD: 1.7 % (ref 0–7)
ERYTHROCYTE [DISTWIDTH] IN BLOOD BY AUTOMATED COUNT: 14.8 % (ref 11.5–14.5)
GLUCOSE BLD STRIP.AUTO-MCNC: 101 MG/DL (ref 65–117)
GLUCOSE BLD STRIP.AUTO-MCNC: 114 MG/DL (ref 65–117)
GLUCOSE BLD STRIP.AUTO-MCNC: 134 MG/DL (ref 65–117)
GLUCOSE BLD STRIP.AUTO-MCNC: 141 MG/DL (ref 65–117)
GLUCOSE SERPL-MCNC: 94 MG/DL (ref 65–100)
HCT VFR BLD AUTO: 40.7 % (ref 36.6–50.3)
HGB BLD-MCNC: 12.5 G/DL (ref 12.1–17)
IMM GRANULOCYTES # BLD AUTO: 0.02 K/UL (ref 0–0.04)
IMM GRANULOCYTES NFR BLD AUTO: 0.4 % (ref 0–0.5)
LYMPHOCYTES # BLD: 1.85 K/UL (ref 0.8–3.5)
LYMPHOCYTES NFR BLD: 34.4 % (ref 12–49)
MCH RBC QN AUTO: 26.4 PG (ref 26–34)
MCHC RBC AUTO-ENTMCNC: 30.7 G/DL (ref 30–36.5)
MCV RBC AUTO: 86 FL (ref 80–99)
MONOCYTES # BLD: 0.62 K/UL (ref 0–1)
MONOCYTES NFR BLD: 11.5 % (ref 5–13)
NEUTS SEG # BLD: 2.77 K/UL (ref 1.8–8)
NEUTS SEG NFR BLD: 51.4 % (ref 32–75)
NRBC # BLD: 0 K/UL (ref 0–0.01)
NRBC BLD-RTO: 0 PER 100 WBC
PLATELET # BLD AUTO: 215 K/UL (ref 150–400)
PMV BLD AUTO: 11.6 FL (ref 8.9–12.9)
POTASSIUM SERPL-SCNC: 3.6 MMOL/L (ref 3.5–5.1)
RBC # BLD AUTO: 4.73 M/UL (ref 4.1–5.7)
SERVICE CMNT-IMP: ABNORMAL
SERVICE CMNT-IMP: ABNORMAL
SERVICE CMNT-IMP: NORMAL
SERVICE CMNT-IMP: NORMAL
SODIUM SERPL-SCNC: 141 MMOL/L (ref 136–145)
WBC # BLD AUTO: 5.4 K/UL (ref 4.1–11.1)

## 2025-02-19 PROCEDURE — 6360000002 HC RX W HCPCS

## 2025-02-19 PROCEDURE — 85025 COMPLETE CBC W/AUTO DIFF WBC: CPT

## 2025-02-19 PROCEDURE — 2500000003 HC RX 250 WO HCPCS: Performed by: INTERNAL MEDICINE

## 2025-02-19 PROCEDURE — 6370000000 HC RX 637 (ALT 250 FOR IP): Performed by: NURSE PRACTITIONER

## 2025-02-19 PROCEDURE — 6360000002 HC RX W HCPCS: Performed by: INTERNAL MEDICINE

## 2025-02-19 PROCEDURE — 6370000000 HC RX 637 (ALT 250 FOR IP): Performed by: INTERNAL MEDICINE

## 2025-02-19 PROCEDURE — 80048 BASIC METABOLIC PNL TOTAL CA: CPT

## 2025-02-19 PROCEDURE — 36415 COLL VENOUS BLD VENIPUNCTURE: CPT

## 2025-02-19 PROCEDURE — 82962 GLUCOSE BLOOD TEST: CPT

## 2025-02-19 PROCEDURE — 1100000000 HC RM PRIVATE

## 2025-02-19 RX ADMIN — POTASSIUM CHLORIDE 10 MEQ: 7.45 INJECTION INTRAVENOUS at 00:29

## 2025-02-19 RX ADMIN — PANTOPRAZOLE SODIUM 40 MG: 40 TABLET, DELAYED RELEASE ORAL at 08:59

## 2025-02-19 RX ADMIN — SERTRALINE HYDROCHLORIDE 25 MG: 25 TABLET ORAL at 08:59

## 2025-02-19 RX ADMIN — EMPAGLIFLOZIN 10 MG: 10 TABLET, FILM COATED ORAL at 08:59

## 2025-02-19 RX ADMIN — HYDRALAZINE HYDROCHLORIDE 25 MG: 25 TABLET ORAL at 21:12

## 2025-02-19 RX ADMIN — AMLODIPINE BESYLATE 10 MG: 5 TABLET ORAL at 08:59

## 2025-02-19 RX ADMIN — POTASSIUM CHLORIDE 10 MEQ: 7.45 INJECTION INTRAVENOUS at 01:32

## 2025-02-19 RX ADMIN — LEVETIRACETAM 1000 MG: 100 INJECTION INTRAVENOUS at 21:10

## 2025-02-19 RX ADMIN — SODIUM CHLORIDE, PRESERVATIVE FREE 10 ML: 5 INJECTION INTRAVENOUS at 09:06

## 2025-02-19 RX ADMIN — CHLORHEXIDINE GLUCONATE 15 ML: 1.2 RINSE ORAL at 21:12

## 2025-02-19 RX ADMIN — ATORVASTATIN CALCIUM 80 MG: 40 TABLET, FILM COATED ORAL at 21:12

## 2025-02-19 RX ADMIN — SODIUM CHLORIDE, PRESERVATIVE FREE 10 ML: 5 INJECTION INTRAVENOUS at 21:12

## 2025-02-19 RX ADMIN — HYDRALAZINE HYDROCHLORIDE 25 MG: 25 TABLET ORAL at 14:28

## 2025-02-19 RX ADMIN — LEVETIRACETAM 1000 MG: 100 INJECTION INTRAVENOUS at 09:05

## 2025-02-19 RX ADMIN — APIXABAN 5 MG: 5 TABLET, FILM COATED ORAL at 08:59

## 2025-02-19 RX ADMIN — APIXABAN 5 MG: 5 TABLET, FILM COATED ORAL at 21:12

## 2025-02-19 RX ADMIN — Medication 1 AMPULE: at 21:12

## 2025-02-19 RX ADMIN — Medication 1 TABLET: at 08:59

## 2025-02-19 ASSESSMENT — PAIN SCALES - GENERAL: PAINLEVEL_OUTOF10: 0

## 2025-02-19 NOTE — PLAN OF CARE
Problem: Neurosensory - Adult  Goal: Absence of seizures  2/19/2025 0054 by Jina Tellez RN  Outcome: Progressing  2/18/2025 1103 by Bettina Felipe RN  Outcome: Progressing     Problem: Neurosensory - Adult  Goal: Remains free of injury related to seizures activity  2/19/2025 0054 by Jina Tellez RN  Outcome: Progressing  2/18/2025 1103 by Bettina Felipe RN  Outcome: Progressing     Problem: Respiratory - Adult  Goal: Achieves optimal ventilation and oxygenation  2/19/2025 0054 by Jina Tellez RN  Outcome: Progressing  2/18/2025 1103 by Bettina Felipe RN  Outcome: Progressing     Problem: Safety - Adult  Goal: Free from fall injury  2/19/2025 0054 by Jina Tellez RN  Outcome: Progressing  2/18/2025 1103 by Bettina Felipe RN  Outcome: Progressing     Problem: Skin/Tissue Integrity  Goal: Skin integrity remains intact  Description: 1.  Monitor for areas of redness and/or skin breakdown  2.  Assess vascular access sites hourly  3.  Every 4-6 hours minimum:  Change oxygen saturation probe site  4.  Every 4-6 hours:  If on nasal continuous positive airway pressure, respiratory therapy assess nares and determine need for appliance change or resting period  2/19/2025 0054 by Jina Tellez RN  Outcome: Progressing  2/18/2025 1103 by Bettina Felipe RN  Outcome: Progressing     Problem: ABCDS Injury Assessment  Goal: Absence of physical injury  2/19/2025 0054 by Jina Tellez RN  Outcome: Progressing  2/18/2025 1103 by Bettina Felipe RN  Outcome: Progressing     Problem: Genitourinary - Adult  Goal: Absence of urinary retention  2/19/2025 0054 by Jina Tellez RN  Outcome: Progressing  2/18/2025 1103 by Bettina Felipe RN  Outcome: Progressing

## 2025-02-19 NOTE — CARE COORDINATION
Transition of Care Plan:     RUR: 17% (moderate RUR)   Prior Level of Functioning: Needing assistance  Disposition: Home with [facility]   MARILIA: 02/20  If SNF or IPR: Date FOC offered:   Date FOC received:   Accepting facility:   Date authorization started with reference number:   Date authorization received and expires:   Follow up appointments: PCP/specialists as indicated  DME needed: None att  Transportation at discharge: BLS  IM/IMM Medicare/ letter given: Last given 02/13  Is patient a [place] and connected with [facility]? N/a              If yes, was [place] transfer form completed and [facility] notified? N/a  Caregiver Contact: Clarisa Duncan; wife; [phone]  Discharge Caregiver contacted prior to discharge? CM to contact  Care Conference needed? Not at this time  Barriers to discharge:    0850 - Chart reviewed. Current plan remains for home with [facility]. CM placed orders and updated them on MARILIA. Ricker at d/c.     JOE Kohli  Care Management  [facility]  [phone]

## 2025-02-19 NOTE — PROGRESS NOTES
End of Shift Note    Bedside shift change report given to GURMEET PEARL  (oncoming nurse) by Kleber Jamil RN .        Shift worked:  DAYS   Shift summary and any significant changes:     -CARDIO AND MD AWARE OF OF RAPID RESPONSE   -PATIENT DOES NOT LIKE TO BE WOKEN UP..    -MEDS GIVEN WHOLE AT ONCE   -PATIENT TURNED FREQUENTLY     Concerns for physician to address:  NONE   Zone phone for oncoming shift:   [phone]     Patient Information  Brii Engle  69 y.o.  2025  7:46 PM by Ludwig Haynes MD. 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 10/03/2023    A-fib ([facility]) 2023    Encounter for preadmission testing 2023    Type 2 diabetes mellitus with chronic kidney disease 2023    Chronic systolic (congestive) heart failure 2023    Hypokalemia 2023    CVA (cerebral vascular accident) ([facility]) 2023    Thrombotic stroke involving right middle cerebral artery (HCC) 2023    Bilateral carotid artery stenosis 2023    Stage 3a chronic kidney disease (HCC) 10/28/2022    Malignant neoplasm of lower lobe, left bronchus or lung (HCC) 2022    Left arm weakness 2021    Bilateral leg weakness 2021    Carcinoid tumor ([facility]) 2018    CKD stage 2 due to type 2 diabetes mellitus ([facility]) 2017    Breakthrough seizure ([facility]) 2015    Type II or unspecified type diabetes mellitus with neurological manifestations,

## 2025-02-19 NOTE — PROGRESS NOTES
Nocturnist/cross cover provider called to Room 142 at approximately 2103 as RRT was reported to be called for AMS.     Patient Vitals for the past 8 hrs:   BP Temp Temp src Pulse Resp SpO2   02/18/25 2100 (!) 140/101 -- -- (!) 48 16 --   02/18/25 2054 106/63 -- -- -- -- --   02/18/25 2021 106/63 97.5 °F (36.4 °C) Oral 56 18 99 %   02/18/25 1712 (!) 138/102 -- -- 91 -- --       Intake/Output Summary (Last 24 hours) at 2/18/2025 2120  Last data filed at 2/18/2025 1745  Gross per 24 hour   Intake --   Output 1550 ml   Net -1550 ml         BACKGROUND/ SITUATION:  Attending provider following patient: Rachel Urrutia MD    69 y.o. 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   admitted 2/11/2025 for Seizure (HCC) [R56.9].  See prior hospitalist group notes for complete details of course of treatment.      FINDINGS:  Rapid response called for unresponsive episode.  Patient responded disgruntled to sternal rub after a few attempts.  Neurology was consulted again during the day due to less responsiveness which was concerning for depression and plans to increase sertraline.  Recommended to see psychiatrist outpatient.  Patient does not appear postictal, no nystagmus or jerking seizure-like activity.  Patient is agitated when staff attempt to elicit response from him.  Resisting eye movement, attempting to hit RN, saying \"stop \".  Residual left-sided weakness of left upper extremity noted, patient is also generally weak, baseline dementia.  Heart rate is 57, as low as upper 30s tonight reported by tele monitoring unit, A-fib with prolonged QTc.    -EKG upon my initial review revealed A-fib with prolonged QTc- awaiting final cardiology reading.     Total

## 2025-02-19 NOTE — PROGRESS NOTES
End of Shift Note    Bedside shift change report given to  GURMEET Shahid  (oncoming nurse) by Jina Tellez RN .        Shift worked:  Nights   Shift summary and any significant changes:    Rapid called for decreased LOC, HR 30-50s. Responsive to painful stimuli, but not following commands.     K+ 3.3 w/3 runs of IV Potassium given.     1 10-beat episode of VTACH @ 0415. Asymptomatic.     Q2 hour turns, CHG bath, and daily weight done.      Concerns for physician to address:  Cards: Notify of HR and VTACH episode overnight.    Zone phone for oncoming shift:   [phone]     Patient Information  Brii Engle  69 y.o.  2025  7:46 PM by Ludwig Haynes MD. Brii Engle was admitted from 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 10/03/2023    A-fib ([facility]) 2023    Encounter for preadmission testing 2023    Type 2 diabetes mellitus with chronic kidney disease 2023    Chronic systolic (congestive) heart failure 2023    Hypokalemia 2023    CVA (cerebral vascular accident) ([facility]) 2023    Thrombotic stroke involving right middle cerebral artery ([facility]) 2023    Bilateral carotid artery stenosis 2023    Stage 3a chronic kidney disease ([facility]) 10/28/2022    Malignant neoplasm of lower lobe, left bronchus or lung (HCC) 2022    Left arm weakness 2021    Bilateral leg weakness 2021    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

## 2025-02-19 NOTE — PROGRESS NOTES
2100: Rapid response called for decreased LOC. Pt not responsive to several attempts of painful stimuli with sternal rub. Eyes would flinch, but no purposeful following of commands. Vitals obtained with HR noted 30-50s with afib on tele and vitals machine. BP initially with SBP low 100s, but elevated to SBP 140s with increased agitation. Rapid GURMEET Peters and NP [place] at bedside and was able to get rise out of patient with painful stimuli, but remained obstinate and would not follow commands. Blood sugar, EKG, and labs obtained. Pt currently resting with eyes closed, equal rise and fall of chest.     2230: Received call from Telemetry tech pt HR maintaining in 40s and consistently dropping to 30s. Reportedly low 32 at the time. Messaged NP Naee regarding low HR's and to obtain escalation parameters. Discussed with Rapid GURMEET Peters. BP obtained. Awaiting further orders.     0415: 3 runs of 10 mEq potassium infused per physician orders. Received call from telemetry tech. Pt had 1 episode of 10-beat ventricular tachycardia. Pt asymptomatic otherwise. NP Naee notified with no changes to care plan at this time.

## 2025-02-19 NOTE — PROGRESS NOTES
Cardiology Progress Note      2/19/2025 11:04 AM    Admit Date: 2/11/2025          Subjective:  Events noted. Poorly responsive this am. Some bradycardia into 30s this am, now resolved          BP (!) 153/87   Pulse 50   Temp 97.3 °F (36.3 °C) (Axillary)   Resp 19   Ht 1.88 m (6' 2\")   Wt 85 kg (187 lb 4.8 oz)   SpO2 100%   BMI 24.05 kg/m²   02/17 1901 - 02/19 0700  In: 795   Out: 2650 [Urine:2650]        Objective:      Physical Exam:  VS as above  Chest CTA  Card Irreg irreg no gallop     Data Review:   Labs:    BUN 8  Creat 1.1  Hgb 12.5    Telemetry: afib R 59      Assessment:     Atrial fibrillation, permanent.  Acute cerebellar stroke.  Prior strokes in 2/2023 and 5/2023.  History of right carotid artery dissection, spontaneous.  Echo EF 40-45% in 5/2023 but now EF 20-25% on echo.  Negative for ischemia or infarct on stress nuclear study in 2018.  Hypertension.  Pneumonia.  Cervical spine stenosis.  Seizure disorder.  DM type 2.  Neuroendocrine lung tumor status post resection in 2015.  Full code.  Transient bradycardia      Plan:   Cont supportive Rx except holding Coreg

## 2025-02-19 NOTE — PROGRESS NOTES
Speech Pathology Note    Chart reviewed and discussed with RN, who reports patient ate entirety of breakfast tray this morning given assistance from patient's family. At the time of attempted SLP visit, patient drowsy and unable to arouse despite verbal and tactile cues (i.e. firm sternal rub, loud auditory stimuli, HOB elevation, and repositioning). Recommend holding PO intake unless patient alert/awake and actively accepting PO intake. Suspect patient's mentation to continue to wax and wane. Will defer SLP treatment at this time and will continue to follow.     Dexter Hernandez M.S., CCC-SLP

## 2025-02-19 NOTE — PROGRESS NOTES
Hospitalist Progress Note    NAME:   Brii Engle   : 1955   MRN: [id number]     Date/Time: 2025 4:54 PM  Patient PCP: Maurilio Michaels MD    Estimated discharge date:  Barriers:       Assessment / Plan:  RRT on  for AMS and bradycardia   History of atrial fibrillation    - Evaluated by cardiology : Recommended to hold Coreg.  -Patient remained altered  , responding only to pain       Seizures disorder  Concern of major depressive disorder     Continue seizure precautions  Fall precautions  Aspiration precautions/speech following/appreciate recommendations  Continue Keppra 1000 mg injection every 12 hours  Revaluated by  neurology again, for less responsiveness---> concern of depression, increased the sertraline in 1 to 2 weeks And recommended to see psychiatrist as outpatient  Discontinued Plavix per neurology recommendation           Community-acquired pneumonia  Chest x-ray on 2025 with impression of  1. Increased, now complete opacification of the left lung with a large left  pleural effusion.   Chest x-ray on  with impression of  Interval reexpansion of the left lung, with patchy left-sided airspace opacities  and small left pleural effusion with basilar atelectasis. Right lung is clear.  CTA chest on 2025 with impression of  No evidence of pulmonary embolism.  2.  There is extensive consolidation throughout the left lung which may be due  to significant atelectasis. There may be underlying masses.  3.  A small left-sided pleural effusion is noted        Influenza and COVID negative  No growth in blood cultures day   Completed Zosyn  for 7 days         History of CVA (residual left-sided deficit)  Hyperlipidemia     MRI brain on 2025 with impression as follows  1. Small areas of acute infarction involving the inferior cerebellar vermis and medial right cerebellum without mass effect.  2. Small focus of diffusion signal abnormality in the right hippocampus, could be

## 2025-02-19 NOTE — SIGNIFICANT EVENT
RAPID RESPONSE TEAM NOTE:    2103: Responded to overhead rapid response for decreased LOC. Patient is admitted with seizure disorder and concern for major depressive disorder as well as CAP. Per primary RN, patient has been drowsy for the last few days, but was found to be unresponsive, prompting rapid response.    Assessment:  Upon arrival to bedside, patient is in bed with eyes closed, does not move or arouse to verbal stimuli, but arouses and moves right side to sternal rub. Patient also able to say \"hey, stop that\" when stimulated. Patient does not open eyes or follow commands, but continues to say \"stop it\" and try to push nursing away when stimulated.    Vital signs as follows: HR: 48, BP: 140/101 (114), RR: 16, SpO2: 99% on RA    Interventions:  MARY Day at bedside, received orders for:    - CMP, CBC, Mg, and Phos levels    Outcome:  2129: Labs obtained and sent.    Patient to remain in room at this time    Please call with any questions or concerns  Lorraine Barroso RN  RRT [phone]

## 2025-02-20 LAB
ALBUMIN SERPL-MCNC: 3.1 G/DL (ref 3.5–5)
ANION GAP SERPL CALC-SCNC: 6 MMOL/L (ref 2–12)
BASOPHILS # BLD: 0.03 K/UL (ref 0–0.1)
BASOPHILS NFR BLD: 0.5 % (ref 0–1)
BUN SERPL-MCNC: 8 MG/DL (ref 6–20)
BUN/CREAT SERPL: 7 (ref 12–20)
CALCIUM SERPL-MCNC: 8.7 MG/DL (ref 8.5–10.1)
CHLORIDE SERPL-SCNC: 104 MMOL/L (ref 97–108)
CO2 SERPL-SCNC: 30 MMOL/L (ref 21–32)
CREAT SERPL-MCNC: 1.11 MG/DL (ref 0.7–1.3)
DIFFERENTIAL METHOD BLD: ABNORMAL
EOSINOPHIL # BLD: 0.07 K/UL (ref 0–0.4)
EOSINOPHIL NFR BLD: 1.2 % (ref 0–7)
ERYTHROCYTE [DISTWIDTH] IN BLOOD BY AUTOMATED COUNT: 15 % (ref 11.5–14.5)
GLUCOSE BLD STRIP.AUTO-MCNC: 111 MG/DL (ref 65–117)
GLUCOSE BLD STRIP.AUTO-MCNC: 116 MG/DL (ref 65–117)
GLUCOSE BLD STRIP.AUTO-MCNC: 132 MG/DL (ref 65–117)
GLUCOSE BLD STRIP.AUTO-MCNC: 195 MG/DL (ref 65–117)
GLUCOSE BLD STRIP.AUTO-MCNC: 91 MG/DL (ref 65–117)
GLUCOSE SERPL-MCNC: 126 MG/DL (ref 65–100)
HCT VFR BLD AUTO: 37.8 % (ref 36.6–50.3)
HGB BLD-MCNC: 12.1 G/DL (ref 12.1–17)
IMM GRANULOCYTES # BLD AUTO: 0.01 K/UL (ref 0–0.04)
IMM GRANULOCYTES NFR BLD AUTO: 0.2 % (ref 0–0.5)
LYMPHOCYTES # BLD: 1.55 K/UL (ref 0.8–3.5)
LYMPHOCYTES NFR BLD: 27.4 % (ref 12–49)
MCH RBC QN AUTO: 26.9 PG (ref 26–34)
MCHC RBC AUTO-ENTMCNC: 32 G/DL (ref 30–36.5)
MCV RBC AUTO: 84.2 FL (ref 80–99)
MONOCYTES # BLD: 0.54 K/UL (ref 0–1)
MONOCYTES NFR BLD: 9.5 % (ref 5–13)
NEUTS SEG # BLD: 3.46 K/UL (ref 1.8–8)
NEUTS SEG NFR BLD: 61.2 % (ref 32–75)
NRBC # BLD: 0 K/UL (ref 0–0.01)
NRBC BLD-RTO: 0 PER 100 WBC
PHOSPHATE SERPL-MCNC: 2.8 MG/DL (ref 2.6–4.7)
PLATELET # BLD AUTO: 241 K/UL (ref 150–400)
PMV BLD AUTO: 10.8 FL (ref 8.9–12.9)
POTASSIUM SERPL-SCNC: 3.3 MMOL/L (ref 3.5–5.1)
RBC # BLD AUTO: 4.49 M/UL (ref 4.1–5.7)
SERVICE CMNT-IMP: ABNORMAL
SERVICE CMNT-IMP: ABNORMAL
SERVICE CMNT-IMP: NORMAL
SODIUM SERPL-SCNC: 140 MMOL/L (ref 136–145)
WBC # BLD AUTO: 5.7 K/UL (ref 4.1–11.1)

## 2025-02-20 PROCEDURE — 6370000000 HC RX 637 (ALT 250 FOR IP): Performed by: INTERNAL MEDICINE

## 2025-02-20 PROCEDURE — 1100000000 HC RM PRIVATE

## 2025-02-20 PROCEDURE — 80069 RENAL FUNCTION PANEL: CPT

## 2025-02-20 PROCEDURE — 6370000000 HC RX 637 (ALT 250 FOR IP): Performed by: NURSE PRACTITIONER

## 2025-02-20 PROCEDURE — 85025 COMPLETE CBC W/AUTO DIFF WBC: CPT

## 2025-02-20 PROCEDURE — 6360000002 HC RX W HCPCS: Performed by: INTERNAL MEDICINE

## 2025-02-20 PROCEDURE — 6370000000 HC RX 637 (ALT 250 FOR IP): Performed by: STUDENT IN AN ORGANIZED HEALTH CARE EDUCATION/TRAINING PROGRAM

## 2025-02-20 PROCEDURE — 36415 COLL VENOUS BLD VENIPUNCTURE: CPT

## 2025-02-20 PROCEDURE — 82962 GLUCOSE BLOOD TEST: CPT

## 2025-02-20 PROCEDURE — 2500000003 HC RX 250 WO HCPCS: Performed by: INTERNAL MEDICINE

## 2025-02-20 RX ORDER — SPIRONOLACTONE 25 MG/1
25 TABLET ORAL DAILY
Status: DISCONTINUED | OUTPATIENT
Start: 2025-02-20 | End: 2025-02-22 | Stop reason: HOSPADM

## 2025-02-20 RX ORDER — CARVEDILOL 6.25 MG/1
3.12 TABLET ORAL 2 TIMES DAILY WITH MEALS
Status: DISCONTINUED | OUTPATIENT
Start: 2025-02-20 | End: 2025-02-22 | Stop reason: HOSPADM

## 2025-02-20 RX ORDER — SACUBITRIL AND VALSARTAN 49; 51 MG/1; MG/1
1 TABLET, FILM COATED ORAL 2 TIMES DAILY
Status: DISCONTINUED | OUTPATIENT
Start: 2025-02-20 | End: 2025-02-22 | Stop reason: HOSPADM

## 2025-02-20 RX ORDER — CARVEDILOL 6.25 MG/1
6.25 TABLET ORAL 2 TIMES DAILY WITH MEALS
Status: DISCONTINUED | OUTPATIENT
Start: 2025-02-20 | End: 2025-02-20

## 2025-02-20 RX ADMIN — SODIUM CHLORIDE, PRESERVATIVE FREE 10 ML: 5 INJECTION INTRAVENOUS at 09:25

## 2025-02-20 RX ADMIN — SACUBITRIL AND VALSARTAN 1 TABLET: 49; 51 TABLET, FILM COATED ORAL at 21:51

## 2025-02-20 RX ADMIN — APIXABAN 5 MG: 5 TABLET, FILM COATED ORAL at 09:25

## 2025-02-20 RX ADMIN — Medication 1 TABLET: at 09:25

## 2025-02-20 RX ADMIN — SERTRALINE HYDROCHLORIDE 25 MG: 25 TABLET ORAL at 09:25

## 2025-02-20 RX ADMIN — INSULIN LISPRO 2 UNITS: 100 INJECTION, SOLUTION INTRAVENOUS; SUBCUTANEOUS at 21:52

## 2025-02-20 RX ADMIN — ATORVASTATIN CALCIUM 80 MG: 40 TABLET, FILM COATED ORAL at 21:51

## 2025-02-20 RX ADMIN — CARVEDILOL 3.12 MG: 6.25 TABLET, FILM COATED ORAL at 16:53

## 2025-02-20 RX ADMIN — SODIUM CHLORIDE, PRESERVATIVE FREE 10 ML: 5 INJECTION INTRAVENOUS at 21:52

## 2025-02-20 RX ADMIN — EMPAGLIFLOZIN 10 MG: 10 TABLET, FILM COATED ORAL at 09:25

## 2025-02-20 RX ADMIN — PANTOPRAZOLE SODIUM 40 MG: 40 TABLET, DELAYED RELEASE ORAL at 06:28

## 2025-02-20 RX ADMIN — LEVETIRACETAM 1000 MG: 100 INJECTION INTRAVENOUS at 20:30

## 2025-02-20 RX ADMIN — AMLODIPINE BESYLATE 10 MG: 5 TABLET ORAL at 09:24

## 2025-02-20 RX ADMIN — APIXABAN 5 MG: 5 TABLET, FILM COATED ORAL at 21:30

## 2025-02-20 RX ADMIN — LEVETIRACETAM 1000 MG: 100 INJECTION INTRAVENOUS at 09:25

## 2025-02-20 RX ADMIN — Medication 1 AMPULE: at 09:24

## 2025-02-20 RX ADMIN — CHLORHEXIDINE GLUCONATE 15 ML: 1.2 RINSE ORAL at 09:25

## 2025-02-20 RX ADMIN — SPIRONOLACTONE 25 MG: 25 TABLET ORAL at 16:54

## 2025-02-20 RX ADMIN — Medication 1 AMPULE: at 21:51

## 2025-02-20 RX ADMIN — CHLORHEXIDINE GLUCONATE 15 ML: 1.2 RINSE ORAL at 21:51

## 2025-02-20 ASSESSMENT — PAIN DESCRIPTION - ORIENTATION: ORIENTATION: RIGHT

## 2025-02-20 ASSESSMENT — PAIN DESCRIPTION - DESCRIPTORS: DESCRIPTORS: SHOOTING

## 2025-02-20 ASSESSMENT — PAIN SCALES - GENERAL: PAINLEVEL_OUTOF10: 6

## 2025-02-20 ASSESSMENT — PAIN DESCRIPTION - LOCATION: LOCATION: LEG

## 2025-02-20 NOTE — PROGRESS NOTES
End of Shift Note    Bedside shift change report given to IndioRN  (oncoming nurse) by Starla Benavidez RN .        Shift worked:  Nights   Shift summary and any significant changes:     -MEDS GIVEN WHOLE AT ONCE   -PATIENT TURNED FREQUENTLY     Concerns for physician to address:  NONE   Zone phone for oncoming shift:   [phone]     Patient Information  Brii Engle  69 y.o.  2025  7:46 PM by Ludwig Haynes MD. 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/03/2023    A-fib ([facility]) 2023    Encounter for preadmission testing 2023    Type 2 diabetes mellitus with chronic kidney disease 2023    Chronic systolic (congestive) heart failure 2023    Hypokalemia 2023    CVA (cerebral vascular accident) ([facility]) 2023    Thrombotic stroke involving right middle cerebral artery ([facility]) 2023    Bilateral carotid artery stenosis 2023    Stage 3a chronic kidney disease (HCC) 10/28/2022    Malignant neoplasm of lower lobe, left bronchus or lung (HCC) 2022    Left arm weakness 2021    Bilateral leg weakness 2021    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    CKD stage 2 due to type 2 diabetes mellitus ([facility]) 2017    Breakthrough seizure ([facility]) 2015    Type II or unspecified type diabetes mellitus with neurological manifestations, uncontrolled(250.62) 2015    Hypertensive emergency 2015     History of atrial fibrillation 07/20/2015    Hyperlipidemia 07/20/2015    Diabetic neuropathy (HCC) 11/21/2014    Seizures (HCC) 08/08/2014    Syncope 07/13/2012    Cardiomyopathy, nonischemic (HCC) 10/31/2009    HTN (hypertension), benign 10/26/2009    Pulmonary nodule 10/26/2009     Past Medical History:   Diagnosis Date    Atrial fibrillation (HCC)     Cancer (HCC) 12/2015    left lung; 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     Dissection of right carotid artery 09/2018    History of MRSA infection 08/01/2023    +MRSA in nasal swab    Hypertension     Renal insufficiency     stage 3    Seizures (HCC)     Stroke ([facility]) 02/2023    prior to feb., had mini stroke; left side paralysis       Core Measures:  CVA: no  CHF: yes  PNA: no    Activity:Level of Assistance: Dependent, patient does less than 25%  Number times ambulated in hallways past shift: 0  Number of times OOB to chair past shift: 0    Cardiac:   Cardiac Monitoring: yes, 137    Access:   Current line(s): PIV    Respiratory:   O2 Device: None (Room air)    GI:  Last BM (including prior to admit): 02/19/25  Current diet:  DIET ONE TIME MESSAGE;  ADULT DIET; Dysphagia - Soft and Bite Sized  Tolerating current diet: Yes - reportedly did not eat lunch d/t late breakfast    Pain Management:   Patient states pain is manageable on current regimen: yes    Skin:  D[place]ik Scale Score: 16  Interventions: turn q2  Pressure injury: no    Patient Safety:  Fall Score: P[place]ta Total Score: 40  Interventions: bed alarm  Self-release roll belt: No  Dexterity to release roll belt: N/A   (must document dexterity  here by stating Yes or No here, otherwise this is a restraint and must follow restraint documentation policy.)    DVT prophylaxis:  DVT prophylaxis: meds    Active Consults:  IP CONSULT TO NEUROLOGY  IP CONSULT TO PHARMACY  IP CONSULT TO PALLIATIVE CARE  IP CONSULT TO DIETITIAN  IP CONSULT TO NEUROLOGY  IP

## 2025-02-20 NOTE — PROGRESS NOTES
Spiritual Health History and Assessment/Progress Note  [facility]    Initial Encounter, Palliative Care,  , Adjustment to illness, Life Adjustments,      Name: Brii Engle MRN: [id number]    Age: 69 y.o.     Sex: male   Language: English   Spiritism: Sabianist   Seizure (HCC)     Date: 2/20/2025            Total Time Calculated: 15 min              Spiritual Assessment began in [facility] NEUROSCIENCE TELEMETRY        Referral/Consult From: Rounding   Encounter Overview/Reason: Initial Encounter, Palliative Care  Service Provided For: Patient    [place], Belief, Meaning:   Patient identifies as spiritual, is connected with a albina tradition or spiritual practice, and has beliefs or practices that help with coping during difficult times  Family/Friends No family/friends present      Importance and Influence:  Patient has spiritual/personal beliefs that influence decisions regarding their health  Family/Friends No family/friends present    Community:  Patient is connected with a spiritual community and feels well-supported. Support system includes: Spouse/Partner, Children, and Extended family  Family/Friends No family/friends present    Assessment and Plan of Care:     Patient Interventions include: Facilitated expression of thoughts and feelings, Explored spiritual coping/struggle/distress, Engaged in theological reflection, and Affirmed coping skills/support systems  Family/Friends Interventions include: No family/friends present    Patient Plan of Care: Spiritual Care available upon further referral  Family/Friends Plan of Care: Spiritual Care available upon further referral    Electronically signed by Chaplain Charlie on 2/20/2025 at 3:00 PM

## 2025-02-20 NOTE — PROGRESS NOTES
Patient is now fully alert and verbal. He is making jokes and states he needs his face and teeth brushed. Patient states he does not remember me being his nurse today and recalls no events from earlier today.

## 2025-02-20 NOTE — PROGRESS NOTES
Hospitalist Progress Note    NAME:   Brii Engle   : 1955   MRN: [id number]     Date/Time: 2025 3:54 PM  Patient PCP: Maurilio Michaels MD    Estimated discharge date:  Barriers:       Assessment / Plan:  RRT on  for AMS and bradycardia   History of atrial fibrillation  - On : AO x 2.  Heart rate better controlled.      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   Chest x-ray on  with impression of Interval reexpansion of the left lung, with patchy left-sided airspace opacities and small left pleural effusion with basilar atelectasis. Right lung is clear.  CTA chest on 2025 with impression of  No evidence of pulmonary embolism.There is extensive consolidation throughout the left lung which may be due  to significant atelectasis. There may be underlying masses.  A small left-sided pleural effusion is noted        Influenza and COVID negative  No growth in blood cultures   Completed Zosyn  for 7 days   Patient needs to repeat CT chest in 2 to 3 months as outpatient     Acute infarction involving inferior cerebellar vermis and medial right cerebellum without mass effect  History of CVA (residual left-sided deficit)  Systolic heart failure with reduced ejection fraction, 20-25%  Hyperlipidemia     MRI brain on 2025 with impression as

## 2025-02-20 NOTE — PROGRESS NOTES
Cardiology Progress Note      2/20/2025 9:43 AM    Admit Date: 2/11/2025          Subjective: Much more alert this am. BP up some No c/o          BP (!) 159/99   Pulse 78   Temp 97.7 °F (36.5 °C) (Oral)   Resp 16   Ht 1.88 m (6' 2\")   Wt 83.5 kg (184 lb 1.6 oz)   SpO2 100%   BMI 23.64 kg/m²   02/18 1901 - 02/20 0700  In: 1395 [P.O.:600]  Out: 2100 [Urine:2100]        Objective:      Physical Exam:  VS as above  Chest CTA ant  Card Irreg irreg no gallop     Data Review:   Labs:    Hgb 12.1    Telemetry: afib R 77. No bradycardia last night       Assessment:       Atrial fibrillation, permanent.  Acute cerebellar stroke.  Prior strokes in 2/2023 and 5/2023.  History of right carotid artery dissection, spontaneous.  Echo EF 40-45% in 5/2023 but now EF 20-25% on echo.  Negative for ischemia or infarct on stress nuclear study in 2018.  Hypertension.  Pneumonia.  Cervical spine stenosis.  Seizure disorder.  DM type 2.  Neuroendocrine lung tumor status post resection in 2015.  Full code.  Transient bradycardia     Plan: Resume Coreg at lower dose. Increase Entresto

## 2025-02-21 LAB
ALBUMIN SERPL-MCNC: 2.9 G/DL (ref 3.5–5)
ANION GAP SERPL CALC-SCNC: 7 MMOL/L (ref 2–12)
BASOPHILS # BLD: 0.02 K/UL (ref 0–0.1)
BASOPHILS NFR BLD: 0.4 % (ref 0–1)
BUN SERPL-MCNC: 10 MG/DL (ref 6–20)
BUN/CREAT SERPL: 9 (ref 12–20)
CALCIUM SERPL-MCNC: 8.9 MG/DL (ref 8.5–10.1)
CHLORIDE SERPL-SCNC: 107 MMOL/L (ref 97–108)
CO2 SERPL-SCNC: 27 MMOL/L (ref 21–32)
CREAT SERPL-MCNC: 1.17 MG/DL (ref 0.7–1.3)
DIFFERENTIAL METHOD BLD: ABNORMAL
EOSINOPHIL # BLD: 0.02 K/UL (ref 0–0.4)
EOSINOPHIL NFR BLD: 0.4 % (ref 0–7)
ERYTHROCYTE [DISTWIDTH] IN BLOOD BY AUTOMATED COUNT: 14.8 % (ref 11.5–14.5)
GLUCOSE BLD STRIP.AUTO-MCNC: 123 MG/DL (ref 65–117)
GLUCOSE BLD STRIP.AUTO-MCNC: 149 MG/DL (ref 65–117)
GLUCOSE BLD STRIP.AUTO-MCNC: 151 MG/DL (ref 65–117)
GLUCOSE BLD STRIP.AUTO-MCNC: 161 MG/DL (ref 65–117)
GLUCOSE SERPL-MCNC: 141 MG/DL (ref 65–100)
HCT VFR BLD AUTO: 38.4 % (ref 36.6–50.3)
HGB BLD-MCNC: 12.1 G/DL (ref 12.1–17)
IMM GRANULOCYTES # BLD AUTO: 0.01 K/UL (ref 0–0.04)
IMM GRANULOCYTES NFR BLD AUTO: 0.2 % (ref 0–0.5)
LYMPHOCYTES # BLD: 1.8 K/UL (ref 0.8–3.5)
LYMPHOCYTES NFR BLD: 33.3 % (ref 12–49)
MCH RBC QN AUTO: 26.7 PG (ref 26–34)
MCHC RBC AUTO-ENTMCNC: 31.5 G/DL (ref 30–36.5)
MCV RBC AUTO: 84.8 FL (ref 80–99)
MONOCYTES # BLD: 0.57 K/UL (ref 0–1)
MONOCYTES NFR BLD: 10.6 % (ref 5–13)
NEUTS SEG # BLD: 2.98 K/UL (ref 1.8–8)
NEUTS SEG NFR BLD: 55.1 % (ref 32–75)
NRBC # BLD: 0 K/UL (ref 0–0.01)
NRBC BLD-RTO: 0 PER 100 WBC
PHOSPHATE SERPL-MCNC: 3.4 MG/DL (ref 2.6–4.7)
PLATELET # BLD AUTO: 282 K/UL (ref 150–400)
PMV BLD AUTO: 11 FL (ref 8.9–12.9)
POTASSIUM SERPL-SCNC: 3.3 MMOL/L (ref 3.5–5.1)
RBC # BLD AUTO: 4.53 M/UL (ref 4.1–5.7)
SERVICE CMNT-IMP: ABNORMAL
SODIUM SERPL-SCNC: 141 MMOL/L (ref 136–145)
WBC # BLD AUTO: 5.4 K/UL (ref 4.1–11.1)

## 2025-02-21 PROCEDURE — 36415 COLL VENOUS BLD VENIPUNCTURE: CPT

## 2025-02-21 PROCEDURE — 6370000000 HC RX 637 (ALT 250 FOR IP): Performed by: NURSE PRACTITIONER

## 2025-02-21 PROCEDURE — 6370000000 HC RX 637 (ALT 250 FOR IP): Performed by: INTERNAL MEDICINE

## 2025-02-21 PROCEDURE — 2500000003 HC RX 250 WO HCPCS: Performed by: INTERNAL MEDICINE

## 2025-02-21 PROCEDURE — 92507 TX SP LANG VOICE COMM INDIV: CPT

## 2025-02-21 PROCEDURE — 92526 ORAL FUNCTION THERAPY: CPT

## 2025-02-21 PROCEDURE — 80069 RENAL FUNCTION PANEL: CPT

## 2025-02-21 PROCEDURE — 1100000000 HC RM PRIVATE

## 2025-02-21 PROCEDURE — 6370000000 HC RX 637 (ALT 250 FOR IP): Performed by: STUDENT IN AN ORGANIZED HEALTH CARE EDUCATION/TRAINING PROGRAM

## 2025-02-21 PROCEDURE — 6360000002 HC RX W HCPCS: Performed by: INTERNAL MEDICINE

## 2025-02-21 PROCEDURE — 82962 GLUCOSE BLOOD TEST: CPT

## 2025-02-21 PROCEDURE — 85025 COMPLETE CBC W/AUTO DIFF WBC: CPT

## 2025-02-21 RX ADMIN — SERTRALINE HYDROCHLORIDE 25 MG: 25 TABLET ORAL at 09:52

## 2025-02-21 RX ADMIN — APIXABAN 5 MG: 5 TABLET, FILM COATED ORAL at 09:52

## 2025-02-21 RX ADMIN — CARVEDILOL 3.12 MG: 6.25 TABLET, FILM COATED ORAL at 09:51

## 2025-02-21 RX ADMIN — CARVEDILOL 3.12 MG: 6.25 TABLET, FILM COATED ORAL at 17:19

## 2025-02-21 RX ADMIN — ATORVASTATIN CALCIUM 80 MG: 40 TABLET, FILM COATED ORAL at 20:18

## 2025-02-21 RX ADMIN — Medication 6 MG: at 20:20

## 2025-02-21 RX ADMIN — POTASSIUM BICARBONATE 40 MEQ: 782 TABLET, EFFERVESCENT ORAL at 09:52

## 2025-02-21 RX ADMIN — Medication 1 TABLET: at 09:52

## 2025-02-21 RX ADMIN — EMPAGLIFLOZIN 10 MG: 10 TABLET, FILM COATED ORAL at 09:51

## 2025-02-21 RX ADMIN — PANTOPRAZOLE SODIUM 40 MG: 40 TABLET, DELAYED RELEASE ORAL at 06:12

## 2025-02-21 RX ADMIN — AMLODIPINE BESYLATE 10 MG: 5 TABLET ORAL at 09:51

## 2025-02-21 RX ADMIN — APIXABAN 5 MG: 5 TABLET, FILM COATED ORAL at 20:17

## 2025-02-21 RX ADMIN — LEVETIRACETAM 1000 MG: 100 INJECTION INTRAVENOUS at 09:50

## 2025-02-21 RX ADMIN — SPIRONOLACTONE 25 MG: 25 TABLET ORAL at 09:52

## 2025-02-21 RX ADMIN — SACUBITRIL AND VALSARTAN 1 TABLET: 49; 51 TABLET, FILM COATED ORAL at 20:18

## 2025-02-21 RX ADMIN — SACUBITRIL AND VALSARTAN 1 TABLET: 49; 51 TABLET, FILM COATED ORAL at 09:51

## 2025-02-21 RX ADMIN — SODIUM CHLORIDE, PRESERVATIVE FREE 10 ML: 5 INJECTION INTRAVENOUS at 09:56

## 2025-02-21 NOTE — PROGRESS NOTES
Cardiology Progress Note      2/21/2025 10:25 AM    Admit Date: 2/11/2025          Subjective: Less responsive today. Bps running slightly high. Appears comfortable          BP (!) 132/106   Pulse 79   Temp 97.9 °F (36.6 °C)   Resp 18   Ht 1.88 m (6' 2\")   Wt 84.4 kg (186 lb 1.6 oz)   SpO2 98%   BMI 23.89 kg/m²   02/19 1901 - 02/21 0700  In: 1471 [P.O.:1471]  Out: 1900 [Urine:1900]        Objective:      Physical Exam:  VS as above  Chest CTA ant  Card Irreg irreg, no gallop     Data Review:   Labs:    BUN 10  Creat 1.2   K 3.3  Hgb 12.1     Telemetry: afib R 80       Assessment:     Atrial fibrillation, permanent.  Acute cerebellar stroke.  Prior strokes in 2/2023 and 5/2023.  History of right carotid artery dissection, spontaneous.  Echo EF 40-45% in 5/2023 but now EF 20-25% on echo.  Negative for ischemia or infarct on stress nuclear study in 2018.  Hypertension.  Pneumonia.  Cervical spine stenosis.  Seizure disorder.  DM type 2.  Neuroendocrine lung tumor status post resection in 2015.  Full code.  Transient bradycardia      Plan: On increased dose on Entresto . Would see how Bps do and increase as needed. Could be d/nancy this weekend from my standpoint . No further severe bradycardia on reduced dose of Coreg

## 2025-02-21 NOTE — CARE COORDINATION
Transition of Care Plan:     RUR: 18% (moderate RUR)   Prior Level of Functioning: Needing assistance  Disposition: Home with [facility]   MARILIA: 02/22  If SNF or IPR: Date FOC offered:   Date FOC received:   Accepting facility:   Date authorization started with reference number:   Date authorization received and expires:   Follow up appointments: PCP/specialists as indicated  DME needed: None att  Transportation at discharge: BLS  IM/IMM Medicare/ letter given: Last given 02/21  Is patient a [place] and connected with [facility]? N/a              If yes, was [place] transfer form completed and [facility] notified? N/a  Caregiver Contact: Clarisa Duncan; wife; [phone]  Discharge Caregiver contacted prior to discharge? CM contacted 02/21  Care Conference needed? Not at this time  Barriers to discharge: bp stability     0929 - Chart reviewed. Current plan for home with [facility]. Noted patient not appearing to be clinically ready at this time, will continue to follow. Will discuss with MD in IDRs this morning.     1154 - Spoke with wife, made her aware of MARILIA tomorrow. She is agreeable. Reviewed IMM with her and left copy at bedside. She confirmed patient will need stretcher transport home. Transport packet completed and in hard chart.  agency updated. Hand-off written for weekend CM.     JOE Kohli  Care Management  [facility]  [phone]

## 2025-02-21 NOTE — PROGRESS NOTES
Hospitalist Progress Note    NAME:   Brii Engle   : 1955   MRN: [id number]     Date/Time: 2025 11:47 AM  Patient PCP: Maurilio Michaels MD    Estimated discharge date:   Barriers: Blood pressure stable    Assessment / Plan:  RRT on  for AMS and bradycardia   History of atrial fibrillation  - On : AO x 2.  Heart rate better controlled.  No episodes of altered mental status overnight.      Seizures disorder  Concern of major depressive disorder     Continue seizure precautions  Fall precautions  Aspiration precautions/speech following/appreciate recommendations  Continue Keppra 1000 mg injection every 12 hours  Revaluated by  neurology again, for less responsiveness---> concern of depression, increased the sertraline in 1 to 2 weeks And recommended to see psychiatrist as outpatient  Discontinued Plavix per neurology recommendation  No further seizure episode noted.           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   Chest x-ray on  with impression of Interval reexpansion of the left lung, with patchy left-sided airspace opacities and small left pleural effusion with basilar atelectasis. Right lung is clear.  CTA chest on 2025 with impression of  No evidence of pulmonary embolism.There is extensive consolidation throughout the left lung which may be due  to significant atelectasis. There may be underlying masses.  A small left-sided pleural effusion is noted        Influenza and COVID negative  No growth in blood cultures   Completed Zosyn  for 7 days   Patient needs to repeat CT chest in 2 to 3 months as outpatient     Acute infarction involving inferior cerebellar vermis and medial right cerebellum without mass effect  History of CVA (residual left-sided deficit)  Systolic heart      VITALS:   Last 24hrs VS reviewed since prior progress note. Most recent are:  Patient Vitals for the past 24 hrs:   BP Temp Temp src Pulse Resp SpO2 Weight   02/21/25 0826 (!) 132/106 97.9 °F (36.6 °C) -- 79 18 98 % --   02/21/25 0600 -- -- -- -- -- -- 84.4 kg (186 lb 1.6 oz)   02/20/25 2042 (!) 151/90 98.8 °F (37.1 °C) Oral 82 19 97 % --   02/20/25 1653 (!) 156/109 -- -- 72 -- -- --         Intake/Output Summary (Last 24 hours) at 2/21/2025 1147  Last data filed at 2/21/2025 0613  Gross per 24 hour   Intake 300 ml   Output 500 ml   Net -200 ml        I had a face to face encounter and independently examined this patient on 2/21/2025, as outlined below:  PHYSICAL EXAM:  General: Alert and cooperative  Resp:  CTA bilaterally, no wheezing or rales.  No accessory muscle use  CV:  Irregular rhythm,  No edema  GI:  Soft, Non distended, Non tender.  +Bowel sounds  Neurologic:  AAO x 2, chronic left-sided neurological deficit  Skin:  No rashes.  No jaundice    Reviewed most current lab test results and cultures  YES  Reviewed most current radiology test results   YES  Review and summation of old records today    NO  Reviewed patient's current orders and MAR    YES  PMH/SH reviewed - no change compared to H&P    Procedures: see electronic medical records for all procedures/Xrays and details which were not copied into this note but were reviewed prior to creation of Plan.      LABS:  I reviewed today's most current labs and imaging studies.  Pertinent labs include:  Recent Labs     02/19/25  0322 02/20/25  0543 02/21/25  0440   WBC 5.4 5.7 5.4   HGB 12.5 12.1 12.1   HCT 40.7 37.8 38.4    241 282     Recent Labs     02/18/25  2122 02/19/25  0324 02/20/25  1111 02/21/25  0440    141 140 141   K 3.2* 3.6 3.3* 3.3*    106 104 107   CO2 32 29 30 27   GLUCOSE 148* 94 126* 141*   BUN 7 8 8 10   CREATININE 1.19 1.07 1.11 1.17   CALCIUM 8.3* 8.7 8.7 8.9   MG 1.7  --   --   --    PHOS 3.0  --  2.8 3.4   BILITOT

## 2025-02-21 NOTE — PROGRESS NOTES
Attempted to schedule PCP hospital follow up. Sent PCP office a message, awaiting return call from PCP office with appt information. [facility] information on AVS for patient resource.  Pending patient discharge.    Contacted dad and informed. Agreeable with plan.

## 2025-02-21 NOTE — PLAN OF CARE
Speech LAnguage Pathology RE-EVALUATION    Patient: Brii Engle (69 y.o. male)  Date: 2/21/2025  Primary Diagnosis: Seizure (HCC) [R56.9]       Precautions:  Seizure, Contact Precautions                  ASSESSMENT :  Patient continues to be followed by SLP for dysphagia management and cognitive-communication goals. Patient assisted with lunch tray on this date. Patient continues to do well with soft&bite-sized consistency. Patient tolerated thin liquid trials via cup and via straw with no overt difficulty.    Patient continues to be impacted by impairments in attention, executive functioning, and memory. Environmental modifications (i.e. TV turned off, door closed, etc.) prior to session on this date. Patient re-oriented to whiteboard for assistance in recall of date. Patient scored a 22/30 while O-Log administered on this date, a slight improvement from score on 2/18. Continue to recommend cognitive-communications outlined below.    Patient will benefit from skilled intervention to address the above impairments.     PLAN :  Recommendations and Planned Interventions:  Diet: Soft and bite sized and thin liquids for ease of self-feeding  -- Medication whole with sips of thin liquid  -- Supervision with feeding  -- Upright for all PO  -- Small bites/sips   -- Alternate bites/sips     Cognitive-Communication Recommendations:   -- Keep windows open/lights during the day   -- Eliminate distractions  -- Redirect to task as needed (i.e. stating patient's name)  -- Have date updated on whiteboard and clock visible to patient, etc.     Recommend next SLP session: dysphagia management, continued cognitive-communication diagnostic rehab    Re-Evaluation: Progress toward goals: good. SLP Plan of Care: 3 times/week   Discharge Recommendations: Continue to assess pending progress     SUBJECTIVE:   Patient stated, “I'm losing it.”    OBJECTIVE:     Past Medical History:   Diagnosis Date    Atrial fibrillation (HCC)

## 2025-02-21 NOTE — PROGRESS NOTES
End of Shift Note    Bedside shift change report given to MariiRN  (oncoming nurse) by Starla Benavidez RN .        Shift worked:  Nights   Shift summary and any significant changes:     -No acute changes     -q2 turns    Concerns for physician to address:  NONE   Zone phone for oncoming shift:   [phone]     Patient Information  Brii Engle  69 y.o.  2025  7:46 PM by Ludwig Haynes MD. 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 10/03/2023    A-fib ([facility]) 2023    Encounter for preadmission testing 2023    Type 2 diabetes mellitus with chronic kidney disease 2023    Chronic systolic (congestive) heart failure 2023    Hypokalemia 2023    CVA (cerebral vascular accident) (HCC) 2023    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

## 2025-02-21 NOTE — PROGRESS NOTES
End of Shift Note    Bedside shift change report given to Paco RN  (oncoming nurse) by DARRYL SMITH, GURMEET .        Shift worked:  days   Shift summary and any significant changes:     none       Concerns for physician to address:  NONE   Zone phone for oncoming shift:   [phone]     Patient Information  Brii Engle  69 y.o.  2025  7:46 PM by Ludwig Haynes MD. 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 10/03/2023    A-fib ([facility]) 2023    Encounter for preadmission testing 2023    Type 2 diabetes mellitus with chronic kidney disease 2023    Chronic systolic (congestive) heart failure 2023    Hypokalemia 2023    CVA (cerebral vascular accident) ([facility]) 2023    Thrombotic stroke involving right middle cerebral artery ([facility]) 2023    Bilateral carotid artery stenosis 2023    Stage 3a chronic kidney disease (HCC) 10/28/2022    Malignant neoplasm of lower lobe, left bronchus or lung (HCC) 2022    Left arm weakness 2021    Bilateral leg weakness 2021    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    CKD stage 2 due to type 2 diabetes mellitus ([facility]) 2017    Breakthrough seizure ([facility]) 2015    Type II or unspecified type diabetes mellitus with neurological manifestations, uncontrolled(250.62) 2015    Hypertensive emergency 2015    History of atrial fibrillation 2015     Hyperlipidemia 07/20/2015    Diabetic neuropathy (HCC) 11/21/2014    Seizures (HCC) 08/08/2014    Syncope 07/13/2012    Cardiomyopathy, nonischemic (HCC) 10/31/2009    HTN (hypertension), benign 10/26/2009    Pulmonary nodule 10/26/2009     Past Medical History:   Diagnosis Date    Atrial fibrillation (HCC)     Cancer (HCC) 12/2015    left lung; 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     Dissection of right carotid artery 09/2018    History of MRSA infection 08/01/2023    +MRSA in nasal swab    Hypertension     Renal insufficiency     stage 3    Seizures (HCC)     Stroke (HCC) 02/2023    prior to feb., had mini stroke; left side paralysis       Core Measures:  CVA: no  CHF: yes  PNA: no    Activity:Level of Assistance: Dependent, patient does less than 25%  Number times ambulated in hallways past shift: 0  Number of times OOB to chair past shift: 0    Cardiac:   Cardiac Monitoring: yes, 137    Access:   Current line(s): PIV    Respiratory:   O2 Device: None (Room air)    GI:  Last BM (including prior to admit): 02/19/25  Current diet:  DIET ONE TIME MESSAGE;  ADULT DIET; Dysphagia - Soft and Bite Sized  Tolerating current diet: Yes - reportedly did not eat lunch d/t late breakfast    Pain Management:   Patient states pain is manageable on current regimen: yes    Skin:  Dominik Scale Score: 16  Interventions: turn q2  Pressure injury: no    Patient Safety:  Fall Score: Peralta Total Score: 30  Interventions: bed alarm  Self-release roll belt: No  Dexterity to release roll belt: N/A   (must document dexterity  here by stating Yes or No here, otherwise this is a restraint and must follow restraint documentation policy.)    DVT prophylaxis:  DVT prophylaxis: meds    Active Consults:  IP CONSULT TO NEUROLOGY  IP CONSULT TO PHARMACY  IP CONSULT TO PALLIATIVE CARE  IP CONSULT TO DIETITIAN  IP CONSULT TO NEUROLOGY  IP CONSULT TO CARDIOLOGY  IP CONSULT TO CASE

## 2025-02-21 NOTE — PROGRESS NOTES
Comprehensive Nutrition Assessment    Type and Reason for Visit:  Reassess    Nutrition Recommendations/Plan:   Continue diet per SLP     Malnutrition Assessment:  Malnutrition Status:  No malnutrition (02/13/25 1329)    Context:  Acute Illness     Findings of the 6 clinical characteristics of malnutrition:  Energy Intake:  No decrease in energy intake  Weight Loss:  No weight loss     Body Fat Loss:  No body fat loss     Muscle Mass Loss:  No muscle mass loss    Fluid Accumulation:  No fluid accumulation     Strength:  Not Performed    Nutrition Assessment:    Chart reviewed; patient working with SLP at time of attempted visit this afternoon. Receiving a soft and bite sized diet. Good PO intake per flowsheets. Plans for discharge tomorrow noted. Continue current nutrition plan of care.     Patient Vitals for the past 120 hrs:   PO Meals Eaten (%)   02/20/25 1303 76 - 100%   02/20/25 0845 76 - 100%   02/17/25 1418 76 - 100%     Nutrition Related Findings:    K+ 3.3, -[phone]   2+ edema BLE   Norvasc, Atorvastatin, Jardiance, Humalog, Keppra, Protonix, Effer-K, Zoloft, MVI, Aldactone   Wound Type: None       Current Nutrition Intake & Therapies:    Average Meal Intake: %  Average Supplements Intake: None Ordered  DIET ONE TIME MESSAGE;  ADULT DIET; Dysphagia - Soft and Bite Sized    Anthropometric Measures:  Height: 188 cm (6' 2\")  Ideal Body Weight (IBW): 190 lbs (86 kg)       Current Body Weight: 84.4 kg (186 lb 1.1 oz),   IBW. Weight Source: Bed scale  Current BMI (kg/m2): 23.9                             BMI Categories: Normal Weight (BMI 18.5-24.9)    Estimated Daily Nutrient Needs:  Energy Requirements Based On: Formula  Weight Used for Energy Requirements: Current  Energy (kcal/day): 2196 kcals (BMR x 1.3AF)  Weight Used for Protein Requirements: Current  Protein (g/day): 84-101g (1.0-1.2 g/kg bw)  Method Used for Fluid Requirements: 1 ml/kcal  Fluid (ml/day): 2200 mL    Nutrition

## 2025-02-21 NOTE — PROGRESS NOTES
End of Shift Note    Bedside shift change report given to  GURMEET Gonzales  (oncoming nurse) by Jasmine Ferreira RN .        Shift worked:  Days   Shift summary and any significant changes:    No acute changes  Q2 turns documented        Concerns for physician to address:    Zone phone for oncoming shift:   [phone]     Patient Information  Brii Engle  69 y.o.  2025  7:46 PM by Ludwig Haynes MD. 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 10/03/2023    A-fib ([facility]) 2023    Encounter for preadmission testing 2023    Type 2 diabetes mellitus with chronic kidney disease 2023    Chronic systolic (congestive) heart failure 2023    Hypokalemia 2023    CVA (cerebral vascular accident) ([facility]) 2023    Thrombotic stroke involving right middle cerebral artery ([facility]) 2023    Bilateral carotid artery stenosis 2023    Stage 3a chronic kidney disease (HCC) 10/28/2022    Malignant neoplasm of lower lobe, left bronchus or lung (HCC) 2022    Left arm weakness 2021    Bilateral leg weakness 2021    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    CKD stage 2 due to type 2 diabetes mellitus ([facility]) 2017    Breakthrough seizure ([facility]) 2015    Type II or unspecified type diabetes mellitus with neurological manifestations, uncontrolled(250.62) 2015    Hypertensive emergency 2015    History of atrial fibrillation  07/20/2015    Hyperlipidemia 07/20/2015    Diabetic neuropathy (HCC) 11/21/2014    Seizures (HCC) 08/08/2014    Syncope 07/13/2012    Cardiomyopathy, nonischemic (HCC) 10/31/2009    HTN (hypertension), benign 10/26/2009    Pulmonary nodule 10/26/2009     Past Medical History:   Diagnosis Date    Atrial fibrillation (HCC)     Cancer (HCC) 12/2015    left lung; 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     Dissection of right carotid artery 09/2018    History of MRSA infection 08/01/2023    +MRSA in nasal swab    Hypertension     Renal insufficiency     stage 3    Seizures (HCC)     Stroke ([facility]) 02/2023    prior to feb., had mini stroke; left side paralysis       Core Measures:  CVA: no  CHF: yes  PNA: no    Activity:Level of Assistance: Dependent, patient does less than 25%  Number times ambulated in hallways past shift: 0  Number of times OOB to chair past shift: 0    Cardiac:   Cardiac Monitoring: yes, 137    Access:   Current line(s): PIV    Respiratory:   O2 Device: None (Room air)    GI:  Last BM (including prior to admit): 02/20/25  Current diet:  DIET ONE TIME MESSAGE;  ADULT DIET; Dysphagia - Soft and Bite Sized  Tolerating current diet: Yes - reportedly did not eat lunch d/t late breakfast    Pain Management:   Patient states pain is manageable on current regimen: yes    Skin:  Dominik Scale Score: 16  Interventions: turn q2  Pressure injury: no    Patient Safety:  Fall Score: Peralta Total Score: 40  Interventions: bed alarm  Self-release roll belt: No  Dexterity to release roll belt: N/A   (must document dexterity  here by stating Yes or No here, otherwise this is a restraint and must follow restraint documentation policy.)    DVT prophylaxis:  DVT prophylaxis: meds    Active Consults:  IP CONSULT TO NEUROLOGY  IP CONSULT TO PHARMACY  IP CONSULT TO PALLIATIVE CARE  IP CONSULT TO DIETITIAN  IP CONSULT TO NEUROLOGY  IP CONSULT TO CARDIOLOGY  IP

## 2025-02-22 VITALS
WEIGHT: 180 LBS | DIASTOLIC BLOOD PRESSURE: 95 MMHG | SYSTOLIC BLOOD PRESSURE: 122 MMHG | OXYGEN SATURATION: 97 % | BODY MASS INDEX: 23.1 KG/M2 | TEMPERATURE: 97.7 F | HEIGHT: 74 IN | RESPIRATION RATE: 19 BRPM | HEART RATE: 65 BPM

## 2025-02-22 LAB
ALBUMIN SERPL-MCNC: 3 G/DL (ref 3.5–5)
ANION GAP SERPL CALC-SCNC: 6 MMOL/L (ref 2–12)
BASOPHILS # BLD: 0.02 K/UL (ref 0–0.1)
BASOPHILS NFR BLD: 0.4 % (ref 0–1)
BUN SERPL-MCNC: 11 MG/DL (ref 6–20)
BUN/CREAT SERPL: 9 (ref 12–20)
CALCIUM SERPL-MCNC: 9.1 MG/DL (ref 8.5–10.1)
CHLORIDE SERPL-SCNC: 106 MMOL/L (ref 97–108)
CO2 SERPL-SCNC: 28 MMOL/L (ref 21–32)
CREAT SERPL-MCNC: 1.17 MG/DL (ref 0.7–1.3)
DIFFERENTIAL METHOD BLD: ABNORMAL
EKG ATRIAL RATE: 72 BPM
EKG DIAGNOSIS: NORMAL
EKG DIAGNOSIS: NORMAL
EKG Q-T INTERVAL: 524 MS
EKG Q-T INTERVAL: 582 MS
EKG QRS DURATION: 110 MS
EKG QRS DURATION: 112 MS
EKG QTC CALCULATION (BAZETT): 565 MS
EKG QTC CALCULATION (BAZETT): 566 MS
EKG R AXIS: -3 DEGREES
EKG R AXIS: 62 DEGREES
EKG T AXIS: 203 DEGREES
EKG T AXIS: 268 DEGREES
EKG VENTRICULAR RATE: 57 BPM
EKG VENTRICULAR RATE: 70 BPM
EOSINOPHIL # BLD: 0.04 K/UL (ref 0–0.4)
EOSINOPHIL NFR BLD: 0.8 % (ref 0–7)
ERYTHROCYTE [DISTWIDTH] IN BLOOD BY AUTOMATED COUNT: 15 % (ref 11.5–14.5)
GLUCOSE BLD STRIP.AUTO-MCNC: 117 MG/DL (ref 65–117)
GLUCOSE BLD STRIP.AUTO-MCNC: 121 MG/DL (ref 65–117)
GLUCOSE BLD STRIP.AUTO-MCNC: 174 MG/DL (ref 65–117)
GLUCOSE SERPL-MCNC: 136 MG/DL (ref 65–100)
HCT VFR BLD AUTO: 39.9 % (ref 36.6–50.3)
HGB BLD-MCNC: 12.6 G/DL (ref 12.1–17)
IMM GRANULOCYTES # BLD AUTO: 0.01 K/UL (ref 0–0.04)
IMM GRANULOCYTES NFR BLD AUTO: 0.2 % (ref 0–0.5)
LYMPHOCYTES # BLD: 1.56 K/UL (ref 0.8–3.5)
LYMPHOCYTES NFR BLD: 30.1 % (ref 12–49)
MCH RBC QN AUTO: 26.6 PG (ref 26–34)
MCHC RBC AUTO-ENTMCNC: 31.6 G/DL (ref 30–36.5)
MCV RBC AUTO: 84.4 FL (ref 80–99)
MONOCYTES # BLD: 0.55 K/UL (ref 0–1)
MONOCYTES NFR BLD: 10.6 % (ref 5–13)
NEUTS SEG # BLD: 3 K/UL (ref 1.8–8)
NEUTS SEG NFR BLD: 57.9 % (ref 32–75)
NRBC # BLD: 0 K/UL (ref 0–0.01)
NRBC BLD-RTO: 0 PER 100 WBC
PHOSPHATE SERPL-MCNC: 3.9 MG/DL (ref 2.6–4.7)
PLATELET # BLD AUTO: 308 K/UL (ref 150–400)
PMV BLD AUTO: 10.8 FL (ref 8.9–12.9)
POTASSIUM SERPL-SCNC: 3.4 MMOL/L (ref 3.5–5.1)
RBC # BLD AUTO: 4.73 M/UL (ref 4.1–5.7)
SERVICE CMNT-IMP: ABNORMAL
SERVICE CMNT-IMP: ABNORMAL
SERVICE CMNT-IMP: NORMAL
SODIUM SERPL-SCNC: 140 MMOL/L (ref 136–145)
WBC # BLD AUTO: 5.2 K/UL (ref 4.1–11.1)

## 2025-02-22 PROCEDURE — 6370000000 HC RX 637 (ALT 250 FOR IP): Performed by: INTERNAL MEDICINE

## 2025-02-22 PROCEDURE — 6370000000 HC RX 637 (ALT 250 FOR IP): Performed by: NURSE PRACTITIONER

## 2025-02-22 PROCEDURE — 36415 COLL VENOUS BLD VENIPUNCTURE: CPT

## 2025-02-22 PROCEDURE — 82962 GLUCOSE BLOOD TEST: CPT

## 2025-02-22 PROCEDURE — 85025 COMPLETE CBC W/AUTO DIFF WBC: CPT

## 2025-02-22 PROCEDURE — 6370000000 HC RX 637 (ALT 250 FOR IP): Performed by: STUDENT IN AN ORGANIZED HEALTH CARE EDUCATION/TRAINING PROGRAM

## 2025-02-22 PROCEDURE — 80069 RENAL FUNCTION PANEL: CPT

## 2025-02-22 RX ORDER — LEVETIRACETAM 500 MG/1
1000 TABLET ORAL 2 TIMES DAILY
Status: DISCONTINUED | OUTPATIENT
Start: 2025-02-22 | End: 2025-02-22 | Stop reason: HOSPADM

## 2025-02-22 RX ORDER — SACUBITRIL AND VALSARTAN 49; 51 MG/1; MG/1
1 TABLET, FILM COATED ORAL 2 TIMES DAILY
Qty: 60 TABLET | Refills: 0 | Status: SHIPPED | OUTPATIENT
Start: 2025-02-22

## 2025-02-22 RX ORDER — SPIRONOLACTONE 25 MG/1
25 TABLET ORAL DAILY
Qty: 30 TABLET | Refills: 3 | Status: SHIPPED | OUTPATIENT
Start: 2025-02-23

## 2025-02-22 RX ORDER — CARVEDILOL 3.12 MG/1
3.12 TABLET ORAL 2 TIMES DAILY WITH MEALS
Qty: 60 TABLET | Refills: 3 | Status: SHIPPED | OUTPATIENT
Start: 2025-02-22

## 2025-02-22 RX ADMIN — LEVETIRACETAM 1000 MG: 500 TABLET, FILM COATED ORAL at 09:15

## 2025-02-22 RX ADMIN — PANTOPRAZOLE SODIUM 40 MG: 40 TABLET, DELAYED RELEASE ORAL at 05:52

## 2025-02-22 RX ADMIN — SPIRONOLACTONE 25 MG: 25 TABLET ORAL at 08:45

## 2025-02-22 RX ADMIN — CHLORHEXIDINE GLUCONATE 15 ML: 1.2 RINSE ORAL at 08:48

## 2025-02-22 RX ADMIN — AMLODIPINE BESYLATE 10 MG: 5 TABLET ORAL at 08:46

## 2025-02-22 RX ADMIN — ACETAMINOPHEN 650 MG: 325 TABLET ORAL at 16:03

## 2025-02-22 RX ADMIN — POTASSIUM BICARBONATE 40 MEQ: 782 TABLET, EFFERVESCENT ORAL at 09:15

## 2025-02-22 RX ADMIN — SACUBITRIL AND VALSARTAN 1 TABLET: 49; 51 TABLET, FILM COATED ORAL at 08:46

## 2025-02-22 RX ADMIN — EMPAGLIFLOZIN 10 MG: 10 TABLET, FILM COATED ORAL at 08:46

## 2025-02-22 RX ADMIN — Medication 1 TABLET: at 08:45

## 2025-02-22 RX ADMIN — CARVEDILOL 3.12 MG: 6.25 TABLET, FILM COATED ORAL at 08:46

## 2025-02-22 RX ADMIN — CARVEDILOL 3.12 MG: 6.25 TABLET, FILM COATED ORAL at 16:03

## 2025-02-22 RX ADMIN — SERTRALINE HYDROCHLORIDE 25 MG: 25 TABLET ORAL at 08:46

## 2025-02-22 RX ADMIN — APIXABAN 5 MG: 5 TABLET, FILM COATED ORAL at 08:45

## 2025-02-22 NOTE — PLAN OF CARE
Problem: Neurosensory - Adult  Goal: Achieves stable or improved neurological status  Outcome: Progressing  Goal: Absence of seizures  Outcome: Progressing  Goal: Remains free of injury related to seizures activity  Outcome: Progressing  Goal: Achieves maximal functionality and self care  Outcome: Progressing     Problem: Respiratory - Adult  Goal: Achieves optimal ventilation and oxygenation  Outcome: Progressing     Problem: Cardiovascular - Adult  Goal: Maintains optimal cardiac output and hemodynamic stability  Outcome: Progressing  Goal: Absence of cardiac dysrhythmias or at baseline  Outcome: Progressing     Problem: Safety - Adult  Goal: Free from fall injury  Outcome: Progressing     Problem: Chronic Conditions and Co-morbidities  Goal: Patient's chronic conditions and co-morbidity symptoms are monitored and maintained or improved  Outcome: Progressing     Problem: Discharge Planning  Goal: Discharge to home or other facility with appropriate resources  Outcome: Progressing     Problem: Pain  Goal: Verbalizes/displays adequate comfort level or baseline comfort level  Outcome: Progressing     Problem: Skin/Tissue Integrity  Goal: Skin integrity remains intact  Description: 1.  Monitor for areas of redness and/or skin breakdown  2.  Assess vascular access sites hourly  3.  Every 4-6 hours minimum:  Change oxygen saturation probe site  4.  Every 4-6 hours:  If on nasal continuous positive airway pressure, respiratory therapy assess nares and determine need for appliance change or resting period  Outcome: Progressing     Problem: ABCDS Injury Assessment  Goal: Absence of physical injury  Outcome: Progressing     Problem: Nutrition Deficit:  Goal: Optimize nutritional status  Outcome: Progressing  Flowsheets (Taken 2/21/2025 4248 by Sharlene Culp, KEHINDE)  Nutrient intake appropriate for improving, restoring, or maintaining nutritional needs: Monitor oral intake, labs, and treatment plans     Problem: SLP Adult

## 2025-02-22 NOTE — PROGRESS NOTES
Cardiology Progress Note      2/22/2025 10:05 AM    Admit Date: 2/11/2025          Subjective: Appears comfortable          BP (!) 122/95   Pulse 65   Temp 97.7 °F (36.5 °C) (Axillary)   Resp 19   Ht 1.88 m (6' 2\")   Wt 81.6 kg (180 lb)   SpO2 97%   BMI 23.11 kg/m²   02/20 1901 - 02/22 0700  In: 500 [P.O.:500]  Out: 1700 [Urine:1700]        Objective:      Physical Exam:  VS as above  Chest CTA ant  Card Irreg irreg, no gallop     Data Review:   Labs:    BUN 10  Creat 1.2   K 3.3  Hgb 12.1     Telemetry: afib R 80       Assessment:     Atrial fibrillation, permanent.  Acute cerebellar stroke.  Prior strokes in 2/2023 and 5/2023.  History of right carotid artery dissection, spontaneous.  Echo EF 40-45% in 5/2023 but now EF 20-25% on echo.  Negative for ischemia or infarct on stress nuclear study in 2018.  Hypertension.  Pneumonia.  Cervical spine stenosis.  Seizure disorder.  DM type 2.  Neuroendocrine lung tumor status post resection in 2015.  Full code.  Transient bradycardia      Plan:   BP and HR stable.  No further changes from cardiac perspective.  Please call with cardiology concerns.

## 2025-02-22 NOTE — DISCHARGE SUMMARY
Discharge Summary    Name: Brii Engle  [id number]  YOB: 1955 (Age: 69 y.o.)   Date of Admission: 2/11/2025  Date of Discharge: 2/22/2025  Attending Physician: Barbara Tucker MD    Discharge Diagnosis:   Acute metabolic encephalopathy due to bradycardia + stroke plus status epilepticus- resolved   Acute infarction involving inferior cerebellar vermis and mid right cerebellum without mass effect, prior history of CVA with residual left-sided deficit  Acute systolic heart failure with reduced ejection fraction 20-25%  Community acquired pneumonia  Hypokalemia  Status epilepticus, hx of Seizure disorder s/p intubation- extubation to protect airway   Newly diagnosed major depressive disorder  History of carcinoid tumor with left lower lobe wedge resection  History of atrial fibrillation  Hyperlipidemia  Diabetes mellitus type 2      Consultations:  IP CONSULT TO NEUROLOGY  IP CONSULT TO PHARMACY  IP CONSULT TO PALLIATIVE CARE  IP CONSULT TO DIETITIAN  IP CONSULT TO NEUROLOGY  IP CONSULT TO CARDIOLOGY  IP CONSULT TO CASE MANAGEMENT      Brief Admission History/Reason for Admission Per Ludwig Haynes MD:   70 y/o male PMHx of CVA (residual left sided deficit) T2DM, Afib (Eliquis), HFrEF HTN. HLD, carcinoid tumor s/p LLL wedge resection and seizure disorder presented to ED via EMS after reported tonic clonic seizure at home witnessed by wife ~30-45 till EMS arrival d/t incliment weather. Administered 10 mg IM versed by EMS, followed by 5 mg IM with subsequent seizure cessation. ED arrival, unresponsive, snoring respirations, A-fib RVR-started on diltiazem drip, emergently placed on mechanical ventilation for airway protection. CT head- negative.  CXR with large left effusion and complete left lung opacification.  Rapid EEG no evidence of seizure burden.  Administered 1000 mg Keppra.  ICU consulted for admission.     ICU evaluation: Sedated on propofol/fentanyl.

## 2025-02-22 NOTE — PROGRESS NOTES
End of Shift Note    Bedside shift change report given to Paco RN  (oncoming nurse) by Jina Tellez RN .        Shift worked:  Nights   Shift summary and any significant changes:     No acute changes overnight  Q2h turns       Concerns for physician to address:  NONE   Zone phone for oncoming shift:   [phone]     Patient Information  Brii Engle  69 y.o.  2025  7:46 PM by Ludwig Haynes MD. Brii Engle was admitted from [facility]    Problem List  Patient Active Problem List    Diagnosis Date Noted    ACP (advance care planning) 2025    Palliative care by specialist 2025    Status epilepticus (HCC) 2025    Seizure (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) 2023    Encounter for preadmission testing 2023    Type 2 diabetes mellitus with chronic kidney disease 2023    Chronic systolic (congestive) heart failure 2023    Hypokalemia 2023    CVA (cerebral vascular accident) ([facility]) 2023    Thrombotic stroke involving right middle cerebral artery (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) 2017    Breakthrough seizure (HCC) 2015    Type II or unspecified type diabetes mellitus with neurological manifestations, uncontrolled(250.62) 2015    Hypertensive emergency 2015    History of atrial  07/20/2015    Hyperlipidemia 07/20/2015    Diabetic neuropathy (HCC) 11/21/2014    Seizures (HCC) 08/08/2014    Syncope 07/13/2012    Cardiomyopathy, nonischemic (HCC) 10/31/2009    HTN (hypertension), benign 10/26/2009    Pulmonary nodule 10/26/2009     Past Medical History:   Diagnosis Date    Atrial fibrillation (HCC)     Cancer (HCC) 12/2015    left lung; 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     Dissection of right carotid artery 09/2018    History of MRSA infection 08/01/2023    +MRSA in nasal swab    Hypertension     Renal insufficiency     stage 3    Seizures (HCC)     Stroke ([facility]) 02/2023    prior to feb., had mini stroke; left side paralysis       Core Measures:  CVA: no  CHF: yes  PNA: no    Activity:Level of Assistance: Maximum assist, patient does 25-49%  Number times ambulated in hallways past shift: 0  Number of times OOB to chair past shift: 0    Cardiac:   Cardiac Monitoring: yes, 137    Access:   Current line(s): PIV    Respiratory:   O2 Device: None (Room air)    GI:  Last BM (including prior to admit): 02/21/25  Current diet:  DIET ONE TIME MESSAGE;  ADULT DIET; Dysphagia - Soft and Bite Sized  Tolerating current diet: Yes - reportedly did not eat lunch d/t late breakfast    Pain Management:   Patient states pain is manageable on current regimen: yes    Skin:  Dominik Scale Score: 15  Interventions: turn q2  Pressure injury: no    Patient Safety:  Fall Score: Peralta Total Score: 30  Interventions: bed alarm  Self-release roll belt: No  Dexterity to release roll belt: N/A   (must document dexterity  here by stating Yes or No here, otherwise this is a restraint and must follow restraint documentation policy.)    DVT prophylaxis:  DVT prophylaxis: meds    Active Consults:  IP CONSULT TO NEUROLOGY  IP CONSULT TO PHARMACY  IP CONSULT TO PALLIATIVE CARE  IP CONSULT TO DIETITIAN  IP CONSULT TO NEUROLOGY  IP CONSULT TO CARDIOLOGY  IP CONSULT

## 2025-02-22 NOTE — DISCHARGE INSTRUCTIONS
HOSPITALIST DISCHARGE INSTRUCTIONS    NAME: Brii Engle   :  1955   MRN:  [id number]     Date/Time:  2025 10:59 AM    ADMIT DATE: 2025     DISCHARGE DATE: 2025     DISCHARGE DIAGNOSIS:  Acute metabolic encephalopathy due to bradycardia + stroke plus status epilepticus- resolved   Acute infarction involving inferior cerebellar vermis and mid right cerebellum without mass effect, prior history of CVA with residual left-sided deficit  Acute systolic heart failure with reduced ejection fraction 20-25%  Community acquired pneumonia  Hypokalemia  Status epilepticus, hx of Seizure disorder s/p intubation- extubation to protect airway   Newly diagnosed major depressive disorder  History of carcinoid 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   Keep your medication in the bottles provided by the pharmacist and keep a list of the medication names, dosages, and times to be taken in your wallet.   Do not take other medications without consulting your doctor.     Pain Management: per above medications    What to do at Home:  Patient will need to follow-up with the neurology clinic in 4 to 6 weeks.   Patient will need repeat CT chest in 2 to 3 months as outpatient.  Diagnosed with major depressive disorder-started on sertraline.  Needs close follow-up with in treatment based on response  Patient needs to follow-up in cardiac clinic..    Recommended diet:  cardiac diet and diabetic diet    Recommended activity: activity as tolerated    If you have questions regarding the hospital related prescriptions or hospital related issues please call at .    If you experience any of the following symptoms then please call your primary care physician or return to the emergency room if  you cannot get hold of your doctor:  Fever, chills, nausea, vomiting, diarrhea, change in mentation, falling, bleeding, shortness of breath    Follow Up:   @PCP@  you are to call and set up an appointment to see them in 7-10 days.      Information obtained by :  I understand that if any problems occur once I am at home I am to contact my physician.    I understand and acknowledge receipt of the instructions indicated above.                                                                                                                                           Physician's or R.N.'s Signature                                                                  Date/Time                                                                                                                                              Patient or Representative Signature                                                          Date/Time

## 2025-02-22 NOTE — PROGRESS NOTES
Discharge instructions completed with patient wife Clarisa. Patient IV and telemetry have been removed. Patient wife is aware of discharge. Patient is excited for discharge. Patient wife has no further questions at this time. [facility] is scheduled for 1730.

## 2025-02-24 ENCOUNTER — TELEPHONE (OUTPATIENT)
Age: 70
End: 2025-02-24

## 2025-02-24 NOTE — TELEPHONE ENCOUNTER
Hospital Follow up appt requested.    DETAILS     Admit date:  2/11/2025 - 2/22/2025 (11 days)  Diagnosis:   Seizure   Facility:   [facility]        No available appts with pcp for this psr to schedule in recommended time frame of 2 days     Please call pt wife Clarisa directly to schedule as soon as able.  [phone]           Appointment history:  Last seen at [facility]:   5/22/2024

## 2025-02-24 NOTE — TELEPHONE ENCOUNTER
Care Transitions Initial Follow Up Call    Outreach made within 2 business days of discharge: Yes    Patient: Brii Engle Patient : 1955   MRN: [id number]  Reason for Admission: seizure  Discharge Date: 25       Spoke with: Clarisa, spouse    Discharge department/facility: [facility]    TCM Interactive Patient Contact:  Was patient able to fill all prescriptions: No: cost expense. Did not  entresto and spironolactone  Was patient instructed to bring all medications to the follow-up visit: Yes  Is patient taking all medications as directed in the discharge summary? carvedilol  Does patient understand their discharge instructions: Yes  Does patient have questions or concerns that need addressed prior to 7-14 day follow up office visit: yes - medications    Additional needs identified to be addressed with provider  Pt taken off amlodipine             Scheduled appointment with PCP within 7-14 days    Follow Up  Future Appointments   Date Time Provider Department Center   2025  1:30 PM Maurilio Michaels MD [facility] DEP       Celso Ying LPN

## 2025-02-24 NOTE — TELEPHONE ENCOUNTER
----- Message from Dr. Maurilio Michaels MD sent at 2/24/2025 12:59 PM EST -----  Regarding: RE: Need for Hospital follow up  Thsi Wednesday at 130  ----- Message -----  From: Ying Gomez LPN  Sent: 2/21/2025  11:21 AM EST  To: Maurilio Michaels MD; Trina Saleem; #  Subject: Need for Hospital follow up                      Hi Dr. Michaels, we need a hospital follow up for the patient below, where would you like to put him?    Brii Engle  RUR: 18%  MARILIA: 2/20/25  MRN: [id number]  DX: Seizure

## 2025-02-24 NOTE — TELEPHONE ENCOUNTER
Care Transitions Initial Follow Up Call    Outreach made within 2 business days of discharge: Yes    Patient: Brii Engle Patient : 1955   MRN: [id number]  Reason for Admission: [facility]  Discharge Date: 25       Spoke with: Patient    Discharge department/facility: [facility]    Scheduled appointment with PCP within 7-14 days    Follow Up  Future Appointments   Date Time Provider Department Center   2025  1:30 PM Maurilio Michaels MD [facility] DEP       Letty Glasgow

## 2025-02-26 ENCOUNTER — TELEMEDICINE (OUTPATIENT)
Age: 70
End: 2025-02-26

## 2025-02-26 DIAGNOSIS — I48.20 CHRONIC ATRIAL FIBRILLATION (HCC): ICD-10-CM

## 2025-02-26 DIAGNOSIS — J18.9 COMMUNITY ACQUIRED PNEUMONIA OF LEFT LUNG, UNSPECIFIED PART OF LUNG: Primary | ICD-10-CM

## 2025-02-26 DIAGNOSIS — N18.31 STAGE 3A CHRONIC KIDNEY DISEASE (HCC): ICD-10-CM

## 2025-02-26 DIAGNOSIS — G81.94 LEFT HEMIPLEGIA (HCC): ICD-10-CM

## 2025-02-26 DIAGNOSIS — E11.22 TYPE 2 DIABETES MELLITUS WITH STAGE 3 CHRONIC KIDNEY DISEASE, WITHOUT LONG-TERM CURRENT USE OF INSULIN, UNSPECIFIED WHETHER STAGE 3A OR 3B CKD (HCC): ICD-10-CM

## 2025-02-26 DIAGNOSIS — I50.21 ACUTE SYSTOLIC CONGESTIVE HEART FAILURE (HCC): ICD-10-CM

## 2025-02-26 DIAGNOSIS — F33.1 MAJOR DEPRESSIVE DISORDER, RECURRENT, MODERATE (HCC): ICD-10-CM

## 2025-02-26 DIAGNOSIS — N18.30 TYPE 2 DIABETES MELLITUS WITH STAGE 3 CHRONIC KIDNEY DISEASE, WITHOUT LONG-TERM CURRENT USE OF INSULIN, UNSPECIFIED WHETHER STAGE 3A OR 3B CKD (HCC): ICD-10-CM

## 2025-02-26 DIAGNOSIS — G40.909 SEIZURE DISORDER (HCC): ICD-10-CM

## 2025-02-26 DIAGNOSIS — I63.9 ACUTE CVA (CEREBROVASCULAR ACCIDENT) (HCC): ICD-10-CM

## 2025-02-26 DIAGNOSIS — C34.32 MALIGNANT NEOPLASM OF LOWER LOBE, LEFT BRONCHUS OR LUNG (HCC): ICD-10-CM

## 2025-02-26 ASSESSMENT — PATIENT HEALTH QUESTIONNAIRE - PHQ9
2. FEELING DOWN, DEPRESSED OR HOPELESS: SEVERAL DAYS
SUM OF ALL RESPONSES TO PHQ QUESTIONS 1-9: 2
1. LITTLE INTEREST OR PLEASURE IN DOING THINGS: SEVERAL DAYS
SUM OF ALL RESPONSES TO PHQ QUESTIONS 1-9: 2
SUM OF ALL RESPONSES TO PHQ9 QUESTIONS 1 & 2: 2

## 2025-02-26 NOTE — PROGRESS NOTES
\"Have you been to the ER, urgent care clinic since your last visit?  Hospitalized since your last visit?\"    Hospital visit 2/11 seizure     “Have you seen or consulted any other health care providers outside our system since your last visit?”    Therapy       “Have you had a diabetic eye exam?”    NO     Date of last diabetic eye exam: 11/16/2018

## 2025-02-26 NOTE — PROGRESS NOTES
HISTORY OF PRESENT ILLNESS   Brii Engle   is a 69 y.o.  male.    Brii Engle, was evaluated through a synchronous (real-time) audio-video encounter. The patient (or guardian if applicable) is aware that this is a billable service, which includes applicable co-pays. This Virtual Visit was conducted with patient's (and/or legal guardian's) consent. Patient identification was verified, and a caregiver was present when appropriate.   The patient was located at Home: [address] [id number]  Provider was located at Facility (Appt Dept): [address]  [address]  [place] [address]  Confirm you are appropriately licensed, registered, or certified to deliver care in the state where the patient is located as indicated above. If you are not or unsure, please re-schedule the visit: Yes, I confirm.        Total time spent for this encounter: Not billed by time    --Maurilio Michaels MD on 2/26/2025 at 12:47 PM    An electronic signature was used to authenticate this note.  fu  dm-2 and RAE, chronic afib, hx lung carcinoid  hx cva's HTN HLD NICM (  EF 40-45 2023) s/p CVA 2/2023  and 5/2023 corona radiata with left weakness  s/p cervical discectomy ( right weakenss), s/p PEG -here with wife  CARD Dr Anderson    Here for PERNELL-admitted 2/11-2/22/2 for status epilepticus-intubated then extubated-keppra continued ,  acute CVA-b/l cerebellar-continue eliquis but no plavix due to microhemorrhages, left lung consolidation vs atx-s/p zosyn -get outpt chest CT , acute chf ef 20-25-jardiance , entresto and aldactone added and outpt cath recommended, depression -started zoloft,   afib with slow HR-coreg dose lowered    No f/c cough or sob  Swallows ok per wife and no change in cognition  No recurrent seizures on keppra  Not able to afford entresto and jardiance so did not get rx filled  Sees CARD next month fu CHF and afib  No swelling or SOB  Date of Admission: 2/11/2025  Date of Discharge:  [facility],   Phone: [phone]  Fax: (   )    -  Follow Up Time:

## 2025-03-14 RX ORDER — LEVETIRACETAM 500 MG/1
TABLET ORAL
Qty: 120 TABLET | Refills: 0 | Status: CANCELLED | OUTPATIENT
Start: 2025-03-14

## 2025-03-14 RX ORDER — BLOOD SUGAR DIAGNOSTIC
STRIP MISCELLANEOUS
Qty: 50 EACH | Refills: 0 | Status: SHIPPED | OUTPATIENT
Start: 2025-03-14

## 2025-03-14 NOTE — TELEPHONE ENCOUNTER
levETIRAcetam (KEPPRA) 500 MG tablet    Refill to [facility] #[id number]    Pt is out of medication and wife is at the pharmacy now and wants this now.  I did advise of turn around time.  She states this is an emergency refill.

## 2025-03-17 ENCOUNTER — APPOINTMENT (OUTPATIENT)
Facility: HOSPITAL | Age: 70
End: 2025-03-17
Payer: MEDICARE

## 2025-03-17 ENCOUNTER — HOSPITAL ENCOUNTER (INPATIENT)
Facility: HOSPITAL | Age: 70
LOS: 4 days | End: 2025-03-21
Attending: EMERGENCY MEDICINE | Admitting: INTERNAL MEDICINE
Payer: MEDICARE

## 2025-03-17 ENCOUNTER — TELEPHONE (OUTPATIENT)
Age: 70
End: 2025-03-17

## 2025-03-17 DIAGNOSIS — I48.91 ATRIAL FIBRILLATION, UNSPECIFIED TYPE (HCC): ICD-10-CM

## 2025-03-17 DIAGNOSIS — E87.70 HYPERVOLEMIA, UNSPECIFIED HYPERVOLEMIA TYPE: ICD-10-CM

## 2025-03-17 DIAGNOSIS — J96.01 ACUTE RESPIRATORY FAILURE WITH HYPOXIA (HCC): ICD-10-CM

## 2025-03-17 DIAGNOSIS — E87.20 METABOLIC ACIDOSIS: ICD-10-CM

## 2025-03-17 DIAGNOSIS — I46.9 CARDIAC ARREST (HCC): Primary | ICD-10-CM

## 2025-03-17 LAB
ALBUMIN SERPL-MCNC: 2.8 G/DL (ref 3.5–5)
ALBUMIN/GLOB SERPL: 0.7 (ref 1.1–2.2)
ALP SERPL-CCNC: 148 U/L (ref 45–117)
ALT SERPL-CCNC: 131 U/L (ref 12–78)
ANION GAP SERPL CALC-SCNC: 12 MMOL/L (ref 2–12)
ANION GAP SERPL CALC-SCNC: 24 MMOL/L (ref 2–12)
ARTERIAL PATENCY WRIST A: ABNORMAL
ARTERIAL PATENCY WRIST A: ABNORMAL
AST SERPL-CCNC: 169 U/L (ref 15–37)
BASE DEFICIT BLDA-SCNC: 23.6 MMOL/L
BASE DEFICIT BLDA-SCNC: 8.1 MMOL/L
BASOPHILS # BLD: 0.03 K/UL (ref 0–0.1)
BASOPHILS NFR BLD: 0.6 % (ref 0–1)
BDY SITE: ABNORMAL
BDY SITE: ABNORMAL
BILIRUB SERPL-MCNC: 0.4 MG/DL (ref 0.2–1)
BUN SERPL-MCNC: 16 MG/DL (ref 6–20)
BUN SERPL-MCNC: 25 MG/DL (ref 6–20)
BUN/CREAT SERPL: 11 (ref 12–20)
BUN/CREAT SERPL: 8 (ref 12–20)
CALCIUM SERPL-MCNC: 7.8 MG/DL (ref 8.5–10.1)
CALCIUM SERPL-MCNC: 8.9 MG/DL (ref 8.5–10.1)
CHLORIDE SERPL-SCNC: 109 MMOL/L (ref 97–108)
CHLORIDE SERPL-SCNC: 114 MMOL/L (ref 97–108)
CO2 SERPL-SCNC: 12 MMOL/L (ref 21–32)
CO2 SERPL-SCNC: 18 MMOL/L (ref 21–32)
CREAT SERPL-MCNC: 1.95 MG/DL (ref 0.7–1.3)
CREAT SERPL-MCNC: 2.23 MG/DL (ref 0.7–1.3)
DIFFERENTIAL METHOD BLD: ABNORMAL
EOSINOPHIL # BLD: 0.04 K/UL (ref 0–0.4)
EOSINOPHIL NFR BLD: 0.8 % (ref 0–7)
ERYTHROCYTE [DISTWIDTH] IN BLOOD BY AUTOMATED COUNT: 15.6 % (ref 11.5–14.5)
EST. AVERAGE GLUCOSE BLD GHB EST-MCNC: 137 MG/DL
ETHANOL SERPL-MCNC: <10 MG/DL (ref 0–0.08)
FIO2 ON VENT: 100 %
FIO2 ON VENT: 60 %
GAS FLOW.O2 SETTING OXYMISER: 24
GAS FLOW.O2 SETTING OXYMISER: 24
GLOBULIN SER CALC-MCNC: 4.3 G/DL (ref 2–4)
GLUCOSE BLD STRIP.AUTO-MCNC: 100 MG/DL (ref 65–117)
GLUCOSE BLD STRIP.AUTO-MCNC: 101 MG/DL (ref 65–117)
GLUCOSE BLD STRIP.AUTO-MCNC: 116 MG/DL (ref 65–117)
GLUCOSE BLD STRIP.AUTO-MCNC: 160 MG/DL (ref 65–117)
GLUCOSE BLD STRIP.AUTO-MCNC: 231 MG/DL (ref 65–117)
GLUCOSE BLD STRIP.AUTO-MCNC: 96 MG/DL (ref 65–117)
GLUCOSE SERPL-MCNC: 103 MG/DL (ref 65–100)
GLUCOSE SERPL-MCNC: 359 MG/DL (ref 65–100)
HBA1C MFR BLD: 6.4 % (ref 4–5.6)
HCO3 BLDA-SCNC: 18 MMOL/L (ref 22–26)
HCO3 BLDA-SCNC: 8 MMOL/L (ref 22–26)
HCT VFR BLD AUTO: 43.9 % (ref 36.6–50.3)
HGB BLD-MCNC: 12.2 G/DL (ref 12.1–17)
IMM GRANULOCYTES # BLD AUTO: 0.05 K/UL (ref 0–0.04)
IMM GRANULOCYTES NFR BLD AUTO: 1 % (ref 0–0.5)
LACTATE SERPL-SCNC: 4.1 MMOL/L (ref 0.4–2)
LIPASE SERPL-CCNC: 25 U/L (ref 13–75)
LYMPHOCYTES # BLD: 3.17 K/UL (ref 0.8–3.5)
LYMPHOCYTES NFR BLD: 64.8 % (ref 12–49)
MAGNESIUM SERPL-MCNC: 1.8 MG/DL (ref 1.6–2.4)
MAGNESIUM SERPL-MCNC: 2.5 MG/DL (ref 1.6–2.4)
MCH RBC QN AUTO: 26.8 PG (ref 26–34)
MCHC RBC AUTO-ENTMCNC: 27.8 G/DL (ref 30–36.5)
MCV RBC AUTO: 96.5 FL (ref 80–99)
MONOCYTES # BLD: 0.23 K/UL (ref 0–1)
MONOCYTES NFR BLD: 4.7 % (ref 5–13)
NEUTS SEG # BLD: 1.38 K/UL (ref 1.8–8)
NEUTS SEG NFR BLD: 28.1 % (ref 32–75)
NRBC # BLD: 0 K/UL (ref 0–0.01)
NRBC BLD-RTO: 0 PER 100 WBC
NT PRO BNP: ABNORMAL PG/ML
PCO2 BLDA: 38 MMHG (ref 35–45)
PCO2 BLDA: 38 MMHG (ref 35–45)
PEEP RESPIRATORY: 5
PEEP RESPIRATORY: 5
PH BLDA: 6.95 (ref 7.35–7.45)
PH BLDA: 7.29 (ref 7.35–7.45)
PHOSPHATE SERPL-MCNC: 5.6 MG/DL (ref 2.6–4.7)
PLATELET # BLD AUTO: 170 K/UL (ref 150–400)
PMV BLD AUTO: 12.9 FL (ref 8.9–12.9)
PO2 BLDA: 232 MMHG (ref 80–100)
PO2 BLDA: 464 MMHG (ref 80–100)
POTASSIUM SERPL-SCNC: 4.2 MMOL/L (ref 3.5–5.1)
POTASSIUM SERPL-SCNC: 4.9 MMOL/L (ref 3.5–5.1)
PROT SERPL-MCNC: 7.1 G/DL (ref 6.4–8.2)
RBC # BLD AUTO: 4.55 M/UL (ref 4.1–5.7)
RBC MORPH BLD: ABNORMAL
SAO2 % BLD: 100 % (ref 92–97)
SAO2 % BLD: 99 % (ref 92–97)
SAO2% DEVICE SAO2% SENSOR NAME: ABNORMAL
SAO2% DEVICE SAO2% SENSOR NAME: ABNORMAL
SERVICE CMNT-IMP: ABNORMAL
SERVICE CMNT-IMP: NORMAL
SODIUM SERPL-SCNC: 144 MMOL/L (ref 136–145)
SODIUM SERPL-SCNC: 145 MMOL/L (ref 136–145)
SPECIMEN SITE: ABNORMAL
SPECIMEN SITE: ABNORMAL
TROPONIN I SERPL HS-MCNC: 2161 NG/L (ref 0–76)
TROPONIN I SERPL HS-MCNC: 25 NG/L (ref 0–76)
VENTILATION MODE VENT: ABNORMAL
VENTILATION MODE VENT: ABNORMAL
VT SETTING VENT: 500
VT SETTING VENT: 500
WBC # BLD AUTO: 4.9 K/UL (ref 4.1–11.1)

## 2025-03-17 PROCEDURE — 2580000003 HC RX 258: Performed by: EMERGENCY MEDICINE

## 2025-03-17 PROCEDURE — 99291 CRITICAL CARE FIRST HOUR: CPT

## 2025-03-17 PROCEDURE — 5A1945Z RESPIRATORY VENTILATION, 24-96 CONSECUTIVE HOURS: ICD-10-PCS | Performed by: INTERNAL MEDICINE

## 2025-03-17 PROCEDURE — 82077 ASSAY SPEC XCP UR&BREATH IA: CPT

## 2025-03-17 PROCEDURE — 94002 VENT MGMT INPAT INIT DAY: CPT

## 2025-03-17 PROCEDURE — 51702 INSERT TEMP BLADDER CATH: CPT

## 2025-03-17 PROCEDURE — 83605 ASSAY OF LACTIC ACID: CPT

## 2025-03-17 PROCEDURE — 2580000003 HC RX 258: Performed by: NURSE PRACTITIONER

## 2025-03-17 PROCEDURE — 82803 BLOOD GASES ANY COMBINATION: CPT

## 2025-03-17 PROCEDURE — 02HV33Z INSERTION OF INFUSION DEVICE INTO SUPERIOR VENA CAVA, PERCUTANEOUS APPROACH: ICD-10-PCS | Performed by: INTERNAL MEDICINE

## 2025-03-17 PROCEDURE — 82962 GLUCOSE BLOOD TEST: CPT

## 2025-03-17 PROCEDURE — 71275 CT ANGIOGRAPHY CHEST: CPT

## 2025-03-17 PROCEDURE — 6360000004 HC RX CONTRAST MEDICATION: Performed by: EMERGENCY MEDICINE

## 2025-03-17 PROCEDURE — 74177 CT ABD & PELVIS W/CONTRAST: CPT

## 2025-03-17 PROCEDURE — 2500000003 HC RX 250 WO HCPCS: Performed by: EMERGENCY MEDICINE

## 2025-03-17 PROCEDURE — 70450 CT HEAD/BRAIN W/O DYE: CPT

## 2025-03-17 PROCEDURE — 37799 UNLISTED PX VASCULAR SURGERY: CPT

## 2025-03-17 PROCEDURE — 6360000002 HC RX W HCPCS: Performed by: INTERNAL MEDICINE

## 2025-03-17 PROCEDURE — 36415 COLL VENOUS BLD VENIPUNCTURE: CPT

## 2025-03-17 PROCEDURE — 2580000003 HC RX 258: Performed by: INTERNAL MEDICINE

## 2025-03-17 PROCEDURE — 96366 THER/PROPH/DIAG IV INF ADDON: CPT

## 2025-03-17 PROCEDURE — 96374 THER/PROPH/DIAG INJ IV PUSH: CPT

## 2025-03-17 PROCEDURE — 36600 WITHDRAWAL OF ARTERIAL BLOOD: CPT

## 2025-03-17 PROCEDURE — 03HB33Z INSERTION OF INFUSION DEVICE INTO RIGHT RADIAL ARTERY, PERCUTANEOUS APPROACH: ICD-10-PCS | Performed by: NURSE PRACTITIONER

## 2025-03-17 PROCEDURE — 71045 X-RAY EXAM CHEST 1 VIEW: CPT

## 2025-03-17 PROCEDURE — 84100 ASSAY OF PHOSPHORUS: CPT

## 2025-03-17 PROCEDURE — 80053 COMPREHEN METABOLIC PANEL: CPT

## 2025-03-17 PROCEDURE — 96365 THER/PROPH/DIAG IV INF INIT: CPT

## 2025-03-17 PROCEDURE — 2500000003 HC RX 250 WO HCPCS: Performed by: INTERNAL MEDICINE

## 2025-03-17 PROCEDURE — 5A12012 PERFORMANCE OF CARDIAC OUTPUT, SINGLE, MANUAL: ICD-10-PCS | Performed by: INTERNAL MEDICINE

## 2025-03-17 PROCEDURE — 0BH17EZ INSERTION OF ENDOTRACHEAL AIRWAY INTO TRACHEA, VIA NATURAL OR ARTIFICIAL OPENING: ICD-10-PCS | Performed by: INTERNAL MEDICINE

## 2025-03-17 PROCEDURE — 6360000002 HC RX W HCPCS: Performed by: NURSE PRACTITIONER

## 2025-03-17 PROCEDURE — 84484 ASSAY OF TROPONIN QUANT: CPT

## 2025-03-17 PROCEDURE — 83735 ASSAY OF MAGNESIUM: CPT

## 2025-03-17 PROCEDURE — 6370000000 HC RX 637 (ALT 250 FOR IP): Performed by: INTERNAL MEDICINE

## 2025-03-17 PROCEDURE — 6360000002 HC RX W HCPCS: Performed by: EMERGENCY MEDICINE

## 2025-03-17 PROCEDURE — 83036 HEMOGLOBIN GLYCOSYLATED A1C: CPT

## 2025-03-17 PROCEDURE — 85025 COMPLETE CBC W/AUTO DIFF WBC: CPT

## 2025-03-17 PROCEDURE — 36556 INSERT NON-TUNNEL CV CATH: CPT

## 2025-03-17 PROCEDURE — 2000000000 HC ICU R&B

## 2025-03-17 PROCEDURE — 92950 HEART/LUNG RESUSCITATION CPR: CPT

## 2025-03-17 PROCEDURE — 3E033XZ INTRODUCTION OF VASOPRESSOR INTO PERIPHERAL VEIN, PERCUTANEOUS APPROACH: ICD-10-PCS | Performed by: INTERNAL MEDICINE

## 2025-03-17 PROCEDURE — 2500000003 HC RX 250 WO HCPCS

## 2025-03-17 PROCEDURE — 83690 ASSAY OF LIPASE: CPT

## 2025-03-17 PROCEDURE — 83880 ASSAY OF NATRIURETIC PEPTIDE: CPT

## 2025-03-17 RX ORDER — NOREPINEPHRINE BITARTRATE 0.06 MG/ML
INJECTION, SOLUTION INTRAVENOUS
Status: COMPLETED
Start: 2025-03-17 | End: 2025-03-17

## 2025-03-17 RX ORDER — SODIUM CHLORIDE 0.9 % (FLUSH) 0.9 %
5-40 SYRINGE (ML) INJECTION PRN
Status: DISCONTINUED | OUTPATIENT
Start: 2025-03-17 | End: 2025-03-21 | Stop reason: HOSPADM

## 2025-03-17 RX ORDER — DEXTROSE MONOHYDRATE 100 MG/ML
INJECTION, SOLUTION INTRAVENOUS CONTINUOUS PRN
Status: DISCONTINUED | OUTPATIENT
Start: 2025-03-17 | End: 2025-03-21 | Stop reason: HOSPADM

## 2025-03-17 RX ORDER — SODIUM CHLORIDE 9 MG/ML
INJECTION, SOLUTION INTRAVENOUS PRN
Status: DISCONTINUED | OUTPATIENT
Start: 2025-03-17 | End: 2025-03-21 | Stop reason: HOSPADM

## 2025-03-17 RX ORDER — PROPOFOL 10 MG/ML
5-50 INJECTION, EMULSION INTRAVENOUS CONTINUOUS
Status: DISCONTINUED | OUTPATIENT
Start: 2025-03-17 | End: 2025-03-19

## 2025-03-17 RX ORDER — ACETAMINOPHEN 325 MG/1
650 TABLET ORAL EVERY 6 HOURS PRN
Status: DISCONTINUED | OUTPATIENT
Start: 2025-03-17 | End: 2025-03-21 | Stop reason: HOSPADM

## 2025-03-17 RX ORDER — GLUCAGON 1 MG/ML
1 KIT INJECTION PRN
Status: DISCONTINUED | OUTPATIENT
Start: 2025-03-17 | End: 2025-03-21 | Stop reason: HOSPADM

## 2025-03-17 RX ORDER — INSULIN LISPRO 100 [IU]/ML
0-8 INJECTION, SOLUTION INTRAVENOUS; SUBCUTANEOUS EVERY 4 HOURS
Status: DISCONTINUED | OUTPATIENT
Start: 2025-03-17 | End: 2025-03-20

## 2025-03-17 RX ORDER — FENTANYL CITRATE-0.9 % NACL/PF 20 MCG/2ML
50 SYRINGE (ML) INTRAVENOUS EVERY 30 MIN PRN
Refills: 0 | Status: DISCONTINUED | OUTPATIENT
Start: 2025-03-17 | End: 2025-03-17

## 2025-03-17 RX ORDER — POTASSIUM CHLORIDE 7.45 MG/ML
10 INJECTION INTRAVENOUS PRN
Status: DISCONTINUED | OUTPATIENT
Start: 2025-03-17 | End: 2025-03-17

## 2025-03-17 RX ORDER — SODIUM CHLORIDE 9 MG/ML
INJECTION, SOLUTION INTRAVENOUS CONTINUOUS
Status: DISCONTINUED | OUTPATIENT
Start: 2025-03-17 | End: 2025-03-17

## 2025-03-17 RX ORDER — SODIUM BICARBONATE IN D5W 150/1000ML
PLASTIC BAG, INJECTION (ML) INTRAVENOUS CONTINUOUS
Status: DISCONTINUED | OUTPATIENT
Start: 2025-03-17 | End: 2025-03-17

## 2025-03-17 RX ORDER — ONDANSETRON 4 MG/1
4 TABLET, ORALLY DISINTEGRATING ORAL EVERY 8 HOURS PRN
Status: DISCONTINUED | OUTPATIENT
Start: 2025-03-17 | End: 2025-03-21 | Stop reason: HOSPADM

## 2025-03-17 RX ORDER — ENOXAPARIN SODIUM 100 MG/ML
40 INJECTION SUBCUTANEOUS DAILY
Status: DISCONTINUED | OUTPATIENT
Start: 2025-03-18 | End: 2025-03-18

## 2025-03-17 RX ORDER — NOREPINEPHRINE BITARTRATE 0.06 MG/ML
1-100 INJECTION, SOLUTION INTRAVENOUS CONTINUOUS
Status: DISCONTINUED | OUTPATIENT
Start: 2025-03-17 | End: 2025-03-18

## 2025-03-17 RX ORDER — 0.9 % SODIUM CHLORIDE 0.9 %
15 INTRAVENOUS SOLUTION INTRAVENOUS ONCE
Status: DISCONTINUED | OUTPATIENT
Start: 2025-03-17 | End: 2025-03-17

## 2025-03-17 RX ORDER — MAGNESIUM SULFATE IN WATER 40 MG/ML
2000 INJECTION, SOLUTION INTRAVENOUS PRN
Status: DISCONTINUED | OUTPATIENT
Start: 2025-03-17 | End: 2025-03-17

## 2025-03-17 RX ORDER — FENTANYL CITRATE-0.9 % NACL/PF 10 MCG/ML
25-200 PLASTIC BAG, INJECTION (ML) INTRAVENOUS CONTINUOUS
Refills: 0 | Status: DISCONTINUED | OUTPATIENT
Start: 2025-03-17 | End: 2025-03-17

## 2025-03-17 RX ORDER — DEXTROSE MONOHYDRATE AND SODIUM CHLORIDE 5; .45 G/100ML; G/100ML
INJECTION, SOLUTION INTRAVENOUS CONTINUOUS PRN
Status: DISCONTINUED | OUTPATIENT
Start: 2025-03-17 | End: 2025-03-21 | Stop reason: HOSPADM

## 2025-03-17 RX ORDER — INDOMETHACIN 25 MG/1
50 CAPSULE ORAL
Status: COMPLETED | OUTPATIENT
Start: 2025-03-17 | End: 2025-03-17

## 2025-03-17 RX ORDER — AMLODIPINE BESYLATE 10 MG/1
10 TABLET ORAL DAILY
Qty: 90 TABLET | Refills: 1 | Status: SHIPPED | OUTPATIENT
Start: 2025-03-17 | End: 2025-03-22

## 2025-03-17 RX ORDER — POLYETHYLENE GLYCOL 3350 17 G/17G
17 POWDER, FOR SOLUTION ORAL DAILY PRN
Status: DISCONTINUED | OUTPATIENT
Start: 2025-03-17 | End: 2025-03-21 | Stop reason: HOSPADM

## 2025-03-17 RX ORDER — ACETAMINOPHEN 650 MG/1
650 SUPPOSITORY RECTAL EVERY 6 HOURS PRN
Status: DISCONTINUED | OUTPATIENT
Start: 2025-03-17 | End: 2025-03-21 | Stop reason: HOSPADM

## 2025-03-17 RX ORDER — LEVETIRACETAM 500 MG/1
1000 TABLET ORAL 2 TIMES DAILY
Qty: 120 TABLET | Refills: 11 | Status: SHIPPED | OUTPATIENT
Start: 2025-03-17 | End: 2025-03-22

## 2025-03-17 RX ORDER — POTASSIUM CHLORIDE 29.8 MG/ML
20 INJECTION INTRAVENOUS PRN
Status: DISCONTINUED | OUTPATIENT
Start: 2025-03-17 | End: 2025-03-17

## 2025-03-17 RX ORDER — FENTANYL CITRATE 50 UG/ML
100 INJECTION, SOLUTION INTRAMUSCULAR; INTRAVENOUS
Refills: 0 | Status: DISCONTINUED | OUTPATIENT
Start: 2025-03-17 | End: 2025-03-18

## 2025-03-17 RX ORDER — 0.9 % SODIUM CHLORIDE 0.9 %
500 INTRAVENOUS SOLUTION INTRAVENOUS ONCE
Status: COMPLETED | OUTPATIENT
Start: 2025-03-17 | End: 2025-03-17

## 2025-03-17 RX ORDER — SODIUM CHLORIDE 0.9 % (FLUSH) 0.9 %
5-40 SYRINGE (ML) INJECTION EVERY 12 HOURS SCHEDULED
Status: DISCONTINUED | OUTPATIENT
Start: 2025-03-17 | End: 2025-03-21 | Stop reason: HOSPADM

## 2025-03-17 RX ORDER — IOPAMIDOL 755 MG/ML
100 INJECTION, SOLUTION INTRAVASCULAR
Status: COMPLETED | OUTPATIENT
Start: 2025-03-17 | End: 2025-03-17

## 2025-03-17 RX ORDER — ONDANSETRON 2 MG/ML
4 INJECTION INTRAMUSCULAR; INTRAVENOUS EVERY 6 HOURS PRN
Status: DISCONTINUED | OUTPATIENT
Start: 2025-03-17 | End: 2025-03-21 | Stop reason: HOSPADM

## 2025-03-17 RX ADMIN — AMIODARONE HYDROCHLORIDE 1 MG/MIN: 50 INJECTION, SOLUTION INTRAVENOUS at 19:03

## 2025-03-17 RX ADMIN — NICARDIPINE HYDROCHLORIDE 5 MG/HR: 25 INJECTION INTRAVENOUS at 20:33

## 2025-03-17 RX ADMIN — NOREPINEPHRINE BITARTRATE 20 MCG/MIN: 0.06 INJECTION, SOLUTION INTRAVENOUS at 15:14

## 2025-03-17 RX ADMIN — IOPAMIDOL 100 ML: 755 INJECTION, SOLUTION INTRAVENOUS at 18:36

## 2025-03-17 RX ADMIN — SODIUM CHLORIDE, PRESERVATIVE FREE 10 ML: 5 INJECTION INTRAVENOUS at 19:27

## 2025-03-17 RX ADMIN — SODIUM BICARBONATE 50 MEQ: 84 INJECTION, SOLUTION INTRAVENOUS at 15:35

## 2025-03-17 RX ADMIN — PROPOFOL 20 MCG/KG/MIN: 10 INJECTION, EMULSION INTRAVENOUS at 20:32

## 2025-03-17 RX ADMIN — Medication 50 MCG/HR: at 19:42

## 2025-03-17 RX ADMIN — ACETAMINOPHEN 650 MG: 325 TABLET ORAL at 22:17

## 2025-03-17 RX ADMIN — SODIUM CHLORIDE 3.4 UNITS/HR: 9 INJECTION, SOLUTION INTRAVENOUS at 17:06

## 2025-03-17 RX ADMIN — SODIUM CHLORIDE 500 ML: 900 INJECTION, SOLUTION INTRAVENOUS at 15:37

## 2025-03-17 RX ADMIN — Medication 20 MCG/MIN: at 15:14

## 2025-03-17 RX ADMIN — AMIODARONE HYDROCHLORIDE 150 MG: 1.5 INJECTION, SOLUTION INTRAVENOUS at 17:04

## 2025-03-17 ASSESSMENT — LIFESTYLE VARIABLES
HOW OFTEN DO YOU HAVE A DRINK CONTAINING ALCOHOL: PATIENT UNABLE TO ANSWER
HOW MANY STANDARD DRINKS CONTAINING ALCOHOL DO YOU HAVE ON A TYPICAL DAY: PATIENT DECLINED

## 2025-03-17 ASSESSMENT — PULMONARY FUNCTION TESTS
PIF_VALUE: 31
PIF_VALUE: 32
PIF_VALUE: 33

## 2025-03-17 ASSESSMENT — PAIN SCALES - GENERAL
PAINLEVEL_OUTOF10: 0
PAINLEVEL_OUTOF10: 0

## 2025-03-17 NOTE — PROGRESS NOTES
Shift report received from Helen LEVI     Shift assessment complete, see flow sheet     1933: Patient hypertensive, current /130, NP informed     2028: Fentanyl drip stopped    2032: Propofol drip started    2033: Cardene drip started    2120: Lm drip stopped    2220: Lactic 4.1, NP informed     2217: Temp 100.9, tylenol administered, ice packs placed on patient. NP informed     2236: Insulin drip stopped, verbal order from NP    2307: Urine output discussed with NP     2315: Patient placed on cooling blanket     0203: Urine output discussed with NP     0230: Cooling blanket removed    0326: CO2 15, critical troponin, critical lactic, NP informed     0411: Spoke with Krupa Delcid heparin infusion to start at 11units/kg/hr and given MAR bolus dose     Shift report given to Lashon LEVI

## 2025-03-17 NOTE — TELEPHONE ENCOUNTER
Spoke with Elsa,  with [facility].   Advised.   Confirmed Keegan has been pended for refill and test strips were sent in on 3/14.  Refill pended for amlodipine as pt will be restarting.   Elsa will update patient wife to restart bp meds and keep log and let pcp know trends.     Elsa confirmed pt is able to swallow pills whole.      PCP: Maurilio Michaels MD    Last appt: 2/26/2025   Future Appointments   Date Time Provider Department Center   3/26/2025 12:30 PM [facility] CT 1 MRMRCT [facility]   4/28/2025  1:00 PM Maribel Darden, APRN - CNP NEUMRSPBPBB BS AMB       Requested Prescriptions     Pending Prescriptions Disp Refills    amLODIPine (NORVASC) 10 MG tablet 90 tablet 1     Sig: Take 1 tablet by mouth daily

## 2025-03-17 NOTE — ED NOTES
SBAR telephone report given to Helen CCU RN. Opportunity for questions and concerns have been provided.

## 2025-03-17 NOTE — TELEPHONE ENCOUNTER
Elsa Rn case manager, [facility]    Phone [phone]      States:  BP trending up over 3 weeks,  170/88, taken 1 hour after taking medications  Hosp discontinued amlodipine, it is still available in home, but pt not taking.  Advise if pt should take.  Needs test strips and med refill needed.  Please see med refill encounter.      Please call Elsa to discuss bp and recommendation.      Appointments:  Last seen at [facility]:   2/26/2025   Future appointment [facility]:  Visit date not found         .            Caller confirms readback of documented phone/fax number(s) as correct.

## 2025-03-17 NOTE — TELEPHONE ENCOUNTER
PCP: Maurilio Michaels MD    Last appt: 2/26/2025  Future Appointments   Date Time Provider Department Center   3/26/2025 12:30 PM [facility] CT 1 MR[facility]CT [facility]   4/28/2025  1:00 PM Maribel Darden, APRN - CNP NEUMRSPBPBB BS [facility]       Requested Prescriptions     Pending Prescriptions Disp Refills    levETIRAcetam (KEPPRA) 500 MG tablet 120 tablet 11     Sig: Take 2 tablets by mouth 2 times daily May crush and give through peg, patient is able to swallow crushed pills

## 2025-03-17 NOTE — ED PROVIDER NOTES
[facility] CRITICAL CARE  EMERGENCY DEPARTMENT ENCOUNTER       Pt Name: Brii Engle  MRN: [id number]  Birthdate 1955  Date of evaluation: 3/17/2025  Provider: Jermaine Tubbs MD   PCP: Maurilio Michaels MD  Note Started: 3:20 PM EDT 3/17/25     CHIEF COMPLAINT       Chief Complaint   Patient presents with    Cardiac Arrest     Pt BIB EMS coming from home. Per EMS initial call was for respiratory distress. Per EMS pt coded upon arrival. ROSC obtained at 14:50 pm. Pt tubed with 7.0 ET tub 26 at the teeth.         HISTORY OF PRESENT ILLNESS: 1 or more elements      History From: EMS, History limited by: unresponsive     Brii Engle is a 69 y.o. male with a past medical history of diabetic neuropathy, atrial fibrillation, CKD, hyperlipidemia and possibly CHF presents to the emergency department as a cardiac arrest.    Patient reportedly called EMS for difficulty breathing.  Upon BLS arrival patient experienced cardiac arrest.  5 minutes without CPR.  Patient was intubated by EMS with 7/26 at the teeth.  Was given epinephrine x 3 as well as atropine x 2 with ROSC x 2.    Upon arrival to ED, CPR in progress, with pulse check patient noted to have ROSC.       Please See MDM for Additional Details of the HPI/PMH  Nursing Notes were all reviewed and agreed with or any disagreements were addressed in the HPI.     REVIEW OF SYSTEMS        Positives and Pertinent negatives as per HPI.    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   Eyes:      Comments: Fixed, 3 mm   Cardiovascular:      Rate and Rhythm: Tachycardia present. Rhythm irregular.   Pulmonary:      Effort: Pulmonary effort is normal.      Comments: Bilateral ventilated breath sounds  Abdominal:      General: Abdomen is flat. There is no distension.   Skin:     Coloration: Skin is not pale.   Neurological:      Comments: GCS 3 T   Psychiatric:         Mood and Affect: Mood normal.          DIAGNOSTIC RESULTS   LABS:     Recent Results (from the past 24 hours)   POCT Glucose    Collection Time: 03/17/25  2:53 PM   Result Value Ref Range    POC Glucose 160 (H) 65 - 117 mg/dL    Performed by: Buddy Coronel [facility]    EKG 12 Lead    Collection Time: 03/17/25  2:55 PM   Result Value Ref Range    Ventricular Rate 122 BPM    QRS Duration 106 ms    Q-T Interval 318 ms    QTc Calculation (Bazett) 453 ms    R Axis 38 degrees    T Axis 223 degrees    Diagnosis       Atrial fibrillation with rapid ventricular response  Cannot rule out Anterior infarct , age undetermined  ST & T wave abnormality, consider inferolateral ischemia  Abnormal ECG  When compared with ECG of 18-FEB-2025 23:04,  Vent. rate has increased BY  52 BPM  Non-specific change in ST segment in Anterior leads  Inverted T waves have replaced nonspecific T wave abnormality in Inferior   leads  T wave inversion less evident in Anterior leads  T wave inversion now evident in Lateral leads     CBC with Auto Differential    Collection Time: 03/17/25  3:02 PM   Result Value Ref Range    WBC 4.9 4.1 - 11.1 K/uL    RBC 4.55 4.10 - 5.70 M/uL    Hemoglobin 12.2 12.1 - 17.0 g/dL    Hematocrit 43.9 36.6 - 50.3 %    MCV 96.5 80.0 - 99.0 FL    MCH 26.8 26.0 - 34.0 PG    MCHC 27.8 (L) 30.0 - 36.5 g/dL    RDW 15.6 (H) 11.5 - 14.5 %    Platelets 170 150 - 400 K/uL    MPV 12.9 8.9 - 12.9 FL    Nucleated RBCs 0.0 0  WBC    nRBC 0.00 0.00 - 0.01 K/uL    Neutrophils % 28.1 (L) 32.0 - 75.0 %     IV CONTRAST Additional Contrast? None   Final Result   No pulmonary embolism. No thoracic aortic aneurysm.   Nonspecific diffuse nodular predominantly groundglass lung opacities may suggest   ARDS versus pneumonitis including hypersensitivity pneumonitis. Clinical   correlation advised.   Endotracheal tube and orogastric/nasogastric tube, and right femoral access   central venous catheter in place.      No abdominal aortic dissection/aneurysm. No acute intra-abdominal abnormality.   Large fecal load may suggest constipation.            Electronically signed by NICO CAMEJO      CT HEAD WO CONTRAST   Final Result   Chronic microvascular ischemic changes. No acute intracranial abnormality is   seen.            Electronically signed by Virgie Marcum      CTA CHEST W WO CONTRAST   Final Result   No pulmonary embolism. No thoracic aortic aneurysm.   Nonspecific diffuse nodular predominantly groundglass lung opacities may suggest   ARDS versus pneumonitis including hypersensitivity pneumonitis. Clinical   correlation advised.   Endotracheal tube and orogastric/nasogastric tube, and right femoral access   central venous catheter in place.      No abdominal aortic dissection/aneurysm. No acute intra-abdominal abnormality.   Large fecal load may suggest constipation.            Electronically signed by NICO CAMEJO      XR CHEST PORTABLE   Final Result      Interstitial edema pattern is favored.         Electronically signed by MONSERRAT FARIAS           PROCEDURES   Unless otherwise noted below, none  Procedures    Procedure Note - CPR Efforts:   8:57 PM EDT  I supervised and directed all CPR efforts.  Procedure Time: 1 minutes    Procedure Note - Central Line Placement:   8:57 PM EDT  Performed by: Jermaine Tubbs MD    Immediately prior to the procedure, the patient was reevaluated and found suitable for the planned procedure and any planned medications.    Area was cleansed with Chlorprep. Prepped

## 2025-03-17 NOTE — H&P
ICU ADMISSION H&P                                                                                                                  [facility]      Chief Complaint: Cardiac arrest    HPI: The patient is a 68 y/o male PMHx of CVA (residual left sided deficit) T2DM, Afib (Eliquis), HFrEF HTN. HLD, carcinoid tumor s/p LLL wedge resection and seizure disorder who we are seeing in consultation at the request of Dr. Tubbs for evaluation of cardiac arrest.     Briefly, patient complained of shortness of breath this morning. Wife called 911. Patient had cardiac arrest, ROSC achieved after 20 minutes. Intubated. ICU consulted for admission.    On my arrival, he is intubated, on vasopressors. Not on sedation, not following commands.    Wife at bedside.     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    History of MRSA infection 08/01/2023    +MRSA in nasal swab    Hypertension     Renal insufficiency     stage 3    Seizures (HCC)     Stroke (HCC) 02/2023    prior to feb., had mini stroke; left side paralysis       Past Surgical History:  Past Surgical History:   Procedure Laterality Date    CERVICAL FUSION Right 10/03/2023    ANTERIOR CERVICAL DISCECTOMY AND FUSION, C3-4, C4-5 performed by Manuel Kelly MD at [facility] MAIN OR    CHEST SURGERY      VATS Video-assisted thoracic surgery    PEG TUBE REMOVAL  2024    replaced       Social History:   reports that he has quit smoking. His smoking use included cigarettes. He started smoking about 9 years ago. He has a 1 pack-year smoking history. He has never used smokeless tobacco. He reports that he does not  03/17/2025 16  6 - 20 MG/DL Final    Creatinine 03/17/2025 1.95 (H)  0.70 - 1.30 MG/DL Final    BUN/Creatinine Ratio 03/17/2025 8 (L)  12 - 20   Final    Est, Glom Filt Rate 03/17/2025 37 (L)  >60 ml/min/1.73m2 Final    Calcium 03/17/2025 8.9  8.5 - 10.1 MG/DL Final    Total Bilirubin 03/17/2025 0.4  0.2 - 1.0 MG/DL Final    ALT 03/17/2025 131 (H)  12 - 78 U/L Final    AST 03/17/2025 169 (H)  15 - 37 U/L Final    Alk Phosphatase 03/17/2025 148 (H)  45 - 117 U/L Final    Total Protein 03/17/2025 7.1  6.4 - 8.2 g/dL Final    Albumin 03/17/2025 2.8 (L)  3.5 - 5.0 g/dL Final    Globulin 03/17/2025 4.3 (H)  2.0 - 4.0 g/dL Final    Albumin/Globulin Ratio 03/17/2025 0.7 (L)  1.1 - 2.2   Final    Magnesium 03/17/2025 2.5 (H)  1.6 - 2.4 mg/dL Final    Troponin, High Sensitivity 03/17/2025 25  0 - 76 ng/L Final    NT Pro-BNP 03/17/2025 10,446 (H)  <125 PG/ML Final    POC Glucose 03/17/2025 160 (H)  65 - 117 mg/dL Final    Performed by: 03/17/2025 Buddy Coronel MSN   Final    Ethanol Lvl 03/17/2025 <10  <10 MG/DL Final    Lipase 03/17/2025 25  13 - 75 U/L Final    pH, Arterial 03/17/2025 6.95 (LL)  7.35 - 7.45   Final    pCO2, Arterial 03/17/2025 38  35 - 45 mmHg Final    pO2, Arterial 03/17/2025 464 (H)  80 - 100 mmHg Final    POC O2 SAT 03/17/2025 100 (H)  92 - 97 % Final    HCO3, Arterial 03/17/2025 8 (L)  22 - 26 mmol/L Final    Base deficit, arterial blood 03/17/2025 23.6  mmol/L Final    O2 Method 03/17/2025 VENT    Final    FIO2 Arterial 03/17/2025 100  % Final    Mode 03/17/2025 ASSIST CONTROL    Final    POC TIDAL VOLUME 03/17/2025 500.0    Final    Set Rate, POC 03/17/2025 24    Final    POC PEEP/CPA 03/17/2025 5.0    Final    Source 03/17/2025 ARTERIAL    Final    Site 03/17/2025 RIGHT RADIAL    Final    Arsh Test 03/17/2025 NOT APPLICABLE    Final    Critical Value Read Back 03/17/2025 Called to edwina gary md on 03/17/2025 at 15:30   Final         Imaging:    XR CHEST PORTABLE    (Results Pending)   CT HEAD WO  CONTRAST    (Results Pending)   CTA CHEST W WO CONTRAST    (Results Pending)   CT ABDOMEN PELVIS W IV CONTRAST Additional Contrast? None    (Results Pending)       Assessment :    The patient is a 68 y/o male PMHx of CVA (residual left sided deficit) T2DM, Afib (Eliquis), HFrEF HTN. HLD, carcinoid tumor s/p LLL wedge resection and seizure disorder who we are seeing in consultation at the request of Dr. Tubbs for evaluation of cardiac arrest.     Cardiac arrest - in the setting of known chronic systolic heart failure. Initial troponin negative. EKG with likely LBBB but a.fib noted as well. Trend troponin. ECHO in 2/2025 showed EF 20%. Consult cardiology.     A.fib with RVR - start amiodarone gtt. On Eliquis at home. Hold anticoagulation until CT head is done.     Cardiogenic shock - Cont. Levophed gtt. With MAP goal 65. Hold beta-blockers.     Acute hypoxic respiratory failure - acute pulmonary edema in the setting of cardiogenic shock.     DKA - start insulin gtt. BMP every 4 hours.     Seizure - Cont. Keppra BID. CT head PENDING.     Poor prognosis.     Wife updated at bedside.     Full code.     Critical care time spent excluding procedures and ANJELICA visit: 60 minutes.     Ludwig Haynes MD., KAIA., FCCM., FCCP., ATSF., DAABIP  Interventional Pulmonology and Critical Care Medicine  [facility] Intensivist  [facility]      Portions of this record may be dictated using voice recognition software. Variances in spelling and vocabulary are possible and unintentional. Some errors may not be recognized or corrected at time of dictation.

## 2025-03-17 NOTE — CONSULTS
[facility]        Consult    NAME: Brii Engle   :  1955   MRN:  [id number]     Date/Time:  3/17/2025 6:19 PM    Patient PCP: Maurilio Michaels MD  ________________________________________________________________________     Assessment:     Somewhat acute shortness of breath  Arrested en route to hospital  Intubated  CPR with return of ROSC  No new documented arrhtyhmia    1. Cardiomyopathy with normal LVEF 2018 and prior stress test negative for ischemia.  Normal EF by echo in 2021.  EF 20-25% on recent hospitalization in February  2. Permanent atrial fibrillation on Eliquis  3. Hypertension.  4. Dyslipidemia.  5. Type 2 diabetes mellitus.  6. Hx of transient ischemic attacks and prior hospitalizations for a ischemic strokes x 2 with seizures and spontaneous right internal carotid dissection.  Recent hospitalization with a acute cerebellar stroke  9. Neuroendocrine Lung tumor status post resection in .   10. Multi degenerative changes of the cervical spine with cord compression.  11. CRI, stage 3.  12. Transient bradycardia during recent hospitalization, resolved with reduction in carvedilol    Cardiologist:  Justin        Plan:     - s/p arrest unclear etiology  - No documented arrhythmia    - No clear chest pain, no acute ST changes on ECG, initial hs troponin 25, to have repeat  - Elevated pBNP  - Afib    - Holding Eliquis, start heparin when able  - Cont levophed  - Ok with temporary amiodarone for rate control  - Holding coreg  - Holding entresto  - Holding amlodipine  - Holding spironolactone  - Holding atorvastatin    Discussed with ICU  Discussed with wife    Dr. Anderson to see tomorrow       [x]       High complexity decision making was performed in this patient at high risk for decompensation with multiple organ involvement.    We discussed the expected course, resolution and complications of the diagnoses in detail.  Medication risk, benefits,  Eliquis          03. Cerebral infarction, unspecified - I63.9   Recent cerebellar stroke      Continue current therapy.  He has follow up with Neurology        04. Essential (primary) hypertension - I10   Controlled at last check      Continue current therapy            IMPRESSION AND PLAN:  Hypertension plan of care documented - Yes  Care of patient's cardiovascular condition(s) is logintudinal.    ORDERS:  1 Return office visit with Milton Anderson MD in 3 Months.        FINAL MEDICATION LIST  Medication Sig Desc Non-VCS   acetaminophen 325 mg tablet take 2 Tablet by oral route  every 4 hours as needed Y   albuterol sulfate HFA 90 mcg/actuation aerosol inhaler inhale 2 puff by inhalation route  every 4 - 6 hours as needed Y   amlodipine 10 mg Tab Take 1 tablet by mouth once a day N   atorvastatin 80 mg tablet take 1 tablet by oral route  every day N   Coreg 3.125 mg tablet take 1 tablet by oral route 2 times every day with food Y   Eliquis 5 mg tablet take 1 tablet by oral route 2 times every day N   Entresto 49 mg-51 mg tablet take 1 tablet by oral route 2 times every day N   glimepiride 4 mg tablet 1 po bid Y   Keppra 500 mg tablet take 1 tablet by oral route 2 times every day Y   melatonin 3 mg capsule take 2 capsule by oral route  every bedtime Y   metformin 500 mg tablet take 2 tablet by oral route 2 times every day with morning and evening meals Y   nitroglycerin 0.4 mg sublingual tablet place 1 tablet by sublingual route at the 1st sign of attack; may repeat every 5 min until relief; if pain persists after 3 tablets in 15 min, prompt medical attention is recommended N   omeprazole 20 mg tablet,delayed release take 1 tablet by oral route  every day Y   sertraline 25 mg tablet take 1 tablet by oral route  every day Y   spironolactone 25 mg tablet take 1 tablet by oral route  every day N         Amauri Garcia MD

## 2025-03-17 NOTE — PROGRESS NOTES
1745: Transfer report received from GURMEET Mcduffie. Pt being transferred from ED.     1850: Pt arrived on unit from ED. Pt non interactive, unresponsive to voice and physical stimulation. Pt intubated and not sedated. Pt has ETT/NGT. Vent settings: AC/VC, FiO2 60%, R 24,  and PEEP 5. NGT present in right nare to low intermittent suction. Pt has temp sensing schrader. Pt has CVC right femoral triple lumen, left tibia IO and left EJ in place. Pt Afib with RVR. Amiodarone infusion started at 1 mg/min. Pt on insulin infusion.     1900: Bedside shift report given to GURMEET Moreno (oncoming nurse).

## 2025-03-17 NOTE — TELEPHONE ENCOUNTER
Caller requests Refill of:  levETIRAcetam (KEPPRA) 500 MG tablet    Please send to:    [facility] [address] [place] [address] - [address] - P [phone] - F [phone] [address] [facility] [facility] [address]  Phone: [phone] Fax: [phone]         Visit / Appointment History:  Future Appointment at [facility]:  Visit date not found   Last Appointment With PCP:  2/26/2025       Caller confirmed instructions and dosages as correct.    Caller was advised that Meds will be refilled as soon as possible, however there can be a 48-72 business hour turn around on refill requests.

## 2025-03-18 LAB
ALBUMIN SERPL-MCNC: 2.8 G/DL (ref 3.5–5)
ALBUMIN/GLOB SERPL: 0.7 (ref 1.1–2.2)
ALP SERPL-CCNC: 144 U/L (ref 45–117)
ALT SERPL-CCNC: 131 U/L (ref 12–78)
ANION GAP SERPL CALC-SCNC: 14 MMOL/L (ref 2–12)
ANION GAP SERPL CALC-SCNC: 15 MMOL/L (ref 2–12)
ANION GAP SERPL CALC-SCNC: 15 MMOL/L (ref 2–12)
ANION GAP SERPL CALC-SCNC: 16 MMOL/L (ref 2–12)
APTT PPP: 122.3 SEC (ref 22.1–31)
APTT PPP: 31.2 SEC (ref 22.1–31)
APTT PPP: >130 SEC (ref 22.1–31)
ARTERIAL PATENCY WRIST A: NO
AST SERPL-CCNC: 329 U/L (ref 15–37)
BASE DEFICIT BLDA-SCNC: 3.9 MMOL/L
BASOPHILS # BLD: 0.04 K/UL (ref 0–0.1)
BASOPHILS NFR BLD: 0.3 % (ref 0–1)
BDY SITE: ABNORMAL
BILIRUB DIRECT SERPL-MCNC: 0.2 MG/DL (ref 0–0.2)
BILIRUB SERPL-MCNC: 0.4 MG/DL (ref 0.2–1)
BUN SERPL-MCNC: 34 MG/DL (ref 6–20)
BUN SERPL-MCNC: 39 MG/DL (ref 6–20)
BUN SERPL-MCNC: 45 MG/DL (ref 6–20)
BUN SERPL-MCNC: 45 MG/DL (ref 6–20)
BUN/CREAT SERPL: 12 (ref 12–20)
BUN/CREAT SERPL: 13 (ref 12–20)
CALCIUM SERPL-MCNC: 6.6 MG/DL (ref 8.5–10.1)
CALCIUM SERPL-MCNC: 6.8 MG/DL (ref 8.5–10.1)
CALCIUM SERPL-MCNC: 7.5 MG/DL (ref 8.5–10.1)
CALCIUM SERPL-MCNC: 7.7 MG/DL (ref 8.5–10.1)
CHLORIDE SERPL-SCNC: 105 MMOL/L (ref 97–108)
CHLORIDE SERPL-SCNC: 106 MMOL/L (ref 97–108)
CHLORIDE SERPL-SCNC: 112 MMOL/L (ref 97–108)
CHLORIDE SERPL-SCNC: 114 MMOL/L (ref 97–108)
CO2 SERPL-SCNC: 15 MMOL/L (ref 21–32)
CO2 SERPL-SCNC: 16 MMOL/L (ref 21–32)
CO2 SERPL-SCNC: 17 MMOL/L (ref 21–32)
CO2 SERPL-SCNC: 17 MMOL/L (ref 21–32)
CREAT SERPL-MCNC: 2.75 MG/DL (ref 0.7–1.3)
CREAT SERPL-MCNC: 3.24 MG/DL (ref 0.7–1.3)
CREAT SERPL-MCNC: 3.49 MG/DL (ref 0.7–1.3)
CREAT SERPL-MCNC: 3.63 MG/DL (ref 0.7–1.3)
DIFFERENTIAL METHOD BLD: ABNORMAL
EKG DIAGNOSIS: NORMAL
EKG Q-T INTERVAL: 318 MS
EKG QRS DURATION: 106 MS
EKG QTC CALCULATION (BAZETT): 453 MS
EKG R AXIS: 38 DEGREES
EKG T AXIS: 223 DEGREES
EKG VENTRICULAR RATE: 122 BPM
EOSINOPHIL # BLD: 0.01 K/UL (ref 0–0.4)
EOSINOPHIL NFR BLD: 0.1 % (ref 0–7)
ERYTHROCYTE [DISTWIDTH] IN BLOOD BY AUTOMATED COUNT: 15.9 % (ref 11.5–14.5)
ERYTHROCYTE [DISTWIDTH] IN BLOOD BY AUTOMATED COUNT: 16 % (ref 11.5–14.5)
GLOBULIN SER CALC-MCNC: 3.9 G/DL (ref 2–4)
GLUCOSE BLD STRIP.AUTO-MCNC: 112 MG/DL (ref 65–117)
GLUCOSE BLD STRIP.AUTO-MCNC: 132 MG/DL (ref 65–117)
GLUCOSE BLD STRIP.AUTO-MCNC: 141 MG/DL (ref 65–117)
GLUCOSE BLD STRIP.AUTO-MCNC: 176 MG/DL (ref 65–117)
GLUCOSE BLD STRIP.AUTO-MCNC: 65 MG/DL (ref 65–117)
GLUCOSE BLD STRIP.AUTO-MCNC: 66 MG/DL (ref 65–117)
GLUCOSE BLD STRIP.AUTO-MCNC: 67 MG/DL (ref 65–117)
GLUCOSE BLD STRIP.AUTO-MCNC: 75 MG/DL (ref 65–117)
GLUCOSE BLD STRIP.AUTO-MCNC: 79 MG/DL (ref 65–117)
GLUCOSE BLD STRIP.AUTO-MCNC: 79 MG/DL (ref 65–117)
GLUCOSE BLD STRIP.AUTO-MCNC: 85 MG/DL (ref 65–117)
GLUCOSE BLD STRIP.AUTO-MCNC: 98 MG/DL (ref 65–117)
GLUCOSE SERPL-MCNC: 107 MG/DL (ref 65–100)
GLUCOSE SERPL-MCNC: 151 MG/DL (ref 65–100)
GLUCOSE SERPL-MCNC: 156 MG/DL (ref 65–100)
GLUCOSE SERPL-MCNC: 90 MG/DL (ref 65–100)
HCO3 BLDA-SCNC: 18 MMOL/L (ref 22–26)
HCT VFR BLD AUTO: 33 % (ref 36.6–50.3)
HCT VFR BLD AUTO: 41.8 % (ref 36.6–50.3)
HGB BLD-MCNC: 11.2 G/DL (ref 12.1–17)
HGB BLD-MCNC: 13.3 G/DL (ref 12.1–17)
IMM GRANULOCYTES # BLD AUTO: 0.09 K/UL (ref 0–0.04)
IMM GRANULOCYTES NFR BLD AUTO: 0.7 % (ref 0–0.5)
INR PPP: 1.4 (ref 0.9–1.1)
LACTATE SERPL-SCNC: 4.3 MMOL/L (ref 0.4–2)
LACTATE SERPL-SCNC: 5.6 MMOL/L (ref 0.4–2)
LACTATE SERPL-SCNC: 6.8 MMOL/L (ref 0.4–2)
LYMPHOCYTES # BLD: 2.01 K/UL (ref 0.8–3.5)
LYMPHOCYTES NFR BLD: 16.7 % (ref 12–49)
MAGNESIUM SERPL-MCNC: 1.6 MG/DL (ref 1.6–2.4)
MAGNESIUM SERPL-MCNC: 1.7 MG/DL (ref 1.6–2.4)
MAGNESIUM SERPL-MCNC: 1.8 MG/DL (ref 1.6–2.4)
MAGNESIUM SERPL-MCNC: 2.4 MG/DL (ref 1.6–2.4)
MCH RBC QN AUTO: 26.5 PG (ref 26–34)
MCH RBC QN AUTO: 27.3 PG (ref 26–34)
MCHC RBC AUTO-ENTMCNC: 31.8 G/DL (ref 30–36.5)
MCHC RBC AUTO-ENTMCNC: 33.9 G/DL (ref 30–36.5)
MCV RBC AUTO: 80.3 FL (ref 80–99)
MCV RBC AUTO: 83.4 FL (ref 80–99)
MONOCYTES # BLD: 0.73 K/UL (ref 0–1)
MONOCYTES NFR BLD: 6.1 % (ref 5–13)
NEUTS SEG # BLD: 9.15 K/UL (ref 1.8–8)
NEUTS SEG NFR BLD: 76.1 % (ref 32–75)
NRBC # BLD: 0 K/UL (ref 0–0.01)
NRBC # BLD: 0.02 K/UL (ref 0–0.01)
NRBC BLD-RTO: 0 PER 100 WBC
NRBC BLD-RTO: 0.2 PER 100 WBC
PCO2 BLDA: 25 MMHG (ref 35–45)
PH BLDA: 7.47 (ref 7.35–7.45)
PHOSPHATE SERPL-MCNC: 2.4 MG/DL (ref 2.6–4.7)
PHOSPHATE SERPL-MCNC: 2.7 MG/DL (ref 2.6–4.7)
PHOSPHATE SERPL-MCNC: 3.3 MG/DL (ref 2.6–4.7)
PHOSPHATE SERPL-MCNC: 3.6 MG/DL (ref 2.6–4.7)
PLATELET # BLD AUTO: 140 K/UL (ref 150–400)
PLATELET # BLD AUTO: 187 K/UL (ref 150–400)
PMV BLD AUTO: 11.8 FL (ref 8.9–12.9)
PMV BLD AUTO: 12.2 FL (ref 8.9–12.9)
PO2 BLDA: 191 MMHG (ref 80–100)
POTASSIUM SERPL-SCNC: 3.4 MMOL/L (ref 3.5–5.1)
POTASSIUM SERPL-SCNC: 3.8 MMOL/L (ref 3.5–5.1)
POTASSIUM SERPL-SCNC: 4.2 MMOL/L (ref 3.5–5.1)
POTASSIUM SERPL-SCNC: 4.6 MMOL/L (ref 3.5–5.1)
PROT SERPL-MCNC: 6.7 G/DL (ref 6.4–8.2)
PROTHROMBIN TIME: 15 SEC (ref 9.2–11.2)
RBC # BLD AUTO: 4.11 M/UL (ref 4.1–5.7)
RBC # BLD AUTO: 5.01 M/UL (ref 4.1–5.7)
SAO2 % BLD: 99 % (ref 92–97)
SAO2% DEVICE SAO2% SENSOR NAME: ABNORMAL
SERVICE CMNT-IMP: ABNORMAL
SERVICE CMNT-IMP: NORMAL
SODIUM SERPL-SCNC: 137 MMOL/L (ref 136–145)
SODIUM SERPL-SCNC: 139 MMOL/L (ref 136–145)
SODIUM SERPL-SCNC: 142 MMOL/L (ref 136–145)
SODIUM SERPL-SCNC: 144 MMOL/L (ref 136–145)
THERAPEUTIC RANGE: ABNORMAL SECS (ref 58–77)
TROPONIN I SERPL HS-MCNC: 2460 NG/L (ref 0–76)
TROPONIN I SERPL HS-MCNC: 3092 NG/L (ref 0–76)
TROPONIN I SERPL HS-MCNC: 3436 NG/L (ref 0–76)
TROPONIN I SERPL HS-MCNC: 4598 NG/L (ref 0–76)
UFH PPP CHRO-ACNC: 0.55 IU/ML
WBC # BLD AUTO: 12 K/UL (ref 4.1–11.1)
WBC # BLD AUTO: 12.7 K/UL (ref 4.1–11.1)

## 2025-03-18 PROCEDURE — 6360000002 HC RX W HCPCS: Performed by: HOSPITALIST

## 2025-03-18 PROCEDURE — 85730 THROMBOPLASTIN TIME PARTIAL: CPT

## 2025-03-18 PROCEDURE — 80076 HEPATIC FUNCTION PANEL: CPT

## 2025-03-18 PROCEDURE — 83605 ASSAY OF LACTIC ACID: CPT

## 2025-03-18 PROCEDURE — 85520 HEPARIN ASSAY: CPT

## 2025-03-18 PROCEDURE — 2500000003 HC RX 250 WO HCPCS: Performed by: HOSPITALIST

## 2025-03-18 PROCEDURE — 84484 ASSAY OF TROPONIN QUANT: CPT

## 2025-03-18 PROCEDURE — 2580000003 HC RX 258: Performed by: NURSE PRACTITIONER

## 2025-03-18 PROCEDURE — 83735 ASSAY OF MAGNESIUM: CPT

## 2025-03-18 PROCEDURE — 94003 VENT MGMT INPAT SUBQ DAY: CPT

## 2025-03-18 PROCEDURE — 2580000003 HC RX 258: Performed by: HOSPITALIST

## 2025-03-18 PROCEDURE — 84100 ASSAY OF PHOSPHORUS: CPT

## 2025-03-18 PROCEDURE — 85027 COMPLETE CBC AUTOMATED: CPT

## 2025-03-18 PROCEDURE — 95813 EEG EXTND MNTR 61-119 MIN: CPT

## 2025-03-18 PROCEDURE — 36415 COLL VENOUS BLD VENIPUNCTURE: CPT

## 2025-03-18 PROCEDURE — 6370000000 HC RX 637 (ALT 250 FOR IP): Performed by: INTERNAL MEDICINE

## 2025-03-18 PROCEDURE — 85610 PROTHROMBIN TIME: CPT

## 2025-03-18 PROCEDURE — 85025 COMPLETE CBC W/AUTO DIFF WBC: CPT

## 2025-03-18 PROCEDURE — 2000000000 HC ICU R&B

## 2025-03-18 PROCEDURE — 2500000003 HC RX 250 WO HCPCS: Performed by: NURSE PRACTITIONER

## 2025-03-18 PROCEDURE — 6360000002 HC RX W HCPCS: Performed by: NURSE PRACTITIONER

## 2025-03-18 PROCEDURE — 95813 EEG EXTND MNTR 61-119 MIN: CPT | Performed by: PSYCHIATRY & NEUROLOGY

## 2025-03-18 PROCEDURE — 82803 BLOOD GASES ANY COMBINATION: CPT

## 2025-03-18 PROCEDURE — 2500000003 HC RX 250 WO HCPCS: Performed by: INTERNAL MEDICINE

## 2025-03-18 PROCEDURE — 2580000003 HC RX 258: Performed by: INTERNAL MEDICINE

## 2025-03-18 PROCEDURE — 6360000002 HC RX W HCPCS: Performed by: INTERNAL MEDICINE

## 2025-03-18 PROCEDURE — 80048 BASIC METABOLIC PNL TOTAL CA: CPT

## 2025-03-18 RX ORDER — 0.9 % SODIUM CHLORIDE 0.9 %
500 INTRAVENOUS SOLUTION INTRAVENOUS ONCE
Status: COMPLETED | OUTPATIENT
Start: 2025-03-18 | End: 2025-03-18

## 2025-03-18 RX ORDER — LEVETIRACETAM 500 MG/5ML
1000 INJECTION, SOLUTION, CONCENTRATE INTRAVENOUS EVERY 12 HOURS
Status: DISCONTINUED | OUTPATIENT
Start: 2025-03-18 | End: 2025-03-21 | Stop reason: HOSPADM

## 2025-03-18 RX ORDER — ACETAMINOPHEN 650 MG/1
650 SUPPOSITORY RECTAL EVERY 6 HOURS PRN
Status: DISCONTINUED | OUTPATIENT
Start: 2025-03-18 | End: 2025-03-19 | Stop reason: SDUPTHER

## 2025-03-18 RX ORDER — NOREPINEPHRINE BITARTRATE 0.06 MG/ML
1-100 INJECTION, SOLUTION INTRAVENOUS CONTINUOUS
Status: DISCONTINUED | OUTPATIENT
Start: 2025-03-18 | End: 2025-03-21 | Stop reason: HOSPADM

## 2025-03-18 RX ORDER — HEPARIN SODIUM 10000 [USP'U]/100ML
5-30 INJECTION, SOLUTION INTRAVENOUS CONTINUOUS
Status: DISCONTINUED | OUTPATIENT
Start: 2025-03-18 | End: 2025-03-19

## 2025-03-18 RX ORDER — VANCOMYCIN 2 G/400ML
2000 INJECTION, SOLUTION INTRAVENOUS ONCE
Status: COMPLETED | OUTPATIENT
Start: 2025-03-18 | End: 2025-03-18

## 2025-03-18 RX ORDER — MAGNESIUM SULFATE IN WATER 40 MG/ML
2000 INJECTION, SOLUTION INTRAVENOUS ONCE
Status: COMPLETED | OUTPATIENT
Start: 2025-03-18 | End: 2025-03-18

## 2025-03-18 RX ORDER — HEPARIN SODIUM 1000 [USP'U]/ML
4000 INJECTION, SOLUTION INTRAVENOUS; SUBCUTANEOUS PRN
Status: DISCONTINUED | OUTPATIENT
Start: 2025-03-18 | End: 2025-03-19

## 2025-03-18 RX ORDER — CHLORHEXIDINE GLUCONATE ORAL RINSE 1.2 MG/ML
15 SOLUTION DENTAL 2 TIMES DAILY
Status: DISCONTINUED | OUTPATIENT
Start: 2025-03-18 | End: 2025-03-21 | Stop reason: HOSPADM

## 2025-03-18 RX ORDER — MIDAZOLAM HYDROCHLORIDE 5 MG/5ML
5 INJECTION, SOLUTION INTRAMUSCULAR; INTRAVENOUS ONCE
Status: COMPLETED | OUTPATIENT
Start: 2025-03-18 | End: 2025-03-18

## 2025-03-18 RX ORDER — FENTANYL CITRATE 50 UG/ML
50 INJECTION, SOLUTION INTRAMUSCULAR; INTRAVENOUS
Status: DISCONTINUED | OUTPATIENT
Start: 2025-03-18 | End: 2025-03-21 | Stop reason: HOSPADM

## 2025-03-18 RX ORDER — HEPARIN SODIUM 1000 [USP'U]/ML
4000 INJECTION, SOLUTION INTRAVENOUS; SUBCUTANEOUS ONCE
Status: COMPLETED | OUTPATIENT
Start: 2025-03-18 | End: 2025-03-18

## 2025-03-18 RX ORDER — ACETAMINOPHEN 325 MG/1
650 TABLET ORAL EVERY 6 HOURS PRN
Status: DISCONTINUED | OUTPATIENT
Start: 2025-03-18 | End: 2025-03-19 | Stop reason: SDUPTHER

## 2025-03-18 RX ORDER — HEPARIN SODIUM 1000 [USP'U]/ML
2000 INJECTION, SOLUTION INTRAVENOUS; SUBCUTANEOUS PRN
Status: DISCONTINUED | OUTPATIENT
Start: 2025-03-18 | End: 2025-03-19

## 2025-03-18 RX ADMIN — LEVETIRACETAM 1000 MG: 100 INJECTION INTRAVENOUS at 11:26

## 2025-03-18 RX ADMIN — ACETAMINOPHEN 650 MG: 650 SUPPOSITORY RECTAL at 09:33

## 2025-03-18 RX ADMIN — DEXTROSE 125 ML: 10 SOLUTION INTRAVENOUS at 11:25

## 2025-03-18 RX ADMIN — NOREPINEPHRINE BITARTRATE 5 MCG/MIN: 64 SOLUTION INTRAVENOUS at 15:19

## 2025-03-18 RX ADMIN — AMIODARONE HYDROCHLORIDE 0.5 MG/MIN: 50 INJECTION, SOLUTION INTRAVENOUS at 01:02

## 2025-03-18 RX ADMIN — Medication 1 AMPULE: at 20:26

## 2025-03-18 RX ADMIN — PROPOFOL 40 MCG/KG/MIN: 10 INJECTION, EMULSION INTRAVENOUS at 06:38

## 2025-03-18 RX ADMIN — PIPERACILLIN AND TAZOBACTAM 3375 MG: 3; .375 INJECTION, POWDER, LYOPHILIZED, FOR SOLUTION INTRAVENOUS at 18:02

## 2025-03-18 RX ADMIN — SODIUM BICARBONATE: 84 INJECTION, SOLUTION INTRAVENOUS at 05:26

## 2025-03-18 RX ADMIN — VANCOMYCIN 2000 MG: 2 INJECTION, SOLUTION INTRAVENOUS at 13:49

## 2025-03-18 RX ADMIN — CHLORHEXIDINE GLUCONATE 15 ML: 1.2 RINSE ORAL at 20:25

## 2025-03-18 RX ADMIN — LEVETIRACETAM 1000 MG: 100 INJECTION INTRAVENOUS at 22:53

## 2025-03-18 RX ADMIN — DEXTROSE 125 ML: 10 SOLUTION INTRAVENOUS at 12:48

## 2025-03-18 RX ADMIN — HEPARIN SODIUM 4000 UNITS: 1000 INJECTION INTRAVENOUS; SUBCUTANEOUS at 04:20

## 2025-03-18 RX ADMIN — MAGNESIUM SULFATE HEPTAHYDRATE 2000 MG: 40 INJECTION, SOLUTION INTRAVENOUS at 03:53

## 2025-03-18 RX ADMIN — DEXTROSE 125 ML: 10 SOLUTION INTRAVENOUS at 15:15

## 2025-03-18 RX ADMIN — DEXTROSE: 10 SOLUTION INTRAVENOUS at 15:25

## 2025-03-18 RX ADMIN — PROPOFOL 20 MCG/KG/MIN: 10 INJECTION, EMULSION INTRAVENOUS at 10:57

## 2025-03-18 RX ADMIN — SODIUM CHLORIDE 500 ML: 0.9 INJECTION, SOLUTION INTRAVENOUS at 03:36

## 2025-03-18 RX ADMIN — HEPARIN SODIUM AND DEXTROSE 11 UNITS/KG/HR: 10000; 5 INJECTION INTRAVENOUS at 04:24

## 2025-03-18 RX ADMIN — FAMOTIDINE 20 MG: 10 INJECTION, SOLUTION INTRAVENOUS at 13:34

## 2025-03-18 RX ADMIN — SODIUM CHLORIDE, PRESERVATIVE FREE 10 ML: 5 INJECTION INTRAVENOUS at 11:13

## 2025-03-18 RX ADMIN — SODIUM CHLORIDE, PRESERVATIVE FREE 10 ML: 5 INJECTION INTRAVENOUS at 20:26

## 2025-03-18 RX ADMIN — FENTANYL CITRATE 50 MCG: 50 INJECTION INTRAMUSCULAR; INTRAVENOUS at 06:26

## 2025-03-18 RX ADMIN — PIPERACILLIN AND TAZOBACTAM 4500 MG: 4; .5 INJECTION, POWDER, LYOPHILIZED, FOR SOLUTION INTRAVENOUS at 13:18

## 2025-03-18 RX ADMIN — PROPOFOL 35 MCG/KG/MIN: 10 INJECTION, EMULSION INTRAVENOUS at 01:01

## 2025-03-18 RX ADMIN — SODIUM BICARBONATE: 84 INJECTION, SOLUTION INTRAVENOUS at 18:14

## 2025-03-18 RX ADMIN — MIDAZOLAM 5 MG: 1 INJECTION INTRAMUSCULAR; INTRAVENOUS at 11:35

## 2025-03-18 ASSESSMENT — PAIN SCALES - GENERAL
PAINLEVEL_OUTOF10: 0

## 2025-03-18 ASSESSMENT — PULMONARY FUNCTION TESTS
PIF_VALUE: 21
PIF_VALUE: 30
PIF_VALUE: 30
PIF_VALUE: 37
PIF_VALUE: 30
PIF_VALUE: 30

## 2025-03-18 NOTE — PROGRESS NOTES
Headband: applied  Date/Time: 03/1825 0730  Recorder: recording started  Skin: intact  Highest Seizure Dalton Percentage past hour: 0      General info regarding Seizure Dalton %:  Minimum duration of study is 2 hours. If Seizure Dalton has remained 0% throughout the entire 2-hour duration, communicate with provider to stop the recording.     Seizure Dalton 0-10% - Continue to monitor and complete 2-hour study.  Seizure Dalton 11-89% - Epileptiform activity present. Notify provider for next steps.  Seizure Dalton >/= 90% - Epileptiform activity consistent with Status Epilepticus. Immediately notify provider.     *Patients with Seizure Dalton above 10% that persists may require a study longer than 2 hours. Maximum recording duration is 24 hours. Please update provider with a persistent increase in Seizure Dalton above 10%.

## 2025-03-18 NOTE — PROGRESS NOTES
Spiritual Health History and Assessment/Progress Note  [facility]    Initial Encounter,  , Adjustment to illness, Life Adjustments,      Name: Brii Engle MRN: [id number]    Age: 69 y.o.     Sex: male   Language: English   Methodist: Religious   Cardiac arrest (HCC)     Date: 3/18/2025            Total Time Calculated: 15 min              Spiritual Assessment began in [facility] CRITICAL CARE        Referral/Consult From: Rounding   Encounter Overview/Reason: Initial Encounter  Service Provided For: Family    [place], Belief, Meaning:   Patient is connected with a albina tradition or spiritual practice and has beliefs or practices that help with coping during difficult times  Family/Friends are connected with a albina tradition or spiritual practice and have beliefs or practices that help with coping during difficult times      Importance and Influence:  Patient has spiritual/personal beliefs that influence decisions regarding their health  Family/Friends have spiritual/personal beliefs that influence decisions regarding the patient's health    Community:  Patient feels well-supported. Support system includes: Spouse/Partner, Children, [place] Community, and Extended family  Family/Friends are connected with a spiritual community: and feel well-supported. Support system includes: Spouse/Partner, Children, [place] Community, Friends, and Extended family    Assessment and Plan of Care:     Patient Interventions include: Other: Patient intubated at time of visit, spouse was present. Provided active listening and supportive presence to patient's spouse.   Family/Friends Interventions include: Facilitated expression of thoughts and feelings, Explored spiritual coping/struggle/distress, Affirmed coping skills/support systems, and Facilitated life review and/or legacy    Patient Plan of Care: Other:    Family/Friends Plan of Care: No spiritual needs identified for follow-up and Spiritual Care available upon  further referral    Electronically signed by JOHN Malloy on 3/18/2025 at 11:53 AM

## 2025-03-18 NOTE — PROGRESS NOTES
Pharmacy Antimicrobial Kinetic Dosing    Indication for Antimicrobials: Sepsis     Current Regimen of Each Antimicrobial:  Vancomycin Pharmacy to Dose; Start Date 3/19; Day # 1  Zosyn 4.5g IV load, then 3.375g Q8H; Start Date 3/19; Day # 1    Previous Antimicrobial Therapy:  N/A     Goal Level: Vancomycin random level < 20     Date Dose & Interval Measured (mcg/mL) Predicted AUC 24-48 Predicted AUC 24,ss                          Significant Cultures:   N/A    Labs:  Recent Labs     Units 25  0900 25  0244 25  0232 25  2134 25  1502   CREATININE MG/DL 3.24*  --  2.75* 2.23* 1.95*   BUN MG/DL 39*  --  34* 25* 16   WBC K/uL  --  12.0*  --   --  4.9     Temp (24hrs), Av.5 °F (37.5 °C), Min:97.3 °F (36.3 °C), Max:102.2 °F (39 °C)    Conditions for Dosing Consideration:  RAE    Creatinine Clearance (mL/min): Estimated Creatinine Clearance: 26 mL/min (A) (based on SCr of 3.24 mg/dL (H)).     Impression/Plan:   RAE (baseline SCr 1.1) - will intermittently dose vancomycin for now  Vancomycin 2000mg IV loading dose ordered   Level scheduled for 3/19 91686  Will dose vancomycin 750 x 1 dose if level <20  Antimicrobial stop date 7 days     Pharmacy will follow daily and adjust medications as appropriate for renal function and/or serum levels.    Thank you,  OG RODNEY [facility]

## 2025-03-18 NOTE — INTERDISCIPLINARY ROUNDS
Interdisciplinary team rounds were held 3/18/2025 with the following team members: Physician, Nursing, Dietitian, Pharmacist, , and the CCU Charge RN.    Plan of care discussed. See clinical pathway and/or care plan for interventions and desired outcomes.

## 2025-03-18 NOTE — PROGRESS NOTES
Comprehensive Nutrition Assessment    Type and Reason for Visit:  Initial, Consult    Nutrition Recommendations/Plan:   Initiate TF via NGT:  Start TwoCal HN @ 15mL/h + 75mL flush q 4h (provides 720kcals/30gPro/78gCHO/702mL)   If tolerates trophic TF well, tomorrow would advance rate 10mL q 8h as tolerated to Goal of 45mL/h (provides 2160kcals/90gPro/235gCHO/1206mL)   Monitor need for bowel regimen, large fecal burden noted on imaging      Malnutrition Assessment:  Malnutrition Status:  No malnutrition (03/18/25 1439)    Context:  Acute Illness     Findings of the 6 clinical characteristics of malnutrition:  Energy Intake:  No decrease in energy intake  Weight Loss:  No weight loss     Body Fat Loss:  No body fat loss     Muscle Mass Loss:  No muscle mass loss    Fluid Accumulation:  No fluid accumulation     Strength:  Not Performed    Nutrition Assessment:  Pt admitted with cardiac arrest.  PMH: HTN, CHF, DM, recent previous admission last month.  Chart reviewed, case discussed during CCU rounds.  Pt intubated and sedated with propofol @ 10.1mL/h, which provides 266 kcals daily.  NGT in place, 400mL OP overnight.  Abdomen is distended, CT of abdomen reveals large fecal load.  Pt has had multiple BM's this morning per RN.  He is on an insulin drip, BG 65-98.  LFT's and lactic acid elevated, he is on a bicarb drip.  Per IDR discussion will start trophic TF and monitor tolerance.  Wt is stable compared to last admit.  Last month he reported to have an excellent appetite and no fat/muscle wasting noted.   Wt Readings from Last 5 Encounters:   03/17/25 84.5 kg (186 lb 4.6 oz)   02/22/25 81.6 kg (180 lb)   02/16/24 75.4 kg (166 lb 4.8 oz)   02/15/24 75.4 kg (166 lb 4.8 oz)   02/14/24 75.4 kg (166 lb 4.8 oz)            Nutrition Related Findings:    Meds: pepcid, lispro, MgSO4, zosyn, vancomycin, NaHCO3@150mL/h;   Drips: insulin, propofol.    BM: 3/18   Wound Type: None       Current Nutrition Intake & Therapies:     Average Meal Intake: NPO         Anthropometric Measures:  Height: 188 cm (6' 2\")  Ideal Body Weight (IBW): 190 lbs (86 kg)       Current Body Weight: 84.5 kg (186 lb 4.6 oz), 98 % IBW. Weight Source: Not specified  Current BMI (kg/m2): 23.9                             BMI Categories: Normal Weight (BMI 22.0 to 24.9) age over 65    Estimated Daily Nutrient Needs:  Energy Requirements Based On: Formula  Weight Used for Energy Requirements: Current  Energy (kcal/day): PSU 2550 (MSJ 1691)  Weight Used for Protein Requirements: Current  Protein (g/day): 84-101g (1-1.2gPro/kg)  Method Used for Fluid Requirements: Defer to provider  Fluid (ml/day): per MD    Nutrition Diagnosis:   Inadequate protein-energy intake related to impaired respiratory function as evidenced by NPO or clear liquid status due to medical condition, intubation    Nutrition Interventions:   Food and/or Nutrient Delivery: Start Tube Feeding  Nutrition Education/Counseling: No recommendation at this time  Coordination of Nutrition Care: Continue to monitor while inpatient, Interdisciplinary Rounds       Goals:  Goals: Initiate nutrition support, by next RD assessment, Tolerate nutrition support at goal rate  Type of Goal: New goal  Previous Goal Met: New Goal    Nutrition Monitoring and Evaluation:   Behavioral-Environmental Outcomes: None Identified  Food/Nutrient Intake Outcomes: Enteral Nutrition Intake/Tolerance, IVF Intake  Physical Signs/Symptoms Outcomes: Biochemical Data, Nutrition Focused Physical Findings, Skin, Weight, Constipation, Fluid Status or Edema, Hemodynamic Status    Discharge Planning:    Too soon to determine     Judy Perez RD, [facility]  Contact: ext [phone]

## 2025-03-18 NOTE — PROGRESS NOTES
0700: Bedside shift change report given to Heather RN and Lashon RN (oncoming nurse) by Tiffanie RN (offgoing nurse). Report included the following information Nurse Handoff Report, Intake/Output, and Recent Results.     0800: Assessment completed, patient noted to have rhythmic shaking. Ihsan GURROLA notified, orders received to start Ceribell monitoring.    0930: Rectal Tylenol given for a fever of 102 and per Ihsan GURROLA hyperthermia protocol started.    0950: IDRs held, Orders received to increase Bicarb gtt to 150 mL/hr, restart sedation and give an additional 5mg Versed for tremors during cooling.    1020: Patient placed on cooling blanket.    1030: MD notified of Lactic of 6.8.     1200: Reassessment completed, see flowsheets.    1530: Dallas GURROLA at bedside, orders received for Levophed gtt for hypotension, hypoglycemia--D10 gtt @100 mL/hr. D/c cooling blanket.    1600: Reassessment completed, see flowsheets.    1630: Amio gtt d/c.    1830: Tube feeds started.    1900: Report given to Tiffanie LEVI by Heather RN and Lashon LEVI

## 2025-03-18 NOTE — CARE COORDINATION
Care Management Initial Assessment       RUR: 22% high  Readmission? Yes - 2/11/2025 to 2/22/2025 seizures  1st IM letter given? Yes - 3/17/2025  1st  letter given: No    CM met with patient - lives with wife - patient requires total care - uses sushila lift for transfers - has hospital bed and w/c for home - uses [facility] - wife administers medications - is open to [facility] prior to admit and wife would like to resume services upon discharge to home- wife plans for patient to return home when medically stable.  Will require BLS transport to home.    Advance Care Planning     General Advance Care Planning (ACP) Conversation    Date of Conversation: 3/18/2025  Conducted with: Patient with Decision Making Capacity and Legal next of kin  Other persons present: Spouse Clarisa    Healthcare Decision Maker:   Primary Decision Maker (Active): Walton,Karen - Spouse - [phone]    Content/Action Overview:  Patient is intubated and unable to particpate in ACP conversation at this time.   Length of Voluntary ACP Conversation in minutes:  <16 minutes (Non-Billable)    JOE NIXON  [phone]              03/18/25 1417   Service Assessment   Patient Orientation Sedated;Unresponsive   Cognition Other (see comment)  (intubated/unresponsive)   History Provided By Spouse   Primary Caregiver Spouse   Support Systems Spouse/Significant Other;Children;Family Members   Patient's Healthcare Decision Maker is: Legal Next of Kin   PCP Verified by CM Yes   Last Visit to PCP Within last 3 months   Prior Functional Level Assistance with the following:;Bathing;Dressing;Toileting;Feeding;Cooking;Housework;Shopping;Mobility   Current Functional Level Assistance with the following:;Bathing;Dressing;Toileting;Feeding;Cooking;Housework;Shopping;Mobility   Can patient return to prior living arrangement Unknown at present   Ability to make needs known: Unable   Family able to assist with home care needs: Yes    Would you like for me to discuss the discharge plan with any other family members/significant others, and if so, who? Yes  (Clarisa Engle spouse  [phone])   Financial Resources Medicare  (has prescription coverage - declined screening for secondary coverage at this time)   Social/Functional History   Lives With Spouse   Type of Home House   Home Layout One level  (5 steps to enter)   Home Equipment Mechanical lift;Hospital bed;Wheelchair - Manual;Walker - Rolling   Discharge Planning   Current Services Prior To Admission Home Care   Patient expects to be discharged to: House        03/18/25 2153   Readmission Assessment   Number of Days since last admission? 8-30 days   Previous Disposition Home with Home Health   Who is being Interviewed Caregiver   What was the patient's/caregiver's perception as to why they think they needed to return back to the hospital? Other (Comment)  (medical condition)   Did you visit your Primary Care Physician after you left the hospital, before you returned this time? Yes   Did you see a specialist, such as Cardiac, Pulmonary, Orthopedic Physician, etc. after you left the hospital? No   Who advised the patient to return to the hospital? Caregiver   Does the patient report anything that got in the way of taking their medications? No   In our efforts to provide the best possible care to you and others like you, can you think of anything that we could have done to help you after you left the hospital the first time, so that you might not have needed to return so soon? Identify patient's health literacy needs;Other (Comment)  (Wife expresses no concerns from last hospitalization)

## 2025-03-18 NOTE — PROGRESS NOTES
Palliative Medicine  Per Dr. Juarez: Brii Engle is a 69 y.o. male with a past medical history of CVA, cancer, who was admitted on 2025 from home with a diagnosis of status epilepticus, AHRF, Afib with RVR, RAE, lactic acidosis. He was treated for seizure with keppra. He was intubated. He was treated for afib with RVR with diltiazem. He become hypotensive and diltiazem was discontinued.      Code Status: Full Code     [facility]/17/2025    12:49 PM   Demographics   Marital Status    No AMD so spouse, Clarisa is primary HCDM and 3 daughters are secondary HCDM     Patient / Family Encounter Documentation     Participants (names): Brii Engle, spouseClarisa, Dr. Champagne, Rita Lopez, LCSW     Narrative:   --Palliative SW reviewed chart, checked in with assigned nurses Heather/Lashon, joined Dr. Champagne as he gave update to spouse   at bedside.  --Patient was unresponsive, intubated, sedated, in no apparent distress.  --Spouse did not change her facial expression when Dr. Champagne recommended DNR for patient.  --LCSW stayed with her to provide support and further assess her understanding and coping with the news that patient will   likely not make a meaningful recovery and that if his heart should stop again that his doctor is advising that he be allowed  to pass peacefully rather than to perform CPR.  --She expressed understanding and stated that she wants to discuss his condition with her children and other family members  and will probably have a decision for DNR when she comes tomorrow around 11a.  --She remembered this writer from his previous admission, and accepted the offer for our team to follow along and to meet with her tomorrow to further discuss code status and GOC.  --Spouse shared that he had \"had a good day yesterday\" was laughing and joking with his HH nurse and ambulating with his legs in his wheel chair.  --She reported that after his nap he became sob so she  called 911.  --LCSW affirmed that at this time, Clarisa is patient's voice/HCDM and asked her if patient would be okay with being dependent   on machines and possibly living in SNF LTC.  --She stated that he has always said he wants to live, but they have not discussed if he would want to be on life-support specifically  but he has always said he wants to be home, not in a SNF.    Psychosocial Issues Identified/ Resilience Factors: Patient is  to wife Clarisa and [place], [place]. They have 3 daughters, Ruth Ann, Linda, Eunice. They also have grandchildren. Patient has been paralyzed on his left side since a CVA. 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 He is described by his wife as a man of few words, who enjoys being outdoors hunting, fishing, watching car racing, and sitting on his porch. His large family is present often visiting in the home and helping with his care. Patient has stated he would not want to go to a nursing home, prefers to be home with his family. They had to call it \"rehab\" to get him to agree to go for skilled SNF stay and then he only stayed 3 days.     Caregiver [place]: Low to moderate  Does the caregiver feel confident administering medication? Not assessed.  Does the caregiver need any help connecting with community resources? HH  Does the caregiver feel confident assisting with activities of daily living? Yes     Goals of Care / Plan: (Please see Dr. Champagne, attending's note.)  Full code/ Full restorative care (Spouse considering DNR, wants to talk with children/family.)  Neurology, cardiology, nephrology expertise is appreciated.  3.   Palliative team will check in tomorrow, follow along for GOC discussion when appropriate.     Thank you for including Palliative Medicine in the care of Mr. Brii Engle.     Rita Lopez, DOLORES  [phone] ([phone])

## 2025-03-18 NOTE — PROGRESS NOTES
Critical Care Progress Note  Kathy Champagne MD          Date of Service:  3/18/2025  NAME:  Brii Engle  :  1955  MRN:  [id number]      Subjective/Hospital course:      3/18: Patient is currently intubated and sedated.  No acute events overnight.       Active Problems Being Managed:     -Witnessed cardiac arrest and route to the hospital.  Unclear rhythm.  About 20 minutes of CPR.  - Acute hypoxemic respiratory failure.  --Acute renal failure.  - Cardiogenic shock.  - Concern for anoxic brain injury.  - Lactic acidosis.  - History of heart failure and moderate mitral regurgitation.  LVEF of 25%.  - Atrial fibrillation.  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  - History of seizure disorder.  - History of hypertension.  - Data diabetes mellitus.  - Hyperlipidemia.    Assessment/Plan:     Witnessed cardiac arrest and route to the hospital.  - About 20 minutes of CPR.  Preceded by respiratory symptoms.  I think cardiac arrest was related to decompensated heart failure and pulmonary edema.  -Continue supportive care.  - Maintain normothermia.  - Consult neurology for neuro prognostication.  - Consult palliative care.    Cardiogenic shock.  - Patient has history of cardiomyopathy with LVEF of 20 to 25% and also has moderate mitral regurgitation.  - Continue supportive care.  - Titrate pressors for goal MAP of more than 65.    Acute hypoxemic respiratory failure:  -Sec to cardiac arrest and pulmonary edema.  -Continue lung protective ventilation with goal Plt pressure <30, driving pressure <15.  -Wean Fio2 for goal sats of 88-92.  -Sedation for RASS goal of 0 to -1.  -ABCDE protocol  -Daily SAT and SBT if criteria met.    Acute renal failure.  -Likely from ATN.  -Closely monitor renal functions and UO.  -Avoid nephrotoxins.    Lactic acidosis.  --Sec to shock.  --Optimize perfusion, maintain MAP >65.  --Serial lactate monitoring.     History of A-fib.  -  heparin (porcine) injection 4,000 Units  4,000 Units IntraVENous PRN    heparin (porcine) injection 2,000 Units  2,000 Units IntraVENous PRN    heparin 25,000 units in dextrose 5% 250 mL (premix) infusion  5-30 Units/kg/hr IntraVENous Continuous    sodium bicarbonate 150 mEq in sterile water 1,000 mL infusion   IntraVENous Continuous    fentaNYL (SUBLIMAZE) injection 50 mcg  50 mcg IntraVENous Q2H PRN    alcohol 62% (NOZIN) nasal  1 ampule  1 ampule Nasal Q12H    chlorhexidine (PERIDEX) 0.12 % solution 15 mL  15 mL Mouth/Throat BID    acetaminophen (TYLENOL) tablet 650 mg  650 mg Oral Q6H PRN    Or    acetaminophen (TYLENOL) suppository 650 mg  650 mg Rectal Q6H PRN    sodium chloride flush 0.9 % injection 5-40 mL  5-40 mL IntraVENous 2 times per day    sodium chloride flush 0.9 % injection 5-40 mL  5-40 mL IntraVENous PRN    0.9 % sodium chloride infusion   IntraVENous PRN    ondansetron (ZOFRAN-ODT) disintegrating tablet 4 mg  4 mg Oral Q8H PRN    Or    ondansetron (ZOFRAN) injection 4 mg  4 mg IntraVENous Q6H PRN    polyethylene glycol (GLYCOLAX) packet 17 g  17 g Oral Daily PRN    acetaminophen (TYLENOL) tablet 650 mg  650 mg Oral Q6H PRN    Or    acetaminophen (TYLENOL) suppository 650 mg  650 mg Rectal Q6H PRN    dextrose 5 % and 0.45 % sodium chloride infusion   IntraVENous Continuous PRN    amiodarone (CORDARONE) 450 mg in dextrose 5 % 250 mL infusion ([id number])  0.5 mg/min IntraVENous Continuous    propofol infusion  5-50 mcg/kg/min IntraVENous Continuous    insulin lispro (HUMALOG,ADMELOG) injection vial 0-8 Units  0-8 Units SubCUTAneous Q4H    glucose chewable tablet 16 g  4 tablet Oral PRN    dextrose bolus 10% 125 mL  125 mL IntraVENous PRN    Or    dextrose bolus 10% 250 mL  250 mL IntraVENous PRN    glucagon injection 1 mg  1 mg SubCUTAneous PRN    dextrose 10 % infusion   IntraVENous Continuous PRN     ______________________________________________________________________  EXPECTED LENGTH OF

## 2025-03-18 NOTE — PROGRESS NOTES
Cardiology Progress Note      3/18/2025 9:27 AM    Admit Date: 3/17/2025          Subjective: Events noted. Remains on vent , unresponsive, in rapid afib          /81   Pulse 93   Temp 100.4 °F (38 °C)   Resp 27   Ht 1.88 m (6' 2\")   Wt 84.5 kg (186 lb 4.6 oz)   SpO2 98%   BMI 23.92 kg/m²   03/16 1901 - 03/18 0700  In: 780 [I.V.:100]  Out: 515 [Urine:115]        Objective:      Physical Exam:  VS as above  Chest coarse BS bilat  Card Irreg irreg no gallop   Neuro unresponsive     Data Review:   Labs:    INR 1.4   Lactic acid 4.3   Trop 4598  Hgb 13.3  BUN 25  Creat 2.2     Telemetry: afib R 120       Assessment:             1. Cardiomyopathy with normal LVEF September 2018 and prior stress test negative for ischemia.  Normal EF by echo in March 2021.  EF 20-25% on recent hospitalization in February  2. Permanent atrial fibrillation with RVR at present   3. Hypertension.  4. Dyslipidemia.  5. Type 2 diabetes mellitus.  6. Hx of transient ischemic attacks and prior hospitalizations for a ischemic strokes x 2 with seizures and spontaneous right internal carotid dissection.  Recent hospitalization with a acute cerebellar stroke  9. Neuroendocrine Lung tumor status post resection in 2015.   10. Multi degenerative changes of the cervical spine with cord compression.  11. CRI, stage 3.  12. Transient bradycardia during recent hospitalization, resolved with reduction in carvedilol  13. S/p cardiac arrest yest ? Cause  14. Post arrest resp failure  15. NSTMI post arrest   16. ? Anoxic brain injury   17. Lactic acidosis          Plan:  Cont supportive Rx. IV amio for rate control. Main issue is neurologic status

## 2025-03-18 NOTE — PROGRESS NOTES
03/18/25 0540   B: Both Spontaneous Awakening and Breathing Trials   Was Patient Receiving Mechanical Ventilation Yes   Safety Screening Spontaneous Breathing Trial (SBT) Lack of inspiratory effort  (per MARY Calhoun no SBT at this time)     No SBT per MARY Calhoun.

## 2025-03-18 NOTE — CONSULTS
Consultation Note    NAME: Brii Engle   :  1955   MRN:  [id number]     Date/Time:  3/18/2025 12:00 PM    I have been asked to see this patient by Dr. Champagne for advice/opinion re: RAE on CKD    Assessment :    Plan:  RAE on CKD stage IIIa    Anion gap positive metabolic acidosis  Lactic acidosis    Cardiac arrest (20 minutes CPR) on route to hospital; SOB prior to ems    Acute hypoxic resp failure    H/o CHF/MR/afib  H/o dm  H/o carcinoid tumor (left lung resection)  H/o cba  H/o sz d/o Creatinine 1.2-1.4 at baseline.  Now up to 3.2. ATN.    On vent, minimal sedation for rigors, worry of anoxic brain injury    guillermina Cardoza    Agree with bicarb gtt    No urgent need for KRT. Will follow closely.      established patient 15, 02-  Performed by Mohamud Dunne MD, Nephrology, [phone]    chronic kidney disease stage 3  I suspect it is due to DM/HTN. Creatinine a little worse over the past 4 years. Workup showed SUMMER OK. Mildly elevated LCR. ELIAS/ANCA negative. Creatinine OK at 1.14. UACR 1002. D 14.. Vitamin D protocol. US showed echogenic kidneys.    It looks like he would benefit from an ACE/ARB. His wife says that he was on lisinopril on the past but is not sure why it was stopped. She thinks he had a reaction.Will hold for now.    benign hypertensive renal disease  At goal. Continue meds.  atrial fibrillation  hyperlipidemia  proteinuria  diabetes mellitus  vitamin D deficiency  It was low and I repleted it.    Subjective:   CHIEF COMPLAINT:  rae    HISTORY OF PRESENT ILLNESS:     Brii is a 69 y.o.   male who has a history of the below. Intubated, not responsive. Wife just left to get food (will talk to her when able). Chart reviewed. Sob on 3/17 and ems called. Had a cardiac arrest on route. Now in icu on vent. Cardoza - anuric. No pressors.    Past Medical History:   Diagnosis Date    Atrial fibrillation (HCC)     Cancer (HCC) 2015    left lung; carcinoid neuroendocrine on  2/22/25   Barbara Tucker MD   sacubitril-valsartan (ENTRESTO) 49-51 MG per tablet Take 1 tablet by mouth 2 times daily  Patient not taking: Reported on 2/26/2025 2/22/25   Barbara Tucker MD   spironolactone (ALDACTONE) 25 MG tablet Take 1 tablet by mouth daily 2/23/25   Barbara Tucker MD   sertraline (ZOLOFT) 25 MG tablet Take 1 tablet by mouth once daily 9/3/24   Maurilio Michaels MD   omeprazole (PRILOSEC) 20 MG delayed release capsule 20mg open capsule to give via peg tube daily 5/31/24   Maurilio Michaels MD   Lancets MISC 1 each by Does not apply route daily PLEASE DISPENSE WHAT INSURANCE COVERS Test once daily. DX: E11.22 3/13/24   Maurilio Michaels MD   glucose monitoring kit 1 kit by Does not apply route daily 3/13/24   Maurilio Michaels MD   atorvastatin (LIPITOR) 80 MG tablet Take 1 tablet by mouth at bedtime 2/16/24   Renata North APRN - NP   metFORMIN (GLUCOPHAGE) 500 MG tablet 2 tablets by PEG Tube route 2 times daily (with meals) 2/16/24 2/26/25  Renata North APRN - NP   melatonin 3 MG TABS tablet 2 tablets by PEG Tube route daily 2/16/24 2/26/25  Renata North APRN - NP   Multiple Vitamins-Minerals (THERAPEUTIC MULTIVITAMIN-MINERALS) tablet 1 tablet by PEG Tube route daily 2/16/24 2/26/25  Renata North APRN - NP   apixaban (ELIQUIS) 5 MG TABS tablet 1 tablet by Per G Tube route 2 times daily 2/16/24 2/26/25  Renata North APRN - NP   acetaminophen (TYLENOL) 325 MG tablet Take 1 tablet by mouth every 6 hours as needed    Provider, MD Gelacio     REVIEW OF SYSTEMS:     [x]  Unable to obtain reliable ROS due to  [] mental status  [] sedated   [x] intubated   [] Total of 12 systems reviewed as follows:  Constitutional: negative fever, negative chills, negative weight loss  Eyes:   negative visual changes  ENT:   negative sore throat, tongue or lip swelling  Respiratory:  negative cough, negative dyspnea  Cards:  negative for chest pain, palpitations, lower extremity edema  GI:   negative  for nausea, vomiting, diarrhea, and abdominal pain  :  negative for frequency, dysuria  Integument:  negative for rash and pruritus  Heme:  negative for easy bruising and gum/nose bleeding  Musculoskel: negative for myalgias,  back pain and muscle weakness  Neuro:  negative for headaches, dizziness, vertigo  Psych:  negative for feelings of anxiety, depression   Travel?: none    Objective:   VITALS:    Vitals:    03/18/25 1138   BP:    Pulse: (!) 104   Resp: 28   Temp:    SpO2: 100%     PHYSICAL EXAM:  Gen:  [x]  WD [x]  WN  [] cachectic []  thin []  obese []  disheveled             [x]  ill apearing  [x]   Critical  []   Chronic    []  No acute distress    HEENT:   [] NC/AT/PERRL    [] pink conjunctivae      [] pale conjunctivae                  PERRL  [] yes  [] no      [] moist mucosa    [] dry mucosa    hearing intact to voice [] yes  [] No                 NECK:   supple [] yes  [] no        masses [] yes  [] No               thyroid  []  non tender  []  tender    RESP:   [x] CTA bilaterally/no wheezing/rhonchi/rales/crackles    [] rhonchi bilaterally - no dullness  [] wheezing   [] rhonchi   [] crackles     use of accessory muscles [] yes [] no    CARD:   []  regular rate and rhythm/No murmurs/rubs/gallops    murmur  [] yes ()  [] no      Rubs  [] yes  [] no       Gallops [] yes  [] no    Rate []  regular  [x]  irregular        carotid bruits  [] Right  []  Left                 LE edema [] yes  [x] no           JVP  []  yes   []  no    ABD:    [x] soft/non distended/non tender/+bowel sounds/no HSM    []  Rigid    tenderness [] yes [] no   Liver enlargement  []  yes []  no                Spleen enlargement  []  yes []  no     distended []  yes [] no     bowel sound  [] hypoactive   [] hyperactive    LYMPH:    Neck []  yes []  no       Axillae []  yes []  no    SKIN:   Rashes []  yes   []  no    Ulcers []  yes   []  no               [] tight to palpitation    skin turgor []  good  [] poor  [] decreased

## 2025-03-18 NOTE — PROCEDURES
PROCEDURE NOTE  Date: 3/17/2025   Name: Brii Engle  YOB: 1955          Arterial Catheter Insertion Procedure Note    Diagnosis: S/p Cardiac Arrest    Indications: Continuous BP monitoring    Location:ICU    Performed by: Annamaria MACHADO    Assistants: RN    Procedure Details:  Performed emergently for continued care of critically ill patient s/p cardiac arrest    Time-Out Process performed.    Under sterile conditions (hand hygiene prior to donning gloves, gown, mask, sterile drape), the skin above the right radial artery was prepped with chlorhexidine. Local anesthesia was infiltrated into the skin and subcutaneous tissues. The Radial Art-line kit was used. A introducer needle was inserted into the Right Radial Artery. A guide wire was easily inserted through the needle. The 20 G arterial catheter was advanced over the guidewire and the guide wire and needle were both removed. The arterial catheter was connected to the pressure tubing for the system. The catheter was flushed and the tracing was adequate. The catheter was sutured, and a sterile dressing was applied.    US Guidance: Yes    Complications: None    Condition: Critical      Annamaria MACHADO, ACNP-[facility] - Critical Care

## 2025-03-19 ENCOUNTER — APPOINTMENT (OUTPATIENT)
Facility: HOSPITAL | Age: 70
End: 2025-03-19
Payer: MEDICARE

## 2025-03-19 PROBLEM — R55 CONVULSIVE SYNCOPE: Status: ACTIVE | Noted: 2025-03-19

## 2025-03-19 PROBLEM — R41.82 ACUTE ALTERATION IN MENTAL STATUS: Status: ACTIVE | Noted: 2025-03-19

## 2025-03-19 LAB
ANION GAP SERPL CALC-SCNC: 15 MMOL/L (ref 2–12)
APTT PPP: 62.4 SEC (ref 22.1–31)
ARTERIAL PATENCY WRIST A: ABNORMAL
BACTERIA SPEC CULT: ABNORMAL
BASE EXCESS BLDA CALC-SCNC: 2.6 MMOL/L
BDY SITE: ABNORMAL
BUN SERPL-MCNC: 46 MG/DL (ref 6–20)
BUN/CREAT SERPL: 11 (ref 12–20)
CALCIUM SERPL-MCNC: 6.8 MG/DL (ref 8.5–10.1)
CHLORIDE SERPL-SCNC: 102 MMOL/L (ref 97–108)
CO2 SERPL-SCNC: 21 MMOL/L (ref 21–32)
CREAT SERPL-MCNC: 4.07 MG/DL (ref 0.7–1.3)
ERYTHROCYTE [DISTWIDTH] IN BLOOD BY AUTOMATED COUNT: 15.9 % (ref 11.5–14.5)
FIO2 ON VENT: 40 %
GAS FLOW.O2 SETTING OXYMISER: 26
GLUCOSE BLD STRIP.AUTO-MCNC: 103 MG/DL (ref 65–117)
GLUCOSE BLD STRIP.AUTO-MCNC: 106 MG/DL (ref 65–117)
GLUCOSE BLD STRIP.AUTO-MCNC: 110 MG/DL (ref 65–117)
GLUCOSE BLD STRIP.AUTO-MCNC: 112 MG/DL (ref 65–117)
GLUCOSE BLD STRIP.AUTO-MCNC: 112 MG/DL (ref 65–117)
GLUCOSE BLD STRIP.AUTO-MCNC: 114 MG/DL (ref 65–117)
GLUCOSE SERPL-MCNC: 110 MG/DL (ref 65–100)
HCO3 BLDA-SCNC: 20 MMOL/L (ref 22–26)
HCT VFR BLD AUTO: 31.7 % (ref 36.6–50.3)
HGB BLD-MCNC: 10.9 G/DL (ref 12.1–17)
LACTATE SERPL-SCNC: 4.9 MMOL/L (ref 0.4–2)
LACTATE SERPL-SCNC: 5.7 MMOL/L (ref 0.4–2)
LACTATE SERPL-SCNC: 6.3 MMOL/L (ref 0.4–2)
MAGNESIUM SERPL-MCNC: 1.7 MG/DL (ref 1.6–2.4)
MCH RBC QN AUTO: 27.2 PG (ref 26–34)
MCHC RBC AUTO-ENTMCNC: 34.4 G/DL (ref 30–36.5)
MCV RBC AUTO: 79.1 FL (ref 80–99)
NRBC # BLD: 0 K/UL (ref 0–0.01)
NRBC BLD-RTO: 0 PER 100 WBC
PCO2 BLDA: 17 MMHG (ref 35–45)
PEEP RESPIRATORY: 5
PH BLDA: 7.68 (ref 7.35–7.45)
PHOSPHATE SERPL-MCNC: 2.6 MG/DL (ref 2.6–4.7)
PLATELET # BLD AUTO: 131 K/UL (ref 150–400)
PMV BLD AUTO: 11.8 FL (ref 8.9–12.9)
PO2 BLDA: 121 MMHG (ref 80–100)
POTASSIUM SERPL-SCNC: 3.4 MMOL/L (ref 3.5–5.1)
RBC # BLD AUTO: 4.01 M/UL (ref 4.1–5.7)
SAO2 % BLD: 99 % (ref 92–97)
SAO2% DEVICE SAO2% SENSOR NAME: ABNORMAL
SERVICE CMNT-IMP: ABNORMAL
SERVICE CMNT-IMP: ABNORMAL
SERVICE CMNT-IMP: NORMAL
SODIUM SERPL-SCNC: 138 MMOL/L (ref 136–145)
SPECIMEN SITE: ABNORMAL
THERAPEUTIC RANGE: ABNORMAL SECS (ref 58–77)
VANCOMYCIN SERPL-MCNC: 24.2 UG/ML
VENTILATION MODE VENT: ABNORMAL
VT SETTING VENT: 550
WBC # BLD AUTO: 11 K/UL (ref 4.1–11.1)

## 2025-03-19 PROCEDURE — 99223 1ST HOSP IP/OBS HIGH 75: CPT

## 2025-03-19 PROCEDURE — 83735 ASSAY OF MAGNESIUM: CPT

## 2025-03-19 PROCEDURE — 6360000002 HC RX W HCPCS: Performed by: HOSPITALIST

## 2025-03-19 PROCEDURE — 2500000003 HC RX 250 WO HCPCS: Performed by: INTERNAL MEDICINE

## 2025-03-19 PROCEDURE — 84100 ASSAY OF PHOSPHORUS: CPT

## 2025-03-19 PROCEDURE — 80202 ASSAY OF VANCOMYCIN: CPT

## 2025-03-19 PROCEDURE — 2580000003 HC RX 258: Performed by: HOSPITALIST

## 2025-03-19 PROCEDURE — 2500000003 HC RX 250 WO HCPCS: Performed by: HOSPITALIST

## 2025-03-19 PROCEDURE — XX20X89 MONITORING OF BRAIN ELECTRICAL ACTIVITY, COMPUTER-AIDED DETECTION AND NOTIFICATION, NEW TECHNOLOGY GROUP 9: ICD-10-PCS | Performed by: PSYCHIATRY & NEUROLOGY

## 2025-03-19 PROCEDURE — 70551 MRI BRAIN STEM W/O DYE: CPT

## 2025-03-19 PROCEDURE — 51702 INSERT TEMP BLADDER CATH: CPT

## 2025-03-19 PROCEDURE — 99222 1ST HOSP IP/OBS MODERATE 55: CPT | Performed by: PSYCHIATRY & NEUROLOGY

## 2025-03-19 PROCEDURE — 6370000000 HC RX 637 (ALT 250 FOR IP): Performed by: HOSPITALIST

## 2025-03-19 PROCEDURE — 36415 COLL VENOUS BLD VENIPUNCTURE: CPT

## 2025-03-19 PROCEDURE — 37799 UNLISTED PX VASCULAR SURGERY: CPT

## 2025-03-19 PROCEDURE — 0DH67UZ INSERTION OF FEEDING DEVICE INTO STOMACH, VIA NATURAL OR ARTIFICIAL OPENING: ICD-10-PCS | Performed by: HOSPITALIST

## 2025-03-19 PROCEDURE — 94003 VENT MGMT INPAT SUBQ DAY: CPT

## 2025-03-19 PROCEDURE — 82962 GLUCOSE BLOOD TEST: CPT

## 2025-03-19 PROCEDURE — 83605 ASSAY OF LACTIC ACID: CPT

## 2025-03-19 PROCEDURE — 2000000000 HC ICU R&B

## 2025-03-19 PROCEDURE — 85027 COMPLETE CBC AUTOMATED: CPT

## 2025-03-19 PROCEDURE — 3E0G76Z INTRODUCTION OF NUTRITIONAL SUBSTANCE INTO UPPER GI, VIA NATURAL OR ARTIFICIAL OPENING: ICD-10-PCS | Performed by: HOSPITALIST

## 2025-03-19 PROCEDURE — 82803 BLOOD GASES ANY COMBINATION: CPT

## 2025-03-19 PROCEDURE — 80048 BASIC METABOLIC PNL TOTAL CA: CPT

## 2025-03-19 PROCEDURE — 85730 THROMBOPLASTIN TIME PARTIAL: CPT

## 2025-03-19 PROCEDURE — 6370000000 HC RX 637 (ALT 250 FOR IP): Performed by: INTERNAL MEDICINE

## 2025-03-19 RX ORDER — FAMOTIDINE 20 MG/1
20 TABLET, FILM COATED ORAL DAILY
Status: DISCONTINUED | OUTPATIENT
Start: 2025-03-19 | End: 2025-03-21 | Stop reason: HOSPADM

## 2025-03-19 RX ADMIN — NOREPINEPHRINE BITARTRATE 15 MCG/MIN: 64 SOLUTION INTRAVENOUS at 13:31

## 2025-03-19 RX ADMIN — LEVETIRACETAM 1000 MG: 100 INJECTION INTRAVENOUS at 22:40

## 2025-03-19 RX ADMIN — CHLORHEXIDINE GLUCONATE 15 ML: 1.2 RINSE ORAL at 08:00

## 2025-03-19 RX ADMIN — PIPERACILLIN AND TAZOBACTAM 3375 MG: 3; .375 INJECTION, POWDER, LYOPHILIZED, FOR SOLUTION INTRAVENOUS at 18:03

## 2025-03-19 RX ADMIN — CHLORHEXIDINE GLUCONATE 15 ML: 1.2 RINSE ORAL at 19:56

## 2025-03-19 RX ADMIN — SODIUM CHLORIDE, PRESERVATIVE FREE 10 ML: 5 INJECTION INTRAVENOUS at 19:56

## 2025-03-19 RX ADMIN — POTASSIUM BICARBONATE 40 MEQ: 782 TABLET, EFFERVESCENT ORAL at 11:29

## 2025-03-19 RX ADMIN — LEVETIRACETAM 1000 MG: 100 INJECTION INTRAVENOUS at 11:22

## 2025-03-19 RX ADMIN — SODIUM BICARBONATE: 84 INJECTION, SOLUTION INTRAVENOUS at 02:04

## 2025-03-19 RX ADMIN — Medication 1 AMPULE: at 19:57

## 2025-03-19 RX ADMIN — PIPERACILLIN AND TAZOBACTAM 3375 MG: 3; .375 INJECTION, POWDER, LYOPHILIZED, FOR SOLUTION INTRAVENOUS at 02:17

## 2025-03-19 RX ADMIN — PIPERACILLIN AND TAZOBACTAM 3375 MG: 3; .375 INJECTION, POWDER, LYOPHILIZED, FOR SOLUTION INTRAVENOUS at 11:21

## 2025-03-19 RX ADMIN — SODIUM CHLORIDE, PRESERVATIVE FREE 10 ML: 5 INJECTION INTRAVENOUS at 11:18

## 2025-03-19 RX ADMIN — FAMOTIDINE 20 MG: 20 TABLET, FILM COATED ORAL at 11:29

## 2025-03-19 RX ADMIN — Medication 1 AMPULE: at 13:32

## 2025-03-19 ASSESSMENT — PAIN SCALES - GENERAL
PAINLEVEL_OUTOF10: 0

## 2025-03-19 ASSESSMENT — PULMONARY FUNCTION TESTS
PIF_VALUE: 25
PIF_VALUE: 29
PIF_VALUE: 26
PIF_VALUE: 26
PIF_VALUE: 25
PIF_VALUE: 28

## 2025-03-19 NOTE — CONSULTS
Palliative Medicine  Patient Name: Brii Engle  YOB: 1955  MRN: [id number]  Age: 69 y.o.  Gender: male    Date of Initial Consult: 3/19/2025  Date of Service: 3/19/2025  Time: 3:40 PM  Provider: JEANNETTE eMza CNP  Hospital Day: 3  Admit Date: 3/17/2025  Referring Provider: Kathy Champagne      Reasons for Consultation:  Goals of Care    HISTORY OF PRESENT ILLNESS (HPI):   Brii Engle is a 69 y.o. male with a past medical history of CVA (residual left sided deficit) T2DM, Afib (Eliquis), HFrEF HTN. HLD, carcinoid tumor s/p LLL wedge resection and seizure disorder, who was admitted on 3/17/2025 from home with complaints of shortness of breath.     Patient had cardiac arrest, ROSC achieved after 20 minutes. Intubated.     Psychosocial: Patient  to spouse Clarisa x 35 years.  They have three adult daughters, Ruth Ann, Linda, Eunice.  He worked several years as a  before he became ill and stopped working.      PALLIATIVE DIAGNOSES:    Cardiac Arrest  Acute respiratory failure - intubated  RAE - Cr. 4.07  DNR  Goals of care  Palliative care encounter      ASSESSMENT AND PLAN:   Palliative team has been asked to meet with Oniel Teresa to address goals of care.  Reviewed medical chart including admit H&P, consultant notes, MAR, and recent labs/imaging.  Mr. Teresa was seen and evaluated, along with Dr. Champagne and DOLORES Salinas. Wife, Clarisa and daughter, Ruth Ann at bedside. Introduced self and role of palliative.   At time of my arrival, he was resting in bed, intubated and being supported by pressors.  He is noted to be unresponsive.    Understanding of Illness/Discussion: We discussed the patient's condition in detail.   Wife verbalized a clear understanding of hospital events including issues leading to admission.   Dr. Champagne updated family on patient's current status - advised patient not making gains, considering history of cardiomypathy with low EF, and s/p strokes.  He

## 2025-03-19 NOTE — PROGRESS NOTES
Cardiology Progress Note      3/19/2025 9:45 AM    Admit Date: 3/17/2025          Subjective:  Course noted. No neurological improvement. On levophed. MRI of brain just done          BP (!) 146/130   Pulse 85   Temp 98.6 °F (37 °C)   Resp 16   Ht 1.88 m (6' 2\")   Wt 84.5 kg (186 lb 4.6 oz)   SpO2 100%   BMI 23.92 kg/m²   03/17 1901 - 03/19 0700  In: 4333.5 [I.V.:2740.5]  Out: 1065 [Urine:165]        Objective:      Physical Exam:  VS as above   Chest coarse BS bilat  Card RRR no changes  Neuro unresponsive     Data Review:   Labs:    7.68/17/121   Lactic acid 5.7   BUN 46  Creat 4.1  Hgb 10.9  PTT 62     Telemetry: afib R 84       Assessment:       1. Cardiomyopathy with normal LVEF September 2018 and prior stress test negative for ischemia.  Normal EF by echo in March 2021.  EF 20-25% on recent hospitalization in February  2. Permanent atrial fibrillation with RVR at present   3. Hypertension.  4. Dyslipidemia.  5. Type 2 diabetes mellitus.  6. Hx of transient ischemic attacks and prior hospitalizations for a ischemic strokes x 2 with seizures and spontaneous right internal carotid dissection.  Recent hospitalization with a acute cerebellar stroke  9. Neuroendocrine Lung tumor status post resection in 2015.   10. Multi degenerative changes of the cervical spine with cord compression.  11. CRI, stage 3.  12. Transient bradycardia during recent hospitalization, resolved with reduction in carvedilol  13. S/p cardiac arrest yest ? Cause  14. Post arrest resp failure  15. NSTMI post arrest   16. ? Anoxic brain injury   17. Lactic acidosis  18. RAE   19. Shock: on pressors     Plan:     Await results of MRI but prognosis poor. Cont supportive Rx for now

## 2025-03-19 NOTE — PROCEDURES
PROCEDURE NOTE  Date: 3/19/2025   Name: Brii Engle  YOB: 1955    Procedures      Test Date: March 18, 2025    History: Patient was sudden collapse and possible cardiac arrest, patient for rapid EEG to rule out seizures and patient with known history of seizures.    Medications: See chart    Patient consent: Correct patient identified    Description of procedure: This EEG was obtained using a 10 lead, 8 channel system positioned circumferentially without any parasagittal coverage (rapid EEG). Computer selected EEG is reviewed as well as background features and all clinically significant events. Clarity algorithm with NTAP was utilized and implemented to provide analysis of underlying activity and seizure detection used to facilitate reading.    Description of recording: This is a rapid EEG on patient to evaluate for possible seizures, and this today began on March 18, 2025 at 5-7 30 1 AM, and ended on March 18, 2025 at 9:23 AM for total time of study of 1 hour and 52 minutes.  There was no normal alpha rhythm seen, there is low amplitude moderate slow-wave activity seen with some considerable muscle and movement and electrode artifact.  There were no epileptiform discharges, focal slowing, or electrographic seizures noted throughout the recording.    Impression: This is a moderately abnormal electroencephalogram due to the generalized slowing seen loss of normal background activity, most consistent with a diffuse encephalopathy of toxic, metabolic or degenerative type, but showing no clear areas of focal slowing, spike or spike and wave discharges or electrographic spells of any type seen.  Clinical correlation recommended and correlation with a standard 16 channel EEG recording may be of further diagnostic benefit if clinically needed.    Comment: A normal EEG does not rule out the diagnosis of a seizure disorder; clinical  correlation is advised.  If there is still persistent suspicion for  continued seizure-like  activity, would advise obtaining an electroencephalogram with the 10-20 international  system for improved spatial resolution and parasagittal coverage.

## 2025-03-19 NOTE — PROGRESS NOTES
Critical Care Progress Note  Kathy Champagne MD          Date of Service:  3/19/2025  NAME:  Brii Engle  :  1955  MRN:  [id number]      Subjective/Hospital course:      3/18: Patient is currently intubated and sedated.  No acute events overnight.  3/19: Patient remains intubated.  Unresponsive.  On 7 mics of Levophed.       Active Problems Being Managed:     -Witnessed cardiac arrest and route to the hospital.  Unclear rhythm.  About 20 minutes of CPR.  - Acute hypoxemic respiratory failure.  --Acute renal failure.  - Cardiogenic shock.  - Concern for anoxic brain injury.  - Lactic acidosis.  - History of heart failure and moderate mitral regurgitation.  LVEF of 25%.  - Atrial fibrillation.  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  - History of seizure disorder.  - History of hypertension.  - Data diabetes mellitus.  - Hyperlipidemia.    Assessment/Plan:     Witnessed cardiac arrest and route to the hospital.  - About 20 minutes of CPR.  Preceded by respiratory symptoms.  I think cardiac arrest was related to decompensated heart failure and pulmonary edema.  -Continue supportive care.  - Maintain normothermia.  - Consult neurology for neuro prognostication.  - Consult palliative care.    Cardiogenic shock.  - Patient has history of cardiomyopathy with LVEF of 20 to 25% and also has moderate mitral regurgitation.  - Continue supportive care.  - Titrate pressors for goal MAP of more than 65.  - Started empiric vancomycin and Zosyn as patient spiked temperature with worsening vasopressor requirement on 3/18.  Follow-up cultures.    Acute hypoxemic respiratory failure:  -Sec to cardiac arrest and pulmonary edema.  -Continue lung protective ventilation with goal Plt pressure <30, driving pressure <15.  -Wean Fio2 for goal sats of 88-92.  -Sedation for RASS goal of 0 to -1.  -ABCDE protocol  -Daily SAT and SBT if criteria met.    Acute renal failure.  -Likely  from ATN.  -Closely monitor renal functions and UO.  -Avoid nephrotoxins.    Lactic acidosis.  --Sec to shock.  --Optimize perfusion, maintain MAP >65.  --Serial lactate monitoring.     History of A-fib.  - Currently on amiodarone drip.  - On Eliquis at home.  Started on heparin drip here.    Code status: Full code  SUP: Pepcid  DVT prophylaxis: On heparin drip continue  Clarisa Elise (Spouse)  [phone] (Mobile)     Care Plan discussed with: Patient/Family and Nurse    Patient has extremely poor prognosis.  Consulted palliative care.  Discussed the plan in detail with patient's wife.    I personally spent 60 minutes of critical care time.  This is time spent at this critically ill patient's bedside actively involved in patient care as well as the coordination of care and discussions with the patient's family.  This does not include any procedural time which has been billed separately.      Review of Systems:   Review of Systems   Unable to perform ROS: Acuity of condition            Vital Signs:   Patient Vitals for the past 4 hrs:   Temp Pulse Resp SpO2   03/19/25 0900 98.6 °F (37 °C) 85 16 100 %   03/19/25 0800 98.6 °F (37 °C) 82 11 100 %   03/19/25 0740 -- -- -- 100 %   03/19/25 0645 98.6 °F (37 °C) 96 16 100 %   03/19/25 0630 98.4 °F (36.9 °C) 93 16 100 %   03/19/25 0629 98.4 °F (36.9 °C) 92 16 100 %   03/19/25 0615 98.2 °F (36.8 °C) 76 16 100 %   03/19/25 0600 98.2 °F (36.8 °C) 90 23 100 %   03/19/25 0545 98.2 °F (36.8 °C) 87 18 100 %   03/19/25 0530 98.1 °F (36.7 °C) 85 28 100 %        Intake/Output Summary (Last 24 hours) at 3/19/2025 0921  Last data filed at 3/19/2025 0600  Gross per 24 hour   Intake 4153.51 ml   Output 435 ml   Net 3718.51 ml        Physical Examination:    Physical Exam  Constitutional:       Appearance: He is ill-appearing.      Comments: Intubated.   HENT:      Head: Normocephalic and atraumatic.      Mouth/Throat:      Mouth: Mucous membranes are moist.   Cardiovascular:

## 2025-03-19 NOTE — CONSULTS
CONSULT - Neurology      Name:  Brii Engle       MRN: [id number]  Location: 2505/01    Date: 3/19/2025  Time:  5:36 AM        Chief Complaint:   Chief Complaint   Patient presents with    Cardiac Arrest     Pt BIB EMS coming from home. Per EMS initial call was for respiratory distress. Per EMS pt coded upon arrival. ROSC obtained at 14:50 pm. Pt tubed with 7.0 ET tub 26 at the teeth.        HPI:  It is a great pleasure to see Brii Engle, a 69 y.o. male today in the hospital . Briefly these are the events happened as per the chart H&P and taken from the chart. The patient was doing fine and the symptoms started with Witnessed cardiac arrest and route to the hospital. About 20 minutes of CPR.  Preceded by respiratory symptoms..The patient was brought to the emergency room for further evaluation.       PAST MEDICAL HISTORY:  Past Medical History:   Diagnosis Date    Atrial fibrillation (HCC)     Cancer (HCC) 12/2015    left lung; 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     Diabetes mellitus (HCC)     Dissection of right carotid artery 09/2018    History of MRSA infection 08/01/2023    +MRSA in nasal swab    Hypertension     Renal insufficiency     stage 3    Seizures (HCC)     Stroke (HCC) 02/2023    prior to feb., had mini stroke; left side paralysis     PAST SURGICAL HISTORY:    Past Surgical History:   Procedure Laterality Date    CERVICAL FUSION Right 10/03/2023    ANTERIOR CERVICAL DISCECTOMY AND FUSION, C3-4, C4-5 performed by Manuel Kelly MD at [facility] MAIN OR    CHEST SURGERY      VATS Video-assisted thoracic surgery    PEG TUBE REMOVAL  2024    replaced     FAMILY HISTORY:    Family History   Problem Relation Age of Onset    Cancer Father     Stroke Mother      SOCIAL HISTORY:   Social History     Tobacco Use    Smoking status: Former     Current packs/day: 0.10     Average packs/day: 0.1 packs/day for 9.8 years (1.0 ttl pk-yrs)      (GLUCOPHAGE) 500 MG tablet 2 tablets by PEG Tube route 2 times daily (with meals) 2/16/24 2/26/25  Renata North APRN - NP   melatonin 3 MG TABS tablet 2 tablets by PEG Tube route daily 2/16/24 2/26/25  Renata North APRN - NP   Multiple Vitamins-Minerals (THERAPEUTIC MULTIVITAMIN-MINERALS) tablet 1 tablet by PEG Tube route daily 2/16/24 2/26/25  Renata North APRN - NP   apixaban (ELIQUIS) 5 MG TABS tablet 1 tablet by Per G Tube route 2 times daily 2/16/24 2/26/25  Renata North APRN - NP   acetaminophen (TYLENOL) 325 MG tablet Take 1 tablet by mouth every 6 hours as needed    Provider, MD Gelacio         CURRENT MEDICATIONS:  Note that completed medications (per the MAR) continue to display for 24 hours. Ordered medications to be given in the future also display.      I've reviewed the Past Medical History, Past Surgical History, Allergies, Medications, Family History (relevant to this case) and Social History and have updated these data in the electronic medical record.  ROS:  All system were reviewed and were negative except for the pertinent positives listed in the HPI.    PHYSICAL EXAMINATION:  Most Recent Vital Signs: Reviewed    Constitutional: Appearance normally developed   Neurologic Examination:   Intubated,   Eyes do not open to verbal or noxious stimuli   Does not follow commands  Pupils are equal,   Corneal reflex absent  Gag reflex absent  Cough reflex absent  Does not withdraw with noxious stimuli      Motor Exam:    Tone is normal       STUDIES:  LABS:   Last 24 hours of labs are   Recent Results (from the past 24 hours)   POCT Glucose    Collection Time: 03/18/25  6:08 AM   Result Value Ref Range    POC Glucose 79 65 - 117 mg/dL    Performed by: Fernanda Mckeon    Basic Metabolic Panel    Collection Time: 03/18/25  9:00 AM   Result Value Ref Range    Sodium 142 136 - 145 mmol/L    Potassium 4.6 3.5 - 5.1 mmol/L    Chloride 112 (H) 97 - 108 mmol/L    CO2 16 (L) 21 - 32 mmol/L    Anion  mechanism may be anoxic brain injury due to cardiac arrest    Recommendations/ plan:  Frequent neurochecks, vital sign parameters  MRI Brain without contrast to evaluate for any  structural abnormalities that may be contributing to symptoms.   - Please do not hesitate to call with questions or concerns.  - Thank you for the opportunity to participate in the care of your patient.Please let us know if we can provide any additional information.  ?      Kj Calderon MD  3/19/2025    MRI Brain without contrast ?  IMPRESSION:  Development of extensive/diffuse parenchymal signal abnormality involving the  cerebrum, cerebellum, and brainstem, with associated complete cisternal and  sulcal effacement. Absent intracranial flow voids. Findings most consistent with  anoxic brain injury.  Overall exam currently remains poor. Overall prognosis appears poor as well.  Neurology will sign off.  Please do not hesitate to call with questions or concerns.  Please let us know if we can provide any additional information.

## 2025-03-19 NOTE — PROGRESS NOTES
03/19/25 0455   B: Both Spontaneous Awakening and Breathing Trials   Was Patient Receiving Mechanical Ventilation Yes   Safety Screening Spontaneous Breathing Trial (SBT)   (per NP no SBT)     Per NP Unique no SBT at this time.

## 2025-03-19 NOTE — PROGRESS NOTES
Shift report received from Lashon LEVI    Shift assessment complete, see flow sheet     2048: Troponin 2460, NP informed, verbal order to stop trending troponin     2055: PTT: 122.3, Informed Np, spoke with Matthew [facility] ANI, heparin drip held for 60 mins and decreased by 3 units/kg/hr. New rate 5 units/kg/hr    2200: Heparin drip restarted at new rate 5 units/kg/hr    0033: Right feral dressing change, site bleeding, NP informed     0044: Lactic 6.3, NP informed     0522: Heparin drips remain at current rate, Spoke with Krupa Delcid    0541: Lactic 5.7, NP informed     0622: Neurology at the bedside, night events discussed     Shift report given to Simin LEVI

## 2025-03-19 NOTE — PROGRESS NOTES
Palliative Medicine  Per Dr. Juarez: Brii Engle is a 69 y.o. male with a past medical history of CVA, cancer, who was admitted on 2025 from home with a diagnosis of status epilepticus, AHRF, Afib with RVR, RAE, lactic acidosis. He was treated for seizure with keppra. He was intubated. He was treated for afib with RVR with diltiazem. He become hypotensive and diltiazem was discontinued.      Code Status: Full Code     [facility]/17/2025    12:49 PM   Demographics   Marital Status    No AMD so spouse, Clarisa is primary HCDM and 3 daughters are secondary HCDM     Patient / Family Encounter Documentation     Participants (names): Brii Engle, spouse, Clarisa, daughter Ruth Ann, Dr. Champagne, Jerman Perkins, NP, Rita Lopez, [facility]     Narrative:   --Palliative SW reviewed chart, checked in with assigned nurse,Simin and attending Dr. Champagne before stepping in to offer support to spouse and daughter at bedside.  --Returned with Dr. Champagne and Palliative NP, for meeting to inform them of MRI results and poor prognosis.  --Palliative team remained with family to discuss next steps.  --Spouse and daughter expressed minimal emotion, as they said they have been trying to prepare for this news.  --Spouse will inform other family members and plan to come tomorrow for closure visits before transitioning patient to comfort.  --Palliative SW affirmed their care and advocacy for patient and encouraged them to balance self care and support.  --Informed them of availability of  here and encouraged them to contact their own  as well.  --Palliative team contact information shared with family with reminder of availability for support.    Psychosocial Issues Identified/ Resilience Factors: Patient is  to wife Clarisa and [place], [place]. They have 3 daughters, Ruth Ann, Linda, Eunice. They also have grandchildren. Patient has been paralyzed on his left side  since a CVA. 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 He is described by his wife as a man of few words, who enjoys being outdoors hunting, fishing, watching car racing, and sitting on his porch. His large family is present often visiting in the home and helping with his care. Patient has stated he would not want to go to a nursing home, prefers to be home with his family. They had to call it \"rehab\" to get him to agree to go for skilled SNF stay and then he only stayed 3 days.     Caregiver [place]: Low to moderate  Does the caregiver feel confident administering medication? Not assessed.  Does the caregiver need any help connecting with community resources? HH  Does the caregiver feel confident assisting with activities of daily living? Yes     Goals of Care / Plan: (Please see Jerman Perkins NP note.)  DNR   Neurology, cardiology, nephrology expertise is appreciated.  3.   Spouse to inform family and schedule closure visits tomorrow.  4.   Palliative team will meet family tomorrow for continued support, education and plans to extubate patient and transition to comfort.    Thank you for including Palliative Medicine in the care of Mr. Brii Engle.     Rita Lopez, [facility]  [phone] ([phone])

## 2025-03-19 NOTE — PROGRESS NOTES
NAME: rBii Engle        :  1955        MRN:  [id number]                   Assessment   :                                               Plan:  RAE on CKD stage IIIa     Anion gap positive metabolic acidosis  Lactic acidosis     Cardiac arrest (20 minutes CPR) on route to hospital; SOB prior to ems     Acute hypoxic resp failure     H/o CHF/MR/afib  H/o dm  H/o carcinoid tumor (left lung resection)  H/o cba  H/o sz d/o Creatinine 1.2-1.4 at baseline.  Cr on the rise, now up to 4.1. ATN.     On vent, worry of anoxic brain injury     guillermina Cardoza     Agree with bicarb gtt     No urgent need for KRT. Will follow closely.        Subjective:     Chief Complaint:  non responsive on the vent. family present      Review of Systems:    Symptom Y/N Comments  Symptom Y/N Comments   Fever/Chills    Chest Pain     Poor Appetite    Edema     Cough    Abdominal Pain     Sputum    Joint Pain     SOB/LOERA    Pruritis/Rash     Nausea/vomit    Tolerating PT/OT     Diarrhea    Tolerating Diet     Constipation    Other       Could not obtain due to:      Objective:     VITALS:   Last 24hrs VS reviewed since prior progress note. Most recent are:  Vitals:    25 0645   BP:    Pulse: 96   Resp: 16   Temp: 98.6 °F (37 °C)   SpO2: 100%       Intake/Output Summary (Last 24 hours) at 3/19/2025 0649  Last data filed at 3/19/2025 0600  Gross per 24 hour   Intake 4153.51 ml   Output 550 ml   Net 3603.51 ml      Telemetry Reviewed:     PHYSICAL EXAM:  General: NAD      Lab Data Reviewed: (see below)    Medications Reviewed: (see below)    PMH/ reviewed - no change compared to H&P  ________________________________________________________________________  Care Plan discussed with:  Patient     Family      RN     Care Manager                    Consultant:          Comments   >50% of visit spent in counseling and coordination of care        ________________________________________________________________________  Julee Gaitan MD     Procedures: see electronic medical records for all procedures/Xrays and details which  were not copied into this note but were reviewed prior to creation of Plan.      LABS:  Recent Labs     03/18/25  2042 03/19/25  0355   WBC 12.7* 11.0   HGB 11.2* 10.9*   HCT 33.0* 31.7*   * 131*     Recent Labs     03/18/25  1541 03/18/25  1940 03/19/25  0406    137 138   K 3.8 3.4* 3.4*    105 102   CO2 17* 17* 21   BUN 45* 45* 46*   MG 1.7 1.8 1.7   PHOS 2.7 2.4* 2.6     Recent Labs     03/17/25  1502 03/18/25  0900   GLOB 4.3* 3.9     Recent Labs     03/18/25  0347 03/18/25  1206 03/18/25  1940 03/19/25  0355   INR 1.4*  --   --   --    APTT 31.2* >130.0* 122.3* 62.4*      No results for input(s): \"TIBC\" in the last 72 hours.    Invalid input(s): \"FE\", \"PSAT\", \"FERR\"   No results found for: \"RBCF\"   No results for input(s): \"PH\", \"PCO2\", \"PO2\" in the last 72 hours.  No results for input(s): \"CPK\", \"CKMB\", \"TROPONINI\" in the last 72 hours.  No components found for: \"GLPOC\"  @labua@    MEDICATIONS:  Current Facility-Administered Medications   Medication Dose Route Frequency    heparin (porcine) injection 4,000 Units  4,000 Units IntraVENous PRN    heparin (porcine) injection 2,000 Units  2,000 Units IntraVENous PRN    heparin 25,000 units in dextrose 5% 250 mL (premix) infusion  5-30 Units/kg/hr IntraVENous Continuous    fentaNYL (SUBLIMAZE) injection 50 mcg  50 mcg IntraVENous Q2H PRN    alcohol 62% (NOZIN) nasal  1 ampule  1 ampule Nasal Q12H    chlorhexidine (PERIDEX) 0.12 % solution 15 mL  15 mL Mouth/Throat BID    acetaminophen (TYLENOL) tablet 650 mg  650 mg Oral Q6H PRN    Or    acetaminophen (TYLENOL) suppository 650 mg  650 mg Rectal Q6H PRN    levETIRAcetam (KEPPRA) injection 1,000 mg  1,000 mg IntraVENous Q12H    famotidine (PEPCID) 20 MG/2ML 20 mg in sodium chloride (PF) 0.9 % 10 mL injection   20 mg IntraVENous Daily    piperacillin-tazobactam (ZOSYN) 3,375 mg in sodium chloride 0.9 % 50 mL IVPB (addEASE)  3,375 mg IntraVENous Q8H    vancomycin (VANCOCIN) intermittent dosing (placeholder)   Other RX Placeholder    Vancomycin Level 3/19 1300 - Please make sure lab is drawn   Other Once    norepinephrine (LEVOPHED) 16 mg in sodium chloride 0.9 % 250 mL infusion (premix)  1-100 mcg/min IntraVENous Continuous    sodium chloride flush 0.9 % injection 5-40 mL  5-40 mL IntraVENous 2 times per day    sodium chloride flush 0.9 % injection 5-40 mL  5-40 mL IntraVENous PRN    0.9 % sodium chloride infusion   IntraVENous PRN    ondansetron (ZOFRAN-ODT) disintegrating tablet 4 mg  4 mg Oral Q8H PRN    Or    ondansetron (ZOFRAN) injection 4 mg  4 mg IntraVENous Q6H PRN    polyethylene glycol (GLYCOLAX) packet 17 g  17 g Oral Daily PRN    acetaminophen (TYLENOL) tablet 650 mg  650 mg Oral Q6H PRN    Or    acetaminophen (TYLENOL) suppository 650 mg  650 mg Rectal Q6H PRN    dextrose 5 % and 0.45 % sodium chloride infusion   IntraVENous Continuous PRN    amiodarone (CORDARONE) 450 mg in dextrose 5 % 250 mL infusion ([id number])  0.5 mg/min IntraVENous Continuous    propofol infusion  5-50 mcg/kg/min IntraVENous Continuous    insulin lispro (HUMALOG,ADMELOG) injection vial 0-8 Units  0-8 Units SubCUTAneous Q4H    glucose chewable tablet 16 g  4 tablet Oral PRN    dextrose bolus 10% 125 mL  125 mL IntraVENous PRN    Or    dextrose bolus 10% 250 mL  250 mL IntraVENous PRN    glucagon injection 1 mg  1 mg SubCUTAneous PRN    dextrose 10 % infusion   IntraVENous Continuous PRN

## 2025-03-19 NOTE — PROGRESS NOTES
0715 Bedside and Verbal shift change report given to Simin RN/Donald RN (oncoming nurse) by Tiffanie RN (offgoing nurse). Report included the following information Nurse Handoff Report, Intake/Output, MAR, Cardiac Rhythm AFIB, and Quality Measures.     0800 Shift assessment done, see flowsheet for details. MRI called , per staff they can do MRI at 0930. MD aware  GCS 3 no gag/cough reflex. NPI 0     0930 reached MRI w/ RT and transport, temp sensing schrader removed.    1035 Back from MRI, Dr. Champagne made aware.    1120 Dr. Champagne aware of right groin central line bleeding, heparin stopped.    1200 Palliative and Dr. Champagne at bedside with wife, per Jerman NP, patient now DNR. Tube feeding held 200 ml residual.    1403 Dr. Champagne okay with blood work ups done tomorrow.    1910 Bedside and Verbal shift change report given to Tiffanie RN (oncoming nurse) by Simin RN (offgoing nurse). Report included the following information Nurse Handoff Report, Index, Cardiac Rhythm AFIB RVR, and Quality Measures.

## 2025-03-19 NOTE — PROGRESS NOTES
Pharmacy Antimicrobial Kinetic Dosing    Indication for Antimicrobials: Sepsis     Current Regimen of Each Antimicrobial:  Vancomycin Pharmacy to Dose; Start Date 3/19; Day # 2  Zosyn 4.5g IV load, then 3.375g Q8H; Start Date 3/19; Day # 2    Previous Antimicrobial Therapy:  N/A     Goal Level: Vancomycin random level < 20     Date Dose & Interval Measured (mcg/mL) Predicted AUC 24-48 Predicted AUC 24,ss                          Significant Cultures:   3/18 MRSA, nares: pending     Labs:  Recent Labs     Units 25  0900 25  0244 25  0232 25  2134 25  1502   CREATININE MG/DL 3.24*  --  2.75* 2.23* 1.95*   BUN MG/DL 39*  --  34* 25* 16   WBC K/uL  --  12.0*  --   --  4.9     Temp (24hrs), Av.5 °F (37.5 °C), Min:97.3 °F (36.3 °C), Max:102.2 °F (39 °C)    Conditions for Dosing Consideration:  RAE    Creatinine Clearance (mL/min): Estimated Creatinine Clearance: 26 mL/min (A) (based on SCr of 3.24 mg/dL (H)).     Impression/Plan:   RAE (baseline SCr 1.1) - will continue intermittently dose vancomycin for now  Level scheduled for 3/19 1300  Will dose vancomycin 750 x 1 dose if level <20  Antimicrobial stop date 7 days     Pharmacy will follow daily and adjust medications as appropriate for renal function and/or serum levels.    Thank you,  OG RODNEY [facility]

## 2025-03-19 NOTE — INTERDISCIPLINARY ROUNDS
Interdisciplinary team rounds were held 3/19/2025 with the following team members: Physician, Nursing, Dietitian, Pharmacist, , Rehab, and the CCU Charge RN.    Plan of care discussed. See clinical pathway and/or care plan for interventions and desired outcomes.    Goals of the Day: Continue with schrader catheter for strict I/Os and CVC for vasopressor use.

## 2025-03-20 LAB
ALBUMIN SERPL-MCNC: 2.1 G/DL (ref 3.5–5)
ALBUMIN/GLOB SERPL: 0.6 (ref 1.1–2.2)
ALP SERPL-CCNC: 136 U/L (ref 45–117)
ALT SERPL-CCNC: 59 U/L (ref 12–78)
ANION GAP SERPL CALC-SCNC: 12 MMOL/L (ref 2–12)
AST SERPL-CCNC: 96 U/L (ref 15–37)
BILIRUB DIRECT SERPL-MCNC: 0.4 MG/DL (ref 0–0.2)
BILIRUB SERPL-MCNC: 0.9 MG/DL (ref 0.2–1)
BUN SERPL-MCNC: 54 MG/DL (ref 6–20)
BUN/CREAT SERPL: 11 (ref 12–20)
CALCIUM SERPL-MCNC: 6.9 MG/DL (ref 8.5–10.1)
CHLORIDE SERPL-SCNC: 104 MMOL/L (ref 97–108)
CO2 SERPL-SCNC: 24 MMOL/L (ref 21–32)
CREAT SERPL-MCNC: 5.01 MG/DL (ref 0.7–1.3)
ERYTHROCYTE [DISTWIDTH] IN BLOOD BY AUTOMATED COUNT: 16.1 % (ref 11.5–14.5)
GLOBULIN SER CALC-MCNC: 3.7 G/DL (ref 2–4)
GLUCOSE BLD STRIP.AUTO-MCNC: 115 MG/DL (ref 65–117)
GLUCOSE BLD STRIP.AUTO-MCNC: 119 MG/DL (ref 65–117)
GLUCOSE BLD STRIP.AUTO-MCNC: 121 MG/DL (ref 65–117)
GLUCOSE BLD STRIP.AUTO-MCNC: 128 MG/DL (ref 65–117)
GLUCOSE BLD STRIP.AUTO-MCNC: 132 MG/DL (ref 65–117)
GLUCOSE SERPL-MCNC: 118 MG/DL (ref 65–100)
HCT VFR BLD AUTO: 32.2 % (ref 36.6–50.3)
HGB BLD-MCNC: 10.7 G/DL (ref 12.1–17)
MAGNESIUM SERPL-MCNC: 1.7 MG/DL (ref 1.6–2.4)
MCH RBC QN AUTO: 26.6 PG (ref 26–34)
MCHC RBC AUTO-ENTMCNC: 33.2 G/DL (ref 30–36.5)
MCV RBC AUTO: 80.1 FL (ref 80–99)
NRBC # BLD: 0 K/UL (ref 0–0.01)
NRBC BLD-RTO: 0 PER 100 WBC
PHOSPHATE SERPL-MCNC: 4.6 MG/DL (ref 2.6–4.7)
PLATELET # BLD AUTO: 120 K/UL (ref 150–400)
PMV BLD AUTO: 11.1 FL (ref 8.9–12.9)
POTASSIUM SERPL-SCNC: 3.7 MMOL/L (ref 3.5–5.1)
PROT SERPL-MCNC: 5.8 G/DL (ref 6.4–8.2)
RBC # BLD AUTO: 4.02 M/UL (ref 4.1–5.7)
SERVICE CMNT-IMP: ABNORMAL
SERVICE CMNT-IMP: NORMAL
SODIUM SERPL-SCNC: 140 MMOL/L (ref 136–145)
WBC # BLD AUTO: 11.7 K/UL (ref 4.1–11.1)

## 2025-03-20 PROCEDURE — 80076 HEPATIC FUNCTION PANEL: CPT

## 2025-03-20 PROCEDURE — 2000000000 HC ICU R&B

## 2025-03-20 PROCEDURE — 80048 BASIC METABOLIC PNL TOTAL CA: CPT

## 2025-03-20 PROCEDURE — 83735 ASSAY OF MAGNESIUM: CPT

## 2025-03-20 PROCEDURE — 2500000003 HC RX 250 WO HCPCS: Performed by: INTERNAL MEDICINE

## 2025-03-20 PROCEDURE — 6360000002 HC RX W HCPCS: Performed by: HOSPITALIST

## 2025-03-20 PROCEDURE — 36415 COLL VENOUS BLD VENIPUNCTURE: CPT

## 2025-03-20 PROCEDURE — 84100 ASSAY OF PHOSPHORUS: CPT

## 2025-03-20 PROCEDURE — 6370000000 HC RX 637 (ALT 250 FOR IP): Performed by: HOSPITALIST

## 2025-03-20 PROCEDURE — 6370000000 HC RX 637 (ALT 250 FOR IP): Performed by: INTERNAL MEDICINE

## 2025-03-20 PROCEDURE — 85027 COMPLETE CBC AUTOMATED: CPT

## 2025-03-20 PROCEDURE — 94003 VENT MGMT INPAT SUBQ DAY: CPT

## 2025-03-20 PROCEDURE — 2580000003 HC RX 258: Performed by: HOSPITALIST

## 2025-03-20 PROCEDURE — 82962 GLUCOSE BLOOD TEST: CPT

## 2025-03-20 RX ORDER — INSULIN LISPRO 100 [IU]/ML
0-8 INJECTION, SOLUTION INTRAVENOUS; SUBCUTANEOUS EVERY 6 HOURS
Status: DISCONTINUED | OUTPATIENT
Start: 2025-03-20 | End: 2025-03-21 | Stop reason: HOSPADM

## 2025-03-20 RX ADMIN — Medication 1 AMPULE: at 20:47

## 2025-03-20 RX ADMIN — LEVETIRACETAM 1000 MG: 100 INJECTION INTRAVENOUS at 10:20

## 2025-03-20 RX ADMIN — Medication 1 AMPULE: at 07:33

## 2025-03-20 RX ADMIN — PIPERACILLIN AND TAZOBACTAM 3375 MG: 3; .375 INJECTION, POWDER, LYOPHILIZED, FOR SOLUTION INTRAVENOUS at 02:43

## 2025-03-20 RX ADMIN — FAMOTIDINE 20 MG: 20 TABLET, FILM COATED ORAL at 07:33

## 2025-03-20 RX ADMIN — NOREPINEPHRINE BITARTRATE 19 MCG/MIN: 64 SOLUTION INTRAVENOUS at 17:34

## 2025-03-20 RX ADMIN — LEVETIRACETAM 1000 MG: 100 INJECTION INTRAVENOUS at 22:04

## 2025-03-20 RX ADMIN — PIPERACILLIN AND TAZOBACTAM 3375 MG: 3; .375 INJECTION, POWDER, LYOPHILIZED, FOR SOLUTION INTRAVENOUS at 14:13

## 2025-03-20 RX ADMIN — CHLORHEXIDINE GLUCONATE 15 ML: 1.2 RINSE ORAL at 07:33

## 2025-03-20 RX ADMIN — SODIUM CHLORIDE, PRESERVATIVE FREE 10 ML: 5 INJECTION INTRAVENOUS at 20:47

## 2025-03-20 RX ADMIN — SODIUM CHLORIDE, PRESERVATIVE FREE 10 ML: 5 INJECTION INTRAVENOUS at 07:33

## 2025-03-20 RX ADMIN — CHLORHEXIDINE GLUCONATE 15 ML: 1.2 RINSE ORAL at 20:47

## 2025-03-20 ASSESSMENT — PULMONARY FUNCTION TESTS
PIF_VALUE: 23
PIF_VALUE: 22
PIF_VALUE: 23
PIF_VALUE: 25
PIF_VALUE: 24
PIF_VALUE: 24

## 2025-03-20 ASSESSMENT — PAIN SCALES - GENERAL
PAINLEVEL_OUTOF10: 0

## 2025-03-20 NOTE — PROGRESS NOTES
Critical Care Progress Note  Kathy Champagne MD          Date of Service:  3/20/2025  NAME:  Brii Engle  :  1955  MRN:  [id number]      Subjective/Hospital course:      3/18: Patient is currently intubated and sedated.  No acute events overnight.  3/19: Patient remains intubated.  Unresponsive.  On 7 mics of Levophed.  3/20: Patient remains unresponsive, currently on 20 mics per minute of Levophed       Active Problems Being Managed:     -Witnessed cardiac arrest and route to the hospital.  Unclear rhythm.  About 20 minutes of CPR.  - Acute hypoxemic respiratory failure.  --Acute renal failure.  - Cardiogenic shock.  - Concern for anoxic brain injury.  - Lactic acidosis.  - History of heart failure and moderate mitral regurgitation.  LVEF of 25%.  - Atrial fibrillation.  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  - History of seizure disorder.  - History of hypertension.  - Data diabetes mellitus.  - Hyperlipidemia.    Assessment/Plan:     Witnessed cardiac arrest and route to the hospital.  - About 20 minutes of CPR.  Preceded by respiratory symptoms.  I think cardiac arrest was related to decompensated heart failure and pulmonary edema.  -Continue supportive care.  - Maintain normothermia.  - Consult neurology for neuro prognostication.  - Consulted palliative care.    Cardiogenic shock.  - Patient has history of cardiomyopathy with LVEF of 20 to 25% and also has moderate mitral regurgitation.  - Continue supportive care.  - Titrate pressors for goal MAP of more than 65.  - Started empiric vancomycin and Zosyn as patient spiked temperature with worsening vasopressor requirement on 3/18.  Follow-up cultures.    Acute hypoxemic respiratory failure:  -Sec to cardiac arrest and pulmonary edema.  -Continue lung protective ventilation with goal Plt pressure <30, driving pressure <15.  -Wean Fio2 for goal sats of 88-92.  -Sedation for RASS goal of 0 to  -1.  -ABCDE protocol  -Daily SAT and SBT if criteria met.    Acute renal failure.  -Likely from ATN.  -Closely monitor renal functions and UO.  -Avoid nephrotoxins.    Lactic acidosis.  --Sec to shock.  --Optimize perfusion, maintain MAP >65.  --Serial lactate monitoring.     History of A-fib.  - Currently on amiodarone drip.  - On Eliquis at home.  Started on heparin drip here.    Code status: Full code  SUP: Pepcid  DVT prophylaxis: On heparin drip continue  Clarisa Elise (Spouse)  [phone] (Mobile)     Care Plan discussed with: Patient/Family and Nurse    I had family meeting along with palliative care on 3/19.  Family is likely going to pursue comfort care today.    I personally spent 40 minutes of critical care time.  This is time spent at this critically ill patient's bedside actively involved in patient care as well as the coordination of care and discussions with the patient's family.  This does not include any procedural time which has been billed separately.      Review of Systems:   Review of Systems   Unable to perform ROS: Acuity of condition            Vital Signs:   Patient Vitals for the past 4 hrs:   BP Temp Temp src Pulse Resp SpO2   03/20/25 0900 90/76 99 °F (37.2 °C) -- (!) 105 13 --   03/20/25 0803 -- 98.8 °F (37.1 °C) -- 100 11 100 %   03/20/25 0800 (!) 84/65 98.8 °F (37.1 °C) Bladder (!) 105 16 100 %   03/20/25 0747 -- -- -- (!) 107 16 94 %   03/20/25 0731 -- 98.8 °F (37.1 °C) -- 99 14 --   03/20/25 0645 -- 98.8 °F (37.1 °C) -- 98 13 --   03/20/25 0630 (!) 140/88 98.8 °F (37.1 °C) -- (!) 105 12 --   03/20/25 0615 -- 98.8 °F (37.1 °C) -- 95 13 100 %        Intake/Output Summary (Last 24 hours) at 3/20/2025 1005  Last data filed at 3/20/2025 1000  Gross per 24 hour   Intake 609.26 ml   Output 1615 ml   Net -1005.74 ml        Physical Examination:    Physical Exam  Constitutional:       Appearance: He is ill-appearing.      Comments: Intubated.   HENT:      Head: Normocephalic and      ______________________________________________________________________  EXPECTED LENGTH OF STAY: 7  ACTUAL LENGTH OF STAY:          3                 Kathy Champagne MD   Pulmonary/[facility]  [phone]  3/20/2025

## 2025-03-20 NOTE — PROGRESS NOTES
Nutrition Note    Chart reviewed, case discussed during CCU rounds.  Pt on levo at 20.  Decision made to possibly go CMO.  TF held for high residuals yesterday, this RD canceled orders as pt is too hemodynamically unstable to resume feeds.  Will sign off, please reconsult prn if plans change and desire is to resume nutrition support.     Electronically signed by Judy Perez RD, [facility] on 3/20/25 at 1:10 PM EDT    Contact: ext [phone]

## 2025-03-20 NOTE — INTERDISCIPLINARY ROUNDS
Interdisciplinary team rounds were held 3/20/2025 with the following team members: Physician, Nursing, Dietitian, Pharmacist, , Rehab, and the CCU Charge RN.     Plan of care discussed. See clinical pathway and/or care plan for interventions and desired outcomes.    Goals of the Day: Possibly transitioning to comfort care today-will re-eval CVC and schrader catheter.

## 2025-03-20 NOTE — PROGRESS NOTES
0720 Bedside and Verbal shift change report given to Donald LEVI and Bryson  (oncoming nurse) by Tiffanie LEVI (offgoing nurse). Report included the following information Nurse Handoff Report, Intake/Output, MAR, Cardiac Rhythm A-fib RVR, and Quality Measures.       0800 Shift assessment complete     [id number] RN Simin spoke with patients daughter to inquire about when wife will come to the hospital. Daughter stated that patients wife would be in today.    1500 NP Jerman notified that  the patients wife has not been in to visit. Patients brothers at bedside. NP stated she will reach out to family.    1920 Bedside and Verbal shift change report given to Sara (oncoming nurse) by Donald LEVI and Simin LEVI (offgoing nurse). Report included the following information Nurse Handoff Report and Index Nurse Handoff Report, Intake/Output, MAR, Cardiac Rhythm A-fib RVR, and Quality Measures.

## 2025-03-20 NOTE — PROGRESS NOTES
Pharmacy Antimicrobial Kinetic Dosing    Indication for Antimicrobials: Sepsis     Current Regimen of Each Antimicrobial:  Vancomycin Pharmacy to Dose; Start Date 3/19; Day # 2  Zosyn 4.5g IV load, then 3.375g Q8H; Start Date 3/19; Day # 2    Goal Level: Vancomycin random level < 20     Date Dose & Interval Measured (mcg/mL) Predicted AUC 24-48 Predicted AUC 24,ss   3/19 15:23 2000 mg loading dose 3/18 13:49  24.2 -- --                   Significant Cultures:   3/18 MRSA, nares: MRSA present      Labs:  Recent Labs     Units 25  0406 25  0355 25  2042 25  1940 25  1541 25  0900 25  0244 25  2134 25  1502   CREATININE MG/DL 4.07*  --   --  3.63* 3.49*   < >  --    < > 1.95*   BUN MG/DL 46*  --   --  45* 45*   < >  --    < > 16   WBC K/uL  --  11.0 12.7*  --   --   --  12.0*  --  4.9    < > = values in this interval not displayed.     Temp (24hrs), Av.9 °F (36.6 °C), Min:96.1 °F (35.6 °C), Max:99.9 °F (37.7 °C)    Conditions for Dosing Consideration:  RAE    Creatinine Clearance (mL/min): Estimated Creatinine Clearance: 20 mL/min (A) (based on SCr of 4.07 mg/dL (H)).     Impression/Plan:   RAE (baseline SCr 1.1) - will continue intermittently dose vancomycin for now  The vancomycin level resulted at 24.2mcg/ml. No re-dose needed today. Will recheck a level tomorrow at 1200.   Antimicrobial stop date 7 days     Pharmacy will follow daily and adjust medications as appropriate for renal function and/or serum levels.    Thank you,  [place], [facility]

## 2025-03-20 NOTE — PROGRESS NOTES
NAME: Brii Egnle        :  1955        MRN:  [id number]                   Assessment   :                                               Plan:  RAE on CKD stage IIIa     Anion gap positive metabolic acidosis  Lactic acidosis     Cardiac arrest (20 minutes CPR) on route to hospital; SOB prior to ems     Acute hypoxic resp failure     H/o CHF/MR/afib  H/o dm  H/o carcinoid tumor (left lung resection)  H/o cba  H/o sz d/o Creatinine 1.2-1.4 at baseline.  Cr on the rise, now up to 5, but improving UOP. ATN.     On vent, on pressor, worry of anoxic brain injury     Schrader      No urgent need for KRT. Will follow. Family considering transition to comfort care.       Subjective:     Chief Complaint:  non responsive on the vent. On levo. schrader      Review of Systems:    Symptom Y/N Comments  Symptom Y/N Comments   Fever/Chills    Chest Pain     Poor Appetite    Edema     Cough    Abdominal Pain     Sputum    Joint Pain     SOB/LOERA    Pruritis/Rash     Nausea/vomit    Tolerating PT/OT     Diarrhea    Tolerating Diet     Constipation    Other       Could not obtain due to:      Objective:     VITALS:   Last 24hrs VS reviewed since prior progress note. Most recent are:  Vitals:    25 1217   BP:    Pulse: 93   Resp: 16   Temp:    SpO2: 100%       Intake/Output Summary (Last 24 hours) at 3/20/2025 1343  Last data filed at 3/20/2025 1200  Gross per 24 hour   Intake 448.03 ml   Output 2570 ml   Net -2121.97 ml      Telemetry Reviewed:     PHYSICAL EXAM:  General: NAD      Lab Data Reviewed: (see below)    Medications Reviewed: (see below)    PMH/SH reviewed - no change compared to H&P  ________________________________________________________________________  Care Plan discussed with:  Patient     Family      RN     Care Manager                    Consultant:          Comments   >50% of visit spent in counseling and coordination of care    IntraVENous 2 times per day    sodium chloride flush 0.9 % injection 5-40 mL  5-40 mL IntraVENous PRN    0.9 % sodium chloride infusion   IntraVENous PRN    ondansetron (ZOFRAN-ODT) disintegrating tablet 4 mg  4 mg Oral Q8H PRN    Or    ondansetron (ZOFRAN) injection 4 mg  4 mg IntraVENous Q6H PRN    polyethylene glycol (GLYCOLAX) packet 17 g  17 g Oral Daily PRN    acetaminophen (TYLENOL) tablet 650 mg  650 mg Oral Q6H PRN    Or    acetaminophen (TYLENOL) suppository 650 mg  650 mg Rectal Q6H PRN    dextrose 5 % and 0.45 % sodium chloride infusion   IntraVENous Continuous PRN    glucose chewable tablet 16 g  4 tablet Oral PRN    dextrose bolus 10% 125 mL  125 mL IntraVENous PRN    Or    dextrose bolus 10% 250 mL  250 mL IntraVENous PRN    glucagon injection 1 mg  1 mg SubCUTAneous PRN    dextrose 10 % infusion   IntraVENous Continuous PRN

## 2025-03-20 NOTE — TELEPHONE ENCOUNTER
Dr. Dinorah Franco. Hospitalist states she would like a call back urgently from Dr. Jhony Means to discuss patient. Please call.  Thank you Subjective:  Body Part and Laterality: Left shin  Mechanism of Injury: Sprinting and jumping, gradual onset  Sounds [place]: none  Pain Scale and Type: 6-7/10 sharp pain  Previous Injury History: Left ankle sprain last year in basketball  Neurological S/S: none  Other: Barbara has been training hard for track this off-season and has entered a number of weekend track meets. It seems like his volume of training was high with not enough recovery.    Objective:  Visual Observations: No swelling, bruising, deformities  Palpations: Moderate pain over tibia rosaline-medially. Pain location is about 5 cm long  Range of Motion: Ankle ROM is within 90% of contralateral side  Strength: Ankle strength is good with manual muscle testing.   Special Tests: Sprinting and jumping cause pain  Functional Evaluations: none performed due to pain    Assessment: Due to the return of pain and the localized aspect of the pain, I cannot rule out a bone stress injury. I don't believe there is a stress fracture, but there obviously stress in the tibia that hasn't fully recovered since the pain returns with sprinting and jumping activities.     Plan: Rest and progressive ROM and strength exercises.

## 2025-03-20 NOTE — PROGRESS NOTES
Cardiology Progress Note      3/20/2025 8:53 AM    Admit Date: 3/17/2025          Subjective:  No major changes overnight. He is now DNR. Comfort care being considered          BP (!) 140/88   Pulse (!) 107   Temp 98.8 °F (37.1 °C)   Resp 16   Ht 1.88 m (6' 2\")   Wt 84.5 kg (186 lb 4.6 oz)   SpO2 94%   BMI 23.92 kg/m²   03/18 1901 - 03/20 0700  In: 745.6 [I.V.:321.4]  Out: 1120 [Urine:1020]        Objective:      Physical Exam:  VS as above   Chest coarse BS bilat  Card Irreg irreg no gallop     Data Review:   Labs:    BUN 54  Creat 5.0   Hgb 10.7  Alb 2.1     Telemetry: Afib R 101       Assessment:         1. Cardiomyopathy with normal LVEF September 2018 and prior stress test negative for ischemia.  Normal EF by echo in March 2021.  EF 20-25% on recent hospitalization in February  2. Permanent atrial fibrillation with RVR at present   3. Hypertension.  4. Dyslipidemia.  5. Type 2 diabetes mellitus.  6. Hx of transient ischemic attacks and prior hospitalizations for a ischemic strokes x 2 with seizures and spontaneous right internal carotid dissection.  Recent hospitalization with a acute cerebellar stroke  9. Neuroendocrine Lung tumor status post resection in 2015.   10. Multi degenerative changes of the cervical spine with cord compression.  11. CRI, stage 3.  12. Transient bradycardia during recent hospitalization, resolved with reduction in carvedilol  13. S/p cardiac arrest yest ? Cause  14. Post arrest resp failure  15. NSTMI post arrest   16. ? Anoxic brain injury   17. Lactic acidosis  18. RAE   19. Shock: on pressors        Plan: Cont current supportive Rx

## 2025-03-21 VITALS
TEMPERATURE: 102 F | SYSTOLIC BLOOD PRESSURE: 75 MMHG | HEIGHT: 74 IN | OXYGEN SATURATION: 96 % | WEIGHT: 186.29 LBS | BODY MASS INDEX: 23.91 KG/M2 | DIASTOLIC BLOOD PRESSURE: 60 MMHG

## 2025-03-21 LAB
GLUCOSE BLD STRIP.AUTO-MCNC: 106 MG/DL (ref 65–117)
GLUCOSE BLD STRIP.AUTO-MCNC: 113 MG/DL (ref 65–117)
SERVICE CMNT-IMP: NORMAL
SERVICE CMNT-IMP: NORMAL

## 2025-03-21 PROCEDURE — 2500000003 HC RX 250 WO HCPCS: Performed by: INTERNAL MEDICINE

## 2025-03-21 PROCEDURE — 99232 SBSQ HOSP IP/OBS MODERATE 35: CPT

## 2025-03-21 PROCEDURE — 94003 VENT MGMT INPAT SUBQ DAY: CPT

## 2025-03-21 PROCEDURE — 6370000000 HC RX 637 (ALT 250 FOR IP): Performed by: HOSPITALIST

## 2025-03-21 PROCEDURE — 6360000002 HC RX W HCPCS: Performed by: HOSPITALIST

## 2025-03-21 PROCEDURE — 2580000003 HC RX 258: Performed by: HOSPITALIST

## 2025-03-21 PROCEDURE — 6370000000 HC RX 637 (ALT 250 FOR IP): Performed by: INTERNAL MEDICINE

## 2025-03-21 PROCEDURE — 82962 GLUCOSE BLOOD TEST: CPT

## 2025-03-21 RX ADMIN — SODIUM CHLORIDE, PRESERVATIVE FREE 10 ML: 5 INJECTION INTRAVENOUS at 10:57

## 2025-03-21 RX ADMIN — Medication 1 AMPULE: at 08:39

## 2025-03-21 RX ADMIN — PIPERACILLIN AND TAZOBACTAM 3375 MG: 3; .375 INJECTION, POWDER, LYOPHILIZED, FOR SOLUTION INTRAVENOUS at 13:15

## 2025-03-21 RX ADMIN — NOREPINEPHRINE BITARTRATE 19 MCG/MIN: 64 SOLUTION INTRAVENOUS at 07:21

## 2025-03-21 RX ADMIN — LEVETIRACETAM 1000 MG: 100 INJECTION INTRAVENOUS at 10:47

## 2025-03-21 RX ADMIN — FAMOTIDINE 20 MG: 20 TABLET, FILM COATED ORAL at 08:40

## 2025-03-21 RX ADMIN — PIPERACILLIN AND TAZOBACTAM 3375 MG: 3; .375 INJECTION, POWDER, LYOPHILIZED, FOR SOLUTION INTRAVENOUS at 01:35

## 2025-03-21 RX ADMIN — CHLORHEXIDINE GLUCONATE 15 ML: 1.2 RINSE ORAL at 08:56

## 2025-03-21 ASSESSMENT — PULMONARY FUNCTION TESTS
PIF_VALUE: 23
PIF_VALUE: 22
PIF_VALUE: 22
PIF_VALUE: 23
PIF_VALUE: 22

## 2025-03-21 ASSESSMENT — PAIN SCALES - GENERAL
PAINLEVEL_OUTOF10: 0
PAINLEVEL_OUTOF10: 0

## 2025-03-21 NOTE — DISCHARGE SUMMARY
Death Discharge Summary      Patient ID:  Brii GONZALEZ Casco  69 y.o.  1955  [id number]    Admit Date: 3/17/2025    Attending Physician:  Yanelis Quintero MD    Chief Complaint:    Chief Complaint   Patient presents with    Cardiac Arrest     Pt BIB EMS coming from home. Per EMS initial call was for respiratory distress. Per EMS pt coded upon arrival. ROSC obtained at 14:50 pm. Pt tubed with 7.0 ET tub 26 at the teeth.        Problem List:    Patient Active Problem List    Diagnosis Date Noted    Acute alteration in mental status 2025    Convulsive syncope 2025    Cardiac arrest (HCC) 2025    Left hemiplegia (HCC) 2025    ACP (advance care planning) 2025    Palliative care by specialist 2025    Status epilepticus ([facility]) 2025    Seizure ([facility]) 2025    Major depressive disorder, recurrent, mild 2024    Major depressive disorder, recurrent, moderate 2024    Major depressive disorder, recurrent, unspecified 2024    Failure to thrive due to feeding problem in  2024    Other dysphagia 2024    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 10/03/2023    A-fib ([facility]) 2023    Encounter for preadmission testing 2023    Type 2 diabetes mellitus with chronic kidney disease 2023    Chronic systolic (congestive) heart failure 2023    Hypokalemia 2023    CVA (cerebral vascular accident) ([facility]) 2023    Thrombotic stroke involving right middle cerebral artery ([facility]) 2023    Bilateral carotid artery stenosis 2023    Stage 3a chronic kidney disease ([facility]) 10/28/2022    Malignant neoplasm of lower lobe, left bronchus or lung ([facility]) 2022    Left arm weakness 2021    Bilateral leg weakness 2021    Carcinoid tumor ([facility]) 2018    CKD stage 2 due to type 2 diabetes mellitus ([facility]) 2017    Breakthrough seizure ([facility]) 2015    Type II or unspecified type diabetes  mellitus with neurological manifestations, uncontrolled(250.62) 2015    Hypertensive emergency 2015    History of atrial fibrillation 2015    Hyperlipidemia 2015    Diabetic neuropathy (HCC) 2014    Seizures (HCC) 2014    Syncope 2012    Cardiomyopathy, nonischemic (HCC) 10/31/2009    HTN (hypertension), benign 10/26/2009    Pulmonary nodule 10/26/2009     Death Diagnosis:  Worsening advanced heart failure leading to cardiac arrest         Hospital Course:    The patient is a 68 y/o male PMHx of CVA (residual left sided deficit) T2DM, Afib (Eliquis), HFrEF HTN. HLD, carcinoid tumor s/p LLL wedge resection and seizure disorder who complained of shortness of breath in the morning . Wife called 911. Patient had cardiac arrest, ROSC achieved after 20 minutes. Intubated. ICU consulted for admission.     On my arrival, he is intubated, on vasopressors. Not on sedation, not following commands.           Condition at discharge:         Kathy Champagne MD  [facility]

## 2025-03-21 NOTE — INTERDISCIPLINARY ROUNDS
Interdisciplinary team rounds were held 3/21/2025 with the following team members: Physician, Nursing, Dietitian, Pharmacist, , Rehab, and the CCU Charge RN.    Plan of care discussed. See clinical pathway and/or care plan for interventions and desired outcomes.    Goals of the Day: Family meeting with Palliative team today to possibly transition to comfort.

## 2025-03-21 NOTE — PROGRESS NOTES
1900h- Bedside and Verbal shift change report given  by Demario Duval RN (offgoing nurse). Report included the following information Nurse Handoff Report, Adult Overview, Intake/Output, MAR, Cardiac Rhythm Afib with Frequent PVC's, Alarm Parameters, Quality Measures, and Neuro Assessment.     - pt.intubated on ACVC mode, FiO2 30%, GCS- 3; pupils non reactive, no movement to painful, no cough and gag reflex, hypothermic. Ongoing levophed drip at 19 mcg/min; no escalation as wife considering on comfort care as per am shift.    1945h- applied warming blanket.    2000h- shift assessment done; see flowsheet.    - due meds given.    2200h- oral temp checked 97.4F  2215h- on Cliff hugger    2240h- SBP 70's NP BUBBA Yin notified on no escalation of levophed as per shift changed report.    0000h- reassessment done; no changed.     0400h- reassessment done; no changed.    - no labs ordered since no escalation as per NP BUBBA Yin    0700h- Bedside and Verbal shift change report given to Mynor RN (oncoming nurse) by Mary LEVI (offgoing nurse).

## 2025-03-21 NOTE — PROGRESS NOTES
Cardiology Progress Note      3/21/2025 10:30 AM    Admit Date: 3/17/2025            Plans for comfort care this weekend once extended family arrives  No new issues          BP (!) 84/57   Pulse 98   Temp 97.5 °F (36.4 °C) (Bladder)   Resp 14   Ht 1.88 m (6' 2\")   Wt 84.5 kg (186 lb 4.6 oz)   SpO2 98%   BMI 23.92 kg/m²   03/19 1901 - 03/21 0700  In: 829.5 [I.V.:651.3]  Out: 3265 [Urine:2365]        Objective:      Physical Exam:  VS as above     Data Review:   Labs:        Telemetry: afib       Assessment:       1. Cardiomyopathy with normal LVEF September 2018 and prior stress test negative for ischemia.  Normal EF by echo in March 2021.  EF 20-25% on recent hospitalization in February  2. Permanent atrial fibrillation with RVR at present   3. Hypertension.  4. Dyslipidemia.  5. Type 2 diabetes mellitus.  6. Hx of transient ischemic attacks and prior hospitalizations for a ischemic strokes x 2 with seizures and spontaneous right internal carotid dissection.  Recent hospitalization with a acute cerebellar stroke  9. Neuroendocrine Lung tumor status post resection in 2015.   10. Multi degenerative changes of the cervical spine with cord compression.  11. CRI, stage 3.  12. Transient bradycardia during recent hospitalization, resolved with reduction in carvedilol  13. S/p cardiac arrest yest ? Cause  14. Post arrest resp failure  15. NSTMI post arrest   16. ? Anoxic brain injury   17. Lactic acidosis  18. RAE   19. Shock: on pressors        Plan: Nothing to add. Will be available to see this weekend as needed.

## 2025-03-21 NOTE — PROGRESS NOTES
03/21/25 1377   B: Both Spontaneous Awakening and Breathing Trials   Was Patient Receiving Mechanical Ventilation Yes   Safety Screening Spontaneous Breathing Trial (SBT) (S)  Lack of inspiratory effort  (Patient goes straight to apnea backup on when blaced on pressure support. No respiratory effort..)

## 2025-03-21 NOTE — PROGRESS NOTES
Spiritual Health History and Assessment/Progress Note  [facility]    Follow-up,  , Death, Bereavement Care,      Name: Brii Engle MRN: [id number]    Age: 69 y.o.     Sex: male   Language: English   Episcopal: Mosque   Cardiac arrest     Date: 3/21/2025            Total Time Calculated: 36 min              Spiritual Assessment began in [facility] CRITICAL CARE        Referral/Consult From: Nurse   Encounter Overview/Reason: Follow-up  Service Provided For: Family, Patient not available    [place], Belief, Meaning:   Patient unable to assess at this time  Family/Friends identify as spiritual, are connected with a albina tradition or spiritual practice, and have beliefs or practices that help with coping during difficult times      Importance and Influence:  Patient unable to assess at this time  Family/Friends have spiritual/personal beliefs that influence decisions regarding the patient's health    Community:  Patient Other: Unable to assess  Family/Friends are connected with a spiritual community: and feel well-supported. Support system includes: Children and Extended family    Assessment and Plan of Care:      responded to an on call page in reference to the death of Mr. Engle.  provided empathetic listening for the family, encouragement, scripture, prayer and next steps information.  thanked the staff for their care of the family.     March  Home  [facility]  [phone]    Primary Contact- Ruth Ann Engle (daughter) [phone]    Senait was given the Nurse Supervisor's for deaths number if   needed.     Spiritual Health Services are available 24 hours a day as requested.     Patient Interventions include: Other: Unable to assess  Family/Friends Interventions include: Affirmed coping skills/support systems    Patient Plan of Care: No future visits per patient/family request  Family/Friends Plan of Care: No future visits per  patient/family request    Electronically signed by JOHN Montejo on 3/21/2025 at 7:33 PM   Rev. IZZY Montejo, [facility] Paging Service [phone])

## 2025-03-21 NOTE — PROGRESS NOTES
NAME: Brii Engle        :  1955        MRN:  [id number]                   Assessment   :                                               Plan:  RAE on CKD stage IIIa     Anion gap positive metabolic acidosis  Lactic acidosis     Cardiac arrest (20 minutes CPR) on route to hospital; SOB prior to ems     Acute hypoxic resp failure     H/o CHF/MR/afib  H/o dm  H/o carcinoid tumor (left lung resection)  H/o cba  H/o sz d/o Creatinine 1.2-1.4 at baseline.  Cr on the rise, now up to 5, but improving UOP. ATN.     On vent, on pressor, worry of anoxic brain injury     [place]      No urgent need for KRT. Will follow. Family considering transition to comfort care - likely today.       Subjective:     Chief Complaint:  non responsive on the vent. On levo. schrader      Review of Systems:    Symptom Y/N Comments  Symptom Y/N Comments   Fever/Chills    Chest Pain     Poor Appetite    Edema     Cough    Abdominal Pain     Sputum    Joint Pain     SOB/LOERA    Pruritis/Rash     Nausea/vomit    Tolerating PT/OT     Diarrhea    Tolerating Diet     Constipation    Other       Could not obtain due to:      Objective:     VITALS:   Last 24hrs VS reviewed since prior progress note. Most recent are:  Vitals:    25 0600   BP: 92/74   Pulse: 98   Resp: (!) 0   Temp: 97.2 °F (36.2 °C)   SpO2: 99%       Intake/Output Summary (Last 24 hours) at 3/21/2025 0630  Last data filed at 3/21/2025 0200  Gross per 24 hour   Intake 567.14 ml   Output 2490 ml   Net -1922.86 ml      Telemetry Reviewed:     PHYSICAL EXAM:  General: NAD      Lab Data Reviewed: (see below)    Medications Reviewed: (see below)    PMH/SH reviewed - no change compared to H&P  ________________________________________________________________________  Care Plan discussed with:  Patient     Family      RN     Care Manager                    Consultant:          Comments   >50% of visit spent in  sodium chloride flush 0.9 % injection 5-40 mL  5-40 mL IntraVENous PRN    0.9 % sodium chloride infusion   IntraVENous PRN    ondansetron (ZOFRAN-ODT) disintegrating tablet 4 mg  4 mg Oral Q8H PRN    Or    ondansetron (ZOFRAN) injection 4 mg  4 mg IntraVENous Q6H PRN    polyethylene glycol (GLYCOLAX) packet 17 g  17 g Oral Daily PRN    acetaminophen (TYLENOL) tablet 650 mg  650 mg Oral Q6H PRN    Or    acetaminophen (TYLENOL) suppository 650 mg  650 mg Rectal Q6H PRN    dextrose 5 % and 0.45 % sodium chloride infusion   IntraVENous Continuous PRN    glucose chewable tablet 16 g  4 tablet Oral PRN    dextrose bolus 10% 125 mL  125 mL IntraVENous PRN    Or    dextrose bolus 10% 250 mL  250 mL IntraVENous PRN    glucagon injection 1 mg  1 mg SubCUTAneous PRN    dextrose 10 % infusion   IntraVENous Continuous PRN

## 2025-03-21 NOTE — PROGRESS NOTES
Palliative Medicine  Per Dr. Juarez: Brii Engle is a 69 y.o. male with a past medical history of CVA, cancer, who was admitted on 2025 from home with a diagnosis of status epilepticus, AHRF, Afib with RVR, RAE, lactic acidosis. He was treated for seizure with keppra. He was intubated. He was treated for afib with RVR with diltiazem. He become hypotensive and diltiazem was discontinued.      Code Status: Full Code     [facility]/17/2025    12:49 PM   Demographics   Marital Status    No AMD so spouse, Clarisa is primary HCDM and 3 daughters are secondary HCDM     Patient / Family Encounter Documentation     Participants (names): Brii Engle, spouse, Clarisa, daughter Linda, Jerman Perkins, MARY, Rita Lopez, [facility]     Narrative:   --Palliative team met with spouse and daughter after rounds.  --They re-iterated that no patient information is to be shared with any visitors except his spouse and 3 daughters.  --Spouse is allowing family members who are coming from out of town to make closure visits today and tomorrow, then allowing only herself,   their daughters, and patient's sister to visit on .  --Palliative SW offered keepsake of patient's heartbeat strip and they requested them for spouse and 3 daughters.  --Reminded them of Palliative availability today for support and directed them to attending physician, Dr. Quintero for plans to transition to comfort over the weekend.    Psychosocial Issues Identified/ Resilience Factors: Patient is  to wife Clarisa and [place], [place]. They have 3 daughters, Linda Vallecillo, Eunice. They also have grandchildren. Patient has been paralyzed on his left side since a CVA. 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 He is described by his wife as a man of few words, who enjoys being outdoors hunting, fishing, watching car racing, and sitting on his porch. His  large family is present often visiting in the home and helping with his care. Patient has stated he would not want to go to a nursing home, prefers to be home with his family. They had to call it \"rehab\" to get him to agree to go for skilled SNF stay and then he only stayed 3 days.     Caregiver [place]: Low to moderate  Does the caregiver feel confident administering medication? Not assessed.  Does the caregiver need any help connecting with community resources? HH  Does the caregiver feel confident assisting with activities of daily living? Yes     Goals of Care / Plan: (Please see Jerman Perkins NP note.)  DNR   No patient information is to be shared with any visitors except his spouse and 3 daughters.  No escalation of care.  4.   Palliative team will continue to provide support, education as appropriate.    Thank you for including Palliative Medicine in the care of Mr. Brii Engle.     Rita Lopez LCSW  [phone] ([phone])

## 2025-03-21 NOTE — PROGRESS NOTES
Critical Care Progress Note  Leon Hilario MD          Date of Service:  3/21/2025  NAME:  Brii Engle  :  1955  MRN:  [id number]      Subjective/Hospital course:      3/18: Patient is currently intubated and sedated.  No acute events overnight.  3/19: Patient remains intubated.  Unresponsive.  On 7 mics of Levophed.  3/20: Patient remains unresponsive, currently on 20 mics per minute of Levophed  3/21/25 - Patient stays off sedation, non responsive. Hon levo at 19 mcg. Family at bedside, spoke to palliative team and plan to withdraw care on Quentin evening.        Active Problems Being Managed:     -Witnessed cardiac arrest and route to the hospital.  Unclear rhythm.  About 20 minutes of CPR.  - Acute hypoxemic respiratory failure.  --Acute renal failure.  - Cardiogenic shock.  - Concern for anoxic brain injury.  - Lactic acidosis.  - History of heart failure and moderate mitral regurgitation.  LVEF of 25%.  - Atrial fibrillation.  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  - History of seizure disorder.  - History of hypertension.  - Data diabetes mellitus.  - Hyperlipidemia.    Assessment/Plan:     Witnessed cardiac arrest and route to the hospital.  - About 20 minutes of CPR.  Preceded by respiratory symptoms.  I think cardiac arrest was related to decompensated heart failure and pulmonary edema.  -Continue supportive care until care is withdrawn on Quentin evening. No escalation of care, ok to wean levophed as needed  - Maintain normothermia.  - Consult neurology for neuro prognostication.  - Consulted palliative care.    Cardiogenic shock.  - Patient has history of cardiomyopathy with LVEF of 20 to 25% and also has moderate mitral regurgitation.  - Continue supportive care.  - Titrate pressors for goal MAP of more than 65.  - Started empiric vancomycin and Zosyn as patient spiked temperature with worsening vasopressor requirement on 3/18.  Follow-up  24 hours) at 3/21/2025 0814  Last data filed at 3/21/2025 0800  Gross per 24 hour   Intake 532.62 ml   Output 2655 ml   Net -2122.38 ml        Physical Examination:    Physical Exam  Constitutional:       Appearance: He is ill-appearing.      Comments: Intubated.   HENT:      Head: Normocephalic and atraumatic.      Mouth/Throat:      Mouth: Mucous membranes are moist.   Cardiovascular:      Rate and Rhythm: Normal rate and regular rhythm.   Pulmonary:      Effort: Pulmonary effort is normal.         Labs and Imaging:   Reviewed.      Medications:     Current Facility-Administered Medications   Medication Dose Route Frequency    piperacillin-tazobactam (ZOSYN) 3,375 mg in sodium chloride 0.9 % 50 mL IVPB (addEASE)  3,375 mg IntraVENous Q12H    insulin lispro (HUMALOG,ADMELOG) injection vial 0-8 Units  0-8 Units SubCUTAneous Q6H    famotidine (PEPCID) tablet 20 mg  20 mg Per NG tube Daily    fentaNYL (SUBLIMAZE) injection 50 mcg  50 mcg IntraVENous Q2H PRN    alcohol 62% (NOZIN) nasal  1 ampule  1 ampule Nasal Q12H    chlorhexidine (PERIDEX) 0.12 % solution 15 mL  15 mL Mouth/Throat BID    levETIRAcetam (KEPPRA) injection 1,000 mg  1,000 mg IntraVENous Q12H    norepinephrine (LEVOPHED) 16 mg in sodium chloride 0.9 % 250 mL infusion (premix)  1-100 mcg/min IntraVENous Continuous    sodium chloride flush 0.9 % injection 5-40 mL  5-40 mL IntraVENous 2 times per day    sodium chloride flush 0.9 % injection 5-40 mL  5-40 mL IntraVENous PRN    0.9 % sodium chloride infusion   IntraVENous PRN    ondansetron (ZOFRAN-ODT) disintegrating tablet 4 mg  4 mg Oral Q8H PRN    Or    ondansetron (ZOFRAN) injection 4 mg  4 mg IntraVENous Q6H PRN    polyethylene glycol (GLYCOLAX) packet 17 g  17 g Oral Daily PRN    acetaminophen (TYLENOL) tablet 650 mg  650 mg Oral Q6H PRN    Or    acetaminophen (TYLENOL) suppository 650 mg  650 mg Rectal Q6H PRN    dextrose 5 % and 0.45 % sodium chloride infusion   IntraVENous Continuous PRN

## 2025-03-21 NOTE — PLAN OF CARE
Problem: Respiratory - Adult  Goal: Achieves optimal ventilation and oxygenation  3/21/2025 0431 by Mary Nieves RN  Outcome: Progressing  Flowsheets (Taken 3/21/2025 0431)  Achieves optimal ventilation and oxygenation:   Assess for changes in respiratory status   Assess for changes in mentation and behavior   Position to facilitate oxygenation and minimize respiratory effort   Oxygen supplementation based on oxygen saturation or arterial blood gases   Encourage broncho-pulmonary hygiene including cough, deep breathe, incentive spirometry   Assess the need for suctioning and aspirate as needed   Assess and instruct to report shortness of breath or any respiratory difficulty   Respiratory therapy support as indicated.    Problem: Safety - Adult  Goal: Free from fall injury  Outcome: Progressing  Flowsheets  Taken 3/21/2025 0433  Free From Fall Injury:   Instruct family/caregiver on patient safety   Based on caregiver fall risk screen, instruct family/caregiver to ask for assistance with transferring infant if caregiver noted to have fall risk factors.

## 2025-03-21 NOTE — DEATH NOTES
Death Pronouncement Note  Patient's Name: Brii Engle   Patient's YOB: 1955  MRN Number: [id number]    Admitting Provider: Ludwig Haynes MD  Attending Provider: Yanelis Quintero MD    Patient was examined and the following were absent: Pulses, Blood Pressure, and Respiratory effort    I declared the patient dead on 3/21/25 at 18:42    Preliminary Cause of Death:   Worsening advanced heart failure   Electronically signed by Leon Hilario MD on 3/21/25 at 6:56 PM EDT
